# Patient Record
Sex: FEMALE | Race: WHITE | NOT HISPANIC OR LATINO | Employment: OTHER | URBAN - METROPOLITAN AREA
[De-identification: names, ages, dates, MRNs, and addresses within clinical notes are randomized per-mention and may not be internally consistent; named-entity substitution may affect disease eponyms.]

---

## 2017-01-04 ENCOUNTER — OFFICE VISIT (OUTPATIENT)
Dept: CARDIOLOGY | Facility: CLINIC | Age: 72
End: 2017-01-04
Payer: MEDICARE

## 2017-01-04 ENCOUNTER — HOSPITAL ENCOUNTER (OUTPATIENT)
Dept: RADIOLOGY | Facility: HOSPITAL | Age: 72
Discharge: HOME | End: 2017-01-04
Attending: INTERNAL MEDICINE
Payer: MEDICARE

## 2017-01-04 VITALS
WEIGHT: 126.13 LBS | HEIGHT: 60 IN | SYSTOLIC BLOOD PRESSURE: 110 MMHG | OXYGEN SATURATION: 97 % | DIASTOLIC BLOOD PRESSURE: 62 MMHG | BODY MASS INDEX: 24.76 KG/M2 | HEART RATE: 74 BPM

## 2017-01-04 DIAGNOSIS — Z82.49 FAMILY HISTORY OF ARTERIOSCLEROTIC CARDIOVASCULAR DISEASE: ICD-10-CM

## 2017-01-04 DIAGNOSIS — E78.00 PURE HYPERCHOLESTEROLEMIA: ICD-10-CM

## 2017-01-04 DIAGNOSIS — R06.02 SOB (SHORTNESS OF BREATH): ICD-10-CM

## 2017-01-04 DIAGNOSIS — I10 ESSENTIAL HYPERTENSION: ICD-10-CM

## 2017-01-04 DIAGNOSIS — Z72.0 TOBACCO USE: ICD-10-CM

## 2017-01-04 DIAGNOSIS — J43.8 OTHER EMPHYSEMA: Primary | ICD-10-CM

## 2017-01-04 PROCEDURE — 71010 XR CHEST 1 VIEW: CPT | Mod: 26,,, | Performed by: RADIOLOGY

## 2017-01-04 PROCEDURE — 3078F DIAST BP <80 MM HG: CPT | Mod: S$GLB,,, | Performed by: INTERNAL MEDICINE

## 2017-01-04 PROCEDURE — 1126F AMNT PAIN NOTED NONE PRSNT: CPT | Mod: S$GLB,,, | Performed by: INTERNAL MEDICINE

## 2017-01-04 PROCEDURE — 1159F MED LIST DOCD IN RCRD: CPT | Mod: S$GLB,,, | Performed by: INTERNAL MEDICINE

## 2017-01-04 PROCEDURE — 99215 OFFICE O/P EST HI 40 MIN: CPT | Mod: S$GLB,,, | Performed by: INTERNAL MEDICINE

## 2017-01-04 PROCEDURE — 3074F SYST BP LT 130 MM HG: CPT | Mod: S$GLB,,, | Performed by: INTERNAL MEDICINE

## 2017-01-04 PROCEDURE — 99999 PR PBB SHADOW E&M-EST. PATIENT-LVL III: CPT | Mod: PBBFAC,,, | Performed by: INTERNAL MEDICINE

## 2017-01-04 PROCEDURE — 1157F ADVNC CARE PLAN IN RCRD: CPT | Mod: S$GLB,,, | Performed by: INTERNAL MEDICINE

## 2017-01-04 PROCEDURE — 71010 XR CHEST 1 VIEW: CPT | Mod: TC

## 2017-01-04 PROCEDURE — 1160F RVW MEDS BY RX/DR IN RCRD: CPT | Mod: S$GLB,,, | Performed by: INTERNAL MEDICINE

## 2017-01-04 NOTE — PROGRESS NOTES
Subjective:   Patient ID:  Soumya Melchor is a 71 y.o. female who presents for follow-up of tests.  Echo- nml lv function. Pt did not want stress test and wants cath. Risks and benefits explained.  Pt with chronic SOB/JOHN worsening.    Hypertension   This is a chronic problem. The current episode started more than 1 year ago. The problem has been gradually improving since onset. The problem is controlled. Associated symptoms include shortness of breath. Pertinent negatives include no chest pain or palpitations. Past treatments include beta blockers, calcium channel blockers and diuretics. The current treatment provides moderate improvement. Compliance problems include medication side effects.    Hyperlipidemia   This is a chronic problem. The current episode started more than 1 year ago. The problem is controlled. Associated symptoms include shortness of breath. Pertinent negatives include no chest pain. Current antihyperlipidemic treatment includes statins and ezetimibe. The current treatment provides moderate improvement of lipids. Compliance problems include medication side effects.    Shortness of Breath   This is a chronic problem. The current episode started more than 1 year ago. The problem occurs intermittently. The problem has been gradually worsening. Pertinent negatives include no chest pain or leg swelling. The symptoms are aggravated by any activity. She has tried rest and beta agonist inhalers for the symptoms. The treatment provided mild relief.       Review of Systems   Constitution: Negative. Negative for weight gain.   HENT: Negative.    Eyes: Negative.    Cardiovascular: Negative.  Negative for chest pain, leg swelling and palpitations.   Respiratory: Positive for shortness of breath.    Endocrine: Negative.    Hematologic/Lymphatic: Negative.    Skin: Negative.    Musculoskeletal: Negative for muscle weakness.   Gastrointestinal: Negative.    Genitourinary: Negative.    Neurological: Negative.   Negative for dizziness.   Psychiatric/Behavioral: Negative.    Allergic/Immunologic: Negative.      Family History   Problem Relation Age of Onset    Heart attacks under age 50 Mother 41     Past Medical History   Diagnosis Date    Acid reflux 4/11/2014    Anxiety and depression 3/6/2014    Chronic pain associated with significant psychosocial dysfunction 2/21/2013    COPD (chronic obstructive pulmonary disease)     HTN (hypertension)     Hypothyroidism      Current Outpatient Prescriptions on File Prior to Visit   Medication Sig Dispense Refill    amlodipine (NORVASC) 10 MG tablet Take 1 tablet (10 mg total) by mouth once daily. 30 tablet 5    azithromycin (ZITHROMAX Z-LOTTIE) 250 MG tablet Take 1 tablet (250 mg total) by mouth once daily. 500 mg on day 1 (two tablets) followed by 250 mg once daily on days 2-5 6 tablet 1    calcium-vitamin D3 500 mg(1,250mg) -200 unit per tablet Take 1 tablet by mouth 2 (two) times daily with meals. 60 tablet 11    DOCOSAHEXANOIC ACID/EPA (FISH OIL ORAL) Take 1,200 mg by mouth 2 (two) times daily.      ezetimibe (ZETIA) 10 mg tablet Take 1 tablet (10 mg total) by mouth every evening. 30 tablet 11    FOLIC ACID/MULTIVIT-MIN/LUTEIN (CENTRUM SILVER ORAL) Take by mouth once daily.      ipratropium (ATROVENT) 0.06 % nasal spray 2 sprays by Nasal route 4 (four) times daily. As needed for rhinitis 15 mL 11    levothyroxine (SYNTHROID) 200 MCG tablet TAKE ONE TABLET BY MOUTH EVERY DAY 30 tablet 11    magnesium 30 mg Tab Take 30 mg by mouth.      MELATONIN ORAL Take by mouth.      metoprolol tartrate (LOPRESSOR) 25 MG tablet TAKE ONE TABLET BY MOUTH TWICE DAILY 60 tablet 11    nitrofurantoin, macrocrystal-monohydrate, (MACROBID) 100 MG capsule Take 100 mg by mouth 2 (two) times daily.  0    omeprazole (PRILOSEC) 40 MG capsule Take 1 capsule (40 mg total) by mouth once daily. 30 capsule 3    potassium 99 mg Tab Take by mouth.      SENNOSIDES (SENNA LAXATIVE ORAL) Take by  mouth 3 (three) times daily.      simvastatin (ZOCOR) 20 MG tablet Take 20 mg by mouth every evening.  11    tiotropium (SPIRIVA WITH HANDIHALER) 18 mcg inhalation capsule Inhale 1 capsule (18 mcg total) into the lungs once daily. 30 capsule 11    tramadol (ULTRAM) 50 mg tablet Take 50 mg by mouth 2 (two) times daily.  5    trazodone (DESYREL) 50 MG tablet TAKE ONE TABLET BY MOUTH EVERY EVENING 30 tablet 11    triamterene-hydrochlorothiazide 37.5-25 mg (DYAZIDE) 37.5-25 mg per capsule Take 1 capsule by mouth every morning. 30 capsule 11    VENTOLIN HFA 90 mcg/actuation inhaler Inhale 2 puffs into the lungs every 4 (four) hours as needed for Wheezing. 1 Inhaler 11    zolpidem (AMBIEN) 5 MG Tab TAKE ONE TABLET BY MOUTH AT BEDTIME AS NEEDED 30 tablet 3     No current facility-administered medications on file prior to visit.      Review of patient's allergies indicates:   Allergen Reactions    Ace inhibitors     Iodine and iodide containing products Hives     Since age of 48    Lisinopril      angioedema    Shrimp Other (See Comments)     Localized itching and swelling on her hands if she peels shrimp.  Able to eat shrimp without difficulty.  No generalized reaction.       Objective:     Physical Exam   Constitutional: She is oriented to person, place, and time. She appears well-developed and well-nourished.   HENT:   Head: Normocephalic and atraumatic.   Eyes: Conjunctivae and EOM are normal. Pupils are equal, round, and reactive to light.   Neck: Normal range of motion. Neck supple.   Cardiovascular: Normal rate, regular rhythm, normal heart sounds and intact distal pulses.    Pulmonary/Chest: Effort normal and breath sounds normal.   Abdominal: Soft. Bowel sounds are normal.   Musculoskeletal: Normal range of motion.   Neurological: She is alert and oriented to person, place, and time.   Skin: Skin is warm and dry.   Psychiatric: She has a normal mood and affect.   Nursing note and vitals  reviewed.      Assessment:     1. Other emphysema    2. SOB (shortness of breath)    3. Essential hypertension    4. Pure hypercholesterolemia    5. Family history of arteriosclerotic cardiovascular disease    6. Tobacco use        Plan:     Other emphysema    SOB (shortness of breath)    Essential hypertension    Pure hypercholesterolemia    Family history of arteriosclerotic cardiovascular disease    Tobacco use    Pt with shrimp allergy- iodine allergy.( pt states can eat shrimp though )  [facility] with prep  Risks and benefits explained  Continue amlodipine, metoprolol, diuretics- htn  Continue statin, zetia-hlp

## 2017-01-11 ENCOUNTER — TELEPHONE (OUTPATIENT)
Dept: PULMONOLOGY | Facility: HOSPITAL | Age: 72
End: 2017-01-11

## 2017-01-13 ENCOUNTER — HOSPITAL ENCOUNTER (OUTPATIENT)
Facility: HOSPITAL | Age: 72
Discharge: HOME | End: 2017-01-13
Attending: INTERNAL MEDICINE | Admitting: INTERNAL MEDICINE
Payer: MEDICARE

## 2017-01-13 ENCOUNTER — SURGERY (OUTPATIENT)
Age: 72
End: 2017-01-13

## 2017-01-13 VITALS
RESPIRATION RATE: 16 BRPM | WEIGHT: 122 LBS | DIASTOLIC BLOOD PRESSURE: 47 MMHG | SYSTOLIC BLOOD PRESSURE: 105 MMHG | HEIGHT: 60 IN | BODY MASS INDEX: 23.95 KG/M2 | TEMPERATURE: 98 F | OXYGEN SATURATION: 99 % | HEART RATE: 70 BPM

## 2017-01-13 DIAGNOSIS — K21.9 GASTRO-ESOPHAGEAL REFLUX DISEASE WITHOUT ESOPHAGITIS: ICD-10-CM

## 2017-01-13 DIAGNOSIS — R07.9 CHEST PAIN: ICD-10-CM

## 2017-01-13 DIAGNOSIS — I20.9 ANGINA PECTORIS: Primary | ICD-10-CM

## 2017-01-13 LAB — CORONARY STENOSIS: ABNORMAL

## 2017-01-13 PROCEDURE — 93458 L HRT ARTERY/VENTRICLE ANGIO: CPT | Mod: 26,,, | Performed by: INTERNAL MEDICINE

## 2017-01-13 PROCEDURE — 63600175 PHARM REV CODE 636 W HCPCS

## 2017-01-13 PROCEDURE — C1760 CLOSURE DEV, VASC: HCPCS

## 2017-01-13 PROCEDURE — 25000003 PHARM REV CODE 250

## 2017-01-13 RX ORDER — SODIUM CHLORIDE 9 MG/ML
INJECTION, SOLUTION INTRAVENOUS CONTINUOUS
Status: DISCONTINUED | OUTPATIENT
Start: 2017-01-13 | End: 2017-01-13 | Stop reason: HOSPADM

## 2017-01-13 RX ORDER — ACETAMINOPHEN 325 MG/1
650 TABLET ORAL EVERY 8 HOURS PRN
Status: DISCONTINUED | OUTPATIENT
Start: 2017-01-13 | End: 2017-01-13 | Stop reason: HOSPADM

## 2017-01-13 RX ORDER — DIPHENHYDRAMINE HCL 50 MG
50 CAPSULE ORAL ONCE
Status: COMPLETED | OUTPATIENT
Start: 2017-01-13 | End: 2017-01-13

## 2017-01-13 RX ORDER — NITROGLYCERIN 0.4 MG/1
0.4 TABLET SUBLINGUAL EVERY 5 MIN PRN
Status: DISCONTINUED | OUTPATIENT
Start: 2017-01-13 | End: 2017-01-13 | Stop reason: HOSPADM

## 2017-01-13 RX ORDER — ATROPINE SULFATE 0.1 MG/ML
0.5 INJECTION INTRAVENOUS
Status: DISCONTINUED | OUTPATIENT
Start: 2017-01-13 | End: 2017-01-13 | Stop reason: HOSPADM

## 2017-01-13 RX ORDER — DIAZEPAM 5 MG/1
5 TABLET ORAL
Status: COMPLETED | OUTPATIENT
Start: 2017-01-13 | End: 2017-01-13

## 2017-01-13 RX ORDER — HYDROCODONE BITARTRATE AND ACETAMINOPHEN 5; 325 MG/1; MG/1
1 TABLET ORAL EVERY 4 HOURS PRN
Status: DISCONTINUED | OUTPATIENT
Start: 2017-01-13 | End: 2017-01-13 | Stop reason: HOSPADM

## 2017-01-13 RX ORDER — OXYCODONE HYDROCHLORIDE 5 MG/1
10 TABLET ORAL EVERY 4 HOURS PRN
Status: DISCONTINUED | OUTPATIENT
Start: 2017-01-13 | End: 2017-01-13 | Stop reason: HOSPADM

## 2017-01-13 RX ORDER — FAMOTIDINE 10 MG/ML
20 INJECTION INTRAVENOUS
Status: COMPLETED | OUTPATIENT
Start: 2017-01-13 | End: 2017-01-13

## 2017-01-13 RX ADMIN — Medication 50 MG: at 09:01

## 2017-01-13 RX ADMIN — FAMOTIDINE 20 MG: 10 INJECTION INTRAVENOUS at 09:01

## 2017-01-13 RX ADMIN — SODIUM CHLORIDE: 9 INJECTION, SOLUTION INTRAVENOUS at 09:01

## 2017-01-13 RX ADMIN — DIAZEPAM 5 MG: 5 TABLET ORAL at 09:01

## 2017-01-13 NOTE — DISCHARGE INSTRUCTIONS
"Post-op Heart Catheterization    1. DIET: It is advisable for you to follow a diet that limits the intake of salt, sugar, saturated fats and cholesterol.     2. DRIVING: You have had sedation today, DO NOT drive or operate machinery for 24 hours. Avoid making critical decisions or signing legal documents until tomorrow.    3. ACTIVITY:                                Day of discharge:             NO vigorous activity, lifting or straining                                                   Day after discharge:         Avoid heavy lifting (up to 10 lbs)                                                                        Showers only, avoid tub baths for 5 days                                                                         Wash site gently with soap and water                 Remove dressing, apply bandaid if desired                                                                                                                                                                               2nd day after discharge:  Resume normal activities                                                                         Exercise program as instructed      4. WOUND CARE: It is not unusual to have a small amount of bruising appear in the groin area. It is also common to have a tender "knot" develop beneath the skin at the puncture site in the groin. This is scar tissue only and is not a cause for concern or alarm. This tender knot may take several weeks to fully resolve. The bruise will usually spread over several days. If the lump gets bigger, call you doctor immediately.    5. DISCOMFORT: For general discomfort at the puncture site, you may take 1 or 2 Acetaminophen (Tylenol) tablets every 4 hours as needed. (Do not take more than 4000 mg a day)    6. SIGNS AND SYMPTOMS TO REPORT:  Call your physician immediately if any of the following occur:                                            1. Problems with the affected leg: " "Pain, discomfort, loss of warmth, numbness or tingling                                                                                                                            2. Problems at the groin site: Bleeding, pain that is sudden/sharp/persistent,                   swelling at site or a change in "lump" size, increased redness or drainage at                     puncture site                                                               3. High fever (101 degrees or higher)      7. GO TO  THE EMERGENCY ROOM OR CALL 911 IF YOU HAVE: Chest pains or discomforts not relieved with 3 nitroglycerin doses (sublingual tablets or spray), numbness or severe pain or if your foot or leg becomes cold or discolored or uncontrolled bleeding from site (apply direct pressure above site).  "

## 2017-01-13 NOTE — IP AVS SNAPSHOT
[facility]  [id number] [place] [address] [address] Chaz [place]ELSEY [address]           Patient Discharge Instructions     Our goal is to set you up for success. This packet includes information on your condition, medications, and your home care. It will help you to care for yourself so you don't get sicker and need to go back to the hospital.     Please ask your nurse if you have any questions.        There are many details to remember when preparing to leave the hospital. Here is what you will need to do:    1. Take your medicine. If you are prescribed medications, review your Medication List in the following pages. You may have new medications to  at the pharmacy and others that you'll need to stop taking. Review the instructions for how and when to take your medications. Talk with your doctor or nurses if you are unsure of what to do.     2. Go to your follow-up appointments. Specific follow-up information is listed in the following pages. Your may be contacted by a transition nurse or clinical provider about future appointments. Be sure we have all of the phone numbers to reach you, if needed. Please contact your provider's office if you are unable to make an appointment.     3. Watch for warning signs. Your doctor or nurse will give you detailed warning signs to watch for and when to call for assistance. These instructions may also include educational information about your condition. If you experience any of warning signs to your health, call your doctor.               [facility]  Unless otherwise directed by your provider, please contact [facility], our nurse care line that is available for 24/7 assistance.     [phone] (toll-free)    Registered nurses in the [facility] provide clinical advisement, health education, appointment booking, and other advisory services.                    ** Verify the list of medication(s) below is accurate and up to date. Carry this with you  in case of emergency. If your medications have changed, please notify your healthcare provider.             Medication List      CONTINUE taking these medications        Additional Info                      amlodipine 10 MG tablet   Commonly known as:  NORVASC   Quantity:  30 tablet   Refills:  5   Dose:  10 mg    Instructions:  Take 1 tablet (10 mg total) by mouth once daily.     Begin Date    AM    Noon    PM    Bedtime       calcium-vitamin D3 500 mg(1,250mg) -200 unit per tablet   Quantity:  60 tablet   Refills:  11   Dose:  1 tablet    Instructions:  Take 1 tablet by mouth 2 (two) times daily with meals.     Begin Date    AM    Noon    PM    Bedtime       CENTRUM SILVER ORAL   Refills:  0    Instructions:  Take by mouth once daily.     Begin Date    AM    Noon    PM    Bedtime       ezetimibe 10 mg tablet   Commonly known as:  ZETIA   Quantity:  30 tablet   Refills:  11   Dose:  10 mg    Instructions:  Take 1 tablet (10 mg total) by mouth every evening.     Begin Date    AM    Noon    PM    Bedtime       FISH OIL ORAL   Refills:  0   Dose:  1200 mg    Instructions:  Take 1,200 mg by mouth 2 (two) times daily.     Begin Date    AM    Noon    PM    Bedtime       ipratropium 0.06 % nasal spray   Commonly known as:  ATROVENT   Quantity:  15 mL   Refills:  11   Dose:  2 spray    Instructions:  2 sprays by Nasal route 4 (four) times daily. As needed for rhinitis     Begin Date    AM    Noon    PM    Bedtime       levothyroxine 200 MCG tablet   Commonly known as:  SYNTHROID   Quantity:  30 tablet   Refills:  11    Instructions:  TAKE ONE TABLET BY MOUTH EVERY DAY     Begin Date    AM    Noon    PM    Bedtime       magnesium 30 mg Tab   Refills:  0   Dose:  30 mg    Instructions:  Take 30 mg by mouth.     Begin Date    AM    Noon    PM    Bedtime       MELATONIN ORAL   Refills:  0    Instructions:  Take by mouth.     Begin Date    AM    Noon    PM    Bedtime       metoprolol tartrate 25 MG tablet   Commonly known as:   LOPRESSOR   Quantity:  60 tablet   Refills:  11    Instructions:  TAKE ONE TABLET BY MOUTH TWICE DAILY     Begin Date    AM    Noon    PM    Bedtime       omeprazole 40 MG capsule   Commonly known as:  PRILOSEC   Quantity:  30 capsule   Refills:  3   Dose:  40 mg    Instructions:  Take 1 capsule (40 mg total) by mouth once daily.     Begin Date    AM    Noon    PM    Bedtime       potassium 99 mg Tab   Refills:  0    Instructions:  Take by mouth.     Begin Date    AM    Noon    PM    Bedtime       SENNA LAXATIVE ORAL   Refills:  0    Instructions:  Take by mouth 3 (three) times daily.     Begin Date    AM    Noon    PM    Bedtime       simvastatin 20 MG tablet   Commonly known as:  ZOCOR   Refills:  11   Dose:  20 mg    Instructions:  Take 20 mg by mouth every evening.     Begin Date    AM    Noon    PM    Bedtime       tiotropium 18 mcg inhalation capsule   Commonly known as:  SPIRIVA WITH HANDIHALER   Quantity:  30 capsule   Refills:  11   Dose:  18 mcg    Instructions:  Inhale 1 capsule (18 mcg total) into the lungs once daily.     Begin Date    AM    Noon    PM    Bedtime       tramadol 50 mg tablet   Commonly known as:  ULTRAM   Refills:  5   Dose:  50 mg    Instructions:  Take 50 mg by mouth 2 (two) times daily.     Begin Date    AM    Noon    PM    Bedtime       trazodone 50 MG tablet   Commonly known as:  DESYREL   Quantity:  30 tablet   Refills:  11    Instructions:  TAKE ONE TABLET BY MOUTH EVERY EVENING     Begin Date    AM    Noon    PM    Bedtime       triamterene-hydrochlorothiazide 37.5-25 mg 37.5-25 mg per capsule   Commonly known as:  DYAZIDE   Quantity:  30 capsule   Refills:  11    Instructions:  Take 1 capsule by mouth every morning.     Begin Date    AM    Noon    PM    Bedtime       VENTOLIN HFA 90 mcg/actuation inhaler   Quantity:  1 Inhaler   Refills:  11   Dose:  2 puff   Generic drug:  albuterol    Instructions:  Inhale 2 puffs into the lungs every 4 (four) hours as needed for Wheezing.      Begin Date    AM    Noon    PM    Bedtime       zolpidem 5 MG Tab   Commonly known as:  AMBIEN   Quantity:  30 tablet   Refills:  3    Instructions:  TAKE ONE TABLET BY MOUTH AT BEDTIME AS NEEDED     Begin Date    AM    Noon    PM    Bedtime                  Please bring to all follow up appointments:    1. A copy of your discharge instructions.  2. All medicines you are currently taking in their original bottles.  3. Identification and insurance card.    Please arrive 15 minutes ahead of scheduled appointment time.    Please call 24 hours in advance if you must reschedule your appointment and/or time.        Your Scheduled Appointments     Jan 17, 2017  3:00 PM CST   Established Patient Visit with MD [facility] ([facility])    [address] [facility] [id number] [phone]            Mar 08, 2017 11:40 AM CST   Established Patient Visit with Giuliano Henry MD   [facility]    [address] [place] [id number] [phone]            Mar 13, 2017 11:20 AM CDT   Established Patient Visit with MD [facility] ([facility])    [address] [facility] [address]   [phone]            Mar 21, 2017  9:40 AM CDT   Established Patient Visit with Navarro Carmona MD   [facility] ([facility]    [address] [place] [id number]   [phone]            Mar 21, 2017 10:00 AM CDT   Spirometry with SPIROMETRY, [facility]   [facility] ([facility]    [address] [place] [id number] [phone]              Follow-up Information     Follow up In 1 week.    Why:  For wound re-check        Follow up with Steve Bonds MD. Schedule an appointment as soon as possible for a visit in 1 week.    Specialty:  Cardiothoracic Surgery    Why:  NIKKI  AT [facility] WILL CALL WITH APPT  [phone]    Contact information:    Steven LOVE  [facility]  [address]  [facility] [address]  [phone]        Referrals     Future Orders    Ambulatory consult to Cardiovascular Surgery         Discharge Instructions     Future Orders    Activity as tolerated     Comments:    Refer to nursing instructions    Call MD for:  difficulty breathing, headache or visual disturbances     Call MD for:  extreme fatigue     Call MD for:  hives     Call MD for:  persistent dizziness or light-headedness     Call MD for:  persistent nausea and vomiting     Call MD for:  redness, tenderness, or signs of infection (pain, swelling, redness, odor or green/yellow discharge around incision site)     Call MD for:  severe uncontrolled pain     Call MD for:  temperature >100.4     Diet general     Questions:    Total calories:      Fat restriction, if any:      Protein restriction, if any:      Na restriction, if any:      Fluid restriction:      Additional restrictions:  Cardiac (Low Na/Chol)        Discharge Instructions       Post-op Heart Catheterization    1. DIET: It is advisable for you to follow a diet that limits the intake of salt, sugar, saturated fats and cholesterol.     2. DRIVING: You have had sedation today, DO NOT drive or operate machinery for 24 hours. Avoid making critical decisions or signing legal documents until tomorrow.    3. ACTIVITY:                                Day of discharge:             NO vigorous activity, lifting or straining                                                   Day after discharge:         Avoid heavy lifting (up to 10 lbs)                                                                        Showers only, avoid tub baths for 5 days                                                                         Wash site gently with soap and water                 Remove dressing, apply bandaid if desired                                                                                                                                                  "                              2nd day after discharge:  Resume normal activities                                                                         Exercise program as instructed      4. WOUND CARE: It is not unusual to have a small amount of bruising appear in the groin area. It is also common to have a tender "knot" develop beneath the skin at the puncture site in the groin. This is scar tissue only and is not a cause for concern or alarm. This tender knot may take several weeks to fully resolve. The bruise will usually spread over several days. If the lump gets bigger, call you doctor immediately.    5. DISCOMFORT: For general discomfort at the puncture site, you may take 1 or 2 Acetaminophen (Tylenol) tablets every 4 hours as needed. (Do not take more than 4000 mg a day)    6. SIGNS AND SYMPTOMS TO REPORT:  Call your physician immediately if any of the following occur:                                            1. Problems with the affected leg: Pain, discomfort, loss of warmth, numbness or tingling                                                                                                                            2. Problems at the groin site: Bleeding, pain that is sudden/sharp/persistent,                   swelling at site or a change in "lump" size, increased redness or drainage at                     puncture site                                                               3. High fever (101 degrees or higher)      7. GO TO  THE EMERGENCY ROOM OR CALL 911 IF YOU HAVE: Chest pains or discomforts not relieved with 3 nitroglycerin doses (sublingual tablets or spray), numbness or severe pain or if your foot or leg becomes cold or discolored or uncontrolled bleeding from site (apply direct pressure above site).    Discharge References/Attachments     CORONARY ARTERY DISEASE (CAD), UNDERSTANDING (ENGLISH)    BYPASS SURGERY, MINIMALLY INVASIVE (ENGLISH)        Admission Information     Date & Time " Provider Department CSN    1/13/2017  7:57 AM Giuliano Henry MD [facility] [id number]      Care Providers     Provider Role Specialty Primary office phone    Giuliano Henry MD Attending Provider Cardiology [phone]    Giuliano Henry MD Surgeon  Cardiology [phone]      Your Vitals Were     BP Pulse Temp Resp Height Weight    114/56 (BP Location: Left arm, Patient Position: Lying, BP Method: Automatic) 72 98 °F (36.7 °C) (Oral) 18 5' (1.524 m) 55.3 kg (122 lb)    SpO2 BMI             100% 23.83 kg/m2         Recent Lab Values     No lab values to display.      Allergies as of 1/13/2017        Reactions    Ace Inhibitors     Iodine And Iodide Containing Products Hives    Since age of 48    Lisinopril     angioedema    Shrimp Other (See Comments)    Localized itching and swelling on her hands if she peels shrimp.  Able to eat shrimp without difficulty.  No generalized reaction.      Advance Directives     An advance directive is a document which, in the event you are no longer able to make decisions for yourself, tells your healthcare team what kind of treatment you do or do not want to receive, or who you would like to make those decisions for you.  If you do not currently have an advance directive, [facility] encourages you to create one.  For more information call:  [phone] ([phone]), [phone] ([phone]),  or log on to [email or web address].        Smoking Cessation     If you would like to quit smoking:   You may be eligible for free services if you are a [place] resident and started smoking cigarettes before September 1, 1988.  Call the Smoking Cessation Trust (SCT) toll free at (717) 017-6161 or (926) 720-8387.   Call 1-800-QUIT-NOW if you do not meet the above criteria.            Language Assistance Services     ATTENTION: Language assistance services are available, free of charge. Please call [phone].      ATENCIÓN: apryl Vázquez  disposición servicios gratuitos de asistencia lingüística. Wilma al [phone].     [facility] Ý: N?u b?n nói Ti?ng Vi?t, có các d?ch v? h? tr? ngôn ng? mi?n phí dành cho b?n. G?i s? [phone].        [facility] Sign-Up     Activating your [facility] account is as easy as 1-2-3!     1) Visit [email or web address], select Sign Up Now, enter this activation code and your date of birth, then select Next.  [email or web address]  Expires: 1/28/2017 11:48 AM      2) Create a username and password to use when you visit [facility] in the future and select a security question in case you lose your password and select Next.    3) Enter your e-mail address and click Sign Up!    Additional Information  If you have questions, please e-mail [email or web address] or call [phone] to talk to our [facility] staff. Remember, [facility] is NOT to be used for urgent needs. For medical emergencies, dial 911.          [facility] complies with applicable Federal civil rights laws and does not discriminate on the basis of race, color, national origin, age, disability, or sex.

## 2017-01-13 NOTE — IP AVS SNAPSHOT
[facility]  [id number] [place] [address] Dr Chaz KLESEY [address]           I have received a copy of my After Visit Summary and discharge instructions from [facility].    INSTRUCTIONS RECEIVED AND UNDERSTOOD BY:                     Patient/Patient Representative: ________________________________________________________________     Date/Time: ________________________________________________________________                     Instructions Given By: ________________________________________________________________     Date/Time: ________________________________________________________________

## 2017-01-13 NOTE — PLAN OF CARE
Problem: Patient Care Overview  Goal: Discharge Needs Assessment  Outcome: Outcome(s) achieved Date Met:  01/13/17  1330 DISCHARGE INSTRUCTIONS REV'D WITH PT AND SPOUSE. BVU. IV REMOVED. PT WHEELED TO CAR.

## 2017-01-13 NOTE — BRIEF OP NOTE
Procedure Date: 01/13/2017  Procedure:C  Assistant: none  Specimen: none  : DIMAS BUTLER MD  Diagnosis:angina  Sedation:conscious  Complications:none  Blood loss: < 50 cc  Findings:multivessel cad , nml lv function  Recommend: CABG

## 2017-01-13 NOTE — H&P
Subjective:   Patient ID: Soumya Melchor is a 71 y.o. female who presents for follow-up of tests.  Echo- nml lv function. Pt did not want stress test and wants cath. Risks and benefits explained.  Pt with chronic SOB/JOHN worsening.     Hypertension   This is a chronic problem. The current episode started more than 1 year ago. The problem has been gradually improving since onset. The problem is controlled. Associated symptoms include shortness of breath. Pertinent negatives include no chest pain or palpitations. Past treatments include beta blockers, calcium channel blockers and diuretics. The current treatment provides moderate improvement. Compliance problems include medication side effects.   Hyperlipidemia   This is a chronic problem. The current episode started more than 1 year ago. The problem is controlled. Associated symptoms include shortness of breath. Pertinent negatives include no chest pain. Current antihyperlipidemic treatment includes statins and ezetimibe. The current treatment provides moderate improvement of lipids. Compliance problems include medication side effects.   Shortness of Breath   This is a chronic problem. The current episode started more than 1 year ago. The problem occurs intermittently. The problem has been gradually worsening. Pertinent negatives include no chest pain or leg swelling. The symptoms are aggravated by any activity. She has tried rest and beta agonist inhalers for the symptoms. The treatment provided mild relief.         Review of Systems   Constitution: Negative. Negative for weight gain.   HENT: Negative.   Eyes: Negative.   Cardiovascular: Negative. Negative for chest pain, leg swelling and palpitations.   Respiratory: Positive for shortness of breath.   Endocrine: Negative.   Hematologic/Lymphatic: Negative.   Skin: Negative.   Musculoskeletal: Negative for muscle weakness.   Gastrointestinal: Negative.   Genitourinary: Negative.   Neurological: Negative. Negative  for dizziness.   Psychiatric/Behavioral: Negative.   Allergic/Immunologic: Negative.            Family History   Problem Relation Age of Onset    Heart attacks under age 50 Mother 41           Past Medical History   Diagnosis Date    Acid reflux 4/11/2014    Anxiety and depression 3/6/2014    Chronic pain associated with significant psychosocial dysfunction 2/21/2013    COPD (chronic obstructive pulmonary disease)      HTN (hypertension)      Hypothyroidism               Current Outpatient Prescriptions on File Prior to Visit   Medication Sig Dispense Refill    amlodipine (NORVASC) 10 MG tablet Take 1 tablet (10 mg total) by mouth once daily. 30 tablet 5    azithromycin (ZITHROMAX Z-LOTTIE) 250 MG tablet Take 1 tablet (250 mg total) by mouth once daily. 500 mg on day 1 (two tablets) followed by 250 mg once daily on days 2-5 6 tablet 1    calcium-vitamin D3 500 mg(1,250mg) -200 unit per tablet Take 1 tablet by mouth 2 (two) times daily with meals. 60 tablet 11    DOCOSAHEXANOIC ACID/EPA (FISH OIL ORAL) Take 1,200 mg by mouth 2 (two) times daily.        ezetimibe (ZETIA) 10 mg tablet Take 1 tablet (10 mg total) by mouth every evening. 30 tablet 11    FOLIC ACID/MULTIVIT-MIN/LUTEIN (CENTRUM SILVER ORAL) Take by mouth once daily.        ipratropium (ATROVENT) 0.06 % nasal spray 2 sprays by Nasal route 4 (four) times daily. As needed for rhinitis 15 mL 11    levothyroxine (SYNTHROID) 200 MCG tablet TAKE ONE TABLET BY MOUTH EVERY DAY 30 tablet 11    magnesium 30 mg Tab Take 30 mg by mouth.        MELATONIN ORAL Take by mouth.        metoprolol tartrate (LOPRESSOR) 25 MG tablet TAKE ONE TABLET BY MOUTH TWICE DAILY 60 tablet 11    nitrofurantoin, macrocrystal-monohydrate, (MACROBID) 100 MG capsule Take 100 mg by mouth 2 (two) times daily.   0    omeprazole (PRILOSEC) 40 MG capsule Take 1 capsule (40 mg total) by mouth once daily. 30 capsule 3    potassium 99 mg Tab Take by mouth.        SENNOSIDES (SENNA  LAXATIVE ORAL) Take by mouth 3 (three) times daily.        simvastatin (ZOCOR) 20 MG tablet Take 20 mg by mouth every evening.   11    tiotropium (SPIRIVA WITH HANDIHALER) 18 mcg inhalation capsule Inhale 1 capsule (18 mcg total) into the lungs once daily. 30 capsule 11    tramadol (ULTRAM) 50 mg tablet Take 50 mg by mouth 2 (two) times daily.   5    trazodone (DESYREL) 50 MG tablet TAKE ONE TABLET BY MOUTH EVERY EVENING 30 tablet 11    triamterene-hydrochlorothiazide 37.5-25 mg (DYAZIDE) 37.5-25 mg per capsule Take 1 capsule by mouth every morning. 30 capsule 11    VENTOLIN HFA 90 mcg/actuation inhaler Inhale 2 puffs into the lungs every 4 (four) hours as needed for Wheezing. 1 Inhaler 11    zolpidem (AMBIEN) 5 MG Tab TAKE ONE TABLET BY MOUTH AT BEDTIME AS NEEDED 30 tablet 3      No current facility-administered medications on file prior to visit.             Review of patient's allergies indicates:   Allergen Reactions    Ace inhibitors      Iodine and iodide containing products Hives       Since age of 48    Lisinopril         angioedema    Shrimp Other (See Comments)       Localized itching and swelling on her hands if she peels shrimp. Able to eat shrimp without difficulty. No generalized reaction.         Objective:      Physical Exam   Constitutional: She is oriented to person, place, and time. She appears well-developed and well-nourished.   HENT:   Head: Normocephalic and atraumatic.   Eyes: Conjunctivae and EOM are normal. Pupils are equal, round, and reactive to light.   Neck: Normal range of motion. Neck supple.   Cardiovascular: Normal rate, regular rhythm, normal heart sounds and intact distal pulses.   Pulmonary/Chest: Effort normal and breath sounds normal.   Abdominal: Soft. Bowel sounds are normal.   Musculoskeletal: Normal range of motion.   Neurological: She is alert and oriented to person, place, and time.   Skin: Skin is warm and dry.   Psychiatric: She has a normal mood and affect.    Nursing note and vitals reviewed.        Assessment:      1. Other emphysema    2. SOB (shortness of breath)    3. Essential hypertension    4. Pure hypercholesterolemia    5. Family history of arteriosclerotic cardiovascular disease    6. Tobacco use          Plan:      Other emphysema     SOB (shortness of breath)     Essential hypertension     Pure hypercholesterolemia     Family history of arteriosclerotic cardiovascular disease     Tobacco use     Pt with shrimp allergy- iodine allergy.( pt states can eat shrimp though )  [facility] with prep  Risks and benefits explained  Continue amlodipine, metoprolol, diuretics- htn  Continue statin, zetia-hlp

## 2017-01-13 NOTE — PLAN OF CARE
Problem: Patient Care Overview  Goal: Individualization & Mutuality  Outcome: Ongoing (interventions implemented as appropriate)  PT HERE FOR LHC,  AND DTR AT BEDSIDE.  HX: COPD, HTN, HLP, ANXIETY, SLEEP DISORDER

## 2017-01-16 ENCOUNTER — HOSPITAL ENCOUNTER (OUTPATIENT)
Dept: PULMONOLOGY | Facility: HOSPITAL | Age: 72
Discharge: HOME | End: 2017-01-16
Attending: THORACIC SURGERY (CARDIOTHORACIC VASCULAR SURGERY)
Payer: MEDICARE

## 2017-01-16 ENCOUNTER — TELEPHONE (OUTPATIENT)
Dept: PULMONOLOGY | Facility: CLINIC | Age: 72
End: 2017-01-16

## 2017-01-16 ENCOUNTER — TELEPHONE (OUTPATIENT)
Dept: CARDIOLOGY | Facility: CLINIC | Age: 72
End: 2017-01-16

## 2017-01-16 DIAGNOSIS — I25.10 CORONARY ATHEROSCLEROSIS OF UNSPECIFIED TYPE OF VESSEL, NATIVE OR GRAFT: ICD-10-CM

## 2017-01-16 DIAGNOSIS — J96.11 CHRONIC RESPIRATORY FAILURE WITH HYPOXIA: Primary | ICD-10-CM

## 2017-01-16 DIAGNOSIS — J44.1 OBSTRUCTIVE CHRONIC BRONCHITIS WITH EXACERBATION: ICD-10-CM

## 2017-01-16 LAB
ALLENS TEST: ABNORMAL
DELSYS: ABNORMAL
FIO2: 21
HCO3 UR-SCNC: 28.5 MMOL/L (ref 24–28)
MODE: ABNORMAL
PCO2 BLDA: 41.4 MMHG (ref 35–45)
PH SMN: 7.45 [PH] (ref 7.35–7.45)
PO2 BLDA: 85 MMHG (ref 80–100)
POC BE: 4 MMOL/L
POC SATURATED O2: 97 % (ref 95–100)
SAMPLE: ABNORMAL
SITE: ABNORMAL

## 2017-01-16 PROCEDURE — 36600 WITHDRAWAL OF ARTERIAL BLOOD: CPT

## 2017-01-16 PROCEDURE — 82803 BLOOD GASES ANY COMBINATION: CPT

## 2017-01-16 NOTE — TELEPHONE ENCOUNTER
----- Message from Allison Batres sent at 1/16/2017  9:15 AM CST -----  Pt need to set up surgery or a procedure. Please call her at [phone].

## 2017-01-16 NOTE — TELEPHONE ENCOUNTER
The patient has been trying to reach [facility] in [place].She had a cath on Friday and needs a CABG appointment set up.I contacted [facility] and they were given the incorrect phone number to contact patient. i gave Ori the correct information and she stated she will be contacting patient today.

## 2017-01-16 NOTE — TELEPHONE ENCOUNTER
Call in for appointment in next 2 days for ABG and preop evaluation for Dr. Bonds. ABG on day of office visit

## 2017-01-16 NOTE — TELEPHONE ENCOUNTER
The patient was contacted by [facility] and she is now scheduled for preop testing.She will not be coming tomorrow for site check.Her

## 2017-01-17 ENCOUNTER — TELEPHONE (OUTPATIENT)
Dept: PULMONOLOGY | Facility: CLINIC | Age: 72
End: 2017-01-17

## 2017-01-17 NOTE — TELEPHONE ENCOUNTER
----- Message from Kate Diaz sent at 1/17/2017  2:48 PM CST -----  Contact: pt  Pt calling to speak to nurse...states that she has to have a f/u appt with Dr Carmona as soon as possible...states that he advised that he needs to see her this week....advised pt of availability...pt refused times offered....please adv/call pt back at [phone]///thx jw

## 2017-01-17 NOTE — LETTER
January 17, 2017    Steve Bonds MD  CVT           [facility]  [address] [place] [address]  Phone: [phone]  Fax: [phone]   Patient: Soumya Melchor   MR Number: [id number]   YOB: 1945   Date of Visit: 1/17/2017       Dear Dr. Bonds    Thank you for referring Soumya Melchor to me for evaluation. Attached you will find relevant portions of my prior assessment and plan of care.    She is scheduled for an appointment on Friday 1/20/2017. On her last office visit she was on a suboptimal regimen and was taking medications intermittenly.    I think that her lung function can be improved prior to Coronary Artery Bypass Grafting.  I am planning on starting her on an aggressive regime of bronchodilators, incentive spirometry and jet nebulizers. She should be ready for surgery in about 14 days    If you have questions, please do not hesitate to call me. I look forward to following Soumya Melchor along with you.    Sincerely,      Navarro Carmona MD            CC    Enclosure

## 2017-01-17 NOTE — TELEPHONE ENCOUNTER
----- Message from Navarro Carmona MD sent at 1/16/2017  3:42 PM CST -----  Call in for appointment in next 2 days for ABG and preop evaluation for Dr. Bonds. ABG on day of office visit

## 2017-01-17 NOTE — TELEPHONE ENCOUNTER
Spoke with patient.  She called back on her own to now set up appointment to see Dr. Carmona  Informed her that Dr. Carmona will call her today.

## 2017-01-17 NOTE — TELEPHONE ENCOUNTER
Called concerning need for office appointment to discuss  Results of ABG  Results for SOUMYA LEMON (MRN [id number]) as of 1/17/2017 15:10   Ref. Range 1/16/2017 16:38   POC PH Latest Ref Range: 7.35 - 7.45  7.445   POC PCO2 Latest Ref Range: 35 - 45 mmHg 41.4   POC PO2 Latest Ref Range: 80 - 100 mmHg 85   POC BE Latest Ref Range: -2 to 2 mmol/L 4   POC HCO3 Latest Ref Range: 24 - 28 mmol/L 28.5 (H)   POC SATURATED O2 Latest Ref Range: 95 - 100 % 97   FiO2 Unknown 21   Sample Unknown ARTERIAL   DelSys Unknown Room Air   Allens Test Unknown Pass   Site Unknown LR   Mode Unknown SPONT     Needs appointment to discuss treatment plan            Subjective:       Patient ID: Soumya Lemon is a 71 y.o. female.    Chief Complaint: She       Results (discussed ABG)    HPI   Hard time breathing  This is a 71-year-old patient with history of chronic obstructive pulmonary   disease and pulmonary nodules who is seen back in followup examination.  She has   complaints of increasing shortness of breath and dyspnea.  It has gotten worse   over the past year.  She is accompanied by her daughter from [place].  She gets   short of breath when she attempts to walk from one room to the other in her   house.  She has a morning cough with phlegm production.  She is beginning to get   short of breath while performing daily activities.  Onset has been somewhat   slow, but worse in the past few months.  She continues to smoke electronic   cigarettes despite admonitions for the contrary.  She has a longstanding history   of regular cigarette smoking was stopped some time ago.    She is somewhat stressed by the fact she is taking care of a 13-year-old child   in her home, and this is causing her a lot of stress.  Her shortness of breath   gets worse when she is stressed.  She has a cardiac evaluation scheduled.  I   have encouraged her to discuss her symptoms with the cardiologist.    On evaluation today, the patient has moderate to  severe airway obstruction and   significant hypoxemic response to exercise - walking consistent with advanced   lung disease.  This patient has only been taking an albuterol inhaler.  While in   the office today, she was instructed on the use of Spiriva as well as review of   a metered-dose inhaler.    I discussed the possibility of placing her on inhaled corticosteroids, but she   at this time is somewhat averse to using corticosteroids.  I indicated that   there are a few systemic side effects, but the patient did not want to consider   it at this time.  She has been told that she has osteoporosis, and I placed her   on a combination of vitamin D and calcium to supplement her regular multivitamin   routine.    In the office today, she also had complaints of clear postnasal drip, worse at   nighttime.  She indicates that she has tried Flonase in the past, but did not   work.  We will try her on Atrovent nasal spray and see if this would be of any   help.    With her continued use of electronic cigarettes, it may be hard to completely   resolve her sinus and pulmonary symptoms.  I have advised her to consider   tapering back or discontinuing use of these devices if possible.    We will see the patient back in three months with spirometry to see if she has   improved with the use of Spiriva.      SHARON/GINO  dd: 12/23/2016 15:05:19 (CST)  td: 12/24/2016 06:11:13 (CST)  Doc ID   #[id number]  Job ID #[id number]    CC:     [id number]        Past Medical History   Diagnosis Date    Acid reflux 4/11/2014    Anxiety and depression 3/6/2014    Chronic pain associated with significant psychosocial dysfunction 2/21/2013    COPD (chronic obstructive pulmonary disease)     HTN (hypertension)     Hypothyroidism      Past Surgical History   Procedure Laterality Date    Knee surgery Right     Dilation and curettage of uterus      Spinal cord stimulator implant       Social History     Social History    Marital status:       Spouse name: N/A    Number of children: N/A    Years of education: N/A     Occupational History    Not on file.     Social History Main Topics    Smoking status: Former Smoker     Packs/day: 1.00     Years: 50.00     Quit date: 12/10/2012    Smokeless tobacco: Not on file    Alcohol use No    Drug use: Not on file    Sexual activity: Not on file     Other Topics Concern    Not on file     Social History Narrative     Review of Systems   Constitutional: Positive for fatigue. Negative for fever.   HENT: Positive for postnasal drip and rhinorrhea. Negative for congestion.    Respiratory: Positive for cough, sputum production, shortness of breath, dyspnea on extertion, use of rescue inhaler and Paroxysmal Nocturnal Dyspnea.    Cardiovascular: Negative for chest pain, palpitations and leg swelling.   Skin: Negative for rash.   Gastrointestinal: Negative for nausea and abdominal pain.   Neurological: Negative for dizziness, syncope, weakness and light-headedness.   Hematological: Negative for adenopathy. Does not bruise/bleed easily.   Psychiatric/Behavioral: Negative for sleep disturbance. The patient is not nervous/anxious.        Objective:      Physical Exam   Constitutional: She is oriented to person, place, and time. She appears well-developed and well-nourished.   HENT:   Head: Normocephalic and atraumatic.   Mouth/Throat: Oropharyngeal exudate present.   Eyes: Conjunctivae are normal. Pupils are equal, round, and reactive to light.   Neck: Neck supple. No JVD present. No tracheal deviation present. No thyromegaly present.   Cardiovascular: Normal rate, regular rhythm and normal heart sounds.    Pulmonary/Chest: Effort normal. No respiratory distress. She has decreased breath sounds. She has wheezes in the right lower field and the left lower field. She has no rhonchi. She has no rales. She exhibits no tenderness.   Abdominal: Soft. Bowel sounds are normal.   Musculoskeletal: Normal range of motion. She  exhibits no edema.   Lymphadenopathy:     She has no cervical adenopathy.   Neurological: She is alert and oriented to person, place, and time.   Skin: Skin is warm and dry.   Nursing note and vitals reviewed.    Personal Diagnostic Review  Chest x-ray: hyperinflation with pulmonary nodules  Spirometry: moderate to severe obstruction    No flowsheet data found.      Assessment:       No diagnosis found.    Outpatient Encounter Prescriptions as of 1/17/2017   Medication Sig Dispense Refill    amlodipine (NORVASC) 10 MG tablet Take 1 tablet (10 mg total) by mouth once daily. 30 tablet 5    calcium-vitamin D3 500 mg(1,250mg) -200 unit per tablet Take 1 tablet by mouth 2 (two) times daily with meals. 60 tablet 11    DOCOSAHEXANOIC ACID/EPA (FISH OIL ORAL) Take 1,200 mg by mouth 2 (two) times daily.      ezetimibe (ZETIA) 10 mg tablet Take 1 tablet (10 mg total) by mouth every evening. 30 tablet 11    FOLIC ACID/MULTIVIT-MIN/LUTEIN (CENTRUM SILVER ORAL) Take by mouth once daily.      ipratropium (ATROVENT) 0.06 % nasal spray 2 sprays by Nasal route 4 (four) times daily. As needed for rhinitis 15 mL 11    levothyroxine (SYNTHROID) 200 MCG tablet TAKE ONE TABLET BY MOUTH EVERY DAY 30 tablet 11    magnesium 30 mg Tab Take 30 mg by mouth.      MELATONIN ORAL Take by mouth.      metoprolol tartrate (LOPRESSOR) 25 MG tablet TAKE ONE TABLET BY MOUTH TWICE DAILY 60 tablet 11    omeprazole (PRILOSEC) 40 MG capsule Take 1 capsule (40 mg total) by mouth once daily. 30 capsule 3    potassium 99 mg Tab Take by mouth.      SENNOSIDES (SENNA LAXATIVE ORAL) Take by mouth 3 (three) times daily.      simvastatin (ZOCOR) 20 MG tablet Take 20 mg by mouth every evening.  11    tiotropium (SPIRIVA WITH HANDIHALER) 18 mcg inhalation capsule Inhale 1 capsule (18 mcg total) into the lungs once daily. 30 capsule 11    tramadol (ULTRAM) 50 mg tablet Take 50 mg by mouth 2 (two) times daily.  5    trazodone (DESYREL) 50 MG tablet  TAKE ONE TABLET BY MOUTH EVERY EVENING 30 tablet 11    triamterene-hydrochlorothiazide 37.5-25 mg (DYAZIDE) 37.5-25 mg per capsule Take 1 capsule by mouth every morning. 30 capsule 11    VENTOLIN HFA 90 mcg/actuation inhaler Inhale 2 puffs into the lungs every 4 (four) hours as needed for Wheezing. 1 Inhaler 11    zolpidem (AMBIEN) 5 MG Tab TAKE ONE TABLET BY MOUTH AT BEDTIME AS NEEDED 30 tablet 3     No facility-administered encounter medications on file as of 1/17/2017.      No orders of the defined types were placed in this encounter.    Plan:       Requested Prescriptions      No prescriptions requested or ordered in this encounter     There are no diagnoses linked to this encounter.       No Follow-up on file.    MEDICAL DECISION MAKING: Moderate to high complexity.  Overall, the multiple problems listed are of moderate to high severity that may impact quality of life and activities of daily living. Side effects of medications, treatment plan as well as options and alternatives reviewed and discussed with patient. There was counseling of patient concerning these issues.    Total time spent in face to face counseling and coordination of care - 40  minutes over 50% of time was used in discussion of prognosis, risks, benefits of treatment, instructions and compliance with regimen . Discussion with other physicians or health care providers (DME, NP, pharmacy, respiratory therapy) occurred.

## 2017-01-17 NOTE — TELEPHONE ENCOUNTER
----- Message from Mala Majano sent at 1/17/2017  3:45 PM CST -----  Contact: Kenia/[facility]  Kenia returned call to Angelic. She can be contacted at [phone].    Thanks,  Mala

## 2017-01-17 NOTE — TELEPHONE ENCOUNTER
Spoke with Kely at Dr. Bonds's office to inform of letter faxed and of content of note and to inform Dr. Bonds.

## 2017-01-17 NOTE — TELEPHONE ENCOUNTER
Spoke with patient who states she wants Dr. Carmona to call her.  She states will not come in for another appointment for pulmonary clearance.  ABG's done 1/16/17.  Note to Dr. Carmona

## 2017-01-19 RX ORDER — METOPROLOL TARTRATE 25 MG/1
TABLET, FILM COATED ORAL
Qty: 60 TABLET | Refills: 11 | Status: ON HOLD | OUTPATIENT
Start: 2017-01-19 | End: 2017-02-17 | Stop reason: HOSPADM

## 2017-01-20 ENCOUNTER — OFFICE VISIT (OUTPATIENT)
Dept: PULMONOLOGY | Facility: CLINIC | Age: 72
End: 2017-01-20
Payer: MEDICARE

## 2017-01-20 VITALS
WEIGHT: 126.31 LBS | RESPIRATION RATE: 20 BRPM | OXYGEN SATURATION: 91 % | DIASTOLIC BLOOD PRESSURE: 58 MMHG | HEART RATE: 69 BPM | SYSTOLIC BLOOD PRESSURE: 122 MMHG | BODY MASS INDEX: 24.8 KG/M2 | HEIGHT: 60 IN

## 2017-01-20 DIAGNOSIS — Z72.0 TOBACCO USE: ICD-10-CM

## 2017-01-20 DIAGNOSIS — J44.9 CHRONIC OBSTRUCTIVE PULMONARY DISEASE, UNSPECIFIED COPD TYPE: ICD-10-CM

## 2017-01-20 DIAGNOSIS — R91.8 PULMONARY NODULES/LESIONS, MULTIPLE: ICD-10-CM

## 2017-01-20 DIAGNOSIS — Z01.818 PRE-OP EVALUATION: Primary | ICD-10-CM

## 2017-01-20 PROCEDURE — 3078F DIAST BP <80 MM HG: CPT | Mod: S$GLB,,, | Performed by: INTERNAL MEDICINE

## 2017-01-20 PROCEDURE — 99215 OFFICE O/P EST HI 40 MIN: CPT | Mod: 25,S$GLB,, | Performed by: INTERNAL MEDICINE

## 2017-01-20 PROCEDURE — 1159F MED LIST DOCD IN RCRD: CPT | Mod: S$GLB,,, | Performed by: INTERNAL MEDICINE

## 2017-01-20 PROCEDURE — 99406 BEHAV CHNG SMOKING 3-10 MIN: CPT | Mod: S$GLB,,, | Performed by: INTERNAL MEDICINE

## 2017-01-20 PROCEDURE — 1157F ADVNC CARE PLAN IN RCRD: CPT | Mod: S$GLB,,, | Performed by: INTERNAL MEDICINE

## 2017-01-20 PROCEDURE — 99999 PR PBB SHADOW E&M-EST. PATIENT-LVL V: CPT | Mod: PBBFAC,,, | Performed by: INTERNAL MEDICINE

## 2017-01-20 PROCEDURE — 1160F RVW MEDS BY RX/DR IN RCRD: CPT | Mod: S$GLB,,, | Performed by: INTERNAL MEDICINE

## 2017-01-20 PROCEDURE — 94640 AIRWAY INHALATION TREATMENT: CPT | Mod: S$GLB,,, | Performed by: INTERNAL MEDICINE

## 2017-01-20 PROCEDURE — 3074F SYST BP LT 130 MM HG: CPT | Mod: S$GLB,,, | Performed by: INTERNAL MEDICINE

## 2017-01-20 RX ORDER — ALBUTEROL SULFATE 0.83 MG/ML
2.5 SOLUTION RESPIRATORY (INHALATION)
Status: COMPLETED | OUTPATIENT
Start: 2017-01-20 | End: 2017-01-20

## 2017-01-20 RX ORDER — IBUPROFEN 100 MG/5ML
1000 SUSPENSION, ORAL (FINAL DOSE FORM) ORAL DAILY
COMMUNITY

## 2017-01-20 RX ORDER — BUDESONIDE 0.5 MG/2ML
0.5 INHALANT ORAL 2 TIMES DAILY
Qty: 120 ML | Refills: 12 | Status: SHIPPED | OUTPATIENT
Start: 2017-01-20 | End: 2017-04-05

## 2017-01-20 RX ORDER — SENNOSIDES 8.6 MG/1
3 TABLET ORAL
COMMUNITY
End: 2018-02-15

## 2017-01-20 RX ORDER — LANOLIN ALCOHOL/MO/W.PET/CERES
500 CREAM (GRAM) TOPICAL
Status: ON HOLD | COMMUNITY
Start: 2014-04-03 | End: 2017-02-17 | Stop reason: HOSPADM

## 2017-01-20 RX ORDER — IPRATROPIUM BROMIDE AND ALBUTEROL SULFATE 2.5; .5 MG/3ML; MG/3ML
3 SOLUTION RESPIRATORY (INHALATION) EVERY 6 HOURS
Qty: 120 VIAL | Refills: 12 | Status: SHIPPED | OUTPATIENT
Start: 2017-01-20 | End: 2017-04-05

## 2017-01-20 RX ORDER — SIMVASTATIN 40 MG/1
20 TABLET, FILM COATED ORAL NIGHTLY
Status: ON HOLD | COMMUNITY
Start: 2014-10-03 | End: 2017-02-17 | Stop reason: HOSPADM

## 2017-01-20 RX ORDER — FLUOROMETHOLONE 1 MG/ML
SUSPENSION/ DROPS OPHTHALMIC
Refills: 0 | COMMUNITY
Start: 2017-01-09 | End: 2017-08-03

## 2017-01-20 RX ADMIN — ALBUTEROL SULFATE 2.5 MG: 0.83 SOLUTION RESPIRATORY (INHALATION) at 10:01

## 2017-01-20 NOTE — PROGRESS NOTES
Subjective:       Patient ID: Soumya Melchor is a 71 y.o. female.    Chief Complaint:   She   presents for evaluation and treatment of Chronic Obstructive Pulmonary Disease and chronic exercise induced hypoxemia after being discharged from the hospital  6  days ago for angina. Since discharge symptoms have rapidly improving course since that time. Patient denies chest pain . Symptoms are aggravated by activity. Symptoms improve with rest.  Respiratory: positive for dyspnea on exertion; Cardiovascular: no chest pain or palpitations.  Patient currently is on oxygen at 2 L/min per nasal cannula. with exercise    NO cough or sputum production  Otherwise negative pulmonary symptoms  Still smokes a few vapo cigarettes a day.    Asthma with COPD    HPI  Past Medical History   Diagnosis Date    Acid reflux 4/11/2014    Anxiety and depression 3/6/2014    Chronic pain associated with significant psychosocial dysfunction 2/21/2013    COPD (chronic obstructive pulmonary disease)     HTN (hypertension)     Hypothyroidism      Past Surgical History   Procedure Laterality Date    Knee surgery Right     Dilation and curettage of uterus      Spinal cord stimulator implant       Social History     Social History    Marital status:      Spouse name: N/A    Number of children: N/A    Years of education: N/A     Occupational History    Not on file.     Social History Main Topics    Smoking status: Former Smoker     Packs/day: 1.00     Years: 50.00     Quit date: 12/10/2012    Smokeless tobacco: Not on file    Alcohol use No    Drug use: Not on file    Sexual activity: Not on file     Other Topics Concern    Not on file     Social History Narrative     Review of Systems   Constitutional: Negative for fever and fatigue.   HENT: Negative for postnasal drip, rhinorrhea, sinus pressure and congestion.    Respiratory: Positive for chest tightness and shortness of breath. Negative for cough, hemoptysis, sputum  production, wheezing, orthopnea, asthma nighttime symptoms and use of rescue inhaler.    Cardiovascular: Negative for chest pain and palpitations.   Musculoskeletal: Negative for arthralgias.   Skin: Negative for rash.   Gastrointestinal: Negative for nausea.   Neurological: Negative for dizziness and weakness.   Hematological: Negative for adenopathy.   Psychiatric/Behavioral: Negative for sleep disturbance. The patient is not nervous/anxious.        Objective:      Physical Exam   Constitutional: She is oriented to person, place, and time. She appears well-developed and well-nourished.   HENT:   Head: Normocephalic and atraumatic.   Eyes: Conjunctivae are normal. Pupils are equal, round, and reactive to light.   Neck: Neck supple. No JVD present. No tracheal deviation present. No thyromegaly present.   Cardiovascular: Normal rate, regular rhythm and normal heart sounds.    Pulmonary/Chest: Effort normal and breath sounds normal. No respiratory distress. She has no wheezes. She has no rales. She exhibits no tenderness.   Abdominal: Soft. Bowel sounds are normal.   Musculoskeletal: Normal range of motion. She exhibits no edema.   Lymphadenopathy:     She has no cervical adenopathy.   Neurological: She is alert and oriented to person, place, and time.   Skin: Skin is warm and dry.   Nursing note and vitals reviewed.    Personal Diagnostic Review  Chest x-ray: hyperinflation - stable pulmonary nodule  Radiology Result       Name:  :  Patient MRN:   Soumya Melchor 1945 [id number]   Account Number: Room & Bed Accession Number:   [id number]   [id number]   Authorizing Physician: Patient Class: Diagnosis:   [facility]- Outpatient Diagnostic Testing SOB (shortness of breath) [R06.02 (ICD-10-CM)]  Essential hypertension [I10 (ICD-10-CM)]  Pure hypercholesterolemia [E78.00 (ICD-10-CM)]  Family history of arteriosclerotic cardiovascular disease [Z82.49 (ICD-10-CM)]  Tobacco use [Z72.0 (ICD-10-CM)]    Procedure: Exam Date: Reason for Exam:   X-Ray Chest 1 View 2017 None Specified          RESULTS:  Comparison: 2015     2 views      Findings:     Stable pulmonary nodule RIGHT apex. Heart and pulmonary vasculature are within normal limits allowing for lordotic positioning. Intraspinal leads noted in the mid and lower T-spine. Trachea is midline. A few scattered bibasilar infiltrates noted.  IMPRESSION:          Mild chronic obstructive pulmonary disease changes with scattered bibasilar infiltrates without consolidation.     Pulmonary nodule again noted the RIGHT apex.     Additional findings as above.        Electronically signed by: JHONY STARR III, MD  Date:     17  Time:    16:46        Signed By: Jhony Starr III, MD on 2017 4:46 PM         REVIEW OF CT SCAN: Stable pulmonary nodules for over 2 years    Radiology Result       Name:  :  Patient MRN:   Soumya Melchor 1945 [id number]   Account Number: Room & Bed Accession Number:   [id number]   [id number]   Authorizing Physician: Patient Class: Diagnosis:   [facility]- Outpatient Diagnostic Testing Pulmonary nodules/lesions, multiple [R91.8 (ICD-10-CM)]   Procedure: Exam Date: Reason for Exam:   CT Chest Without Contrast 2016 None Specified          RESULTS:  Axial images obtained to the chest. Comparison is made to the previous exam from 2016. Vascular calcifications including coronary artery calcifications are again evident. There is suggestion of a hiatal hernia. There is no evidence of a pleural or pericardial effusion. Some right renal scarring as questioned. The adrenal glands are unremarkable. A small fat-containing Bochdalek type hernia is noted in the left posterior hemidiaphragm.     Areas of air trapping suggesting emphysema are evident. On series #2 image #12 there is a well-defined 7 by 8mm noncalcified pulmonary nodule. This is not significant to change relative to 2015. A stable  subpleural nodule is seen on series #2 image #15 measuring 4 x 2 mm. Some probable scarring and groundglass opacities in the right middle lobe. On series #2 image #49 there is a 2-3 mm noncalcified pulmonary nodule which is stable. Some linear density in subpleural groundglass opacity seen in the left lower lobe in image #46 series #2. Some lingular opacity is evident. There is a distal groundglass opacity in the left upper lobe. At the level of ashanti on series #2 image #25 there is a 3 mm noncalcified pulmonary nodule.  IMPRESSION:   Stable appearance of multiple pulmonary noduleswith areas of scarring and emphysema. No new masses or areas of infiltration are identified.     Hiatal hernia and extensive vascular calcifications are noted.  ______________________________________      Electronically signed by: CARLOS SERRANO MD  Date:     07/07/16  Time:    10:28        Signed By: Carlos Serrano MD on 7/7/2016 10:28 AM               Spirometry: severe obstruction improves with bronchodilator  .[email or web address]  No flowsheet data found.        Results for CÉSAR LEMON (MRN [id number]) as of 1/20/2017 10:27   Ref. Range 1/16/2017 16:38   POC PH Latest Ref Range: 7.35 - 7.45  7.445   POC PCO2 Latest Ref Range: 35 - 45 mmHg 41.4   POC PO2 Latest Ref Range: 80 - 100 mmHg 85   POC BE Latest Ref Range: -2 to 2 mmol/L 4   POC HCO3 Latest Ref Range: 24 - 28 mmol/L 28.5 (H)   POC SATURATED O2 Latest Ref Range: 95 - 100 % 97   FiO2 Unknown 21   Sample Unknown ARTERIAL   DelSys Unknown Room Air   Allens Test Unknown Pass   Site Unknown LR   Mode Unknown SPONT       Physician Interpretation  Severe obstruction.(FEV1>35% and <49% predicted).  Improvement in airflow following bronchodilator therapy suggests an asthmatic component.  Mild restriction based on reduced forced vital capacity (FVC - Forced Vital Capacity).    (Physician 01/04/2017 11:45AM, Dr. Navarro Carmona / Final: 01/04/2017 11:45AM, Dr. Navarro Carmona)          Assessment:        1. Pre-op evaluation    2. Chronic obstructive pulmonary disease, unspecified COPD type    3. Pulmonary nodules/lesions, multiple    4. Tobacco use        Outpatient Encounter Prescriptions as of 1/20/2017   Medication Sig Dispense Refill    amlodipine (NORVASC) 10 MG tablet Take 1 tablet (10 mg total) by mouth once daily. 30 tablet 5    ascorbic acid, vitamin C, (VITAMIN C) 1000 MG tablet Take 1,000 mg by mouth.      calcium-vitamin D3 500 mg(1,250mg) -200 unit per tablet Take 1 tablet by mouth 2 (two) times daily with meals. 60 tablet 11    DOCOSAHEXANOIC ACID/EPA (FISH OIL ORAL) Take 1,200 mg by mouth 2 (two) times daily.      ezetimibe (ZETIA) 10 mg tablet Take 1 tablet (10 mg total) by mouth every evening. 30 tablet 11    fluorometholone 0.1% (FML) 0.1 % DrpS INSTILL ONE DROP IN THE RIGHT EYE THREE TIMES DAILY  0    FOLIC ACID/MULTIVIT-MIN/LUTEIN (CENTRUM SILVER ORAL) Take by mouth once daily.      ipratropium (ATROVENT) 0.06 % nasal spray 2 sprays by Nasal route 4 (four) times daily. As needed for rhinitis 15 mL 11    levothyroxine (SYNTHROID) 200 MCG tablet TAKE ONE TABLET BY MOUTH EVERY DAY 30 tablet 11    magnesium 30 mg Tab Take 30 mg by mouth.      MELATONIN ORAL Take by mouth.      metoprolol tartrate (LOPRESSOR) 25 MG tablet TAKE ONE TABLET BY MOUTH TWICE DAILY 60 tablet 11    niacin 500 mg TbSR Take 500 mg by mouth.      omeprazole (PRILOSEC) 40 MG capsule Take 1 capsule (40 mg total) by mouth once daily. 30 capsule 3    potassium 99 mg Tab Take by mouth.      senna (SENOKOT) 8.6 mg tablet Take 3 tablets by mouth.      SENNOSIDES (SENNA LAXATIVE ORAL) Take by mouth 3 (three) times daily.      simvastatin (ZOCOR) 40 MG tablet Take 40 mg by mouth.      tiotropium (SPIRIVA WITH HANDIHALER) 18 mcg inhalation capsule Inhale 1 capsule (18 mcg total) into the lungs once daily. 30 capsule 11    tramadol (ULTRAM) 50 mg tablet Take 50 mg by mouth 2 (two) times daily.  5    trazodone  (DESYREL) 50 MG tablet TAKE ONE TABLET BY MOUTH EVERY EVENING 30 tablet 11    triamterene-hydrochlorothiazide 37.5-25 mg (DYAZIDE) 37.5-25 mg per capsule Take 1 capsule by mouth every morning. 30 capsule 11    VENTOLIN HFA 90 mcg/actuation inhaler Inhale 2 puffs into the lungs every 4 (four) hours as needed for Wheezing. 1 Inhaler 11    zolpidem (AMBIEN) 5 MG Tab TAKE ONE TABLET BY MOUTH AT BEDTIME AS NEEDED 30 tablet 3    [DISCONTINUED] ipratropium (ATROVENT HFA) 17 mcg/actuation inhaler Inhale 2 puffs into the lungs.      albuterol-ipratropium 2.5mg-0.5mg/3mL (DUO-NEB) 0.5 mg-3 mg(2.5 mg base)/3 mL nebulizer solution Take 3 mLs by nebulization every 6 (six) hours. 120 vial 12    budesonide (PULMICORT) 0.5 mg/2 mL nebulizer solution Take 2 mLs (0.5 mg total) by nebulization 2 (two) times daily. Wash out mouth after using. 120 mL 12    [DISCONTINUED] simvastatin (ZOCOR) 20 MG tablet Take 20 mg by mouth every evening.  11    [] albuterol nebulizer solution 2.5 mg        No facility-administered encounter medications on file as of 2017.      Orders Placed This Encounter   Procedures    NEBULIZER KIT (SUPPLIES) FOR HOME USE     Order Specific Question:   Height:     Answer:   5' (1.524 m)     Order Specific Question:   Weight:     Answer:   57.3 kg (126 lb 5.2 oz)     Order Specific Question:   Length of need (1-99 months):     Answer:   99     Order Specific Question:   Mask or Mouthpiece?     Answer:   Mouthpiece     Order Specific Question:   Vendor:     Answer:   [facility]     Order Specific Question:   Expected Date of Delivery:     Answer:   2017    NEBULIZER FOR HOME USE     Order Specific Question:   Height:     Answer:   5' (1.524 m)     Order Specific Question:   Weight:     Answer:   57.3 kg (126 lb 5.2 oz)     Order Specific Question:   Length of need (1-99 months):     Answer:   99     Order Specific Question:   Vendor:     Answer:   [facility]     Order Specific Question:    Expected Date of Delivery:     Answer:   1/20/2017    Incentive spirometry     Standing Status:   Future     Standing Expiration Date:   3/21/2018    Spirometry with/without bronchodilator     Standing Status:   Future     Standing Expiration Date:   1/20/2018     Plan:       Requested Prescriptions     Signed Prescriptions Disp Refills    albuterol-ipratropium 2.5mg-0.5mg/3mL (DUO-NEB) 0.5 mg-3 mg(2.5 mg base)/3 mL nebulizer solution 120 vial 12     Sig: Take 3 mLs by nebulization every 6 (six) hours.    budesonide (PULMICORT) 0.5 mg/2 mL nebulizer solution 120 mL 12     Sig: Take 2 mLs (0.5 mg total) by nebulization 2 (two) times daily. Wash out mouth after using.     Pre-op evaluation    Chronic obstructive pulmonary disease, unspecified COPD type  -     Incentive spirometry; Future; Expected date: 1/20/17  -     NEBULIZER KIT (SUPPLIES) FOR HOME USE  -     NEBULIZER FOR HOME USE  -     albuterol-ipratropium 2.5mg-0.5mg/3mL (DUO-NEB) 0.5 mg-3 mg(2.5 mg base)/3 mL nebulizer solution; Take 3 mLs by nebulization every 6 (six) hours.  Dispense: 120 vial; Refill: 12  -     budesonide (PULMICORT) 0.5 mg/2 mL nebulizer solution; Take 2 mLs (0.5 mg total) by nebulization 2 (two) times daily. Wash out mouth after using.  Dispense: 120 mL; Refill: 12  -     albuterol nebulizer solution 2.5 mg; Take 3 mLs (2.5 mg total) by nebulization one time.  -     Spirometry with/without bronchodilator; Future; Expected date: 7/23/17    Pulmonary nodules/lesions, multiple    Tobacco use    Patient advised of the adverse effects of cigarette smoking including increased risk of cancer and respiratory diseases. Method of smoking cessation with nicotine replacement products discussed The use of Chantrix  And/or nicotine replacement products were described to patient. Side effects including nausea, GI upset, insomnia, sleep disturbance and possible suicidal ideation discussed. Patient expressed understanding and is given educational  materials. Patient appeared responsive and wished to proceed.  Smoking cessation counseling: Intensive direct physician to patient counseling of greater than 5 minutes   CPT: 77244           Return in about 6 months (around 7/20/2017) for rodolfo.     Patient was given a jet nebulization treatment with albuterol. The patient was instructed on the proper use of nebulizer machine and given nebulizer set up device. Side effects of medication discussed.  Patient voiced understanding.   CPT code 47567    Patient given an incentive spirometer and instructed on use of device. Instructed to use at least four times a day for 4-5 minute intervals with jet neb treatments.    Clearance for surgery:  The patient is at an increased risk of complications from surgery due to multiple medical problems including COPD. Vaccination status reviewed and updated. Risks of possible complications of surgery discussed in detail including risk of respiratory failure requiring mechanical ventilation, post operative pneumonia, deep venous thrombosis, pulmonary embolism and death.  Pulmonary function studies, arterial blood gases and chest X ray reports are independently reviewed. Methods of improving lung function prior to surgery including incentive spirometry, regular use of bronchodilators, exercise and respiratory toilet discussed. Patient voices understanding of increased risks involved due to compromised pulmonary status and the need to comply with treatment plan prior to surgery.  The patient is cleared for anesthesia and surgery from a pulmonary standpoint.    Recommend 10 -14 days of bronchodilator therapy prior to surgery  Advised patient to stop vapo cigarettes    MEDICAL DECISION MAKING: Moderate to high complexity.  Overall, the multiple problems listed are of moderate to high severity that may impact quality of life and activities of daily living. Side effects of medications, treatment plan as well as options and alternatives  reviewed and discussed with patient. There was counseling of patient concerning these issues.    Total time spent in face to face counseling and coordination of care - 40 minutes over 50% of time was used in discussion of prognosis, risks, benefits of treatment, instructions and compliance with regimen . Discussion with other physicians or health care providers (homehealth, durable medical equipment providers).

## 2017-01-20 NOTE — PATIENT INSTRUCTIONS
Use Budesonide jet nebs twice a day - no more or no less.  Use Albuterol prior to budesonide at least twice a day. May use albuterol every 4 - 6 hours if needed for shortness of breath, cough or chest congestion        Ipratropium Bromide, Albuterol Sulfate Nebulizer solution  What is this medicine?  ALBUTEROL; IPRATROPIUM (al BYOO ter ole; i pra TROE pee um) has two bronchodilators. It helps open up the airways in your lungs to make it easier to breathe. This medicine is used to treat chronic obstructive pulmonary disease (COPD).  This medicine may be used for other purposes; ask your health care provider or pharmacist if you have questions.  What should I tell my health care provider before I take this medicine?  They need to know if you have any of the following conditions:  · heart disease  · high blood pressure  · irregular heartbeat  · an unusual or allergic reaction to albuterol, ipratropium, atropine, soya protein, soybeans or peanuts, other medicines, foods, dyes, or preservatives  · pregnant or trying to get pregnant  · breast-feeding  How should I use this medicine?  This medicine is used in a nebulizer. Nebulizers make a liquid into an aerosol that you breathe in through your mouth or your mouth and nose into your lungs. You will be taught how to use your nebulizer. Follow the directions on your prescription label. Take your medicine at regular intervals. Do not use more often than directed.  Talk to your pediatrician regarding the use of this medicine in children. Special care may be needed.  Overdosage: If you think you have taken too much of this medicine contact a poison control center or emergency room at once.  NOTE: This medicine is only for you. Do not share this medicine with others.  What if I miss a dose?  If you miss a dose, use it as soon as you can. If it is almost time for your next dose, use only that dose. Do not use double or extra doses.  What may interact with this medicine?  Do not  take this medicine with any of the following medications:  · MAOIs like Carbex, Eldepryl, Marplan, Nardil, and Parnate  This medicine may also interact with the following medications:  · diuretics  · medicines for depression, anxiety, or psychotic disturbances  · medicines for irregular heartbeat  · medicines for weight loss including some herbal products  · methadone  · pimozide  · some medicines for blood pressure or the heart  · sertindole  This list may not describe all possible interactions. Give your health care provider a list of all the medicines, herbs, non-prescription drugs, or dietary supplements you use. Also tell them if you smoke, drink alcohol, or use illegal drugs. Some items may interact with your medicine.  What should I watch for while using this medicine?  Tell your doctor or healthcare professional if your symptoms do not start to get better or if they get worse. If your breathing gets worse while you are using this medicine, call your doctor right away. Do not stop using your medicine unless your doctor tells you to.  Your mouth may get dry. Chewing sugarless gum or sucking hard candy, and drinking plenty of water may help. Contact your doctor if the problem does not go away or is severe.  You may get dizzy or have blurred vision. Do not drive, use machinery, or do anything that needs mental alertness until you know how this medicine affects you. Do not stand or sit up quickly, especially if you are an older patient. This reduces the risk of dizzy or fainting spells.  What side effects may I notice from receiving this medicine?  Side effects that you should report to your doctor or health care professional as soon as possible:  · allergic reactions like skin rash, itching or hives, swelling of the face, lips, or tongue  · breathing problems  · feeling faint or lightheaded, falls  · fever  · high blood pressure  · irregular heartbeat or chest pain  · muscle cramps or weakness  · pain, tingling,  numbness in the hands or feet  · vomiting  Side effects that usually do not require medical attention (report to your doctor or health care professional if they continue or are bothersome):  · blurred vision  · cough  · difficulty passing urine  · difficulty sleeping  · headache  · nervousness or trembling  · stuffy or runny nose  · unusual taste  · upset stomach  This list may not describe all possible side effects. Call your doctor for medical advice about side effects. You may report side effects to FDA at 5-232-HTM-6249.  Where should I keep my medicine?  Keep out of the reach of children.  Store at a room temperature 2 and 30 degees C (36 to 86 degrees F). Protect from light. Store this medicine in the protective pouch until ready to use. Throw away any unused medicine after the expiration date.  NOTE:This sheet is a summary. It may not cover all possible information. If you have questions about this medicine, talk to your doctor, pharmacist, or health care provider. Copyright© 2016 [employer]      Budesonide Nebulizer suspension  What is this medicine?  BUDESONIDE (bue KIRSTIE oh nide) is a corticosteroid. It helps decrease inflammation in your lungs. This medicine is used to treat the symptoms of asthma. Never use this medicine for an acute asthma attack.  This medicine may be used for other purposes; ask your health care provider or pharmacist if you have questions.  What should I tell my health care provider before I take this medicine?  They need to know if you have any of these conditions:  · bone problems  · glaucoma  · immune system problems  · infection, like chickenpox, tuberculosis, herpes, or fungal infection  · recent surgery or injury of the mouth or throat  · taking corticosteroids by mouth  · an unusual or allergic reaction to budesonide, steroids, other medicines, foods, dyes, or preservatives  · pregnant or trying to get pregnant  · breast-feeding  How should I use this medicine?  This medicine  is used in a nebulizer. Nebulizers make a liquid into an aerosol that you breathe in through your mouth or your mouth and nose into your lungs. You will be taught how to use your nebulizer. Rinse your mouth with water after use. Follow the directions on your prescription label. Do not mix this medicine with other medicines in your nebulizer. Do not use more often than directed.  Talk to your pediatrician regarding the use of this medicine in children. Special care may be needed.  Overdosage: If you think you have taken too much of this medicine contact a poison control center or emergency room at once.  NOTE: This medicine is only for you. Do not share this medicine with others.  What if I miss a dose?  If you miss a dose, use it as soon as you remember. If it is almost time for your next dose, use only that dose and continue with your regular schedule, spacing doses evenly. Do not use double or extra doses.  What may interact with this medicine?  Do not take this medicine with any of the following medications:  · mifepristone  This medicine may also interact with the following medications:  · cimetidine  · clarithromycin  · erythromycin  · itraconazole  · ketoconazole  · some vaccinations  This list may not describe all possible interactions. Give your health care provider a list of all the medicines, herbs, non-prescription drugs, or dietary supplements you use. Also tell them if you smoke, drink alcohol, or use illegal drugs. Some items may interact with your medicine.  What should I watch for while using this medicine?  Visit your doctor or health care professional for regular checks on your progress. Check with your health care professional if your symptoms do not improve. If your symptoms get worse or if you need your short acting inhalers more often, call your doctor right away.  The medicine may increase your risk of getting an infection. Stay away from people who are sick. Tell your doctor or health care  professional if you are around anyone with measles or chickenpox.  What side effects may I notice from receiving this medicine?  Side effects that you should report to your doctor or health care professional as soon as possible:  · allergic reactions like skin rash, itching or hives, swelling of the face, lips, or tongue  · breathing problems  · changes in vision  · unusual swelling  · white patches or sores in the mouth or throat  Side effects that usually do not require medical attention (report to your doctor or health care professional if they continue or are bothersome):  · coughing, hoarseness  · headache  · runny nose  · stomach upset  This list may not describe all possible side effects. Call your doctor for medical advice about side effects. You may report side effects to FDA at 5-376-FDA-5805.  Where should I keep my medicine?  Keep out of the reach of children.  Store at a room temperature between 20 and 25 degrees C (68 and 77 degrees F). Do not refrigerate or freeze. Keep unopened vials in the foil pouch. When the package has been opened, the shelf life of the unused medicine is 2 weeks when protected from light. Unused medicine should be returned to the aluminum foil envelope right away to protect them from light. Throw away any unused medicine after the expiration date.  NOTE:This sheet is a summary. It may not cover all possible information. If you have questions about this medicine, talk to your doctor, pharmacist, or health care provider. Copyright© 2016 [employer]        Step-by-Step  Using a Nebulizer with a Mouthpiece    © 3758-9475 [employer]. [address] [place] [address], [place] [address]. All rights reserved. This information is not intended as a substitute for professional medical care. Always follow your healthcare professional's instructions.        Step-by-Step  Using a Nebulizer    © 4645-4362 [employer]. [address] [place] [address], [place] [address]. All rights  reserved. This information is not intended as a substitute for professional medical care. Always follow your healthcare professional's instructions.        Using an Incentive Spirometer  Soon after your surgery, a nurse or therapist will teach you breathing exercises. These keep your lungs clear, strengthen your breathing muscles, and help prevent complications.  The exercises include doing a deep-breathing exercise using a device called an incentive spirometer.  To do these exercises, you will breathe in through your mouth and not your nose. The incentive spirometer only works correctly if you breathe in through your mouth.  Four steps to clear lungs     Deep breathing expands the lungs, aids circulation, and helps prevent pneumonia.   Step 1. Exhale normally.  · Relax and breathe out.  Step 2. Place your lips tightly around the mouthpiece.  · Make sure the device is upright and not tilted.  Step 3. Inhale as much air as you can through the mouthpiece (don't breath through your nose).  · Inhale slowly and deeply.  · Hold your breath long enough to keep the balls or disk raised for at least 3 seconds.  · Some spirometers have an indicator to let you know that you are breathing in too fast. If the indicator goes off, breathe in more slowly.  Step 4. Repeat the exercise regularly.  · Do this exercise every hour while you're awake, or as your healthcare provider tells you to.  · You will also be taught coughing exercises and be asked to do them regularly on your own.  © 4036-0852 [employer]. [address] [place] [address], [place] [address]. All rights reserved. This information is not intended as a substitute for professional medical care. Always follow your healthcare professional's instructions.        Discharge Instructions: Using an Incentive Spirometer  An incentive spirometer is a device that helps you do deep breathing exercises. These exercises will help you breathe better and improve the function of  your lungs. The incentive spirometer gives you a way to take an active part in your recovery.  Follow these steps to use your incentive spirometer  Inhale normally. Relax and breathe out:  · Place your lips tightly around the mouthpiece.  · Make sure the device is upright and not tilted.  · Inhale as much air as you can through the mouthpiece (don't breathe through your nose). Some spirometers have an indicator to let you know that you are breathing in too fast. If the indicator goes off, breathe in more slowly.  · Hold your breath long enough to keep the balls (or disk) raised for at least 5 seconds.  · Do this exercise every hour while youre awake or as often as your healthcare provider instructs.  · If you were also taught coughing exercises, do them regularly as instructed.  Follow-up care  Make a follow-up appointment as directed by our staff.     When to call the healthcare provider  Call your healthcare provider right away if you have any of the following:  · Shortness of breath that doesn't get better after taking your medicine  · Chest pain  · Fever above 101.0°F (38.3°C)  · Brownish, bloody, or smelly sputum  · Blue lips or fingernails   © 2858-5108 [employer]. [address] [place] [address], [place] [address]. All rights reserved. This information is not intended as a substitute for professional medical care. Always follow your healthcare professional's instructions.

## 2017-01-20 NOTE — MR AVS SNAPSHOT
[facility]  [address] [facility] [address]  Phone: [phone]  Fax: [phone]                  Soumya Melchor   2017 10:00 AM   Office Visit    Description:  Female : 1945   Provider:  Navarro Carmona MD   Department:  [facility]           Reason for Visit     Asthma with COPD           Diagnoses this Visit        Comments    Pre-op evaluation    -  Primary     Chronic obstructive pulmonary disease, unspecified COPD type                To Do List           Future Appointments        Provider Department Dept Phone    3/8/2017 11:40 AM Giuliano Henry MD [facility] [phone]    3/13/2017 11:20 AM Giuliano Henry MD [facility] [phone]    3/21/2017 9:40 AM Navarro Carmona MD [facility] [phone]    3/21/2017 10:00 AM SPIROMETRY, [facility] [phone]    2017 9:30 AM [facility] CT1 LIMIT 500 LBS [facility] -  [phone]      Goals (5 Years of Data)              17    Blood Pressure < 150/90   105/47  114/56  112/54       These Medications        Disp Refills Start End    albuterol-ipratropium 2.5mg-0.5mg/3mL (DUO-NEB) 0.5 mg-3 mg(2.5 mg base)/3 mL nebulizer solution 120 vial 12 2017    Take 3 mLs by nebulization every 6 (six) hours. - Nebulization    [place] [facility] [place] [address] [place] [address] Ph #: [phone]       budesonide (PULMICORT) 0.5 mg/2 mL nebulizer solution 120 mL 12 2017    Take 2 mLs (0.5 mg total) by nebulization 2 (two) times daily. Wash out mouth after using. - Nebulization    Pharm[place] [facility] [place] [address] [facility] Ph #: [phone]       Notes to Pharmacy: Dispense # 60 vials of 0.5mg/2ml      [facility]     [facility] -  Assistance  Registered nurses in the [facility]  [facility] provide clinical advisement, health education, appointment booking, and other advisory services.  Call for this free service at [phone].             Medications           START taking these NEW medications        Refills    albuterol-ipratropium 2.5mg-0.5mg/3mL (DUO-NEB) 0.5 mg-3 mg(2.5 mg base)/3 mL nebulizer solution 12    Sig: Take 3 mLs by nebulization every 6 (six) hours.    Class: Normal    Route: Nebulization    budesonide (PULMICORT) 0.5 mg/2 mL nebulizer solution 12    Sig: Take 2 mLs (0.5 mg total) by nebulization 2 (two) times daily. Wash out mouth after using.    Class: Normal    Route: Nebulization      These medications were administered today        Dose Freq    albuterol nebulizer solution 2.5 mg 2.5 mg Clinic/\[facility]\"" 1 time    Sig: Take 3 mLs (2.5 mg total) by nebulization one time.    Class: Normal    Route: Nebulization      STOP taking these medications     ipratropium (ATROVENT HFA) 17 mcg/actuation inhaler Inhale 2 puffs into the lungs.           Verify that the below list of medications is an accurate representation of the medications you are currently taking.  If none reported, the list may be blank. If incorrect, please contact your healthcare provider. Carry this list with you in case of emergency.           Current Medications     amlodipine (NORVASC) 10 MG tablet Take 1 tablet (10 mg total) by mouth once daily.    ascorbic acid, vitamin C, (VITAMIN C) 1000 MG tablet Take 1,000 mg by mouth.    calcium-vitamin D3 500 mg(1,250mg) -200 unit per tablet Take 1 tablet by mouth 2 (two) times daily with meals.    DOCOSAHEXANOIC ACID/EPA (FISH OIL ORAL) Take 1,200 mg by mouth 2 (two) times daily.    ezetimibe (ZETIA) 10 mg tablet Take 1 tablet (10 mg total) by mouth every evening.    fluorometholone 0.1% (FML) 0.1 % DrpS INSTILL ONE DROP IN THE RIGHT EYE THREE TIMES DAILY    FOLIC ACID/MULTIVIT-MIN/LUTEIN (CENTRUM SILVER ORAL) Take by mouth once daily.    ipratropium (ATROVENT) 0.06 %  nasal spray 2 sprays by Nasal route 4 (four) times daily. As needed for rhinitis    levothyroxine (SYNTHROID) 200 MCG tablet TAKE ONE TABLET BY MOUTH EVERY DAY    magnesium 30 mg Tab Take 30 mg by mouth.    MELATONIN ORAL Take by mouth.    metoprolol tartrate (LOPRESSOR) 25 MG tablet TAKE ONE TABLET BY MOUTH TWICE DAILY    niacin 500 mg TbSR Take 500 mg by mouth.    omeprazole (PRILOSEC) 40 MG capsule Take 1 capsule (40 mg total) by mouth once daily.    potassium 99 mg Tab Take by mouth.    senna (SENOKOT) 8.6 mg tablet Take 3 tablets by mouth.    SENNOSIDES (SENNA LAXATIVE ORAL) Take by mouth 3 (three) times daily.    simvastatin (ZOCOR) 40 MG tablet Take 40 mg by mouth.    tiotropium (SPIRIVA WITH HANDIHALER) 18 mcg inhalation capsule Inhale 1 capsule (18 mcg total) into the lungs once daily.    tramadol (ULTRAM) 50 mg tablet Take 50 mg by mouth 2 (two) times daily.    trazodone (DESYREL) 50 MG tablet TAKE ONE TABLET BY MOUTH EVERY EVENING    triamterene-hydrochlorothiazide 37.5-25 mg (DYAZIDE) 37.5-25 mg per capsule Take 1 capsule by mouth every morning.    VENTOLIN HFA 90 mcg/actuation inhaler Inhale 2 puffs into the lungs every 4 (four) hours as needed for Wheezing.    zolpidem (AMBIEN) 5 MG Tab TAKE ONE TABLET BY MOUTH AT BEDTIME AS NEEDED    albuterol-ipratropium 2.5mg-0.5mg/3mL (DUO-NEB) 0.5 mg-3 mg(2.5 mg base)/3 mL nebulizer solution Take 3 mLs by nebulization every 6 (six) hours.    budesonide (PULMICORT) 0.5 mg/2 mL nebulizer solution Take 2 mLs (0.5 mg total) by nebulization 2 (two) times daily. Wash out mouth after using.           Clinical Reference Information           Vital Signs - Last Recorded  Most recent update: 1/20/2017 10:13 AM by Angelic Curtis LPN    Pulse Resp Ht Wt SpO2 BMI    69 20 5' (1.524 m) 57.3 kg (126 lb 5.2 oz) (!) 91% 24.67 kg/m2      Allergies as of 1/20/2017     Ace Inhibitors    Iodine And Iodide Containing Products    Lisinopril    Shrimp      Immunizations Administered on  Date of Encounter - 1/20/2017     None      Orders Placed During Today's Visit      Normal Orders This Visit    NEBULIZER FOR HOME USE     NEBULIZER KIT (SUPPLIES) FOR HOME USE     Future Labs/Procedures Expected by Expires    Incentive spirometry  1/20/2017 3/21/2018      [facility] Sign-Up     Activating your [facility] account is as easy as 1-2-3!     1) Visit [email or web address], select Sign Up Now, enter this activation code and your date of birth, then select Next.  [email or web address]  Expires: 1/28/2017 11:48 AM      2) Create a username and password to use when you visit [facility] in the future and select a security question in case you lose your password and select Next.    3) Enter your e-mail address and click Sign Up!    Additional Information  If you have questions, please e-mail [email or web address] or call [phone] to talk to our [facility] staff. Remember, [facility] is NOT to be used for urgent needs. For medical emergencies, dial 911.         Instructions    Use Budesonide jet nebs twice a day - no more or no less.  Use Albuterol prior to budesonide at least twice a day. May use albuterol every 4 - 6 hours if needed for shortness of breath, cough or chest congestion        Ipratropium Bromide, Albuterol Sulfate Nebulizer solution  What is this medicine?  ALBUTEROL; IPRATROPIUM (al BYOO ter ole; i pra TROE pee um) has two bronchodilators. It helps open up the airways in your lungs to make it easier to breathe. This medicine is used to treat chronic obstructive pulmonary disease (COPD).  This medicine may be used for other purposes; ask your health care provider or pharmacist if you have questions.  What should I tell my health care provider before I take this medicine?  They need to know if you have any of the following conditions:  · heart disease  · high blood pressure  · irregular heartbeat  · an unusual or allergic reaction to albuterol, ipratropium, atropine, soya protein, soybeans or peanuts, other  medicines, foods, dyes, or preservatives  · pregnant or trying to get pregnant  · breast-feeding  How should I use this medicine?  This medicine is used in a nebulizer. Nebulizers make a liquid into an aerosol that you breathe in through your mouth or your mouth and nose into your lungs. You will be taught how to use your nebulizer. Follow the directions on your prescription label. Take your medicine at regular intervals. Do not use more often than directed.  Talk to your pediatrician regarding the use of this medicine in children. Special care may be needed.  Overdosage: If you think you have taken too much of this medicine contact a poison control center or emergency room at once.  NOTE: This medicine is only for you. Do not share this medicine with others.  What if I miss a dose?  If you miss a dose, use it as soon as you can. If it is almost time for your next dose, use only that dose. Do not use double or extra doses.  What may interact with this medicine?  Do not take this medicine with any of the following medications:  · MAOIs like Carbex, Eldepryl, Marplan, Nardil, and Parnate  This medicine may also interact with the following medications:  · diuretics  · medicines for depression, anxiety, or psychotic disturbances  · medicines for irregular heartbeat  · medicines for weight loss including some herbal products  · methadone  · pimozide  · some medicines for blood pressure or the heart  · sertindole  This list may not describe all possible interactions. Give your health care provider a list of all the medicines, herbs, non-prescription drugs, or dietary supplements you use. Also tell them if you smoke, drink alcohol, or use illegal drugs. Some items may interact with your medicine.  What should I watch for while using this medicine?  Tell your doctor or healthcare professional if your symptoms do not start to get better or if they get worse. If your breathing gets worse while you are using this medicine,  call your doctor right away. Do not stop using your medicine unless your doctor tells you to.  Your mouth may get dry. Chewing sugarless gum or sucking hard candy, and drinking plenty of water may help. Contact your doctor if the problem does not go away or is severe.  You may get dizzy or have blurred vision. Do not drive, use machinery, or do anything that needs mental alertness until you know how this medicine affects you. Do not stand or sit up quickly, especially if you are an older patient. This reduces the risk of dizzy or fainting spells.  What side effects may I notice from receiving this medicine?  Side effects that you should report to your doctor or health care professional as soon as possible:  · allergic reactions like skin rash, itching or hives, swelling of the face, lips, or tongue  · breathing problems  · feeling faint or lightheaded, falls  · fever  · high blood pressure  · irregular heartbeat or chest pain  · muscle cramps or weakness  · pain, tingling, numbness in the hands or feet  · vomiting  Side effects that usually do not require medical attention (report to your doctor or health care professional if they continue or are bothersome):  · blurred vision  · cough  · difficulty passing urine  · difficulty sleeping  · headache  · nervousness or trembling  · stuffy or runny nose  · unusual taste  · upset stomach  This list may not describe all possible side effects. Call your doctor for medical advice about side effects. You may report side effects to FDA at 9-160-FDA-1286.  Where should I keep my medicine?  Keep out of the reach of children.  Store at a room temperature 2 and 30 degees C (36 to 86 degrees F). Protect from light. Store this medicine in the protective pouch until ready to use. Throw away any unused medicine after the expiration date.  NOTE:This sheet is a summary. It may not cover all possible information. If you have questions about this medicine, talk to your doctor, pharmacist,  or health care provider. Copyright© 2016 [employer]      Budesonide Nebulizer suspension  What is this medicine?  BUDESONIDE (bue KIRSTIE oh nide) is a corticosteroid. It helps decrease inflammation in your lungs. This medicine is used to treat the symptoms of asthma. Never use this medicine for an acute asthma attack.  This medicine may be used for other purposes; ask your health care provider or pharmacist if you have questions.  What should I tell my health care provider before I take this medicine?  They need to know if you have any of these conditions:  · bone problems  · glaucoma  · immune system problems  · infection, like chickenpox, tuberculosis, herpes, or fungal infection  · recent surgery or injury of the mouth or throat  · taking corticosteroids by mouth  · an unusual or allergic reaction to budesonide, steroids, other medicines, foods, dyes, or preservatives  · pregnant or trying to get pregnant  · breast-feeding  How should I use this medicine?  This medicine is used in a nebulizer. Nebulizers make a liquid into an aerosol that you breathe in through your mouth or your mouth and nose into your lungs. You will be taught how to use your nebulizer. Rinse your mouth with water after use. Follow the directions on your prescription label. Do not mix this medicine with other medicines in your nebulizer. Do not use more often than directed.  Talk to your pediatrician regarding the use of this medicine in children. Special care may be needed.  Overdosage: If you think you have taken too much of this medicine contact a poison control center or emergency room at once.  NOTE: This medicine is only for you. Do not share this medicine with others.  What if I miss a dose?  If you miss a dose, use it as soon as you remember. If it is almost time for your next dose, use only that dose and continue with your regular schedule, spacing doses evenly. Do not use double or extra doses.  What may interact with this  medicine?  Do not take this medicine with any of the following medications:  · mifepristone  This medicine may also interact with the following medications:  · cimetidine  · clarithromycin  · erythromycin  · itraconazole  · ketoconazole  · some vaccinations  This list may not describe all possible interactions. Give your health care provider a list of all the medicines, herbs, non-prescription drugs, or dietary supplements you use. Also tell them if you smoke, drink alcohol, or use illegal drugs. Some items may interact with your medicine.  What should I watch for while using this medicine?  Visit your doctor or health care professional for regular checks on your progress. Check with your health care professional if your symptoms do not improve. If your symptoms get worse or if you need your short acting inhalers more often, call your doctor right away.  The medicine may increase your risk of getting an infection. Stay away from people who are sick. Tell your doctor or health care professional if you are around anyone with measles or chickenpox.  What side effects may I notice from receiving this medicine?  Side effects that you should report to your doctor or health care professional as soon as possible:  · allergic reactions like skin rash, itching or hives, swelling of the face, lips, or tongue  · breathing problems  · changes in vision  · unusual swelling  · white patches or sores in the mouth or throat  Side effects that usually do not require medical attention (report to your doctor or health care professional if they continue or are bothersome):  · coughing, hoarseness  · headache  · runny nose  · stomach upset  This list may not describe all possible side effects. Call your doctor for medical advice about side effects. You may report side effects to FDA at 0-479-FDA-0644.  Where should I keep my medicine?  Keep out of the reach of children.  Store at a room temperature between 20 and 25 degrees C (68 and 77  degrees F). Do not refrigerate or freeze. Keep unopened vials in the foil pouch. When the package has been opened, the shelf life of the unused medicine is 2 weeks when protected from light. Unused medicine should be returned to the aluminum foil envelope right away to protect them from light. Throw away any unused medicine after the expiration date.  NOTE:This sheet is a summary. It may not cover all possible information. If you have questions about this medicine, talk to your doctor, pharmacist, or health care provider. Copyright© 2016 Gold Standard        Step-by-Step  Using a Nebulizer with a Mouthpiece    © 2170-9849 [employer]. [address] [place] [address]. All rights reserved. This information is not intended as a substitute for professional medical care. Always follow your healthcare professional's instructions.        Step-by-Step  Using a Nebulizer    © 4301-8934 [employer]. [address] [place] [address]. All rights reserved. This information is not intended as a substitute for professional medical care. Always follow your healthcare professional's instructions.        Using an Incentive Spirometer  Soon after your surgery, a nurse or therapist will teach you breathing exercises. These keep your lungs clear, strengthen your breathing muscles, and help prevent complications.  The exercises include doing a deep-breathing exercise using a device called an incentive spirometer.  To do these exercises, you will breathe in through your mouth and not your nose. The incentive spirometer only works correctly if you breathe in through your mouth.  Four steps to clear lungs     Deep breathing expands the lungs, aids circulation, and helps prevent pneumonia.   Step 1. Exhale normally.  · Relax and breathe out.  Step 2. Place your lips tightly around the mouthpiece.  · Make sure the device is upright and not tilted.  Step 3. Inhale as much air as you can through  the mouthpiece (don't breath through your nose).  · Inhale slowly and deeply.  · Hold your breath long enough to keep the balls or disk raised for at least 3 seconds.  · Some spirometers have an indicator to let you know that you are breathing in too fast. If the indicator goes off, breathe in more slowly.  Step 4. Repeat the exercise regularly.  · Do this exercise every hour while you're awake, or as your healthcare provider tells you to.  · You will also be taught coughing exercises and be asked to do them regularly on your own.  © 5918-5385 [employer]. [address] [place] [address], [place] [address]. All rights reserved. This information is not intended as a substitute for professional medical care. Always follow your healthcare professional's instructions.        Discharge Instructions: Using an Incentive Spirometer  An incentive spirometer is a device that helps you do deep breathing exercises. These exercises will help you breathe better and improve the function of your lungs. The incentive spirometer gives you a way to take an active part in your recovery.  Follow these steps to use your incentive spirometer  Inhale normally. Relax and breathe out:  · Place your lips tightly around the mouthpiece.  · Make sure the device is upright and not tilted.  · Inhale as much air as you can through the mouthpiece (don't breathe through your nose). Some spirometers have an indicator to let you know that you are breathing in too fast. If the indicator goes off, breathe in more slowly.  · Hold your breath long enough to keep the balls (or disk) raised for at least 5 seconds.  · Do this exercise every hour while youre awake or as often as your healthcare provider instructs.  · If you were also taught coughing exercises, do them regularly as instructed.  Follow-up care  Make a follow-up appointment as directed by our staff.     When to call the healthcare provider  Call your healthcare provider right away if you  have any of the following:  · Shortness of breath that doesn't get better after taking your medicine  · Chest pain  · Fever above 101.0°F (38.3°C)  · Brownish, bloody, or smelly sputum  · Blue lips or fingernails   © 8744-5376 [employer]. [address] [place] [address]. All rights reserved. This information is not intended as a substitute for professional medical care. Always follow your healthcare professional's instructions.

## 2017-01-23 ENCOUNTER — TELEPHONE (OUTPATIENT)
Dept: CARDIOLOGY | Facility: CLINIC | Age: 72
End: 2017-01-23

## 2017-01-23 NOTE — TELEPHONE ENCOUNTER
----- Message from Tatum Trent sent at 1/23/2017 11:43 AM CST -----  Contact: patient  Patient is taking breathing treatments per Dr Carmona, but its hurts her badly, and patient wants to know if it's OK  For her to take Lidocaine patches for the pain, they have aspirin in them, patient left messages with Dr Carmona office but did not hear back. Please call  Patient back at [phone] or [phone]. Thank you

## 2017-01-25 ENCOUNTER — TELEPHONE (OUTPATIENT)
Dept: PULMONOLOGY | Facility: CLINIC | Age: 72
End: 2017-01-25

## 2017-01-25 NOTE — TELEPHONE ENCOUNTER
Pt would like to start lidocaine patches. Please advise patient on whether she may take this or not due to her COPD.

## 2017-01-25 NOTE — TELEPHONE ENCOUNTER
Pt notified per Cardiology that she may use the patches from a cardiac stand point. Advised pt to speak with pulmonary to ensure it will not cause any breathing problems for her. Pt voiced understanding.

## 2017-01-25 NOTE — TELEPHONE ENCOUNTER
----- Message from Katy Cantrell sent at 1/25/2017 11:14 AM CST -----  needs callback rg surgery next week, will elaborate..[phone]

## 2017-01-25 NOTE — TELEPHONE ENCOUNTER
----- Message from Nohemy Pulido sent at 1/25/2017  1:46 PM CST -----  States call is regarding patch that she need to wear. States she has shingles. Please adv/call [phone].//thanks. cw

## 2017-02-01 ENCOUNTER — TELEPHONE (OUTPATIENT)
Dept: PULMONOLOGY | Facility: CLINIC | Age: 72
End: 2017-02-01

## 2017-02-01 NOTE — TELEPHONE ENCOUNTER
Left message with Kely that a copy of the preop visit was faxed to [phone]. [facility]

## 2017-02-06 ENCOUNTER — PATIENT OUTREACH (OUTPATIENT)
Dept: ADMINISTRATIVE | Facility: HOSPITAL | Age: 72
End: 2017-02-06

## 2017-02-06 NOTE — PROGRESS NOTES
Completed vital signs for measurement year 2016 routed to [facility] as attestation documentation for Adult BMI Assessment.  Measure has been satisfied.

## 2017-02-07 NOTE — DISCHARGE INSTRUCTIONS
To confirm, Your doctor has instructed you that surgery is scheduled for 02/13/17  at  07:00 a.m .       Please report to [facility] [place], 1st floor, main lobby by 05:30 a.m .      INSTRUCTIONS IMPORTANT!!!   Do not eat, drink, or smoke after 12 midnight-including water. OK to brush teeth, no gum, candy or mints!    ¨ Take only these medicines with a small swallow of water-morning of surgery.  Metoprolol    REMOVE ALL ARTIFICIAL NAILS/ POLISH PRIOR TO SURGERY    Come to [facility] Saturday,. 2/11/17 by 09:00 a.m for Type and Screen    DO NOT REMOVE RED BLOOD ARMBAND PRIOR TO SURGERY  Pre operative instructions:  Please review the Pre-Operative Instruction booklet that you were given.        Bathing Instructions--See page 6 in the Pre-operative booklet.      Prevention of surgical site infections:     -Keep incisions clean and dry.   -Do not soak/submerge incisions in water until completely healed.   -Do not apply lotions, powders, creams, or deodorants to site.   -Always make sure hands are cleaned with antibacterial soap/ alcohol-based                 prior to touching the surgical site.  (This includes doctors,                 nurses, staff, and yourself.)    Signs and symptoms:   -Redness and pain around the area where you had surgery   -Drainage of cloudy fluid from your surgical wound   -Fever over 100.4       I have read or had read and explained to me, and understand the above information.  Additional comments or instructions:  Received a copy of Pre-operative instructions booklet, FAQ surgical site infection sheet, and packets of hibiclens (if indicated).

## 2017-02-09 ENCOUNTER — HOSPITAL ENCOUNTER (OUTPATIENT)
Dept: PULMONOLOGY | Facility: HOSPITAL | Age: 72
Discharge: HOME | End: 2017-02-09
Attending: THORACIC SURGERY (CARDIOTHORACIC VASCULAR SURGERY)
Payer: MEDICARE

## 2017-02-09 ENCOUNTER — HOSPITAL ENCOUNTER (OUTPATIENT)
Dept: PREADMISSION TESTING | Facility: HOSPITAL | Age: 72
Discharge: HOME | End: 2017-02-09
Attending: THORACIC SURGERY (CARDIOTHORACIC VASCULAR SURGERY)
Payer: MEDICARE

## 2017-02-09 ENCOUNTER — HOSPITAL ENCOUNTER (OUTPATIENT)
Dept: RADIOLOGY | Facility: HOSPITAL | Age: 72
Discharge: HOME | End: 2017-02-09
Attending: THORACIC SURGERY (CARDIOTHORACIC VASCULAR SURGERY)
Payer: MEDICARE

## 2017-02-09 VITALS
SYSTOLIC BLOOD PRESSURE: 120 MMHG | DIASTOLIC BLOOD PRESSURE: 57 MMHG | HEIGHT: 60 IN | OXYGEN SATURATION: 98 % | TEMPERATURE: 98 F | BODY MASS INDEX: 24.35 KG/M2 | WEIGHT: 124 LBS | RESPIRATION RATE: 20 BRPM | HEART RATE: 67 BPM

## 2017-02-09 DIAGNOSIS — I25.10 CORONARY ATHEROSCLEROSIS OF UNSPECIFIED TYPE OF VESSEL, NATIVE OR GRAFT: ICD-10-CM

## 2017-02-09 PROCEDURE — 87081 CULTURE SCREEN ONLY: CPT

## 2017-02-09 PROCEDURE — 93010 ELECTROCARDIOGRAM REPORT: CPT | Mod: ,,, | Performed by: INTERNAL MEDICINE

## 2017-02-09 PROCEDURE — 71020 XR CHEST PA AND LATERAL PRE-OP: CPT | Mod: TC

## 2017-02-09 PROCEDURE — 93005 ELECTROCARDIOGRAM TRACING: CPT

## 2017-02-09 RX ORDER — DULOXETIN HYDROCHLORIDE 30 MG/1
30 CAPSULE, DELAYED RELEASE ORAL DAILY
COMMUNITY
End: 2017-08-03

## 2017-02-09 NOTE — PRE ADMISSION SCREENING
Jennifer Grey  sent IM stating pt needs CPT code for insurance company to authorize ABG's today.  Called [place] with Dr Bonds's office to request CPT.  One[place] stated pt recently had ABG's done in January and that it is not necessary.  Jennifer Grey informed.  Alicia with [facility] called and stated pt refused to have ABG's done today.  Informed that it is not necessary/  Dr Bonds's office.

## 2017-02-09 NOTE — PROGRESS NOTES
EKG done on patient. Patient refused ABG at this time stated she had one done two weeks ago here. See results of ABG on 1/16/17

## 2017-02-09 NOTE — PLAN OF CARE
Patient visit for pre op instructions.  Pre operative instruction booklet given  Hibiclens given to patient with showering instructions

## 2017-02-09 NOTE — IP AVS SNAPSHOT
[facility]  [id number] [place] [address] [address] [place] [id number]           Patient Discharge Instructions    Our goal is to set you up for success. This packet includes information on your condition, medications, and your home care. It will help you to care for yourself so you don't get sicker.     Please ask your nurse if you have any questions.        There are many details to remember when preparing for your surgery. Here is what you will need to do, please ask your nurse if there are more specific instructions and if you have any questions:    1. 24 hours before procedure Do not smoke or drink alcoholic beverages 24 hours prior to your procedure    2. Eating before procedure Do not eat or drink anything 8 hours before your procedure - this includes gum, mints, and candy.     3. Day of procedure Please remove all jewelry for the procedure. If you wear contact lenses, dentures, hearing aids or glasses, bring a container to put them in during your surgery and give to a family member for safekeeping.  If your doctor has scheduled you for an overnight stay, bring a small overnight bag with any personal items that you need.    4. After procedure Make arrangements in advance for transportation home by a responsible adult. It is not safe to drive a vehicle during the 24 hours following surgery.     PLEASE NOTE: You may be contacted the day before your surgery to confirm your surgery date and arrival time. The Surgery schedule has many variables which may affect the time of your surgery case. Family members should be available if your surgery time changes.                ** Verify the list of medication(s) below is accurate and up to date. Carry this with you in case of emergency. If your medications have changed, please notify your healthcare provider.             Medication List      TAKE these medications        Additional Info                      albuterol-ipratropium 2.5mg-0.5mg/3mL 0.5 mg-3 mg(2.5  mg base)/3 mL nebulizer solution   Commonly known as:  DUO-NEB   Quantity:  120 vial   Refills:  12   Dose:  3 mL    Instructions:  Take 3 mLs by nebulization every 6 (six) hours.     Begin Date    AM    Noon    PM    Bedtime       amlodipine 10 MG tablet   Commonly known as:  NORVASC   Quantity:  30 tablet   Refills:  5   Dose:  10 mg    Instructions:  Take 1 tablet (10 mg total) by mouth once daily.     Begin Date    AM    Noon    PM    Bedtime       ascorbic acid (vitamin C) 1000 MG tablet   Commonly known as:  VITAMIN C   Refills:  0   Dose:  1000 mg    Instructions:  Take 1,000 mg by mouth once daily.     Begin Date    AM    Noon    PM    Bedtime       budesonide 0.5 mg/2 mL nebulizer solution   Commonly known as:  PULMICORT   Quantity:  120 mL   Refills:  12   Dose:  0.5 mg   Comments:  Dispense # 60 vials of 0.5mg/2ml    Instructions:  Take 2 mLs (0.5 mg total) by nebulization 2 (two) times daily. Wash out mouth after using.     Begin Date    AM    Noon    PM    Bedtime       calcium-vitamin D3 500 mg(1,250mg) -200 unit per tablet   Quantity:  60 tablet   Refills:  11   Dose:  1 tablet    Instructions:  Take 1 tablet by mouth 2 (two) times daily with meals.     Begin Date    AM    Noon    PM    Bedtime       CENTRUM SILVER ORAL   Refills:  0    Instructions:  Take by mouth once daily.     Begin Date    AM    Noon    PM    Bedtime       duloxetine 30 MG capsule   Commonly known as:  CYMBALTA   Refills:  0   Dose:  30 mg    Instructions:  Take 30 mg by mouth once daily.     Begin Date    AM    Noon    PM    Bedtime       ezetimibe 10 mg tablet   Commonly known as:  ZETIA   Quantity:  30 tablet   Refills:  11   Dose:  10 mg    Instructions:  Take 1 tablet (10 mg total) by mouth every evening.     Begin Date    AM    Noon    PM    Bedtime       FISH OIL ORAL   Refills:  0   Dose:  1200 mg    Instructions:  Take 1,200 mg by mouth 2 (two) times daily.     Begin Date    AM    Noon    PM    Bedtime        fluorometholone 0.1% 0.1 % Drps   Commonly known as:  FML   Refills:  0    Instructions:  INSTILL ONE DROP IN THE RIGHT EYE THREE TIMES DAILY     Begin Date    AM    Noon    PM    Bedtime       ipratropium 0.06 % nasal spray   Commonly known as:  ATROVENT   Quantity:  15 mL   Refills:  11   Dose:  2 spray    Instructions:  2 sprays by Nasal route 4 (four) times daily. As needed for rhinitis     Begin Date    AM    Noon    PM    Bedtime       levothyroxine 200 MCG tablet   Commonly known as:  SYNTHROID   Quantity:  30 tablet   Refills:  11    Instructions:  TAKE ONE TABLET BY MOUTH EVERY DAY     Begin Date    AM    Noon    PM    Bedtime       magnesium 30 mg Tab   Refills:  0   Dose:  30 mg    Instructions:  Take 30 mg by mouth.     Begin Date    AM    Noon    PM    Bedtime       MELATONIN ORAL   Refills:  0   Dose:  20 mg    Instructions:  Take 20 mg by mouth nightly as needed.     Begin Date    AM    Noon    PM    Bedtime       metoprolol tartrate 25 MG tablet   Commonly known as:  LOPRESSOR   Quantity:  60 tablet   Refills:  11    Instructions:  TAKE ONE TABLET BY MOUTH TWICE DAILY     Begin Date    AM    Noon    PM    Bedtime       niacin 500 mg Tbsr   Refills:  0   Dose:  500 mg    Instructions:  Take 500 mg by mouth.     Begin Date    AM    Noon    PM    Bedtime       omeprazole 40 MG capsule   Commonly known as:  PRILOSEC   Quantity:  30 capsule   Refills:  3   Dose:  40 mg    Instructions:  Take 1 capsule (40 mg total) by mouth once daily.     Begin Date    AM    Noon    PM    Bedtime       potassium 99 mg Tab   Refills:  0    Instructions:  Take by mouth.     Begin Date    AM    Noon    PM    Bedtime       SENNA LAXATIVE ORAL   Refills:  0    Instructions:  Take by mouth 3 (three) times daily.     Begin Date    AM    Noon    PM    Bedtime       senna 8.6 mg tablet   Commonly known as:  SENOKOT   Refills:  0   Dose:  3 tablet    Instructions:  Take 3 tablets by mouth.     Begin Date    AM    Noon    PM     Bedtime       simvastatin 40 MG tablet   Commonly known as:  ZOCOR   Refills:  0   Dose:  20 mg    Instructions:  Take 20 mg by mouth every evening.     Begin Date    AM    Noon    PM    Bedtime       tiotropium 18 mcg inhalation capsule   Commonly known as:  SPIRIVA WITH HANDIHALER   Quantity:  30 capsule   Refills:  11   Dose:  18 mcg    Instructions:  Inhale 1 capsule (18 mcg total) into the lungs once daily.     Begin Date    AM    Noon    PM    Bedtime       tramadol 50 mg tablet   Commonly known as:  ULTRAM   Refills:  5   Dose:  50 mg    Instructions:  Take 50 mg by mouth 2 (two) times daily.     Begin Date    AM    Noon    PM    Bedtime       trazodone 50 MG tablet   Commonly known as:  DESYREL   Quantity:  30 tablet   Refills:  11    Instructions:  TAKE ONE TABLET BY MOUTH EVERY EVENING     Begin Date    AM    Noon    PM    Bedtime       triamterene-hydrochlorothiazide 37.5-25 mg 37.5-25 mg per capsule   Commonly known as:  DYAZIDE   Quantity:  30 capsule   Refills:  11    Instructions:  Take 1 capsule by mouth every morning.     Begin Date    AM    Noon    PM    Bedtime       VENTOLIN HFA 90 mcg/actuation inhaler   Quantity:  1 Inhaler   Refills:  11   Dose:  2 puff   Generic drug:  albuterol    Instructions:  Inhale 2 puffs into the lungs every 4 (four) hours as needed for Wheezing.     Begin Date    AM    Noon    PM    Bedtime       zolpidem 5 MG Tab   Commonly known as:  AMBIEN   Quantity:  30 tablet   Refills:  3    Instructions:  TAKE ONE TABLET BY MOUTH AT BEDTIME AS NEEDED     Begin Date    AM    Noon    PM    Bedtime                  Please bring to all follow up appointments:    1. A copy of your discharge instructions.  2. All medicines you are currently taking in their original bottles.  3. Identification and insurance card.    Please arrive 15 minutes ahead of scheduled appointment time.    Please call 24 hours in advance if you must reschedule your appointment and/or time.        Your  Scheduled Appointments     Mar 08, 2017 11:40 AM CST   Established Patient Visit with Giuliano Henry MD   [facility]    [address] [place] [address] [id number] [phone]            Mar 13, 2017 11:20 AM CDT   Established Patient Visit with Giuliano Henry MD   [facility]    [address] [facility] [id number] [phone]            Mar 21, 2017  9:40 AM CDT   Established Patient Visit with Navarro Carmona MD   [facility]    [address] [place] [id number] [phone]            Mar 21, 2017 10:00 AM CDT   Spirometry with SPIROMETRY, [facility]   [facility]    [address] [place] [id number] [phone]            Jul 11, 2017 11:20 AM CDT   Established Patient Visit with Navarro Carmona MD   [facility]    [address] [place] [address] [id number] [phone]              Your Future Surgeries/Procedures     Feb 13, 2017   Surgery with Steve Bonds MD   [facility] ([facility])    [id number] [place] [id number]   [phone]                  Discharge Instructions       To confirm, Your doctor has instructed you that surgery is scheduled for 02/13/17  at  07:00 a.m .       Please report to [facility], 1st floor, main lobby by 05:30 a.m .      INSTRUCTIONS IMPORTANT!!!   Do not eat, drink, or smoke after 12 midnight-including water. OK to brush teeth, no gum, candy or mints!    ¨ Take only these medicines with a small swallow of water-morning of surgery.  Metoprolol    REMOVE ALL ARTIFICIAL NAILS/ POLISH PRIOR TO SURGERY    Come to [facility] Saturday,. 2/11/17 by 09:00 a.m for Type and Screen    DO NOT REMOVE RED BLOOD ARMBAND PRIOR TO SURGERY  Pre operative instructions:  Please review the Pre-Operative  Instruction booklet that you were given.        Bathing Instructions--See page 6 in the Pre-operative booklet.      Prevention of surgical site infections:     -Keep incisions clean and dry.   -Do not soak/submerge incisions in water until completely healed.   -Do not apply lotions, powders, creams, or deodorants to site.   -Always make sure hands are cleaned with antibacterial soap/ alcohol-based                 prior to touching the surgical site.  (This includes doctors,                 nurses, staff, and yourself.)    Signs and symptoms:   -Redness and pain around the area where you had surgery   -Drainage of cloudy fluid from your surgical wound   -Fever over 100.4       I have read or had read and explained to me, and understand the above information.  Additional comments or instructions:  Received a copy of Pre-operative instructions booklet, FAQ surgical site infection sheet, and packets of hibiclens (if indicated).      Discharge References/Attachments     AFTER BYPASS SURGERY, CONTROLLING YOUR RISK FACTORS  (ENGLISH)    AFTER BYPASS SURGERY, HEART MEDICINES (ENGLISH)    AFTER BYPASS SURGERY, MANAGING PAIN (ENGLISH)    AFTER BYPASS SURGERY, WHEN TO CALL THE DOCTOR  (ENGLISH)    AFTER BYPASS SURGERY, YOUR EMOTIONAL HEALTH (ENGLISH)    CARING FOR YOUR INCISION AFTER BYPASS SURGERY (ENGLISH)    CARING FOR YOURSELF AFTER BYPASS SURGERY (ENGLISH)    CHOOSING ACTIVITIES AFTER BYPASS SURGERY (ENGLISH)    GET PLENTY OF REST AFTER BYPASS SURGERY (ENGLISH)    GETTING UP AND OUT OF BED AFTER BYPASS SURGERY (ENGLISH)    PACING YOURSELF, AFTER YOUR BYPASS  (ENGLISH)    PREVENT SWELLING IN YOUR LEGS AFTER BYPASS SURGERY (ENGLISH)    REACHING, BENDING, AND LIFTING AFTER BYPASS SURGERY (ENGLISH)    YOUR ROLE IN RECOVERY, AFTER BYPASS SURGERY (ENGLISH)    CARLOS: TRANSESOPHAGEAL ECHOCARDIOGRAPHY (ENGLISH)    ANESTHESIA: GENERAL ANESTHESIA (ENGLISH)    POSTSURGICAL DEEP BREATHING, DISCHARGE INSTRUCTIONS (ENGLISH)     INCENTIVE SPIROMETER, USING AN (ENGLISH)        Admission Information     Date & Time Provider Department CSN    2/9/2017  9:00 AM Steve Bonds MD [facility] [id number]      Care Providers     Provider Role Specialty Primary office phone    Steve Bonds MD Attending Provider Cardiothoracic Surgery [phone]      Your Vitals Were     BP Pulse Temp Resp Height Weight    120/57 (BP Location: Right arm, Patient Position: Sitting, BP Method: Automatic) 67 98.2 °F (36.8 °C) (Oral) 20 5' (1.524 m) 56.2 kg (124 lb)    SpO2 BMI             98% 24.22 kg/m2         Recent Lab Values     No lab values to display.      Pending Labs     Order Current Status    Culture, MRSA In process      Allergies as of 2/9/2017        Reactions    Ace Inhibitors Other (See Comments)    unknown    Iodine And Iodide Containing Products Hives    Since age of 48    Lisinopril     angioedema    Shrimp Other (See Comments)    Localized itching and swelling on her hands if she peels shrimp.  Able to eat shrimp without difficulty.  No generalized reaction.      [facility]     [facility] Nurse Care Line - 24/7 Assistance  Unless otherwise directed by your provider, please contact [facility], our nurse care line that is available for 24/7 assistance.     Registered nurses in the [facility] provide clinical advisement, health education, appointment booking, and other advisory services.  Call for this free service at [phone].        Advance Directives     An advance directive is a document which, in the event you are no longer able to make decisions for yourself, tells your healthcare team what kind of treatment you do or do not want to receive, or who you would like to make those decisions for you.  If you do not currently have an advance directive, [facility] encourages you to create one.  For more information call:  [phone]), [phone] ([phone]),  or log on to  [email or web address].        Smoking Cessation     If you would like to quit smoking:   You may be eligible for free services if you are a [place] resident and started smoking cigarettes before September 1, 1988.  Call the [employer]oking [employer]essation [employer] (S[employer]) toll free at (296) 298-2518 or (982) 501-9384.   Call 1-800-QUIT-NOW if you do not meet the above criteria.            Language Assistance Services     ATTENTION: Language assistance services are available, free of charge. Please call [phone].      ATENCIÓN: Si habla español, tiene a baxter disposición servicios gratuitos de asistencia lingüística. Llame al [phone].     CHÚ Ý: N?u b?n nói Ti?ng Vi?t, có các d?ch v? h? tr? ngôn ng? mi?n phí dành cho b?n. G?i s? [phone].         [facility] complies with applicable Federal civil rights laws and does not discriminate on the basis of race, color, national origin, age, disability, or sex.

## 2017-02-11 LAB — MRSA SPEC QL CULT: NORMAL

## 2017-02-13 ENCOUNTER — ANESTHESIA EVENT (OUTPATIENT)
Dept: SURGERY | Facility: HOSPITAL | Age: 72
DRG: 236 | End: 2017-02-13
Payer: MEDICARE

## 2017-02-13 ENCOUNTER — HOSPITAL ENCOUNTER (INPATIENT)
Facility: HOSPITAL | Age: 72
LOS: 4 days | Discharge: HOME HEALTH | DRG: 236 | End: 2017-02-17
Attending: THORACIC SURGERY (CARDIOTHORACIC VASCULAR SURGERY) | Admitting: THORACIC SURGERY (CARDIOTHORACIC VASCULAR SURGERY)
Payer: MEDICARE

## 2017-02-13 ENCOUNTER — ANESTHESIA (OUTPATIENT)
Dept: SURGERY | Facility: HOSPITAL | Age: 72
DRG: 236 | End: 2017-02-13
Payer: MEDICARE

## 2017-02-13 DIAGNOSIS — I25.10 CAD, MULTIPLE VESSEL: ICD-10-CM

## 2017-02-13 DIAGNOSIS — I25.10 CAD (CORONARY ARTERY DISEASE): ICD-10-CM

## 2017-02-13 PROBLEM — I20.9 AP (ANGINA PECTORIS): Status: ACTIVE | Noted: 2017-02-13

## 2017-02-13 PROBLEM — Z95.1 S/P CABG (CORONARY ARTERY BYPASS GRAFT): Status: ACTIVE | Noted: 2017-02-13

## 2017-02-13 LAB
ALLENS TEST: ABNORMAL
ANION GAP SERPL CALC-SCNC: 12 MMOL/L
ANION GAP SERPL CALC-SCNC: 8 MMOL/L
APTT BLDCRRT: 29.5 SEC
APTT BLDCRRT: 31.8 SEC
BASOPHILS # BLD AUTO: 0.01 K/UL
BASOPHILS # BLD AUTO: 0.03 K/UL
BASOPHILS NFR BLD: 0.1 %
BASOPHILS NFR BLD: 0.2 %
BLD PROD TYP BPU: NORMAL
BLD PROD TYP BPU: NORMAL
BLOOD UNIT EXPIRATION DATE: NORMAL
BLOOD UNIT EXPIRATION DATE: NORMAL
BLOOD UNIT TYPE CODE: 6200
BLOOD UNIT TYPE CODE: 6200
BLOOD UNIT TYPE: NORMAL
BLOOD UNIT TYPE: NORMAL
BUN SERPL-MCNC: 12 MG/DL
BUN SERPL-MCNC: 13 MG/DL
CALCIUM SERPL-MCNC: 10.1 MG/DL
CALCIUM SERPL-MCNC: 8 MG/DL
CHLORIDE SERPL-SCNC: 105 MMOL/L
CHLORIDE SERPL-SCNC: 107 MMOL/L
CK SERPL-CCNC: 91 U/L
CO2 SERPL-SCNC: 24 MMOL/L
CO2 SERPL-SCNC: 27 MMOL/L
CODING SYSTEM: NORMAL
CODING SYSTEM: NORMAL
CREAT SERPL-MCNC: 0.9 MG/DL
CREAT SERPL-MCNC: 1.1 MG/DL
CREAT SERPL-MCNC: 1.1 MG/DL
DELSYS: ABNORMAL
DIFFERENTIAL METHOD: ABNORMAL
DIFFERENTIAL METHOD: ABNORMAL
DISPENSE STATUS: NORMAL
DISPENSE STATUS: NORMAL
EOSINOPHIL # BLD AUTO: 0.2 K/UL
EOSINOPHIL # BLD AUTO: 0.2 K/UL
EOSINOPHIL NFR BLD: 1.7 %
EOSINOPHIL NFR BLD: 2.7 %
ERYTHROCYTE [DISTWIDTH] IN BLOOD BY AUTOMATED COUNT: 14 %
ERYTHROCYTE [DISTWIDTH] IN BLOOD BY AUTOMATED COUNT: 14.3 %
ERYTHROCYTE [SEDIMENTATION RATE] IN BLOOD BY WESTERGREN METHOD: 14 MM/H
EST. GFR  (AFRICAN AMERICAN): 58 ML/MIN/1.73 M^2
EST. GFR  (AFRICAN AMERICAN): 58 ML/MIN/1.73 M^2
EST. GFR  (AFRICAN AMERICAN): >60 ML/MIN/1.73 M^2
EST. GFR  (NON AFRICAN AMERICAN): 50 ML/MIN/1.73 M^2
EST. GFR  (NON AFRICAN AMERICAN): 50 ML/MIN/1.73 M^2
EST. GFR  (NON AFRICAN AMERICAN): >60 ML/MIN/1.73 M^2
FIBRINOGEN PPP-MCNC: 177 MG/DL
FIO2: 50
GLUCOSE SERPL-MCNC: 105 MG/DL (ref 70–110)
GLUCOSE SERPL-MCNC: 111 MG/DL (ref 70–110)
GLUCOSE SERPL-MCNC: 115 MG/DL
GLUCOSE SERPL-MCNC: 120 MG/DL (ref 70–110)
GLUCOSE SERPL-MCNC: 142 MG/DL (ref 70–110)
GLUCOSE SERPL-MCNC: 65 MG/DL
HCO3 UR-SCNC: 25.4 MMOL/L (ref 24–28)
HCO3 UR-SCNC: 25.7 MMOL/L (ref 24–28)
HCO3 UR-SCNC: 29.3 MMOL/L (ref 24–28)
HCO3 UR-SCNC: 30.1 MMOL/L (ref 24–28)
HCO3 UR-SCNC: 38.4 MMOL/L (ref 24–28)
HCT VFR BLD AUTO: 26.5 %
HCT VFR BLD AUTO: 28.1 %
HCT VFR BLD AUTO: 29 %
HCT VFR BLD CALC: 26 %PCV (ref 36–54)
HCT VFR BLD CALC: 26 %PCV (ref 36–54)
HCT VFR BLD CALC: 29 %PCV (ref 36–54)
HCT VFR BLD CALC: 32 %PCV (ref 36–54)
HGB BLD-MCNC: 10.1 G/DL
HGB BLD-MCNC: 9.3 G/DL
HGB BLD-MCNC: 9.5 G/DL
INR PPP: 1
INR PPP: 1.2
INR PPP: 1.4
LYMPHOCYTES # BLD AUTO: 1.6 K/UL
LYMPHOCYTES # BLD AUTO: 2.2 K/UL
LYMPHOCYTES NFR BLD: 15.9 %
LYMPHOCYTES NFR BLD: 21.3 %
MAGNESIUM SERPL-MCNC: 1.8 MG/DL
MAGNESIUM SERPL-MCNC: 2.6 MG/DL
MCH RBC QN AUTO: 30.9 PG
MCH RBC QN AUTO: 31 PG
MCHC RBC AUTO-ENTMCNC: 34.8 %
MCHC RBC AUTO-ENTMCNC: 35.1 %
MCV RBC AUTO: 88 FL
MCV RBC AUTO: 89 FL
MODE: ABNORMAL
MONOCYTES # BLD AUTO: 0.5 K/UL
MONOCYTES # BLD AUTO: 0.6 K/UL
MONOCYTES NFR BLD: 3.7 %
MONOCYTES NFR BLD: 8.2 %
NEUTROPHILS # BLD AUTO: 10.7 K/UL
NEUTROPHILS # BLD AUTO: 5.2 K/UL
NEUTROPHILS NFR BLD: 67.7 %
NEUTROPHILS NFR BLD: 79.1 %
NUM UNITS TRANS FFP: NORMAL
NUM UNITS TRANS FFP: NORMAL
PCO2 BLDA: 46.5 MMHG (ref 35–45)
PCO2 BLDA: 48.6 MMHG (ref 35–45)
PCO2 BLDA: 52.9 MMHG (ref 35–45)
PCO2 BLDA: 55 MMHG (ref 35–45)
PCO2 BLDA: 58 MMHG (ref 35–45)
PEEP: 5
PH SMN: 7.29 [PH] (ref 7.35–7.45)
PH SMN: 7.31 [PH] (ref 7.35–7.45)
PH SMN: 7.33 [PH] (ref 7.35–7.45)
PH SMN: 7.42 [PH] (ref 7.35–7.45)
PH SMN: 7.45 [PH] (ref 7.35–7.45)
PLATELET # BLD AUTO: 142 K/UL
PLATELET # BLD AUTO: 167 K/UL
PLATELET # BLD AUTO: 284 K/UL
PMV BLD AUTO: 9 FL
PMV BLD AUTO: 9.3 FL
PMV BLD AUTO: 9.3 FL
PO2 BLDA: 210 MMHG (ref 80–100)
PO2 BLDA: 473 MMHG (ref 80–100)
PO2 BLDA: 56 MMHG (ref 40–60)
PO2 BLDA: 57 MMHG (ref 40–60)
PO2 BLDA: 76 MMHG (ref 40–60)
POC ACTIVATED CLOTTING TIME K: 121 SEC (ref 74–137)
POC ACTIVATED CLOTTING TIME K: 137 SEC (ref 74–137)
POC ACTIVATED CLOTTING TIME K: 265 SEC (ref 74–137)
POC ACTIVATED CLOTTING TIME K: 343 SEC (ref 74–137)
POC BE: -1 MMOL/L
POC BE: 0 MMOL/L
POC BE: 14 MMOL/L
POC BE: 3 MMOL/L
POC BE: 6 MMOL/L
POC IONIZED CALCIUM: 0.94 MMOL/L (ref 1.06–1.42)
POC IONIZED CALCIUM: 1.06 MMOL/L (ref 1.06–1.42)
POC IONIZED CALCIUM: 1.11 MMOL/L (ref 1.06–1.42)
POC IONIZED CALCIUM: 1.17 MMOL/L (ref 1.06–1.42)
POC SATURATED O2: 100 % (ref 95–100)
POC SATURATED O2: 100 % (ref 95–100)
POC SATURATED O2: 86 % (ref 95–100)
POC SATURATED O2: 86 % (ref 95–100)
POC SATURATED O2: 93 % (ref 95–100)
POCT GLUCOSE: 113 MG/DL (ref 70–110)
POCT GLUCOSE: 138 MG/DL (ref 70–110)
POCT GLUCOSE: 57 MG/DL (ref 70–110)
POCT GLUCOSE: 75 MG/DL (ref 70–110)
POTASSIUM BLD-SCNC: 3.1 MMOL/L (ref 3.5–5.1)
POTASSIUM BLD-SCNC: 3.5 MMOL/L (ref 3.5–5.1)
POTASSIUM BLD-SCNC: 4.1 MMOL/L (ref 3.5–5.1)
POTASSIUM BLD-SCNC: 4.9 MMOL/L (ref 3.5–5.1)
POTASSIUM SERPL-SCNC: 3.4 MMOL/L
POTASSIUM SERPL-SCNC: 3.4 MMOL/L
POTASSIUM SERPL-SCNC: 3.7 MMOL/L
PROTHROMBIN TIME: 10.5 SEC
PROTHROMBIN TIME: 12.4 SEC
PROTHROMBIN TIME: 14.7 SEC
PS: 10
RBC # BLD AUTO: 3.01 M/UL
RBC # BLD AUTO: 3.26 M/UL
SAMPLE: ABNORMAL
SAMPLE: NORMAL
SAMPLE: NORMAL
SITE: ABNORMAL
SODIUM BLD-SCNC: 138 MMOL/L (ref 136–145)
SODIUM BLD-SCNC: 140 MMOL/L (ref 136–145)
SODIUM BLD-SCNC: 140 MMOL/L (ref 136–145)
SODIUM BLD-SCNC: 141 MMOL/L (ref 136–145)
SODIUM SERPL-SCNC: 140 MMOL/L
SODIUM SERPL-SCNC: 143 MMOL/L
VT: 480
WBC # BLD AUTO: 13.61 K/UL
WBC # BLD AUTO: 7.67 K/UL

## 2017-02-13 PROCEDURE — 85610 PROTHROMBIN TIME: CPT | Mod: 91

## 2017-02-13 PROCEDURE — 85730 THROMBOPLASTIN TIME PARTIAL: CPT | Mod: 91

## 2017-02-13 PROCEDURE — 99223 1ST HOSP IP/OBS HIGH 75: CPT | Mod: ,,, | Performed by: INTERNAL MEDICINE

## 2017-02-13 PROCEDURE — 27000221 HC OXYGEN, UP TO 24 HOURS

## 2017-02-13 PROCEDURE — 25000242 PHARM REV CODE 250 ALT 637 W/ HCPCS

## 2017-02-13 PROCEDURE — 63600175 PHARM REV CODE 636 W HCPCS: Performed by: NURSE ANESTHETIST, CERTIFIED REGISTERED

## 2017-02-13 PROCEDURE — 25000242 PHARM REV CODE 250 ALT 637 W/ HCPCS: Performed by: THORACIC SURGERY (CARDIOTHORACIC VASCULAR SURGERY)

## 2017-02-13 PROCEDURE — 20000000 HC ICU ROOM

## 2017-02-13 PROCEDURE — 36430 TRANSFUSION BLD/BLD COMPNT: CPT

## 2017-02-13 PROCEDURE — 85384 FIBRINOGEN ACTIVITY: CPT

## 2017-02-13 PROCEDURE — 37000008 HC ANESTHESIA 1ST 15 MINUTES: Performed by: THORACIC SURGERY (CARDIOTHORACIC VASCULAR SURGERY)

## 2017-02-13 PROCEDURE — 36415 COLL VENOUS BLD VENIPUNCTURE: CPT

## 2017-02-13 PROCEDURE — 25000003 PHARM REV CODE 250: Performed by: THORACIC SURGERY (CARDIOTHORACIC VASCULAR SURGERY)

## 2017-02-13 PROCEDURE — C1729 CATH, DRAINAGE: HCPCS | Performed by: THORACIC SURGERY (CARDIOTHORACIC VASCULAR SURGERY)

## 2017-02-13 PROCEDURE — 99291 CRITICAL CARE FIRST HOUR: CPT | Mod: ,,, | Performed by: INTERNAL MEDICINE

## 2017-02-13 PROCEDURE — 94002 VENT MGMT INPAT INIT DAY: CPT

## 2017-02-13 PROCEDURE — 37000009 HC ANESTHESIA EA ADD 15 MINS: Performed by: THORACIC SURGERY (CARDIOTHORACIC VASCULAR SURGERY)

## 2017-02-13 PROCEDURE — 06BQ4ZZ EXCISION OF LEFT SAPHENOUS VEIN, PERCUTANEOUS ENDOSCOPIC APPROACH: ICD-10-PCS | Performed by: THORACIC SURGERY (CARDIOTHORACIC VASCULAR SURGERY)

## 2017-02-13 PROCEDURE — 27201423 OPTIME MED/SURG SUP & DEVICES STERILE SUPPLY: Performed by: THORACIC SURGERY (CARDIOTHORACIC VASCULAR SURGERY)

## 2017-02-13 PROCEDURE — 36000712 HC OR TIME LEV V 1ST 15 MIN: Performed by: THORACIC SURGERY (CARDIOTHORACIC VASCULAR SURGERY)

## 2017-02-13 PROCEDURE — P9017 PLASMA 1 DONOR FRZ W/IN 8 HR: HCPCS

## 2017-02-13 PROCEDURE — 02100Z9 BYPASS CORONARY ARTERY, ONE ARTERY FROM LEFT INTERNAL MAMMARY, OPEN APPROACH: ICD-10-PCS | Performed by: THORACIC SURGERY (CARDIOTHORACIC VASCULAR SURGERY)

## 2017-02-13 PROCEDURE — 85049 AUTOMATED PLATELET COUNT: CPT

## 2017-02-13 PROCEDURE — 94640 AIRWAY INHALATION TREATMENT: CPT

## 2017-02-13 PROCEDURE — P9045 ALBUMIN (HUMAN), 5%, 250 ML: HCPCS | Performed by: NURSE ANESTHETIST, CERTIFIED REGISTERED

## 2017-02-13 PROCEDURE — 99900038 HC OT GENERIC THERAPY SCREENING (STAT)

## 2017-02-13 PROCEDURE — 63600175 PHARM REV CODE 636 W HCPCS: Performed by: THORACIC SURGERY (CARDIOTHORACIC VASCULAR SURGERY)

## 2017-02-13 PROCEDURE — 83735 ASSAY OF MAGNESIUM: CPT

## 2017-02-13 PROCEDURE — 83036 HEMOGLOBIN GLYCOSYLATED A1C: CPT

## 2017-02-13 PROCEDURE — 82565 ASSAY OF CREATININE: CPT

## 2017-02-13 PROCEDURE — 63600175 PHARM REV CODE 636 W HCPCS

## 2017-02-13 PROCEDURE — 93010 ELECTROCARDIOGRAM REPORT: CPT | Mod: ,,, | Performed by: INTERNAL MEDICINE

## 2017-02-13 PROCEDURE — 25000003 PHARM REV CODE 250

## 2017-02-13 PROCEDURE — 82803 BLOOD GASES ANY COMBINATION: CPT

## 2017-02-13 PROCEDURE — 99900037 HC PT THERAPY SCREENING (STAT)

## 2017-02-13 PROCEDURE — 25000003 PHARM REV CODE 250: Performed by: NURSE ANESTHETIST, CERTIFIED REGISTERED

## 2017-02-13 PROCEDURE — 93005 ELECTROCARDIOGRAM TRACING: CPT

## 2017-02-13 PROCEDURE — 93503 INSERT/PLACE HEART CATHETER: CPT | Performed by: NURSE ANESTHETIST, CERTIFIED REGISTERED

## 2017-02-13 PROCEDURE — 82550 ASSAY OF CK (CPK): CPT

## 2017-02-13 PROCEDURE — 27800903 OPTIME MED/SURG SUP & DEVICES OTHER IMPLANTS: Performed by: THORACIC SURGERY (CARDIOTHORACIC VASCULAR SURGERY)

## 2017-02-13 PROCEDURE — 25000003 PHARM REV CODE 250: Performed by: PHYSICIAN ASSISTANT

## 2017-02-13 PROCEDURE — 85014 HEMATOCRIT: CPT

## 2017-02-13 PROCEDURE — 71000028 HC RECOVERY HIGH ACUITY/ PER HOUR

## 2017-02-13 PROCEDURE — 85018 HEMOGLOBIN: CPT

## 2017-02-13 PROCEDURE — 86920 COMPATIBILITY TEST SPIN: CPT

## 2017-02-13 PROCEDURE — 80048 BASIC METABOLIC PNL TOTAL CA: CPT

## 2017-02-13 PROCEDURE — 37799 UNLISTED PX VASCULAR SURGERY: CPT

## 2017-02-13 PROCEDURE — 36000713 HC OR TIME LEV V EA ADD 15 MIN: Performed by: THORACIC SURGERY (CARDIOTHORACIC VASCULAR SURGERY)

## 2017-02-13 PROCEDURE — 85025 COMPLETE CBC W/AUTO DIFF WBC: CPT

## 2017-02-13 PROCEDURE — P9045 ALBUMIN (HUMAN), 5%, 250 ML: HCPCS | Performed by: PHYSICIAN ASSISTANT

## 2017-02-13 PROCEDURE — 021109W BYPASS CORONARY ARTERY, TWO ARTERIES FROM AORTA WITH AUTOLOGOUS VENOUS TISSUE, OPEN APPROACH: ICD-10-PCS | Performed by: THORACIC SURGERY (CARDIOTHORACIC VASCULAR SURGERY)

## 2017-02-13 PROCEDURE — 63600175 PHARM REV CODE 636 W HCPCS: Performed by: PHYSICIAN ASSISTANT

## 2017-02-13 RX ORDER — ASPIRIN 81 MG/1
81 TABLET ORAL DAILY
Status: DISCONTINUED | OUTPATIENT
Start: 2017-02-14 | End: 2017-02-17 | Stop reason: HOSPADM

## 2017-02-13 RX ORDER — DOCUSATE SODIUM 100 MG/1
100 CAPSULE, LIQUID FILLED ORAL 2 TIMES DAILY
Status: DISCONTINUED | OUTPATIENT
Start: 2017-02-14 | End: 2017-02-15

## 2017-02-13 RX ORDER — DEXMEDETOMIDINE HYDROCHLORIDE 4 UG/ML
0.2 INJECTION, SOLUTION INTRAVENOUS CONTINUOUS
Status: DISCONTINUED | OUTPATIENT
Start: 2017-02-13 | End: 2017-02-14

## 2017-02-13 RX ORDER — ALBUMIN HUMAN 50 G/1000ML
SOLUTION INTRAVENOUS CONTINUOUS PRN
Status: DISCONTINUED | OUTPATIENT
Start: 2017-02-13 | End: 2017-02-13

## 2017-02-13 RX ORDER — MIDAZOLAM HYDROCHLORIDE 1 MG/ML
2 INJECTION INTRAMUSCULAR; INTRAVENOUS
Status: DISCONTINUED | OUTPATIENT
Start: 2017-02-13 | End: 2017-02-14

## 2017-02-13 RX ORDER — LIDOCAINE HCL/PF 100 MG/5ML
SYRINGE (ML) INTRAVENOUS
Status: DISCONTINUED | OUTPATIENT
Start: 2017-02-13 | End: 2017-02-13

## 2017-02-13 RX ORDER — CLOPIDOGREL BISULFATE 75 MG/1
75 TABLET ORAL DAILY
Status: DISCONTINUED | OUTPATIENT
Start: 2017-02-14 | End: 2017-02-17 | Stop reason: HOSPADM

## 2017-02-13 RX ORDER — BACITRACIN 50000 [IU]/1
INJECTION, POWDER, FOR SOLUTION INTRAMUSCULAR
Status: DISCONTINUED | OUTPATIENT
Start: 2017-02-13 | End: 2017-02-13 | Stop reason: HOSPADM

## 2017-02-13 RX ORDER — SODIUM CHLORIDE, SODIUM LACTATE, POTASSIUM CHLORIDE, CALCIUM CHLORIDE 600; 310; 30; 20 MG/100ML; MG/100ML; MG/100ML; MG/100ML
INJECTION, SOLUTION INTRAVENOUS CONTINUOUS PRN
Status: DISCONTINUED | OUTPATIENT
Start: 2017-02-13 | End: 2017-02-13

## 2017-02-13 RX ORDER — FENTANYL CITRATE 50 UG/ML
INJECTION, SOLUTION INTRAMUSCULAR; INTRAVENOUS
Status: DISCONTINUED | OUTPATIENT
Start: 2017-02-13 | End: 2017-02-13

## 2017-02-13 RX ORDER — MAGNESIUM SULFATE HEPTAHYDRATE 40 MG/ML
2 INJECTION, SOLUTION INTRAVENOUS
Status: DISCONTINUED | OUTPATIENT
Start: 2017-02-13 | End: 2017-02-14

## 2017-02-13 RX ORDER — DEXMEDETOMIDINE HYDROCHLORIDE 4 UG/ML
0.2 INJECTION, SOLUTION INTRAVENOUS CONTINUOUS
Status: DISCONTINUED | OUTPATIENT
Start: 2017-02-13 | End: 2017-02-13

## 2017-02-13 RX ORDER — POTASSIUM CHLORIDE 29.8 MG/ML
INJECTION INTRAVENOUS
Status: DISCONTINUED | OUTPATIENT
Start: 2017-02-13 | End: 2017-02-13

## 2017-02-13 RX ORDER — MIDAZOLAM HYDROCHLORIDE 1 MG/ML
INJECTION, SOLUTION INTRAMUSCULAR; INTRAVENOUS
Status: DISCONTINUED | OUTPATIENT
Start: 2017-02-13 | End: 2017-02-13

## 2017-02-13 RX ORDER — ALBUTEROL SULFATE 2.5 MG/.5ML
2.5 SOLUTION RESPIRATORY (INHALATION) EVERY 4 HOURS PRN
Status: DISCONTINUED | OUTPATIENT
Start: 2017-02-13 | End: 2017-02-17 | Stop reason: HOSPADM

## 2017-02-13 RX ORDER — DEXTROSE MONOHYDRATE AND SODIUM CHLORIDE 5; .225 G/100ML; G/100ML
50 INJECTION, SOLUTION INTRAVENOUS CONTINUOUS
Status: DISCONTINUED | OUTPATIENT
Start: 2017-02-13 | End: 2017-02-14

## 2017-02-13 RX ORDER — FAMOTIDINE 10 MG/ML
20 INJECTION INTRAVENOUS DAILY
Status: DISCONTINUED | OUTPATIENT
Start: 2017-02-13 | End: 2017-02-14

## 2017-02-13 RX ORDER — HYDROCODONE BITARTRATE AND ACETAMINOPHEN 10; 325 MG/1; MG/1
1 TABLET ORAL EVERY 4 HOURS PRN
Status: DISCONTINUED | OUTPATIENT
Start: 2017-02-13 | End: 2017-02-17 | Stop reason: HOSPADM

## 2017-02-13 RX ORDER — CEFAZOLIN SODIUM 1 G/3ML
INJECTION, POWDER, FOR SOLUTION INTRAMUSCULAR; INTRAVENOUS
Status: DISCONTINUED | OUTPATIENT
Start: 2017-02-13 | End: 2017-02-13

## 2017-02-13 RX ORDER — MORPHINE SULFATE 10 MG/ML
5 INJECTION INTRAMUSCULAR; INTRAVENOUS; SUBCUTANEOUS
Status: DISCONTINUED | OUTPATIENT
Start: 2017-02-13 | End: 2017-02-14

## 2017-02-13 RX ORDER — ACETAMINOPHEN 325 MG/1
650 TABLET ORAL EVERY 6 HOURS PRN
Status: DISCONTINUED | OUTPATIENT
Start: 2017-02-13 | End: 2017-02-17 | Stop reason: HOSPADM

## 2017-02-13 RX ORDER — HEPARIN SODIUM 1000 [USP'U]/ML
INJECTION, SOLUTION INTRAVENOUS; SUBCUTANEOUS
Status: DISCONTINUED | OUTPATIENT
Start: 2017-02-13 | End: 2017-02-13 | Stop reason: HOSPADM

## 2017-02-13 RX ORDER — MIDAZOLAM HYDROCHLORIDE 1 MG/ML
1 INJECTION INTRAMUSCULAR; INTRAVENOUS
Status: DISCONTINUED | OUTPATIENT
Start: 2017-02-13 | End: 2017-02-14

## 2017-02-13 RX ORDER — HYDROCODONE BITARTRATE AND ACETAMINOPHEN 7.5; 325 MG/1; MG/1
1 TABLET ORAL EVERY 4 HOURS PRN
Status: DISCONTINUED | OUTPATIENT
Start: 2017-02-13 | End: 2017-02-17 | Stop reason: HOSPADM

## 2017-02-13 RX ORDER — PAPAVERINE HYDROCHLORIDE 30 MG/ML
INJECTION INTRAMUSCULAR; INTRAVENOUS
Status: DISCONTINUED | OUTPATIENT
Start: 2017-02-13 | End: 2017-02-13 | Stop reason: HOSPADM

## 2017-02-13 RX ORDER — ONDANSETRON 2 MG/ML
4 INJECTION INTRAMUSCULAR; INTRAVENOUS EVERY 12 HOURS PRN
Status: DISCONTINUED | OUTPATIENT
Start: 2017-02-13 | End: 2017-02-17 | Stop reason: HOSPADM

## 2017-02-13 RX ORDER — SODIUM CHLORIDE, SODIUM LACTATE, POTASSIUM CHLORIDE, CALCIUM CHLORIDE 600; 310; 30; 20 MG/100ML; MG/100ML; MG/100ML; MG/100ML
1000 INJECTION, SOLUTION INTRAVENOUS
Status: DISCONTINUED | OUTPATIENT
Start: 2017-02-13 | End: 2017-02-14

## 2017-02-13 RX ORDER — HYDROCODONE BITARTRATE AND ACETAMINOPHEN 5; 325 MG/1; MG/1
1 TABLET ORAL EVERY 4 HOURS PRN
Status: DISCONTINUED | OUTPATIENT
Start: 2017-02-13 | End: 2017-02-17 | Stop reason: HOSPADM

## 2017-02-13 RX ORDER — MORPHINE SULFATE 10 MG/ML
2 INJECTION INTRAMUSCULAR; INTRAVENOUS; SUBCUTANEOUS
Status: DISCONTINUED | OUTPATIENT
Start: 2017-02-13 | End: 2017-02-13

## 2017-02-13 RX ORDER — ONDANSETRON 2 MG/ML
INJECTION INTRAMUSCULAR; INTRAVENOUS
Status: DISCONTINUED | OUTPATIENT
Start: 2017-02-13 | End: 2017-02-13

## 2017-02-13 RX ORDER — POTASSIUM CHLORIDE 14.9 MG/ML
20 INJECTION INTRAVENOUS
Status: DISCONTINUED | OUTPATIENT
Start: 2017-02-13 | End: 2017-02-14

## 2017-02-13 RX ORDER — LIDOCAINE HYDROCHLORIDE ANHYDROUS AND DEXTROSE MONOHYDRATE .8; 5 G/100ML; G/100ML
INJECTION, SOLUTION INTRAVENOUS CONTINUOUS PRN
Status: DISCONTINUED | OUTPATIENT
Start: 2017-02-13 | End: 2017-02-13

## 2017-02-13 RX ORDER — CEFAZOLIN SODIUM 2 G/50ML
2 SOLUTION INTRAVENOUS
Status: COMPLETED | OUTPATIENT
Start: 2017-02-13 | End: 2017-02-14

## 2017-02-13 RX ORDER — POTASSIUM CHLORIDE 20 MEQ/1
40 TABLET, EXTENDED RELEASE ORAL DAILY
Status: COMPLETED | OUTPATIENT
Start: 2017-02-14 | End: 2017-02-16

## 2017-02-13 RX ORDER — FUROSEMIDE 10 MG/ML
40 INJECTION INTRAMUSCULAR; INTRAVENOUS 2 TIMES DAILY
Status: COMPLETED | OUTPATIENT
Start: 2017-02-14 | End: 2017-02-16

## 2017-02-13 RX ORDER — ALBUTEROL SULFATE 2.5 MG/.5ML
2.5 SOLUTION RESPIRATORY (INHALATION)
Status: DISCONTINUED | OUTPATIENT
Start: 2017-02-13 | End: 2017-02-13 | Stop reason: HOSPADM

## 2017-02-13 RX ORDER — MORPHINE SULFATE 10 MG/ML
2 INJECTION INTRAMUSCULAR; INTRAVENOUS; SUBCUTANEOUS
Status: DISCONTINUED | OUTPATIENT
Start: 2017-02-13 | End: 2017-02-16

## 2017-02-13 RX ORDER — ALBUTEROL SULFATE 2.5 MG/.5ML
SOLUTION RESPIRATORY (INHALATION)
Status: COMPLETED
Start: 2017-02-13 | End: 2017-02-13

## 2017-02-13 RX ORDER — PROTAMINE SULFATE 10 MG/ML
INJECTION, SOLUTION INTRAVENOUS
Status: DISCONTINUED | OUTPATIENT
Start: 2017-02-13 | End: 2017-02-13

## 2017-02-13 RX ORDER — MORPHINE SULFATE 10 MG/ML
5 INJECTION INTRAMUSCULAR; INTRAVENOUS; SUBCUTANEOUS
Status: DISCONTINUED | OUTPATIENT
Start: 2017-02-13 | End: 2017-02-13

## 2017-02-13 RX ORDER — ALBUMIN HUMAN 50 G/1000ML
250 SOLUTION INTRAVENOUS
Status: DISCONTINUED | OUTPATIENT
Start: 2017-02-13 | End: 2017-02-14

## 2017-02-13 RX ORDER — ETOMIDATE 2 MG/ML
INJECTION INTRAVENOUS
Status: DISCONTINUED | OUTPATIENT
Start: 2017-02-13 | End: 2017-02-13

## 2017-02-13 RX ORDER — METOPROLOL TARTRATE 25 MG/1
25 TABLET, FILM COATED ORAL 2 TIMES DAILY
Status: DISCONTINUED | OUTPATIENT
Start: 2017-02-14 | End: 2017-02-17 | Stop reason: HOSPADM

## 2017-02-13 RX ORDER — ENOXAPARIN SODIUM 100 MG/ML
40 INJECTION SUBCUTANEOUS
Status: DISCONTINUED | OUTPATIENT
Start: 2017-02-14 | End: 2017-02-17 | Stop reason: HOSPADM

## 2017-02-13 RX ORDER — POLYETHYLENE GLYCOL 3350 17 G/17G
17 POWDER, FOR SOLUTION ORAL DAILY
Status: DISCONTINUED | OUTPATIENT
Start: 2017-02-14 | End: 2017-02-17 | Stop reason: HOSPADM

## 2017-02-13 RX ORDER — MUPIROCIN 20 MG/G
1 OINTMENT TOPICAL 2 TIMES DAILY
Status: COMPLETED | OUTPATIENT
Start: 2017-02-13 | End: 2017-02-15

## 2017-02-13 RX ORDER — MIDAZOLAM HYDROCHLORIDE 1 MG/ML
1 INJECTION, SOLUTION INTRAMUSCULAR; INTRAVENOUS
Status: DISPENSED | OUTPATIENT
Start: 2017-02-13 | End: 2017-02-14

## 2017-02-13 RX ORDER — VECURONIUM BROMIDE FOR INJECTION 1 MG/ML
INJECTION, POWDER, LYOPHILIZED, FOR SOLUTION INTRAVENOUS
Status: DISCONTINUED | OUTPATIENT
Start: 2017-02-13 | End: 2017-02-13

## 2017-02-13 RX ORDER — POTASSIUM CHLORIDE 29.8 MG/ML
40 INJECTION INTRAVENOUS
Status: DISCONTINUED | OUTPATIENT
Start: 2017-02-13 | End: 2017-02-14

## 2017-02-13 RX ORDER — POTASSIUM CHLORIDE 14.9 MG/ML
60 INJECTION INTRAVENOUS
Status: DISCONTINUED | OUTPATIENT
Start: 2017-02-13 | End: 2017-02-14

## 2017-02-13 RX ORDER — HEPARIN SODIUM 1000 [USP'U]/ML
INJECTION, SOLUTION INTRAVENOUS; SUBCUTANEOUS
Status: DISCONTINUED | OUTPATIENT
Start: 2017-02-13 | End: 2017-02-13

## 2017-02-13 RX ORDER — DEXMEDETOMIDINE HYDROCHLORIDE 4 UG/ML
1 INJECTION, SOLUTION INTRAVENOUS ONCE
Status: DISCONTINUED | OUTPATIENT
Start: 2017-02-13 | End: 2017-02-13

## 2017-02-13 RX ADMIN — SODIUM CHLORIDE, SODIUM LACTATE, POTASSIUM CHLORIDE, AND CALCIUM CHLORIDE: 600; 310; 30; 20 INJECTION, SOLUTION INTRAVENOUS at 10:02

## 2017-02-13 RX ADMIN — PROTAMINE SULFATE 150 MG: 10 INJECTION, SOLUTION INTRAVENOUS at 10:02

## 2017-02-13 RX ADMIN — DEXMEDETOMIDINE HYDROCHLORIDE 1.4 MCG/KG/HR: 4 INJECTION, SOLUTION INTRAVENOUS at 04:02

## 2017-02-13 RX ADMIN — HEPARIN SODIUM 8000 UNITS: 1000 INJECTION, SOLUTION INTRAVENOUS; SUBCUTANEOUS at 08:02

## 2017-02-13 RX ADMIN — VECURONIUM BROMIDE FOR INJECTION 5 MG: 1 INJECTION, POWDER, LYOPHILIZED, FOR SOLUTION INTRAVENOUS at 09:02

## 2017-02-13 RX ADMIN — MIDAZOLAM HYDROCHLORIDE 2 MG: 1 INJECTION, SOLUTION INTRAMUSCULAR; INTRAVENOUS at 12:02

## 2017-02-13 RX ADMIN — FENTANYL CITRATE 250 MCG: 50 INJECTION, SOLUTION INTRAMUSCULAR; INTRAVENOUS at 09:02

## 2017-02-13 RX ADMIN — MUPIROCIN 1 G: 20 OINTMENT TOPICAL at 08:02

## 2017-02-13 RX ADMIN — CEFAZOLIN 2 G: 1 INJECTION, POWDER, FOR SOLUTION INTRAMUSCULAR; INTRAVENOUS at 07:02

## 2017-02-13 RX ADMIN — HEPARIN SODIUM 4000 UNITS: 1000 INJECTION, SOLUTION INTRAVENOUS; SUBCUTANEOUS at 09:02

## 2017-02-13 RX ADMIN — POTASSIUM CHLORIDE 40 MEQ: 29.8 INJECTION, SOLUTION INTRAVENOUS at 09:02

## 2017-02-13 RX ADMIN — FAMOTIDINE 20 MG: 10 INJECTION, SOLUTION INTRAVENOUS at 12:02

## 2017-02-13 RX ADMIN — POTASSIUM CHLORIDE 20 MEQ: 200 INJECTION, SOLUTION INTRAVENOUS at 10:02

## 2017-02-13 RX ADMIN — MORPHINE SULFATE 5 MG: 10 INJECTION, SOLUTION INTRAMUSCULAR; INTRAVENOUS at 11:02

## 2017-02-13 RX ADMIN — ALBUTEROL SULFATE 2.5 MG: 2.5 SOLUTION RESPIRATORY (INHALATION) at 12:02

## 2017-02-13 RX ADMIN — FENTANYL CITRATE 250 MCG: 50 INJECTION, SOLUTION INTRAMUSCULAR; INTRAVENOUS at 08:02

## 2017-02-13 RX ADMIN — MIDAZOLAM HYDROCHLORIDE 3 MG: 1 INJECTION, SOLUTION INTRAMUSCULAR; INTRAVENOUS at 07:02

## 2017-02-13 RX ADMIN — FENTANYL CITRATE 250 MCG: 50 INJECTION, SOLUTION INTRAMUSCULAR; INTRAVENOUS at 07:02

## 2017-02-13 RX ADMIN — LIDOCAINE HYDROCHLORIDE 100 MG: 20 INJECTION, SOLUTION INTRAVENOUS at 07:02

## 2017-02-13 RX ADMIN — MIDAZOLAM HYDROCHLORIDE 4 MG: 1 INJECTION, SOLUTION INTRAMUSCULAR; INTRAVENOUS at 07:02

## 2017-02-13 RX ADMIN — VECURONIUM BROMIDE FOR INJECTION 10 MG: 1 INJECTION, POWDER, LYOPHILIZED, FOR SOLUTION INTRAVENOUS at 07:02

## 2017-02-13 RX ADMIN — ALBUTEROL SULFATE 2.5 MG: 2.5 SOLUTION RESPIRATORY (INHALATION) at 06:02

## 2017-02-13 RX ADMIN — SODIUM CHLORIDE 1 UNITS/HR: 9 INJECTION, SOLUTION INTRAVENOUS at 06:02

## 2017-02-13 RX ADMIN — MUPIROCIN 1 G: 20 OINTMENT TOPICAL at 12:02

## 2017-02-13 RX ADMIN — ETOMIDATE 20 MG: 2 INJECTION, SOLUTION INTRAVENOUS at 07:02

## 2017-02-13 RX ADMIN — CEFAZOLIN SODIUM 2 G: 2 SOLUTION INTRAVENOUS at 05:02

## 2017-02-13 RX ADMIN — FENTANYL CITRATE 350 MCG: 50 INJECTION, SOLUTION INTRAMUSCULAR; INTRAVENOUS at 07:02

## 2017-02-13 RX ADMIN — CALCIUM CHLORIDE 700 MG: 100 INJECTION, SOLUTION INTRAVENOUS at 10:02

## 2017-02-13 RX ADMIN — POTASSIUM CHLORIDE 40 MEQ: 400 INJECTION, SOLUTION INTRAVENOUS at 12:02

## 2017-02-13 RX ADMIN — ALBUMIN (HUMAN): 12.5 SOLUTION INTRAVENOUS at 09:02

## 2017-02-13 RX ADMIN — MIDAZOLAM HYDROCHLORIDE 1 MG: 1 INJECTION, SOLUTION INTRAMUSCULAR; INTRAVENOUS at 11:02

## 2017-02-13 RX ADMIN — MIDAZOLAM HYDROCHLORIDE 2 MG: 1 INJECTION, SOLUTION INTRAMUSCULAR; INTRAVENOUS at 02:02

## 2017-02-13 RX ADMIN — ALBUMIN (HUMAN): 12.5 SOLUTION INTRAVENOUS at 08:02

## 2017-02-13 RX ADMIN — SODIUM CHLORIDE 1 UNITS/HR: 9 INJECTION, SOLUTION INTRAVENOUS at 09:02

## 2017-02-13 RX ADMIN — LIDOCAINE HYDROCHLORIDE 2 MG/MIN: 800 INJECTION, SOLUTION INTRAVENOUS at 07:02

## 2017-02-13 RX ADMIN — MIDAZOLAM HYDROCHLORIDE 3 MG: 1 INJECTION, SOLUTION INTRAMUSCULAR; INTRAVENOUS at 10:02

## 2017-02-13 RX ADMIN — DEXTROSE AND SODIUM CHLORIDE 50 ML/HR: 5; .2 INJECTION, SOLUTION INTRAVENOUS at 11:02

## 2017-02-13 RX ADMIN — FENTANYL CITRATE 150 MCG: 50 INJECTION, SOLUTION INTRAMUSCULAR; INTRAVENOUS at 07:02

## 2017-02-13 RX ADMIN — CALCIUM CHLORIDE 300 MG: 100 INJECTION, SOLUTION INTRAVENOUS at 09:02

## 2017-02-13 RX ADMIN — DEXMEDETOMIDINE HYDROCHLORIDE 1.4 MCG/KG/HR: 4 INJECTION, SOLUTION INTRAVENOUS at 11:02

## 2017-02-13 RX ADMIN — MORPHINE SULFATE 2 MG: 10 INJECTION, SOLUTION INTRAMUSCULAR; INTRAVENOUS at 08:02

## 2017-02-13 RX ADMIN — DEXMEDETOMIDINE HYDROCHLORIDE 1.4 MCG/KG/HR: 4 INJECTION, SOLUTION INTRAVENOUS at 03:02

## 2017-02-13 RX ADMIN — MAGNESIUM SULFATE IN WATER 2 G: 40 INJECTION, SOLUTION INTRAVENOUS at 06:02

## 2017-02-13 RX ADMIN — SODIUM CHLORIDE, SODIUM LACTATE, POTASSIUM CHLORIDE, AND CALCIUM CHLORIDE: 600; 310; 30; 20 INJECTION, SOLUTION INTRAVENOUS at 07:02

## 2017-02-13 RX ADMIN — MORPHINE SULFATE 5 MG: 10 INJECTION, SOLUTION INTRAMUSCULAR; INTRAVENOUS at 02:02

## 2017-02-13 RX ADMIN — FENTANYL CITRATE 250 MCG: 50 INJECTION, SOLUTION INTRAMUSCULAR; INTRAVENOUS at 10:02

## 2017-02-13 RX ADMIN — HEPARIN SODIUM 5000 UNITS: 1000 INJECTION, SOLUTION INTRAVENOUS; SUBCUTANEOUS at 07:02

## 2017-02-13 RX ADMIN — ALBUMIN (HUMAN) 250 ML: 12.5 SOLUTION INTRAVENOUS at 08:02

## 2017-02-13 RX ADMIN — CEFAZOLIN 2 G: 1 INJECTION, POWDER, FOR SOLUTION INTRAMUSCULAR; INTRAVENOUS at 10:02

## 2017-02-13 RX ADMIN — ALBUMIN (HUMAN) 250 ML: 12.5 SOLUTION INTRAVENOUS at 11:02

## 2017-02-13 RX ADMIN — SODIUM CHLORIDE, SODIUM LACTATE, POTASSIUM CHLORIDE, AND CALCIUM CHLORIDE 1000 ML: .6; .31; .03; .02 INJECTION, SOLUTION INTRAVENOUS at 11:02

## 2017-02-13 RX ADMIN — ALBUMIN (HUMAN) 250 ML: 12.5 SOLUTION INTRAVENOUS at 06:02

## 2017-02-13 RX ADMIN — SODIUM CHLORIDE, SODIUM LACTATE, POTASSIUM CHLORIDE, AND CALCIUM CHLORIDE 1000 ML: .6; .31; .03; .02 INJECTION, SOLUTION INTRAVENOUS at 08:02

## 2017-02-13 NOTE — IP AVS SNAPSHOT
[address] [place] [address]           Patient Discharge Instructions     Our goal is to set you up for success. This packet includes information on your condition, medications, and your home care. It will help you to care for yourself so you don't get sicker and need to go back to the hospital.     Please ask your nurse if you have any questions.        There are many details to remember when preparing to leave the hospital. Here is what you will need to do:    1. Take your medicine. If you are prescribed medications, review your Medication List in the following pages. You may have new medications to  at the pharmacy and others that you'll need to stop taking. Review the instructions for how and when to take your medications. Talk with your doctor or nurses if you are unsure of what to do.     2. Go to your follow-up appointments. Specific follow-up information is listed in the following pages. Your may be contacted by a transition nurse or clinical provider about future appointments. Be sure we have all of the phone numbers to reach you, if needed. Please contact your provider's office if you are unable to make an appointment.     3. Watch for warning signs. Your doctor or nurse will give you detailed warning signs to watch for and when to call for assistance. These instructions may also include educational information about your condition. If you experience any of warning signs to your health, call your doctor.               ** Verify the list of medication(s) below is accurate and up to date. Carry this with you in case of emergency. If your medications have changed, please notify your healthcare provider.             Medication List      START taking these medications        Additional Info                      aspirin 81 MG EC tablet   Commonly known as:  ECOTRIN   Quantity:  30 tablet   Refills:  1   Dose:  81 mg    Last time this was given:  81 mg on  2/17/2017  9:55 AM   Instructions:  Take 1 tablet (81 mg total) by mouth once daily.     Begin Date    AM    Noon    PM    Bedtime       clopidogrel 75 mg tablet   Commonly known as:  PLAVIX   Quantity:  30 tablet   Refills:  1   Dose:  75 mg    Last time this was given:  75 mg on 2/17/2017  9:55 AM   Instructions:  Take 1 tablet (75 mg total) by mouth once daily.     Begin Date    AM    Noon    PM    Bedtime       hydrocodone-acetaminophen 5-325mg 5-325 mg per tablet   Commonly known as:  NORCO   Quantity:  10 tablet   Refills:  0   Dose:  1 tablet    Instructions:  Take 1 tablet by mouth every 4 (four) hours as needed.     Begin Date    AM    Noon    PM    Bedtime         CHANGE how you take these medications        Additional Info                      metoprolol tartrate 25 MG tablet   Commonly known as:  LOPRESSOR   Quantity:  60 tablet   Refills:  11   Dose:  25 mg   What changed:  See the new instructions.    Last time this was given:  25 mg on 2/17/2017  9:55 AM   Instructions:  Take 1 tablet (25 mg total) by mouth 2 (two) times daily.     Begin Date    AM    Noon    PM    Bedtime       simvastatin 40 MG tablet   Commonly known as:  ZOCOR   Quantity:  90 tablet   Refills:  3   Dose:  40 mg   What changed:  how much to take    Last time this was given:  40 mg on 2/16/2017  8:39 PM   Instructions:  Take 1 tablet (40 mg total) by mouth every evening.     Begin Date    AM    Noon    PM    Bedtime         CONTINUE taking these medications        Additional Info                      albuterol-ipratropium 2.5mg-0.5mg/3mL 0.5 mg-3 mg(2.5 mg base)/3 mL nebulizer solution   Commonly known as:  DUO-NEB   Quantity:  120 vial   Refills:  12   Dose:  3 mL    Last time this was given:  3 mLs on 2/17/2017 12:58 PM   Instructions:  Take 3 mLs by nebulization every 6 (six) hours.     Begin Date    AM    Noon    PM    Bedtime       ascorbic acid (vitamin C) 1000 MG tablet   Commonly known as:  VITAMIN C   Refills:  0   Dose:   1000 mg    Instructions:  Take 1,000 mg by mouth once daily.     Begin Date    AM    Noon    PM    Bedtime       budesonide 0.5 mg/2 mL nebulizer solution   Commonly known as:  PULMICORT   Quantity:  120 mL   Refills:  12   Dose:  0.5 mg   Comments:  Dispense # 60 vials of 0.5mg/2ml    Last time this was given:  0.5 mg on 2/17/2017  7:35 AM   Instructions:  Take 2 mLs (0.5 mg total) by nebulization 2 (two) times daily. Wash out mouth after using.     Begin Date    AM    Noon    PM    Bedtime       calcium-vitamin D3 500 mg(1,250mg) -200 unit per tablet   Quantity:  60 tablet   Refills:  11   Dose:  1 tablet    Instructions:  Take 1 tablet by mouth 2 (two) times daily with meals.     Begin Date    AM    Noon    PM    Bedtime       CENTRUM SILVER ORAL   Refills:  0    Instructions:  Take by mouth once daily.     Begin Date    AM    Noon    PM    Bedtime       duloxetine 30 MG capsule   Commonly known as:  CYMBALTA   Refills:  0   Dose:  30 mg    Instructions:  Take 30 mg by mouth once daily.     Begin Date    AM    Noon    PM    Bedtime       ezetimibe 10 mg tablet   Commonly known as:  ZETIA   Quantity:  30 tablet   Refills:  11   Dose:  10 mg    Instructions:  Take 1 tablet (10 mg total) by mouth every evening.     Begin Date    AM    Noon    PM    Bedtime       FISH OIL ORAL   Refills:  0   Dose:  1200 mg    Instructions:  Take 1,200 mg by mouth 2 (two) times daily.     Begin Date    AM    Noon    PM    Bedtime       fluorometholone 0.1% 0.1 % Drps   Commonly known as:  FML   Refills:  0    Instructions:  INSTILL ONE DROP IN THE RIGHT EYE THREE TIMES DAILY     Begin Date    AM    Noon    PM    Bedtime       ipratropium 0.06 % nasal spray   Commonly known as:  ATROVENT   Quantity:  15 mL   Refills:  11   Dose:  2 spray    Instructions:  2 sprays by Nasal route 4 (four) times daily. As needed for rhinitis     Begin Date    AM    Noon    PM    Bedtime       levothyroxine 200 MCG tablet   Commonly known as:  SYNTHROID    Quantity:  30 tablet   Refills:  11    Instructions:  TAKE ONE TABLET BY MOUTH EVERY DAY     Begin Date    AM    Noon    PM    Bedtime       MELATONIN ORAL   Refills:  0   Dose:  20 mg    Instructions:  Take 20 mg by mouth nightly as needed.     Begin Date    AM    Noon    PM    Bedtime       omeprazole 40 MG capsule   Commonly known as:  PRILOSEC   Quantity:  30 capsule   Refills:  3   Dose:  40 mg    Instructions:  Take 1 capsule (40 mg total) by mouth once daily.     Begin Date    AM    Noon    PM    Bedtime       SENNA LAXATIVE ORAL   Refills:  0    Instructions:  Take by mouth 3 (three) times daily.     Begin Date    AM    Noon    PM    Bedtime       senna 8.6 mg tablet   Commonly known as:  SENOKOT   Refills:  0   Dose:  3 tablet    Instructions:  Take 3 tablets by mouth.     Begin Date    AM    Noon    PM    Bedtime       tiotropium 18 mcg inhalation capsule   Commonly known as:  SPIRIVA WITH HANDIHALER   Quantity:  30 capsule   Refills:  11   Dose:  18 mcg    Instructions:  Inhale 1 capsule (18 mcg total) into the lungs once daily.     Begin Date    AM    Noon    PM    Bedtime       tramadol 50 mg tablet   Commonly known as:  ULTRAM   Refills:  5   Dose:  50 mg    Instructions:  Take 50 mg by mouth 2 (two) times daily.     Begin Date    AM    Noon    PM    Bedtime       trazodone 50 MG tablet   Commonly known as:  DESYREL   Quantity:  30 tablet   Refills:  11    Instructions:  TAKE ONE TABLET BY MOUTH EVERY EVENING     Begin Date    AM    Noon    PM    Bedtime       VENTOLIN HFA 90 mcg/actuation inhaler   Quantity:  1 Inhaler   Refills:  11   Dose:  2 puff   Generic drug:  albuterol    Instructions:  Inhale 2 puffs into the lungs every 4 (four) hours as needed for Wheezing.     Begin Date    AM    Noon    PM    Bedtime       zolpidem 5 MG Tab   Commonly known as:  AMBIEN   Quantity:  30 tablet   Refills:  3    Instructions:  TAKE ONE TABLET BY MOUTH AT BEDTIME AS NEEDED     Begin Date    AM    Noon    PM     Bedtime         STOP taking these medications     amlodipine 10 MG tablet   Commonly known as:  NORVASC       magnesium 30 mg Tab       niacin 500 mg Tbsr       potassium 99 mg Tab       triamterene-hydrochlorothiazide 37.5-25 mg 37.5-25 mg per capsule   Commonly known as:  DYAZIDE            Where to Get Your Medications      These medications were sent to [facility] [place] - [address] [facility]  [address] [address]     Phone:  [phone]     aspirin 81 MG EC tablet    clopidogrel 75 mg tablet    metoprolol tartrate 25 MG tablet    simvastatin 40 MG tablet         You can get these medications from any pharmacy     Bring a paper prescription for each of these medications     hydrocodone-acetaminophen 5-325mg 5-325 mg per tablet                  Please bring to all follow up appointments:    1. A copy of your discharge instructions.  2. All medicines you are currently taking in their original bottles.  3. Identification and insurance card.    Please arrive 15 minutes ahead of scheduled appointment time.    Please call 24 hours in advance if you must reschedule your appointment and/or time.        Your Scheduled Appointments     Mar 08, 2017 11:40 AM CST   Established Patient Visit with MD Vinayak Tierney - Cardiology ([facility])    [address] [place] [address] [phone]            Mar 13, 2017 11:20 AM CDT   Established Patient Visit with Giuliano Henry MD   [facility] ([facility])    [address] [facility] [address] [phone]            Mar 21, 2017  9:40 AM CDT   Established Patient Visit with MD Vinayak Brady - Pulmonary Services ([facility])    [address] [place] [address] [phone]            Mar 21, 2017 10:00 AM CDT   Spirometry with SPIROMETRY, [facility]   [facility] Function [facility] ([facility])    [address] [place] [address]    [phone]            Jul 11, 2017 11:20 AM CDT   Established Patient Visit with Navarro Carmona MD   [facility] Services ([facility])    [address] [place] [address] [phone]              Follow-up Information     Follow up with Steve Bonds MD In 4 weeks.    Specialty:  Cardiothoracic Surgery    Why:  Hosp F/U - Cardiothoracic Surgeon    Contact information:    [address] [facility] [facility]  [address]  [facility] [id number] [phone]          Follow up with NEERAJ Schroeder In 2 weeks.    Specialty:  Family Medicine    Why:  Hosp F/U - Nurse Practitioner for Cardiothoracic Surgeon    Contact information:    [address] [facility]  [address]  [facility] [address] [phone]          Follow up with [facility].    Specialties:  DME Provider, Home Health Services    Why:  Home Health, SN per CVT protocal    Contact information:    [address] [facility] [address] [phone]          Discharge Instructions     Future Orders    Activity as tolerated     Call MD for:  difficulty breathing or increased cough     Call MD for:  severe uncontrolled pain     Call MD for:  temperature >100.4     Diet general     Questions:    Total calories:      Fat restriction, if any:      Protein restriction, if any:      Na restriction, if any:      Fluid restriction:      Additional restrictions:        Discharge References/Attachments     HEART-HEALTHY FOOD, EATING: USING THE DASH PLAN (ENGLISH)    EATING HEART-HEALTHY FOODS (ENGLISH)        Primary Diagnosis     Your primary diagnosis was:  History Of Coronary Artery Bypass Graft      Admission Information     Date & Time Department CSN    2/13/2017  5:26 AM [facility] [id number]      Care Providers     Provider Role Specialty Primary office phone    Steve Bonds MD Surgeon  Cardiothoracic Surgery [phone]    Cherry Terrazas MD Team Attending  Internal Medicine [phone]    Vern Mason MD  Team Attending  General Practice [phone]      Your Vitals Were     BP Pulse Temp Resp Height Weight    121/89 84 98.1 °F (36.7 °C) (Oral) 20 5' (1.524 m) 61.2 kg (135 lb)    SpO2 BMI             96% 26.37 kg/m2         Recent Lab Values        2/13/2017                           6:21 AM           A1C 5.5           Comment for A1C at  6:21 AM on 2/13/2017:  According to ADA guidelines, hemoglobin A1C <7.0% represents  optimal control in non-pregnant diabetic patients.  Different  metrics may apply to specific populations.   Standards of Medical Care in Diabetes - 2016.  For the purpose of screening for the presence of diabetes:  <5.7%     Consistent with the absence of diabetes  5.7-6.4%  Consistent with increasing risk for diabetes   (prediabetes)  >or=6.5%  Consistent with diabetes  Currently no consensus exists for use of hemoglobin A1C  for diagnosis of diabetes for children.        Pending Labs     Order Current Status    Prepare Fresh Frozen Plasma 2 Units Preliminary result      Allergies as of 2/17/2017        Reactions    Ace Inhibitors Other (See Comments)    unknown    Iodine And Iodide Containing Products Hives    Since age of 48    Lisinopril     angioedema    Shrimp Other (See Comments)    Localized itching and swelling on her hands if she peels shrimp.  Able to eat shrimp without difficulty.  No generalized reaction.      [facility]     [facility] Nurse Care Line - 24/7 Assistance  Unless otherwise directed by your provider, please contact [facility], our nurse care line that is available for 24/7 assistance.     Registered nurses in the [facility] provide clinical advisement, health education, appointment booking, and other advisory services.  Call for this free service at [phone].        Advance Directives     An advance directive is a document which, in the event you are no longer able to make decisions for yourself, tells your healthcare team what kind of  treatment you do or do not want to receive, or who you would like to make those decisions for you.  If you do not currently have an advance directive, [facility] encourages you to create one.  For more information call:  [phone]), [phone] ([phone]),  or log on to [email or web address].        Smoking Cessation     If you would like to quit smoking:   You may be eligible for free services if you are a [place] resident and started smoking cigarettes before September 1, 1988.  Call the Smoking Cessation Trust (SCT) toll free at (117) 251-5808 or (476) 443-9089.   Call 6-011-QUIT-NOW if you do not meet the above criteria.            Language Assistance Services     ATTENTION: Language assistance services are available, free of charge. Please call [phone].      ATENCIÓN: Si habla español, tiene a baxter disposición servicios gratuitos de asistencia lingüística. Llame al [phone].     [facility] Ý: N?u b?n nói Ti?ng Vi?t, có các d?ch v? h? tr? ngôn ng? mi?n phí dành cho b?n. G?i s? [phone].        Blood Transfusion Reaction Signs and Symptoms     The blood you have received has been matched for you as carefully as possible. Most patients who receive a blood transfusion do not experience problems. However, there can be a delayed reaction that happens a few weeks after your blood transfusion. Contact your physician immediately if you experience any NEW SYMPTOMS listed below:     Fever greater than 100.4 degrees    Chills   Yellow color to your skin or eyes(Jaundice)   Back pain, chest pain, or pain at the infusion site   Weakness (more than usual)   Discomfort or uneasiness more than usual (Malaise)   Nausea or vomiting   Shortness of breath, wheezing, or coughing   Higher or lower blood pressure than normal   Skin rash, itching, skin redness, or localized swelling (example: hands or feet)   Urinating less than normal   Urine appears reddish or orange and is darker than  normal      Remember that some these signs may already exist for you--such as having chronic back pain or high blood pressure. You only need to look for and report to your doctor any new occurrences since your blood transfusion that are of concern.         [facility] complies with applicable Federal civil rights laws and does not discriminate on the basis of race, color, national origin, age, disability, or sex.

## 2017-02-13 NOTE — ANESTHESIA RELEASE NOTE
Anesthesia Release from PACU Note    Patient: Soumya Melchor    Procedure(s) Performed: Procedure(s) (LRB):  22   ), transesophageal echocardiogram. off pump (N/A)    Anesthesia type: general    Post pain: Adequate analgesia    Post assessment: no apparent anesthetic complications    Last Vitals:   98/59; 79; 14; 98%; 96.7    Post vital signs: stable    Level of consciousness: sedated    Nausea/Vomiting: no nausea/no vomiting    Complications: none    Airway Patency: patent    Respiratory: ETT    Cardiovascular: stable and blood pressure at baseline    Hydration: hypovolemic

## 2017-02-13 NOTE — ANESTHESIA PREPROCEDURE EVALUATION
02/13/2017  Soumya Melchor is a 71 y.o., female.    [facility] Anesthesia Evaluation    I have reviewed the Patient Summary Reports.     I have reviewed the Medications.     Review of Systems  Anesthesia Hx:  No problems with previous Anesthesia Denies Hx of Anesthetic complications  History of prior surgery of interest to airway management or planning: Previous anesthesia: General Denies Family Hx of Anesthesia complications.   Denies Personal Hx of Anesthesia complications.   Social:  Smoker Pt is now using vap cigs   Hematology/Oncology:         -- Anemia:   EENT/Dental:  EENT/Dental Normal Denies Eye Symptoms  Denies Throat Symptoms  Denies Active Dental Problems  Denies Jaw Problems   Cardiovascular:   Exercise tolerance: poor Hypertension CAD   Angina, with exertion PVD hyperlipidemia JOHN ECG has been reviewed. Pt with severe 3 vessel dz.... Including LAD Lcx and RCA... Good function... No sig valve disorders... Increasing SOB recently... Placed on O2 a few months ago...  Functional Capacity very limited / < 3 METS  Coronary Artery Disease:  Coronary Angiography ([facility]).  Coronary Angiogram Findings , Three Vessal Disease , left anterior descending, left circumflex, right coronary.  Denies Valvular Heart Disease.    Congestive Heart Failure (CHF)    Pulmonary:   COPD, severe Asthma Shortness of breath Pt on nasal cannula at home... Denies use of CPAP... Pt has been using bronchodilators... Will order a treatment this am...  Pulmonary Symptoms:  Chronic Obstructive Pulmonary Disease (COPD):  is secondary to smoking. Emphysema Inhaler use is maintenance inhaler daily.  Denies Obstructive Sleep Apnea (ADEOLA) Denies Lung/Chest Surgery or Procedure.    Renal/:   Denies Chronic Renal Disease.     Hepatic/GI:   GERD Denies Liver Disease. Denies Hepatitis.  Denies Biliary Disease  Denies Liver Disease     Neurological:   Denies TIA. Denies CVA. Denies Seizures.  Denies Seizure Disorder  Denies CVA - Cerebrovasular Accident  Denies TIA - Transient Ischemic Attack    Endocrine:   Denies Diabetes. Hypothyroidism  Denies Diabetes  Thyroid Disease Hypothyroidism    Psych:   anxiety depression          Physical Exam  General:  Well nourished Thin in appearance     Airway/Jaw/Neck:  Airway Findings: Mouth Opening: Small, but > 3cm Tongue: Normal  Mallampati: II  TM Distance: 4 - 6 cm         Dental:  DENTAL FINDINGS: Normal   Chest/Lungs:  Chest/Lungs Findings: Clear to auscultation, Normal Respiratory Rate  Pt on nasal cannula in holding     Heart/Vascular:  Heart Findings: Rate: Normal  Rhythm: Regular Rhythm        Mental Status:  Mental Status Findings: Normal        Anesthesia Plan  Type of Anesthesia, risks & benefits discussed:  Anesthesia Type:  general  Patient's Preference:   Intra-op Monitoring Plan: arterial line, central line, Falls-Eriberto and cardiac output  Intra-op Monitoring Plan Comments:   Post Op Pain Control Plan:   Post Op Pain Control Plan Comments:   Induction:   IV  Beta Blocker:  Patient is on a Beta-Blocker and has received one dose within the past 24 hours (No further documentation required).       Informed Consent: Patient understands risks and agrees with Anesthesia plan.  Questions answered. Anesthesia consent signed with patient.  ASA Score: 4     Day of Surgery Review of History & Physical: I have interviewed and examined the patient. I have reviewed the patient's H&P dated:        Anesthesia Plan Notes: Pt with combined cardiopulm dz... She asked me if this procedure will improve her status... i informed her that improving her heart function can certainly improve her SOB assuming that her sx are due to combined lung/heart dz... However she has severe primary lung dz and this will likely require continued maint. Rx... I have ordered a resp treatment this am to optimize... Given that her  spirometry indicated a partial reversal of the obstruction with dilators... Asthmatic component... I discussed the risk of prolonged mech vent in the face of severe COPD... I also discussed the increased poss of blood clots and emboli as well as stroke... In addition to the typical risks of heart surgery... Pt understands the risks and is willing to go forward...         Ready For Surgery From Anesthesia Perspective.

## 2017-02-13 NOTE — PT/OT/SLP PROGRESS
Physical Therapy      Soumya Melchor  MRN: [id number]    P.T. EVAL AND TX RECEIVED. PT ON VENT AFTER CABG. P.T. TO ATTEMPT EVAL 2/14/17    Disha Moore, PT 2/13/2017  , 1873

## 2017-02-13 NOTE — ANESTHESIA POSTPROCEDURE EVALUATION
Anesthesia Post Evaluation    Patient: Soumya V Pomvicente    Procedure(s) Performed: Procedure(s) (LRB):  22   ), transesophageal echocardiogram. off pump (N/A)    Final Anesthesia Type: general  Patient location during evaluation: ICU  Patient participation: No - Unable to Participate, Intubation  Level of consciousness: sedated  Post-procedure vital signs: reviewed and stable  Pain management: adequate  Airway patency: patent  PONV status at discharge: No PONV  Anesthetic complications: no      Cardiovascular status: blood pressure returned to baseline  Respiratory status: ETT and ventilator  Hydration status: hypovolemic  Follow-up needed         98/59; 79; 14; 98%; 96.7    Pain/Maya Score: Pain Assessment Performed: Yes (2/13/2017  6:24 AM)  Presence of Pain: denies (2/13/2017  6:24 AM)

## 2017-02-13 NOTE — PT/OT/SLP PROGRESS
Physical Therapy      Soumya Melchor  MRN: [id number]    ATTEMPTED COMPLETION OF P.T. EVAL THIS PM, PT CURRENTLY INTUBATED, WILL ASSESS PT NEXT VISIT    Mckayla Tenorio, PT   2/13/2017  5508

## 2017-02-13 NOTE — PROGRESS NOTES
Soumya Melchor is a 71 y.o. female patient.   1. CAD (coronary artery disease)      Past Medical History   Diagnosis Date    Acid reflux 4/11/2014    Anxiety and depression 3/6/2014    Chronic pain associated with significant psychosocial dysfunction 2/21/2013    COPD (chronic obstructive pulmonary disease)     HTN (hypertension)     Hypothyroidism      No past surgical history pertinent negatives on file.  Scheduled Meds:   [START ON 2/14/2017] aspirin  81 mg Oral Daily    ceFAZolin (ANCEF) IVPB  2 g Intravenous Q8H    [START ON 2/14/2017] clopidogrel  75 mg Oral Daily    dexmedetomidine in 0.9 % NaCl  1 mcg/kg/hr (Order-Specific) Intravenous Once    [START ON 2/14/2017] docusate sodium  100 mg Oral BID    [START ON 2/14/2017] enoxaparin  40 mg Subcutaneous Q24H    famotidine (PF)  20 mg Intravenous BID    [START ON 2/14/2017] furosemide  40 mg Intravenous BID    [START ON 2/14/2017] metoprolol tartrate  25 mg Oral BID    mupirocin  1 g Nasal BID    [START ON 2/14/2017] polyethylene glycol  17 g Oral Daily    [START ON 2/14/2017] potassium chloride  40 mEq Oral Q12H     Continuous Infusions:   dexmedetomidine (PRECEDEX) infusion      dextrose 5 % and 0.2 % NaCl      insulin (HUMAN R) infusion (adults)       PRN Meds:acetaminophen, albumin human 5%, albuterol sulfate, dextrose 50%, dextrose 50%, hydrocodone-acetaminophen 10-325mg, hydrocodone-acetaminophen 5-325mg, hydrocodone-acetaminophen 7.5-325mg, lactated ringers, magnesium sulfate IVPB, midazolam, midazolam (PF), midazolam (PF), morphine, morphine, ondansetron, potassium chloride **AND** potassium chloride **AND** potassium chloride, promethazine (PHENERGAN) IVPB    Review of patient's allergies indicates:   Allergen Reactions    Ace inhibitors Other (See Comments)     unknown      Iodine and iodide containing products Hives     Since age of 48    Lisinopril      angioedema    Shrimp Other (See Comments)     Localized itching and  swelling on her hands if she peels shrimp.  Able to eat shrimp without difficulty.  No generalized reaction.     Active Hospital Problems    Diagnosis  POA    CAD (coronary artery disease) [I25.10]  Yes      Resolved Hospital Problems    Diagnosis Date Resolved POA   No resolved problems to display.     Blood pressure 127/76, pulse 82, temperature 96.5 °F (35.8 °C), temperature source Core, resp. rate 14, height 5' (1.524 m), weight 56.8 kg (125 lb 3.5 oz), SpO2 100 %, not currently breastfeeding.    Subjective  Objective   Assessment & Plan   OP NOTE. Dx CAD OP CABG times 3. Surgeon Vamshi Complications none    Steve Bonds MD  2/13/2017

## 2017-02-13 NOTE — ANESTHESIA PROCEDURE NOTES
CARLOS    Diagnosis: CAD  Patient location during procedure: OR  Staffing  Anesthesiologist: RADHA GUEVARA  Performed by: anesthesiologist   Preanesthetic Checklist  Completed: patient identified, surgical consent, pre-op evaluation, timeout performed, risks and benefits discussed, monitors and equipment checked, anesthesia consent given, oxygen available, suction available, hand hygiene performed and patient being monitored  Setup & Induction  Patient preparation: bite block inserted  Probe Insertion: easy  Exam                                               Effusions    Summary    Other Findings   LV and RV function is normal... AV is normal with no sig gradient... Trace AI... MV looks normal... Trace MR... Trace to mild TR... Atherosclerotic plaque noted in descending aorta but not mobile... prox ascending aorta is calcified... Chambers appear grossly normal in size...

## 2017-02-13 NOTE — OP NOTE
DATE OF PROCEDURE:  02/13/2017.    PREOPERATIVE DIAGNOSIS:  Coronary artery disease.    POSTOPERATIVE DIAGNOSIS:  Coronary artery disease.    PROCEDURE:  Off-pump coronary artery bypass grafting x3 with left internal   mammary artery to LAD, aorta to first obtuse marginal, aorta to distal right   coronary artery.    SURGEON:  Steve Bonds III, M.D.    ASSISTANT:  Sandra Turner.    INDICATION FOR OPERATION:  This is a 71-year-old cigarette abusing white female   with a history of hypertension, hyperlipidemia and significant COPD.  She has a   considerable amount of dyspnea with exertion, had an abnormal outpatient workup   and underwent a heart catheterization by Dr. Giuliano Henry.  This   catheterization demonstrated good left ventricular function, significant triple   vessel disease.    The patient required preoperative pulmonary consultation and after a period of   treatment eventually she was cleared from a pulmonary standpoint for coronary   artery bypass grafting although both she and her family were aware that this   would be at significantly increased risk for respiratory complications.    OPERATIVE FINDINGS:  Chest was entered via primary median sternotomy and the   heart was found to be moderately enlarged.  The distal right coronary artery was   2 mm in size and severely diseased.  The first obtuse marginal was 2 mm in size   and moderately diseased and the LAD was 2 mm in size and minimally diseased.    The greater saphenous vein was harvested from the left leg using endoscopic   technique and was of adequate size and quality.  The internal mammary artery was   of adequate size and flow.    PROCEDURE IN DETAIL:  Chest was entered via primary median sternotomy and the   heart was inspected with the above findings noted.  The patient was systemically   heparinized.  Deep pericardial traction sutures were then placed and the   patient was placed in Trendelenburg right lateral decubitus position.    An  arteriotomy was made in the distal right coronary artery and end-to-side   anastomosis with the first vein graft segment was accomplished using 7-0 Prolene   running suture.  An arteriotomy was made in the first obtuse marginal and   end-to-side anastomosis with the second vein graft segment was accomplished   using 7-0 Prolene running suture.    Side-biting clamp was placed on the ascending aorta.  Two proximal anastomoses   were then accomplished using 6-0 Prolene running suture.  Side-biting clamp was   released.    An arteriotomy was made in the LAD and end-to-side anastomosis with the left   internal mammary artery was then accomplished using 8-0 Prolene running suture.    Heparin was reversed with protamine.  Adequate hemostasis was obtained.  A   single John drain was then placed in the left pleura and 2 John drains were   then placed in the mediastinum.    The sternum was reapproximated with heavy stainless steel wires.  The remaining   layers of the chest wall were closed with running Vicryl suture.    The patient tolerated the procedure well.  Sponge ____ correct and she has   returned to the ICU in good condition with stable vital signs.      NICOLAS  dd: 02/13/2017 11:42:25 (CST)  td: 02/13/2017 14:13:14 (CST)  Doc ID   #[id number]  Job ID #[id number]    CC: Giuliano Bonds III M.D.

## 2017-02-13 NOTE — PT/OT/SLP PROGRESS
Occupational Therapy      Soumya Melchor  MRN: [id number]    Attempted completion of OT evaluation this pm, pt currently intubated, will assess pt next visit.  Attempted: 15:45    Allyson Campbell, OT  2/13/2017

## 2017-02-13 NOTE — PLAN OF CARE
Discharge plan home with family and possible HH pending medical status discussed with spouse and daughter. Patient intubated. Ownership disclosure form completed and signed. [facility] notified of referral spoke to Kaitlynn Bradshaw. [phone]; [facility] unable to accommodate this referral; Discussed with family; ownership disclosure form completed and signed; Rena  notified of referral;  Information via [facility].      02/13/17 1430   Discharge Assessment   Assessment Type Discharge Planning Assessment   Confirmed/corrected address and phone number on facesheet? Yes   Assessment information obtained from? Caregiver;Medical Record   Expected Length of Stay (days) 7   Communicated expected length of stay with patient/caregiver yes   Type of Healthcare Directive Received (none)   If Healthcare Directive is received, is it scanned into Epic? no (comment)   Prior to hospitilization cognitive status: Alert/Oriented   Prior to hospitalization functional status: Independent   Current cognitive status: Coma/Sedated/Intubated   Current Functional Status: Assistive Equipment;Needs Assistance   Arrived From admitted as an inpatient;home or self-care   Lives With spouse   Able to Return to Prior Arrangements yes   Is patient able to care for self after discharge? Yes   How many people do you have in your home that can help with your care after discharge? 1   Who are your caregiver(s) and their phone number(s)? (Angel(spouse)-[phone]; [phone])   Patient's perception of discharge disposition admitted as an inpatient;home or selfcare   Readmission Within The Last 30 Days planned readmission   Patient currently being followed by outpatient case management? No   Patient currently receives home health services? No   Does the patient currently use HME? No   Patient currently receives private duty nursing? No   Patient currently receives any other outside agency services? No   Equipment Currently Used at Home  oxygen   Do you have any problems affording any of your prescribed medications? No   Is the patient taking medications as prescribed? yes   Do you have any financial concerns preventing you from receiving the healthcare you need? No   Does the patient have transportation to healthcare appointments? Yes   Transportation Available car;family or friend will provide   On Dialysis? No   Does the patient receive services at the [facility]madin Clinic? No   Are there any open cases? No   Discharge Plan A Home with family;Home Health   Discharge Plan B Home with family;Home Health   Patient/Family In Agreement With Plan yes   .

## 2017-02-13 NOTE — TRANSFER OF CARE
Anesthesia Transfer of Care Note    Patient: Soumya Melchor    Procedure(s) Performed: Procedure(s) (LRB):  22   ), transesophageal echocardiogram. off pump (N/A)    Patient location: ICU    Anesthesia Type: general    Transport from OR: Upon arrival to PACU/ICU, patient attached to ventilator and auscultated to confirm bilateral breath sounds and adequate TV. Continuous ECG monitoring in transport. Continuous SpO2 monitoring in transport. Continuos invasive BP monitoring in transport    Post pain: adequate analgesia    Post assessment: no apparent anesthetic complications    Post vital signs: stable    Level of consciousness: sedated    Nausea/Vomiting: no nausea/vomiting    Complications: none          Last vitals:   98/59; 79; 14; 94%; 96.7

## 2017-02-13 NOTE — PLAN OF CARE
Problem: Patient Care Overview  Goal: Plan of Care Review  Outcome: Ongoing (interventions implemented as appropriate)  Received pt from surgery earlier, no complications, VSS, sedated with precedex. Weaning vent as tolerated. CT output marginal. No signs of distress. Spouse and daughter at bedside, update given, POC discussed.

## 2017-02-13 NOTE — CONSULTS
Consult Note  Cardiology    Consult Requested By:  Dr. Steve Bonds  Reason for Consult:  CABG care    SUBJECTIVE:     History of Present Illness:  Patient is a 71 y.o. female admitted for CABG.  Pt saw Dr. Henry Dec 2016, c/o cp and dyspnea.    LHC Jan 2017 showed multivessel CAD.  Normal LVEF.  Pt underwent 3 v CABG today.  Now in ICU, intubated.  No acute post-cabg events noted.  Pt hemodynamically stable.    Patient Active Problem List   Diagnosis    Gastroesophageal reflux disease    Anxiety and depression    Chronic pain associated with significant psychosocial dysfunction    COPD (chronic obstructive pulmonary disease)    Hypothyroidism    Hypertension    Edema    Pulmonary nodule, right    Pulmonary nodules/lesions, multiple    SOB (shortness of breath)    Family history of arteriosclerotic cardiovascular disease    Hyperlipidemia    Other psychoactive substance use, unspecified with psychoactive substance-induced sleep disorder    Tobacco use    Angina pectoris    CAD (coronary artery disease)    CAD, multiple vessel    S/P CABG (coronary artery bypass graft)    AP (angina pectoris)       Review of patient's allergies indicates:   Allergen Reactions    Ace inhibitors Other (See Comments)     unknown      Iodine and iodide containing products Hives     Since age of 48    Lisinopril      angioedema    Shrimp Other (See Comments)     Localized itching and swelling on her hands if she peels shrimp.  Able to eat shrimp without difficulty.  No generalized reaction.       Past Medical History   Diagnosis Date    Acid reflux 4/11/2014    Anxiety and depression 3/6/2014    AP (angina pectoris) 2/13/2017    CAD (coronary artery disease) 2/13/2017    CAD, multiple vessel 2/13/2017    Chronic pain associated with significant psychosocial dysfunction 2/21/2013    COPD (chronic obstructive pulmonary disease)     HTN (hypertension)     Hypothyroidism     S/P CABG (coronary artery  bypass graft) 2/13/2017     Past Surgical History   Procedure Laterality Date    Knee surgery Right     Dilation and curettage of uterus      Spinal cord stimulator implant      Tonsillectomy       Family History   Problem Relation Age of Onset    Heart attacks under age 50 Mother 41     Social History   Substance Use Topics    Smoking status: Former Smoker     Packs/day: 1.00     Years: 50.00     Quit date: 12/10/2012    Smokeless tobacco: None    Alcohol use No        Review of Systems:    Unable to obtain ROS due to pt being sedated and on ventilator    OBJECTIVE:     Vital Signs (Most Recent)  Temp: 97.9 °F (36.6 °C) (02/13/17 1300)  Pulse: 87 (02/13/17 1345)  Resp: 15 (02/13/17 1345)  BP: 100/68 (02/13/17 1330)  SpO2: 100 % (02/13/17 1345)    Vital Signs Range (Last 24H):  Temp:  [96.5 °F (35.8 °C)-98.4 °F (36.9 °C)]   Pulse:  [65-87]   Resp:  [13-20]   BP: ()/(57-76)   SpO2:  [100 %]     Current Facility-Administered Medications   Medication    acetaminophen tablet 650 mg    albumin human 5% bottle 250 mL    albuterol sulfate nebulizer solution 2.5 mg    [START ON 2/14/2017] aspirin EC tablet 81 mg    cefazolin (ANCEF) 2 gram in dextrose 5% 50 mL IVPB (premix)    [START ON 2/14/2017] clopidogrel tablet 75 mg    dexmedetomidine (PRECEDEX) 400mcg/100mL 0.9% NaCL infusion    dextrose 5 % and 0.2 % NaCl infusion    dextrose 50% injection 12.5 g    dextrose 50% injection 25 g    [START ON 2/14/2017] docusate sodium capsule 100 mg    [START ON 2/14/2017] enoxaparin injection 40 mg    famotidine (PF) 20 mg/2 mL injection 20 mg    [START ON 2/14/2017] furosemide injection 40 mg    hydrocodone-acetaminophen 10-325mg per tablet 1 tablet    hydrocodone-acetaminophen 5-325mg per tablet 1 tablet    hydrocodone-acetaminophen 7.5-325mg per tablet 1 tablet    insulin regular (Humulin R) 100 Units in sodium chloride 0.9% 100 mL infusion    lactated ringers infusion    magnesium sulfate 2g in  water 50mL IVPB (premix)    [START ON 2/14/2017] metoprolol tartrate (LOPRESSOR) tablet 25 mg    midazolam injection 1 mg    midazolam injection 1 mg    midazolam injection 2 mg    morphine injection 2 mg    morphine injection 5 mg    mupirocin 2 % ointment 1 g    ondansetron injection 4 mg    [START ON 2/14/2017] polyethylene glycol packet 17 g    potassium chloride 20 mEq in 100 mL IVPB (FOR CENTRAL LINE ADMINISTRATION ONLY)    And    potassium chloride 40 mEq in 100 mL IVPB (FOR CENTRAL LINE ADMINISTRATION ONLY)    And    potassium chloride 20 mEq in 100 mL IVPB (FOR CENTRAL LINE ADMINISTRATION ONLY)    [START ON 2/14/2017] potassium chloride SA CR tablet 40 mEq    promethazine (PHENERGAN) 6.25 mg in dextrose 5 % 50 mL IVPB     No current facility-administered medications on file prior to encounter.      Current Outpatient Prescriptions on File Prior to Encounter   Medication Sig    potassium 99 mg Tab Take by mouth.    simvastatin (ZOCOR) 40 MG tablet Take 20 mg by mouth every evening.     triamterene-hydrochlorothiazide 37.5-25 mg (DYAZIDE) 37.5-25 mg per capsule Take 1 capsule by mouth every morning.    VENTOLIN HFA 90 mcg/actuation inhaler Inhale 2 puffs into the lungs every 4 (four) hours as needed for Wheezing.    albuterol-ipratropium 2.5mg-0.5mg/3mL (DUO-NEB) 0.5 mg-3 mg(2.5 mg base)/3 mL nebulizer solution Take 3 mLs by nebulization every 6 (six) hours.    amlodipine (NORVASC) 10 MG tablet Take 1 tablet (10 mg total) by mouth once daily.    ascorbic acid, vitamin C, (VITAMIN C) 1000 MG tablet Take 1,000 mg by mouth once daily.     budesonide (PULMICORT) 0.5 mg/2 mL nebulizer solution Take 2 mLs (0.5 mg total) by nebulization 2 (two) times daily. Wash out mouth after using.    calcium-vitamin D3 500 mg(1,250mg) -200 unit per tablet Take 1 tablet by mouth 2 (two) times daily with meals.    DOCOSAHEXANOIC ACID/EPA (FISH OIL ORAL) Take 1,200 mg by mouth 2 (two) times daily.     ezetimibe (ZETIA) 10 mg tablet Take 1 tablet (10 mg total) by mouth every evening.    fluorometholone 0.1% (FML) 0.1 % DrpS INSTILL ONE DROP IN THE RIGHT EYE THREE TIMES DAILY    FOLIC ACID/MULTIVIT-MIN/LUTEIN (CENTRUM SILVER ORAL) Take by mouth once daily.    ipratropium (ATROVENT) 0.06 % nasal spray 2 sprays by Nasal route 4 (four) times daily. As needed for rhinitis    levothyroxine (SYNTHROID) 200 MCG tablet TAKE ONE TABLET BY MOUTH EVERY DAY    magnesium 30 mg Tab Take 30 mg by mouth.    MELATONIN ORAL Take 20 mg by mouth nightly as needed.     metoprolol tartrate (LOPRESSOR) 25 MG tablet TAKE ONE TABLET BY MOUTH TWICE DAILY    niacin 500 mg TbSR Take 500 mg by mouth.    omeprazole (PRILOSEC) 40 MG capsule Take 1 capsule (40 mg total) by mouth once daily.    senna (SENOKOT) 8.6 mg tablet Take 3 tablets by mouth.    SENNOSIDES (SENNA LAXATIVE ORAL) Take by mouth 3 (three) times daily.    tiotropium (SPIRIVA WITH HANDIHALER) 18 mcg inhalation capsule Inhale 1 capsule (18 mcg total) into the lungs once daily.    tramadol (ULTRAM) 50 mg tablet Take 50 mg by mouth 2 (two) times daily.    trazodone (DESYREL) 50 MG tablet TAKE ONE TABLET BY MOUTH EVERY EVENING    zolpidem (AMBIEN) 5 MG Tab TAKE ONE TABLET BY MOUTH AT BEDTIME AS NEEDED       Physical Exam:  General appearance: intubated, no acute distress, appears comfortable, appears stated age.  Head: Normocephalic, without obvious abnormality, atraumatic  Neck: no adenopathy, no carotid bruit, no JVD, supple, symmetrical, trachea midline and thyroid not enlarged, symmetric, no tenderness/mass/nodules  Lungs:  clear to auscultation bilaterally  Heart: regular rate and rhythm, S1, S2 normal, no murmur, click, rub or gallop  Abdomen: soft, non-tender; bowel sounds normal; no masses,  no organomegaly  Extremities: extremities normal, atraumatic, no cyanosis or edema  Pulses: palpable  Skin: Skin color, texture, turgor normal. No rashes or  lesions  Neurologic: Grossly normal    Laboratory:  Chemistry:   Lab Results   Component Value Date     02/13/2017    K 3.4 (L) 02/13/2017    K 3.4 (L) 02/13/2017     02/13/2017    CO2 24 02/13/2017    BUN 13 02/13/2017    CREATININE 1.1 02/13/2017    CALCIUM 10.1 02/13/2017     Cardiac Markers: No results found for: CKTOTAL, CKMB, CKMBINDEX, TROPONINI  Cardiac Markers (Last 3): No results found for: CKTOTAL, CKMB, CKMBINDEX, TROPONINI  CBC:   Lab Results   Component Value Date    WBC 13.61 (H) 02/13/2017    HGB 10.1 (L) 02/13/2017    HCT 29.0 (L) 02/13/2017    HCT 32 (L) 02/13/2017    MCV 89 02/13/2017     02/13/2017     Lipids:   Lab Results   Component Value Date    CHOL 248 (H) 10/07/2015    TRIG 92 10/07/2015    HDL 99 (H) 10/07/2015     Coagulation:   Lab Results   Component Value Date    INR 1.2 02/13/2017    INR 0.9 03/17/2004    APTT 29.5 02/13/2017       Diagnostic Results:  ECG: Reviewed  X-Ray: Reviewed  US: Reviewed  CT: Reviewed  Echo: Reviewed      ASSESSMENT/PLAN:     Patient Active Problem List   Diagnosis    Gastroesophageal reflux disease    Anxiety and depression    Chronic pain associated with significant psychosocial dysfunction    COPD (chronic obstructive pulmonary disease)    Hypothyroidism    Hypertension    Edema    Pulmonary nodule, right    Pulmonary nodules/lesions, multiple    SOB (shortness of breath)    Family history of arteriosclerotic cardiovascular disease    Hyperlipidemia    Other psychoactive substance use, unspecified with psychoactive substance-induced sleep disorder    Tobacco use    Angina pectoris    CAD (coronary artery disease)    CAD, multiple vessel    S/P CABG (coronary artery bypass graft)    AP (angina pectoris)        S/P 3V CABG FOR RECENT ANGINAL SXS/MULTIVESSEL CAD.  CONTINUE POST OP CARE/MGT.  WEAN VENT AS TOLERATED.  OPTIMIZE MEDICAL TX FOR CAD  WILL FOLLOW.

## 2017-02-14 LAB
ALLENS TEST: ABNORMAL
ANION GAP SERPL CALC-SCNC: 11 MMOL/L
BASOPHILS # BLD AUTO: 0.02 K/UL
BASOPHILS NFR BLD: 0.2 %
BUN SERPL-MCNC: 10 MG/DL
CALCIUM SERPL-MCNC: 7.8 MG/DL
CHLORIDE SERPL-SCNC: 107 MMOL/L
CO2 SERPL-SCNC: 21 MMOL/L
CREAT SERPL-MCNC: 1 MG/DL
DELSYS: ABNORMAL
DIFFERENTIAL METHOD: ABNORMAL
EOSINOPHIL # BLD AUTO: 0.1 K/UL
EOSINOPHIL NFR BLD: 1.2 %
ERYTHROCYTE [DISTWIDTH] IN BLOOD BY AUTOMATED COUNT: 15.1 %
EST. GFR  (AFRICAN AMERICAN): >60 ML/MIN/1.73 M^2
EST. GFR  (NON AFRICAN AMERICAN): 56 ML/MIN/1.73 M^2
ESTIMATED AVG GLUCOSE: 111 MG/DL
FIO2: 35
GLUCOSE SERPL-MCNC: 142 MG/DL
HBA1C MFR BLD HPLC: 5.5 %
HCO3 UR-SCNC: 25 MMOL/L (ref 24–28)
HCT VFR BLD AUTO: 34.1 %
HGB BLD-MCNC: 11.8 G/DL
LYMPHOCYTES # BLD AUTO: 2.4 K/UL
LYMPHOCYTES NFR BLD: 21.6 %
MAGNESIUM SERPL-MCNC: 1.9 MG/DL
MCH RBC QN AUTO: 30.2 PG
MCHC RBC AUTO-ENTMCNC: 34.6 %
MCV RBC AUTO: 87 FL
MODE: ABNORMAL
MONOCYTES # BLD AUTO: 1 K/UL
MONOCYTES NFR BLD: 8.6 %
NEUTROPHILS # BLD AUTO: 7.7 K/UL
NEUTROPHILS NFR BLD: 68.4 %
PCO2 BLDA: 50.2 MMHG (ref 35–45)
PEEP: 5
PH SMN: 7.31 [PH] (ref 7.35–7.45)
PLATELET # BLD AUTO: 155 K/UL
PMV BLD AUTO: 9.5 FL
PO2 BLDA: 78 MMHG (ref 80–100)
POC BE: -1 MMOL/L
POC SATURATED O2: 94 % (ref 95–100)
POCT GLUCOSE: 100 MG/DL (ref 70–110)
POCT GLUCOSE: 105 MG/DL (ref 70–110)
POCT GLUCOSE: 106 MG/DL (ref 70–110)
POCT GLUCOSE: 112 MG/DL (ref 70–110)
POCT GLUCOSE: 113 MG/DL (ref 70–110)
POCT GLUCOSE: 115 MG/DL (ref 70–110)
POCT GLUCOSE: 117 MG/DL (ref 70–110)
POCT GLUCOSE: 120 MG/DL (ref 70–110)
POCT GLUCOSE: 132 MG/DL (ref 70–110)
POCT GLUCOSE: 133 MG/DL (ref 70–110)
POCT GLUCOSE: 133 MG/DL (ref 70–110)
POCT GLUCOSE: 144 MG/DL (ref 70–110)
POCT GLUCOSE: 162 MG/DL (ref 70–110)
POTASSIUM SERPL-SCNC: 4.4 MMOL/L
PS: 10
RBC # BLD AUTO: 3.91 M/UL
SAMPLE: ABNORMAL
SITE: ABNORMAL
SODIUM SERPL-SCNC: 139 MMOL/L
SPONT RATE: 13
WBC # BLD AUTO: 11.27 K/UL

## 2017-02-14 PROCEDURE — 97530 THERAPEUTIC ACTIVITIES: CPT

## 2017-02-14 PROCEDURE — 20000000 HC ICU ROOM

## 2017-02-14 PROCEDURE — 99900035 HC TECH TIME PER 15 MIN (STAT)

## 2017-02-14 PROCEDURE — G8988 SELF CARE GOAL STATUS: HCPCS | Mod: CK

## 2017-02-14 PROCEDURE — 63600175 PHARM REV CODE 636 W HCPCS: Performed by: PHYSICIAN ASSISTANT

## 2017-02-14 PROCEDURE — 27000221 HC OXYGEN, UP TO 24 HOURS

## 2017-02-14 PROCEDURE — 36415 COLL VENOUS BLD VENIPUNCTURE: CPT

## 2017-02-14 PROCEDURE — 94150 VITAL CAPACITY TEST: CPT

## 2017-02-14 PROCEDURE — 80048 BASIC METABOLIC PNL TOTAL CA: CPT

## 2017-02-14 PROCEDURE — 99900017 HC EXTUBATION W/PARAMETERS (STAT)

## 2017-02-14 PROCEDURE — 97110 THERAPEUTIC EXERCISES: CPT

## 2017-02-14 PROCEDURE — 99900026 HC AIRWAY MAINTENANCE (STAT)

## 2017-02-14 PROCEDURE — 94799 UNLISTED PULMONARY SVC/PX: CPT

## 2017-02-14 PROCEDURE — 85025 COMPLETE CBC W/AUTO DIFF WBC: CPT

## 2017-02-14 PROCEDURE — 25000003 PHARM REV CODE 250: Performed by: PHYSICIAN ASSISTANT

## 2017-02-14 PROCEDURE — 94640 AIRWAY INHALATION TREATMENT: CPT

## 2017-02-14 PROCEDURE — G8979 MOBILITY GOAL STATUS: HCPCS | Mod: CI

## 2017-02-14 PROCEDURE — 97166 OT EVAL MOD COMPLEX 45 MIN: CPT

## 2017-02-14 PROCEDURE — P9016 RBC LEUKOCYTES REDUCED: HCPCS

## 2017-02-14 PROCEDURE — G8987 SELF CARE CURRENT STATUS: HCPCS | Mod: CM

## 2017-02-14 PROCEDURE — G8978 MOBILITY CURRENT STATUS: HCPCS | Mod: CM

## 2017-02-14 PROCEDURE — 97802 MEDICAL NUTRITION INDIV IN: CPT

## 2017-02-14 PROCEDURE — 63600175 PHARM REV CODE 636 W HCPCS: Performed by: THORACIC SURGERY (CARDIOTHORACIC VASCULAR SURGERY)

## 2017-02-14 PROCEDURE — 99232 SBSQ HOSP IP/OBS MODERATE 35: CPT | Mod: ,,, | Performed by: INTERNAL MEDICINE

## 2017-02-14 PROCEDURE — 36592 COLLECT BLOOD FROM PICC: CPT

## 2017-02-14 PROCEDURE — 82803 BLOOD GASES ANY COMBINATION: CPT

## 2017-02-14 PROCEDURE — 25000003 PHARM REV CODE 250: Performed by: NURSE PRACTITIONER

## 2017-02-14 PROCEDURE — 25000003 PHARM REV CODE 250: Performed by: THORACIC SURGERY (CARDIOTHORACIC VASCULAR SURGERY)

## 2017-02-14 PROCEDURE — 97535 SELF CARE MNGMENT TRAINING: CPT

## 2017-02-14 PROCEDURE — 97162 PT EVAL MOD COMPLEX 30 MIN: CPT

## 2017-02-14 PROCEDURE — 83735 ASSAY OF MAGNESIUM: CPT

## 2017-02-14 RX ORDER — FAMOTIDINE 20 MG/1
20 TABLET, FILM COATED ORAL DAILY
Status: DISCONTINUED | OUTPATIENT
Start: 2017-02-15 | End: 2017-02-17 | Stop reason: HOSPADM

## 2017-02-14 RX ORDER — HYDROCODONE BITARTRATE AND ACETAMINOPHEN 500; 5 MG/1; MG/1
TABLET ORAL
Status: DISCONTINUED | OUTPATIENT
Start: 2017-02-14 | End: 2017-02-14

## 2017-02-14 RX ORDER — NOREPINEPHRINE BITARTRATE/D5W 4MG/250ML
2 PLASTIC BAG, INJECTION (ML) INTRAVENOUS CONTINUOUS
Status: DISCONTINUED | OUTPATIENT
Start: 2017-02-14 | End: 2017-02-14

## 2017-02-14 RX ORDER — BUDESONIDE 0.5 MG/2ML
0.5 INHALANT ORAL 2 TIMES DAILY
Status: DISCONTINUED | OUTPATIENT
Start: 2017-02-14 | End: 2017-02-17 | Stop reason: HOSPADM

## 2017-02-14 RX ORDER — IPRATROPIUM BROMIDE AND ALBUTEROL SULFATE 2.5; .5 MG/3ML; MG/3ML
3 SOLUTION RESPIRATORY (INHALATION) EVERY 6 HOURS
Status: DISCONTINUED | OUTPATIENT
Start: 2017-02-15 | End: 2017-02-17 | Stop reason: HOSPADM

## 2017-02-14 RX ORDER — SIMVASTATIN 20 MG/1
40 TABLET, FILM COATED ORAL NIGHTLY
Status: DISCONTINUED | OUTPATIENT
Start: 2017-02-14 | End: 2017-02-17 | Stop reason: HOSPADM

## 2017-02-14 RX ADMIN — CLOPIDOGREL BISULFATE 75 MG: 75 TABLET ORAL at 08:02

## 2017-02-14 RX ADMIN — BUDESONIDE 0.5 MG: 0.5 INHALANT RESPIRATORY (INHALATION) at 09:02

## 2017-02-14 RX ADMIN — MORPHINE SULFATE 2 MG: 10 INJECTION, SOLUTION INTRAMUSCULAR; INTRAVENOUS at 05:02

## 2017-02-14 RX ADMIN — FUROSEMIDE 40 MG: 10 INJECTION, SOLUTION INTRAVENOUS at 08:02

## 2017-02-14 RX ADMIN — Medication 2 MCG/MIN: at 02:02

## 2017-02-14 RX ADMIN — MUPIROCIN 1 G: 20 OINTMENT TOPICAL at 08:02

## 2017-02-14 RX ADMIN — DOCUSATE SODIUM 100 MG: 100 CAPSULE, LIQUID FILLED ORAL at 09:02

## 2017-02-14 RX ADMIN — METOPROLOL TARTRATE 25 MG: 25 TABLET ORAL at 08:02

## 2017-02-14 RX ADMIN — POTASSIUM CHLORIDE 40 MEQ: 1500 TABLET, EXTENDED RELEASE ORAL at 08:02

## 2017-02-14 RX ADMIN — POLYETHYLENE GLYCOL 3350 17 G: 17 POWDER, FOR SOLUTION ORAL at 08:02

## 2017-02-14 RX ADMIN — MORPHINE SULFATE 2 MG: 10 INJECTION, SOLUTION INTRAMUSCULAR; INTRAVENOUS at 09:02

## 2017-02-14 RX ADMIN — CEFAZOLIN SODIUM 2 G: 2 SOLUTION INTRAVENOUS at 02:02

## 2017-02-14 RX ADMIN — FUROSEMIDE 40 MG: 10 INJECTION, SOLUTION INTRAVENOUS at 09:02

## 2017-02-14 RX ADMIN — FAMOTIDINE 20 MG: 10 INJECTION, SOLUTION INTRAVENOUS at 08:02

## 2017-02-14 RX ADMIN — SIMVASTATIN 40 MG: 20 TABLET, FILM COATED ORAL at 09:02

## 2017-02-14 RX ADMIN — HYDROCODONE BITARTRATE AND ACETAMINOPHEN 1 TABLET: 10; 325 TABLET ORAL at 06:02

## 2017-02-14 RX ADMIN — DOCUSATE SODIUM 100 MG: 100 CAPSULE, LIQUID FILLED ORAL at 08:02

## 2017-02-14 RX ADMIN — SODIUM CHLORIDE 1 UNITS/HR: 9 INJECTION, SOLUTION INTRAVENOUS at 02:02

## 2017-02-14 RX ADMIN — ENOXAPARIN SODIUM 40 MG: 100 INJECTION SUBCUTANEOUS at 01:02

## 2017-02-14 RX ADMIN — MORPHINE SULFATE 2 MG: 10 INJECTION, SOLUTION INTRAMUSCULAR; INTRAVENOUS at 08:02

## 2017-02-14 RX ADMIN — ASPIRIN 81 MG: 81 TABLET, COATED ORAL at 08:02

## 2017-02-14 RX ADMIN — HYDROCODONE BITARTRATE AND ACETAMINOPHEN 1 TABLET: 10; 325 TABLET ORAL at 08:02

## 2017-02-14 RX ADMIN — MAGNESIUM SULFATE IN WATER 2 G: 40 INJECTION, SOLUTION INTRAVENOUS at 05:02

## 2017-02-14 NOTE — PT/OT/SLP EVAL
Physical Therapy  Evaluation    Soumya Melchor   MRN: [id number]   Admitting Diagnosis: CAD, multiple vessel    PT Received On: 02/14/17  PT Start Time: 0925     PT Stop Time: 0950    PT Total Time (min): 25 min       Billable Minutes:  Evaluation 15 and Therapeutic Activity 10    Diagnosis: CAD, multiple vessel  P.T. TX DX: IMPAIRED FUNCTIONAL MOBILITY    Past Medical History   Diagnosis Date    Acid reflux 4/11/2014    Anxiety and depression 3/6/2014    AP (angina pectoris) 2/13/2017    CAD (coronary artery disease) 2/13/2017    CAD, multiple vessel 2/13/2017    Chronic pain associated with significant psychosocial dysfunction 2/21/2013    COPD (chronic obstructive pulmonary disease)     HTN (hypertension)     Hypothyroidism     S/P CABG (coronary artery bypass graft) 2/13/2017      Past Surgical History   Procedure Laterality Date    Knee surgery Right     Dilation and curettage of uterus      Spinal cord stimulator implant      Tonsillectomy       Referring physician: DR. العلي  Date referred to PT: 2-13-17    General Precautions: Standard, fall, respiratory, sternal  Orthopedic Precautions: N/A   Braces: N/A       Patient History:  Lives With: spouse  Living Arrangements: house  Home Layout: Able to live on 1st floor  Transportation Available: car, family or friend will provide  Living Environment Comment: PT LIVES WITH  WHO WORKS DURING THE DAY, HOME TO ASSIST IN EVENING AND NIGHT, PT FUNCTIONALLY INDEP PTA  Equipment Currently Used at Home: none  DME owned (not currently used): rolling walker    Previous Level of Function:  Ambulation Skills: independent  Transfer Skills: independent  ADL Skills: independent  Work/Leisure Activity: independent (PT WORKS AS BEAUTICIAN 3X A WEEK)    Subjective:  Communicated with NURSE FRAUSTO prior to session. PT AGREEABLE TO P.T. EVAL  Pain Rating: 10/10   Location:  (CHEST INCISION)    Objective:   Patient found with: arterial line, blood pressure cuff,  chest tube, hernandez catheter, oxygen, peripheral IV, telemetry, pulse ox (continuous)     Cognitive Exam:  Oriented to: Person, Place, Time and Situation    Follows Commands/attention: Follows one-step commands  Communication: clear/fluent  Safety awareness/insight to disability: impaired    Physical Exam:  Postural examination/scapula alignment: Rounded shoulder    Skin integrity: Visible skin intact, STERNAL INCISION  Edema: None noted     Sensation:   Intact    Lower Extremity Range of Motion:  Right Lower Extremity: WFL  Left Lower Extremity: WFL    Lower Extremity Strength:  Right Lower Extremity: WFL  Left Lower Extremity: WFL     Functional Mobility:  Bed Mobility:  Scooting/Bridging: Maximum Assistance (SEATED SCOOTING IN CHAIR, CUES FOR DIRECTION AND SAFETY WITH STERNAL PRECAUTIONS)    Transfers:  Sit <> Stand Assistance: Moderate Assistance  Sit <> Stand Assistive Device: No Assistive Device    Gait:   Gait Distance: UNABLE TO PROGRESS TO GAIT AT THIS TIME, PT WITH MULTIPLE ICU LINES, PT ABLE TO MARCH IN PLACE WITH MOD/TYRA    Balance:   Static Sit: FAIR+  Dynamic Sit: POOR  Static Stand: POOR  Dynamic stand:     Therapeutic Activities and Exercises:  PT AND FAMILY EDUCATED IN STERNAL PRECAUTIONS, HANDOUT ISSUED FOR HOME USE, PT EDUCATED IN BLE THEREX TO PERFORM WHILE SEATED IN CHAIR    Patient left up in chair with all lines intact, call button in reach, NURSE notified and FAMILY present.    Assessment:   Soumya Melchor is a 71 y.o. female with a medical diagnosis of CAD, multiple vessel and presents with IMPAIRED FUNCTIONAL MOBILITY. PT WILL BENEFIT FROM CONT. SKILLED P.T. TO ADDRESS IMPAIRMENTS    Rehab identified problem list/impairments: Rehab identified problem list/impairments: impaired endurance, impaired functional mobilty, decreased safety awareness, impaired balance, impaired coordination    Rehab potential is good.    Activity tolerance: Good    Discharge recommendations: Discharge  Facility/Level Of Care Needs: home health PT     Barriers to discharge: Barriers to Discharge: Decreased caregiver support (FAMILY REPORTS THEY ARE WORKING ON 24 HOUR CARE FOR PT WHEN RETURN HOME)    Equipment recommendations: Equipment Needed After Discharge: bath bench     GOALS:   Physical Therapy Goals        Problem: Physical Therapy Goal    Goal Priority Disciplines Outcome Goal Variances Interventions   Physical Therapy Goal     PT/OT, PT      Description:  LTG'S TO BE MET IN 7 DAYS (2-21-17)  1. PT WILL REQUIRE SBA FOR BED MOBILITY  2. PT WILL REQUIRE SPV FOR TF'S  3. PT WILL ' WITH SPV  4. PT WILL TOLERATE BLE THEREX X 20 REPS AROM  5. PT WILL DEMO G DYNAMIC BALANCE DURING GAIT            PLAN:    Patient to be seen  (A MINIMUM OF 5 OF 7 DAYS A WEEK AS TOLERATED) to address the above listed problems via gait training, therapeutic activities, therapeutic exercises  Plan of Care expires: 02/21/17  Plan of Care reviewed with: patient, spouse, daughter     PT ENCOURAGED TO CALL FOR ASSISTANCE WITH ALL NEEDS DUE TO FALL RISK STATUS, PT AGREEABLE    Functional Assessment Tool Used: FIM-SCOOTING  Score: 2  Functional Limitation: Mobility: Walking and moving around  Mobility: Walking and Moving Around Current Status (): CM  Mobility: Walking and Moving Around Goal Status (): ELIDA Tenorio, PT  02/14/2017

## 2017-02-14 NOTE — PT/OT/SLP PROGRESS
Physical Therapy      Soumya Melchor  MRN: [id number]    S: PT AGREEABLE TO BLE THEREX   O: PT FOUND SEATED IN CHAIR UPON ARRIVAL.  NO C/O PAIN, REVIEWED STERNAL PRECAUTIONS WITH PT AND FAMILY.  PT EDUCATED IN AND PERFORMED BLE THEREX X 15 REPS AROM, PT LEFT SEATED IN CHAIR WITH ALL NEEDS MET. PT ENCOURAGED TO CALL FOR ASSISTANCE WITH ALL NEEDS DUE TO FALL RISK STATUS, PT AGREEABLE.  PT ENCOURAGED TO INCREASE TIME OOB IN CHAIR, ALL MEALS IN CHAIR OOB, PT AGREEABLE  A: GOOD PARTICIPATION  P: CONT PER POC    Mckayla Tenorio, PT   2/14/2017  [id number]

## 2017-02-14 NOTE — PLAN OF CARE
Problem: Patient Care Overview  Goal: Plan of Care Review  Outcome: Ongoing (interventions implemented as appropriate)  Recommendation/Intervention: 1. Advance diet when medically able 2. Patient not appropriate for diet education this day, medicated and sleepy, will provide education once patient is transfered out of ICU and before discharge, materials attached to EMR today.  Goals: 1. Adherence to hospital Rx diet  Nutrition Goal Status: new

## 2017-02-14 NOTE — SUBJECTIVE & OBJECTIVE
Past Medical History   Diagnosis Date    Acid reflux 4/11/2014    Anxiety and depression 3/6/2014    AP (angina pectoris) 2/13/2017    CAD (coronary artery disease) 2/13/2017    CAD, multiple vessel 2/13/2017    Chronic pain associated with significant psychosocial dysfunction 2/21/2013    COPD (chronic obstructive pulmonary disease)     HTN (hypertension)     Hypothyroidism     S/P CABG (coronary artery bypass graft) 2/13/2017       Past Surgical History   Procedure Laterality Date    Knee surgery Right     Dilation and curettage of uterus      Spinal cord stimulator implant      Tonsillectomy         Review of patient's allergies indicates:   Allergen Reactions    Ace inhibitors Other (See Comments)     unknown      Iodine and iodide containing products Hives     Since age of 48    Lisinopril      angioedema    Shrimp Other (See Comments)     Localized itching and swelling on her hands if she peels shrimp.  Able to eat shrimp without difficulty.  No generalized reaction.       No current facility-administered medications on file prior to encounter.      Current Outpatient Prescriptions on File Prior to Encounter   Medication Sig    potassium 99 mg Tab Take by mouth.    simvastatin (ZOCOR) 40 MG tablet Take 20 mg by mouth every evening.     triamterene-hydrochlorothiazide 37.5-25 mg (DYAZIDE) 37.5-25 mg per capsule Take 1 capsule by mouth every morning.    VENTOLIN HFA 90 mcg/actuation inhaler Inhale 2 puffs into the lungs every 4 (four) hours as needed for Wheezing.    albuterol-ipratropium 2.5mg-0.5mg/3mL (DUO-NEB) 0.5 mg-3 mg(2.5 mg base)/3 mL nebulizer solution Take 3 mLs by nebulization every 6 (six) hours.    amlodipine (NORVASC) 10 MG tablet Take 1 tablet (10 mg total) by mouth once daily.    ascorbic acid, vitamin C, (VITAMIN C) 1000 MG tablet Take 1,000 mg by mouth once daily.     budesonide (PULMICORT) 0.5 mg/2 mL nebulizer solution Take 2 mLs (0.5 mg total) by nebulization 2  (two) times daily. Wash out mouth after using.    calcium-vitamin D3 500 mg(1,250mg) -200 unit per tablet Take 1 tablet by mouth 2 (two) times daily with meals.    DOCOSAHEXANOIC ACID/EPA (FISH OIL ORAL) Take 1,200 mg by mouth 2 (two) times daily.    ezetimibe (ZETIA) 10 mg tablet Take 1 tablet (10 mg total) by mouth every evening.    fluorometholone 0.1% (FML) 0.1 % DrpS INSTILL ONE DROP IN THE RIGHT EYE THREE TIMES DAILY    FOLIC ACID/MULTIVIT-MIN/LUTEIN (CENTRUM SILVER ORAL) Take by mouth once daily.    ipratropium (ATROVENT) 0.06 % nasal spray 2 sprays by Nasal route 4 (four) times daily. As needed for rhinitis    levothyroxine (SYNTHROID) 200 MCG tablet TAKE ONE TABLET BY MOUTH EVERY DAY    magnesium 30 mg Tab Take 30 mg by mouth.    MELATONIN ORAL Take 20 mg by mouth nightly as needed.     metoprolol tartrate (LOPRESSOR) 25 MG tablet TAKE ONE TABLET BY MOUTH TWICE DAILY    niacin 500 mg TbSR Take 500 mg by mouth.    omeprazole (PRILOSEC) 40 MG capsule Take 1 capsule (40 mg total) by mouth once daily.    senna (SENOKOT) 8.6 mg tablet Take 3 tablets by mouth.    SENNOSIDES (SENNA LAXATIVE ORAL) Take by mouth 3 (three) times daily.    tiotropium (SPIRIVA WITH HANDIHALER) 18 mcg inhalation capsule Inhale 1 capsule (18 mcg total) into the lungs once daily.    tramadol (ULTRAM) 50 mg tablet Take 50 mg by mouth 2 (two) times daily.    trazodone (DESYREL) 50 MG tablet TAKE ONE TABLET BY MOUTH EVERY EVENING    zolpidem (AMBIEN) 5 MG Tab TAKE ONE TABLET BY MOUTH AT BEDTIME AS NEEDED     Family History     Problem Relation (Age of Onset)    Heart attacks under age 50 Mother (41)        Social History Main Topics    Smoking status: Former Smoker     Packs/day: 1.00     Years: 50.00     Quit date: 12/10/2012    Smokeless tobacco: Not on file    Alcohol use No    Drug use: No    Sexual activity: Not on file     Review of Systems   Unable to perform ROS: Intubated     Objective:     Vital Signs (Most  Recent):  Temp: 99 °F (37.2 °C) (02/13/17 1700)  Pulse: 72 (02/13/17 1745)  Resp: 14 (02/13/17 1745)  BP: (!) 91/55 (02/13/17 1730)  SpO2: 100 % (02/13/17 1745) Vital Signs (24h Range):  Temp:  [96.5 °F (35.8 °C)-99 °F (37.2 °C)] 99 °F (37.2 °C)  Pulse:  [65-87] 72  Resp:  [13-20] 14  SpO2:  [100 %] 100 %  BP: ()/(54-76) 91/55     Weight: 56.8 kg (125 lb 3.5 oz)  Body mass index is 24.46 kg/(m^2).    Physical Exam   Constitutional: She appears well-developed.   intubated   HENT:   Head: Normocephalic and atraumatic.   Eyes: Pupils are equal, round, and reactive to light. Right eye exhibits no discharge. Left eye exhibits no discharge.   Neck: Normal range of motion. Neck supple.   Cardiovascular: Normal rate.  Exam reveals no friction rub.    No murmur heard.  Pulmonary/Chest:   Decrease BS B/L   Abdominal: Soft.   Hypoactive BS   Musculoskeletal:   CT in place  Left LE wrapped.    Skin: She is not diaphoretic.       Significant Labs: All pertinent labs within the past 24 hours have been reviewed.    Significant Imaging: I have reviewed all pertinent imaging results/findings within the past 24 hours.

## 2017-02-14 NOTE — ASSESSMENT & PLAN NOTE
S/P CABG X3, extubated on NC, encouraged IS, PT, ASA, Plavix, BB, Lasix, monitor I/O, FU with Cardiology and CVT recommendation.

## 2017-02-14 NOTE — ASSESSMENT & PLAN NOTE
Nutrition Problem:   Decreased nutrient needs (saturated and trans fat, cholesterol, sodium)    Etiology/Related to:   CAD    As evidenced by:  CABGx3 this admission    Treatment Strategy:   1. Advance to Cardiac diet  2. Provide diet education prior to discharge5    Nutrition Diagnosis Status:   New

## 2017-02-14 NOTE — PLAN OF CARE
Problem: Patient Care Overview  Goal: Plan of Care Review  Outcome: Ongoing (interventions implemented as appropriate)  POC reviewed with pt at bedside and daughter by phone - all questions answered, indicated understanding. Pt slightly forgetful this AM, may require review of POC at later time. Extubated to 5L NC at 0350 following NIF of -31. Full bed bath with hibiclens completed this AM with gown and linen change - ambulated to chair with 2x assist, generalized weakness noted, VSS stable with low-normal BPs - levophed gtt maintained. Denies further pain at this time. No skin breakdown noted. Resting quietly in chair, no complaints. 24 hour chart check completed. Gwen Hernandez RN

## 2017-02-14 NOTE — CONSULTS
[facility] Hospital Medicine  Consult Note    Patient Name: Soumya Melchor  MRN: [id number]  Admission Date: 2/13/2017  Hospital Length of Stay: 0 days  Attending Physician: Steve Bonds MD   Primary Care Provider: Howard Mathews MD           Patient information was obtained from Records and ER records.     Consult to Hospitalist  Consult performed by: ANTON LEWIS  Consult ordered by: STEVE BONDS        Subjective:     Principal Problem: <principal problem not specified>    Chief Complaint: No chief complaint on file.       HPI: 71 y.o female patient with PMHx of CAD, HTN, depression, COPD, who was admitted for CABG after having LHC on Jan showed MVD, patient is intubated, no acute events.     Past Medical History   Diagnosis Date    Acid reflux 4/11/2014    Anxiety and depression 3/6/2014    AP (angina pectoris) 2/13/2017    CAD (coronary artery disease) 2/13/2017    CAD, multiple vessel 2/13/2017    Chronic pain associated with significant psychosocial dysfunction 2/21/2013    COPD (chronic obstructive pulmonary disease)     HTN (hypertension)     Hypothyroidism     S/P CABG (coronary artery bypass graft) 2/13/2017       Past Surgical History   Procedure Laterality Date    Knee surgery Right     Dilation and curettage of uterus      Spinal cord stimulator implant      Tonsillectomy         Review of patient's allergies indicates:   Allergen Reactions    Ace inhibitors Other (See Comments)     unknown      Iodine and iodide containing products Hives     Since age of 48    Lisinopril      angioedema    Shrimp Other (See Comments)     Localized itching and swelling on her hands if she peels shrimp.  Able to eat shrimp without difficulty.  No generalized reaction.       No current facility-administered medications on file prior to encounter.      Current Outpatient Prescriptions on File Prior to Encounter   Medication Sig    potassium 99 mg Tab Take by mouth.     simvastatin (ZOCOR) 40 MG tablet Take 20 mg by mouth every evening.     triamterene-hydrochlorothiazide 37.5-25 mg (DYAZIDE) 37.5-25 mg per capsule Take 1 capsule by mouth every morning.    VENTOLIN HFA 90 mcg/actuation inhaler Inhale 2 puffs into the lungs every 4 (four) hours as needed for Wheezing.    albuterol-ipratropium 2.5mg-0.5mg/3mL (DUO-NEB) 0.5 mg-3 mg(2.5 mg base)/3 mL nebulizer solution Take 3 mLs by nebulization every 6 (six) hours.    amlodipine (NORVASC) 10 MG tablet Take 1 tablet (10 mg total) by mouth once daily.    ascorbic acid, vitamin C, (VITAMIN C) 1000 MG tablet Take 1,000 mg by mouth once daily.     budesonide (PULMICORT) 0.5 mg/2 mL nebulizer solution Take 2 mLs (0.5 mg total) by nebulization 2 (two) times daily. Wash out mouth after using.    calcium-vitamin D3 500 mg(1,250mg) -200 unit per tablet Take 1 tablet by mouth 2 (two) times daily with meals.    DOCOSAHEXANOIC ACID/EPA (FISH OIL ORAL) Take 1,200 mg by mouth 2 (two) times daily.    ezetimibe (ZETIA) 10 mg tablet Take 1 tablet (10 mg total) by mouth every evening.    fluorometholone 0.1% (FML) 0.1 % DrpS INSTILL ONE DROP IN THE RIGHT EYE THREE TIMES DAILY    FOLIC ACID/MULTIVIT-MIN/LUTEIN (CENTRUM SILVER ORAL) Take by mouth once daily.    ipratropium (ATROVENT) 0.06 % nasal spray 2 sprays by Nasal route 4 (four) times daily. As needed for rhinitis    levothyroxine (SYNTHROID) 200 MCG tablet TAKE ONE TABLET BY MOUTH EVERY DAY    magnesium 30 mg Tab Take 30 mg by mouth.    MELATONIN ORAL Take 20 mg by mouth nightly as needed.     metoprolol tartrate (LOPRESSOR) 25 MG tablet TAKE ONE TABLET BY MOUTH TWICE DAILY    niacin 500 mg TbSR Take 500 mg by mouth.    omeprazole (PRILOSEC) 40 MG capsule Take 1 capsule (40 mg total) by mouth once daily.    senna (SENOKOT) 8.6 mg tablet Take 3 tablets by mouth.    SENNOSIDES (SENNA LAXATIVE ORAL) Take by mouth 3 (three) times daily.    tiotropium (SPIRIVA WITH HANDIHALER)  18 mcg inhalation capsule Inhale 1 capsule (18 mcg total) into the lungs once daily.    tramadol (ULTRAM) 50 mg tablet Take 50 mg by mouth 2 (two) times daily.    trazodone (DESYREL) 50 MG tablet TAKE ONE TABLET BY MOUTH EVERY EVENING    zolpidem (AMBIEN) 5 MG Tab TAKE ONE TABLET BY MOUTH AT BEDTIME AS NEEDED     Family History     Problem Relation (Age of Onset)    Heart attacks under age 50 Mother (41)        Social History Main Topics    Smoking status: Former Smoker     Packs/day: 1.00     Years: 50.00     Quit date: 12/10/2012    Smokeless tobacco: Not on file    Alcohol use No    Drug use: No    Sexual activity: Not on file     Review of Systems   Unable to perform ROS: Intubated     Objective:     Vital Signs (Most Recent):  Temp: 99 °F (37.2 °C) (02/13/17 1700)  Pulse: 72 (02/13/17 1745)  Resp: 14 (02/13/17 1745)  BP: (!) 91/55 (02/13/17 1730)  SpO2: 100 % (02/13/17 1745) Vital Signs (24h Range):  Temp:  [96.5 °F (35.8 °C)-99 °F (37.2 °C)] 99 °F (37.2 °C)  Pulse:  [65-87] 72  Resp:  [13-20] 14  SpO2:  [100 %] 100 %  BP: ()/(54-76) 91/55     Weight: 56.8 kg (125 lb 3.5 oz)  Body mass index is 24.46 kg/(m^2).    Physical Exam   Constitutional: She appears well-developed.   intubated   HENT:   Head: Normocephalic and atraumatic.   Eyes: Pupils are equal, round, and reactive to light. Right eye exhibits no discharge. Left eye exhibits no discharge.   Neck: Normal range of motion. Neck supple.   Cardiovascular: Normal rate.  Exam reveals no friction rub.    No murmur heard.  Pulmonary/Chest:   Decrease BS B/L   Abdominal: Soft.   Hypoactive BS   Musculoskeletal:   CT in place  Left LE wrapped.    Skin: She is not diaphoretic.       Significant Labs: All pertinent labs within the past 24 hours have been reviewed.    Significant Imaging: I have reviewed all pertinent imaging results/findings within the past 24 hours.    Assessment/Plan:     CAD (coronary artery disease)  S/P CABG X3  Intubated wean as  tolerated  On ASA, plavix, BB, lasix      S/P CABG (coronary artery bypass graft)  As above      Gastroesophageal reflux disease  On Famotidine, continue with the current medications      COPD (chronic obstructive pulmonary disease)  Intubated, wean as tolerated.   Nebs as needed    Hypertension  Continue with the current medications      VTE Risk Mitigation         Ordered     enoxaparin injection 40 mg  Every 24 hours (non-standard times)     Route:  Subcutaneous        02/13/17 1118     Medium Risk of VTE  Once      02/13/17 1118        Thank you for your consult. I will follow-up with patient. Please contact us if you have any additional questions.    Cherry Terrazas MD  Department of Hospital Medicine   [facility]

## 2017-02-14 NOTE — PROGRESS NOTES
Unit of PRBCs complete - no s/s transfusion reaction noted, pt tolerated well. Called Dr. Gonzalez to notify that BPs remain 80s/40s-90s/50s - still very labile. Order received to initiate levophed gtt at set rate (non-titrating) of 2mcg/min (7.5ml/ hr or .035mcg/kg/min). Done with immediate positive response - pt's BPs now 110s/60s with reduced lability. Gwen Hernandez RN

## 2017-02-14 NOTE — PROGRESS NOTES
Cardiology Progress Note        SUBJECTIVE:   POD 1 s/p CABG x3. Tolerated procedure well. AAO this morning. Has been extubated and has remained stable.   Normal LVF. Using IS. Labs and vitals stable. Chest tube remains in place.     OBJECTIVE:     Vital Signs (Most Recent)  Temp: 100 °F (37.8 °C) (02/14/17 1500)  Pulse: 82 (02/14/17 1500)  Resp: 19 (02/14/17 1500)  BP: (!) 113/59 (02/14/17 1500)  SpO2: 100 % (02/14/17 1500)    Vital Signs Range (Last 24H):  Temp:  [99 °F (37.2 °C)-100.3 °F (37.9 °C)]   Pulse:  []   Resp:  [9-35]   BP: ()/(27-84)   SpO2:  [83 %-100 %]     Intake/Output last 3 shifts:  I/O last 3 completed shifts:  In: 8382.8 [I.V.:6224.8; Blood:1828; NG/GT:30; IV Piggyback:300]  Out: 3621 [Urine:2371; Chest Tube:1250]    Intake/Output this shift:  I/O this shift:  In: 516.4 [P.O.:500; I.V.:16.4]  Out: 1675 [Urine:1475; Chest Tube:200]    Review of patient's allergies indicates:   Allergen Reactions    Ace inhibitors Other (See Comments)     unknown      Iodine and iodide containing products Hives     Since age of 48    Lisinopril      angioedema    Shrimp Other (See Comments)     Localized itching and swelling on her hands if she peels shrimp.  Able to eat shrimp without difficulty.  No generalized reaction.       Current Facility-Administered Medications   Medication    acetaminophen tablet 650 mg    albuterol sulfate nebulizer solution 2.5 mg    aspirin EC tablet 81 mg    clopidogrel tablet 75 mg    docusate sodium capsule 100 mg    enoxaparin injection 40 mg    [START ON 2/15/2017] famotidine tablet 20 mg    furosemide injection 40 mg    hydrocodone-acetaminophen 10-325mg per tablet 1 tablet    hydrocodone-acetaminophen 5-325mg per tablet 1 tablet    hydrocodone-acetaminophen 7.5-325mg per tablet 1 tablet    influenza vaccine 180 mcg/0.5 mL (for patients > 65) injection    metoprolol tartrate (LOPRESSOR) tablet 25 mg    morphine injection 2 mg    mupirocin 2 %  ointment 1 g    ondansetron injection 4 mg    pneumoc 13-rayne conj-dip cr(PF) 0.5 mL 0.5 mL    polyethylene glycol packet 17 g    potassium chloride SA CR tablet 40 mEq     No current facility-administered medications on file prior to encounter.      Current Outpatient Prescriptions on File Prior to Encounter   Medication Sig    potassium 99 mg Tab Take by mouth.    simvastatin (ZOCOR) 40 MG tablet Take 20 mg by mouth every evening.     triamterene-hydrochlorothiazide 37.5-25 mg (DYAZIDE) 37.5-25 mg per capsule Take 1 capsule by mouth every morning.    VENTOLIN HFA 90 mcg/actuation inhaler Inhale 2 puffs into the lungs every 4 (four) hours as needed for Wheezing.    albuterol-ipratropium 2.5mg-0.5mg/3mL (DUO-NEB) 0.5 mg-3 mg(2.5 mg base)/3 mL nebulizer solution Take 3 mLs by nebulization every 6 (six) hours.    amlodipine (NORVASC) 10 MG tablet Take 1 tablet (10 mg total) by mouth once daily.    ascorbic acid, vitamin C, (VITAMIN C) 1000 MG tablet Take 1,000 mg by mouth once daily.     budesonide (PULMICORT) 0.5 mg/2 mL nebulizer solution Take 2 mLs (0.5 mg total) by nebulization 2 (two) times daily. Wash out mouth after using.    calcium-vitamin D3 500 mg(1,250mg) -200 unit per tablet Take 1 tablet by mouth 2 (two) times daily with meals.    DOCOSAHEXANOIC ACID/EPA (FISH OIL ORAL) Take 1,200 mg by mouth 2 (two) times daily.    ezetimibe (ZETIA) 10 mg tablet Take 1 tablet (10 mg total) by mouth every evening.    fluorometholone 0.1% (FML) 0.1 % DrpS INSTILL ONE DROP IN THE RIGHT EYE THREE TIMES DAILY    FOLIC ACID/MULTIVIT-MIN/LUTEIN (CENTRUM SILVER ORAL) Take by mouth once daily.    ipratropium (ATROVENT) 0.06 % nasal spray 2 sprays by Nasal route 4 (four) times daily. As needed for rhinitis    levothyroxine (SYNTHROID) 200 MCG tablet TAKE ONE TABLET BY MOUTH EVERY DAY    magnesium 30 mg Tab Take 30 mg by mouth.    MELATONIN ORAL Take 20 mg by mouth nightly as needed.     metoprolol tartrate  (LOPRESSOR) 25 MG tablet TAKE ONE TABLET BY MOUTH TWICE DAILY    niacin 500 mg TbSR Take 500 mg by mouth.    omeprazole (PRILOSEC) 40 MG capsule Take 1 capsule (40 mg total) by mouth once daily.    senna (SENOKOT) 8.6 mg tablet Take 3 tablets by mouth.    SENNOSIDES (SENNA LAXATIVE ORAL) Take by mouth 3 (three) times daily.    tiotropium (SPIRIVA WITH HANDIHALER) 18 mcg inhalation capsule Inhale 1 capsule (18 mcg total) into the lungs once daily.    tramadol (ULTRAM) 50 mg tablet Take 50 mg by mouth 2 (two) times daily.    trazodone (DESYREL) 50 MG tablet TAKE ONE TABLET BY MOUTH EVERY EVENING    zolpidem (AMBIEN) 5 MG Tab TAKE ONE TABLET BY MOUTH AT BEDTIME AS NEEDED       Physical Exam:  General appearance: alert, appears stated age and cooperative  Head: Normocephalic, without obvious abnormality, atraumatic  Neck: no adenopathy, no carotid bruit, no JVD  Lungs: diminished to auscultation bilaterally  Chest wall: midsternal surgical incision. Well approximated. No drainage or hematoma   Heart: regular rate and rhythm, S1, S2 normal, no murmur, click, rub or gallop  Abdomen: soft, non-tender; bowel sounds normal; no masses,  no organomegaly  Extremities: graft site WNL  Pulses:Pedal pulses palpable   Skin: Skin color, texture, turgor normal. No rashes or lesions  Neurologic: Grossly normal    Laboratory:  Chemistry:   Lab Results   Component Value Date     02/14/2017    K 4.4 02/14/2017     02/14/2017    CO2 21 (L) 02/14/2017    BUN 10 02/14/2017    CREATININE 1.0 02/14/2017    CALCIUM 7.8 (L) 02/14/2017     Cardiac Markers: No results found for: CKTOTAL, CKMB, CKMBINDEX, TROPONINI  Cardiac Markers (Last 3): No results found for: CKTOTAL, CKMB, CKMBINDEX, TROPONINI  CBC:   Lab Results   Component Value Date    WBC 11.27 02/14/2017    HGB 11.8 (L) 02/14/2017    HCT 34.1 (L) 02/14/2017    HCT 32 (L) 02/13/2017    MCV 87 02/14/2017     02/14/2017     Lipids:   Lab Results   Component Value  Date    CHOL 248 (H) 10/07/2015    TRIG 92 10/07/2015    HDL 99 (H) 10/07/2015     Coagulation:   Lab Results   Component Value Date    INR 1.2 02/13/2017    INR 0.9 03/17/2004    APTT 29.5 02/13/2017       Diagnostic Results:  Pertinent labs and radiology reviewed by myself and supervising MD      ASSESSMENT/PLAN:     Patient Active Problem List   Diagnosis    Gastroesophageal reflux disease    Anxiety and depression    Chronic pain associated with significant psychosocial dysfunction    COPD (chronic obstructive pulmonary disease)    Hypothyroidism    Hypertension    Edema    Pulmonary nodule, right    Pulmonary nodules/lesions, multiple    SOB (shortness of breath)    Family history of arteriosclerotic cardiovascular disease    Hyperlipidemia    Other psychoactive substance use, unspecified with psychoactive substance-induced sleep disorder    Tobacco use    Angina pectoris    CAD (coronary artery disease)    CAD, multiple vessel    S/P CABG (coronary artery bypass graft)    AP (angina pectoris)        Plan:   Continue current post CABG care. OK to transfer to [facility].   Encourage IS use and ambulate. Restart simvastatin    Chart reviewed. Patient examined by Dr. Grey and agrees with plan that has been outlined.

## 2017-02-14 NOTE — PROGRESS NOTES
Called CVT surgeon on-call (Dr. Gonzalez) and notified of sustained low BPs in spite of 2L LR and 500ml albumin since start of shift. Also notified of CT output approximately 350ml (serosanguinous), H&H, CO/CI, drop in SVR. Pt remains intubated, arouses to stimulation, follows commands but falls asleep quickly, followed by drop in BP - unable to extubate at this time. Order received to transfuse 1 unit PRBCs. Gwen Hernandez RN

## 2017-02-14 NOTE — PROGRESS NOTES
[facility] Hospital Medicine  Progress Note    Patient Name: Soumya Melchor  MRN: [id number]  Patient Class: IP- Inpatient   Admission Date: 2/13/2017  Length of Stay: 1 days  Attending Physician: Steve Bonds MD  Primary Care Provider: Howard Mathews MD        Subjective:     Principal Problem:CAD, multiple vessel    HPI:  71 y.o female patient with PMHx of CAD, HTN, depression, COPD, who was admitted for CABG after having LHC on Jan showed MVD, patient is intubated, no acute events.     Hospital Course:  - Patient extubated, on NC, complain of pain at the chest. Encouraged patient about IS.     Interval History: Extubated on NC    Review of Systems   Constitutional: Negative for activity change and appetite change.   HENT: Negative for congestion, dental problem and ear discharge.    Eyes: Negative for discharge and itching.   Respiratory: Negative for apnea, cough, choking and shortness of breath.    Cardiovascular: Positive for chest pain. Negative for palpitations and leg swelling.   Gastrointestinal: Negative for abdominal distention, abdominal pain and constipation.   Genitourinary: Negative for difficulty urinating and dyspareunia.   Musculoskeletal: Negative for arthralgias.   Neurological: Positive for headaches. Negative for dizziness.     Objective:     Vital Signs (Most Recent):  Temp: 100.3 °F (37.9 °C) (02/14/17 0700)  Pulse: 86 (02/14/17 1100)  Resp: 16 (02/14/17 1100)  BP: (!) 113/56 (02/14/17 1100)  SpO2: 98 % (02/14/17 1100) Vital Signs (24h Range):  Temp:  [97.2 °F (36.2 °C)-100.3 °F (37.9 °C)] 100.3 °F (37.9 °C)  Pulse:  [] 86  Resp:  [9-35] 16  SpO2:  [83 %-100 %] 98 %  BP: ()/(27-84) 113/56     Weight: 60.1 kg (132 lb 7.9 oz)  Body mass index is 25.88 kg/(m^2).    Intake/Output Summary (Last 24 hours) at 02/14/17 1153  Last data filed at 02/14/17 1100   Gross per 24 hour   Intake          4671.15 ml   Output             4332 ml   Net           339.15 ml       Physical Exam   Constitutional: She is oriented to person, place, and time. She appears well-developed and well-nourished. No distress.   HENT:   Head: Normocephalic and atraumatic.   Eyes: EOM are normal. Pupils are equal, round, and reactive to light. Right eye exhibits no discharge. Left eye exhibits no discharge.   Neck: Normal range of motion. Neck supple. No thyromegaly present.   Cardiovascular: Normal rate, regular rhythm and normal heart sounds.  Exam reveals no friction rub.    No murmur heard.  Pulmonary/Chest: She has no wheezes. She has no rales.   Decrease BS B/L    Abdominal: Soft. Bowel sounds are normal. She exhibits no distension. There is no tenderness.   Hypoactive BS   Musculoskeletal:   CT in place  Left LE wrapped.    Neurological: She is alert and oriented to person, place, and time. No cranial nerve deficit.   Skin: Skin is warm and dry. She is not diaphoretic.       Significant Labs:   CBC:   Recent Labs  Lab 02/13/17  1110 02/13/17  2125 02/14/17  0436   WBC 13.61* 7.67 11.27   HGB 10.1* 9.3* 11.8*   HCT 29.0* 26.5* 34.1*    142* 155       Significant Imaging: I have reviewed all pertinent imaging results/findings within the past 24 hours.    Assessment/Plan:      * CAD, multiple vessel  S/P CABG X3, extubated on NC, encouraged IS, PT, ASA, Plavix, BB, Lasix, monitor I/O, FU with Cardiology and CVT recommendation.       S/P CABG (coronary artery bypass graft)  As above      COPD (chronic obstructive pulmonary disease)  Nebs as needed, Titrate O2 to keep sat > 94%    Hypertension  Well controlled, continue with the current medications      VTE Risk Mitigation         Ordered     enoxaparin injection 40 mg  Every 24 hours (non-standard times)     Route:  Subcutaneous        02/13/17 1118     Medium Risk of VTE  Once      02/13/17 1118        Disposition: TF to tele floor.     Cherry Terrazas MD  Department of Hospital Medicine   [facility]

## 2017-02-14 NOTE — PROGRESS NOTES
Soumya Melchor is a 71 y.o. female patient.   1. CAD (coronary artery disease)    2. CAD, multiple vessel      Past Medical History   Diagnosis Date    Acid reflux 4/11/2014    Anxiety and depression 3/6/2014    AP (angina pectoris) 2/13/2017    CAD (coronary artery disease) 2/13/2017    CAD, multiple vessel 2/13/2017    Chronic pain associated with significant psychosocial dysfunction 2/21/2013    COPD (chronic obstructive pulmonary disease)     HTN (hypertension)     Hypothyroidism     S/P CABG (coronary artery bypass graft) 2/13/2017     No past surgical history pertinent negatives on file.  Scheduled Meds:   aspirin  81 mg Oral Daily    clopidogrel  75 mg Oral Daily    docusate sodium  100 mg Oral BID    enoxaparin  40 mg Subcutaneous Q24H    famotidine (PF)  20 mg Intravenous Daily    furosemide  40 mg Intravenous BID    metoprolol tartrate  25 mg Oral BID    mupirocin  1 g Nasal BID    polyethylene glycol  17 g Oral Daily    potassium chloride SA  40 mEq Oral Daily     Continuous Infusions:   dexmedetomidine (PRECEDEX) infusion Stopped (02/13/17 2145)    dextrose 5 % and 0.2 % NaCl 50 mL/hr (02/14/17 0700)    insulin (HUMAN R) infusion (adults) 0.1 Units/hr (02/14/17 0700)    norepinephrine bitartrate-D5W 2 mcg/min (02/14/17 0700)     PRN Meds:sodium chloride, acetaminophen, albumin human 5%, albuterol sulfate, dextrose 50%, dextrose 50%, hydrocodone-acetaminophen 10-325mg, hydrocodone-acetaminophen 5-325mg, hydrocodone-acetaminophen 7.5-325mg, lactated ringers, magnesium sulfate IVPB, midazolam, midazolam (PF), midazolam (PF), morphine, morphine, ondansetron, potassium chloride **AND** potassium chloride **AND** potassium chloride, promethazine (PHENERGAN) IVPB    Review of patient's allergies indicates:   Allergen Reactions    Ace inhibitors Other (See Comments)     unknown      Iodine and iodide containing products Hives     Since age of 48    Lisinopril      angioedema     Shrimp Other (See Comments)     Localized itching and swelling on her hands if she peels shrimp.  Able to eat shrimp without difficulty.  No generalized reaction.     Active Hospital Problems    Diagnosis  POA    *CAD, multiple vessel [I25.10]  Yes    CAD (coronary artery disease) [I25.10]  Yes    S/P CABG (coronary artery bypass graft) [Z95.1]  Not Applicable    AP (angina pectoris) [I20.9]  Yes    Hypertension [I10]  Yes    COPD (chronic obstructive pulmonary disease) [J44.9]  Yes      Resolved Hospital Problems    Diagnosis Date Resolved POA   No resolved problems to display.     Blood pressure (!) 108/52, pulse 98, temperature 100.3 °F (37.9 °C), temperature source Core, resp. rate 13, height 5' (1.524 m), weight 60.1 kg (132 lb 7.9 oz), SpO2 100 %, not currently breastfeeding.    Subjective  Objective   Assessment & Plan    Awake/alert. Up in chair. Labs OK. To tele  Steve Bonds MD  2/14/2017

## 2017-02-14 NOTE — PT/OT/SLP EVAL
Occupational Therapy  Evaluation    Soumya Melchor   MRN: [id number]   Admitting Diagnosis: CAD, multiple vessel    OT Date of Treatment: 02/14/17   OT Start Time: 0845  OT Stop Time: 0915  OT Total Time (min): 30 min    Billable Minutes:  Evaluation 15 MINUTES  Therapeutic Activity 15 MINUTES    Diagnosis: CAD, multiple vessel   DEBILITY AND GENERALIZED WEAKNESS    Past Medical History   Diagnosis Date    Acid reflux 4/11/2014    Anxiety and depression 3/6/2014    AP (angina pectoris) 2/13/2017    CAD (coronary artery disease) 2/13/2017    CAD, multiple vessel 2/13/2017    Chronic pain associated with significant psychosocial dysfunction 2/21/2013    COPD (chronic obstructive pulmonary disease)     HTN (hypertension)     Hypothyroidism     S/P CABG (coronary artery bypass graft) 2/13/2017      Past Surgical History   Procedure Laterality Date    Knee surgery Right     Dilation and curettage of uterus      Spinal cord stimulator implant      Tonsillectomy         Referring physician: DR. العلي  Date referred to OT:  2-13-17    General Precautions: Standard, fall  Orthopedic Precautions: N/A  Braces:            Patient History:  Living Environment  Lives With: spouse  Living Arrangements: house  Home Layout: Able to live on 1st floor  Living Environment Comment: PT LIVES WITH SPOUSE AND (I) WITH ADL'S AND IADL'S PLOF. PT HAS 1 STEP TO ENTER BED  Equipment Currently Used at Home: oxygen    Prior level of function:   Bed Mobility/Transfers: independent  Grooming: independent  Bathing: independent  Upper Body Dressing: independent  Lower Body Dressing: independent  Toileting: independent  Home Management Skills: independent  Driving License: Yes  Occupation: Part time employment     Dominant hand: right    Subjective:  Communicated with NURSE FRAUSTO AND [facility] CHART REVIEW prior to session.  PT REPORTED 10/10 PAIN  Chief Complaint: DEBILITY AND GENERALIZED WEAKNESS  Patient/Family stated goals:     Pain  Rating: 10/10  Location - Side: Bilateral  Location - Orientation: upper  Location: chest          Objective:  Patient found with: peripheral IV, telemetry, blood pressure cuff, pulse ox (continuous)    Cognitive Exam:  Oriented to: Person, Place, Time and Situation  Follows Commands/attention: Follows one-step commands  Communication: clear/fluent  Memory:  No Deficits noted  Safety awareness/insight to disability: intact  Coping skills/emotional control: Appropriate to situation    Visual/perceptual:  Intact    Physical Exam:  Postural examination/scapula alignment: No postural abnormalities identified  Skin integrity: Open scar MID CHEST BANDAGE and Thin  Edema: None noted     Sensation:   Intact    Upper Extremity Range of Motion:  Right Upper Extremity: WFL OF STERNAL PRECAUTIONS  Left Upper Extremity: WFL OF STERNAL PRECAUTIONS    Upper Extremity Strength:  Right Upper Extremity: NOT TESTED  Left Upper Extremity: NOT TESTED   Strength: NOT TESTED    Fine motor coordination:   Intact    Gross motor coordination: WFL OF STERNAL PRECAUTIONS    Functional Mobility:  Bed Mobility:       Transfers:  Sit <> Stand Assistance: Moderate Assistance  Sit <> Stand Assistive Device: No Assistive Device    Functional Ambulation: NA    Activities of Daily Living:     Feeding adaptive equipment: NA  UE Dressing Level of Assistance: Maximum assistance  UE adaptive equipment: NA    LE Dressing Level of Assistance: Maximum assistance  LE adaptive equipment: NA          Toileting Where Assessed: Other (Comment)  Toileting Level of Assistance: Total assistance (PEDRAZA PLACEMENTT)        Bathing adaptive equipment: NA      Balance:   Static Sit: FAIR+: Able to take MINIMAL challenges from all directions  Dynamic Sit: POOR: N/A  Static Stand: POOR: Needs MODERATE assist to maintain  Dynamic stand: POOR: N/A    Therapeutic Activities and Exercises:  PT EDUCATED OF STERNAL PRECAUTIONS. PT VERBALIZED UNDERSTANDING.    AM-PAC 6 CLICK  "ADL  How much help from another person does this patient currently need?  1 = Unable, Total/Dependent Assistance  2 = A lot, Maximum/Moderate Assistance  3 = A little, Minimum/Contact Guard/Supervision  4 = None, Modified [place]/Independent         AM-PAC Raw Score CMS "G-Code Modifier Level of Impairment Assistance   6 % Total / Unable   7 - 9 CM 80 - 100% Maximal Assist   10 - 14 CL 60 - 80% Moderate Assist   15 - 19 CK 40 - 60% Moderate Assist   20 - 22 CJ 20 - 40% Minimal Assist   23 CI 1-20% SBA / CGA   24 CH 0% Independent/ Mod I       Patient left up in chair with all lines intact, call button in reach, NURSE JETT notified and SPOUSE present    Assessment:  Soumya Melchor is a 71 y.o. female with a medical diagnosis of CAD, multiple vessel and presents with DEBILITY AND GENERALIZED WEAKNESS. PT MAY BENEFIT FROM SKILLED O.T.    Rehab identified problem list/impairments: Rehab identified problem list/impairments: weakness, impaired functional mobilty, impaired balance, decreased safety awareness, impaired coordination, impaired endurance, impaired self care skills, gait instability, decreased coordination, decreased lower extremity function, pain, decreased ROM    Rehab potential is good.    Activity tolerance: Good    Discharge recommendations: Discharge Facility/Level Of Care Needs: home health OT     Barriers to discharge:      Equipment recommendations: none     GOALS:   Occupational Therapy Goals        Problem: Occupational Therapy Goal    Goal Priority Disciplines Outcome Interventions   Occupational Therapy Goal     OT, PT/OT     Description:  OT  GOALS TO BE MET BY 2-21-17  1. MIN A WITH LE DRESSING  2. MIN A WITH UE DRESSING  3. PT WILL VERBALIZED AND RETURNED DEMONSTRATION OF STERNAL PRECAUTIONS DURING THERAPEUTIC ACTIVITY  4. SBA WITH BSC T/F'S              PLAN:  Patient to be seen 3 x/week to address the above listed problems via self-care/home management, therapeutic exercises, " therapeutic activities  Plan of Care expires: 02/21/17  Plan of Care reviewed with: patient, spouse, daughter         Lidyajacquelyn Formanzier, OT  02/14/2017

## 2017-02-14 NOTE — PLAN OF CARE
Problem: Patient Care Overview  Goal: Plan of Care Review  Attempt to wean to extubation. Pt on CPAP, very anxious, RR high at times, volumes vary from 190-450 at this time.Hopefully will extubate soon if pt meets criteria.

## 2017-02-14 NOTE — PROGRESS NOTES
Pt extubated and placed on NC 5lpm. O2 sat 97%, RR 21, Pt tolerated procedure well. ABG done prior to extubation. No critical values.

## 2017-02-14 NOTE — PROGRESS NOTES
CVT cleared for transfer to Tele and transfer orders placed per IM.  Will sign off.     HECTOR Cottrell [facility]

## 2017-02-14 NOTE — SUBJECTIVE & OBJECTIVE
Interval History: Extubated on NC    Review of Systems   Constitutional: Negative for activity change and appetite change.   HENT: Negative for congestion, dental problem and ear discharge.    Eyes: Negative for discharge and itching.   Respiratory: Negative for apnea, cough, choking and shortness of breath.    Cardiovascular: Positive for chest pain. Negative for palpitations and leg swelling.   Gastrointestinal: Negative for abdominal distention, abdominal pain and constipation.   Genitourinary: Negative for difficulty urinating and dyspareunia.   Musculoskeletal: Negative for arthralgias.   Neurological: Positive for headaches. Negative for dizziness.     Objective:     Vital Signs (Most Recent):  Temp: 100.3 °F (37.9 °C) (02/14/17 0700)  Pulse: 86 (02/14/17 1100)  Resp: 16 (02/14/17 1100)  BP: (!) 113/56 (02/14/17 1100)  SpO2: 98 % (02/14/17 1100) Vital Signs (24h Range):  Temp:  [97.2 °F (36.2 °C)-100.3 °F (37.9 °C)] 100.3 °F (37.9 °C)  Pulse:  [] 86  Resp:  [9-35] 16  SpO2:  [83 %-100 %] 98 %  BP: ()/(27-84) 113/56     Weight: 60.1 kg (132 lb 7.9 oz)  Body mass index is 25.88 kg/(m^2).    Intake/Output Summary (Last 24 hours) at 02/14/17 1153  Last data filed at 02/14/17 1100   Gross per 24 hour   Intake          4671.15 ml   Output             4332 ml   Net           339.15 ml      Physical Exam   Constitutional: She is oriented to person, place, and time. She appears well-developed and well-nourished. No distress.   HENT:   Head: Normocephalic and atraumatic.   Eyes: EOM are normal. Pupils are equal, round, and reactive to light. Right eye exhibits no discharge. Left eye exhibits no discharge.   Neck: Normal range of motion. Neck supple. No thyromegaly present.   Cardiovascular: Normal rate, regular rhythm and normal heart sounds.  Exam reveals no friction rub.    No murmur heard.  Pulmonary/Chest: She has no wheezes. She has no rales.   Decrease BS B/L    Abdominal: Soft. Bowel sounds are  normal. She exhibits no distension. There is no tenderness.   Hypoactive BS   Musculoskeletal:   CT in place  Left LE wrapped.    Neurological: She is alert and oriented to person, place, and time. No cranial nerve deficit.   Skin: Skin is warm and dry. She is not diaphoretic.       Significant Labs:   CBC:   Recent Labs  Lab 02/13/17  1110 02/13/17  2125 02/14/17  0436   WBC 13.61* 7.67 11.27   HGB 10.1* 9.3* 11.8*   HCT 29.0* 26.5* 34.1*    142* 155       Significant Imaging: I have reviewed all pertinent imaging results/findings within the past 24 hours.

## 2017-02-14 NOTE — CONSULTS
[facility] -   Critical Care Medicine  Consult Note    Patient Name: Soumya Melchor  MRN: [id number]  Admission Date: 2/13/2017  Hospital Length of Stay: 0 days  Code Status: No Order  Attending Physician: Steve Bonds MD   Primary Care Provider: Howard Mathews MD   Principal Problem: CAD, multiple vessel    Inpatient consult to Pulmonology  Consult performed by: CLARICE ANGELES  Consult ordered by: STEVE BONDS  Reason for consult: Post operative ICU management        Subjective:     HPI:  71 y.o female patient with PMHx of CAD, HTN, depression, COPD, who was admitted for CABG after having C on Jan showed MVD, patient is intubated, no acute events.     Hospital/ICU Course: Transferred to the MICU post op. Seen and examined at bedside. No acute detrimental interval events.        Past Medical History   Diagnosis Date    Acid reflux 4/11/2014    Anxiety and depression 3/6/2014    AP (angina pectoris) 2/13/2017    CAD (coronary artery disease) 2/13/2017    CAD, multiple vessel 2/13/2017    Chronic pain associated with significant psychosocial dysfunction 2/21/2013    COPD (chronic obstructive pulmonary disease)     HTN (hypertension)     Hypothyroidism     S/P CABG (coronary artery bypass graft) 2/13/2017       Past Surgical History   Procedure Laterality Date    Knee surgery Right     Dilation and curettage of uterus      Spinal cord stimulator implant      Tonsillectomy         Family History   Problem Relation Age of Onset    Heart attacks under age 50 Mother 41       Medications :  Scheduled Meds:   [START ON 2/14/2017] aspirin  81 mg Oral Daily    ceFAZolin (ANCEF) IVPB  2 g Intravenous Q8H    [START ON 2/14/2017] clopidogrel  75 mg Oral Daily    [START ON 2/14/2017] docusate sodium  100 mg Oral BID    [START ON 2/14/2017] enoxaparin  40 mg Subcutaneous Q24H    famotidine (PF)  20 mg Intravenous Daily    [START ON 2/14/2017] furosemide  40 mg Intravenous BID    [START  ON 2/14/2017] metoprolol tartrate  25 mg Oral BID    mupirocin  1 g Nasal BID    [START ON 2/14/2017] polyethylene glycol  17 g Oral Daily    [START ON 2/14/2017] potassium chloride SA  40 mEq Oral Daily     Continuous Infusions:   dexmedetomidine (PRECEDEX) infusion 0.3 mcg/kg/hr (02/13/17 2115)    dextrose 5 % and 0.2 % NaCl 50 mL/hr (02/13/17 2105)    insulin (HUMAN R) infusion (adults) Stopped (02/13/17 2105)     PRN Meds:.acetaminophen, albumin human 5%, albuterol sulfate, dextrose 50%, dextrose 50%, hydrocodone-acetaminophen 10-325mg, hydrocodone-acetaminophen 5-325mg, hydrocodone-acetaminophen 7.5-325mg, lactated ringers, magnesium sulfate IVPB, midazolam, midazolam (PF), midazolam (PF), morphine, morphine, ondansetron, potassium chloride **AND** potassium chloride **AND** potassium chloride, promethazine (PHENERGAN) IVPB      Review of patient's allergies indicates:   Allergen Reactions    Ace inhibitors Other (See Comments)     unknown      Iodine and iodide containing products Hives     Since age of 48    Lisinopril      angioedema    Shrimp Other (See Comments)     Localized itching and swelling on her hands if she peels shrimp.  Able to eat shrimp without difficulty.  No generalized reaction.       Vital Signs (Most Recent):  Temp: 99.6 °F (37.6 °C) (02/13/17 1915)  Pulse: 73 (02/13/17 2120)  Resp: 10 (02/13/17 2120)  BP: (!) 89/44 (02/13/17 2100)  SpO2: 100 % (02/13/17 2120) Vital Signs (24h Range):  Temp:  [96.5 °F (35.8 °C)-99.6 °F (37.6 °C)] 99.6 °F (37.6 °C)  Pulse:  [65-87] 73  Resp:  [10-20] 10  SpO2:  [97 %-100 %] 100 %  BP: ()/(44-76) 89/44     Weight: 56.8 kg (125 lb 3.5 oz)  Body mass index is 24.46 kg/(m^2).      Intake/Output Summary (Last 24 hours) at 02/13/17 2126  Last data filed at 02/13/17 2105   Gross per 24 hour   Intake           6102.9 ml   Output             2363 ml   Net           3739.9 ml         Vents:  Vent Mode: SIMV (02/13/17 2113)  Set Rate: 10 bmp (02/13/17  2113)  Vt Set: 480 mL (02/13/17 2113)  Pressure Support: 10 cmH20 (02/13/17 2113)  PEEP/CPAP: 5 cmH20 (02/13/17 2113)  Oxygen Concentration (%): 35 (02/13/17 2120)  Peak Airway Pressure: 23 cmH2O (02/13/17 2113)  Total Ve: 4.71 mL (02/13/17 2113)  F/VT Ratio<105 (RSBI): (!) 24.95 (02/13/17 1941)    Lines/Drains/Airways     Central Venous Catheter Line                 Introducer 02/13/17 0747 right internal jugular less than 1 day         Pulmonary Artery Catheter Assessment  02/13/17 0749 Right internal jugular vein continuous hemodynamic catheter 9 Fr Double Lumen less than 1 day         Pulmonary Artery Catheter Assessment  02/13/17 0824 less than 1 day          Drain                 NG/OG Tube Morris sump 18 Fr. Center mouth -- days         Chest Tube 02/13/17 0832 1 Left Pleural 24 Fr. less than 1 day         Urethral Catheter 02/13/17 0725 Temperature probe 16 Fr. less than 1 day         Y Chest Tube 1 and 2 02/13/17 1015 Anterior Mediastinal 19 Fr. Anterior Mediastinal 24 Fr. less than 1 day          Airway                 Airway - Non-Surgical 02/13/17 0730 Endotracheal Tube less than 1 day          Peripheral Intravenous Line                 Peripheral IV - Single Lumen 02/13/17 0600 Left Forearm less than 1 day         Peripheral IV - Single Lumen 02/13/17 0738 Left Wrist less than 1 day                Significant Labs:    CBC/Anemia Profile:    Recent Labs  Lab 02/13/17  0708  02/13/17  0940 02/13/17  1017 02/13/17  1110   WBC  --   --   --   --  13.61*   HGB 9.5*  --   --   --  10.1*   HCT 28.1*  < > 26* 32* 29.0*     --   --   --  167   MCV  --   --   --   --  89   RDW  --   --   --   --  14.0   < > = values in this interval not displayed.     Chemistries:    Recent Labs  Lab 02/13/17  1110 02/13/17  1129     --    K 3.4*  3.4*  --      --    CO2 24  --    BUN 13  --    CREATININE 1.1 1.1   CALCIUM 10.1  --    MG 1.8  --            Significant Imaging:    X-Ray Chest AP Portable      Interval thoracic surgery with sternal wire sutures now noted. Neurostimulator leads again noted. ET tube, NG tube, and right-sided Cornish-Eriberto catheter now noted in position. Mediastinal/pleural drains noted. band of atelectasis noted inferiorly on the left with lungs otherwise grossly clear.    Assessment/Plan:     I have reviewed all labs and imaging studies and compared to previous results. I have also discussed labs with all the teams in the medical care of the patient and my plan is outlined below     Admission Diagnosis: Coronary atherosclerosis of unspec*    Problem   Cad (Coronary Artery Disease)   Cad, Multiple Vessel   Ap (Angina Pectoris)   Hypertension        Critical Care Medicine Daily Checklist:    A: Awake: RASS Goal/Actual Goal: RASS Goal: 0-->alert and calm (pending extubation)  Actual: [place] Agitation Sedation Scale (RASS): Light sedation   B: Spontaneous Breathing Trial Performed?  YES   C: SAT & SBT Coordinated?  YES                    D: Delirium: CAM-ICU  N/A   E: Early Mobility Performed? Yes   F: Feeding Goal:    Status:     Current Diet Order   Procedures    Diet NPO      AS: Analgesia/Sedation HYDROCODONE - ACETAMINOPHEN / MIDAZOLAM / PRECEDEX   T: Thromboembolic Prophylaxis SCD   H: HOB > 300 Yes   U: Stress Ulcer Prophylaxis (if needed) FAMOTIDINE   G: Glucose Control INSULIN GTT   B: Bowel Function     I: Indwelling Catheter (Lines & Broussard) Necessity YES   D: De-escalation of Antimicrobials/Pharmacotherapies  YES    Plan for the day/ETD Continue current    Code Status:  Family/Goals of Care: No Order  Home       PLAN:    1. Neuro:   Normal strength and tone. No focal numbness or weakness.ICU neurologic monitoring    2. Pulmonary:    Stable: Vent weaning per CVT protocol. Oxygen supplementation by nasal canula once extubated.  FIO2 titrate to > 90 - 92 %.DUONEB PRN.  Routine radiologic surveillance: CXR  ( daily?). TCDB Q shift. Sternal precautions.       3. Cardiac:   Stable:  METOPROLOL,ASA, PLAVIX, FUROSEMIDE. Monitor hemodynamics and  monitor for dysrhythmias.  MAP goal of  60 mmHg.      4. Renal:    Stable. Volume status: Euvolemic. daily weights and strict I/O's . Broussard in place.     5. Infectious Disease:   Stable: FEVER absent.  Monitor fever curve and sepsis surveillance. Continue current antibiotic(s) ANCEF per post op protocol.         6. Hematology/Oncology:   Stable:  negative for anemia:  Conservative transfusion strategy and monitor for SS of occult or overt bleeding    7. Endocrine: Stable: Monitor serum glucose.  adjust insulin as indicated by accuchecks.  Blood glucose target 100 - 180 mg/dl    8. Fluids/Electrolytes/Nutrition/GI: Monitor and replete electrolytes.  Maintain even  fluid balance. Cardiac diet once extubated.      9. Musculoskeletal: Stable. Physical Therapy and Occupational Therapy    10. Pain Management: Pain control  Adequate analgesia.    11. Discharge and Palliative Care: Home        Critical Care Time: 50 minutes  Critical secondary to Patient has a condition that poses threat to life and bodily function:  s/p CAB - multivessel.       Critical care was time spent personally by me on the following activities: development of treatment plan with patient or surrogate and bedside caregivers, discussions with consultants, evaluation of patient's response to treatment, examination of patient, ordering and performing treatments and interventions, ordering and review of laboratory studies, ordering and review of radiographic studies, pulse oximetry, re-evaluation of patient's condition. This critical care time did not overlap with that of any other provider or involve time for any procedures.    Thank you for your consult. I will follow-up with patient. Please contact us if you have any additional questions.     Tony Douglas MD  Critical Care Medicine  [facility] -

## 2017-02-14 NOTE — PLAN OF CARE
Problem: Patient Care Overview  Goal: Plan of Care Review  Outcome: Ongoing (interventions implemented as appropriate)  Patient doing well, up in cardiac chair, VSS. Octavio diaz and art line removed this AM. Patient tolerating PO well. Family at [facility], update given, POC discussed.

## 2017-02-14 NOTE — CONSULTS
[facility] -   Adult Nutrition  Consult Note    SUMMARY     Recommendations  Recommendation/Intervention: 1. Advance diet when medically able    2. Patient not appropriate for diet education this day, medicated and sleepy, will provide education once patient is transfered out of ICU and before discharge, materials attached to EMR today.  Goals: 1. Adherence to hospital Rx diet  Nutrition Goal Status: new    Nutrition Discharge Plan  Home on Cardiac diet    Reason for Assessment  Reason for Assessment: per organizational policy, nurse/nurse practitioner consult (s/p CABG)  Diagnosis:  (CAD s/p CABGx3 )  Relevent Medical History: Anxiety, Depression, CAD, COPD, HTN, GERD   General Information Comments: Patient with good appetite and intake at home prior to admission. She denies unintentiona; weight loss, she was not following Cardiac diet prior to admit. She does have good support at home for needed dietary changes.    Nutrition Prescription Ordered  Current Diet Order: Clear Liquid     Nutrition Risk Screen  Nutrition Risk Screen: no indicators present    Nutrition/Diet History  Patient Reported Diet/Restrictions/Preferences: general  Typical Food/Fluid Intake: Good appetite and intake  Food Preferences: none reported    Labs/Tests/Procedures/Meds  Pertinent Labs Reviewed: reviewed  Pertinent Labs Comments: Gluc 142  Pertinent Medications Reviewed: reviewed    Physical Findings  Oral/Mouth Cavity: WDL  Skin:  (incisions)    Anthropometrics  Height (inches): 60 in  Weight Method: Bed Scale  Weight (kg): 60.1 kg  Ideal Body Weight (IBW), Female: 100 lb  % Ideal Body Weight, Female (lb): 132.5 lb  BMI (kg/m2): 25.88  BMI Grade: 25 - 29.9 - overweight    Estimated/Assessed Needs  Weight Used For Calorie Calculations: 60.1 kg (132 lb 7.9 oz)   Height (cm): 152.4 cm  Energy Need Method: Roane-St Jeor (x1.2-1.3 = 0564-1963 calories)  Weight Used For Protein Calculations: 60.1 kg (132 lb 7.9 oz)  1.0 gm  Protein (gm): 60.23  Fluid Need Method: RDA Method (1ml/dominique or as needed)    Monitor and Evaluation  Food and Nutrient Intake: food and beverage intake  Food and Nutrient Adminstration: diet order  Knowledge/Beliefs/Attitudes: beliefs and attitudes, food and nutrition knowledge/skill  Physical Activity and Function: nutrition-related ADLs and IADLs  Biochemical Data, Medical Tests and Procedures: electrolyte and renal panel, glucose/endocrine profile  Nutrition-Focused Physical Findings: skin    Nutrition Risk  Level of Risk: moderate    Nutrition Follow-Up  RD Follow-up?: Yes      Assessment and Plan  S/P CABG (coronary artery bypass graft)  Nutrition Problem:   Decreased nutrient needs (saturated and trans fat, cholesterol, sodium)    Etiology/Related to:   CAD    As evidenced by:  CABGx3 this admission    Treatment Strategy:   1. Advance to Cardiac diet  2. Provide diet education prior to discharge5    Nutrition Diagnosis Status:   New

## 2017-02-15 LAB
ANION GAP SERPL CALC-SCNC: 14 MMOL/L
BASOPHILS # BLD AUTO: 0.03 K/UL
BASOPHILS NFR BLD: 0.2 %
BUN SERPL-MCNC: 11 MG/DL
CALCIUM SERPL-MCNC: 8.3 MG/DL
CHLORIDE SERPL-SCNC: 98 MMOL/L
CO2 SERPL-SCNC: 27 MMOL/L
CREAT SERPL-MCNC: 1.1 MG/DL
DIFFERENTIAL METHOD: ABNORMAL
EOSINOPHIL # BLD AUTO: 0.1 K/UL
EOSINOPHIL NFR BLD: 0.4 %
ERYTHROCYTE [DISTWIDTH] IN BLOOD BY AUTOMATED COUNT: 15.6 %
EST. GFR  (AFRICAN AMERICAN): 58 ML/MIN/1.73 M^2
EST. GFR  (NON AFRICAN AMERICAN): 50 ML/MIN/1.73 M^2
GLUCOSE SERPL-MCNC: 113 MG/DL
HCT VFR BLD AUTO: 39.5 %
HGB BLD-MCNC: 13.2 G/DL
LYMPHOCYTES # BLD AUTO: 1.4 K/UL
LYMPHOCYTES NFR BLD: 10.8 %
MCH RBC QN AUTO: 29.9 PG
MCHC RBC AUTO-ENTMCNC: 33.4 %
MCV RBC AUTO: 90 FL
MONOCYTES # BLD AUTO: 1.3 K/UL
MONOCYTES NFR BLD: 10 %
NEUTROPHILS # BLD AUTO: 10 K/UL
NEUTROPHILS NFR BLD: 78.6 %
PLATELET # BLD AUTO: 149 K/UL
PMV BLD AUTO: 9.6 FL
POTASSIUM SERPL-SCNC: 3.8 MMOL/L
RBC # BLD AUTO: 4.41 M/UL
SODIUM SERPL-SCNC: 139 MMOL/L
WBC # BLD AUTO: 12.65 K/UL

## 2017-02-15 PROCEDURE — 25000003 PHARM REV CODE 250: Performed by: NURSE PRACTITIONER

## 2017-02-15 PROCEDURE — 99232 SBSQ HOSP IP/OBS MODERATE 35: CPT | Mod: ,,, | Performed by: INTERNAL MEDICINE

## 2017-02-15 PROCEDURE — 97535 SELF CARE MNGMENT TRAINING: CPT

## 2017-02-15 PROCEDURE — 63600175 PHARM REV CODE 636 W HCPCS: Performed by: PHYSICIAN ASSISTANT

## 2017-02-15 PROCEDURE — 27000221 HC OXYGEN, UP TO 24 HOURS

## 2017-02-15 PROCEDURE — 25000242 PHARM REV CODE 250 ALT 637 W/ HCPCS: Performed by: NURSE PRACTITIONER

## 2017-02-15 PROCEDURE — 25000003 PHARM REV CODE 250: Performed by: PHYSICIAN ASSISTANT

## 2017-02-15 PROCEDURE — 97116 GAIT TRAINING THERAPY: CPT

## 2017-02-15 PROCEDURE — 97530 THERAPEUTIC ACTIVITIES: CPT

## 2017-02-15 PROCEDURE — 21400001 HC TELEMETRY ROOM

## 2017-02-15 PROCEDURE — 99900035 HC TECH TIME PER 15 MIN (STAT)

## 2017-02-15 PROCEDURE — 85025 COMPLETE CBC W/AUTO DIFF WBC: CPT

## 2017-02-15 PROCEDURE — 80048 BASIC METABOLIC PNL TOTAL CA: CPT

## 2017-02-15 PROCEDURE — 36415 COLL VENOUS BLD VENIPUNCTURE: CPT

## 2017-02-15 PROCEDURE — 94799 UNLISTED PULMONARY SVC/PX: CPT

## 2017-02-15 PROCEDURE — 63600175 PHARM REV CODE 636 W HCPCS: Performed by: THORACIC SURGERY (CARDIOTHORACIC VASCULAR SURGERY)

## 2017-02-15 PROCEDURE — 94640 AIRWAY INHALATION TREATMENT: CPT

## 2017-02-15 RX ORDER — AMOXICILLIN 250 MG
2 CAPSULE ORAL NIGHTLY
Status: DISCONTINUED | OUTPATIENT
Start: 2017-02-15 | End: 2017-02-17 | Stop reason: HOSPADM

## 2017-02-15 RX ORDER — RAMELTEON 8 MG/1
8 TABLET ORAL NIGHTLY PRN
Status: DISCONTINUED | OUTPATIENT
Start: 2017-02-15 | End: 2017-02-17 | Stop reason: HOSPADM

## 2017-02-15 RX ADMIN — MORPHINE SULFATE 2 MG: 10 INJECTION, SOLUTION INTRAMUSCULAR; INTRAVENOUS at 03:02

## 2017-02-15 RX ADMIN — IPRATROPIUM BROMIDE AND ALBUTEROL SULFATE 3 ML: .5; 3 SOLUTION RESPIRATORY (INHALATION) at 02:02

## 2017-02-15 RX ADMIN — FUROSEMIDE 40 MG: 10 INJECTION, SOLUTION INTRAVENOUS at 09:02

## 2017-02-15 RX ADMIN — IPRATROPIUM BROMIDE AND ALBUTEROL SULFATE 3 ML: .5; 3 SOLUTION RESPIRATORY (INHALATION) at 12:02

## 2017-02-15 RX ADMIN — CLOPIDOGREL BISULFATE 75 MG: 75 TABLET ORAL at 09:02

## 2017-02-15 RX ADMIN — BUDESONIDE 0.5 MG: 0.5 INHALANT RESPIRATORY (INHALATION) at 08:02

## 2017-02-15 RX ADMIN — STANDARDIZED SENNA CONCENTRATE AND DOCUSATE SODIUM 2 TABLET: 8.6; 5 TABLET, FILM COATED ORAL at 08:02

## 2017-02-15 RX ADMIN — ASPIRIN 81 MG: 81 TABLET, COATED ORAL at 09:02

## 2017-02-15 RX ADMIN — IPRATROPIUM BROMIDE AND ALBUTEROL SULFATE 3 ML: .5; 3 SOLUTION RESPIRATORY (INHALATION) at 07:02

## 2017-02-15 RX ADMIN — POTASSIUM CHLORIDE 40 MEQ: 1500 TABLET, EXTENDED RELEASE ORAL at 09:02

## 2017-02-15 RX ADMIN — SIMVASTATIN 40 MG: 20 TABLET, FILM COATED ORAL at 08:02

## 2017-02-15 RX ADMIN — RAMELTEON 8 MG: 8 TABLET, FILM COATED ORAL at 10:02

## 2017-02-15 RX ADMIN — FUROSEMIDE 40 MG: 10 INJECTION, SOLUTION INTRAVENOUS at 08:02

## 2017-02-15 RX ADMIN — MUPIROCIN 1 G: 20 OINTMENT TOPICAL at 09:02

## 2017-02-15 RX ADMIN — METOPROLOL TARTRATE 25 MG: 25 TABLET ORAL at 09:02

## 2017-02-15 RX ADMIN — HYDROCODONE BITARTRATE AND ACETAMINOPHEN 1 TABLET: 10; 325 TABLET ORAL at 05:02

## 2017-02-15 RX ADMIN — POLYETHYLENE GLYCOL 3350 17 G: 17 POWDER, FOR SOLUTION ORAL at 09:02

## 2017-02-15 RX ADMIN — HYDROCODONE BITARTRATE AND ACETAMINOPHEN 1 TABLET: 10; 325 TABLET ORAL at 12:02

## 2017-02-15 RX ADMIN — ONDANSETRON 4 MG: 2 INJECTION INTRAMUSCULAR; INTRAVENOUS at 11:02

## 2017-02-15 RX ADMIN — IPRATROPIUM BROMIDE AND ALBUTEROL SULFATE 3 ML: .5; 3 SOLUTION RESPIRATORY (INHALATION) at 08:02

## 2017-02-15 RX ADMIN — BUDESONIDE 0.5 MG: 0.5 INHALANT RESPIRATORY (INHALATION) at 07:02

## 2017-02-15 RX ADMIN — FAMOTIDINE 20 MG: 20 TABLET ORAL at 09:02

## 2017-02-15 RX ADMIN — DOCUSATE SODIUM 100 MG: 100 CAPSULE, LIQUID FILLED ORAL at 09:02

## 2017-02-15 RX ADMIN — ENOXAPARIN SODIUM 40 MG: 100 INJECTION SUBCUTANEOUS at 11:02

## 2017-02-15 RX ADMIN — METOPROLOL TARTRATE 25 MG: 25 TABLET ORAL at 08:02

## 2017-02-15 RX ADMIN — MUPIROCIN 1 G: 20 OINTMENT TOPICAL at 08:02

## 2017-02-15 NOTE — PROGRESS NOTES
Spoke with Sushma, Transitional Navigator. Patient declined for [facility], out of their service area. Sent referral information to [facility]RI[facility] through [facility].

## 2017-02-15 NOTE — PT/OT/SLP PROGRESS
Physical Therapy  Treatment    Soumya Melchor   MRN: [id number]   Admitting Diagnosis: CAD, multiple vessel    PT Received On: 02/15/17  PT Start Time: 0950     PT Stop Time: 1015    PT Total Time (min): 25 min       Billable Minutes:  Gait [id number] and Therapeutic Activity 10    Treatment Type: Treatment  PT/PTA: PT       General Precautions: Standard, respiratory, sternal  Orthopedic Precautions: N/A   Braces: N/A    Subjective:  Communicated with NURSE ESCALONA prior to session.  Pain Ratin/10    Objective:   Patient found with: pulse ox (continuous), telemetry, oxygen, chest tube    Functional Mobility:  Bed Mobility:   Scooting/Bridging: Stand by Assistance    Transfers:  Sit <> Stand Assistance: Contact Guard Assistance  Sit <> Stand Assistive Device: No Assistive Device    Gait:   Gait Distance: PT ' WITH CGA, QUICK PACED GAIT, MILDY UNSTEADY BUT NO GROSS LOB, CT AND O2 IN TOW  Assistance 1: Contact Guard Assistance  Gait Assistive Device: No device  Gait Pattern: swing-through gait  Gait Deviation(s): decreased toe-to-floor clearance    Balance:   Static Sit: GOOD  Dynamic Sit: GOOD  Static Stand: FAIR  Dynamic stand:  FAIR    Therapeutic Activities and Exercises:  PT ABLE TO RECITE 1/5 STERNAL PRECAUTIONS SO ALL REVIEWED WITH PT AND FAMILY.  PT STOOD FOR APPROX. 10 MINUTES TO SPEAK TO LENARD FISHER+ STATIC STAND    Patient left up in chair with all lines intact, call button in reach, NURSE notified and FAMILY present.    Assessment:  Soumya Melchor is a 71 y.o. female with a medical diagnosis of CAD, multiple vessel PT WILL BENEFIT FROM CONT. SKILLED P.T. TO ADDRESS IMPAIRMENTS    Rehab identified problem list/impairments: Rehab identified problem list/impairments: impaired endurance, impaired functional mobilty, decreased safety awareness, impaired coordination, impaired balance    Rehab potential is good.    Activity tolerance: Good    Discharge recommendations: Discharge Facility/Level Of Care  Needs: home health PT     Barriers to discharge: Barriers to Discharge: Decreased caregiver support    Equipment recommendations: Equipment Needed After Discharge: bath bench     GOALS:   Physical Therapy Goals        Problem: Physical Therapy Goal    Goal Priority Disciplines Outcome Goal Variances Interventions   Physical Therapy Goal     PT/OT, PT      Description:  LTG'S TO BE MET IN 7 DAYS (2-21-17)  1. PT WILL REQUIRE SBA FOR BED MOBILITY  2. PT WILL REQUIRE SPV FOR TF'S  3. PT WILL ' WITH SPV  4. PT WILL TOLERATE BLE THEREX X 20 REPS AROM  5. PT WILL DEMO G DYNAMIC BALANCE DURING GAIT            PLAN:    Patient to be seen  (A MINIMUM OF 5 OF 7 DAYS A WEEK AS TOLERATED)  to address the above listed problems via  (CONT PER POC)  Plan of Care expires: 02/21/17  Plan of Care reviewed with: patient, spouse    PT ENCOURAGED TO CALL FOR ASSISTANCE WITH ALL NEEDS DUE TO FALL RISK STATUS, PT AGREEABLE.  PT ENCOURAGED TO INCREASE TIME OOB IN CHAIR, ALL MEALS IN CHAIR OOB, PT AGREEABLE    Mckayla Tenorio, PT  02/15/2017

## 2017-02-15 NOTE — PLAN OF CARE
Problem: Patient Care Overview  Goal: Plan of Care Review  Pt currently on O2, IS therapy done with encouragement w patient.

## 2017-02-15 NOTE — PROGRESS NOTES
Soumya Melchor is a 71 y.o. female patient.   1. CAD (coronary artery disease)    2. CAD, multiple vessel      Past Medical History   Diagnosis Date    Acid reflux 4/11/2014    Anxiety and depression 3/6/2014    AP (angina pectoris) 2/13/2017    CAD (coronary artery disease) 2/13/2017    CAD, multiple vessel 2/13/2017    Chronic pain associated with significant psychosocial dysfunction 2/21/2013    COPD (chronic obstructive pulmonary disease)     HTN (hypertension)     Hypothyroidism     S/P CABG (coronary artery bypass graft) 2/13/2017     No past surgical history pertinent negatives on file.  Scheduled Meds:   albuterol-ipratropium 2.5mg-0.5mg/3mL  3 mL Nebulization Q6H    aspirin  81 mg Oral Daily    budesonide  0.5 mg Nebulization BID    clopidogrel  75 mg Oral Daily    docusate sodium  100 mg Oral BID    enoxaparin  40 mg Subcutaneous Q24H    famotidine  20 mg Oral Daily    furosemide  40 mg Intravenous BID    metoprolol tartrate  25 mg Oral BID    mupirocin  1 g Nasal BID    polyethylene glycol  17 g Oral Daily    potassium chloride SA  40 mEq Oral Daily    simvastatin  40 mg Oral QHS     Continuous Infusions:   PRN Meds:acetaminophen, albuterol sulfate, hydrocodone-acetaminophen 10-325mg, hydrocodone-acetaminophen 5-325mg, hydrocodone-acetaminophen 7.5-325mg, flu vacc [id number] 65yr up(PF), morphine, ondansetron, pneumoc 13-rayne conj-dip cr(PF)    Review of patient's allergies indicates:   Allergen Reactions    Ace inhibitors Other (See Comments)     unknown      Iodine and iodide containing products Hives     Since age of 48    Lisinopril      angioedema    Shrimp Other (See Comments)     Localized itching and swelling on her hands if she peels shrimp.  Able to eat shrimp without difficulty.  No generalized reaction.     Active Hospital Problems    Diagnosis  POA    *CAD, multiple vessel [I25.10]  Yes    CAD (coronary artery disease) [I25.10]  Yes    S/P CABG (coronary artery  bypass graft) [Z95.1]  Not Applicable    Hypertension [I10]  Yes    COPD (chronic obstructive pulmonary disease) [J44.9]  Yes      Resolved Hospital Problems    Diagnosis Date Resolved POA   No resolved problems to display.     Blood pressure 112/60, pulse 86, temperature 98.4 °F (36.9 °C), temperature source Oral, resp. rate 18, height 5' (1.524 m), weight 60.9 kg (134 lb 4.2 oz), SpO2 96 %, not currently breastfeeding.    Subjective  Objective   Assessment & Plan   NSR. Labs OK. DC tubes. Start Rehab. Home Fri/Sat.    Steve Bonds MD  2/15/2017

## 2017-02-15 NOTE — PT/OT/SLP PROGRESS
Physical Therapy  Treatment    Soumya Melchor   MRN: [id number]   Admitting Diagnosis: CAD, multiple vessel    PT Received On: 02/15/17  PT Start Time: 1420     PT Stop Time: 1445    PT Total Time (min): 25 min       Billable Minutes:  Gait [id number] and Therapeutic Activity 10    Treatment Type: Treatment  PT/PTA: PT             General Precautions: Standard, sternal, fall  Orthopedic Precautions: N/A   Braces: N/A         Subjective:  Communicated with epic prior to session.      Pain Ratin/10                   Objective:   Patient found with: oxygen    Functional Mobility:  Bed Mobility:   Scooting/Bridging: Stand by Assistance  Supine to Sit: Moderate Assistance  Sit to Supine: Moderate Assistance    Transfers:  Sit <> Stand Assistance: Minimum Assistance  Sit <> Stand Assistive Device: No Assistive Device    Gait:   Gait with no AD.  150 ft with CGA.  Wide [place] wide path, mild staggering.    Stairs:      Balance:   Static Sit: fair  Dynamic Sit: fair  Static Stand: fair-  Dynamic stand: fair-     Therapeutic Activities and Exercises:  Supine to/from sit requires mod A secondary to sternal precautions.       AM-PAC 6 CLICK MOBILITY  How much help from another person does this patient currently need?   1 = Unable, Total/Dependent Assistance  2 = A lot, Maximum/Moderate Assistance  3 = A little, Minimum/Contact Guard/Supervision  4 = None, Modified [place]/Independent         AM-PAC Raw Score CMS G-Code Modifier Level of Impairment Assistance   6 % Total / Unable   7 - 9 CM 80 - 100% Maximal Assist   10 - 14 CL 60 - 80% Moderate Assist   15 - 19 CK 40 - 60% Moderate Assist   20 - 22 CJ 20 - 40% Minimal Assist   23 CI 1-20% SBA / CGA   24 CH 0% Independent/ Mod I     Patient left supine with all lines intact.    Assessment:  Soumya Melchor is a 71 y.o. female with a medical diagnosis of CAD, multiple vessel and presents with decreased endurance, decreased balance and decreased func mobility  including gaitn.    Rehab identified problem list/impairments: Rehab identified problem list/impairments: weakness, impaired balance, gait instability, impaired functional mobilty    Rehab potential is fair.    Activity tolerance: Fair    Discharge recommendations: Discharge Facility/Level Of Care Needs: home health PT     Barriers to discharge: Barriers to Discharge: None    Equipment recommendations: Equipment Needed After Discharge: none     GOALS:   Physical Therapy Goals        Problem: Physical Therapy Goal    Goal Priority Disciplines Outcome Goal Variances Interventions   Physical Therapy Goal     PT/OT, PT      Description:  LTG'S TO BE MET IN 7 DAYS (2-21-17)  1. PT WILL REQUIRE SBA FOR BED MOBILITY  2. PT WILL REQUIRE SPV FOR TF'S  3. PT WILL ' WITH SPV  4. PT WILL TOLERATE BLE THEREX X 20 REPS AROM  5. PT WILL DEMO G DYNAMIC BALANCE DURING GAIT              PLAN:    Patient to be seen 5 x/week  to address the above listed problems via gait training, therapeutic activities, therapeutic exercises  Plan of Care expires: 02/21/17  Plan of Care reviewed with: patient, spouse         Fatimah Michell, PT  02/15/2017

## 2017-02-15 NOTE — PROGRESS NOTES
Cardiology Progress Note        SUBJECTIVE:   Transferred to Tele this morning. Patient is POD 2 s/p CABG x3. She is progressing well. Her labs and vitals look good. No arrhythmias. Pain well controlled. Ambulating around  Unit with PT. Using IS. No chest pain or SOB. Chest tube remains in place. BP is a little on the low side today, but asymptomatic. Normal EF on recent echo.       OBJECTIVE:     Vital Signs (Most Recent)  Temp: 98.4 °F (36.9 °C) (02/15/17 1224)  Pulse: 86 (02/15/17 1224)  Resp: 18 (02/15/17 1224)  BP: 112/60 (02/15/17 1224)  SpO2: 96 % (02/15/17 1224)    Vital Signs Range (Last 24H):  Temp:  [98.4 °F (36.9 °C)-100 °F (37.8 °C)]   Pulse:  []   Resp:  [12-29]   BP: ()/(45-70)   SpO2:  [93 %-100 %]     Intake/Output last 3 shifts:  I/O last 3 completed shifts:  In: 4882.9 [P.O.:1100; I.V.:3252.9; Blood:350; NG/GT:30; IV Piggyback:150]  Out: 5043 [Urine:3873; Chest Tube:1170]    Intake/Output this shift:       Review of patient's allergies indicates:   Allergen Reactions    Ace inhibitors Other (See Comments)     unknown      Iodine and iodide containing products Hives     Since age of 48    Lisinopril      angioedema    Shrimp Other (See Comments)     Localized itching and swelling on her hands if she peels shrimp.  Able to eat shrimp without difficulty.  No generalized reaction.       Current Facility-Administered Medications   Medication    acetaminophen tablet 650 mg    albuterol sulfate nebulizer solution 2.5 mg    albuterol-ipratropium 2.5mg-0.5mg/3mL nebulizer solution 3 mL    aspirin EC tablet 81 mg    budesonide nebulizer solution 0.5 mg    clopidogrel tablet 75 mg    docusate sodium capsule 100 mg    enoxaparin injection 40 mg    famotidine tablet 20 mg    furosemide injection 40 mg    hydrocodone-acetaminophen 10-325mg per tablet 1 tablet    hydrocodone-acetaminophen 5-325mg per tablet 1 tablet    hydrocodone-acetaminophen 7.5-325mg per tablet 1 tablet     influenza vaccine 180 mcg/0.5 mL (for patients > 65) injection    metoprolol tartrate (LOPRESSOR) tablet 25 mg    morphine injection 2 mg    mupirocin 2 % ointment 1 g    ondansetron injection 4 mg    pneumoc 13-rayne conj-dip cr(PF) 0.5 mL 0.5 mL    polyethylene glycol packet 17 g    potassium chloride SA CR tablet 40 mEq    simvastatin tablet 40 mg     No current facility-administered medications on file prior to encounter.      Current Outpatient Prescriptions on File Prior to Encounter   Medication Sig    potassium 99 mg Tab Take by mouth.    simvastatin (ZOCOR) 40 MG tablet Take 20 mg by mouth every evening.     triamterene-hydrochlorothiazide 37.5-25 mg (DYAZIDE) 37.5-25 mg per capsule Take 1 capsule by mouth every morning.    VENTOLIN HFA 90 mcg/actuation inhaler Inhale 2 puffs into the lungs every 4 (four) hours as needed for Wheezing.    albuterol-ipratropium 2.5mg-0.5mg/3mL (DUO-NEB) 0.5 mg-3 mg(2.5 mg base)/3 mL nebulizer solution Take 3 mLs by nebulization every 6 (six) hours.    amlodipine (NORVASC) 10 MG tablet Take 1 tablet (10 mg total) by mouth once daily.    ascorbic acid, vitamin C, (VITAMIN C) 1000 MG tablet Take 1,000 mg by mouth once daily.     budesonide (PULMICORT) 0.5 mg/2 mL nebulizer solution Take 2 mLs (0.5 mg total) by nebulization 2 (two) times daily. Wash out mouth after using.    calcium-vitamin D3 500 mg(1,250mg) -200 unit per tablet Take 1 tablet by mouth 2 (two) times daily with meals.    DOCOSAHEXANOIC ACID/EPA (FISH OIL ORAL) Take 1,200 mg by mouth 2 (two) times daily.    ezetimibe (ZETIA) 10 mg tablet Take 1 tablet (10 mg total) by mouth every evening.    fluorometholone 0.1% (FML) 0.1 % DrpS INSTILL ONE DROP IN THE RIGHT EYE THREE TIMES DAILY    FOLIC ACID/MULTIVIT-MIN/LUTEIN (CENTRUM SILVER ORAL) Take by mouth once daily.    ipratropium (ATROVENT) 0.06 % nasal spray 2 sprays by Nasal route 4 (four) times daily. As needed for rhinitis    levothyroxine  (SYNTHROID) 200 MCG tablet TAKE ONE TABLET BY MOUTH EVERY DAY    magnesium 30 mg Tab Take 30 mg by mouth.    MELATONIN ORAL Take 20 mg by mouth nightly as needed.     metoprolol tartrate (LOPRESSOR) 25 MG tablet TAKE ONE TABLET BY MOUTH TWICE DAILY    niacin 500 mg TbSR Take 500 mg by mouth.    omeprazole (PRILOSEC) 40 MG capsule Take 1 capsule (40 mg total) by mouth once daily.    senna (SENOKOT) 8.6 mg tablet Take 3 tablets by mouth.    SENNOSIDES (SENNA LAXATIVE ORAL) Take by mouth 3 (three) times daily.    tiotropium (SPIRIVA WITH HANDIHALER) 18 mcg inhalation capsule Inhale 1 capsule (18 mcg total) into the lungs once daily.    tramadol (ULTRAM) 50 mg tablet Take 50 mg by mouth 2 (two) times daily.    trazodone (DESYREL) 50 MG tablet TAKE ONE TABLET BY MOUTH EVERY EVENING    zolpidem (AMBIEN) 5 MG Tab TAKE ONE TABLET BY MOUTH AT BEDTIME AS NEEDED       Physical Exam:  General appearance: alert, appears stated age and cooperative  Head: Normocephalic, without obvious abnormality, atraumatic  Neck: no adenopathy, no carotid bruit, no JVD, supple  Lungs:  clear to auscultation bilaterally  Chest wall: mid sternal surgical incision. Well approximated. No drainage or hematoma. CT remains in place   Heart: regular rate and rhythm, S1, S2 normal, no murmur, click, rub or gallop  Abdomen: soft, non-tender; bowel sounds normal; no masses,  no organomegaly  Extremities: extremities normal, atraumatic, no cyanosis . + edema  Pulses: 2+ and symmetric  Skin: Skin color, texture, turgor normal. No rashes or lesions  Neurologic: Grossly normal    Laboratory:  Chemistry:   Lab Results   Component Value Date     02/15/2017    K 3.8 02/15/2017    CL 98 02/15/2017    CO2 27 02/15/2017    BUN 11 02/15/2017    CREATININE 1.1 02/15/2017    CALCIUM 8.3 (L) 02/15/2017     Cardiac Markers: No results found for: CKTOTAL, CKMB, CKMBINDEX, TROPONINI  Cardiac Markers (Last 3): No results found for: CKTOTAL, CKMB,  CKMBINDEX, TROPONINI  CBC:   Lab Results   Component Value Date    WBC 12.65 02/15/2017    HGB 13.2 02/15/2017    HCT 39.5 02/15/2017    HCT 32 (L) 02/13/2017    MCV 90 02/15/2017     (L) 02/15/2017     Lipids:   Lab Results   Component Value Date    CHOL 248 (H) 10/07/2015    TRIG 92 10/07/2015    HDL 99 (H) 10/07/2015     Coagulation:   Lab Results   Component Value Date    INR 1.2 02/13/2017    INR 0.9 03/17/2004    APTT 29.5 02/13/2017       Diagnostic Results:  ECG: Reviewed  X-Ray: Reviewed  US: Reviewed  CT: Reviewed  Echo: Reviewed      ASSESSMENT/PLAN:     Patient Active Problem List   Diagnosis    Gastroesophageal reflux disease    Anxiety and depression    Chronic pain associated with significant psychosocial dysfunction    COPD (chronic obstructive pulmonary disease)    Hypothyroidism    Hypertension    Edema    Pulmonary nodule, right    Pulmonary nodules/lesions, multiple    SOB (shortness of breath)    Family history of arteriosclerotic cardiovascular disease    Hyperlipidemia    Other psychoactive substance use, unspecified with psychoactive substance-induced sleep disorder    Tobacco use    Angina pectoris    CAD (coronary artery disease)    CAD, multiple vessel    S/P CABG (coronary artery bypass graft)    AP (angina pectoris)        Plan:   Continue current post CABG medical management. Patient looks great and is progressing well. Continue to ambulate and use IS.     Chart reviewed. Patient examined by Dr. Grey  and agrees with plan that has been outlined.

## 2017-02-15 NOTE — PROGRESS NOTES
[facility] Hospital Medicine  Progress Note    Patient Name: Soumya Melchor  MRN: [id number]  Patient Class: IP- Inpatient   Admission Date: 2/13/2017  Length of Stay: 2 days  Attending Physician: Steve Bonds MD  Primary Care Provider: Howard Mathews MD        Subjective:     Principal Problem:CAD, multiple vessel    HPI:  71 y.o female patient with PMHx of CAD, HTN, depression, COPD, who was admitted for CABG after having LHC on Jan showed MVD, patient is intubated, no acute events.     Hospital Course:  - Patient extubated, on NC, complain of pain at the chest. Encouraged patient about IS.     2/15 - Patient is up and walking with PT, complain of some confusion this am which is resolved, has no other complaints, in good spirit.       Interval History: Transferred to the floor.     Review of Systems   Constitutional: Negative for activity change and appetite change.   HENT: Negative for congestion, dental problem and ear discharge.    Eyes: Negative for discharge and itching.   Respiratory: Negative for apnea, cough, choking and shortness of breath.    Cardiovascular: Positive for chest pain (controlled). Negative for palpitations and leg swelling.   Gastrointestinal: Negative for abdominal distention, abdominal pain and constipation.   Genitourinary: Negative for difficulty urinating and dyspareunia.   Musculoskeletal: Negative for arthralgias.   Neurological: Negative for dizziness and syncope.   Psychiatric/Behavioral: Positive for confusion (early this am but better than yesterday. ). Negative for agitation.     Objective:     Vital Signs (Most Recent):  Temp: 98.7 °F (37.1 °C) (02/15/17 0730)  Pulse: 81 (02/15/17 0900)  Resp: 17 (02/15/17 0757)  BP: (!) 100/53 (02/15/17 0732)  SpO2: 97 % (02/15/17 0757) Vital Signs (24h Range):  Temp:  [98.7 °F (37.1 °C)-100 °F (37.8 °C)] 98.7 °F (37.1 °C)  Pulse:  [] 81  Resp:  [11-29] 17  SpO2:  [93 %-100 %] 97 %  BP: ()/(45-70) 100/53      Weight: 60.9 kg (134 lb 4.2 oz)  Body mass index is 26.22 kg/(m^2).    Intake/Output Summary (Last 24 hours) at 02/15/17 1120  Last data filed at 02/15/17 0600   Gross per 24 hour   Intake              850 ml   Output             2371 ml   Net            -1521 ml      Physical Exam   Constitutional: She is oriented to person, place, and time. She appears well-developed and well-nourished. No distress.   HENT:   Head: Normocephalic and atraumatic.   Eyes: EOM are normal. Pupils are equal, round, and reactive to light. Right eye exhibits no discharge. Left eye exhibits no discharge.   Neck: Normal range of motion. Neck supple. No thyromegaly present.   Cardiovascular: Normal rate, regular rhythm and normal heart sounds.  Exam reveals no friction rub.    No murmur heard.  Pulmonary/Chest: She has no wheezes. She has no rales.   Decrease BS B/L    Abdominal: Soft. Bowel sounds are normal. She exhibits no distension. There is no tenderness.   Hypoactive BS   Musculoskeletal: Normal range of motion. She exhibits no deformity.   CT in place  Left LE wrapped.    Neurological: She is alert and oriented to person, place, and time. No cranial nerve deficit.   Skin: Skin is warm and dry. She is not diaphoretic.       Significant Labs:   BMP:   Recent Labs  Lab 02/14/17  0436 02/15/17  0317   * 113*    139   K 4.4 3.8    98   CO2 21* 27   BUN 10 11   CREATININE 1.0 1.1   CALCIUM 7.8* 8.3*   MG 1.9  --      CBC:   Recent Labs  Lab 02/13/17  2125 02/14/17  0436 02/15/17  0317   WBC 7.67 11.27 12.65   HGB 9.3* 11.8* 13.2   HCT 26.5* 34.1* 39.5   * 155 149*       Significant Imaging: I have reviewed all pertinent imaging results/findings within the past 24 hours.    Assessment/Plan:      * CAD, multiple vessel  S/P CABG X3, extubated on NC, encouraged IS, PT, ASA, Plavix, BB, Lasix, monitor I/O, FU with Cardiology and CVT recommendation.       CAD (coronary artery disease)  S/P CABG X3  Extubated doing  well on NC  On ASA, plavix, BB, lasix  Cardiology and CVT on board      COPD (chronic obstructive pulmonary disease)  On NC, PRN breathing tx.     VTE Risk Mitigation         Ordered     enoxaparin injection 40 mg  Every 24 hours (non-standard times)     Route:  Subcutaneous        02/13/17 1118     Medium Risk of VTE  Once      02/13/17 1118          Cherry Terrazas MD  Department of Hospital Medicine   [facility]

## 2017-02-15 NOTE — PLAN OF CARE
Problem: Physical Therapy Goal  Goal: Physical Therapy Goal  LTGS TO BE MET IN 7 DAYS (2-21-17)  1. PT WILL REQUIRE SBA FOR BED MOBILITY  2. PT WILL REQUIRE SPV FOR TFS  3. PT WILL  WITH SPV  4. PT WILL TOLERATE BLE THEREX X 20 REPS AROM  5. PT WILL DEMO G DYNAMIC BALANCE DURING GAIT   Pt progressing with bed mobility t/fs and gait

## 2017-02-15 NOTE — PROGRESS NOTES
Called daughter Meg at [phone] who stated she will tell pt's  of transfer to telemetry unit, room 220.

## 2017-02-15 NOTE — PROGRESS NOTES
Report given to DELMY Pizarro (telemetry), pt placed on telemetry unit monitor and pt assisted to wheelchair for transfer per Ondina HOBSON (CNA). Gwen Hernandez RN

## 2017-02-15 NOTE — PT/OT/SLP PROGRESS
Occupational Therapy      Soumya Melchor  MRN: [id number]    S: PT COOPERATIVE WITH THERAPY SESSION. PT REPORTED FEELING A LIL CONFUSED THIS DATE  O: PT SEEN IN ROOM WITH C/O NO PAIN. PT REQ CGA WITH SIT<>STAND T/F'S AND STATIC STANCE X 10 MINUTES.  PT AMBULATED 250 FEET WITH CGA. PT WAS UNABLE TO VERBALIZED STERNAL PRECAUTIONS.  PT REQ MIN A WITH UE DRESSING , HOWEVER, UNABLE TO  ADHERE TO STERNAL PRECAUTIONS AND SBA WITH RENETTA/DOFF SOCKS.  A: PT VERBALIZED UNDERSTANDING OF HEP AND INCREASE (I) WITH FUNCTIONAL MOBILITY.  P: CONTINUE WITH POC    Lidya Roldan, OT   [id number]  1ADL  1TA    2/15/2017

## 2017-02-15 NOTE — PROGRESS NOTES
Chest tubes removed by CVT.  Pt tolerated well. Vamshi stated he wants pt up walking and to receive a shower today.  This RN communicated with ANABEL Oh.

## 2017-02-15 NOTE — PLAN OF CARE
Problem: Patient Care Overview  Goal: Plan of Care Review  Outcome: Ongoing (interventions implemented as appropriate)  PT WITH GOOD TOLERANCE TO P.T. TX.

## 2017-02-15 NOTE — PLAN OF CARE
Problem: Patient Care Overview  Goal: Plan of Care Review  Outcome: Ongoing (interventions implemented as appropriate)  POC reviewed with pt and  at bedside, all questions answered, indicated understanding. Pain intermittently controlled with prn norco/morphine. No vasoactive or sedative drips in use. Assisted with turning/repositioning q2 hours and prn. Voided since hernandez cath removal. Bed bath with hibiclens this AM, up to chair, full linen and gown change completed, dressings removed, sites cleaned. CT remain x3 - see flowsheet for output. Informed of pending transfer to telemetry unit this AM. 24 hour chart check completed. Gwen Hernandez RN

## 2017-02-15 NOTE — SUBJECTIVE & OBJECTIVE
Interval History: Transferred to the floor.     Review of Systems   Constitutional: Negative for activity change and appetite change.   HENT: Negative for congestion, dental problem and ear discharge.    Eyes: Negative for discharge and itching.   Respiratory: Negative for apnea, cough, choking and shortness of breath.    Cardiovascular: Positive for chest pain (controlled). Negative for palpitations and leg swelling.   Gastrointestinal: Negative for abdominal distention, abdominal pain and constipation.   Genitourinary: Negative for difficulty urinating and dyspareunia.   Musculoskeletal: Negative for arthralgias.   Neurological: Negative for dizziness and syncope.   Psychiatric/Behavioral: Positive for confusion (early this am but better than yesterday. ). Negative for agitation.     Objective:     Vital Signs (Most Recent):  Temp: 98.7 °F (37.1 °C) (02/15/17 0730)  Pulse: 81 (02/15/17 0900)  Resp: 17 (02/15/17 0757)  BP: (!) 100/53 (02/15/17 0732)  SpO2: 97 % (02/15/17 0757) Vital Signs (24h Range):  Temp:  [98.7 °F (37.1 °C)-100 °F (37.8 °C)] 98.7 °F (37.1 °C)  Pulse:  [] 81  Resp:  [11-29] 17  SpO2:  [93 %-100 %] 97 %  BP: ()/(45-70) 100/53     Weight: 60.9 kg (134 lb 4.2 oz)  Body mass index is 26.22 kg/(m^2).    Intake/Output Summary (Last 24 hours) at 02/15/17 1120  Last data filed at 02/15/17 0600   Gross per 24 hour   Intake              850 ml   Output             2371 ml   Net            -1521 ml      Physical Exam   Constitutional: She is oriented to person, place, and time. She appears well-developed and well-nourished. No distress.   HENT:   Head: Normocephalic and atraumatic.   Eyes: EOM are normal. Pupils are equal, round, and reactive to light. Right eye exhibits no discharge. Left eye exhibits no discharge.   Neck: Normal range of motion. Neck supple. No thyromegaly present.   Cardiovascular: Normal rate, regular rhythm and normal heart sounds.  Exam reveals no friction rub.    No  murmur heard.  Pulmonary/Chest: She has no wheezes. She has no rales.   Decrease BS B/L    Abdominal: Soft. Bowel sounds are normal. She exhibits no distension. There is no tenderness.   Hypoactive BS   Musculoskeletal: Normal range of motion. She exhibits no deformity.   CT in place  Left LE wrapped.    Neurological: She is alert and oriented to person, place, and time. No cranial nerve deficit.   Skin: Skin is warm and dry. She is not diaphoretic.       Significant Labs:   BMP:   Recent Labs  Lab 02/14/17  0436 02/15/17  0317   * 113*    139   K 4.4 3.8    98   CO2 21* 27   BUN 10 11   CREATININE 1.0 1.1   CALCIUM 7.8* 8.3*   MG 1.9  --      CBC:   Recent Labs  Lab 02/13/17  2125 02/14/17  0436 02/15/17  0317   WBC 7.67 11.27 12.65   HGB 9.3* 11.8* 13.2   HCT 26.5* 34.1* 39.5   * 155 149*       Significant Imaging: I have reviewed all pertinent imaging results/findings within the past 24 hours.

## 2017-02-16 PROBLEM — I25.10 CAD, MULTIPLE VESSEL: Status: RESOLVED | Noted: 2017-02-13 | Resolved: 2017-02-16

## 2017-02-16 LAB
ANION GAP SERPL CALC-SCNC: 12 MMOL/L
BASOPHILS # BLD AUTO: 0.02 K/UL
BASOPHILS NFR BLD: 0.2 %
BUN SERPL-MCNC: 21 MG/DL
CALCIUM SERPL-MCNC: 8.4 MG/DL
CHLORIDE SERPL-SCNC: 94 MMOL/L
CO2 SERPL-SCNC: 31 MMOL/L
CREAT SERPL-MCNC: 1.2 MG/DL
DIFFERENTIAL METHOD: ABNORMAL
EOSINOPHIL # BLD AUTO: 0.2 K/UL
EOSINOPHIL NFR BLD: 1.6 %
ERYTHROCYTE [DISTWIDTH] IN BLOOD BY AUTOMATED COUNT: 15 %
EST. GFR  (AFRICAN AMERICAN): 52 ML/MIN/1.73 M^2
EST. GFR  (NON AFRICAN AMERICAN): 45 ML/MIN/1.73 M^2
GLUCOSE SERPL-MCNC: 108 MG/DL
HCT VFR BLD AUTO: 34 %
HGB BLD-MCNC: 11.6 G/DL
LYMPHOCYTES # BLD AUTO: 1.3 K/UL
LYMPHOCYTES NFR BLD: 12.7 %
MCH RBC QN AUTO: 30.7 PG
MCHC RBC AUTO-ENTMCNC: 34.1 %
MCV RBC AUTO: 90 FL
MONOCYTES # BLD AUTO: 1.1 K/UL
MONOCYTES NFR BLD: 10.9 %
NEUTROPHILS # BLD AUTO: 7.7 K/UL
NEUTROPHILS NFR BLD: 74.6 %
PLATELET # BLD AUTO: 144 K/UL
PMV BLD AUTO: 9.7 FL
POTASSIUM SERPL-SCNC: 3.6 MMOL/L
RBC # BLD AUTO: 3.78 M/UL
SODIUM SERPL-SCNC: 137 MMOL/L
WBC # BLD AUTO: 10.27 K/UL

## 2017-02-16 PROCEDURE — 63600175 PHARM REV CODE 636 W HCPCS: Performed by: PHYSICIAN ASSISTANT

## 2017-02-16 PROCEDURE — 36415 COLL VENOUS BLD VENIPUNCTURE: CPT

## 2017-02-16 PROCEDURE — 97535 SELF CARE MNGMENT TRAINING: CPT

## 2017-02-16 PROCEDURE — 25000003 PHARM REV CODE 250: Performed by: PHYSICIAN ASSISTANT

## 2017-02-16 PROCEDURE — 25000003 PHARM REV CODE 250: Performed by: NURSE PRACTITIONER

## 2017-02-16 PROCEDURE — 25000242 PHARM REV CODE 250 ALT 637 W/ HCPCS: Performed by: NURSE PRACTITIONER

## 2017-02-16 PROCEDURE — 99232 SBSQ HOSP IP/OBS MODERATE 35: CPT | Mod: ,,, | Performed by: INTERNAL MEDICINE

## 2017-02-16 PROCEDURE — 94799 UNLISTED PULMONARY SVC/PX: CPT

## 2017-02-16 PROCEDURE — 21400001 HC TELEMETRY ROOM

## 2017-02-16 PROCEDURE — 94640 AIRWAY INHALATION TREATMENT: CPT

## 2017-02-16 PROCEDURE — 99900035 HC TECH TIME PER 15 MIN (STAT)

## 2017-02-16 PROCEDURE — 27000221 HC OXYGEN, UP TO 24 HOURS

## 2017-02-16 PROCEDURE — 97110 THERAPEUTIC EXERCISES: CPT

## 2017-02-16 PROCEDURE — 63600175 PHARM REV CODE 636 W HCPCS: Performed by: THORACIC SURGERY (CARDIOTHORACIC VASCULAR SURGERY)

## 2017-02-16 PROCEDURE — 97530 THERAPEUTIC ACTIVITIES: CPT

## 2017-02-16 PROCEDURE — 94761 N-INVAS EAR/PLS OXIMETRY MLT: CPT

## 2017-02-16 PROCEDURE — 97116 GAIT TRAINING THERAPY: CPT

## 2017-02-16 PROCEDURE — 80048 BASIC METABOLIC PNL TOTAL CA: CPT

## 2017-02-16 PROCEDURE — 85025 COMPLETE CBC W/AUTO DIFF WBC: CPT

## 2017-02-16 RX ORDER — MORPHINE SULFATE 2 MG/ML
2 INJECTION, SOLUTION INTRAMUSCULAR; INTRAVENOUS
Status: DISCONTINUED | OUTPATIENT
Start: 2017-02-16 | End: 2017-02-17 | Stop reason: HOSPADM

## 2017-02-16 RX ORDER — SYRING-NEEDL,DISP,INSUL,0.3 ML 29 G X1/2"
296 SYRINGE, EMPTY DISPOSABLE MISCELLANEOUS ONCE
Status: COMPLETED | OUTPATIENT
Start: 2017-02-16 | End: 2017-02-16

## 2017-02-16 RX ADMIN — MORPHINE SULFATE 2 MG: 2 INJECTION, SOLUTION INTRAMUSCULAR; INTRAVENOUS at 04:02

## 2017-02-16 RX ADMIN — IPRATROPIUM BROMIDE AND ALBUTEROL SULFATE 3 ML: .5; 3 SOLUTION RESPIRATORY (INHALATION) at 07:02

## 2017-02-16 RX ADMIN — HYDROCODONE BITARTRATE AND ACETAMINOPHEN 1 TABLET: 10; 325 TABLET ORAL at 02:02

## 2017-02-16 RX ADMIN — FAMOTIDINE 20 MG: 20 TABLET ORAL at 08:02

## 2017-02-16 RX ADMIN — METOPROLOL TARTRATE 25 MG: 25 TABLET ORAL at 08:02

## 2017-02-16 RX ADMIN — CLOPIDOGREL BISULFATE 75 MG: 75 TABLET ORAL at 08:02

## 2017-02-16 RX ADMIN — FUROSEMIDE 40 MG: 10 INJECTION, SOLUTION INTRAVENOUS at 08:02

## 2017-02-16 RX ADMIN — BUDESONIDE 0.5 MG: 0.5 INHALANT RESPIRATORY (INHALATION) at 07:02

## 2017-02-16 RX ADMIN — IPRATROPIUM BROMIDE AND ALBUTEROL SULFATE 3 ML: .5; 3 SOLUTION RESPIRATORY (INHALATION) at 01:02

## 2017-02-16 RX ADMIN — HYDROCODONE BITARTRATE AND ACETAMINOPHEN 1 TABLET: 7.5; 325 TABLET ORAL at 06:02

## 2017-02-16 RX ADMIN — POLYETHYLENE GLYCOL 3350 17 G: 17 POWDER, FOR SOLUTION ORAL at 08:02

## 2017-02-16 RX ADMIN — HYDROCODONE BITARTRATE AND ACETAMINOPHEN 1 TABLET: 10; 325 TABLET ORAL at 10:02

## 2017-02-16 RX ADMIN — IPRATROPIUM BROMIDE AND ALBUTEROL SULFATE 3 ML: .5; 3 SOLUTION RESPIRATORY (INHALATION) at 12:02

## 2017-02-16 RX ADMIN — MAGESIUM CITRATE 296 ML: 1.75 LIQUID ORAL at 08:02

## 2017-02-16 RX ADMIN — ENOXAPARIN SODIUM 40 MG: 100 INJECTION SUBCUTANEOUS at 11:02

## 2017-02-16 RX ADMIN — ASPIRIN 81 MG: 81 TABLET, COATED ORAL at 08:02

## 2017-02-16 RX ADMIN — POTASSIUM CHLORIDE 40 MEQ: 1500 TABLET, EXTENDED RELEASE ORAL at 08:02

## 2017-02-16 RX ADMIN — HYDROCODONE BITARTRATE AND ACETAMINOPHEN 1 TABLET: 7.5; 325 TABLET ORAL at 01:02

## 2017-02-16 RX ADMIN — SIMVASTATIN 40 MG: 20 TABLET, FILM COATED ORAL at 08:02

## 2017-02-16 NOTE — PROGRESS NOTES
[facility] Hospital Medicine  Progress Note    Patient Name: Soumya Melchor  MRN: [id number]  Patient Class: IP- Inpatient   Admission Date: 2/13/2017  Length of Stay: 3 days  Attending Physician: Steve Bonds MD  Primary Care Provider: Howard Mathews MD        Subjective:     Principal Problem:S/P CABG (coronary artery bypass graft)    HPI:  71 y.o female patient with PMHx of CAD, HTN, depression, COPD, who was admitted for CABG after having LHC on Jan showed MVD, patient is intubated, no acute events.     Hospital Course:  71 y.o female patient with PMHx of CAD, HTN, Depression, COPD, who was admitted for CABG after LHC in Jan showed MVD- Patient extubated, on NC, complain of pain at the chest. Encouraged patient about IS.     2/15 - Patient is up and walking with PT, complain of some confusion this am which is resolved, has no other complaints, in good spirit.     Resting and taking a quiet nap, has been walking around well in the hallways. No complaints, pain under control, doing IS well.       Interval History: Resting and taking a quiet nap, has been walking around well in the hallways. No complaints, pain under control, doing IS well.      Review of Systems   Constitutional: Negative for activity change and appetite change.   HENT: Negative for congestion, dental problem and ear discharge.    Eyes: Negative for discharge and itching.   Respiratory: Negative for apnea, cough, choking and shortness of breath.    Cardiovascular: Positive for chest pain (controlled). Negative for palpitations and leg swelling.   Gastrointestinal: Negative for abdominal distention, abdominal pain and constipation.   Genitourinary: Negative for difficulty urinating and dyspareunia.   Musculoskeletal: Negative for arthralgias.   Neurological: Negative for dizziness and syncope.   Psychiatric/Behavioral: Negative for agitation and confusion (early this am but better than yesterday. ).     Objective:     Vital  Signs (Most Recent):  Temp: 98.4 °F (36.9 °C) (02/16/17 1115)  Pulse: 75 (02/16/17 1314)  Resp: 18 (02/16/17 1314)  BP: (!) 94/59 (02/16/17 1115)  SpO2: 95 % (02/16/17 1314) Vital Signs (24h Range):  Temp:  [98.2 °F (36.8 °C)-98.6 °F (37 °C)] 98.4 °F (36.9 °C)  Pulse:  [75-89] 75  Resp:  [17-20] 18  SpO2:  [92 %-96 %] 95 %  BP: ()/(58-65) 94/59     Weight: 61.6 kg (135 lb 12.9 oz)  Body mass index is 26.52 kg/(m^2).    Intake/Output Summary (Last 24 hours) at 02/16/17 1731  Last data filed at 02/16/17 1653   Gross per 24 hour   Intake             2440 ml   Output             1550 ml   Net              890 ml      Physical Exam   Psychiatric: She has a normal mood and affect. Her behavior is normal. Judgment and thought content normal.   Nursing note and vitals reviewed.      Significant Labs:   BMP:   Recent Labs  Lab 02/16/17  0539         K 3.6   CL 94*   CO2 31*   BUN 21   CREATININE 1.2   CALCIUM 8.4*     CBC:   Recent Labs  Lab 02/15/17  0317 02/16/17  0539   WBC 12.65 10.27   HGB 13.2 11.6*   HCT 39.5 34.0*   * 144*     All pertinent labs within the past 24 hours have been reviewed.    Significant Imaging: I have reviewed all pertinent imaging results/findings within the past 24 hours.    Assessment/Plan:      * S/P CABG (coronary artery bypass graft)  As above      CAD (coronary artery disease)  S/P CABG X3  Transferred to floor, all lines, CT out.  Walking around well  On ASA, plavix, BB, lasix  Cardiology and CVT on board  Ok to D/C soon    COPD (chronic obstructive pulmonary disease)  On NC, PRN breathing tx.     Hypertension  Well controlled, continue with the current medications      VTE Risk Mitigation         Ordered     enoxaparin injection 40 mg  Every 24 hours (non-standard times)     Route:  Subcutaneous        02/13/17 1118     Medium Risk of VTE  Once      02/13/17 1118          Vern Mason MD  Department of Hospital Medicine   [facility]

## 2017-02-16 NOTE — PT/OT/SLP PROGRESS
Physical Therapy  Treatment    Soumya Melchor   MRN: [id number]   Admitting Diagnosis: CAD, multiple vessel    PT Received On: 17  PT Start Time: 0850     PT Stop Time: 0915    PT Total Time (min): 25 min       Billable Minutes:  Gait [id number] and Therapeutic Activity 10    Treatment Type: Treatment  PT/PTA: PT       General Precautions: Standard, sternal  Orthopedic Precautions: N/A   Braces: N/A    Subjective:  Communicated with NURSE MICHEL prior to session.  Pain Ratin/10    Objective:   Patient found with: telemetry    Functional Mobility:  Bed Mobility:   Scooting/Bridging: Stand by Assistance    Transfers:  Sit <> Stand Assistance: Stand By Assistance  Sit <> Stand Assistive Device: No Assistive Device    Gait:   Gait Distance: PT ' WITH SBA, NO LOB OR SOB ON ROOM AIR  Assistance 1: Stand by Assistance  Gait Assistive Device: No device  Gait Pattern: swing-through gait  Gait Deviation(s): decreased edith    Balance:   Static Sit: GOOD  Dynamic Sit: GOOD  Static Stand: GOOD  Dynamic stand:  FAIR    Therapeutic Activities and Exercises:  PT ABLE TO RECITE 3/5 STERNAL PRECAUTIONS SO REVIEWED ALL WITH PT AND FAMILY    Patient left up in chair with all lines intact, call button in reach and NURSE notified.    Assessment:  Soumya Melchor is a 71 y.o. female with a medical diagnosis of CAD, multiple vessel   PT WILL BENEFIT FROM CONT. SKILLED P.T. TO ADDRESS IMPAIRMENTS    Rehab identified problem list/impairments: Rehab identified problem list/impairments: impaired endurance, decreased safety awareness    Rehab potential is good.    Activity tolerance: Good    Discharge recommendations: Discharge Facility/Level Of Care Needs: home health PT     Barriers to discharge: Barriers to Discharge: None    Equipment recommendations: Equipment Needed After Discharge: none     GOALS:   Physical Therapy Goals        Problem: Physical Therapy Goal    Goal Priority Disciplines Outcome Goal Variances  Interventions   Physical Therapy Goal     PT/OT, PT      Description:  LTG'S TO BE MET IN 7 DAYS (2-21-17)  1. PT WILL REQUIRE SBA FOR BED MOBILITY  2. PT WILL REQUIRE SPV FOR TF'S  3. PT WILL ' WITH SPV  4. PT WILL TOLERATE BLE THEREX X 20 REPS AROM  5. PT WILL DEMO G DYNAMIC BALANCE DURING GAIT            PLAN:    Patient to be seen 5 x/week  to address the above listed problems via  (CONT PER POC)  Plan of Care expires: 02/21/17  Plan of Care reviewed with: patient    PT ENCOURAGED TO CALL FOR ASSISTANCE WITH ALL NEEDS DUE TO FALL RISK STATUS, PT AGREEABLE.  PT ENCOURAGED TO INCREASE TIME OOB IN CHAIR, ALL MEALS IN CHAIR OOB, PT AGREEABLE    Mckayla Tenorio, PT  02/16/2017

## 2017-02-16 NOTE — PLAN OF CARE
Problem: Patient Care Overview  Goal: Plan of Care Review  Outcome: Ongoing (interventions implemented as appropriate)  Patient remains free from falls. Fall precautions remain in place. Pain medication given for incisional pain with full relief. Sternal precautions in place. NSR on cardiac monitor. VS are stable. No other c/o at this time. Call bell within reach. Reminded to call for assistance.

## 2017-02-16 NOTE — PROGRESS NOTES
Cardiology Progress Note        SUBJECTIVE:   POD 3 s/p CABG x3. She looks great and is progressing well. Labs and vitals remain stable. Pain well controlled. Ambulating around unit without symptoms. No chest pain or angina. Chest tube has been dc'd.       OBJECTIVE:     Vital Signs (Most Recent)  Temp: 98.4 °F (36.9 °C) (02/16/17 1115)  Pulse: 75 (02/16/17 1314)  Resp: 18 (02/16/17 1314)  BP: (!) 94/59 (02/16/17 1115)  SpO2: 95 % (02/16/17 1314)    Vital Signs Range (Last 24H):  Temp:  [98.1 °F (36.7 °C)-98.6 °F (37 °C)]   Pulse:  [75-89]   Resp:  [17-20]   BP: ()/(58-65)   SpO2:  [92 %-96 %]     Intake/Output last 3 shifts:  I/O last 3 completed shifts:  In: 2840 [P.O.:2840]  Out: 3197 [Urine:2987; Chest Tube:210]    Intake/Output this shift:       Review of patient's allergies indicates:   Allergen Reactions    Ace inhibitors Other (See Comments)     unknown      Iodine and iodide containing products Hives     Since age of 48    Lisinopril      angioedema    Shrimp Other (See Comments)     Localized itching and swelling on her hands if she peels shrimp.  Able to eat shrimp without difficulty.  No generalized reaction.       Current Facility-Administered Medications   Medication    acetaminophen tablet 650 mg    albuterol sulfate nebulizer solution 2.5 mg    albuterol-ipratropium 2.5mg-0.5mg/3mL nebulizer solution 3 mL    aspirin EC tablet 81 mg    budesonide nebulizer solution 0.5 mg    clopidogrel tablet 75 mg    enoxaparin injection 40 mg    famotidine tablet 20 mg    furosemide injection 40 mg    hydrocodone-acetaminophen 10-325mg per tablet 1 tablet    hydrocodone-acetaminophen 5-325mg per tablet 1 tablet    hydrocodone-acetaminophen 7.5-325mg per tablet 1 tablet    influenza vaccine 180 mcg/0.5 mL (for patients > 65) injection    metoprolol tartrate (LOPRESSOR) tablet 25 mg    morphine injection 2 mg    ondansetron injection 4 mg    pneumoc 13-rayne conj-dip cr(PF) 0.5 mL 0.5 mL     polyethylene glycol packet 17 g    ramelteon tablet 8 mg    senna-docusate 8.6-50 mg per tablet 2 tablet    simvastatin tablet 40 mg     No current facility-administered medications on file prior to encounter.      Current Outpatient Prescriptions on File Prior to Encounter   Medication Sig    potassium 99 mg Tab Take by mouth.    simvastatin (ZOCOR) 40 MG tablet Take 20 mg by mouth every evening.     triamterene-hydrochlorothiazide 37.5-25 mg (DYAZIDE) 37.5-25 mg per capsule Take 1 capsule by mouth every morning.    VENTOLIN HFA 90 mcg/actuation inhaler Inhale 2 puffs into the lungs every 4 (four) hours as needed for Wheezing.    albuterol-ipratropium 2.5mg-0.5mg/3mL (DUO-NEB) 0.5 mg-3 mg(2.5 mg base)/3 mL nebulizer solution Take 3 mLs by nebulization every 6 (six) hours.    amlodipine (NORVASC) 10 MG tablet Take 1 tablet (10 mg total) by mouth once daily.    ascorbic acid, vitamin C, (VITAMIN C) 1000 MG tablet Take 1,000 mg by mouth once daily.     budesonide (PULMICORT) 0.5 mg/2 mL nebulizer solution Take 2 mLs (0.5 mg total) by nebulization 2 (two) times daily. Wash out mouth after using.    calcium-vitamin D3 500 mg(1,250mg) -200 unit per tablet Take 1 tablet by mouth 2 (two) times daily with meals.    DOCOSAHEXANOIC ACID/EPA (FISH OIL ORAL) Take 1,200 mg by mouth 2 (two) times daily.    ezetimibe (ZETIA) 10 mg tablet Take 1 tablet (10 mg total) by mouth every evening.    fluorometholone 0.1% (FML) 0.1 % DrpS INSTILL ONE DROP IN THE RIGHT EYE THREE TIMES DAILY    FOLIC ACID/MULTIVIT-MIN/LUTEIN (CENTRUM SILVER ORAL) Take by mouth once daily.    ipratropium (ATROVENT) 0.06 % nasal spray 2 sprays by Nasal route 4 (four) times daily. As needed for rhinitis    levothyroxine (SYNTHROID) 200 MCG tablet TAKE ONE TABLET BY MOUTH EVERY DAY    magnesium 30 mg Tab Take 30 mg by mouth.    MELATONIN ORAL Take 20 mg by mouth nightly as needed.     metoprolol tartrate (LOPRESSOR) 25 MG tablet TAKE ONE  TABLET BY MOUTH TWICE DAILY    niacin 500 mg TbSR Take 500 mg by mouth.    omeprazole (PRILOSEC) 40 MG capsule Take 1 capsule (40 mg total) by mouth once daily.    senna (SENOKOT) 8.6 mg tablet Take 3 tablets by mouth.    SENNOSIDES (SENNA LAXATIVE ORAL) Take by mouth 3 (three) times daily.    tiotropium (SPIRIVA WITH HANDIHALER) 18 mcg inhalation capsule Inhale 1 capsule (18 mcg total) into the lungs once daily.    tramadol (ULTRAM) 50 mg tablet Take 50 mg by mouth 2 (two) times daily.    trazodone (DESYREL) 50 MG tablet TAKE ONE TABLET BY MOUTH EVERY EVENING    zolpidem (AMBIEN) 5 MG Tab TAKE ONE TABLET BY MOUTH AT BEDTIME AS NEEDED       Physical Exam:  General appearance: alert, appears stated age and cooperative  Head: Normocephalic, without obvious abnormality, atraumatic  Neck: no adenopathy, no carotid bruit, no JVD, supple  Back:  no skin lesions, erythema, or scars  Lungs:  clear to auscultation bilaterally  Chest wall:  Mid sternal surgical incision   Heart: regular rate and rhythm, S1, S2 normal, no murmur, click, rub or gallop  Abdomen: soft, non-tender; bowel sounds normal; no masses,  no organomegaly  Extremities: extremities normal, atraumatic, no cyanosis . trace edema to LLE  Pulses: 2+ and symmetric  Skin: Skin color, texture, turgor normal. No rashes or lesions  Neurologic: Grossly normal    Laboratory:  Chemistry:   Lab Results   Component Value Date     02/16/2017    K 3.6 02/16/2017    CL 94 (L) 02/16/2017    CO2 31 (H) 02/16/2017    BUN 21 02/16/2017    CREATININE 1.2 02/16/2017    CALCIUM 8.4 (L) 02/16/2017     Cardiac Markers: No results found for: CKTOTAL, CKMB, CKMBINDEX, TROPONINI  Cardiac Markers (Last 3): No results found for: CKTOTAL, CKMB, CKMBINDEX, TROPONINI  CBC:   Lab Results   Component Value Date    WBC 10.27 02/16/2017    HGB 11.6 (L) 02/16/2017    HCT 34.0 (L) 02/16/2017    HCT 32 (L) 02/13/2017    MCV 90 02/16/2017     (L) 02/16/2017     Lipids:   Lab  Results   Component Value Date    CHOL 248 (H) 10/07/2015    TRIG 92 10/07/2015    HDL 99 (H) 10/07/2015     Coagulation:   Lab Results   Component Value Date    INR 1.2 02/13/2017    INR 0.9 03/17/2004    APTT 29.5 02/13/2017       Diagnostic Results:  ECG: Reviewed  X-Ray: Reviewed  Echo: Reviewed      ASSESSMENT/PLAN:     Patient Active Problem List   Diagnosis    Gastroesophageal reflux disease    Anxiety and depression    Chronic pain associated with significant psychosocial dysfunction    COPD (chronic obstructive pulmonary disease)    Hypothyroidism    Hypertension    Edema    Pulmonary nodule, right    Pulmonary nodules/lesions, multiple    SOB (shortness of breath)    Family history of arteriosclerotic cardiovascular disease    Hyperlipidemia    Other psychoactive substance use, unspecified with psychoactive substance-induced sleep disorder    Tobacco use    Angina pectoris    CAD (coronary artery disease)    CAD, multiple vessel    S/P CABG (coronary artery bypass graft)    AP (angina pectoris)        Plan:   Continue post CABG medical management. If she continues to progress well today, she may be cleared for DC tomorrow if ok with   CVT. Continue to ambulate and use IS.     Chart reviewed. Patient examined by Dr. Grey and agrees with plan that has been outlined.

## 2017-02-16 NOTE — PLAN OF CARE
Problem: Patient Care Overview  Goal: Plan of Care Review  Outcome: Ongoing (interventions implemented as appropriate)  Pt stable. Pt is NSR on the heart monitor.  Pt c/o mild pain but did not want pain med this shift.  Pt had chest tubes removed.  Pt walked three times this shift.  Pt received shower following CT removal.  Pt provided cardiac chair.  Plan of care reviewed.  Pt verbalizes understanding.  Pt was free from falls or injuries during duration of shift.  Pt turned and repositioned self.  Pt reminded of sternal precautions.  PIV intact with no redness, swelling or drainage.  Bed low, wheels locked, bed alarm on, call light in reach. Pt instructed to call for assistance.  Will continue to monitor.

## 2017-02-16 NOTE — PLAN OF CARE
Problem: Patient Care Overview  Goal: Plan of Care Review  Outcome: Outcome(s) achieved Date Met:  02/16/17  D/C PT FROM [facility] TO [facility]

## 2017-02-16 NOTE — PT/OT/SLP PROGRESS
Occupational Therapy  Treatment    Soumya Melchor   MRN: [id number]   Admitting Diagnosis: CAD, multiple vessel    OT Date of Treatment: 17   OT Start Time:   OT Stop Time:   OT Total Time (min): 20 min    Billable Minutes:  Self Care/Home Management  x 20 min    General Precautions: Standard, sternal  Orthopedic Precautions: N/A  Braces: N/A         Subjective:  Communicated with pt, spouse, and nurse- Barbara prior to session.      Pain Ratin/10                   Objective:  Patient found with: telemetry     Functional Mobility:  Bed Mobility:  Rolling/Turning to Left: Modified independent  Rolling/Turning Right: Modified independent  Scooting/Bridging: Modified Independent  Supine to Sit: Modified Independent  Sit to Supine: Modified Independent    Transfers:   Sit <> Stand Assistance: Supervision  Sit <> Stand Assistive Device: No Assistive Device  Toilet Transfer Technique: Stand Pivot  Toilet Transfer Assistance: Supervision  Toilet Transfer Assistive Device: No Assistive Device    Functional Ambulation:     Activities of Daily Living:     Feeding adaptive equipment:   UE Dressing Level of Assistance: Modified independent  UE adaptive equipment:   LE Dressing Level of Assistance: Modified independent  LE adaptive equipment:         Toileting Where Assessed: Toilet  Toileting Level of Assistance: Modified independent (per pt/spouse report)        Bathing adaptive equipment:     Balance:   Static Sit: NORMAL: No deviations seen in posture held statically  Dynamic Sit: GOOD: Maintains balance through MODERATE excursions of active trunk movement  Static Stand: FAIR+: Takes MINIMAL challenges from all directions  Dynamic stand: FAIR+: Needs CLOSE SUPERVISION during gait and is able to right self with minor LOB    Therapeutic Activities and Exercises:  Pt completed AROM ex to BUE with adhere to sternal precautions to maintain joint mobility, educated pt on sternal precautions as related to home  management tasks and toileting tasks. Pt and spouse verbalized understanding and pt is able to recited all sternal precautions. Pt tolerated tx well, cooperative and motivated.     AM-PAC 6 CLICK ADL   How much help from another person does this patient currently need?   1 = Unable, Total/Dependent Assistance  2 = A lot, Maximum/Moderate Assistance  3 = A little, Minimum/Contact Guard/Supervision  4 = None, Modified [place]/Independent          AM-PAC Raw Score CMS G-Code Modifier Level of Impairment Assistance   6 % Total / Unable   7 - 9 CM 80 - 100% Maximal Assist   10 - 14 CL 60 - 80% Moderate Assist   15 - 19 CK 40 - 60% Moderate Assist   20 - 22 CJ 20 - 40% Minimal Assist   23 CI 1-20% SBA / CGA   24 CH 0% Independent/ Mod I     Patient left supine with all lines intact, call button in reach and spouse present    ASSESSMENT:  Soumya Melchor is a 71 y.o. female with a medical diagnosis of CAD, multiple vessel              Discharge recommendations: Discharge Facility/Level Of Care Needs: home (family to assist as needed)     Barriers to discharge: Barriers to Discharge: None    Equipment recommendations: none     GOALS:   Occupational Therapy Goals     Not on file      Multidisciplinary Problems (Resolved)        Problem: Occupational Therapy Goal    Goal Priority Disciplines Outcome Interventions   Occupational Therapy Goal   (Resolved)     OT, PT/OT Outcome(s) achieved    Description:  OT  GOALS TO BE MET BY 2-21-17  1. MIN A WITH LE DRESSING  2. MIN A WITH UE DRESSING  3. PT WILL VERBALIZED AND RETURNED DEMONSTRATION OF STERNAL PRECAUTIONS DURING THERAPEUTIC ACTIVITY  4. SBA WITH BSC T/F'S              Plan:  Pt is d/c from skilled OT services due to pt is performing basic self care skills and fx transfers with mod indep and SPV for transfers for safety awareness.    Plan of Care reviewed with: patient, spouse         Allyson Campbell, OT  02/16/2017

## 2017-02-16 NOTE — PROGRESS NOTES
Soumya Melchor is a 71 y.o. female patient.   1. CAD (coronary artery disease)    2. CAD, multiple vessel      Past Medical History   Diagnosis Date    Acid reflux 4/11/2014    Anxiety and depression 3/6/2014    AP (angina pectoris) 2/13/2017    CAD (coronary artery disease) 2/13/2017    CAD, multiple vessel 2/13/2017    Chronic pain associated with significant psychosocial dysfunction 2/21/2013    COPD (chronic obstructive pulmonary disease)     HTN (hypertension)     Hypothyroidism     S/P CABG (coronary artery bypass graft) 2/13/2017     No past surgical history pertinent negatives on file.  Scheduled Meds:   albuterol-ipratropium 2.5mg-0.5mg/3mL  3 mL Nebulization Q6H    aspirin  81 mg Oral Daily    budesonide  0.5 mg Nebulization BID    clopidogrel  75 mg Oral Daily    enoxaparin  40 mg Subcutaneous Q24H    famotidine  20 mg Oral Daily    furosemide  40 mg Intravenous BID    metoprolol tartrate  25 mg Oral BID    polyethylene glycol  17 g Oral Daily    potassium chloride SA  40 mEq Oral Daily    senna-docusate 8.6-50 mg  2 tablet Oral Nightly    simvastatin  40 mg Oral QHS     Continuous Infusions:   PRN Meds:acetaminophen, albuterol sulfate, hydrocodone-acetaminophen 10-325mg, hydrocodone-acetaminophen 5-325mg, hydrocodone-acetaminophen 7.5-325mg, flu vacc [id number] 65yr up(PF), morphine, ondansetron, pneumoc 13-rayne conj-dip cr(PF), ramelteon    Review of patient's allergies indicates:   Allergen Reactions    Ace inhibitors Other (See Comments)     unknown      Iodine and iodide containing products Hives     Since age of 48    Lisinopril      angioedema    Shrimp Other (See Comments)     Localized itching and swelling on her hands if she peels shrimp.  Able to eat shrimp without difficulty.  No generalized reaction.     Active Hospital Problems    Diagnosis  POA    *CAD, multiple vessel [I25.10]  Yes    CAD (coronary artery disease) [I25.10]  Yes    S/P CABG (coronary artery  bypass graft) [Z95.1]  Not Applicable    Hypertension [I10]  Yes    COPD (chronic obstructive pulmonary disease) [J44.9]  Yes      Resolved Hospital Problems    Diagnosis Date Resolved POA   No resolved problems to display.     Blood pressure 108/64, pulse 88, temperature 98.4 °F (36.9 °C), temperature source Oral, resp. rate 18, height 5' (1.524 m), weight 61.6 kg (135 lb 12.9 oz), SpO2 (!) 94 %, not currently breastfeeding.    Subjective  Objective   Assessment & Plan   NSR. Wounds OK. Doing well so far. Home in 1 to 2 days.    Steve Bonds MD  2/16/2017

## 2017-02-16 NOTE — SUBJECTIVE & OBJECTIVE
Interval History: Resting and taking a quiet nap, has been walking around well in the hallways. No complaints, pain under control, doing IS well.      Review of Systems   Constitutional: Negative for activity change and appetite change.   HENT: Negative for congestion, dental problem and ear discharge.    Eyes: Negative for discharge and itching.   Respiratory: Negative for apnea, cough, choking and shortness of breath.    Cardiovascular: Positive for chest pain (controlled). Negative for palpitations and leg swelling.   Gastrointestinal: Negative for abdominal distention, abdominal pain and constipation.   Genitourinary: Negative for difficulty urinating and dyspareunia.   Musculoskeletal: Negative for arthralgias.   Neurological: Negative for dizziness and syncope.   Psychiatric/Behavioral: Negative for agitation and confusion (early this am but better than yesterday. ).     Objective:     Vital Signs (Most Recent):  Temp: 98.4 °F (36.9 °C) (02/16/17 1115)  Pulse: 75 (02/16/17 1314)  Resp: 18 (02/16/17 1314)  BP: (!) 94/59 (02/16/17 1115)  SpO2: 95 % (02/16/17 1314) Vital Signs (24h Range):  Temp:  [98.2 °F (36.8 °C)-98.6 °F (37 °C)] 98.4 °F (36.9 °C)  Pulse:  [75-89] 75  Resp:  [17-20] 18  SpO2:  [92 %-96 %] 95 %  BP: ()/(58-65) 94/59     Weight: 61.6 kg (135 lb 12.9 oz)  Body mass index is 26.52 kg/(m^2).    Intake/Output Summary (Last 24 hours) at 02/16/17 1731  Last data filed at 02/16/17 1653   Gross per 24 hour   Intake             2440 ml   Output             1550 ml   Net              890 ml      Physical Exam   Psychiatric: She has a normal mood and affect. Her behavior is normal. Judgment and thought content normal.   Nursing note and vitals reviewed.      Significant Labs:   BMP:   Recent Labs  Lab 02/16/17  0539         K 3.6   CL 94*   CO2 31*   BUN 21   CREATININE 1.2   CALCIUM 8.4*     CBC:   Recent Labs  Lab 02/15/17  0317 02/16/17  0539   WBC 12.65 10.27   HGB 13.2 11.6*   HCT  39.5 34.0*   * 144*     All pertinent labs within the past 24 hours have been reviewed.    Significant Imaging: I have reviewed all pertinent imaging results/findings within the past 24 hours.

## 2017-02-16 NOTE — PT/OT/SLP PROGRESS
Physical Therapy      Soumya Melchor  MRN: [id number]    S: PT AGREEABLE TO BLE THEREX  O: PT FOUND SEATED IN CHAIR UPON ARRIVAL, NO C/O PAIN.  REVIEWED ALL STERNAL PRECAUTIONS WITH PT.  PT PERFORMED BLE THEREX X 20 REPS AROM. PT LEFT SEATED IN CHAIR WITH ALL NEEDS MET. PT ENCOURAGED TO CALL FOR ASSISTANCE WITH ALL NEEDS DUE TO FALL RISK STATUS, PT AGREEABLE.  PT ENCOURAGED TO INCREASE TIME OOB IN CHAIR, ALL MEALS IN CHAIR OOB, PT AGREEABLE  A: GOOD PARTICIPATION  P: CONT PER POC    Mckayla Tenorio, PT   2/16/2017  [id number]

## 2017-02-16 NOTE — PT/OT/SLP PROGRESS
Physical Therapy  Treatment    Soumya Melchor   MRN: [id number]   Admitting Diagnosis: CAD, multiple vessel    PT Received On: 17  PT Start Time: 1100     PT Stop Time: 1115    PT Total Time (min): 15 min       Billable Minutes:  Gait [id number]    Treatment Type: Treatment  PT/PTA: PT       General Precautions: Standard, sternal  Orthopedic Precautions: N/A   Braces: N/A     Subjective:  Communicated with NURSE MICHEL prior to session.  Pain Ratin/10    Objective:   Patient found with: telemetry    Functional Mobility:  Bed Mobility:   Scooting/Bridging: Supervision    Transfers:  Sit <> Stand Assistance: Supervision  Sit <> Stand Assistive Device: No Assistive Device    Gait:   Gait Distance: PT ' WITH SPV, NO LOB OR SOB ON ROOM AIR  Assistance 1: Stand by Assistance  Gait Assistive Device: No device  Gait Pattern: swing-through gait  Gait Deviation(s): decreased edith    Balance:   Static Sit: GOOD  Dynamic Sit: GOOD  Static Stand: GOOD  Dynamic stand:  GOOD    Therapeutic Activities and Exercises:  PT ABLE TO RECITE STERNAL PRECAUTIONS, ENCOURAGED TO CONT. GAIT IN HALLWAY WITH FAMILY MULTIPLE TIMES A DAY, PT AGREEABLE    Patient left up in chair with all lines intact, call button in reach and NURSE notified.    Assessment:  Soumya Melchor is a 71 y.o. female with a medical diagnosis of CAD, multiple vessel   PT IS NO LONGER A CANDIDATE FOR INPATIENT SKILLED P.T. DUE TO HIGH LEVEL OF FUNCTION, PT WILL BENEFIT FROM [facility] FOR CONT. GAIT/TE    Rehab identified problem list/impairments:     Rehab potential is .    Activity tolerance:     Discharge recommendations: Discharge Facility/Level Of Care Needs: home health PT     Barriers to discharge: Barriers to Discharge: None    Equipment recommendations: Equipment Needed After Discharge: none     GOALS:   Physical Therapy Goals        Problem: Physical Therapy Goal    Goal Priority Disciplines Outcome Goal Variances Interventions    Physical Therapy Goal     PT/OT, PT      Description:  LTG'S TO BE MET IN 7 DAYS (2-21-17)  1. PT WILL REQUIRE SBA FOR BED MOBILITY  2. PT WILL REQUIRE SPV FOR TF'S  3. PT WILL ' WITH SPV  4. PT WILL TOLERATE BLE THEREX X 20 REPS AROM  5. PT WILL DEMO G DYNAMIC BALANCE DURING GAIT            PLAN:      (D/C PT FROM [facility] SERVICES TO [facility])  Plan of Care reviewed with: patient    PT ENCOURAGED TO CALL FOR ASSISTANCE WITH ALL NEEDS, PT AGREEABLE.  PT ENCOURAGED TO INCREASE TIME OOB IN CHAIR, ALL MEALS IN CHAIR OOB, PT AGREEABLE    Mckayla Tenorio, PT  02/16/2017

## 2017-02-16 NOTE — PLAN OF CARE
Problem: Patient Care Overview  Goal: Plan of Care Review  Outcome: Ongoing (interventions implemented as appropriate)  o2 sat on nc 2l/m=94%; tolerates txs well.

## 2017-02-16 NOTE — PLAN OF CARE
Problem: Patient Care Overview  Goal: Plan of Care Review  Outcome: Ongoing (interventions implemented as appropriate)  Patient has been doing very well today. She is ambulating today with no difficulty noted, and has had no complaints of pain. Patient has ambulated several times and has had a good oral intake. She is aware that staff is keeping accurate counts of intake and output. Patient's incisions remain clean, dry and appear free from infection. Patient is demonstrating good sternal precautions and IS use. No distress noted at this time.

## 2017-02-16 NOTE — ASSESSMENT & PLAN NOTE
S/P CABG X3  Transferred to floor, all lines, CT out.  Walking around well  On ASA, plavix, BB, lasix  Cardiology and CVT on board  Ok to D/C soon

## 2017-02-17 VITALS
HEART RATE: 84 BPM | RESPIRATION RATE: 20 BRPM | WEIGHT: 135 LBS | OXYGEN SATURATION: 96 % | BODY MASS INDEX: 26.5 KG/M2 | DIASTOLIC BLOOD PRESSURE: 89 MMHG | SYSTOLIC BLOOD PRESSURE: 121 MMHG | HEIGHT: 60 IN | TEMPERATURE: 98 F

## 2017-02-17 LAB
BLD PROD TYP BPU: NORMAL
BLD PROD TYP BPU: NORMAL
BLOOD UNIT EXPIRATION DATE: NORMAL
BLOOD UNIT EXPIRATION DATE: NORMAL
BLOOD UNIT TYPE CODE: 6200
BLOOD UNIT TYPE CODE: 6200
BLOOD UNIT TYPE: NORMAL
BLOOD UNIT TYPE: NORMAL
CODING SYSTEM: NORMAL
CODING SYSTEM: NORMAL
DISPENSE STATUS: NORMAL
DISPENSE STATUS: NORMAL
NUM UNITS TRANS PACKED RBC: NORMAL
NUM UNITS TRANS PACKED RBC: NORMAL

## 2017-02-17 PROCEDURE — 27000221 HC OXYGEN, UP TO 24 HOURS

## 2017-02-17 PROCEDURE — 99900035 HC TECH TIME PER 15 MIN (STAT)

## 2017-02-17 PROCEDURE — 25000003 PHARM REV CODE 250: Performed by: PHYSICIAN ASSISTANT

## 2017-02-17 PROCEDURE — 25000003 PHARM REV CODE 250: Performed by: NURSE PRACTITIONER

## 2017-02-17 PROCEDURE — 94640 AIRWAY INHALATION TREATMENT: CPT

## 2017-02-17 PROCEDURE — 99232 SBSQ HOSP IP/OBS MODERATE 35: CPT | Mod: ,,, | Performed by: INTERNAL MEDICINE

## 2017-02-17 PROCEDURE — 25000242 PHARM REV CODE 250 ALT 637 W/ HCPCS: Performed by: NURSE PRACTITIONER

## 2017-02-17 RX ORDER — ASPIRIN 81 MG/1
81 TABLET ORAL DAILY
Qty: 30 TABLET | Refills: 1 | Status: SHIPPED | OUTPATIENT
Start: 2017-02-17 | End: 2018-02-17

## 2017-02-17 RX ORDER — SIMVASTATIN 40 MG/1
40 TABLET, FILM COATED ORAL NIGHTLY
Qty: 90 TABLET | Refills: 3 | Status: SHIPPED | OUTPATIENT
Start: 2017-02-17 | End: 2018-03-08

## 2017-02-17 RX ORDER — METOPROLOL TARTRATE 25 MG/1
25 TABLET, FILM COATED ORAL 2 TIMES DAILY
Qty: 60 TABLET | Refills: 11 | Status: SHIPPED | OUTPATIENT
Start: 2017-02-17 | End: 2017-06-13 | Stop reason: SDUPTHER

## 2017-02-17 RX ORDER — HYDROCODONE BITARTRATE AND ACETAMINOPHEN 5; 325 MG/1; MG/1
1 TABLET ORAL EVERY 4 HOURS PRN
Qty: 10 TABLET | Refills: 0 | Status: SHIPPED | OUTPATIENT
Start: 2017-02-17 | End: 2017-04-05

## 2017-02-17 RX ORDER — CLOPIDOGREL BISULFATE 75 MG/1
75 TABLET ORAL DAILY
Qty: 30 TABLET | Refills: 1 | Status: SHIPPED | OUTPATIENT
Start: 2017-02-17 | End: 2018-02-15

## 2017-02-17 RX ADMIN — IPRATROPIUM BROMIDE AND ALBUTEROL SULFATE 3 ML: .5; 3 SOLUTION RESPIRATORY (INHALATION) at 12:02

## 2017-02-17 RX ADMIN — BUDESONIDE 0.5 MG: 0.5 INHALANT RESPIRATORY (INHALATION) at 07:02

## 2017-02-17 RX ADMIN — IPRATROPIUM BROMIDE AND ALBUTEROL SULFATE 3 ML: .5; 3 SOLUTION RESPIRATORY (INHALATION) at 07:02

## 2017-02-17 RX ADMIN — ASPIRIN 81 MG: 81 TABLET, COATED ORAL at 09:02

## 2017-02-17 RX ADMIN — CLOPIDOGREL BISULFATE 75 MG: 75 TABLET ORAL at 09:02

## 2017-02-17 RX ADMIN — METOPROLOL TARTRATE 25 MG: 25 TABLET ORAL at 09:02

## 2017-02-17 RX ADMIN — HYDROCODONE BITARTRATE AND ACETAMINOPHEN 1 TABLET: 10; 325 TABLET ORAL at 03:02

## 2017-02-17 RX ADMIN — FAMOTIDINE 20 MG: 20 TABLET ORAL at 09:02

## 2017-02-17 NOTE — PLAN OF CARE
Problem: Infection, Risk/Actual (Adult)  Goal: Identify Related Risk Factors and Signs and Symptoms  Related risk factors and signs and symptoms are identified upon initiation of Human Response Clinical Practice Guideline (CPG)   Outcome: Ongoing (interventions implemented as appropriate)  o2 sat on nc 2l/m=95%; tolerates txs well.

## 2017-02-17 NOTE — PLAN OF CARE
02/17/17 1206   Final Note   Assessment Type Final Discharge Note   Discharge Disposition Home-Health   Referral to / orders for Home Health Complete? Yes  (Sent referral information through [facility]. )   [facility] Referral Info   Post Acute Recommendation Home-care   Facility Name [facility]

## 2017-02-17 NOTE — PROGRESS NOTES
Cardiology Progress Note        SUBJECTIVE:   POD 4 s/p CABG. Patient has progressed really well. Labs and vitals stable. Ambulating with no issues. CVT has cleared patient for discharge.       OBJECTIVE:     Vital Signs (Most Recent)  Temp: 98.1 °F (36.7 °C) (02/17/17 1207)  Pulse: 78 (02/17/17 1258)  Resp: 20 (02/17/17 1258)  BP: 121/89 (02/17/17 1207)  SpO2: 96 % (02/17/17 1207)    Vital Signs Range (Last 24H):  Temp:  [98 °F (36.7 °C)-98.6 °F (37 °C)]   Pulse:  [72-95]   Resp:  [18-20]   BP: (102-121)/(61-89)   SpO2:  [88 %-100 %]     Intake/Output last 3 shifts:  I/O last 3 completed shifts:  In: 2440 [P.O.:2440]  Out: 2350 [Urine:2350]    Intake/Output this shift:       Review of patient's allergies indicates:   Allergen Reactions    Ace inhibitors Other (See Comments)     unknown      Iodine and iodide containing products Hives     Since age of 48    Lisinopril      angioedema    Shrimp Other (See Comments)     Localized itching and swelling on her hands if she peels shrimp.  Able to eat shrimp without difficulty.  No generalized reaction.       Current Facility-Administered Medications   Medication    acetaminophen tablet 650 mg    albuterol sulfate nebulizer solution 2.5 mg    albuterol-ipratropium 2.5mg-0.5mg/3mL nebulizer solution 3 mL    aspirin EC tablet 81 mg    budesonide nebulizer solution 0.5 mg    clopidogrel tablet 75 mg    enoxaparin injection 40 mg    famotidine tablet 20 mg    hydrocodone-acetaminophen 10-325mg per tablet 1 tablet    hydrocodone-acetaminophen 5-325mg per tablet 1 tablet    hydrocodone-acetaminophen 7.5-325mg per tablet 1 tablet    influenza vaccine 180 mcg/0.5 mL (for patients > 65) injection    metoprolol tartrate (LOPRESSOR) tablet 25 mg    morphine injection 2 mg    ondansetron injection 4 mg    pneumoc 13-rayne conj-dip cr(PF) 0.5 mL 0.5 mL    polyethylene glycol packet 17 g    ramelteon tablet 8 mg    senna-docusate 8.6-50 mg per tablet 2 tablet     simvastatin tablet 40 mg     No current facility-administered medications on file prior to encounter.      Current Outpatient Prescriptions on File Prior to Encounter   Medication Sig    VENTOLIN HFA 90 mcg/actuation inhaler Inhale 2 puffs into the lungs every 4 (four) hours as needed for Wheezing.    [DISCONTINUED] potassium 99 mg Tab Take by mouth.    [DISCONTINUED] simvastatin (ZOCOR) 40 MG tablet Take 20 mg by mouth every evening.     [DISCONTINUED] triamterene-hydrochlorothiazide 37.5-25 mg (DYAZIDE) 37.5-25 mg per capsule Take 1 capsule by mouth every morning.    albuterol-ipratropium 2.5mg-0.5mg/3mL (DUO-NEB) 0.5 mg-3 mg(2.5 mg base)/3 mL nebulizer solution Take 3 mLs by nebulization every 6 (six) hours.    ascorbic acid, vitamin C, (VITAMIN C) 1000 MG tablet Take 1,000 mg by mouth once daily.     budesonide (PULMICORT) 0.5 mg/2 mL nebulizer solution Take 2 mLs (0.5 mg total) by nebulization 2 (two) times daily. Wash out mouth after using.    calcium-vitamin D3 500 mg(1,250mg) -200 unit per tablet Take 1 tablet by mouth 2 (two) times daily with meals.    DOCOSAHEXANOIC ACID/EPA (FISH OIL ORAL) Take 1,200 mg by mouth 2 (two) times daily.    ezetimibe (ZETIA) 10 mg tablet Take 1 tablet (10 mg total) by mouth every evening.    fluorometholone 0.1% (FML) 0.1 % DrpS INSTILL ONE DROP IN THE RIGHT EYE THREE TIMES DAILY    FOLIC ACID/MULTIVIT-MIN/LUTEIN (CENTRUM SILVER ORAL) Take by mouth once daily.    ipratropium (ATROVENT) 0.06 % nasal spray 2 sprays by Nasal route 4 (four) times daily. As needed for rhinitis    levothyroxine (SYNTHROID) 200 MCG tablet TAKE ONE TABLET BY MOUTH EVERY DAY    MELATONIN ORAL Take 20 mg by mouth nightly as needed.     omeprazole (PRILOSEC) 40 MG capsule Take 1 capsule (40 mg total) by mouth once daily.    senna (SENOKOT) 8.6 mg tablet Take 3 tablets by mouth.    SENNOSIDES (SENNA LAXATIVE ORAL) Take by mouth 3 (three) times daily.    tiotropium (SPIRIVA WITH  HANDIHALER) 18 mcg inhalation capsule Inhale 1 capsule (18 mcg total) into the lungs once daily.    tramadol (ULTRAM) 50 mg tablet Take 50 mg by mouth 2 (two) times daily.    trazodone (DESYREL) 50 MG tablet TAKE ONE TABLET BY MOUTH EVERY EVENING    zolpidem (AMBIEN) 5 MG Tab TAKE ONE TABLET BY MOUTH AT BEDTIME AS NEEDED    [DISCONTINUED] amlodipine (NORVASC) 10 MG tablet Take 1 tablet (10 mg total) by mouth once daily.    [DISCONTINUED] magnesium 30 mg Tab Take 30 mg by mouth.    [DISCONTINUED] metoprolol tartrate (LOPRESSOR) 25 MG tablet TAKE ONE TABLET BY MOUTH TWICE DAILY    [DISCONTINUED] niacin 500 mg TbSR Take 500 mg by mouth.       Physical Exam:  General appearance: alert, appears stated age and cooperative  Head: Normocephalic, without obvious abnormality, atraumatic  Neck: no adenopathy, no carotid bruit, no JVD, supple  Lungs:  clear to auscultation bilaterally  Chest wall: Surgical incision C/D/I  Heart: regular rate and rhythm, S1, S2 normal, no murmur, click, rub or gallop  Abdomen: soft, non-tender; bowel sounds normal; no masses,  no organomegaly  Extremities: extremities normal, atraumatic, no cyanosis or edema  Pulses: 2+ and symmetric  Skin: Skin color, texture, turgor normal. No rashes or lesions  Neurologic: Grossly normal    Laboratory:  Chemistry:   Lab Results   Component Value Date     02/16/2017    K 3.6 02/16/2017    CL 94 (L) 02/16/2017    CO2 31 (H) 02/16/2017    BUN 21 02/16/2017    CREATININE 1.2 02/16/2017    CALCIUM 8.4 (L) 02/16/2017     Cardiac Markers: No results found for: CKTOTAL, CKMB, CKMBINDEX, TROPONINI  Cardiac Markers (Last 3): No results found for: CKTOTAL, CKMB, CKMBINDEX, TROPONINI  CBC:   Lab Results   Component Value Date    WBC 10.27 02/16/2017    HGB 11.6 (L) 02/16/2017    HCT 34.0 (L) 02/16/2017    HCT 32 (L) 02/13/2017    MCV 90 02/16/2017     (L) 02/16/2017     Lipids:   Lab Results   Component Value Date    CHOL 248 (H) 10/07/2015    TRIG 92  10/07/2015    HDL 99 (H) 10/07/2015     Coagulation:   Lab Results   Component Value Date    INR 1.2 02/13/2017    INR 0.9 03/17/2004    APTT 29.5 02/13/2017       Diagnostic Results:  Pertinent labs and radiology reviewed by myself and supervising MD      ASSESSMENT/PLAN:     Patient Active Problem List   Diagnosis    Gastroesophageal reflux disease    Anxiety and depression    Chronic pain associated with significant psychosocial dysfunction    COPD (chronic obstructive pulmonary disease)    Hypothyroidism    Hypertension    Edema    Pulmonary nodule, right    Pulmonary nodules/lesions, multiple    SOB (shortness of breath)    Family history of arteriosclerotic cardiovascular disease    Hyperlipidemia    Other psychoactive substance use, unspecified with psychoactive substance-induced sleep disorder    Tobacco use    Angina pectoris    CAD (coronary artery disease)    S/P CABG (coronary artery bypass graft)    AP (angina pectoris)        Plan:   Patient has been examined and is clear for discharge from Cardiology standpoint. DC home with current admission meds. She will need to follow up with Cardiology in [place] in 1 month.  F/U with Dr. Bonds in 4 weeks & NEERAJ Benton with CVT in 2 weeks.     Chart reviewed. Patient examined by Dr. Grey and agrees with plan that has been outlined.

## 2017-02-17 NOTE — PROGRESS NOTES
Ambulating in room    Vitals:    02/17/17 0420   BP: 108/70   Pulse: 77   Resp: 18   Temp: 98.6 °F (37 °C)     Currently NSR on tele    Gen-no acute distress  CVS-s1s2 normal  Resp-diminished bilateral bases  Abd- +BM  Skin- M/S and LLE incision HUMA, C/D/I  Ext-BLE trace edema  Neuro- A&O x 3    A/P: 1. S/p CABGx3 POD #4  2. COPD    -patient looks great  -d/c home today, F/U with Dr. Bonds in 4 weeks & NEERAJ Benton with CVT in 2 weeks  -script for plavix and pain med left in folder.

## 2017-02-17 NOTE — PLAN OF CARE
Problem: Patient Care Overview  Goal: Plan of Care Review  Outcome: Ongoing (interventions implemented as appropriate)  Patient remains free from falls. Fall precautions remain in place. Pain medication given for incisional pain. Ambulates in hallway with standby assistance. Magnesium Citrate given to help with bowel movement. VS are stable. No other c/o at this time. Call bell within reach. Reminded to call for assistance.

## 2017-02-21 RX ORDER — OMEPRAZOLE 40 MG/1
CAPSULE, DELAYED RELEASE ORAL
Qty: 30 CAPSULE | Refills: 11 | Status: SHIPPED | OUTPATIENT
Start: 2017-02-21 | End: 2018-04-17 | Stop reason: SDUPTHER

## 2017-02-21 NOTE — DISCHARGE SUMMARY
[facility] -   Cardiology  Discharge Summary      Patient Name: Soumya Melchor  MRN: [id number]  Admission Date: 2/13/2017  Hospital Length of Stay: 4 days  Discharge Date and Time: 2/17/2017  2:45 PM  Attending Physician: Tahmina att. providers found  Discharging Provider: NEERAJ Schroeder  Primary Care Physician: Howard Mathews MD    HPI:  This is a 71-year-old cigarette abusing white female with a history of hypertension, hyperlipidemia and significant COPD. She has a considerable amount of dyspnea with exertion, had an abnormal outpatient workup and underwent a heart catheterization by Dr. Giuliano Henry. This catheterization demonstrated good left ventricular function, significant triple vessel disease.The patient required preoperative pulmonary consultation and after a period of   treatment eventually she was cleared from a pulmonary standpoint for coronary artery bypass grafting although both she and her family were aware that this would be at significantly increased risk for respiratory complications.       Procedure(s) (LRB):  AOROTOCORONARY BYPASS-CABG (N/A)     Indwelling Lines/Drains at time of discharge:  Lines/Drains/Airways          No matching active lines, drains, or airways          Hospital Course: Once the patient agreed and consented to undergo surgery, on 2/13/2017 Dr. Bonds performed an Off-pump coronary artery bypass grafting x3 with left internal mammary artery to LAD, aorta to first obtuse marginal, aorta to distal right coronary artery. She tolerated the procedure well and afterwards was transferred to the ICU in stable condition.    Postoperative Course:  POD #1: Patient was extubated per CVT protocol.  Vital signs and lab values stable.  H&H was 11.8 & 34.1.  She was deemed medically stable and orders placed to transfer to telemetry.  Chest tubes to stay, hernandez and appropriate lines were removed.  POD #2: Patient doing well. Chest tubes with minimal output and thus  removed. Patient ambulating in halls without issues. NSR on tele. POD #3: Patient with no new events.  POD #4: Patient doing well and was cleared for discharge by cardiology and CVT surgeons.    Consults:   Consults         Status Ordering Provider     Consult to Cardiology  Once     Provider:  (Not yet assigned)    Completed ELMIRA RODRIGUEZ     Consult to Dietary  Once     Provider:  (Not yet assigned)    Completed ELMIRA RODRIGUEZ     Consult to Hospitalist  Once     Provider:  (Not yet assigned)    Completed ELMIRA RODRIGUEZ     Consult to Pulmonology  Once     Provider:  (Not yet assigned)    Completed ELMIRA RODRIGUEZ          Significant Diagnostic Studies: Labs: BMP: No results for input(s): GLU, NA, K, CL, CO2, BUN, CREATININE, CALCIUM, MG in the last 48 hours. and CBC No results for input(s): WBC, HGB, HCT, PLT in the last 48 hours.    Pending Diagnostic Studies:     None          Final Active Diagnoses:    Diagnosis Date Noted POA    PRINCIPAL PROBLEM:  S/P CABG (coronary artery bypass graft) [Z95.1] 02/13/2017 Not Applicable    CAD (coronary artery disease) [I25.10] 02/13/2017 Yes    Hypertension [I10]  Yes    COPD (chronic obstructive pulmonary disease) [J44.9]  Yes      Problems Resolved During this Admission:    Diagnosis Date Noted Date Resolved POA    CAD, multiple vessel [I25.10] 02/13/2017 02/16/2017 Yes       Discharged Condition: stable    Disposition: Home-Health Care [facility]    Follow Up:  Follow-up Information     Follow up with Steve Bonds MD In 4 weeks.    Specialty:  Cardiothoracic Surgery    Why:  Hosp F/U - Cardiothoracic Surgeon    Contact information:    [address] [facility]  [address]  [facility] [address] [phone]          Follow up with NEERAJ Schroeder In 2 weeks.    Specialty:  Family Medicine    Why:  Hosp F/U - Nurse Practitioner for Cardiothoracic Surgeon    Contact information:    [address] [facility]  [address]  [facility] [address] [phone]          Follow up with  [facility].    Specialties:  DME Provider, Home Health Services    Why:  Home Health, SN per CVT protocal    Contact information:    [address]  [facility] [address]  [phone]          Patient Instructions:     Diet general     Activity as tolerated     Call MD for:  temperature >100.4     Call MD for:  difficulty breathing or increased cough     Call MD for:  severe uncontrolled pain       Medications:  Reconciled Home Medications:   Discharge Medication List as of 2/17/2017  3:17 PM      START taking these medications    Details   aspirin (ECOTRIN) 81 MG EC tablet Take 1 tablet (81 mg total) by mouth once daily., Starting 2/17/2017, Until Sat 2/17/18, Normal      clopidogrel (PLAVIX) 75 mg tablet Take 1 tablet (75 mg total) by mouth once daily., Starting 2/17/2017, Until Sat 2/17/18, Normal      hydrocodone-acetaminophen 5-325mg (NORCO) 5-325 mg per tablet Take 1 tablet by mouth every 4 (four) hours as needed., Starting 2/17/2017, Until Discontinued, Print         CONTINUE these medications which have CHANGED    Details   metoprolol tartrate (LOPRESSOR) 25 MG tablet Take 1 tablet (25 mg total) by mouth 2 (two) times daily., Starting 2/17/2017, Until Sat 2/17/18, Normal      simvastatin (ZOCOR) 40 MG tablet Take 1 tablet (40 mg total) by mouth every evening., Starting 2/17/2017, Until Sat 2/17/18, Normal         CONTINUE these medications which have NOT CHANGED    Details   albuterol-ipratropium 2.5mg-0.5mg/3mL (DUO-NEB) 0.5 mg-3 mg(2.5 mg base)/3 mL nebulizer solution Take 3 mLs by nebulization every 6 (six) hours., Starting 1/20/2017, Until Sat 1/20/18, Normal      ascorbic acid, vitamin C, (VITAMIN C) 1000 MG tablet Take 1,000 mg by mouth once daily. , Until Discontinued, Historical Med      budesonide (PULMICORT) 0.5 mg/2 mL nebulizer solution Take 2 mLs (0.5 mg total) by nebulization 2 (two) times daily. Wash out mouth after using., Starting 1/20/2017, Until Sat 1/20/18, Normal       calcium-vitamin D3 500 mg(1,250mg) -200 unit per tablet Take 1 tablet by mouth 2 (two) times daily with meals., Starting 12/23/2016, Until Sat 12/23/17, OTC      DOCOSAHEXANOIC ACID/EPA (FISH OIL ORAL) Take 1,200 mg by mouth 2 (two) times daily., Until Discontinued, Historical Med      duloxetine (CYMBALTA) 30 MG capsule Take 30 mg by mouth once daily., Until Discontinued, Historical Med      ezetimibe (ZETIA) 10 mg tablet Take 1 tablet (10 mg total) by mouth every evening., Starting 3/30/2016, Until Thu 3/30/17, Normal      fluorometholone 0.1% (FML) 0.1 % DrpS INSTILL ONE DROP IN THE RIGHT EYE THREE TIMES DAILY, Historical Med      FOLIC ACID/MULTIVIT-MIN/LUTEIN (CENTRUM SILVER ORAL) Take by mouth once daily., Until Discontinued, Historical Med      ipratropium (ATROVENT) 0.06 % nasal spray 2 sprays by Nasal route 4 (four) times daily. As needed for rhinitis, Starting 12/23/2016, Until Discontinued, Normal      levothyroxine (SYNTHROID) 200 MCG tablet TAKE ONE TABLET BY MOUTH EVERY DAY, Normal      MELATONIN ORAL Take 20 mg by mouth nightly as needed. , Until Discontinued, Historical Med      !! senna (SENOKOT) 8.6 mg tablet Take 3 tablets by mouth., Until Discontinued, Historical Med      !! SENNOSIDES (SENNA LAXATIVE ORAL) Take by mouth 3 (three) times daily., Until Discontinued, Historical Med      tiotropium (SPIRIVA WITH HANDIHALER) 18 mcg inhalation capsule Inhale 1 capsule (18 mcg total) into the lungs once daily., Starting 12/23/2016, Until Sat 12/23/17, Normal      tramadol (ULTRAM) 50 mg tablet Take 50 mg by mouth 2 (two) times daily., Starting 7/25/2016, Until Discontinued, Historical Med      trazodone (DESYREL) 50 MG tablet TAKE ONE TABLET BY MOUTH EVERY EVENING, Normal      VENTOLIN HFA 90 mcg/actuation inhaler Inhale 2 puffs into the lungs every 4 (four) hours as needed for Wheezing., Starting 12/23/2016, Until Discontinued, Normal      zolpidem (AMBIEN) 5 MG Tab TAKE ONE TABLET BY MOUTH AT  BEDTIME AS NEEDED, Normal      omeprazole (PRILOSEC) 40 MG capsule Take 1 capsule (40 mg total) by mouth once daily., Starting 8/29/2016, Until Discontinued, Normal       !! - Potential duplicate medications found. Please discuss with provider.      STOP taking these medications       amlodipine (NORVASC) 10 MG tablet Comments:   Reason for Stopping:         magnesium 30 mg Tab Comments:   Reason for Stopping:         niacin 500 mg TbSR Comments:   Reason for Stopping:         potassium 99 mg Tab Comments:   Reason for Stopping:         triamterene-hydrochlorothiazide 37.5-25 mg (DYAZIDE) 37.5-25 mg per capsule Comments:   Reason for Stopping:               Time spent on the discharge of patient: 15 minutes    NEERAJ Schroeder  Cardiology  [facility]

## 2017-02-21 NOTE — PROGRESS NOTES
[facility]. Patient would not allow the home health in the home, patient stated that she did not need assistance. [place]  to notify the surgeon.

## 2017-03-13 ENCOUNTER — HOSPITAL ENCOUNTER (OUTPATIENT)
Dept: RADIOLOGY | Facility: HOSPITAL | Age: 72
Discharge: HOME | End: 2017-03-13
Attending: INTERNAL MEDICINE
Payer: MEDICARE

## 2017-03-13 ENCOUNTER — OFFICE VISIT (OUTPATIENT)
Dept: CARDIOLOGY | Facility: CLINIC | Age: 72
End: 2017-03-13
Payer: MEDICARE

## 2017-03-13 VITALS
WEIGHT: 123.13 LBS | HEART RATE: 89 BPM | BODY MASS INDEX: 24.17 KG/M2 | HEIGHT: 60 IN | DIASTOLIC BLOOD PRESSURE: 69 MMHG | SYSTOLIC BLOOD PRESSURE: 124 MMHG

## 2017-03-13 DIAGNOSIS — Z95.1 S/P CABG (CORONARY ARTERY BYPASS GRAFT): ICD-10-CM

## 2017-03-13 DIAGNOSIS — R06.02 SOB (SHORTNESS OF BREATH): ICD-10-CM

## 2017-03-13 DIAGNOSIS — I25.10 CORONARY ARTERY DISEASE INVOLVING NATIVE CORONARY ARTERY OF NATIVE HEART WITHOUT ANGINA PECTORIS: ICD-10-CM

## 2017-03-13 DIAGNOSIS — E78.00 PURE HYPERCHOLESTEROLEMIA: ICD-10-CM

## 2017-03-13 DIAGNOSIS — R06.02 SOB (SHORTNESS OF BREATH): Primary | ICD-10-CM

## 2017-03-13 DIAGNOSIS — I10 ESSENTIAL HYPERTENSION: ICD-10-CM

## 2017-03-13 PROCEDURE — 71020 XR CHEST PA AND LATERAL: CPT | Mod: TC,PO

## 2017-03-13 PROCEDURE — 99214 OFFICE O/P EST MOD 30 MIN: CPT | Mod: S$GLB,,, | Performed by: INTERNAL MEDICINE

## 2017-03-13 PROCEDURE — 1159F MED LIST DOCD IN RCRD: CPT | Mod: S$GLB,,, | Performed by: INTERNAL MEDICINE

## 2017-03-13 PROCEDURE — 71020 XR CHEST PA AND LATERAL: CPT | Mod: 26,,, | Performed by: RADIOLOGY

## 2017-03-13 PROCEDURE — 3074F SYST BP LT 130 MM HG: CPT | Mod: S$GLB,,, | Performed by: INTERNAL MEDICINE

## 2017-03-13 PROCEDURE — 1157F ADVNC CARE PLAN IN RCRD: CPT | Mod: S$GLB,,, | Performed by: INTERNAL MEDICINE

## 2017-03-13 PROCEDURE — 3078F DIAST BP <80 MM HG: CPT | Mod: S$GLB,,, | Performed by: INTERNAL MEDICINE

## 2017-03-13 PROCEDURE — 99999 PR PBB SHADOW E&M-EST. PATIENT-LVL II: CPT | Mod: PBBFAC,,, | Performed by: INTERNAL MEDICINE

## 2017-03-13 PROCEDURE — 1160F RVW MEDS BY RX/DR IN RCRD: CPT | Mod: S$GLB,,, | Performed by: INTERNAL MEDICINE

## 2017-03-13 RX ORDER — FUROSEMIDE 20 MG/1
20 TABLET ORAL DAILY
Qty: 30 TABLET | Refills: 11 | Status: SHIPPED | OUTPATIENT
Start: 2017-03-13 | End: 2018-03-20 | Stop reason: SDUPTHER

## 2017-03-13 NOTE — MR AVS SNAPSHOT
[facility]  [address] [facility]  [place] [address]  Phone: [phone]  Fax: [phone]                  Soumya Melchor   3/13/2017 11:20 AM   Office Visit    Description:  Female : 1945   Provider:  Giuliano Henry MD   Department:  [facility]           Reason for Visit     Follow-up           Diagnoses this Visit        Comments    SOB (shortness of breath)    -  Primary     Coronary artery disease involving native coronary artery of native heart without angina pectoris         S/P CABG (coronary artery bypass graft)         Pure hypercholesterolemia         Essential hypertension                To Do List           Future Appointments        Provider Department Dept Phone    3/13/2017 12:00 PM [facility] XR1 [facility] [phone]    3/17/2017 10:00 AM JORGE GUERRA SCHEDULE [facility] [phone]    3/21/2017 9:20 AM SPIROMETRY, [facility] Pulm Function [facility] [phone]    3/21/2017 9:40 AM Navarro Carmona MD [facility]monary [facility] [phone]    3/21/2017 10:00 AM SPIROMETRY, [facility] - Pulm Function [facility] [phone]      Goals (5 Years of Data)              Today    17    Blood Pressure < 150/90   124/69  124/69  124/69      Follow-Up and Disposition     Return in about 1 month (around 2017).    Follow-up and Disposition History       These Medications        Disp Refills Start End    furosemide (LASIX) 20 MG tablet 30 tablet 11 3/13/2017 3/13/2018    Take 1 tablet (20 mg total) by mouth once daily. - Oral    Pharmacy: [facility] [place] - [address] Ph #: [phone]         [facility] On [facility]     [facility] -  Assistance  Registered nurses in the [facility] provide clinical advisement, health education, appointment booking, and other advisory services.  Call for this free service at [phone].             Medications           START  taking these NEW medications        Refills    furosemide (LASIX) 20 MG tablet 11    Sig: Take 1 tablet (20 mg total) by mouth once daily.    Class: Normal    Route: Oral           Verify that the below list of medications is an accurate representation of the medications you are currently taking.  If none reported, the list may be blank. If incorrect, please contact your healthcare provider. Carry this list with you in case of emergency.           Current Medications     albuterol-ipratropium 2.5mg-0.5mg/3mL (DUO-NEB) 0.5 mg-3 mg(2.5 mg base)/3 mL nebulizer solution Take 3 mLs by nebulization every 6 (six) hours.    ascorbic acid, vitamin C, (VITAMIN C) 1000 MG tablet Take 1,000 mg by mouth once daily.     aspirin (ECOTRIN) 81 MG EC tablet Take 1 tablet (81 mg total) by mouth once daily.    budesonide (PULMICORT) 0.5 mg/2 mL nebulizer solution Take 2 mLs (0.5 mg total) by nebulization 2 (two) times daily. Wash out mouth after using.    calcium-vitamin D3 500 mg(1,250mg) -200 unit per tablet Take 1 tablet by mouth 2 (two) times daily with meals.    clopidogrel (PLAVIX) 75 mg tablet Take 1 tablet (75 mg total) by mouth once daily.    DOCOSAHEXANOIC ACID/EPA (FISH OIL ORAL) Take 1,200 mg by mouth 2 (two) times daily.    duloxetine (CYMBALTA) 30 MG capsule Take 30 mg by mouth once daily.    ezetimibe (ZETIA) 10 mg tablet Take 1 tablet (10 mg total) by mouth every evening.    fluorometholone 0.1% (FML) 0.1 % DrpS INSTILL ONE DROP IN THE RIGHT EYE THREE TIMES DAILY    FOLIC ACID/MULTIVIT-MIN/LUTEIN (CENTRUM SILVER ORAL) Take by mouth once daily.    hydrocodone-acetaminophen 5-325mg (NORCO) 5-325 mg per tablet Take 1 tablet by mouth every 4 (four) hours as needed.    ipratropium (ATROVENT) 0.06 % nasal spray 2 sprays by Nasal route 4 (four) times daily. As needed for rhinitis    levothyroxine (SYNTHROID) 200 MCG tablet TAKE ONE TABLET BY MOUTH EVERY DAY    MELATONIN ORAL Take 20 mg by mouth nightly as needed.      metoprolol tartrate (LOPRESSOR) 25 MG tablet Take 1 tablet (25 mg total) by mouth 2 (two) times daily.    omeprazole (PRILOSEC) 40 MG capsule TAKE ONE CAPSULE BY MOUTH DAILY    senna (SENOKOT) 8.6 mg tablet Take 3 tablets by mouth.    SENNOSIDES (SENNA LAXATIVE ORAL) Take by mouth 3 (three) times daily.    simvastatin (ZOCOR) 40 MG tablet Take 1 tablet (40 mg total) by mouth every evening.    tiotropium (SPIRIVA WITH HANDIHALER) 18 mcg inhalation capsule Inhale 1 capsule (18 mcg total) into the lungs once daily.    tramadol (ULTRAM) 50 mg tablet Take 50 mg by mouth 2 (two) times daily.    trazodone (DESYREL) 50 MG tablet TAKE ONE TABLET BY MOUTH EVERY EVENING    VENTOLIN HFA 90 mcg/actuation inhaler Inhale 2 puffs into the lungs every 4 (four) hours as needed for Wheezing.    zolpidem (AMBIEN) 5 MG Tab TAKE ONE TABLET BY MOUTH AT BEDTIME AS NEEDED    furosemide (LASIX) 20 MG tablet Take 1 tablet (20 mg total) by mouth once daily.           Clinical Reference Information           Your Vitals Were     BP Pulse Height Weight BMI    124/69 (BP Location: Left arm, Patient Position: Sitting, BP Method: Automatic) 89 5' (1.524 m) 55.8 kg (123 lb 2 oz) 24.05 kg/m2      Blood Pressure          Most Recent Value    BP  124/69      Allergies as of 3/13/2017     Ace Inhibitors    Iodine And Iodide Containing Products    Lisinopril    Shrimp      Immunizations Administered on Date of Encounter - 3/13/2017     None      Orders Placed During Today's Visit      Normal Orders This Visit    2D echo with color flow doppler     Future Labs/Procedures Expected by Expires    X-Ray Chest PA And Lateral  3/13/2017 3/13/2018      Language Assistance Services     ATTENTION: Language assistance services are available, free of charge. Please call [phone].      ATENCIÓN: Si ac yanely, tiene a baxter disposición servicios gratuitos de asistencia lingüística. Llame al [phone].     CHÚ Ý: N?u b?n nói Ti?ng Vi?t, có các d?ch v? h?  tr? gardenia zee? mi?n phí dành cho b?n. G?i s? [phone].         [facility] complies with applicable Federal civil rights laws and does not discriminate on the basis of race, color, national origin, age, disability, or sex.

## 2017-03-13 NOTE — PROGRESS NOTES
Subjective:   Patient ID:  Soumya Melchor is a 71 y.o. female who presents for follow-up of Follow-up  Pt is s/p #V CABG. Pt states increasing SOB/JOHN. Pt with pleuritic CP.    Coronary Artery Disease   Presents for follow-up visit. Symptoms include chest pain and shortness of breath. Pertinent negatives include no chest pressure, chest tightness, dizziness, leg swelling, muscle weakness, palpitations or weight gain. The symptoms have been stable. Compliance with diet is variable. Compliance with exercise is variable. Compliance with medications is good.   Shortness of Breath   This is a chronic problem. The current episode started more than 1 year ago. The problem has been gradually worsening. Associated symptoms include chest pain. Pertinent negatives include no leg swelling. The symptoms are aggravated by any activity. She has tried rest for the symptoms. The treatment provided mild relief. Her past medical history is significant for CAD.       Review of Systems   Constitution: Negative. Negative for weight gain.   HENT: Negative.    Eyes: Negative.    Cardiovascular: Positive for chest pain. Negative for leg swelling and palpitations.   Respiratory: Positive for shortness of breath. Negative for chest tightness.    Endocrine: Negative.    Hematologic/Lymphatic: Negative.    Skin: Negative.    Musculoskeletal: Negative for muscle weakness.   Gastrointestinal: Negative.    Genitourinary: Negative.    Neurological: Negative.  Negative for dizziness.   Psychiatric/Behavioral: Negative.    Allergic/Immunologic: Negative.      Family History   Problem Relation Age of Onset    Heart attacks under age 50 Mother 41     Past Medical History:   Diagnosis Date    Acid reflux 4/11/2014    Anxiety and depression 3/6/2014    AP (angina pectoris) 2/13/2017    CAD (coronary artery disease) 2/13/2017    CAD, multiple vessel 2/13/2017    Chronic pain associated with significant psychosocial dysfunction 2/21/2013    COPD  (chronic obstructive pulmonary disease)     HTN (hypertension)     Hypothyroidism     S/P CABG (coronary artery bypass graft) 2/13/2017     Current Outpatient Prescriptions on File Prior to Visit   Medication Sig Dispense Refill    albuterol-ipratropium 2.5mg-0.5mg/3mL (DUO-NEB) 0.5 mg-3 mg(2.5 mg base)/3 mL nebulizer solution Take 3 mLs by nebulization every 6 (six) hours. 120 vial 12    ascorbic acid, vitamin C, (VITAMIN C) 1000 MG tablet Take 1,000 mg by mouth once daily.       aspirin (ECOTRIN) 81 MG EC tablet Take 1 tablet (81 mg total) by mouth once daily. 30 tablet 1    budesonide (PULMICORT) 0.5 mg/2 mL nebulizer solution Take 2 mLs (0.5 mg total) by nebulization 2 (two) times daily. Wash out mouth after using. 120 mL 12    calcium-vitamin D3 500 mg(1,250mg) -200 unit per tablet Take 1 tablet by mouth 2 (two) times daily with meals. 60 tablet 11    clopidogrel (PLAVIX) 75 mg tablet Take 1 tablet (75 mg total) by mouth once daily. 30 tablet 1    DOCOSAHEXANOIC ACID/EPA (FISH OIL ORAL) Take 1,200 mg by mouth 2 (two) times daily.      duloxetine (CYMBALTA) 30 MG capsule Take 30 mg by mouth once daily.      ezetimibe (ZETIA) 10 mg tablet Take 1 tablet (10 mg total) by mouth every evening. 30 tablet 11    fluorometholone 0.1% (FML) 0.1 % DrpS INSTILL ONE DROP IN THE RIGHT EYE THREE TIMES DAILY  0    FOLIC ACID/MULTIVIT-MIN/LUTEIN (CENTRUM SILVER ORAL) Take by mouth once daily.      hydrocodone-acetaminophen 5-325mg (NORCO) 5-325 mg per tablet Take 1 tablet by mouth every 4 (four) hours as needed. 10 tablet 0    ipratropium (ATROVENT) 0.06 % nasal spray 2 sprays by Nasal route 4 (four) times daily. As needed for rhinitis 15 mL 11    levothyroxine (SYNTHROID) 200 MCG tablet TAKE ONE TABLET BY MOUTH EVERY DAY 30 tablet 11    MELATONIN ORAL Take 20 mg by mouth nightly as needed.       metoprolol tartrate (LOPRESSOR) 25 MG tablet Take 1 tablet (25 mg total) by mouth 2 (two) times daily. 60 tablet 11     omeprazole (PRILOSEC) 40 MG capsule TAKE ONE CAPSULE BY MOUTH DAILY 30 capsule 11    senna (SENOKOT) 8.6 mg tablet Take 3 tablets by mouth.      SENNOSIDES (SENNA LAXATIVE ORAL) Take by mouth 3 (three) times daily.      simvastatin (ZOCOR) 40 MG tablet Take 1 tablet (40 mg total) by mouth every evening. 90 tablet 3    tiotropium (SPIRIVA WITH HANDIHALER) 18 mcg inhalation capsule Inhale 1 capsule (18 mcg total) into the lungs once daily. 30 capsule 11    tramadol (ULTRAM) 50 mg tablet Take 50 mg by mouth 2 (two) times daily.  5    trazodone (DESYREL) 50 MG tablet TAKE ONE TABLET BY MOUTH EVERY EVENING 30 tablet 11    VENTOLIN HFA 90 mcg/actuation inhaler Inhale 2 puffs into the lungs every 4 (four) hours as needed for Wheezing. 1 Inhaler 11    zolpidem (AMBIEN) 5 MG Tab TAKE ONE TABLET BY MOUTH AT BEDTIME AS NEEDED 30 tablet 3     No current facility-administered medications on file prior to visit.      Review of patient's allergies indicates:   Allergen Reactions    Ace inhibitors Other (See Comments)     unknown      Iodine and iodide containing products Hives     Since age of 48    Lisinopril      angioedema    Shrimp Other (See Comments)     Localized itching and swelling on her hands if she peels shrimp.  Able to eat shrimp without difficulty.  No generalized reaction.       Objective:     Physical Exam   Constitutional: She is oriented to person, place, and time. She appears well-developed and well-nourished.   HENT:   Head: Normocephalic and atraumatic.   Eyes: Conjunctivae and EOM are normal. Pupils are equal, round, and reactive to light.   Neck: Normal range of motion. Neck supple.   Cardiovascular: Normal rate, regular rhythm, normal heart sounds and intact distal pulses.    Pulmonary/Chest: Effort normal and breath sounds normal.   Abdominal: Soft. Bowel sounds are normal.   Musculoskeletal: Normal range of motion.   Neurological: She is alert and oriented to person, place, and time.    Skin: Skin is warm and dry.   Psychiatric: She has a normal mood and affect.   Nursing note and vitals reviewed.      Assessment:     1. SOB (shortness of breath)    2. Coronary artery disease involving native coronary artery of native heart without angina pectoris    3. S/P CABG (coronary artery bypass graft)    4. Pure hypercholesterolemia    5. Essential hypertension        Plan:     SOB (shortness of breath)    Coronary artery disease involving native coronary artery of native heart without angina pectoris    S/P CABG (coronary artery bypass graft)    Pure hypercholesterolemia    Essential hypertension    echo  CXR  lasix

## 2017-03-14 ENCOUNTER — TELEPHONE (OUTPATIENT)
Dept: CARDIOLOGY | Facility: CLINIC | Age: 72
End: 2017-03-14

## 2017-03-14 NOTE — TELEPHONE ENCOUNTER
----- Message from Kaitlynn Silverman sent at 3/14/2017  9:28 AM CDT -----  Contact: pt  Pt is requesting results from 3/13/17. Pls call pt back at [phone]

## 2017-03-17 ENCOUNTER — APPOINTMENT (OUTPATIENT)
Dept: CARDIOLOGY | Facility: CLINIC | Age: 72
End: 2017-03-17
Payer: MEDICARE

## 2017-03-17 LAB
DIASTOLIC DYSFUNCTION: NO
ESTIMATED PA SYSTOLIC PRESSURE: 20.98
RETIRED EF AND QEF - SEE NOTES: 60 (ref 55–65)

## 2017-03-17 PROCEDURE — 93306 TTE W/DOPPLER COMPLETE: CPT | Mod: S$GLB,,, | Performed by: INTERNAL MEDICINE

## 2017-03-21 ENCOUNTER — OFFICE VISIT (OUTPATIENT)
Dept: PULMONOLOGY | Facility: CLINIC | Age: 72
End: 2017-03-21
Payer: MEDICARE

## 2017-03-21 ENCOUNTER — PROCEDURE VISIT (OUTPATIENT)
Dept: PULMONOLOGY | Facility: CLINIC | Age: 72
End: 2017-03-21
Payer: MEDICARE

## 2017-03-21 VITALS
HEIGHT: 59 IN | HEART RATE: 73 BPM | OXYGEN SATURATION: 99 % | SYSTOLIC BLOOD PRESSURE: 108 MMHG | DIASTOLIC BLOOD PRESSURE: 64 MMHG

## 2017-03-21 DIAGNOSIS — J90 PLEURAL EFFUSION: ICD-10-CM

## 2017-03-21 DIAGNOSIS — R91.8 PULMONARY NODULES/LESIONS, MULTIPLE: ICD-10-CM

## 2017-03-21 DIAGNOSIS — J44.9 CHRONIC OBSTRUCTIVE PULMONARY DISEASE, UNSPECIFIED COPD TYPE: ICD-10-CM

## 2017-03-21 DIAGNOSIS — J44.1 COPD EXACERBATION: ICD-10-CM

## 2017-03-21 DIAGNOSIS — R91.1 PULMONARY NODULE, RIGHT: ICD-10-CM

## 2017-03-21 DIAGNOSIS — J30.2 SEASONAL ALLERGIC RHINITIS, UNSPECIFIED ALLERGIC RHINITIS TRIGGER: ICD-10-CM

## 2017-03-21 DIAGNOSIS — J43.8 OTHER EMPHYSEMA: Primary | ICD-10-CM

## 2017-03-21 LAB
PRE FEV1 FVC: 43.23 % (ref 65.5–85.09)
PRE FEV1: 0.47 L (ref 1.22–2.21)
PRE FVC: 1.08 L (ref 1.71–2.89)
PRE PEF: 1.85 L/S (ref 3.22–6.13)

## 2017-03-21 PROCEDURE — 3074F SYST BP LT 130 MM HG: CPT | Mod: S$GLB,,, | Performed by: INTERNAL MEDICINE

## 2017-03-21 PROCEDURE — 94060 EVALUATION OF WHEEZING: CPT | Mod: S$GLB,,, | Performed by: INTERNAL MEDICINE

## 2017-03-21 PROCEDURE — 99215 OFFICE O/P EST HI 40 MIN: CPT | Mod: 25,S$GLB,, | Performed by: INTERNAL MEDICINE

## 2017-03-21 PROCEDURE — 3078F DIAST BP <80 MM HG: CPT | Mod: S$GLB,,, | Performed by: INTERNAL MEDICINE

## 2017-03-21 PROCEDURE — 1157F ADVNC CARE PLAN IN RCRD: CPT | Mod: S$GLB,,, | Performed by: INTERNAL MEDICINE

## 2017-03-21 PROCEDURE — 99999 PR PBB SHADOW E&M-EST. PATIENT-LVL III: CPT | Mod: PBBFAC,,, | Performed by: INTERNAL MEDICINE

## 2017-03-21 PROCEDURE — 1160F RVW MEDS BY RX/DR IN RCRD: CPT | Mod: S$GLB,,, | Performed by: INTERNAL MEDICINE

## 2017-03-21 PROCEDURE — 1159F MED LIST DOCD IN RCRD: CPT | Mod: S$GLB,,, | Performed by: INTERNAL MEDICINE

## 2017-03-21 PROCEDURE — 1125F AMNT PAIN NOTED PAIN PRSNT: CPT | Mod: S$GLB,,, | Performed by: INTERNAL MEDICINE

## 2017-03-21 RX ORDER — CETIRIZINE HYDROCHLORIDE 10 MG/1
10 TABLET ORAL DAILY
Qty: 30 TABLET | Refills: 11 | Status: SHIPPED | OUTPATIENT
Start: 2017-03-21 | End: 2017-08-03

## 2017-03-21 RX ORDER — METHYLPREDNISOLONE 4 MG/1
TABLET ORAL
Qty: 1 PACKAGE | Refills: 1 | Status: SHIPPED | OUTPATIENT
Start: 2017-03-21 | End: 2017-04-05 | Stop reason: ALTCHOICE

## 2017-03-21 RX ORDER — AZITHROMYCIN 250 MG/1
250 TABLET, FILM COATED ORAL DAILY
Qty: 6 TABLET | Refills: 1 | Status: SHIPPED | OUTPATIENT
Start: 2017-03-21 | End: 2017-04-05 | Stop reason: ALTCHOICE

## 2017-03-21 NOTE — PATIENT INSTRUCTIONS
Methylprednisolone tablets  What is this medicine?  METHYLPREDNISOLONE (meth ill pred NISS oh lone) is a corticosteroid. It is commonly used to treat inflammation of the skin, joints, lungs, and other organs. Common conditions treated include asthma, allergies, and arthritis. It is also used for other conditions, such as blood disorders and diseases of the adrenal glands.  How should I use this medicine?  Take this medicine by mouth with a drink of water. Follow the directions on the prescription label. Take it with food or milk to avoid stomach upset. If you are taking this medicine once a day, take it in the morning. Do not take more medicine than you are told to take. Do not suddenly stop taking your medicine because you may develop a severe reaction. Your doctor will tell you how much medicine to take. If your doctor wants you to stop the medicine, the dose may be slowly lowered over time to avoid any side effects.  Talk to your pediatrician regarding the use of this medicine in children. Special care may be needed.  What side effects may I notice from receiving this medicine?  Side effects that you should report to your doctor or health care professional as soon as possible:  · allergic reactions like skin rash, itching or hives, swelling of the face, lips, or tongue  · eye pain, decreased or blurred vision, or bulging eyes  · fever, sore throat, sneezing, cough, or other signs of infection, wounds that will not heal  · increased thirst  · mental depression, mood swings, mistaken feelings of self importance or of being mistreated  · pain in hips, back, ribs, arms, shoulders, or legs  · swelling of the ankles, feet, hands  · trouble passing urine or change in the amount of urine  Side effects that usually do not require medical attention (report to your doctor or health care professional if they continue or are bothersome):  · confusion, excitement, restlessness  · headache  · nausea, vomiting  · skin  problems, acne, thin and shiny skin  · weight gain  What may interact with this medicine?  Do not take this medicine with any of the following medications:  · mifepristone  This medicine may also interact with the following medications:  · tacrolimus  · vaccines  · warfarin  What if I miss a dose?  If you miss a dose, take it as soon as you can. If it is almost time for your next dose, talk to your doctor or health care professional. You may need to miss a dose or take an extra dose. Do not take double or extra doses without advice.  Where should I keep my medicine?  Keep out of the reach of children.  Store at room temperature between 20 and 25 degrees C (68 and 77 degrees F). Throw away any unused medicine after the expiration date.  What should I tell my health care provider before I take this medicine?  They need to know if you have any of these conditions:  · Cushing's syndrome  · diabetes  · glaucoma  · heart problems or disease  · high blood pressure  · infection such as herpes, measles, tuberculosis, or chickenpox  · kidney disease  · liver disease  · mental problems  · myasthenia gravis  · osteoporosis  · seizures  · stomach ulcer or intestine disease including colitis and diverticulitis  · thyroid problem  · an unusual or allergic reaction to lactose, methylprednisolone, other medicines, foods, dyes, or preservatives  · pregnant or trying to get pregnant  · breast-feeding  What should I watch for while using this medicine?  Visit your doctor or health care professional for regular checks on your progress. If you are taking this medicine for a long time, carry an identification card with your name and address, the type and dose of your medicine, and your doctor's name and address.  The medicine may increase your risk of getting an infection. Stay away from people who are sick. Tell your doctor or health care professional if you are around anyone with measles or chickenpox.  If you are going to have surgery,  tell your doctor or health care professional that you have taken this medicine within the last twelve months.  Ask your doctor or health care professional about your diet. You may need to lower the amount of salt you eat.  The medicine can increase your blood sugar. If you are a diabetic check with your doctor if you need help adjusting the dose of your diabetic medicine.  Date Last Reviewed:   NOTE:This sheet is a summary. It may not cover all possible information. If you have questions about this medicine, talk to your doctor, pharmacist, or health care provider. Copyright© 2016 [employer]        Azithromycin tablets  What is this medicine?  AZITHROMYCIN (az ith conrado DEMARCO sin) is a macrolide antibiotic. It is used to treat or prevent certain kinds of bacterial infections. It will not work for colds, flu, or other viral infections.  How should I use this medicine?  Take this medicine by mouth with a full glass of water. Follow the directions on the prescription label. The tablets can be taken with food or on an empty stomach. If the medicine upsets your stomach, take it with food. Take your medicine at regular intervals. Do not take your medicine more often than directed. Take all of your medicine as directed even if you think your are better. Do not skip doses or stop your medicine early. Talk to your pediatrician regarding the use of this medicine in children. Special care may be needed.  What side effects may I notice from receiving this medicine?  Side effects that you should report to your doctor or health care professional as soon as possible:  · allergic reactions like skin rash, itching or hives, swelling of the face, lips, or tongue  · confusion, nightmares or hallucinations  · dark urine  · difficulty breathing  · hearing loss  · irregular heartbeat or chest pain  · pain or difficulty passing urine  · redness, blistering, peeling or loosening of the skin, including inside the mouth  · white patches or  sores in the mouth  · yellowing of the eyes or skin  Side effects that usually do not require medical attention (report to your doctor or health care professional if they continue or are bothersome):  · diarrhea  · dizziness, drowsiness  · headache  · stomach upset or vomiting  · tooth discoloration  · vaginal irritation  What may interact with this medicine?  Do not take this medicine with any of the following medications:  · lincomycin  This medicine may also interact with the following medications:  · amiodarone  · antacids  · birth control pills  · cyclosporine  · digoxin  · magnesium  · nelfinavir  · phenytoin  · warfarin  What if I miss a dose?  If you miss a dose, take it as soon as you can. If it is almost time for your next dose, take only that dose. Do not take double or extra doses.  Where should I keep my medicine?  Keep out of the reach of children.  Store at room temperature between 15 and 30 degrees C (59 and 86 degrees F). Throw away any unused medicine after the expiration date.  What should I tell my health care provider before I take this medicine?  They need to know if you have any of these conditions:  · kidney disease  · liver disease  · irregular heartbeat or heart disease  · an unusual or allergic reaction to azithromycin, erythromycin, other macrolide antibiotics, foods, dyes, or preservatives  · pregnant or trying to get pregnant  · breast-feeding  What should I watch for while using this medicine?  Tell your doctor or health care professional if your symptoms do not improve.  Do not treat diarrhea with over the counter products. Contact your doctor if you have diarrhea that lasts more than 2 days or if it is severe and watery.  This medicine can make you more sensitive to the sun. Keep out of the sun. If you cannot avoid being in the sun, wear protective clothing and use sunscreen. Do not use sun lamps or tanning beds/booths.  Date Last Reviewed:   NOTE:This sheet is a summary. It may not  cover all possible information. If you have questions about this medicine, talk to your doctor, pharmacist, or health care provider. Copyright© 2016 [employer]        Pulmonary Rehabilitation  Pulmonary rehabilitation (rehab) programs help people with chronic lung problems breathe better and improve their overall health and strength. The programs are led by healthcare professionals who are trained to treat people with lung disease. With their help, youll learn about your condition and gain skills to help you manage it.  Features    Pulmonary rehab programs include:  · Exercise. To help you increase endurance, strength, and flexibility.  ¨ You may walk, ride a stationary bike, or do exercises in a chair.  ¨ You will be taught stretches to do before and after exercise.  ¨ You may also use weights to build strength.  ¨ Your pulse, heart rate, and oxygen levels may be checked during exercise.  · Education. About your lung problem.  · Self-management. Information about medicines, oxygen, and other equipment.  · Counseling. Emotional support and problem-solving.  · Stop-smoking. For smokers, ways to quit and support.     Benefits  Pulmonary rehab will help you:  · Do more of the things you enjoy.  · Improve your strength.  · Be more independent.  · Complete daily activities, such as household tasks, with less shortness of breath.  · Manage your symptoms and medicines. This can mean fewer emergency room visits and less time in the hospital.  · Deal with shortness of breath.  · Quit smoking. This is the most important change you can make for your health.  · Get answers to your healthcare questions.  · Set and meet realistic goals.   Date Last Reviewed: 11/1/2016  © 4569-6132 [employer]. [address] [place] [address], [place] [address]. All rights reserved. This information is not intended as a substitute for professional medical care. Always follow your healthcare professional's instructions.        Using Oxygen  at Home  Your healthcare provider has prescribed oxygen to help make breathing easier for you. You will be shown how to use your oxygen unit. Below are some guidelines on using oxygen at home safely. Do all steps each time you use your oxygen unit.   Note: Instructions will vary based on the type of oxygen device you use.    Step 1. Check your supply  · Pressurize your oxygen tank (compressed oxygen tanks only). Other devices may simply be switched on. Make sure you follow the instructions provided by your healthcare provider or medical equipment company.  · Check the oxygen supply level on the tank to be sure you have enough. Your medical supply company will tell you when to call them to let them know that you need more oxygen. Or they will deliver your oxygen on a regular schedule.  · If you have a humidifier bottle, check the water level. When it is at or below 1/2 full, refill it with sterile or distilled water.  Step 2. Attach the tubing  · Attach the cannula tubing (long oxygen tubing) to your oxygen source as you have been shown.  · Be sure the tubing is not bent or blocked.  Step 3. Set your prescribed flow rate  · Set the oxygen to flow at the rate your healthcare provider has prescribed. This is _____________.  · Never change this rate unless told to by your healthcare provider.  Step 4. Insert the cannula  · Insert the nasal cannula into your nose and breathe through your nose normally.  · If youre not sure whether oxygen is flowing, place the nasal cannula in a glass of water. Bubbles mean that oxygen is flowing.  Follow safety guidelines when using oxygen in your home  · Avoid open flames. This includes cigarettes, matches, candles, fireplaces, gas burners, pipes, or anything else that could start a fire.  · Don't smoke or be around others who are smoking.  · Keep oxygen tanks at least 5 feet from gas stoves, space heaters, electric or gas heaters, or any heat sources.  · Don't use lotions or creams  that contain petroleum jelly. This substance can be flammable when mixed with pure oxygen.  · Turn oxygen off when you aren't using it.  · Store the oxygen cylinder upright in a secure, approved storage device.   · Make sure you know what to do in an emergency. Your emergency numbers should include 911 (or your area's emergency number), your healthcare provider, and your medical supply company.  · Always follow the instructions for safe use as recommended by your medical supply company. Not using oxygen safely at home can put you and your neighbors at higher risk for fires and burns.   Maintain your equipment  Ask your medical supply company how often you should change your nasal cannula tubing, your cannula, and your humidifier bottle, if you have one.  Date Last Reviewed: 5/1/2016 © 2000-2016 [employer]. [address] [place] [address]. All rights reserved. This information is not intended as a substitute for professional medical care. Always follow your healthcare professional's instructions.        Pulmonary Rehabilitation: Getting Started  Pulmonary rehabilitation (rehab) programs can help people with chronic lung disease like chronic obstructive pulmonary disease (COPD) which includes emphysema and chronic bronchitis. And, other conditions like cystic fibrosis, pulmonary fibrosis, and sarcoidosis. The programs are provided by healthcare professionals who will:  · Teach you the skills you need to live and breathe better  · Encourage you to put these skills to good use through lifestyle changes  · Help you set realistic goals so you can make these changes gradually and effectively    Pulmonary rehabilitation professionals  The programs are usually led by healthcare providers including nurses and respiratory therapists. The team may also include exercise specialists, physical and occupational therapists, dietitians, pharmacists, and counselors. Although most programs take place in a  group setting, these team members will help you one-on-one when you need it. If you are not in a pulmonary rehab program, ask your healthcare provider or nurse about programs in your area.  Pulmonary rehab programs  The programs may include:  · Exercise training  · Breathing techniques  · How to do things more easily  · Nutritional support  · Information about your condition  · Self-management skills  · Help to stop smoking  · Emotional support  Making changes that work for you  To reach your goals, youll probably need to make changes to your lifestyle. To help make changes go more smoothly:  · Expect new emotions. Its common to resist or feel angry or scared about having to make changes. Youre not alone. Share your feelings with the pulmonary rehab team and people close to you.  · Prepare yourself for slow, steady progress. Change doesnt happen overnight. To feel your best, you need to commit yourself to practicing your new skills. Over time, youll be stronger, have more control of shortness of breath, and be able to do more. But only if you keep at it.  · Get support. Get support from family and friends as you try new things. Tell the people in your life how they can help you reach your goals. Share your ideas and tips for success with other members of your pulmonary rehab group. And dont be embarrassed to ask for help.  My goals  Are there things you cant do now that youd like to be able to do when your pulmonary rehab program is finished? Check off the statements below that may apply to you.  I want to:  ? Breathe better.  ? Understand my lung disease and what I can do to feel better.  ? Have more energy to enjoy my family and friends.  ? Rely less on others.  ? Be able to walk further.  ? Be stronger.  ? Enjoy my hobbies and leisure activities.  ? Eat healthier foods.  ? Quit smoking.  ? Feel less anxious and depressed about my condition.  ? Travel and enjoy myself.  ? Have fewer visits to the hospital  or emergency room.  Other goals:  ___________________________________________________________  ___________________________________________________________  ___________________________________________________________  ___________________________________________________________   Date Last Reviewed: 11/1/2016  © 8275-8968 [employer]. [address] [place] [address], [place] [address]. All rights reserved. This information is not intended as a substitute for professional medical care. Always follow your healthcare professional's instructions.

## 2017-03-21 NOTE — MR AVS SNAPSHOT
[facility]  [address] [facility]  [place] [address]  Phone: [phone]  Fax: [phone]                  Soumya Melchor   3/21/2017 9:40 AM   Office Visit    Description:  Female : 1945   Provider:  Navarro Carmona MD   Department:  [facility]           Reason for Visit     COPD           Diagnoses this Visit        Comments    Other emphysema    -  Primary     Pulmonary nodule, right         Pulmonary nodules/lesions, multiple         Pleural effusion         Seasonal allergic rhinitis, unspecified allergic rhinitis trigger         COPD exacerbation                To Do List           Future Appointments        Provider Department Dept Phone    3/22/2017 1:20 PM Geraldine Umanzor NP [facility] [phone]    2017 3:00 PM [facility] [phone]    2017 9:30 AM [facility] CT1 LIMIT 500 LBS [facility] -  [phone]    2017 11:20 AM Navarro Carmona MD [facility] [phone]      Goals (5 Years of Data)              Today    3/13/17    2/17/17    Blood Pressure < 150/90   108/64  108/64  108/64      Follow-Up and Disposition     Return in about 2 weeks (around 2017) for CXR on retrun.       These Medications        Disp Refills Start End    cetirizine (ZYRTEC) 10 MG tablet 30 tablet 11 3/21/2017 3/21/2018    Take 1 tablet (10 mg total) by mouth once daily. For sinus congestion - Oral    [place] [facility] [place] [address] [place] [address] Ph #: [phone]       methylPREDNISolone (MEDROL DOSEPACK) 4 mg tablet 1 Package 1 3/21/2017 2017    use as directed    [place] [facility] [place] [address] [place] [address] Ph #: [phone]       azithromycin (ZITHROMAX Z-LOTTIE) 250 MG tablet 6 tablet 1 3/21/2017     Take 1 tablet (250 mg total) by mouth once daily. 500 mg on day 1 (two tablets) followed by 250 mg once daily on days  2-5 - Oral    Pharmacy: [facility]  [place] [address] [facility] #: [phone]         [facility]     [facility] - 24/7 Assistance  Registered nurses in the [facility] provide clinical advisement, health education, appointment booking, and other advisory services.  Call for this free service at [phone].             Medications           START taking these NEW medications        Refills    cetirizine (ZYRTEC) 10 MG tablet 11    Sig: Take 1 tablet (10 mg total) by mouth once daily. For sinus congestion    Class: Normal    Route: Oral    methylPREDNISolone (MEDROL DOSEPACK) 4 mg tablet 1    Sig: use as directed    Class: Normal    azithromycin (ZITHROMAX Z-LOTTIE) 250 MG tablet 1    Sig: Take 1 tablet (250 mg total) by mouth once daily. 500 mg on day 1 (two tablets) followed by 250 mg once daily on days 2-5    Class: Normal    Route: Oral      STOP taking these medications     trazodone (DESYREL) 50 MG tablet TAKE ONE TABLET BY MOUTH EVERY EVENING           Verify that the below list of medications is an accurate representation of the medications you are currently taking.  If none reported, the list may be blank. If incorrect, please contact your healthcare provider. Carry this list with you in case of emergency.           Current Medications     albuterol-ipratropium 2.5mg-0.5mg/3mL (DUO-NEB) 0.5 mg-3 mg(2.5 mg base)/3 mL nebulizer solution Take 3 mLs by nebulization every 6 (six) hours.    ascorbic acid, vitamin C, (VITAMIN C) 1000 MG tablet Take 1,000 mg by mouth once daily.     aspirin (ECOTRIN) 81 MG EC tablet Take 1 tablet (81 mg total) by mouth once daily.    azithromycin (ZITHROMAX Z-LOTTIE) 250 MG tablet Take 1 tablet (250 mg total) by mouth once daily. 500 mg on day 1 (two tablets) followed by 250 mg once daily on days 2-5    budesonide (PULMICORT) 0.5 mg/2 mL nebulizer solution Take 2 mLs (0.5 mg total) by nebulization 2 (two) times daily.  Wash out mouth after using.    calcium-vitamin D3 500 mg(1,250mg) -200 unit per tablet Take 1 tablet by mouth 2 (two) times daily with meals.    cetirizine (ZYRTEC) 10 MG tablet Take 1 tablet (10 mg total) by mouth once daily. For sinus congestion    clopidogrel (PLAVIX) 75 mg tablet Take 1 tablet (75 mg total) by mouth once daily.    DOCOSAHEXANOIC ACID/EPA (FISH OIL ORAL) Take 1,200 mg by mouth 2 (two) times daily.    duloxetine (CYMBALTA) 30 MG capsule Take 30 mg by mouth once daily.    ezetimibe (ZETIA) 10 mg tablet Take 1 tablet (10 mg total) by mouth every evening.    fluorometholone 0.1% (FML) 0.1 % DrpS INSTILL ONE DROP IN THE RIGHT EYE THREE TIMES DAILY    FOLIC ACID/MULTIVIT-MIN/LUTEIN (CENTRUM SILVER ORAL) Take by mouth once daily.    furosemide (LASIX) 20 MG tablet Take 1 tablet (20 mg total) by mouth once daily.    hydrocodone-acetaminophen 5-325mg (NORCO) 5-325 mg per tablet Take 1 tablet by mouth every 4 (four) hours as needed.    ipratropium (ATROVENT) 0.06 % nasal spray 2 sprays by Nasal route 4 (four) times daily. As needed for rhinitis    levothyroxine (SYNTHROID) 200 MCG tablet TAKE ONE TABLET BY MOUTH EVERY DAY    MELATONIN ORAL Take 20 mg by mouth nightly as needed.     methylPREDNISolone (MEDROL DOSEPACK) 4 mg tablet use as directed    metoprolol tartrate (LOPRESSOR) 25 MG tablet Take 1 tablet (25 mg total) by mouth 2 (two) times daily.    omeprazole (PRILOSEC) 40 MG capsule TAKE ONE CAPSULE BY MOUTH DAILY    senna (SENOKOT) 8.6 mg tablet Take 3 tablets by mouth.    simvastatin (ZOCOR) 40 MG tablet Take 1 tablet (40 mg total) by mouth every evening.    tiotropium (SPIRIVA WITH HANDIHALER) 18 mcg inhalation capsule Inhale 1 capsule (18 mcg total) into the lungs once daily.    tramadol (ULTRAM) 50 mg tablet Take 50 mg by mouth 2 (two) times daily.    VENTOLIN HFA 90 mcg/actuation inhaler Inhale 2 puffs into the lungs every 4 (four) hours as needed for Wheezing.    zolpidem (AMBIEN) 5 MG Tab TAKE  "ONE TABLET BY MOUTH AT BEDTIME AS NEEDED           Clinical Reference Information           Your Vitals Were     BP Pulse Height SpO2          108/64 73 4' 11" (1.499 m) 99%        Blood Pressure          Most Recent Value    BP  108/64      Allergies as of 3/21/2017     Ace Inhibitors    Iodine And Iodide Containing Products    Lisinopril    Shrimp      Immunizations Administered on Date of Encounter - 3/21/2017     None      Orders Placed During Today's Visit      Normal Orders This Visit    Ambulatory Referral to Pulmonary Rehab     Future Labs/Procedures Expected by Expires    X-Ray Chest Lateral Decubitus Bilateral  3/21/2017 3/21/2018      Instructions      Methylprednisolone tablets  What is this medicine?  METHYLPREDNISOLONE (meth ill pred NISS oh lone) is a corticosteroid. It is commonly used to treat inflammation of the skin, joints, lungs, and other organs. Common conditions treated include asthma, allergies, and arthritis. It is also used for other conditions, such as blood disorders and diseases of the adrenal glands.  How should I use this medicine?  Take this medicine by mouth with a drink of water. Follow the directions on the prescription label. Take it with food or milk to avoid stomach upset. If you are taking this medicine once a day, take it in the morning. Do not take more medicine than you are told to take. Do not suddenly stop taking your medicine because you may develop a severe reaction. Your doctor will tell you how much medicine to take. If your doctor wants you to stop the medicine, the dose may be slowly lowered over time to avoid any side effects.  Talk to your pediatrician regarding the use of this medicine in children. Special care may be needed.  What side effects may I notice from receiving this medicine?  Side effects that you should report to your doctor or health care professional as soon as possible:  · allergic reactions like skin rash, itching or hives, swelling of the face, " lips, or tongue  · eye pain, decreased or blurred vision, or bulging eyes  · fever, sore throat, sneezing, cough, or other signs of infection, wounds that will not heal  · increased thirst  · mental depression, mood swings, mistaken feelings of self importance or of being mistreated  · pain in hips, back, ribs, arms, shoulders, or legs  · swelling of the ankles, feet, hands  · trouble passing urine or change in the amount of urine  Side effects that usually do not require medical attention (report to your doctor or health care professional if they continue or are bothersome):  · confusion, excitement, restlessness  · headache  · nausea, vomiting  · skin problems, acne, thin and shiny skin  · weight gain  What may interact with this medicine?  Do not take this medicine with any of the following medications:  · mifepristone  This medicine may also interact with the following medications:  · tacrolimus  · vaccines  · warfarin  What if I miss a dose?  If you miss a dose, take it as soon as you can. If it is almost time for your next dose, talk to your doctor or health care professional. You may need to miss a dose or take an extra dose. Do not take double or extra doses without advice.  Where should I keep my medicine?  Keep out of the reach of children.  Store at room temperature between 20 and 25 degrees C (68 and 77 degrees F). Throw away any unused medicine after the expiration date.  What should I tell my health care provider before I take this medicine?  They need to know if you have any of these conditions:  · Cushing's syndrome  · diabetes  · glaucoma  · heart problems or disease  · high blood pressure  · infection such as herpes, measles, tuberculosis, or chickenpox  · kidney disease  · liver disease  · mental problems  · myasthenia gravis  · osteoporosis  · seizures  · stomach ulcer or intestine disease including colitis and diverticulitis  · thyroid problem  · an unusual or allergic reaction to lactose,  methylprednisolone, other medicines, foods, dyes, or preservatives  · pregnant or trying to get pregnant  · breast-feeding  What should I watch for while using this medicine?  Visit your doctor or health care professional for regular checks on your progress. If you are taking this medicine for a long time, carry an identification card with your name and address, the type and dose of your medicine, and your doctor's name and address.  The medicine may increase your risk of getting an infection. Stay away from people who are sick. Tell your doctor or health care professional if you are around anyone with measles or chickenpox.  If you are going to have surgery, tell your doctor or health care professional that you have taken this medicine within the last twelve months.  Ask your doctor or health care professional about your diet. You may need to lower the amount of salt you eat.  The medicine can increase your blood sugar. If you are a diabetic check with your doctor if you need help adjusting the dose of your diabetic medicine.  Date Last Reviewed:   NOTE:This sheet is a summary. It may not cover all possible information. If you have questions about this medicine, talk to your doctor, pharmacist, or health care provider. Copyright© 2016 [employer]        Azithromycin tablets  What is this medicine?  AZITHROMYCIN (az ith conrado MYE sin) is a macrolide antibiotic. It is used to treat or prevent certain kinds of bacterial infections. It will not work for colds, flu, or other viral infections.  How should I use this medicine?  Take this medicine by mouth with a full glass of water. Follow the directions on the prescription label. The tablets can be taken with food or on an empty stomach. If the medicine upsets your stomach, take it with food. Take your medicine at regular intervals. Do not take your medicine more often than directed. Take all of your medicine as directed even if you think your are better. Do not skip  doses or stop your medicine early. Talk to your pediatrician regarding the use of this medicine in children. Special care may be needed.  What side effects may I notice from receiving this medicine?  Side effects that you should report to your doctor or health care professional as soon as possible:  · allergic reactions like skin rash, itching or hives, swelling of the face, lips, or tongue  · confusion, nightmares or hallucinations  · dark urine  · difficulty breathing  · hearing loss  · irregular heartbeat or chest pain  · pain or difficulty passing urine  · redness, blistering, peeling or loosening of the skin, including inside the mouth  · white patches or sores in the mouth  · yellowing of the eyes or skin  Side effects that usually do not require medical attention (report to your doctor or health care professional if they continue or are bothersome):  · diarrhea  · dizziness, drowsiness  · headache  · stomach upset or vomiting  · tooth discoloration  · vaginal irritation  What may interact with this medicine?  Do not take this medicine with any of the following medications:  · lincomycin  This medicine may also interact with the following medications:  · amiodarone  · antacids  · birth control pills  · cyclosporine  · digoxin  · magnesium  · nelfinavir  · phenytoin  · warfarin  What if I miss a dose?  If you miss a dose, take it as soon as you can. If it is almost time for your next dose, take only that dose. Do not take double or extra doses.  Where should I keep my medicine?  Keep out of the reach of children.  Store at room temperature between 15 and 30 degrees C (59 and 86 degrees F). Throw away any unused medicine after the expiration date.  What should I tell my health care provider before I take this medicine?  They need to know if you have any of these conditions:  · kidney disease  · liver disease  · irregular heartbeat or heart disease  · an unusual or allergic reaction to azithromycin, erythromycin,  other macrolide antibiotics, foods, dyes, or preservatives  · pregnant or trying to get pregnant  · breast-feeding  What should I watch for while using this medicine?  Tell your doctor or health care professional if your symptoms do not improve.  Do not treat diarrhea with over the counter products. Contact your doctor if you have diarrhea that lasts more than 2 days or if it is severe and watery.  This medicine can make you more sensitive to the sun. Keep out of the sun. If you cannot avoid being in the sun, wear protective clothing and use sunscreen. Do not use sun lamps or tanning beds/booths.  Date Last Reviewed:   NOTE:This sheet is a summary. It may not cover all possible information. If you have questions about this medicine, talk to your doctor, pharmacist, or health care provider. Copyright© 2016 [employer]        Pulmonary Rehabilitation  Pulmonary rehabilitation (rehab) programs help people with chronic lung problems breathe better and improve their overall health and strength. The programs are led by healthcare professionals who are trained to treat people with lung disease. With their help, youll learn about your condition and gain skills to help you manage it.  Features    Pulmonary rehab programs include:  · Exercise. To help you increase endurance, strength, and flexibility.  ¨ You may walk, ride a stationary bike, or do exercises in a chair.  ¨ You will be taught stretches to do before and after exercise.  ¨ You may also use weights to build strength.  ¨ Your pulse, heart rate, and oxygen levels may be checked during exercise.  · Education. About your lung problem.  · Self-management. Information about medicines, oxygen, and other equipment.  · Counseling. Emotional support and problem-solving.  · Stop-smoking. For smokers, ways to quit and support.     Benefits  Pulmonary rehab will help you:  · Do more of the things you enjoy.  · Improve your strength.  · Be more independent.  · Complete daily  activities, such as household tasks, with less shortness of breath.  · Manage your symptoms and medicines. This can mean fewer emergency room visits and less time in the hospital.  · Deal with shortness of breath.  · Quit smoking. This is the most important change you can make for your health.  · Get answers to your healthcare questions.  · Set and meet realistic goals.   Date Last Reviewed: 11/1/2016  © 6740-7506 [employer]. [address] [place] [address], [place] [address]. All rights reserved. This information is not intended as a substitute for professional medical care. Always follow your healthcare professional's instructions.        Using Oxygen at Home  Your healthcare provider has prescribed oxygen to help make breathing easier for you. You will be shown how to use your oxygen unit. Below are some guidelines on using oxygen at home safely. Do all steps each time you use your oxygen unit.   Note: Instructions will vary based on the type of oxygen device you use.    Step 1. Check your supply  · Pressurize your oxygen tank (compressed oxygen tanks only). Other devices may simply be switched on. Make sure you follow the instructions provided by your healthcare provider or medical equipment company.  · Check the oxygen supply level on the tank to be sure you have enough. Your medical supply company will tell you when to call them to let them know that you need more oxygen. Or they will deliver your oxygen on a regular schedule.  · If you have a humidifier bottle, check the water level. When it is at or below 1/2 full, refill it with sterile or distilled water.  Step 2. Attach the tubing  · Attach the cannula tubing (long oxygen tubing) to your oxygen source as you have been shown.  · Be sure the tubing is not bent or blocked.  Step 3. Set your prescribed flow rate  · Set the oxygen to flow at the rate your healthcare provider has prescribed. This is _____________.  · Never change this rate unless told to  by your healthcare provider.  Step 4. Insert the cannula  · Insert the nasal cannula into your nose and breathe through your nose normally.  · If youre not sure whether oxygen is flowing, place the nasal cannula in a glass of water. Bubbles mean that oxygen is flowing.  Follow safety guidelines when using oxygen in your home  · Avoid open flames. This includes cigarettes, matches, candles, fireplaces, gas burners, pipes, or anything else that could start a fire.  · Don't smoke or be around others who are smoking.  · Keep oxygen tanks at least 5 feet from gas stoves, space heaters, electric or gas heaters, or any heat sources.  · Don't use lotions or creams that contain petroleum jelly. This substance can be flammable when mixed with pure oxygen.  · Turn oxygen off when you aren't using it.  · Store the oxygen cylinder upright in a secure, approved storage device.   · Make sure you know what to do in an emergency. Your emergency numbers should include 911 (or your area's emergency number), your healthcare provider, and your medical supply company.  · Always follow the instructions for safe use as recommended by your medical supply company. Not using oxygen safely at home can put you and your neighbors at higher risk for fires and burns.   Maintain your equipment  Ask your medical supply company how often you should change your nasal cannula tubing, your cannula, and your humidifier bottle, if you have one.  Date Last Reviewed: 5/1/2016  © 0929-5605 [employer]. [address] [place] [address]. All rights reserved. This information is not intended as a substitute for professional medical care. Always follow your healthcare professional's instructions.        Pulmonary Rehabilitation: Getting Started  Pulmonary rehabilitation (rehab) programs can help people with chronic lung disease like chronic obstructive pulmonary disease (COPD) which includes emphysema and chronic bronchitis. And, other  conditions like cystic fibrosis, pulmonary fibrosis, and sarcoidosis. The programs are provided by healthcare professionals who will:  · Teach you the skills you need to live and breathe better  · Encourage you to put these skills to good use through lifestyle changes  · Help you set realistic goals so you can make these changes gradually and effectively    Pulmonary rehabilitation professionals  The programs are usually led by healthcare providers including nurses and respiratory therapists. The team may also include exercise specialists, physical and occupational therapists, dietitians, pharmacists, and counselors. Although most programs take place in a group setting, these team members will help you one-on-one when you need it. If you are not in a pulmonary rehab program, ask your healthcare provider or nurse about programs in your area.  Pulmonary rehab programs  The programs may include:  · Exercise training  · Breathing techniques  · How to do things more easily  · Nutritional support  · Information about your condition  · Self-management skills  · Help to stop smoking  · Emotional support  Making changes that work for you  To reach your goals, youll probably need to make changes to your lifestyle. To help make changes go more smoothly:  · Expect new emotions. Its common to resist or feel angry or scared about having to make changes. Youre not alone. Share your feelings with the pulmonary rehab team and people close to you.  · Prepare yourself for slow, steady progress. Change doesnt happen overnight. To feel your best, you need to commit yourself to practicing your new skills. Over time, youll be stronger, have more control of shortness of breath, and be able to do more. But only if you keep at it.  · Get support. Get support from family and friends as you try new things. Tell the people in your life how they can help you reach your goals. Share your ideas and tips for success with other members of your  pulmonary rehab group. And dont be embarrassed to ask for help.  My goals  Are there things you cant do now that youd like to be able to do when your pulmonary rehab program is finished? Check off the statements below that may apply to you.  I want to:  ? Breathe better.  ? Understand my lung disease and what I can do to feel better.  ? Have more energy to enjoy my family and friends.  ? Rely less on others.  ? Be able to walk further.  ? Be stronger.  ? Enjoy my hobbies and leisure activities.  ? Eat healthier foods.  ? Quit smoking.  ? Feel less anxious and depressed about my condition.  ? Travel and enjoy myself.  ? Have fewer visits to the hospital or emergency room.  Other goals:  ___________________________________________________________  ___________________________________________________________  ___________________________________________________________  ___________________________________________________________   Date Last Reviewed: 11/1/2016  © 5273-6861 [employer]. [address] [place] [address], [place] [address]. All rights reserved. This information is not intended as a substitute for professional medical care. Always follow your healthcare professional's instructions.             Language Assistance Services     ATTENTION: Language assistance services are available, free of charge. Please call [phone].      ATENCIÓN: Si ac ny, tiene a baxter disposición servicios gratuitos de asistencia lingüística. Wilma mayers [phone].     JAYY Ý: N?u b?n nói Ti?ng Vi?t, có các d?ch v? h? tr? ngôn ng? mi?n phí dành cho b?n. G?i s? [phone].         [facility] complies with applicable Federal civil rights laws and does not discriminate on the basis of race, color, national origin, age, disability, or sex.

## 2017-03-21 NOTE — PROGRESS NOTES
Subjective:       Patient ID: Soumya Melchor is a 71 y.o. female.    Chief Complaint: COPD    HPI COPD  She presents for evaluation and treatment of COPD. The patient is currently having symptoms / an exacerbation. Current symptoms include chronic dyspnea and cough productive of yellow sputum in moderate amounts. Symptoms have been present since several weeks ago and have been gradually worsening. She denies chills and fever. Associated symptoms include poor exercise tolerance and shortness of breath.  This episode appears to have been triggered by pollens. Treatments tried for the current exacerbation: albuterol nebulizer. The patient has been having similar episodes for approximately 10 years. She uses 1 pillows at night. Patient currently is on oxygen at 2-3 L/min per nasal cannula.. The patient is having no constitutional symptoms, denying fever, chills, anorexia, or weight loss. The patient has been hospitalized for this condition before. She quit smoking approximately 5 years ago. The patient is experiencing exercise intolerance (difficulty walking 1 blocks on flat ground).    Chest Pain: 5/10    She   presents for evaluation and treatment of Chronic Obstructive Pulmonary Disease and Coronary Artery Disease  after being discharged from the hospital  1  month ago following Coronary Artery Bypass Grafting .. Since discharge symptoms have unchanged course since that time. Patient denies fever or chills. Symptoms are aggravated by activity. Symptoms improve with rest.  Respiratory: positive for cough, dyspnea on exertion and pleurisy/chest pain; Cardiovascular: no chest pain or palpitations.  Patient currently is on oxygen at 2-3 L/min per nasal cannula..      [facility] -   Cardiology  Discharge Summary   Patient Name: Soumya Melchor  MRN: [id number]  Admission Date: 2/13/2017  Hospital Length of Stay: 4 days  Discharge Date and Time: 2/17/2017 2:45 PM  Attending Physician: No att. providers  found  Discharging Provider: NEERAJ Schroeder  Primary Care Physician: Howard Mathews MD     HPI:  This is a 71-year-old cigarette abusing white female with a history of hypertension, hyperlipidemia and significant COPD. She has a considerable amount of dyspnea with exertion, had an abnormal outpatient workup and underwent a heart catheterization by Dr. Giuliano Henry. This catheterization demonstrated good left ventricular function, significant triple vessel disease.The patient required preoperative pulmonary consultation and after a period of   treatment eventually she was cleared from a pulmonary standpoint for coronary artery bypass grafting although both she and her family were aware that this would be at significantly increased risk for respiratory complications.        Procedure(s) (LRB):  AOROTOCORONARY BYPASS-CABG (N/A)      Indwelling Lines/Drains at time of discharge:  Lines/Drains/Airways            No matching active lines, drains, or airways            Hospital Course: Once the patient agreed and consented to undergo surgery, on 2/13/2017 Dr. Bonds performed an Off-pump coronary artery bypass grafting x3 with left internal mammary artery to LAD, aorta to first obtuse marginal, aorta to distal right coronary artery. She tolerated the procedure well and afterwards was transferred to the ICU in stable condition.     Postoperative Course: POD #1: Patient was extubated per CVT protocol. Vital signs and lab values stable. H&H was 11.8 & 34.1. She was deemed medically stable and orders placed to transfer to telemetry. Chest tubes to stay, hernandez and appropriate lines were removed. POD #2: Patient doing well. Chest tubes with minimal output and thus removed. Patient ambulating in halls without issues. NSR on tele. POD #3: Patient with no new events. POD #4: Patient doing well and was cleared for discharge by cardiology and CVT surgeons.         Past Medical History:   Diagnosis Date    Acid reflux  4/11/2014    Anxiety and depression 3/6/2014    AP (angina pectoris) 2/13/2017    CAD (coronary artery disease) 2/13/2017    CAD, multiple vessel 2/13/2017    Chronic pain associated with significant psychosocial dysfunction 2/21/2013    COPD (chronic obstructive pulmonary disease)     HTN (hypertension)     Hypothyroidism     S/P CABG (coronary artery bypass graft) 2/13/2017     Past Surgical History:   Procedure Laterality Date    DILATION AND CURETTAGE OF UTERUS      KNEE SURGERY Right     SPINAL CORD STIMULATOR IMPLANT      TONSILLECTOMY       Social History     Social History    Marital status:      Spouse name: N/A    Number of children: N/A    Years of education: N/A     Occupational History    Not on file.     Social History Main Topics    Smoking status: Former Smoker     Packs/day: 1.00     Years: 50.00     Quit date: 12/10/2012    Smokeless tobacco: Not on file    Alcohol use No    Drug use: No    Sexual activity: Not on file     Other Topics Concern    Not on file     Social History Narrative     Review of Systems   Constitutional: Positive for fatigue. Negative for fever.   HENT: Positive for postnasal drip and rhinorrhea. Negative for congestion.    Respiratory: Positive for cough, sputum production, shortness of breath, dyspnea on extertion, use of rescue inhaler and Paroxysmal Nocturnal Dyspnea.    Cardiovascular: Negative for chest pain, palpitations and leg swelling.   Skin: Negative for rash.   Gastrointestinal: Negative for nausea and abdominal pain.   Neurological: Negative for dizziness, syncope, weakness and light-headedness.   Hematological: Negative for adenopathy. Does not bruise/bleed easily.   Psychiatric/Behavioral: Negative for sleep disturbance. The patient is not nervous/anxious.        Objective:      Physical Exam   Constitutional: She is oriented to person, place, and time. She appears well-developed and well-nourished.   HENT:   Head: Normocephalic  and atraumatic.   Mouth/Throat: Oropharyngeal exudate present.   Eyes: Conjunctivae are normal. Pupils are equal, round, and reactive to light.   Neck: Neck supple. No JVD present. No tracheal deviation present. No thyromegaly present.   Cardiovascular: Normal rate, regular rhythm and normal heart sounds.    Pulmonary/Chest: Effort normal. No respiratory distress. She has decreased breath sounds. She has no wheezes. She has rales in the right lower field and the left lower field. She exhibits no tenderness.   Abdominal: Soft. Bowel sounds are normal.   Musculoskeletal: Normal range of motion. She exhibits no edema.   Lymphadenopathy:     She has no cervical adenopathy.   Neurological: She is alert and oriented to person, place, and time.   Skin: Skin is warm and dry.   Nursing note and vitals reviewed.    Personal Diagnostic Review  Chest X-Ray: I personally reviewed the films and findings are:, air trapping/emphysema  Pulmonary function tests:  Severe obstruction  No flowsheet data found.      Assessment:       1. Other emphysema    2. Pulmonary nodule, right    3. Pulmonary nodules/lesions, multiple    4. Pleural effusion    5. Seasonal allergic rhinitis, unspecified allergic rhinitis trigger    6. COPD exacerbation        Outpatient Encounter Prescriptions as of 3/21/2017   Medication Sig Dispense Refill    albuterol-ipratropium 2.5mg-0.5mg/3mL (DUO-NEB) 0.5 mg-3 mg(2.5 mg base)/3 mL nebulizer solution Take 3 mLs by nebulization every 6 (six) hours. 120 vial 12    ascorbic acid, vitamin C, (VITAMIN C) 1000 MG tablet Take 1,000 mg by mouth once daily.       aspirin (ECOTRIN) 81 MG EC tablet Take 1 tablet (81 mg total) by mouth once daily. 30 tablet 1    budesonide (PULMICORT) 0.5 mg/2 mL nebulizer solution Take 2 mLs (0.5 mg total) by nebulization 2 (two) times daily. Wash out mouth after using. 120 mL 12    calcium-vitamin D3 500 mg(1,250mg) -200 unit per tablet Take 1 tablet by mouth 2 (two) times daily  with meals. 60 tablet 11    clopidogrel (PLAVIX) 75 mg tablet Take 1 tablet (75 mg total) by mouth once daily. 30 tablet 1    DOCOSAHEXANOIC ACID/EPA (FISH OIL ORAL) Take 1,200 mg by mouth 2 (two) times daily.      duloxetine (CYMBALTA) 30 MG capsule Take 30 mg by mouth once daily.      ezetimibe (ZETIA) 10 mg tablet Take 1 tablet (10 mg total) by mouth every evening. 30 tablet 11    fluorometholone 0.1% (FML) 0.1 % DrpS INSTILL ONE DROP IN THE RIGHT EYE THREE TIMES DAILY  0    FOLIC ACID/MULTIVIT-MIN/LUTEIN (CENTRUM SILVER ORAL) Take by mouth once daily.      furosemide (LASIX) 20 MG tablet Take 1 tablet (20 mg total) by mouth once daily. 30 tablet 11    hydrocodone-acetaminophen 5-325mg (NORCO) 5-325 mg per tablet Take 1 tablet by mouth every 4 (four) hours as needed. 10 tablet 0    ipratropium (ATROVENT) 0.06 % nasal spray 2 sprays by Nasal route 4 (four) times daily. As needed for rhinitis 15 mL 11    levothyroxine (SYNTHROID) 200 MCG tablet TAKE ONE TABLET BY MOUTH EVERY DAY 30 tablet 11    MELATONIN ORAL Take 20 mg by mouth nightly as needed.       metoprolol tartrate (LOPRESSOR) 25 MG tablet Take 1 tablet (25 mg total) by mouth 2 (two) times daily. 60 tablet 11    omeprazole (PRILOSEC) 40 MG capsule TAKE ONE CAPSULE BY MOUTH DAILY 30 capsule 11    senna (SENOKOT) 8.6 mg tablet Take 3 tablets by mouth.      simvastatin (ZOCOR) 40 MG tablet Take 1 tablet (40 mg total) by mouth every evening. 90 tablet 3    tiotropium (SPIRIVA WITH HANDIHALER) 18 mcg inhalation capsule Inhale 1 capsule (18 mcg total) into the lungs once daily. 30 capsule 11    tramadol (ULTRAM) 50 mg tablet Take 50 mg by mouth 2 (two) times daily.  5    VENTOLIN HFA 90 mcg/actuation inhaler Inhale 2 puffs into the lungs every 4 (four) hours as needed for Wheezing. 1 Inhaler 11    zolpidem (AMBIEN) 5 MG Tab TAKE ONE TABLET BY MOUTH AT BEDTIME AS NEEDED 30 tablet 3    [DISCONTINUED] trazodone (DESYREL) 50 MG tablet TAKE ONE  TABLET BY MOUTH EVERY EVENING 30 tablet 11    azithromycin (ZITHROMAX Z-LOTTIE) 250 MG tablet Take 1 tablet (250 mg total) by mouth once daily. 500 mg on day 1 (two tablets) followed by 250 mg once daily on days 2-5 6 tablet 1    cetirizine (ZYRTEC) 10 MG tablet Take 1 tablet (10 mg total) by mouth once daily. For sinus congestion 30 tablet 11    methylPREDNISolone (MEDROL DOSEPACK) 4 mg tablet use as directed 1 Package 1    [DISCONTINUED] amlodipine (NORVASC) 10 MG tablet Take 1 tablet (10 mg total) by mouth once daily. 30 tablet 5    [DISCONTINUED] magnesium 30 mg Tab Take 30 mg by mouth.      [DISCONTINUED] metoprolol tartrate (LOPRESSOR) 25 MG tablet TAKE ONE TABLET BY MOUTH TWICE DAILY 60 tablet 11    [DISCONTINUED] nitrofurantoin, macrocrystal-monohydrate, (MACROBID) 100 MG capsule Take 100 mg by mouth 2 (two) times daily.  0    [DISCONTINUED] omeprazole (PRILOSEC) 40 MG capsule Take 1 capsule (40 mg total) by mouth once daily. 30 capsule 3    [DISCONTINUED] potassium 99 mg Tab Take by mouth.      [DISCONTINUED] SENNOSIDES (SENNA LAXATIVE ORAL) Take by mouth 3 (three) times daily.      [DISCONTINUED] simvastatin (ZOCOR) 20 MG tablet Take 20 mg by mouth every evening.  11    [DISCONTINUED] triamterene-hydrochlorothiazide 37.5-25 mg (DYAZIDE) 37.5-25 mg per capsule Take 1 capsule by mouth every morning. 30 capsule 11    [] diazePAM tablet 5 mg       [] diphenhydrAMINE capsule 50 mg       [] famotidine (PF) 20 mg/2 mL injection 20 mg       [] methylPREDNISolone sod suc(PF) 125 mg/2 mL injection 125 mg       [DISCONTINUED] 0.9%  NaCl infusion       [DISCONTINUED] acetaminophen tablet 650 mg       [DISCONTINUED] atropine injection 0.5 mg       [DISCONTINUED] hydrocodone-acetaminophen 5-325mg per tablet 1 tablet       [DISCONTINUED] nitroGLYCERIN SL tablet 0.4 mg       [DISCONTINUED] oxycodone immediate release tablet 10 mg        No facility-administered encounter  medications on file as of 3/21/2017.      Orders Placed This Encounter   Procedures    X-Ray Chest Lateral Decubitus Bilateral     Standing Status:   Future     Standing Expiration Date:   3/21/2018    Ambulatory Referral to Pulmonary Rehab     Referral Priority:   Routine     Referral Type:   Consultation     Referral Reason:   Specialty Services Required     Requested Specialty:   Respiratory Therapy     Number of Visits Requested:   1     Plan:       Requested Prescriptions     Signed Prescriptions Disp Refills    cetirizine (ZYRTEC) 10 MG tablet 30 tablet 11     Sig: Take 1 tablet (10 mg total) by mouth once daily. For sinus congestion    methylPREDNISolone (MEDROL DOSEPACK) 4 mg tablet 1 Package 1     Sig: use as directed    azithromycin (ZITHROMAX Z-LOTTIE) 250 MG tablet 6 tablet 1     Sig: Take 1 tablet (250 mg total) by mouth once daily. 500 mg on day 1 (two tablets) followed by 250 mg once daily on days 2-5     Other emphysema  -     Ambulatory Referral to Pulmonary Rehab    Pulmonary nodule, right    Pulmonary nodules/lesions, multiple    Pleural effusion  -     X-Ray Chest Lateral Decubitus Bilateral; Future; Expected date: 3/21/17    Seasonal allergic rhinitis, unspecified allergic rhinitis trigger  -     cetirizine (ZYRTEC) 10 MG tablet; Take 1 tablet (10 mg total) by mouth once daily. For sinus congestion  Dispense: 30 tablet; Refill: 11    COPD exacerbation  -     methylPREDNISolone (MEDROL DOSEPACK) 4 mg tablet; use as directed  Dispense: 1 Package; Refill: 1  -     azithromycin (ZITHROMAX Z-LOTTIE) 250 MG tablet; Take 1 tablet (250 mg total) by mouth once daily. 500 mg on day 1 (two tablets) followed by 250 mg once daily on days 2-5  Dispense: 6 tablet; Refill: 1    Return in about 2 weeks (around 4/4/2017) for CXR on retrun.    MEDICAL DECISION MAKING: Moderate to high complexity.  Overall, the multiple problems listed are of moderate to high severity that may impact quality of life and activities  of daily living. Side effects of medications, treatment plan as well as options and alternatives reviewed and discussed with patient. There was counseling of patient concerning these issues.    Total time spent in face to face counseling and coordination of care - 40 minutes over 50% of time was used in discussion of prognosis, risks, benefits of treatment, instructions and compliance with regimen . Discussion with other physicians or health care providers (homehealth, durable medical equipment providers).

## 2017-04-05 ENCOUNTER — HOSPITAL ENCOUNTER (OUTPATIENT)
Dept: RADIOLOGY | Facility: HOSPITAL | Age: 72
Discharge: HOME | End: 2017-04-05
Attending: INTERNAL MEDICINE
Payer: MEDICARE

## 2017-04-05 ENCOUNTER — OFFICE VISIT (OUTPATIENT)
Dept: PULMONOLOGY | Facility: CLINIC | Age: 72
End: 2017-04-05
Payer: MEDICARE

## 2017-04-05 VITALS
HEIGHT: 59 IN | WEIGHT: 116.63 LBS | HEART RATE: 61 BPM | BODY MASS INDEX: 23.51 KG/M2 | DIASTOLIC BLOOD PRESSURE: 66 MMHG | SYSTOLIC BLOOD PRESSURE: 114 MMHG

## 2017-04-05 DIAGNOSIS — J90 PLEURAL EFFUSION: ICD-10-CM

## 2017-04-05 DIAGNOSIS — J44.9 CHRONIC OBSTRUCTIVE PULMONARY DISEASE, UNSPECIFIED COPD TYPE: Primary | ICD-10-CM

## 2017-04-05 PROCEDURE — 1160F RVW MEDS BY RX/DR IN RCRD: CPT | Mod: S$GLB,,, | Performed by: INTERNAL MEDICINE

## 2017-04-05 PROCEDURE — 99999 PR PBB SHADOW E&M-EST. PATIENT-LVL IV: CPT | Mod: PBBFAC,,, | Performed by: INTERNAL MEDICINE

## 2017-04-05 PROCEDURE — 1157F ADVNC CARE PLAN IN RCRD: CPT | Mod: S$GLB,,, | Performed by: INTERNAL MEDICINE

## 2017-04-05 PROCEDURE — 1159F MED LIST DOCD IN RCRD: CPT | Mod: S$GLB,,, | Performed by: INTERNAL MEDICINE

## 2017-04-05 PROCEDURE — 71035 XR CHEST LATERAL DECUBITUS BILAT: CPT | Mod: 26,,, | Performed by: RADIOLOGY

## 2017-04-05 PROCEDURE — 99215 OFFICE O/P EST HI 40 MIN: CPT | Mod: S$GLB,,, | Performed by: INTERNAL MEDICINE

## 2017-04-05 PROCEDURE — 3074F SYST BP LT 130 MM HG: CPT | Mod: S$GLB,,, | Performed by: INTERNAL MEDICINE

## 2017-04-05 PROCEDURE — 1126F AMNT PAIN NOTED NONE PRSNT: CPT | Mod: S$GLB,,, | Performed by: INTERNAL MEDICINE

## 2017-04-05 PROCEDURE — 3078F DIAST BP <80 MM HG: CPT | Mod: S$GLB,,, | Performed by: INTERNAL MEDICINE

## 2017-04-05 RX ORDER — AMLODIPINE BESYLATE 10 MG/1
10 TABLET ORAL DAILY
Refills: 5 | COMMUNITY
Start: 2017-03-24 | End: 2017-04-24 | Stop reason: SINTOL

## 2017-04-05 RX ORDER — TRAZODONE HYDROCHLORIDE 50 MG/1
50 TABLET ORAL NIGHTLY
Refills: 11 | COMMUNITY
Start: 2017-03-23 | End: 2017-09-05

## 2017-04-05 NOTE — MR AVS SNAPSHOT
[facility]  [address]  [place] [address]  Phone: [phone]  Fax: [phone]                  Soumya Melchor   2017 1:00 PM   Office Visit    Description:  Female : 1945   Provider:  Navarro Carmona MD   Department:  [facility]           Reason for Visit     Pleural Effusion           Diagnoses this Visit        Comments    Chronic obstructive pulmonary disease, unspecified COPD type    -  Primary            To Do List           Future Appointments        Provider Department Dept Phone    2017 3:00 PM Giuliano Henry MD [facility] [phone]    1/15/2018 9:30 AM [facility] CT1 LIMIT 500 LBS [facility] -  [phone]    1/15/2018 11:00 AM Navarro Carmona MD [facility] [phone]    3/26/2018 10:40 AM [facility], [facility] [phone]    3/26/2018 11:00 AM Nirmala Marcelino NP [facility] [phone]      Goals (5 Years of Data)              Today    3/21/17    3/13/17    Blood Pressure < 150/90   114/66  114/66  114/66      Follow-Up and Disposition     Return in about 1 year (around 2018) for cxr and rodolfo.      [facility]     [facility] Care [facility] -  Assistance  Unless otherwise directed by your provider, please contact [facility], our nurse care line that is available for  assistance.     Registered nurses in the [facility] provide: appointment scheduling, clinical advisement, health education, and other advisory services.  Call: [phone] (toll free)               Medications           STOP taking these medications     azithromycin (ZITHROMAX Z-LOTTIE) 250 MG tablet Take 1 tablet (250 mg total) by mouth once daily. 500 mg on day 1 (two tablets) followed by 250 mg once daily on days 2-5    methylPREDNISolone (MEDROL DOSEPACK) 4 mg tablet use as directed    hydrocodone-acetaminophen 5-325mg  (NORCO) 5-325 mg per tablet Take 1 tablet by mouth every 4 (four) hours as needed.    budesonide (PULMICORT) 0.5 mg/2 mL nebulizer solution Take 2 mLs (0.5 mg total) by nebulization 2 (two) times daily. Wash out mouth after using.    albuterol-ipratropium 2.5mg-0.5mg/3mL (DUO-NEB) 0.5 mg-3 mg(2.5 mg base)/3 mL nebulizer solution Take 3 mLs by nebulization every 6 (six) hours.           Verify that the below list of medications is an accurate representation of the medications you are currently taking.  If none reported, the list may be blank. If incorrect, please contact your healthcare provider. Carry this list with you in case of emergency.           Current Medications     amlodipine (NORVASC) 10 MG tablet Take 10 mg by mouth once daily.    ascorbic acid, vitamin C, (VITAMIN C) 1000 MG tablet Take 1,000 mg by mouth once daily.     aspirin (ECOTRIN) 81 MG EC tablet Take 1 tablet (81 mg total) by mouth once daily.    calcium-vitamin D3 500 mg(1,250mg) -200 unit per tablet Take 1 tablet by mouth 2 (two) times daily with meals.    cetirizine (ZYRTEC) 10 MG tablet Take 1 tablet (10 mg total) by mouth once daily. For sinus congestion    clopidogrel (PLAVIX) 75 mg tablet Take 1 tablet (75 mg total) by mouth once daily.    DOCOSAHEXANOIC ACID/EPA (FISH OIL ORAL) Take 1,200 mg by mouth 2 (two) times daily.    duloxetine (CYMBALTA) 30 MG capsule Take 30 mg by mouth once daily.    ezetimibe (ZETIA) 10 mg tablet Take 1 tablet (10 mg total) by mouth every evening.    fluorometholone 0.1% (FML) 0.1 % DrpS INSTILL ONE DROP IN THE RIGHT EYE THREE TIMES DAILY    FOLIC ACID/MULTIVIT-MIN/LUTEIN (CENTRUM SILVER ORAL) Take by mouth once daily.    furosemide (LASIX) 20 MG tablet Take 1 tablet (20 mg total) by mouth once daily.    ipratropium (ATROVENT) 0.06 % nasal spray 2 sprays by Nasal route 4 (four) times daily. As needed for rhinitis    levothyroxine (SYNTHROID) 200 MCG tablet TAKE ONE TABLET BY MOUTH EVERY DAY    MELATONIN ORAL  "Take 20 mg by mouth nightly as needed.     metoprolol tartrate (LOPRESSOR) 25 MG tablet Take 1 tablet (25 mg total) by mouth 2 (two) times daily.    omeprazole (PRILOSEC) 40 MG capsule TAKE ONE CAPSULE BY MOUTH DAILY    senna (SENOKOT) 8.6 mg tablet Take 3 tablets by mouth.    simvastatin (ZOCOR) 40 MG tablet Take 1 tablet (40 mg total) by mouth every evening.    tiotropium (SPIRIVA WITH HANDIHALER) 18 mcg inhalation capsule Inhale 1 capsule (18 mcg total) into the lungs once daily.    tramadol (ULTRAM) 50 mg tablet Take 50 mg by mouth 2 (two) times daily.    trazodone (DESYREL) 50 MG tablet Take 50 mg by mouth every evening.    VENTOLIN HFA 90 mcg/actuation inhaler Inhale 2 puffs into the lungs every 4 (four) hours as needed for Wheezing.    zolpidem (AMBIEN) 5 MG Tab TAKE ONE TABLET BY MOUTH AT BEDTIME AS NEEDED           Clinical Reference Information           Your Vitals Were     BP Pulse Height Weight BMI    114/66 61 4' 11" (1.499 m) 52.9 kg (116 lb 10 oz) 23.56 kg/m2      Blood Pressure          Most Recent Value    BP  114/66      Allergies as of 4/5/2017     Ace Inhibitors    Iodine And Iodide Containing Products    Lisinopril    Shrimp      Immunizations Administered on Date of Encounter - 4/5/2017     None      Orders Placed During Today's Visit     Future Labs/Procedures Expected by Expires    Spirometry with/without bronchodilator  10/6/2017 (Approximate) 4/5/2018    X-Ray Chest PA And Lateral  As directed 4/5/2019      Language Assistance Services     ATTENTION: Language assistance services are available, free of charge. Please call [phone].      ATENCIÓN: Si habla kokiañol, tiene a baxter disposición servicios gratuitos de asistencia lingüística. Llame al [phone].     CHÚ Ý: N?u b?n nói Ti?ng Vi?t, có các d?ch v? h? tr? ngôn ng? mi?n phí dành cho b?n. G?i s? [phone].         [facility] complies with applicable Federal civil rights laws and does not discriminate on the " basis of race, color, national origin, age, disability, or sex.

## 2017-04-05 NOTE — PROGRESS NOTES
Subjective:       Patient ID: Soumya Melchor is a 71 y.o. female.    Chief Complaint: She       Pleural Effusion    HPI     Improved   Dyspnea improved  Still on daily lasix  pleural effusion largely resolved on Chest X Ray    She   presents for evaluation and treatment of Chronic Obstructive Pulmonary Disease and pleural effusion  after being discharged from the hospital  7  weeks ago for Coronary Artery Bypass Grafting . Since discharge symptoms have gradually improving course since that time. Patient denies chest pain , wheezing . Symptoms are aggravated by activity. Symptoms improve with rest.  Respiratory: positive for dyspnea on exertion; Cardiovascular: no chest pain or palpitations.  Patient currently is not on home oxygen therapy..      REVIEW OF DISCHARGE SUMMARY AND HOSPITAL COURSE:  [facility]  Cardiology  Discharge Summary      Patient Name: Soumya Melchor  MRN: [id number]  Admission Date: 2/13/2017  Hospital Length of Stay: 4 days  Discharge Date and Time: 2/17/2017 2:45 PM  Attending Physician: Tahmina att. providers found  Discharging Provider: NEERAJ Schroeder  Primary Care Physician: Howard Mathews MD     HPI:  This is a 71-year-old cigarette abusing white female with a history of hypertension, hyperlipidemia and significant COPD. She has a considerable amount of dyspnea with exertion, had an abnormal outpatient workup and underwent a heart catheterization by Dr. Giuliano Henry. This catheterization demonstrated good left ventricular function, significant triple vessel disease.The patient required preoperative pulmonary consultation and after a period of   treatment eventually she was cleared from a pulmonary standpoint for coronary artery bypass grafting although both she and her family were aware that this would be at significantly increased risk for respiratory complications.        Procedure(s) (LRB):  AOROTOCORONARY BYPASS-CABG (N/A)      Indwelling Lines/Drains at time of  discharge:  Lines/Drains/Airways            No matching active lines, drains, or airways          Hospital Course: Once the patient agreed and consented to undergo surgery, on 2/13/2017 Dr. Bonds performed an Off-pump coronary artery bypass grafting x3 with left internal mammary artery to LAD, aorta to first obtuse marginal, aorta to distal right coronary artery. She tolerated the procedure well and afterwards was transferred to the ICU in stable condition.     Postoperative Course: POD #1: Patient was extubated per CVT protocol. Vital signs and lab values stable. H&H was 11.8 & 34.1. She was deemed medically stable and orders placed to transfer to telemetry. Chest tubes to stay, hernandez and appropriate lines were removed. POD #2: Patient doing well. Chest tubes with minimal output and thus removed. Patient ambulating in halls without issues. NSR on tele. POD #3: Patient with no new events. POD #4: Patient doing well and was cleared for discharge by cardiology and CVT surgeons.    Past Medical History:   Diagnosis Date    Acid reflux 4/11/2014    Anxiety and depression 3/6/2014    AP (angina pectoris) 2/13/2017    CAD (coronary artery disease) 2/13/2017    CAD, multiple vessel 2/13/2017    Chronic pain associated with significant psychosocial dysfunction 2/21/2013    COPD (chronic obstructive pulmonary disease)     HTN (hypertension)     Hypothyroidism     S/P CABG (coronary artery bypass graft) 2/13/2017     Past Surgical History:   Procedure Laterality Date    DILATION AND CURETTAGE OF UTERUS      KNEE SURGERY Right     SPINAL CORD STIMULATOR IMPLANT      TONSILLECTOMY       Social History     Social History    Marital status:      Spouse name: N/A    Number of children: N/A    Years of education: N/A     Occupational History    Not on file.     Social History Main Topics    Smoking status: Former Smoker     Packs/day: 1.00     Years: 50.00     Quit date: 12/10/2012    Smokeless  tobacco: Not on file    Alcohol use No    Drug use: No    Sexual activity: Not on file     Other Topics Concern    Not on file     Social History Narrative     Review of Systems   Constitutional: Negative for fever and fatigue.   HENT: Negative for postnasal drip and rhinorrhea.    Eyes: Negative for redness and itching.   Respiratory: Negative for cough, shortness of breath, wheezing, dyspnea on extertion and Paroxysmal Nocturnal Dyspnea.    Cardiovascular: Positive for chest pain.   Genitourinary: Negative for difficulty urinating and hematuria.   Endocrine: Negative for polyphagia, cold intolerance and heat intolerance.    Musculoskeletal: Negative for arthralgias.   Skin: Negative for rash.   Gastrointestinal: Negative for nausea, vomiting, abdominal pain and abdominal distention.   Neurological: Negative for dizziness and headaches.   Hematological: Negative for adenopathy. Does not bruise/bleed easily and no excessive bruising.   Psychiatric/Behavioral: The patient is not nervous/anxious.        Objective:      Physical Exam   Constitutional: She is oriented to person, place, and time. She appears well-developed and well-nourished.   HENT:   Head: Normocephalic and atraumatic.   Eyes: Conjunctivae are normal. Pupils are equal, round, and reactive to light.   Neck: Neck supple. No JVD present. No tracheal deviation present. No thyromegaly present.   Cardiovascular: Normal rate, regular rhythm and normal heart sounds.    Pulmonary/Chest: Effort normal. No respiratory distress. She has decreased breath sounds. She has no wheezes. She has no rhonchi. She has no rales. She exhibits no tenderness.   Abdominal: Soft. Bowel sounds are normal.   Musculoskeletal: Normal range of motion. She exhibits no edema.   Lymphadenopathy:     She has no cervical adenopathy.   Neurological: She is alert and oriented to person, place, and time.   Skin: Skin is warm and dry.   Nursing note and vitals reviewed.    Personal  Diagnostic Review  Chest x-ray: pleural effusion is resolved. hyperinflation  Post op changes    No flowsheet data found.      Assessment:       1. Chronic obstructive pulmonary disease, unspecified COPD type        Outpatient Encounter Prescriptions as of 4/5/2017   Medication Sig Dispense Refill    amlodipine (NORVASC) 10 MG tablet Take 10 mg by mouth once daily.  5    ascorbic acid, vitamin C, (VITAMIN C) 1000 MG tablet Take 1,000 mg by mouth once daily.       aspirin (ECOTRIN) 81 MG EC tablet Take 1 tablet (81 mg total) by mouth once daily. 30 tablet 1    calcium-vitamin D3 500 mg(1,250mg) -200 unit per tablet Take 1 tablet by mouth 2 (two) times daily with meals. 60 tablet 11    cetirizine (ZYRTEC) 10 MG tablet Take 1 tablet (10 mg total) by mouth once daily. For sinus congestion 30 tablet 11    clopidogrel (PLAVIX) 75 mg tablet Take 1 tablet (75 mg total) by mouth once daily. 30 tablet 1    DOCOSAHEXANOIC ACID/EPA (FISH OIL ORAL) Take 1,200 mg by mouth 2 (two) times daily.      duloxetine (CYMBALTA) 30 MG capsule Take 30 mg by mouth once daily.      ezetimibe (ZETIA) 10 mg tablet Take 1 tablet (10 mg total) by mouth every evening. 30 tablet 11    fluorometholone 0.1% (FML) 0.1 % DrpS INSTILL ONE DROP IN THE RIGHT EYE THREE TIMES DAILY  0    FOLIC ACID/MULTIVIT-MIN/LUTEIN (CENTRUM SILVER ORAL) Take by mouth once daily.      furosemide (LASIX) 20 MG tablet Take 1 tablet (20 mg total) by mouth once daily. 30 tablet 11    ipratropium (ATROVENT) 0.06 % nasal spray 2 sprays by Nasal route 4 (four) times daily. As needed for rhinitis 15 mL 11    levothyroxine (SYNTHROID) 200 MCG tablet TAKE ONE TABLET BY MOUTH EVERY DAY 30 tablet 11    MELATONIN ORAL Take 20 mg by mouth nightly as needed.       metoprolol tartrate (LOPRESSOR) 25 MG tablet Take 1 tablet (25 mg total) by mouth 2 (two) times daily. 60 tablet 11    omeprazole (PRILOSEC) 40 MG capsule TAKE ONE CAPSULE BY MOUTH DAILY 30 capsule 11     senna (SENOKOT) 8.6 mg tablet Take 3 tablets by mouth.      simvastatin (ZOCOR) 40 MG tablet Take 1 tablet (40 mg total) by mouth every evening. 90 tablet 3    tiotropium (SPIRIVA WITH HANDIHALER) 18 mcg inhalation capsule Inhale 1 capsule (18 mcg total) into the lungs once daily. 30 capsule 11    tramadol (ULTRAM) 50 mg tablet Take 50 mg by mouth 2 (two) times daily.  5    trazodone (DESYREL) 50 MG tablet Take 50 mg by mouth every evening.  11    VENTOLIN HFA 90 mcg/actuation inhaler Inhale 2 puffs into the lungs every 4 (four) hours as needed for Wheezing. 1 Inhaler 11    zolpidem (AMBIEN) 5 MG Tab TAKE ONE TABLET BY MOUTH AT BEDTIME AS NEEDED 30 tablet 3    [DISCONTINUED] albuterol-ipratropium 2.5mg-0.5mg/3mL (DUO-NEB) 0.5 mg-3 mg(2.5 mg base)/3 mL nebulizer solution Take 3 mLs by nebulization every 6 (six) hours. 120 vial 12    [DISCONTINUED] azithromycin (ZITHROMAX Z-LOTTIE) 250 MG tablet Take 1 tablet (250 mg total) by mouth once daily. 500 mg on day 1 (two tablets) followed by 250 mg once daily on days 2-5 6 tablet 1    [DISCONTINUED] budesonide (PULMICORT) 0.5 mg/2 mL nebulizer solution Take 2 mLs (0.5 mg total) by nebulization 2 (two) times daily. Wash out mouth after using. 120 mL 12    [DISCONTINUED] hydrocodone-acetaminophen 5-325mg (NORCO) 5-325 mg per tablet Take 1 tablet by mouth every 4 (four) hours as needed. 10 tablet 0    [DISCONTINUED] methylPREDNISolone (MEDROL DOSEPACK) 4 mg tablet use as directed 1 Package 1     No facility-administered encounter medications on file as of 4/5/2017.      Orders Placed This Encounter   Procedures    X-Ray Chest PA And Lateral     Standing Status:   Future     Standing Expiration Date:   4/5/2019     Order Specific Question:   Reason for Exam:     Answer:   SOB    Spirometry with/without bronchodilator     Standing Status:   Future     Standing Expiration Date:   4/5/2018     Plan:       Requested Prescriptions      No prescriptions requested or ordered  in this encounter     Chronic obstructive pulmonary disease, unspecified COPD type  -     Spirometry with/without bronchodilator; Future; Expected date: 10/6/17  -     X-Ray Chest PA And Lateral; Future           Return in about 1 year (around 4/5/2018) for cxr and rodolfo.    MEDICAL DECISION MAKING: Moderate to high complexity.  Overall, the multiple problems listed are of moderate to high severity that may impact quality of life and activities of daily living. Side effects of medications, treatment plan as well as options and alternatives reviewed and discussed with patient. There was counseling of patient concerning these issues.    Total time spent in face to face counseling and coordination of care - 40  minutes over 50% of time was used in discussion of prognosis, risks, benefits of treatment, instructions and compliance with regimen . Discussion with other physicians or health care providers (DME, NP, pharmacy, respiratory therapy) occurred.

## 2017-04-18 RX ORDER — EZETIMIBE 10 MG
TABLET ORAL
Qty: 30 TABLET | Refills: 11 | Status: SHIPPED | OUTPATIENT
Start: 2017-04-18 | End: 2020-06-17

## 2017-04-20 ENCOUNTER — TELEPHONE (OUTPATIENT)
Dept: PULMONOLOGY | Facility: HOSPITAL | Age: 72
End: 2017-04-20

## 2017-04-20 ENCOUNTER — TELEPHONE (OUTPATIENT)
Dept: CARDIOLOGY | Facility: CLINIC | Age: 72
End: 2017-04-20

## 2017-04-20 NOTE — TELEPHONE ENCOUNTER
----- Message from Taisha Ríos sent at 4/20/2017 12:21 PM CDT -----  Contact: Pt  Pt request call from nurse to see if she can get a Nitro Med when she comes for her apt on 04-24-17, please contact pt at [phone]

## 2017-04-24 ENCOUNTER — OFFICE VISIT (OUTPATIENT)
Dept: CARDIOLOGY | Facility: CLINIC | Age: 72
End: 2017-04-24
Payer: MEDICARE

## 2017-04-24 VITALS
HEART RATE: 76 BPM | DIASTOLIC BLOOD PRESSURE: 48 MMHG | BODY MASS INDEX: 22.59 KG/M2 | SYSTOLIC BLOOD PRESSURE: 104 MMHG | HEIGHT: 60 IN | WEIGHT: 115.06 LBS

## 2017-04-24 DIAGNOSIS — J90 PLEURAL EFFUSION: ICD-10-CM

## 2017-04-24 DIAGNOSIS — R06.02 SOB (SHORTNESS OF BREATH): Primary | ICD-10-CM

## 2017-04-24 DIAGNOSIS — E78.00 PURE HYPERCHOLESTEROLEMIA: ICD-10-CM

## 2017-04-24 DIAGNOSIS — I25.10 CORONARY ARTERY DISEASE INVOLVING NATIVE CORONARY ARTERY OF NATIVE HEART WITHOUT ANGINA PECTORIS: ICD-10-CM

## 2017-04-24 DIAGNOSIS — I10 ESSENTIAL HYPERTENSION: ICD-10-CM

## 2017-04-24 DIAGNOSIS — Z72.0 TOBACCO USE: ICD-10-CM

## 2017-04-24 DIAGNOSIS — Z95.1 S/P CABG (CORONARY ARTERY BYPASS GRAFT): ICD-10-CM

## 2017-04-24 PROCEDURE — 99999 PR PBB SHADOW E&M-EST. PATIENT-LVL II: CPT | Mod: PBBFAC,,, | Performed by: INTERNAL MEDICINE

## 2017-04-24 PROCEDURE — 1159F MED LIST DOCD IN RCRD: CPT | Mod: S$GLB,,, | Performed by: INTERNAL MEDICINE

## 2017-04-24 PROCEDURE — 3074F SYST BP LT 130 MM HG: CPT | Mod: S$GLB,,, | Performed by: INTERNAL MEDICINE

## 2017-04-24 PROCEDURE — 1160F RVW MEDS BY RX/DR IN RCRD: CPT | Mod: S$GLB,,, | Performed by: INTERNAL MEDICINE

## 2017-04-24 PROCEDURE — 3078F DIAST BP <80 MM HG: CPT | Mod: S$GLB,,, | Performed by: INTERNAL MEDICINE

## 2017-04-24 PROCEDURE — 99214 OFFICE O/P EST MOD 30 MIN: CPT | Mod: S$GLB,,, | Performed by: INTERNAL MEDICINE

## 2017-04-24 RX ORDER — LOSARTAN POTASSIUM 100 MG/1
100 TABLET ORAL DAILY
Qty: 90 TABLET | Refills: 3 | Status: SHIPPED | OUTPATIENT
Start: 2017-04-24 | End: 2018-02-15

## 2017-04-24 NOTE — PROGRESS NOTES
Subjective:   Patient ID:  Soumya Melchor is a 71 y.o. female who presents for follow-up of echo- nml.  Patient denies CP, angina or anginal equivalent.Pt is off O2.    Hypertension   This is a chronic problem. The current episode started more than 1 year ago. The problem has been gradually improving since onset. The problem is controlled. Pertinent negatives include no chest pain, palpitations or shortness of breath. Past treatments include beta blockers and angiotensin blockers. The current treatment provides moderate improvement. Compliance problems include medication side effects.    Hyperlipidemia   This is a chronic problem. The current episode started more than 1 year ago. The problem is controlled. Recent lipid tests were reviewed and are variable. Pertinent negatives include no chest pain or shortness of breath. Current antihyperlipidemic treatment includes statins and ezetimibe. The current treatment provides moderate improvement of lipids. Compliance problems include adherence to exercise.    Coronary Artery Disease   Presents for follow-up visit. Pertinent negatives include no chest pain, chest pressure, chest tightness, dizziness, leg swelling, muscle weakness, palpitations, shortness of breath or weight gain. Risk factors include hyperlipidemia. The symptoms have been stable. Compliance with diet is variable. Compliance with exercise is variable. Compliance with medications is good.       Review of Systems   Constitution: Negative. Negative for weight gain.   HENT: Negative.    Eyes: Negative.    Cardiovascular: Negative.  Negative for chest pain, leg swelling and palpitations.   Respiratory: Negative.  Negative for chest tightness and shortness of breath.    Endocrine: Negative.    Hematologic/Lymphatic: Negative.    Skin: Negative.    Musculoskeletal: Negative for muscle weakness.   Gastrointestinal: Negative.    Genitourinary: Negative.    Neurological: Negative.  Negative for dizziness.    Psychiatric/Behavioral: Negative.    Allergic/Immunologic: Negative.      Family History   Problem Relation Age of Onset    Heart attacks under age 50 Mother 41     Past Medical History:   Diagnosis Date    Acid reflux 4/11/2014    Anxiety and depression 3/6/2014    AP (angina pectoris) 2/13/2017    CAD (coronary artery disease) 2/13/2017    CAD, multiple vessel 2/13/2017    Chronic pain associated with significant psychosocial dysfunction 2/21/2013    COPD (chronic obstructive pulmonary disease)     HTN (hypertension)     Hypothyroidism     S/P CABG (coronary artery bypass graft) 2/13/2017     Current Outpatient Prescriptions on File Prior to Visit   Medication Sig Dispense Refill    amlodipine (NORVASC) 10 MG tablet Take 10 mg by mouth once daily.  5    ascorbic acid, vitamin C, (VITAMIN C) 1000 MG tablet Take 1,000 mg by mouth once daily.       aspirin (ECOTRIN) 81 MG EC tablet Take 1 tablet (81 mg total) by mouth once daily. 30 tablet 1    calcium-vitamin D3 500 mg(1,250mg) -200 unit per tablet Take 1 tablet by mouth 2 (two) times daily with meals. 60 tablet 11    cetirizine (ZYRTEC) 10 MG tablet Take 1 tablet (10 mg total) by mouth once daily. For sinus congestion 30 tablet 11    clopidogrel (PLAVIX) 75 mg tablet Take 1 tablet (75 mg total) by mouth once daily. 30 tablet 1    DOCOSAHEXANOIC ACID/EPA (FISH OIL ORAL) Take 1,200 mg by mouth 2 (two) times daily.      duloxetine (CYMBALTA) 30 MG capsule Take 30 mg by mouth once daily.      fluorometholone 0.1% (FML) 0.1 % DrpS INSTILL ONE DROP IN THE RIGHT EYE THREE TIMES DAILY  0    FOLIC ACID/MULTIVIT-MIN/LUTEIN (CENTRUM SILVER ORAL) Take by mouth once daily.      furosemide (LASIX) 20 MG tablet Take 1 tablet (20 mg total) by mouth once daily. 30 tablet 11    ipratropium (ATROVENT) 0.06 % nasal spray 2 sprays by Nasal route 4 (four) times daily. As needed for rhinitis 15 mL 11    levothyroxine (SYNTHROID) 200 MCG tablet TAKE ONE TABLET BY  MOUTH EVERY DAY 30 tablet 11    MELATONIN ORAL Take 20 mg by mouth nightly as needed.       metoprolol tartrate (LOPRESSOR) 25 MG tablet Take 1 tablet (25 mg total) by mouth 2 (two) times daily. 60 tablet 11    omeprazole (PRILOSEC) 40 MG capsule TAKE ONE CAPSULE BY MOUTH DAILY 30 capsule 11    senna (SENOKOT) 8.6 mg tablet Take 3 tablets by mouth.      simvastatin (ZOCOR) 40 MG tablet Take 1 tablet (40 mg total) by mouth every evening. 90 tablet 3    tiotropium (SPIRIVA WITH HANDIHALER) 18 mcg inhalation capsule Inhale 1 capsule (18 mcg total) into the lungs once daily. 30 capsule 11    tramadol (ULTRAM) 50 mg tablet Take 50 mg by mouth 2 (two) times daily.  5    trazodone (DESYREL) 50 MG tablet Take 50 mg by mouth every evening.  11    VENTOLIN HFA 90 mcg/actuation inhaler Inhale 2 puffs into the lungs every 4 (four) hours as needed for Wheezing. 1 Inhaler 11    ZETIA 10 mg tablet TAKE 1 TABLET BY MOUTH EVERY EVENING 30 tablet 11    zolpidem (AMBIEN) 5 MG Tab TAKE ONE TABLET BY MOUTH AT BEDTIME AS NEEDED 30 tablet 3     No current facility-administered medications on file prior to visit.      Review of patient's allergies indicates:   Allergen Reactions    Ace inhibitors Other (See Comments)     unknown      Iodine and iodide containing products Hives     Since age of 48    Lisinopril      angioedema    Shrimp Other (See Comments)     Localized itching and swelling on her hands if she peels shrimp.  Able to eat shrimp without difficulty.  No generalized reaction.       Objective:     Physical Exam    Assessment:     1. SOB (shortness of breath)    2. Pleural effusion    3. Essential hypertension    4. Coronary artery disease involving native coronary artery of native heart without angina pectoris    5. Pure hypercholesterolemia    6. S/P CABG (coronary artery bypass graft)    7. Tobacco use        Plan:     SOB (shortness of breath)    Pleural effusion    Essential hypertension    Coronary artery  disease involving native coronary artery of native heart without angina pectoris    Pure hypercholesterolemia    S/P CABG (coronary artery bypass graft)    Tobacco use    continue asa, plavix- cad  coninue statin, zetia -hlp   Continue metoprolol,  norvasc- htn

## 2017-04-24 NOTE — MR AVS SNAPSHOT
[facility]  [address] [facility]  [place] [address]  Phone: [phone]  Fax: [phone]                  Soumya Melchor   2017 3:00 PM   Office Visit    Description:  Female : 1945   Provider:  Giuliano Henry MD   Department:  [facility]           Reason for Visit     Follow-up           Diagnoses this Visit        Comments    SOB (shortness of breath)    -  Primary     Pleural effusion         Essential hypertension         Coronary artery disease involving native coronary artery of native heart without angina pectoris         Pure hypercholesterolemia         S/P CABG (coronary artery bypass graft)         Tobacco use                To Do List           Future Appointments        Provider Department Dept Phone    1/15/2018 9:30 AM [facility] CT1 LIMIT 500 LBS [facility] -  [phone]    1/15/2018 11:00 AM Navarro Carmona MD [facility] [phone]    3/26/2018 10:40 AM [facility]ARY [facility], [place] [facility] [phone]    3/26/2018 11:00 AM Nirmala Marcelino NP [facility] [phone]      Goals (5 Years of Data)              Today    4/5/17    3/21/17    Blood Pressure < 150/90   104/48  104/48  104/48      Follow-Up and Disposition     Return in about 6 months (around 10/24/2017).       These Medications        Disp Refills Start End    losartan (COZAAR) 100 MG tablet 90 tablet 3 2017    Take 1 tablet (100 mg total) by mouth once daily. - Oral    Pharmacy: [facility] - [place] - [address] [place] [address] Ph #: [phone]         [facility]     [facility] Nurse Care Line -  Assistance  Unless otherwise directed by your provider, please contact [facility], our nurse care line that is available for  assistance.     Registered nurses in the [facility] provide: appointment scheduling, clinical advisement, health education, and other advisory  services.  Call: [phone] (toll free)               Medications           START taking these NEW medications        Refills    losartan (COZAAR) 100 MG tablet 3    Sig: Take 1 tablet (100 mg total) by mouth once daily.    Class: Normal    Route: Oral      STOP taking these medications     amlodipine (NORVASC) 10 MG tablet Take 10 mg by mouth once daily.           Verify that the below list of medications is an accurate representation of the medications you are currently taking.  If none reported, the list may be blank. If incorrect, please contact your healthcare provider. Carry this list with you in case of emergency.           Current Medications     ascorbic acid, vitamin C, (VITAMIN C) 1000 MG tablet Take 1,000 mg by mouth once daily.     aspirin (ECOTRIN) 81 MG EC tablet Take 1 tablet (81 mg total) by mouth once daily.    calcium-vitamin D3 500 mg(1,250mg) -200 unit per tablet Take 1 tablet by mouth 2 (two) times daily with meals.    cetirizine (ZYRTEC) 10 MG tablet Take 1 tablet (10 mg total) by mouth once daily. For sinus congestion    clopidogrel (PLAVIX) 75 mg tablet Take 1 tablet (75 mg total) by mouth once daily.    DOCOSAHEXANOIC ACID/EPA (FISH OIL ORAL) Take 1,200 mg by mouth 2 (two) times daily.    duloxetine (CYMBALTA) 30 MG capsule Take 30 mg by mouth once daily.    fluorometholone 0.1% (FML) 0.1 % DrpS INSTILL ONE DROP IN THE RIGHT EYE THREE TIMES DAILY    FOLIC ACID/MULTIVIT-MIN/LUTEIN (CENTRUM SILVER ORAL) Take by mouth once daily.    furosemide (LASIX) 20 MG tablet Take 1 tablet (20 mg total) by mouth once daily.    ipratropium (ATROVENT) 0.06 % nasal spray 2 sprays by Nasal route 4 (four) times daily. As needed for rhinitis    levothyroxine (SYNTHROID) 200 MCG tablet TAKE ONE TABLET BY MOUTH EVERY DAY    MELATONIN ORAL Take 20 mg by mouth nightly as needed.     metoprolol tartrate (LOPRESSOR) 25 MG tablet Take 1 tablet (25 mg total) by mouth 2 (two) times daily.    omeprazole (PRILOSEC) 40  MG capsule TAKE ONE CAPSULE BY MOUTH DAILY    senna (SENOKOT) 8.6 mg tablet Take 3 tablets by mouth.    simvastatin (ZOCOR) 40 MG tablet Take 1 tablet (40 mg total) by mouth every evening.    tiotropium (SPIRIVA WITH HANDIHALER) 18 mcg inhalation capsule Inhale 1 capsule (18 mcg total) into the lungs once daily.    tramadol (ULTRAM) 50 mg tablet Take 50 mg by mouth 2 (two) times daily.    trazodone (DESYREL) 50 MG tablet Take 50 mg by mouth every evening.    VENTOLIN HFA 90 mcg/actuation inhaler Inhale 2 puffs into the lungs every 4 (four) hours as needed for Wheezing.    ZETIA 10 mg tablet TAKE 1 TABLET BY MOUTH EVERY EVENING    zolpidem (AMBIEN) 5 MG Tab TAKE ONE TABLET BY MOUTH AT BEDTIME AS NEEDED    losartan (COZAAR) 100 MG tablet Take 1 tablet (100 mg total) by mouth once daily.           Clinical Reference Information           Your Vitals Were     BP Pulse Height Weight BMI    104/48 (BP Location: Left arm, Patient Position: Sitting, BP Method: Automatic) 76 5' (1.524 m) 52.2 kg (115 lb 1.3 oz) 22.48 kg/m2      Blood Pressure          Most Recent Value    BP  (!)  104/48      Allergies as of 4/24/2017     Ace Inhibitors    Iodine And Iodide Containing Products    Lisinopril    Shrimp      Immunizations Administered on Date of Encounter - 4/24/2017     None      Language Assistance Services     ATTENTION: Language assistance services are available, free of charge. Please call [phone].      ATENCIÓN: Si habla español, tiene a baxter disposición servicios gratuitos de asistencia lingüística. Llame al [phone].     [facility] Ý: N?u b?n nói Ti?ng Vi?t, có các d?ch v? h? tr? ngôn ng? mi?n phí dành cho b?n. G?i s? [phone].         [facility] complies with applicable Federal civil rights laws and does not discriminate on the basis of race, color, national origin, age, disability, or sex.

## 2017-04-26 ENCOUNTER — TELEPHONE (OUTPATIENT)
Dept: PULMONOLOGY | Facility: CLINIC | Age: 72
End: 2017-04-26

## 2017-04-26 DIAGNOSIS — J44.89 ASTHMA WITH COPD: Primary | ICD-10-CM

## 2017-04-26 NOTE — LETTER
April 26, 2017    Soumya Melchor  [address]  [place] [address]       [facility]  [address] [place] [address]  Phone: [phone]  Fax: [phone] Dear Ms. Melchor:    Enclosed is your prescription to discontinue oxygen. Please give this to your medical equipment company ( [employer] ).    If you have any questions or concerns, please don't hesitate to call.    Sincerely,        Navarro Carmona MD

## 2017-04-26 NOTE — TELEPHONE ENCOUNTER
Spoke with pt, she would like to d/c oxygen. Explained to pt that she would need to come into clinic for stress test to show that she does not desat on exertion and then we could d/c oxygen. Other option is to return equipment to [employer] without order and they may make her sign AMA. Pt became very upset stating she doesn't know why she would have to come in for such a test. Explained to pt that test would show that she no longer benefits from oxygen use and in turn it could be d/c'd. Pt states she would just contact DME and tell them to just come pick it up.

## 2017-04-26 NOTE — TELEPHONE ENCOUNTER
----- Message from Kacie Hendricks sent at 4/26/2017  9:38 AM CDT -----  Contact: pt  Needs to return oxygen equipment but the company needs approval from the Dr first.  Please call pt to discuss and assist at [phone]

## 2017-06-12 RX ORDER — FLUTICASONE PROPIONATE 50 MCG
1 SPRAY, SUSPENSION (ML) NASAL DAILY
Qty: 1 BOTTLE | Refills: 11 | Status: SHIPPED | OUTPATIENT
Start: 2017-06-12 | End: 2017-08-03

## 2017-06-12 NOTE — TELEPHONE ENCOUNTER
----- Message from Kate Bates sent at 6/12/2017 12:46 PM CDT -----  Contact: Pt  Pt is requesting to have an rx for Fluticasone  Propionate nasal spray. Pt uses.    [facility] - [place] - [address] [id number] [facility] [address]  Phone: [phone] Fax: [phone]    Pt can be reached at [phone].

## 2017-06-13 ENCOUNTER — OFFICE VISIT (OUTPATIENT)
Dept: CARDIOLOGY | Facility: CLINIC | Age: 72
End: 2017-06-13
Payer: MEDICARE

## 2017-06-13 VITALS
SYSTOLIC BLOOD PRESSURE: 136 MMHG | HEIGHT: 60 IN | HEART RATE: 103 BPM | DIASTOLIC BLOOD PRESSURE: 71 MMHG | WEIGHT: 112.63 LBS | BODY MASS INDEX: 22.11 KG/M2

## 2017-06-13 DIAGNOSIS — R07.89 CHEST PAIN, ATYPICAL: ICD-10-CM

## 2017-06-13 DIAGNOSIS — Z95.1 S/P CABG (CORONARY ARTERY BYPASS GRAFT): Primary | ICD-10-CM

## 2017-06-13 DIAGNOSIS — Z72.0 TOBACCO USE: ICD-10-CM

## 2017-06-13 DIAGNOSIS — I25.10 CORONARY ARTERY DISEASE INVOLVING NATIVE CORONARY ARTERY OF NATIVE HEART WITHOUT ANGINA PECTORIS: ICD-10-CM

## 2017-06-13 DIAGNOSIS — E78.00 PURE HYPERCHOLESTEROLEMIA: ICD-10-CM

## 2017-06-13 DIAGNOSIS — I10 ESSENTIAL HYPERTENSION: ICD-10-CM

## 2017-06-13 DIAGNOSIS — R00.2 PALPITATIONS: ICD-10-CM

## 2017-06-13 PROCEDURE — 99999 PR PBB SHADOW E&M-EST. PATIENT-LVL II: CPT | Mod: PBBFAC,,, | Performed by: INTERNAL MEDICINE

## 2017-06-13 PROCEDURE — 99215 OFFICE O/P EST HI 40 MIN: CPT | Mod: S$GLB,,, | Performed by: INTERNAL MEDICINE

## 2017-06-13 PROCEDURE — 1159F MED LIST DOCD IN RCRD: CPT | Mod: S$GLB,,, | Performed by: INTERNAL MEDICINE

## 2017-06-13 RX ORDER — METOPROLOL TARTRATE 25 MG/1
25 TABLET, FILM COATED ORAL DAILY
Qty: 30 TABLET | Refills: 11 | Status: SHIPPED | OUTPATIENT
Start: 2017-06-13 | End: 2018-02-15 | Stop reason: SDUPTHER

## 2017-06-13 NOTE — PROGRESS NOTES
Subjective:   Patient ID:  Soumya Melchor is a 71 y.o. female who presents for follow-up of CAD, HTN, HLP.  Pt with chest burning at rest or exertion. Pt with 1 episode of SOB and palpitations.    Hypertension   This is a chronic problem. The current episode started more than 1 year ago. The problem has been gradually improving since onset. Pertinent negatives include no chest pain, palpitations or shortness of breath. Past treatments include beta blockers and ACE inhibitors. Compliance problems include medication side effects.    Hyperlipidemia   This is a chronic problem. The current episode started more than 1 year ago. Recent lipid tests were reviewed and are variable. Pertinent negatives include no chest pain or shortness of breath. The current treatment provides moderate improvement of lipids.       Review of Systems   Constitution: Negative. Negative for weight gain.   HENT: Negative.    Eyes: Negative.    Cardiovascular: Negative.  Negative for chest pain, leg swelling and palpitations.   Respiratory: Negative.  Negative for shortness of breath.    Endocrine: Negative.    Hematologic/Lymphatic: Negative.    Skin: Negative.    Musculoskeletal: Negative for muscle weakness.   Gastrointestinal: Negative.    Genitourinary: Negative.    Neurological: Negative.  Negative for dizziness.   Psychiatric/Behavioral: Negative.    Allergic/Immunologic: Negative.      Family History   Problem Relation Age of Onset    Heart attacks under age 50 Mother 41     Past Medical History:   Diagnosis Date    Acid reflux 4/11/2014    Anxiety and depression 3/6/2014    AP (angina pectoris) 2/13/2017    CAD (coronary artery disease) 2/13/2017    CAD, multiple vessel 2/13/2017    Chronic pain associated with significant psychosocial dysfunction 2/21/2013    COPD (chronic obstructive pulmonary disease)     HTN (hypertension)     Hypothyroidism     S/P CABG (coronary artery bypass graft) 2/13/2017     Current Outpatient  Prescriptions on File Prior to Visit   Medication Sig Dispense Refill    ascorbic acid, vitamin C, (VITAMIN C) 1000 MG tablet Take 1,000 mg by mouth once daily.       aspirin (ECOTRIN) 81 MG EC tablet Take 1 tablet (81 mg total) by mouth once daily. 30 tablet 1    calcium-vitamin D3 500 mg(1,250mg) -200 unit per tablet Take 1 tablet by mouth 2 (two) times daily with meals. 60 tablet 11    cetirizine (ZYRTEC) 10 MG tablet Take 1 tablet (10 mg total) by mouth once daily. For sinus congestion 30 tablet 11    clopidogrel (PLAVIX) 75 mg tablet Take 1 tablet (75 mg total) by mouth once daily. 30 tablet 1    DOCOSAHEXANOIC ACID/EPA (FISH OIL ORAL) Take 1,200 mg by mouth 2 (two) times daily.      duloxetine (CYMBALTA) 30 MG capsule Take 30 mg by mouth once daily.      fluorometholone 0.1% (FML) 0.1 % DrpS INSTILL ONE DROP IN THE RIGHT EYE THREE TIMES DAILY  0    fluticasone (FLONASE) 50 mcg/actuation nasal spray 1 spray by Each Nare route once daily. 1 Bottle 11    FOLIC ACID/MULTIVIT-MIN/LUTEIN (CENTRUM SILVER ORAL) Take by mouth once daily.      furosemide (LASIX) 20 MG tablet Take 1 tablet (20 mg total) by mouth once daily. 30 tablet 11    ipratropium (ATROVENT) 0.06 % nasal spray 2 sprays by Nasal route 4 (four) times daily. As needed for rhinitis 15 mL 11    levothyroxine (SYNTHROID) 200 MCG tablet TAKE ONE TABLET BY MOUTH EVERY DAY 30 tablet 11    losartan (COZAAR) 100 MG tablet Take 1 tablet (100 mg total) by mouth once daily. 90 tablet 3    MELATONIN ORAL Take 20 mg by mouth nightly as needed.       metoprolol tartrate (LOPRESSOR) 25 MG tablet Take 1 tablet (25 mg total) by mouth 2 (two) times daily. 60 tablet 11    omeprazole (PRILOSEC) 40 MG capsule TAKE ONE CAPSULE BY MOUTH DAILY 30 capsule 11    senna (SENOKOT) 8.6 mg tablet Take 3 tablets by mouth.      simvastatin (ZOCOR) 40 MG tablet Take 1 tablet (40 mg total) by mouth every evening. 90 tablet 3    tiotropium (SPIRIVA WITH HANDIHALER) 18  mcg inhalation capsule Inhale 1 capsule (18 mcg total) into the lungs once daily. 30 capsule 11    tramadol (ULTRAM) 50 mg tablet Take 50 mg by mouth 2 (two) times daily.  5    trazodone (DESYREL) 50 MG tablet Take 50 mg by mouth every evening.  11    VENTOLIN HFA 90 mcg/actuation inhaler Inhale 2 puffs into the lungs every 4 (four) hours as needed for Wheezing. 1 Inhaler 11    ZETIA 10 mg tablet TAKE 1 TABLET BY MOUTH EVERY EVENING 30 tablet 11    zolpidem (AMBIEN) 5 MG Tab TAKE ONE TABLET BY MOUTH AT BEDTIME AS NEEDED 30 tablet 3     No current facility-administered medications on file prior to visit.      Review of patient's allergies indicates:   Allergen Reactions    Ace inhibitors Other (See Comments)     unknown      Iodine and iodide containing products Hives     Since age of 48    Lisinopril      angioedema    Shrimp Other (See Comments)     Localized itching and swelling on her hands if she peels shrimp.  Able to eat shrimp without difficulty.  No generalized reaction.       Objective:     Physical Exam   Constitutional: She is oriented to person, place, and time. She appears well-developed and well-nourished.   HENT:   Head: Normocephalic and atraumatic.   Eyes: Conjunctivae and EOM are normal. Pupils are equal, round, and reactive to light.   Neck: Normal range of motion. Neck supple.   Cardiovascular: Normal rate, regular rhythm, normal heart sounds and intact distal pulses.    Pulmonary/Chest: Effort normal and breath sounds normal.   Abdominal: Soft. Bowel sounds are normal.   Musculoskeletal: Normal range of motion.   Neurological: She is alert and oriented to person, place, and time.   Skin: Skin is warm and dry.   Psychiatric: She has a normal mood and affect.   Nursing note and vitals reviewed.      Assessment:     1. S/P CABG (coronary artery bypass graft)    2. Tobacco use    3. Pure hypercholesterolemia    4. Coronary artery disease involving native coronary artery of native heart  without angina pectoris    5. Essential hypertension        Plan:       Stress test, echo and holter  Continue asa- cad  Continue zetia, statin-hlp  Continue losartan, metoprolol-htn

## 2017-06-23 ENCOUNTER — CLINICAL SUPPORT (OUTPATIENT)
Dept: CARDIOLOGY | Facility: CLINIC | Age: 72
End: 2017-06-23
Payer: MEDICARE

## 2017-06-23 DIAGNOSIS — R00.2 PALPITATIONS: ICD-10-CM

## 2017-06-23 DIAGNOSIS — R00.2 RAPID PALPITATIONS: ICD-10-CM

## 2017-06-23 DIAGNOSIS — R00.2 RAPID PALPITATIONS: Primary | ICD-10-CM

## 2017-06-23 PROCEDURE — 0298T HOLTER MONITOR - 3-14 DAY ADULT: CPT | Mod: ,,, | Performed by: INTERNAL MEDICINE

## 2017-06-23 PROCEDURE — 0296T HOLTER MONITOR - 3-14 DAY ADULT: CPT | Mod: ,,, | Performed by: INTERNAL MEDICINE

## 2017-07-05 ENCOUNTER — TELEPHONE (OUTPATIENT)
Dept: FAMILY MEDICINE | Facility: CLINIC | Age: 72
End: 2017-07-05

## 2017-07-05 NOTE — TELEPHONE ENCOUNTER
----- Message from Rod Pulido sent at 7/5/2017  2:20 PM CDT -----  Contact: Pt   Pt is requesting to be seen tomorrow./ States she would like to discuss something that happened to her day before yesterday. Pt would not give any other information./ She does not want to see anyone else./ she accepted an appt for 8/3 but wants to be seen tomorrow./ Pt can be reached at [phone]

## 2017-07-28 ENCOUNTER — TELEPHONE (OUTPATIENT)
Dept: CARDIOLOGY | Facility: CLINIC | Age: 72
End: 2017-07-28

## 2017-07-28 DIAGNOSIS — Z79.01 CHRONIC ANTICOAGULATION: Primary | ICD-10-CM

## 2017-07-28 NOTE — TELEPHONE ENCOUNTER
Patient would like a result message to accompany her zio monitor results.  I do not see an attached message so I will forward to provider.

## 2017-07-28 NOTE — TELEPHONE ENCOUNTER
I gave patient result message of holter within normal limits, verbalized understanding.  She has not had PTINR checked since February and says she is bruising badly.  Will ask provider if we can order labs [facility].

## 2017-08-03 ENCOUNTER — TELEPHONE (OUTPATIENT)
Dept: CARDIOLOGY | Facility: CLINIC | Age: 72
End: 2017-08-03

## 2017-08-03 ENCOUNTER — OFFICE VISIT (OUTPATIENT)
Dept: FAMILY MEDICINE | Facility: CLINIC | Age: 72
End: 2017-08-03
Payer: MEDICARE

## 2017-08-03 VITALS
DIASTOLIC BLOOD PRESSURE: 60 MMHG | HEART RATE: 62 BPM | BODY MASS INDEX: 23.37 KG/M2 | WEIGHT: 119.06 LBS | TEMPERATURE: 99 F | SYSTOLIC BLOOD PRESSURE: 115 MMHG | HEIGHT: 60 IN

## 2017-08-03 DIAGNOSIS — R41.3 MEMORY DEFICIT: Primary | ICD-10-CM

## 2017-08-03 DIAGNOSIS — G89.4 CHRONIC PAIN ASSOCIATED WITH SIGNIFICANT PSYCHOSOCIAL DYSFUNCTION: ICD-10-CM

## 2017-08-03 DIAGNOSIS — E78.00 PURE HYPERCHOLESTEROLEMIA: ICD-10-CM

## 2017-08-03 DIAGNOSIS — I25.10 CORONARY ARTERY DISEASE INVOLVING NATIVE CORONARY ARTERY OF NATIVE HEART WITHOUT ANGINA PECTORIS: Primary | ICD-10-CM

## 2017-08-03 DIAGNOSIS — I25.10 CORONARY ARTERY DISEASE INVOLVING NATIVE HEART, ANGINA PRESENCE UNSPECIFIED, UNSPECIFIED VESSEL OR LESION TYPE: ICD-10-CM

## 2017-08-03 DIAGNOSIS — E03.4 HYPOTHYROIDISM DUE TO ACQUIRED ATROPHY OF THYROID: ICD-10-CM

## 2017-08-03 DIAGNOSIS — I10 ESSENTIAL HYPERTENSION: ICD-10-CM

## 2017-08-03 DIAGNOSIS — Z95.1 S/P CABG (CORONARY ARTERY BYPASS GRAFT): ICD-10-CM

## 2017-08-03 DIAGNOSIS — Z95.1 HX OF CABG: ICD-10-CM

## 2017-08-03 PROBLEM — J90 PLEURAL EFFUSION: Status: RESOLVED | Noted: 2017-03-21 | Resolved: 2017-08-03

## 2017-08-03 PROBLEM — I20.9 ANGINA PECTORIS: Status: RESOLVED | Noted: 2017-01-13 | Resolved: 2017-08-03

## 2017-08-03 PROBLEM — I20.9 AP (ANGINA PECTORIS): Status: RESOLVED | Noted: 2017-02-13 | Resolved: 2017-08-03

## 2017-08-03 PROCEDURE — 1126F AMNT PAIN NOTED NONE PRSNT: CPT | Mod: S$GLB,,, | Performed by: FAMILY MEDICINE

## 2017-08-03 PROCEDURE — 99214 OFFICE O/P EST MOD 30 MIN: CPT | Mod: S$GLB,,, | Performed by: FAMILY MEDICINE

## 2017-08-03 PROCEDURE — 1159F MED LIST DOCD IN RCRD: CPT | Mod: S$GLB,,, | Performed by: FAMILY MEDICINE

## 2017-08-03 PROCEDURE — 3008F BODY MASS INDEX DOCD: CPT | Mod: S$GLB,,, | Performed by: FAMILY MEDICINE

## 2017-08-03 PROCEDURE — 99999 PR PBB SHADOW E&M-EST. PATIENT-LVL III: CPT | Mod: PBBFAC,,, | Performed by: FAMILY MEDICINE

## 2017-08-03 RX ORDER — DULOXETIN HYDROCHLORIDE 60 MG/1
60 CAPSULE, DELAYED RELEASE ORAL DAILY
Refills: 11 | COMMUNITY
Start: 2017-07-06 | End: 2018-02-15

## 2017-08-03 NOTE — TELEPHONE ENCOUNTER
I received call from Dr Shoemaker office stating orders we had sent to [facility] were not signed to redo to have PT/INR.Noted patient not on Coumadin,only Plavix and ASA so no PT/INR indicated.However patient saw Dr Mathews and he is checking her CBC and other lab to cover possible causes of bruising.

## 2017-08-03 NOTE — PROGRESS NOTES
The patient presents today co episode mod severe memory issues started ~7/3 was lost in town. Co that  and daughter are complaining that she repeats a lot.   Interrim hx CAD sp 3v CABG since last visit.      Past Medical History:  Past Medical History:   Diagnosis Date    Acid reflux 4/11/2014    Anxiety and depression 3/6/2014    AP (angina pectoris) 2/13/2017    CAD (coronary artery disease) 2/13/2017    CAD, multiple vessel 2/13/2017    Chronic pain associated with significant psychosocial dysfunction 2/21/2013    COPD (chronic obstructive pulmonary disease)     HTN (hypertension)     Hypothyroidism     S/P CABG (coronary artery bypass graft) 2/13/2017     Past Surgical History:   Procedure Laterality Date    DILATION AND CURETTAGE OF UTERUS      KNEE SURGERY Right     SPINAL CORD STIMULATOR IMPLANT      TONSILLECTOMY       Review of patient's allergies indicates:   Allergen Reactions    Ace inhibitors Other (See Comments)     unknown      Iodine and iodide containing products Hives     Since age of 48    Lisinopril      angioedema    Shrimp Other (See Comments)     Localized itching and swelling on her hands if she peels shrimp.  Able to eat shrimp without difficulty.  No generalized reaction.     Current Outpatient Prescriptions on File Prior to Visit   Medication Sig Dispense Refill    ascorbic acid, vitamin C, (VITAMIN C) 1000 MG tablet Take 1,000 mg by mouth once daily.       aspirin (ECOTRIN) 81 MG EC tablet Take 1 tablet (81 mg total) by mouth once daily. 30 tablet 1    calcium-vitamin D3 500 mg(1,250mg) -200 unit per tablet Take 1 tablet by mouth 2 (two) times daily with meals. 60 tablet 11    clopidogrel (PLAVIX) 75 mg tablet Take 1 tablet (75 mg total) by mouth once daily. 30 tablet 1    DOCOSAHEXANOIC ACID/EPA (FISH OIL ORAL) Take 1,200 mg by mouth 2 (two) times daily.      FOLIC ACID/MULTIVIT-MIN/LUTEIN (CENTRUM SILVER ORAL) Take by mouth once daily.      furosemide  (LASIX) 20 MG tablet Take 1 tablet (20 mg total) by mouth once daily. 30 tablet 11    levothyroxine (SYNTHROID) 200 MCG tablet TAKE ONE TABLET BY MOUTH EVERY DAY 30 tablet 11    losartan (COZAAR) 100 MG tablet Take 1 tablet (100 mg total) by mouth once daily. 90 tablet 3    MELATONIN ORAL Take 20 mg by mouth nightly as needed.       metoprolol tartrate (LOPRESSOR) 25 MG tablet Take 1 tablet (25 mg total) by mouth once daily. 30 tablet 11    omeprazole (PRILOSEC) 40 MG capsule TAKE ONE CAPSULE BY MOUTH DAILY 30 capsule 11    senna (SENOKOT) 8.6 mg tablet Take 3 tablets by mouth.      simvastatin (ZOCOR) 40 MG tablet Take 1 tablet (40 mg total) by mouth every evening. 90 tablet 3    tiotropium (SPIRIVA WITH HANDIHALER) 18 mcg inhalation capsule Inhale 1 capsule (18 mcg total) into the lungs once daily. 30 capsule 11    tramadol (ULTRAM) 50 mg tablet Take 50 mg by mouth 2 (two) times daily.  5    trazodone (DESYREL) 50 MG tablet Take 50 mg by mouth every evening.  11    ZETIA 10 mg tablet TAKE 1 TABLET BY MOUTH EVERY EVENING 30 tablet 11    [DISCONTINUED] duloxetine (CYMBALTA) 30 MG capsule Take 30 mg by mouth once daily.      [DISCONTINUED] cetirizine (ZYRTEC) 10 MG tablet Take 1 tablet (10 mg total) by mouth once daily. For sinus congestion 30 tablet 11    [DISCONTINUED] fluorometholone 0.1% (FML) 0.1 % DrpS INSTILL ONE DROP IN THE RIGHT EYE THREE TIMES DAILY  0    [DISCONTINUED] fluticasone (FLONASE) 50 mcg/actuation nasal spray 1 spray by Each Nare route once daily. 1 Bottle 11    [DISCONTINUED] ipratropium (ATROVENT) 0.06 % nasal spray 2 sprays by Nasal route 4 (four) times daily. As needed for rhinitis 15 mL 11    [DISCONTINUED] VENTOLIN HFA 90 mcg/actuation inhaler Inhale 2 puffs into the lungs every 4 (four) hours as needed for Wheezing. 1 Inhaler 11    [DISCONTINUED] zolpidem (AMBIEN) 5 MG Tab TAKE ONE TABLET BY MOUTH AT BEDTIME AS NEEDED 30 tablet 3     No current facility-administered  medications on file prior to visit.      Social History     Social History    Marital status:      Spouse name: N/A    Number of children: N/A    Years of education: N/A     Occupational History    Not on file.     Social History Main Topics    Smoking status: Former Smoker     Packs/day: 1.00     Years: 50.00     Quit date: 12/10/2012    Smokeless tobacco: Not on file    Alcohol use No    Drug use: No    Sexual activity: Not on file     Other Topics Concern    Not on file     Social History Narrative    No narrative on file     Family History   Problem Relation Age of Onset    Heart attacks under age 50 Mother 41         ROS:GENERAL: No fever, chills, fatigability or weight loss.  SKIN: No rashes, itching or changes in color or texture of skin.  HEAD: No headaches or recent head trauma.EYES: Visual acuity fine. No photophobia, ocular pain or diplopia.EARS: Denies ear pain, discharge or vertigo.NOSE: No loss of smell, no epistaxis or postnasal drip.MOUTH & THROAT: No hoarseness or change in voice. No excessive gum bleeding.NODES: Denies swollen glands.  CHEST: Denies JOHN, cyanosis, wheezing, cough and sputum production.  CARDIOVASCULAR: Denies chest pain, PND, orthopnea or reduced exercise tolerance.  ABDOMEN: Appetite fine. No weight loss. Denies diarrhea, abdominal pain, hematemesis or blood in stool.  URINARY: No flank pain, dysuria or hematuria.  PERIPHERAL VASCULAR: No claudication or cyanosis.  MUSCULOSKELETAL: See above.  NEUROLOGIC: No history of seizures, paralysis, alteration of gait or coordination.  PE:   HEAD: Normocephalic, atraumatic.EYES: PERRL. EOMI.   EARS: TM's intact. Light reflex normal. No retraction or perforation.   NOSE: Mucosa pink. Airway clear.MOUTH & THROAT: No tonsillar enlargement. No pharyngeal erythema or exudate. No stridor.  NODES: No cervical, axillary or inguinal lymph node enlargement.  CHEST: Lungs clear to auscultation.  CARDIOVASCULAR: Normal S1, S2. No  rubs, murmurs or gallops.  ABDOMEN: Bowel sounds normal. Not distended. Soft. No tenderness or masses.  MUSCULOSKELETAL: Neck tender to palp and rom   NEUROLOGIC: Cranial Nerves: II-XII grossly intact.  Motor: 5/5 strength major flexors/extensors.  DTR's: Knees, Ankles 2+ and equal bilaterally; downgoing toes.  Sensory: Intact to light touch distally.  Gait & Posture: Normal gait and fine motion. No cerebellar signs.  MSE Alert oriented   MMSE: 30/30   Impression:Memory deficit   CAD    Plan:Lab eval  Rec diet and ex recs  CT brain (spinal metal Lumbar to neck)   Consider Neuro con (currently sees Dr Ndiaye for PMR)

## 2017-08-15 LAB
ALBUMIN SERPL-MCNC: 4.5 G/DL (ref 3.6–5.1)
ALBUMIN/GLOB SERPL: 1.7 (CALC) (ref 1–2.5)
ALP SERPL-CCNC: 70 U/L (ref 33–130)
ALT SERPL-CCNC: 14 U/L (ref 6–29)
AST SERPL-CCNC: 22 U/L (ref 10–35)
BASOPHILS # BLD AUTO: 64 CELLS/UL (ref 0–200)
BASOPHILS NFR BLD AUTO: 0.9 %
BILIRUB SERPL-MCNC: 0.4 MG/DL (ref 0.2–1.2)
BUN SERPL-MCNC: 18 MG/DL (ref 7–25)
BUN/CREAT SERPL: 14 (CALC) (ref 6–22)
CALCIUM SERPL-MCNC: 9.5 MG/DL (ref 8.6–10.4)
CHLORIDE SERPL-SCNC: 98 MMOL/L (ref 98–110)
CO2 SERPL-SCNC: 33 MMOL/L (ref 20–31)
CREAT SERPL-MCNC: 1.25 MG/DL (ref 0.6–0.93)
EOSINOPHIL # BLD AUTO: 341 CELLS/UL (ref 15–500)
EOSINOPHIL NFR BLD AUTO: 4.8 %
ERYTHROCYTE [DISTWIDTH] IN BLOOD BY AUTOMATED COUNT: 12.9 % (ref 11–15)
ERYTHROCYTE [SEDIMENTATION RATE] IN BLOOD BY WESTERGREN METHOD: 2 MM/H
GFR SERPL CREATININE-BSD FRML MDRD: 43 ML/MIN/1.73M2
GLOBULIN SER CALC-MCNC: 2.6 G/DL (CALC) (ref 1.9–3.7)
GLUCOSE SERPL-MCNC: 86 MG/DL (ref 65–99)
HCT VFR BLD AUTO: 35.8 % (ref 35–45)
HGB BLD-MCNC: 11.9 G/DL (ref 11.7–15.5)
LYMPHOCYTES # BLD AUTO: 2073 CELLS/UL (ref 850–3900)
LYMPHOCYTES NFR BLD AUTO: 29.2 %
MCH RBC QN AUTO: 31 PG (ref 27–33)
MCHC RBC AUTO-ENTMCNC: 33.2 G/DL (ref 32–36)
MCV RBC AUTO: 93.2 FL (ref 80–100)
MONOCYTES # BLD AUTO: 454 CELLS/UL (ref 200–950)
MONOCYTES NFR BLD AUTO: 6.4 %
NEUTROPHILS # BLD AUTO: 4168 CELLS/UL (ref 1500–7800)
NEUTROPHILS NFR BLD AUTO: 58.7 %
PLATELET # BLD AUTO: 334 THOUSAND/UL (ref 140–400)
PMV BLD REES-ECKER: 9.5 FL (ref 7.5–12.5)
POTASSIUM SERPL-SCNC: 4.3 MMOL/L (ref 3.5–5.3)
PROT SERPL-MCNC: 7.1 G/DL (ref 6.1–8.1)
RBC # BLD AUTO: 3.84 MILLION/UL (ref 3.8–5.1)
SODIUM SERPL-SCNC: 139 MMOL/L (ref 135–146)
TSH SERPL-ACNC: 10.39 MIU/L (ref 0.4–4.5)
WBC # BLD AUTO: 7.1 THOUSAND/UL (ref 3.8–10.8)

## 2017-08-16 ENCOUNTER — TELEPHONE (OUTPATIENT)
Dept: FAMILY MEDICINE | Facility: CLINIC | Age: 72
End: 2017-08-16

## 2017-08-16 DIAGNOSIS — N28.9 FUNCTION KIDNEY DECREASED: Primary | ICD-10-CM

## 2017-08-21 ENCOUNTER — TELEPHONE (OUTPATIENT)
Dept: FAMILY MEDICINE | Facility: CLINIC | Age: 72
End: 2017-08-21

## 2017-08-21 DIAGNOSIS — N28.9 FUNCTION KIDNEY DECREASED: Primary | ICD-10-CM

## 2017-08-21 RX ORDER — LEVOTHYROXINE SODIUM 125 UG/1
250 TABLET ORAL DAILY
Qty: 60 TABLET | Refills: 11 | Status: SHIPPED | OUTPATIENT
Start: 2017-08-21 | End: 2017-11-15 | Stop reason: DRUGHIGH

## 2017-08-21 NOTE — TELEPHONE ENCOUNTER
----- Message from Allison Cast sent at 8/21/2017 10:18 AM CDT -----  Contact: pt  Pt has a lab appt on 8/28. Pt needs this sent electronically to [facility]. Pt can be reached at [phone]

## 2017-08-22 ENCOUNTER — TELEPHONE (OUTPATIENT)
Dept: FAMILY MEDICINE | Facility: CLINIC | Age: 72
End: 2017-08-22

## 2017-08-22 DIAGNOSIS — N28.9 FUNCTION KIDNEY DECREASED: Primary | ICD-10-CM

## 2017-08-22 DIAGNOSIS — R79.89 ABNORMAL TSH: ICD-10-CM

## 2017-08-23 ENCOUNTER — TELEPHONE (OUTPATIENT)
Dept: FAMILY MEDICINE | Facility: CLINIC | Age: 72
End: 2017-08-23

## 2017-08-23 NOTE — TELEPHONE ENCOUNTER
----- Message from Amanda Barron sent at 8/23/2017 12:36 PM CDT -----  Contact: pt   States she's calling rg being a heart pt and wanted to let the Dr richardson that she is now taking Donepezil 5mg once daily and can be reached at [phone]//thanks/dbw

## 2017-08-26 LAB
BUN SERPL-MCNC: 20 MG/DL (ref 7–25)
BUN/CREAT SERPL: 17 (CALC) (ref 6–22)
CALCIUM SERPL-MCNC: 9.2 MG/DL (ref 8.6–10.4)
CHLORIDE SERPL-SCNC: 98 MMOL/L (ref 98–110)
CO2 SERPL-SCNC: 29 MMOL/L (ref 20–31)
CREAT SERPL-MCNC: 1.17 MG/DL (ref 0.6–0.93)
GFR SERPL CREATININE-BSD FRML MDRD: 46 ML/MIN/1.73M2
GLUCOSE SERPL-MCNC: 89 MG/DL (ref 65–99)
POTASSIUM SERPL-SCNC: 3.6 MMOL/L (ref 3.5–5.3)
SODIUM SERPL-SCNC: 137 MMOL/L (ref 135–146)
TSH SERPL-ACNC: 4.94 MIU/L (ref 0.4–4.5)

## 2017-08-28 ENCOUNTER — TELEPHONE (OUTPATIENT)
Dept: FAMILY MEDICINE | Facility: CLINIC | Age: 72
End: 2017-08-28

## 2017-08-28 DIAGNOSIS — R79.89 ABNORMAL TSH: ICD-10-CM

## 2017-08-28 DIAGNOSIS — N28.9 FUNCTION KIDNEY DECREASED: Primary | ICD-10-CM

## 2017-08-28 NOTE — TELEPHONE ENCOUNTER
----- Message from Daylin Sullivan sent at 8/28/2017  3:25 PM CDT -----  Contact: pt  Call pt at [phone]//pt want to talk with about a different rx from what she has//thks ht

## 2017-09-05 ENCOUNTER — TELEPHONE (OUTPATIENT)
Dept: FAMILY MEDICINE | Facility: CLINIC | Age: 72
End: 2017-09-05

## 2017-09-05 RX ORDER — TRAZODONE HYDROCHLORIDE 100 MG/1
100 TABLET ORAL NIGHTLY
Qty: 90 TABLET | Refills: 4 | Status: SHIPPED | OUTPATIENT
Start: 2017-09-05 | End: 2018-02-15

## 2017-09-05 NOTE — TELEPHONE ENCOUNTER
----- Message from Amanda Barron sent at 9/5/2017  1:54 PM CDT -----  Contact: pt   States she is calling rg pt has been taking 2 trazadone at night along with melatonin and she is sleeping better and but won't have enough to last and wants to knw if it's ok to have doubled the trazadon and taking 3 10mg melatonin and if it's ok she needs a refill and can be reached at [phone]//thanks/dbw     1. What is the name of the medication you are requesting? trazadon  2. What is the dose? 50mg  3. How do you take the medication? Orally, topically, etc? Orally   4. How often do you take this medication? 2 pills once daily   5. Do you need a 30 day or 90 day supply? 30 days   6. How many refills are you requesting? States she needs the nurse to be the  of that   7. What is your preferred pharmacy and location of the pharmacy?   8. Who can we contact with further questions? Pt    [facility] [place] - [place] - [address] [address] [facility]  [place] [address]  Phone: [phone] Fax: [phone]

## 2017-09-05 NOTE — TELEPHONE ENCOUNTER
Can patient take 100 mg trazodone and 30 mg melatonin nightly safely? If so, will send rx request for trazodone.

## 2017-09-25 ENCOUNTER — OFFICE VISIT (OUTPATIENT)
Dept: FAMILY MEDICINE | Facility: CLINIC | Age: 72
End: 2017-09-25
Payer: MEDICARE

## 2017-09-25 VITALS
HEART RATE: 77 BPM | DIASTOLIC BLOOD PRESSURE: 71 MMHG | SYSTOLIC BLOOD PRESSURE: 132 MMHG | WEIGHT: 121 LBS | HEIGHT: 60 IN | BODY MASS INDEX: 23.75 KG/M2 | TEMPERATURE: 98 F

## 2017-09-25 DIAGNOSIS — Z48.02 VISIT FOR SUTURE REMOVAL: Primary | ICD-10-CM

## 2017-09-25 PROCEDURE — 3078F DIAST BP <80 MM HG: CPT | Mod: S$GLB,,, | Performed by: NURSE PRACTITIONER

## 2017-09-25 PROCEDURE — 3075F SYST BP GE 130 - 139MM HG: CPT | Mod: S$GLB,,, | Performed by: NURSE PRACTITIONER

## 2017-09-25 PROCEDURE — 1159F MED LIST DOCD IN RCRD: CPT | Mod: S$GLB,,, | Performed by: NURSE PRACTITIONER

## 2017-09-25 PROCEDURE — 99212 OFFICE O/P EST SF 10 MIN: CPT | Mod: S$GLB,,, | Performed by: NURSE PRACTITIONER

## 2017-09-25 PROCEDURE — 3008F BODY MASS INDEX DOCD: CPT | Mod: S$GLB,,, | Performed by: NURSE PRACTITIONER

## 2017-09-25 PROCEDURE — 99999 PR PBB SHADOW E&M-EST. PATIENT-LVL IV: CPT | Mod: PBBFAC,,, | Performed by: NURSE PRACTITIONER

## 2017-09-25 RX ORDER — DONEPEZIL HYDROCHLORIDE 5 MG/1
5 TABLET, FILM COATED ORAL NIGHTLY
COMMUNITY
End: 2017-10-26 | Stop reason: DRUGHIGH

## 2017-09-28 NOTE — PROGRESS NOTES
Subjective:       Patient ID: Soumya Melchor is a 72 y.o. female.    Chief Complaint: Suture / Staple Removal    Suture / Staple Removal   The sutures were placed 3 to 6 days ago (Stitches x 3 noted to left forehead; pt had fall at home and struck affected area; denies LOC; went to [facility] ER; CT head unremarkable). She tried regular soap and water washings since the wound repair. The treatment provided mild relief. The maximum temperature noted was less than 100.4 F. The temperature was taken using an oral thermometer. There has been no drainage from the wound. There is no redness present. There is no swelling present. There is no pain present. She has no difficulty moving the affected extremity or digit.     Past Medical History:   Diagnosis Date    Acid reflux 4/11/2014    Anxiety and depression 3/6/2014    AP (angina pectoris) 2/13/2017    CAD (coronary artery disease) 2/13/2017    CAD, multiple vessel 2/13/2017    Chronic pain associated with significant psychosocial dysfunction 2/21/2013    COPD (chronic obstructive pulmonary disease)     HTN (hypertension)     Hypothyroidism     S/P CABG (coronary artery bypass graft) 2/13/2017     Social History     Social History    Marital status:      Spouse name: N/A    Number of children: N/A    Years of education: N/A     Occupational History         Social History Main Topics    Smoking status: Former Smoker     Packs/day: 1.00     Years: 50.00     Quit date: 12/10/2012    Smokeless tobacco: Not on file    Alcohol use No    Drug use: No    Sexual activity: Not on file     Past Surgical History:   Procedure Laterality Date    DILATION AND CURETTAGE OF UTERUS      KNEE SURGERY Right     SPINAL CORD STIMULATOR IMPLANT      TONSILLECTOMY         Review of Systems   Constitutional: Negative.    HENT: Negative.    Eyes: Negative.    Respiratory: Negative.    Cardiovascular: Negative.    Gastrointestinal: Negative.    Endocrine: Negative.     Genitourinary: Negative.    Musculoskeletal: Negative.    Skin: Negative.         Healing laceration with stitches   Allergic/Immunologic: Negative.    Neurological: Negative.    Psychiatric/Behavioral: Negative.        Objective:      Physical Exam   Constitutional: She is oriented to person, place, and time. She appears well-developed and well-nourished.   HENT:   Head: Normocephalic.       Right Ear: External ear normal.   Left Ear: External ear normal.   Nose: Nose normal.   Mouth/Throat: Oropharynx is clear and moist.   Eyes: Conjunctivae are normal. Pupils are equal, round, and reactive to light.   Neck: Normal range of motion. Neck supple.   Cardiovascular: Normal rate, regular rhythm and normal heart sounds.    Pulmonary/Chest: Effort normal and breath sounds normal.   Abdominal: Soft. Bowel sounds are normal.   Musculoskeletal: Normal range of motion.   Neurological: She is alert and oriented to person, place, and time.   Skin: Skin is warm and dry. Capillary refill takes 2 to 3 seconds.   Psychiatric: She has a normal mood and affect. Her behavior is normal. Judgment and thought content normal.   Nursing note and vitals reviewed.      Assessment:       1. Visit for suture removal        Plan:           Soumya was seen today for suture / staple removal.    Diagnoses and all orders for this visit:    Visit for suture removal  Continue cleaning with mild soap and water  RTC on Friday for removal  Report to ER immediately if symptoms worsen  Cool compresses to bruised area

## 2017-09-29 ENCOUNTER — OFFICE VISIT (OUTPATIENT)
Dept: FAMILY MEDICINE | Facility: CLINIC | Age: 72
End: 2017-09-29
Payer: MEDICARE

## 2017-09-29 VITALS
HEIGHT: 60 IN | HEART RATE: 86 BPM | SYSTOLIC BLOOD PRESSURE: 133 MMHG | WEIGHT: 121 LBS | DIASTOLIC BLOOD PRESSURE: 72 MMHG | BODY MASS INDEX: 23.75 KG/M2

## 2017-09-29 DIAGNOSIS — Z48.02 VISIT FOR SUTURE REMOVAL: Primary | ICD-10-CM

## 2017-09-29 PROCEDURE — 3078F DIAST BP <80 MM HG: CPT | Mod: S$GLB,,, | Performed by: NURSE PRACTITIONER

## 2017-09-29 PROCEDURE — 99212 OFFICE O/P EST SF 10 MIN: CPT | Mod: S$GLB,,, | Performed by: NURSE PRACTITIONER

## 2017-09-29 PROCEDURE — 3008F BODY MASS INDEX DOCD: CPT | Mod: S$GLB,,, | Performed by: NURSE PRACTITIONER

## 2017-09-29 PROCEDURE — 1159F MED LIST DOCD IN RCRD: CPT | Mod: S$GLB,,, | Performed by: NURSE PRACTITIONER

## 2017-09-29 PROCEDURE — 3075F SYST BP GE 130 - 139MM HG: CPT | Mod: S$GLB,,, | Performed by: NURSE PRACTITIONER

## 2017-09-29 PROCEDURE — 99999 PR PBB SHADOW E&M-EST. PATIENT-LVL III: CPT | Mod: PBBFAC,,, | Performed by: NURSE PRACTITIONER

## 2017-09-29 NOTE — PROGRESS NOTES
Subjective:       Patient ID: Soumya Melchor is a 72 y.o. female.    Chief Complaint: No chief complaint on file.    Suture / Staple Removal   The sutures were placed 7 to 10 days ago (Sutures x 3 in place to left forehead). She tried regular peroxide and water cleansings since the wound repair. The treatment provided significant relief. The maximum temperature noted was less than 100.4 F. The temperature was taken using an oral thermometer. There has been no drainage from the wound. There is no redness present. There is no swelling present. There is no pain present. She has no difficulty moving the affected extremity or digit.     Past Medical History:   Diagnosis Date    Acid reflux 4/11/2014    Anxiety and depression 3/6/2014    AP (angina pectoris) 2/13/2017    CAD (coronary artery disease) 2/13/2017    CAD, multiple vessel 2/13/2017    Chronic pain associated with significant psychosocial dysfunction 2/21/2013    COPD (chronic obstructive pulmonary disease)     HTN (hypertension)     Hypothyroidism     S/P CABG (coronary artery bypass graft) 2/13/2017     Social History     Social History    Marital status:      Spouse name: N/A    Number of children: N/A    Years of education: N/A     Occupational History         Social History Main Topics    Smoking status: Former Smoker     Packs/day: 1.00     Years: 50.00     Quit date: 12/10/2012    Smokeless tobacco: Not on file    Alcohol use No    Drug use: No    Sexual activity: Not on file     Past Surgical History:   Procedure Laterality Date    DILATION AND CURETTAGE OF UTERUS      KNEE SURGERY Right     SPINAL CORD STIMULATOR IMPLANT      TONSILLECTOMY         Review of Systems   Constitutional: Negative.    HENT: Negative.    Eyes: Negative.    Respiratory: Negative.    Cardiovascular: Negative.    Gastrointestinal: Negative.    Endocrine: Negative.    Genitourinary: Negative.    Musculoskeletal: Negative.    Skin: Negative.     Allergic/Immunologic: Negative.    Neurological: Negative.    Psychiatric/Behavioral: Negative.        Objective:      Physical Exam   Constitutional: She is oriented to person, place, and time. She appears well-developed and well-nourished.   HENT:   Head: Normocephalic.       Right Ear: External ear normal.   Left Ear: External ear normal.   Nose: Nose normal.   Mouth/Throat: Oropharynx is clear and moist.   Eyes: Conjunctivae are normal. Pupils are equal, round, and reactive to light.   Neck: Normal range of motion.   Cardiovascular: Normal rate, regular rhythm and normal heart sounds.    Pulmonary/Chest: Effort normal and breath sounds normal.   Abdominal: Soft. Bowel sounds are normal.   Musculoskeletal: Normal range of motion.   Neurological: She is alert and oriented to person, place, and time.   Skin: Skin is warm and dry. Capillary refill takes 2 to 3 seconds.   Psychiatric: She has a normal mood and affect. Her behavior is normal. Judgment and thought content normal.   Nursing note and vitals reviewed.      Assessment:       1. Visit for suture removal        Plan:         Diagnoses and all orders for this visit:    Visit for suture removal  Sutures x 3 counted prior to removal and post removal; sterile suture removal kit used; tolerated well  Clean area with mild soap and water  Triple antibiotic ointment OTC x 3 d  RTC as needed

## 2017-10-03 ENCOUNTER — OFFICE VISIT (OUTPATIENT)
Dept: CARDIOLOGY | Facility: CLINIC | Age: 72
End: 2017-10-03
Payer: MEDICARE

## 2017-10-03 VITALS
SYSTOLIC BLOOD PRESSURE: 121 MMHG | BODY MASS INDEX: 23.5 KG/M2 | HEART RATE: 70 BPM | HEIGHT: 60 IN | DIASTOLIC BLOOD PRESSURE: 62 MMHG | WEIGHT: 119.69 LBS

## 2017-10-03 DIAGNOSIS — E78.00 PURE HYPERCHOLESTEROLEMIA: ICD-10-CM

## 2017-10-03 DIAGNOSIS — J43.8 OTHER EMPHYSEMA: ICD-10-CM

## 2017-10-03 DIAGNOSIS — Z95.1 S/P CABG (CORONARY ARTERY BYPASS GRAFT): Primary | ICD-10-CM

## 2017-10-03 DIAGNOSIS — I25.10 CORONARY ARTERY DISEASE INVOLVING NATIVE CORONARY ARTERY OF NATIVE HEART WITHOUT ANGINA PECTORIS: ICD-10-CM

## 2017-10-03 DIAGNOSIS — Z72.0 TOBACCO USE: ICD-10-CM

## 2017-10-03 DIAGNOSIS — I10 ESSENTIAL HYPERTENSION: ICD-10-CM

## 2017-10-03 PROCEDURE — 99214 OFFICE O/P EST MOD 30 MIN: CPT | Mod: S$GLB,,, | Performed by: INTERNAL MEDICINE

## 2017-10-03 PROCEDURE — 99999 PR PBB SHADOW E&M-EST. PATIENT-LVL IV: CPT | Mod: PBBFAC,,, | Performed by: INTERNAL MEDICINE

## 2017-10-03 NOTE — PROGRESS NOTES
Subjective:    Patient ID:  Soumya Melchor is a 72 y.o. female who presents for evaluation of Follow-up (follow up ); recent fall; and clearance for surgery      HPI 71 yo WF S/P CABG with UZAIR to LAD and SVG to OM and RCA 2-, HTN , HLD and smoking hx.Denies chest pain, SOB, or edema. Has some soreness in sternum. Denies palpitations, weak spells, and syncope Tripped recently and hit her head but healing OK.      DATE OF PROCEDURE:  02/13/2017.     PREOPERATIVE DIAGNOSIS:  Coronary artery disease.     POSTOPERATIVE DIAGNOSIS:  Coronary artery disease.     PROCEDURE:  Off-pump coronary artery bypass grafting x3 with left internal   mammary artery to LAD, aorta to first obtuse marginal, aorta to distal right   coronary artery.     SURGEON:  Steve Bonds III, M.D.    CONCLUSIONS     1 - Concentric remodeling.     2 - Normal left ventricular systolic function (EF 60-65%).     3 - Normal left ventricular diastolic function.     4 - Normal right ventricular systolic function .     5 - The estimated PA systolic pressure is greater than 21 mmHg.     Review of Systems   Constitution: Negative for decreased appetite, fever, weakness, malaise/fatigue, weight gain and weight loss.   HENT: Negative for hearing loss and nosebleeds.    Eyes: Negative for visual disturbance.   Cardiovascular: Positive for chest pain. Negative for claudication, cyanosis, dyspnea on exertion, irregular heartbeat, leg swelling, near-syncope, orthopnea, palpitations, paroxysmal nocturnal dyspnea and syncope.        Incisional pain   Respiratory: Negative for cough, hemoptysis, shortness of breath, sleep disturbances due to breathing, snoring and wheezing.    Endocrine: Negative for cold intolerance, heat intolerance, polydipsia and polyuria.   Hematologic/Lymphatic: Negative for adenopathy and bleeding problem. Does not bruise/bleed easily.   Skin: Positive for suspicious lesions. Negative for color change, itching, poor wound healing and  rash.   Musculoskeletal: Negative for arthritis, back pain, falls, joint pain, joint swelling, muscle cramps, muscle weakness and myalgias.   Gastrointestinal: Negative for bloating, abdominal pain, change in bowel habit, constipation, flatus, heartburn, hematemesis, hematochezia, hemorrhoids, jaundice, melena, nausea and vomiting.   Genitourinary: Negative for bladder incontinence, decreased libido, frequency, hematuria, hesitancy and urgency.   Neurological: Negative for brief paralysis, difficulty with concentration, excessive daytime sleepiness, dizziness, focal weakness, headaches, light-headedness, loss of balance, numbness and vertigo.   Psychiatric/Behavioral: Negative for altered mental status, depression and memory loss. The patient does not have insomnia and is not nervous/anxious.    Allergic/Immunologic: Negative for environmental allergies, hives and persistent infections.        Objective:    Physical Exam   Constitutional: She is oriented to person, place, and time. She appears well-developed and well-nourished.   /62 (BP Location: Left arm, Patient Position: Sitting, BP Method: Large (Automatic))   Pulse 70   Ht 5' (1.524 m)   Wt 54.3 kg (119 lb 11.2 oz)   BMI 23.38 kg/m²      HENT:   Head: Normocephalic and atraumatic.   Right Ear: External ear normal.   Left Ear: External ear normal.   Nose: Nose normal.   Mouth/Throat: Oropharynx is clear and moist.   Eyes: Conjunctivae, EOM and lids are normal. Pupils are equal, round, and reactive to light. Right eye exhibits no discharge. Left eye exhibits no discharge. Right conjunctiva has no hemorrhage. No scleral icterus.   Neck: Normal range of motion. Neck supple. No JVD present. No tracheal deviation present. No thyromegaly present.   Cardiovascular: Normal rate, regular rhythm, normal heart sounds and intact distal pulses.  Exam reveals no gallop and no friction rub.    No murmur heard.  Pulmonary/Chest: Effort normal and breath sounds  normal. No respiratory distress. She has no wheezes. She has no rales. She exhibits no tenderness. Breasts are symmetrical.   Sternal scar   Abdominal: Soft. Bowel sounds are normal. She exhibits no distension and no mass. There is no hepatosplenomegaly or hepatomegaly. There is no tenderness. There is no rebound and no guarding.   Musculoskeletal: Normal range of motion. She exhibits no edema or tenderness.   Lymphadenopathy:     She has no cervical adenopathy.   Neurological: She is alert and oriented to person, place, and time. She displays normal reflexes. No cranial nerve deficit. Coordination normal.   Skin: Skin is warm and dry. No rash noted. No erythema. No pallor.   Psychiatric: She has a normal mood and affect. Her behavior is normal. Judgment and thought content normal.   Nursing note and vitals reviewed.        Assessment:       1. S/P CABG (coronary artery bypass graft)    2. Coronary artery disease involving native coronary artery of native heart without angina pectoris    3. Pure hypercholesterolemia    4. Essential hypertension    5. Tobacco use    6. Other emphysema         Plan:    Doing well. Plan on having spine stimulator changed in January   Can Dc plavix now    Hold ASA 5 days prior to procedure    There are no absolute contraindications to surgery from cardiac standpoint and would use routine precautions. No further cardiac testing required prior to surgery.  Has lab thru primary  No orders of the defined types were placed in this encounter.    Return in about 6 months (around 4/3/2018).

## 2017-10-11 ENCOUNTER — TELEPHONE (OUTPATIENT)
Dept: FAMILY MEDICINE | Facility: CLINIC | Age: 72
End: 2017-10-11

## 2017-10-11 NOTE — TELEPHONE ENCOUNTER
----- Message from Bernadine Mayer sent at 10/11/2017  2:47 PM CDT -----  Contact: karen at [facility] in [place]  Karen Barron at [facility] in [place] calling stating she needs a copy of the pt medication list...[phone]

## 2017-10-20 ENCOUNTER — TELEPHONE (OUTPATIENT)
Dept: FAMILY MEDICINE | Facility: CLINIC | Age: 72
End: 2017-10-20

## 2017-10-20 NOTE — TELEPHONE ENCOUNTER
----- Message from Krystyna Montanez sent at 10/20/2017  9:01 AM CDT -----  Contact: pt  Please call pt @ [phone] regarding a referral for urologist, prefer in [place].

## 2017-10-26 ENCOUNTER — TELEPHONE (OUTPATIENT)
Dept: FAMILY MEDICINE | Facility: CLINIC | Age: 72
End: 2017-10-26

## 2017-10-26 ENCOUNTER — OFFICE VISIT (OUTPATIENT)
Dept: FAMILY MEDICINE | Facility: CLINIC | Age: 72
End: 2017-10-26
Payer: MEDICARE

## 2017-10-26 VITALS
HEIGHT: 59 IN | DIASTOLIC BLOOD PRESSURE: 67 MMHG | SYSTOLIC BLOOD PRESSURE: 128 MMHG | TEMPERATURE: 98 F | BODY MASS INDEX: 24.76 KG/M2 | WEIGHT: 122.81 LBS | HEART RATE: 64 BPM

## 2017-10-26 DIAGNOSIS — R41.89 COGNITIVE IMPAIRMENT: Primary | ICD-10-CM

## 2017-10-26 DIAGNOSIS — R25.2 LEG CRAMPS: Primary | ICD-10-CM

## 2017-10-26 PROCEDURE — 99213 OFFICE O/P EST LOW 20 MIN: CPT | Mod: S$GLB,,, | Performed by: FAMILY MEDICINE

## 2017-10-26 PROCEDURE — 99999 PR PBB SHADOW E&M-EST. PATIENT-LVL IV: CPT | Mod: PBBFAC,,, | Performed by: FAMILY MEDICINE

## 2017-10-26 RX ORDER — DONEPEZIL HYDROCHLORIDE 10 MG/1
10 TABLET, FILM COATED ORAL NIGHTLY
Refills: 5 | COMMUNITY
Start: 2017-10-04 | End: 2018-03-12

## 2017-10-26 NOTE — PROGRESS NOTES
Transitional Care Note  Subjective:       Patient ID: Soumya Melchor is a 72 y.o. female.  Chief Complaint: Hospital Follow Up    Family and/or Caretaker present at visit?  No.  Diagnostic tests reviewed/disposition: No diagnosic tests pending after this hospitalization.  Disease/illness education: Cog impairment  Home health/community services discussion/referrals: Patient does not have home health established from hospital visit.  They do not need home health.  If needed, we will set up home health for the patient.   Establishment or re-establishment of referral orders for community resources: No other necessary community resources.   Discussion with other health care providers: No discussion with other health care providers necessary.   HPI  Review of Systems    Objective:      Physical Exam    Assessment:       No diagnosis found.    Plan:       The patient presents today to fu admit cog impairment Following w Dr Mccain now on Aricept which is helping      Past Medical History:  Past Medical History:   Diagnosis Date    Acid reflux 4/11/2014    Anxiety and depression 3/6/2014    AP (angina pectoris) 2/13/2017    CAD (coronary artery disease) 2/13/2017    CAD, multiple vessel 2/13/2017    Chronic pain associated with significant psychosocial dysfunction 2/21/2013    COPD (chronic obstructive pulmonary disease)     HTN (hypertension)     Hypothyroidism     S/P CABG (coronary artery bypass graft) 2/13/2017     Past Surgical History:   Procedure Laterality Date    DILATION AND CURETTAGE OF UTERUS      KNEE SURGERY Right     SPINAL CORD STIMULATOR IMPLANT      TONSILLECTOMY       Review of patient's allergies indicates:   Allergen Reactions    Ace inhibitors Other (See Comments)     unknown      Iodine and iodide containing products Hives     Since age of 48    Lisinopril      angioedema    Shrimp Other (See Comments)     Localized itching and swelling on her hands if she peels shrimp.  Able to  eat shrimp without difficulty.  No generalized reaction.     Current Outpatient Prescriptions on File Prior to Visit   Medication Sig Dispense Refill    ascorbic acid, vitamin C, (VITAMIN C) 1000 MG tablet Take 1,000 mg by mouth once daily.       aspirin (ECOTRIN) 81 MG EC tablet Take 1 tablet (81 mg total) by mouth once daily. 30 tablet 1    calcium-vitamin D3 500 mg(1,250mg) -200 unit per tablet Take 1 tablet by mouth 2 (two) times daily with meals. 60 tablet 11    clopidogrel (PLAVIX) 75 mg tablet Take 1 tablet (75 mg total) by mouth once daily. 30 tablet 1    duloxetine (CYMBALTA) 60 MG capsule Take 60 mg by mouth once daily.  11    FOLIC ACID/MULTIVIT-MIN/LUTEIN (CENTRUM SILVER ORAL) Take by mouth once daily.      furosemide (LASIX) 20 MG tablet Take 1 tablet (20 mg total) by mouth once daily. 30 tablet 11    levothyroxine (SYNTHROID) 125 MCG tablet Take 2 tablets (250 mcg total) by mouth once daily. 60 tablet 11    losartan (COZAAR) 100 MG tablet Take 1 tablet (100 mg total) by mouth once daily. 90 tablet 3    MELATONIN ORAL Take 20 mg by mouth nightly as needed.       metoprolol tartrate (LOPRESSOR) 25 MG tablet Take 1 tablet (25 mg total) by mouth once daily. 30 tablet 11    omeprazole (PRILOSEC) 40 MG capsule TAKE ONE CAPSULE BY MOUTH DAILY 30 capsule 11    senna (SENOKOT) 8.6 mg tablet Take 3 tablets by mouth.      simvastatin (ZOCOR) 40 MG tablet Take 1 tablet (40 mg total) by mouth every evening. 90 tablet 3    tiotropium (SPIRIVA WITH HANDIHALER) 18 mcg inhalation capsule Inhale 1 capsule (18 mcg total) into the lungs once daily. 30 capsule 11    tramadol (ULTRAM) 50 mg tablet Take 50 mg by mouth 2 (two) times daily.  5    trazodone (DESYREL) 100 MG tablet Take 1 tablet (100 mg total) by mouth every evening. 90 tablet 4    ZETIA 10 mg tablet TAKE 1 TABLET BY MOUTH EVERY EVENING 30 tablet 11    [DISCONTINUED] DOCOSAHEXANOIC ACID/EPA (FISH OIL ORAL) Take 1,200 mg by mouth 2 (two) times  daily.      [DISCONTINUED] donepezil (ARICEPT) 5 MG tablet Take 5 mg by mouth every evening.       No current facility-administered medications on file prior to visit.      Social History     Social History    Marital status:      Spouse name: N/A    Number of children: N/A    Years of education: N/A     Occupational History    Not on file.     Social History Main Topics    Smoking status: Former Smoker     Packs/day: 1.00     Years: 50.00     Quit date: 12/10/2012    Smokeless tobacco: Never Used    Alcohol use No    Drug use: No    Sexual activity: Not on file     Other Topics Concern    Not on file     Social History Narrative    No narrative on file     Family History   Problem Relation Age of Onset    Heart attacks under age 50 Mother 41         ROS:GENERAL: No fever, chills, fatigability or weight loss.  SKIN: No rashes, itching or changes in color or texture of skin.  HEAD: No headaches or recent head trauma.EYES: Visual acuity fine. No photophobia, ocular pain or diplopia.EARS: Denies ear pain, discharge or vertigo.NOSE: No loss of smell, no epistaxis or postnasal drip.MOUTH & THROAT: No hoarseness or change in voice. No excessive gum bleeding.NODES: Denies swollen glands.  CHEST: Denies JOHN, cyanosis, wheezing, cough and sputum production.  CARDIOVASCULAR: Denies chest pain, PND, orthopnea or reduced exercise tolerance.  ABDOMEN: Appetite fine. No weight loss. Denies diarrhea, abdominal pain, hematemesis or blood in stool.  URINARY: No flank pain, dysuria or hematuria.  PERIPHERAL VASCULAR: No claudication or cyanosis.  MUSCULOSKELETAL: See above.  NEUROLOGIC: No history of seizures, paralysis, alteration of gait or coordination.  PE:   HEAD: Normocephalic, atraumatic.EYES: PERRL. EOMI.   EARS: TM's intact. Light reflex normal. No retraction or perforation.   NOSE: Mucosa pink. Airway clear.MOUTH & THROAT: No tonsillar enlargement. No pharyngeal erythema or exudate. No stridor.  NODES:  No cervical, axillary or inguinal lymph node enlargement.  CHEST: Lungs clear to auscultation.  CARDIOVASCULAR: Normal S1, S2. No rubs, murmurs or gallops.  ABDOMEN: Bowel sounds normal. Not distended. Soft. No tenderness or masses.  MUSCULOSKELETAL: No palpable abnormality  NEUROLOGIC: Cranial Nerves: II-XII grossly intact.  Motor: 5/5 strength major flexors/extensors.  DTR's: Knees, Ankles 2+ and equal bilaterally; downgoing toes.  Sensory: Intact to light touch distally.  Gait & Posture: Normal gait and fine motion. No cerebellar signs.

## 2017-10-31 LAB
BUN SERPL-MCNC: 23 MG/DL (ref 7–25)
BUN/CREAT SERPL: 19 (CALC) (ref 6–22)
CALCIUM SERPL-MCNC: 9 MG/DL (ref 8.6–10.4)
CHLORIDE SERPL-SCNC: 102 MMOL/L (ref 98–110)
CO2 SERPL-SCNC: 32 MMOL/L (ref 20–31)
CREAT SERPL-MCNC: 1.18 MG/DL (ref 0.6–0.93)
GFR SERPL CREATININE-BSD FRML MDRD: 45 ML/MIN/1.73M2
GLUCOSE SERPL-MCNC: 94 MG/DL (ref 65–99)
MAGNESIUM SERPL-MCNC: 2.3 MG/DL (ref 1.5–2.5)
POTASSIUM SERPL-SCNC: 4.4 MMOL/L (ref 3.5–5.3)
SODIUM SERPL-SCNC: 141 MMOL/L (ref 135–146)

## 2017-11-01 ENCOUNTER — TELEPHONE (OUTPATIENT)
Dept: FAMILY MEDICINE | Facility: CLINIC | Age: 72
End: 2017-11-01

## 2017-11-01 DIAGNOSIS — R79.89 ABNORMAL TSH: Primary | ICD-10-CM

## 2017-11-15 ENCOUNTER — OFFICE VISIT (OUTPATIENT)
Dept: UROLOGY | Facility: CLINIC | Age: 72
End: 2017-11-15
Payer: MEDICARE

## 2017-11-15 VITALS
DIASTOLIC BLOOD PRESSURE: 66 MMHG | WEIGHT: 123 LBS | HEART RATE: 67 BPM | SYSTOLIC BLOOD PRESSURE: 143 MMHG | BODY MASS INDEX: 24.15 KG/M2 | HEIGHT: 60 IN

## 2017-11-15 DIAGNOSIS — R35.0 INCREASED URINARY FREQUENCY: ICD-10-CM

## 2017-11-15 DIAGNOSIS — R30.0 DYSURIA: Primary | ICD-10-CM

## 2017-11-15 DIAGNOSIS — N39.41 URGE INCONTINENCE: ICD-10-CM

## 2017-11-15 DIAGNOSIS — R39.15 URINARY URGENCY: ICD-10-CM

## 2017-11-15 LAB
BILIRUB SERPL-MCNC: NORMAL MG/DL
BLOOD URINE, POC: NORMAL
COLOR, POC UA: YELLOW
GLUCOSE UR QL STRIP: NORMAL
KETONES UR QL STRIP: NORMAL
LEUKOCYTE ESTERASE URINE, POC: NORMAL
NITRITE, POC UA: NORMAL
PH, POC UA: 6
PROTEIN, POC: NORMAL
SPECIFIC GRAVITY, POC UA: 1
UROBILINOGEN, POC UA: NORMAL

## 2017-11-15 PROCEDURE — 99999 PR PBB SHADOW E&M-EST. PATIENT-LVL III: CPT | Mod: PBBFAC,,, | Performed by: UROLOGY

## 2017-11-15 PROCEDURE — 81002 URINALYSIS NONAUTO W/O SCOPE: CPT | Mod: S$GLB,,, | Performed by: UROLOGY

## 2017-11-15 PROCEDURE — 99204 OFFICE O/P NEW MOD 45 MIN: CPT | Mod: 25,S$GLB,, | Performed by: UROLOGY

## 2017-11-15 RX ORDER — LEVOTHYROXINE SODIUM 200 UG/1
200 TABLET ORAL DAILY
Refills: 11 | COMMUNITY
Start: 2017-10-10 | End: 2017-12-13 | Stop reason: SDUPTHER

## 2017-11-15 RX ORDER — OXYBUTYNIN CHLORIDE 5 MG/1
5 TABLET, EXTENDED RELEASE ORAL DAILY
Qty: 30 TABLET | Refills: 11 | Status: SHIPPED | OUTPATIENT
Start: 2017-11-15 | End: 2018-12-04 | Stop reason: SDUPTHER

## 2017-11-15 NOTE — PROGRESS NOTES
Subjective:       Patient ID: Soumya Melchor is a 72 y.o. female.    Chief Complaint: Dysuria    HPI     72 year old with painful urination which began 9 months ago.  She says the pain is intermittent and happens about 1-2 times per week.  She had a catheter placed 9 months ago when she was hospitalized for heart surgery.  Previous urine culture was negative and UA is clear today.  She denies gross hematuria.  The pain is in her bladder or lower abdomen.  She also notes mild stress incontinence with sneeze and cough.  She wears pads.  She has difficulty holding urine.  Urgency.  Wearing 3 pads per day.   She has a history of smoking.  Quit 7 years ago.  Urine dipstick shows negative for all components.      Past Medical History:   Diagnosis Date    Acid reflux 4/11/2014    Anxiety and depression 3/6/2014    AP (angina pectoris) 2/13/2017    CAD (coronary artery disease) 2/13/2017    CAD, multiple vessel 2/13/2017    Chronic pain associated with significant psychosocial dysfunction 2/21/2013    COPD (chronic obstructive pulmonary disease)     HTN (hypertension)     Hypothyroidism     S/P CABG (coronary artery bypass graft) 2/13/2017     Past Surgical History:   Procedure Laterality Date    DILATION AND CURETTAGE OF UTERUS      KNEE SURGERY Right     SPINAL CORD STIMULATOR IMPLANT      TONSILLECTOMY         Current Outpatient Prescriptions:     ascorbic acid, vitamin C, (VITAMIN C) 1000 MG tablet, Take 1,000 mg by mouth once daily. , Disp: , Rfl:     aspirin (ECOTRIN) 81 MG EC tablet, Take 1 tablet (81 mg total) by mouth once daily., Disp: 30 tablet, Rfl: 1    calcium-vitamin D3 500 mg(1,250mg) -200 unit per tablet, Take 1 tablet by mouth 2 (two) times daily with meals., Disp: 60 tablet, Rfl: 11    clopidogrel (PLAVIX) 75 mg tablet, Take 1 tablet (75 mg total) by mouth once daily., Disp: 30 tablet, Rfl: 1    docosahexanoic acid-epa 120-180 mg Cap, Take by mouth., Disp: , Rfl:     donepezil  (ARICEPT) 10 MG tablet, Take 10 mg by mouth nightly., Disp: , Rfl: 5    duloxetine (CYMBALTA) 60 MG capsule, Take 60 mg by mouth once daily., Disp: , Rfl: 11    FOLIC ACID/MULTIVIT-MIN/LUTEIN (CENTRUM SILVER ORAL), Take by mouth once daily., Disp: , Rfl:     furosemide (LASIX) 20 MG tablet, Take 1 tablet (20 mg total) by mouth once daily., Disp: 30 tablet, Rfl: 11    levothyroxine (SYNTHROID) 200 MCG tablet, Take 200 mcg by mouth once daily., Disp: , Rfl: 11    losartan (COZAAR) 100 MG tablet, Take 1 tablet (100 mg total) by mouth once daily., Disp: 90 tablet, Rfl: 3    MELATONIN ORAL, Take 20 mg by mouth nightly as needed. , Disp: , Rfl:     metoprolol tartrate (LOPRESSOR) 25 MG tablet, Take 1 tablet (25 mg total) by mouth once daily., Disp: 30 tablet, Rfl: 11    omeprazole (PRILOSEC) 40 MG capsule, TAKE ONE CAPSULE BY MOUTH DAILY, Disp: 30 capsule, Rfl: 11    senna (SENOKOT) 8.6 mg tablet, Take 3 tablets by mouth., Disp: , Rfl:     simvastatin (ZOCOR) 40 MG tablet, Take 1 tablet (40 mg total) by mouth every evening., Disp: 90 tablet, Rfl: 3    tiotropium (SPIRIVA WITH HANDIHALER) 18 mcg inhalation capsule, Inhale 1 capsule (18 mcg total) into the lungs once daily., Disp: 30 capsule, Rfl: 11    tramadol (ULTRAM) 50 mg tablet, Take 50 mg by mouth 2 (two) times daily., Disp: , Rfl: 5    trazodone (DESYREL) 100 MG tablet, Take 1 tablet (100 mg total) by mouth every evening., Disp: 90 tablet, Rfl: 4    ZETIA 10 mg tablet, TAKE 1 TABLET BY MOUTH EVERY EVENING, Disp: 30 tablet, Rfl: 11    oxybutynin (DITROPAN-XL) 5 MG TR24, Take 1 tablet (5 mg total) by mouth once daily., Disp: 30 tablet, Rfl: 11      Review of Systems   Constitutional: Negative for fever.   Eyes: Negative for visual disturbance.   Respiratory: Negative for shortness of breath.    Cardiovascular: Negative for chest pain.   Gastrointestinal: Negative for constipation and nausea.   Genitourinary: Positive for dysuria, frequency and urgency.  Negative for flank pain and hematuria.   Musculoskeletal: Negative for gait problem.   Skin: Negative for rash.   Neurological: Negative for seizures.   Psychiatric/Behavioral: Negative for confusion.       Objective:      Physical Exam   Constitutional: She is oriented to person, place, and time. She appears well-developed and well-nourished.   HENT:   Head: Normocephalic.   Eyes: Conjunctivae and EOM are normal.   Neck: Normal range of motion.   Cardiovascular: Normal rate.    Pulmonary/Chest: Effort normal.   Abdominal: Soft. She exhibits no distension and no mass. There is no tenderness.   Genitourinary:   Genitourinary Comments: Bladder non-tender and nondistended  No CVA tenderness   Musculoskeletal: She exhibits no edema.   Neurological: She is alert and oriented to person, place, and time.   Skin: Skin is warm and dry. No rash noted. No erythema.   Psychiatric: She has a normal mood and affect. Her behavior is normal.   Vitals reviewed.      Assessment:       1. Dysuria    2. Increased urinary frequency    3. Urinary urgency    4. Urge incontinence        Plan:       Dysuria  -     US Retroperitoneal Complete (Kidney and; Future; Expected date: 11/15/2017  -     POCT URINE DIPSTICK WITHOUT MICROSCOPE    Increased urinary frequency  -     POCT URINE DIPSTICK WITHOUT MICROSCOPE    Urinary urgency  -     POCT URINE DIPSTICK WITHOUT MICROSCOPE    Urge incontinence  -     POCT URINE DIPSTICK WITHOUT MICROSCOPE    Other orders  -     oxybutynin (DITROPAN-XL) 5 MG TR24; Take 1 tablet (5 mg total) by mouth once daily.  Dispense: 30 tablet; Refill: 11      Trial oxybutynin.  RTC for cystoscopy.

## 2017-11-30 ENCOUNTER — TELEPHONE (OUTPATIENT)
Dept: CARDIOLOGY | Facility: CLINIC | Age: 72
End: 2017-11-30

## 2017-11-30 NOTE — TELEPHONE ENCOUNTER
Faxed last office note.        ----- Message from Kristofer Ross sent at 11/30/2017 11:23 AM CST -----  Contact: Jennifer/[facility]/Mari Rodriguez called in regarding the attached patient and stated she is having surgery in the morning Friday 12/1 at 5:30am and needs clinical notes from 10/3/17 (patient last saw Dr. Loya).  Fax number is [phone] and call back number is [phone]

## 2017-12-13 RX ORDER — LEVOTHYROXINE SODIUM 200 UG/1
200 TABLET ORAL DAILY
Qty: 30 TABLET | Refills: 11 | Status: SHIPPED | OUTPATIENT
Start: 2017-12-13 | End: 2018-12-03 | Stop reason: SDUPTHER

## 2017-12-19 ENCOUNTER — TELEPHONE (OUTPATIENT)
Dept: FAMILY MEDICINE | Facility: CLINIC | Age: 72
End: 2017-12-19

## 2017-12-19 DIAGNOSIS — R41.89 COGNITIVE IMPAIRMENT: Primary | ICD-10-CM

## 2017-12-19 NOTE — TELEPHONE ENCOUNTER
----- Message from Henny Sullivan sent at 12/19/2017  3:13 PM CST -----  Contact: pt  She's calling to get a referral to a neurologist, please advise [phone] (home)

## 2017-12-20 ENCOUNTER — HOSPITAL ENCOUNTER (OUTPATIENT)
Dept: RADIOLOGY | Facility: HOSPITAL | Age: 72
Discharge: HOME | End: 2017-12-20
Attending: UROLOGY
Payer: MEDICARE

## 2017-12-20 DIAGNOSIS — R30.0 DYSURIA: ICD-10-CM

## 2017-12-20 PROCEDURE — 76770 US EXAM ABDO BACK WALL COMP: CPT | Mod: TC,PO

## 2017-12-20 PROCEDURE — 76770 US EXAM ABDO BACK WALL COMP: CPT | Mod: 26,,, | Performed by: RADIOLOGY

## 2017-12-21 DIAGNOSIS — R32 URINARY INCONTINENCE, UNSPECIFIED TYPE: Primary | ICD-10-CM

## 2017-12-28 ENCOUNTER — PROCEDURE VISIT (OUTPATIENT)
Dept: UROLOGY | Facility: CLINIC | Age: 72
End: 2017-12-28
Payer: MEDICARE

## 2017-12-28 VITALS
HEIGHT: 60 IN | WEIGHT: 127 LBS | RESPIRATION RATE: 18 BRPM | SYSTOLIC BLOOD PRESSURE: 116 MMHG | BODY MASS INDEX: 24.94 KG/M2 | HEART RATE: 61 BPM | DIASTOLIC BLOOD PRESSURE: 67 MMHG

## 2017-12-28 DIAGNOSIS — R32 URINARY INCONTINENCE, UNSPECIFIED TYPE: ICD-10-CM

## 2017-12-28 PROCEDURE — 52000 CYSTOURETHROSCOPY: CPT | Mod: S$GLB,,, | Performed by: UROLOGY

## 2017-12-28 RX ORDER — MUPIROCIN 20 MG/G
OINTMENT TOPICAL 2 TIMES DAILY
Refills: 0 | COMMUNITY
Start: 2017-11-14 | End: 2018-02-15

## 2017-12-28 NOTE — PROCEDURES
"Cystoscopy  Date/Time: 12/28/2017 12:41 PM  Performed by: TIFF CRUZ  Authorized by: TIFF CRUZ     Consent Done?:  Yes (Written)  Time out: Immediately prior to procedure a "time out" was called to verify the correct patient, procedure, equipment, support staff and site/side marked as required.    Indications: overactive bladder and dysuria    Position:  Other  Patient sedated?: No    Preparation: Patient was prepped and draped in usual sterile fashion      Scope type:  Flexible cystoscope    Patient tolerance:  Patient tolerated the procedure well with no immediate complications    Blood Loss:  None    The patients clinic history and physical were reviewed and there are no changes.    The patient was placed in the frog-leg position.  The genitals were prepped and draped in a sterile fashion.   The urethra was dilated with sounds to 22 Romansh.  Using a flexible scope, a careful cystoscopic exam was then performed.  The entire bladder mucosa was systematically visualized.  The mucosa appeared normal.  There were no lesions, masses foreign bodies or stones.   Each ureteral orifices were visualized and both had clear efflux of urine.    The cystoscope was then removed and I examined the entire length of the urethra.  The urethra appeared normal.  She tolerated the procedure well.  There were no complications.    Impression:  Normal cystoscopy.    Plan:  Continue oxybutynin.  RTC 6 months      "

## 2018-01-26 ENCOUNTER — TELEPHONE (OUTPATIENT)
Dept: FAMILY MEDICINE | Facility: CLINIC | Age: 73
End: 2018-01-26

## 2018-01-26 DIAGNOSIS — R41.3 MEMORY CHANGES: Primary | ICD-10-CM

## 2018-01-26 NOTE — TELEPHONE ENCOUNTER
----- Message from Eleni Katz sent at 1/26/2018  3:34 PM CST -----  Contact: Latia  Patient is requesting referral to be entered for Dr Carlos Valdivia, Neurologist, [facility]. Please call when referral is entered [phone]. Thanks!

## 2018-01-26 NOTE — TELEPHONE ENCOUNTER
Dr Mathews, patient currently a patient of Dr. Mccain, wants to change to Dr Valdivia, states original consult was for memory changes

## 2018-01-29 ENCOUNTER — TELEPHONE (OUTPATIENT)
Dept: NEUROLOGY | Facility: CLINIC | Age: 73
End: 2018-01-29

## 2018-01-29 NOTE — TELEPHONE ENCOUNTER
----- Message from Zo Espitia sent at 1/29/2018 11:12 AM CST -----  Contact: self  Patient needs first available appointment due to referred from Dr Mathews's office. Please call patient at [phone]. Thanks!

## 2018-01-29 NOTE — TELEPHONE ENCOUNTER
Spoke with patient. Scheduled appointment. Gave her fax number for our office; stated she would contact her current neurologist to try to get all the records faxed to us.

## 2018-02-01 ENCOUNTER — TELEPHONE (OUTPATIENT)
Dept: NEUROLOGY | Facility: CLINIC | Age: 73
End: 2018-02-01

## 2018-02-01 NOTE — TELEPHONE ENCOUNTER
----- Message from Steph Torres sent at 2/1/2018  9:52 AM CST -----  Contact: Patient  Patient is calling to see if your office received a fax on her from Dr. Galan's office.  Call Back#  Thanks

## 2018-02-15 ENCOUNTER — OFFICE VISIT (OUTPATIENT)
Dept: CARDIOLOGY | Facility: CLINIC | Age: 73
End: 2018-02-15
Payer: MEDICARE

## 2018-02-15 ENCOUNTER — OFFICE VISIT (OUTPATIENT)
Dept: FAMILY MEDICINE | Facility: CLINIC | Age: 73
End: 2018-02-15
Payer: MEDICARE

## 2018-02-15 VITALS
BODY MASS INDEX: 24.41 KG/M2 | HEIGHT: 60 IN | WEIGHT: 124.31 LBS | DIASTOLIC BLOOD PRESSURE: 59 MMHG | HEART RATE: 62 BPM | TEMPERATURE: 98 F | SYSTOLIC BLOOD PRESSURE: 103 MMHG

## 2018-02-15 VITALS
HEART RATE: 60 BPM | BODY MASS INDEX: 24.76 KG/M2 | WEIGHT: 126.13 LBS | SYSTOLIC BLOOD PRESSURE: 146 MMHG | HEIGHT: 60 IN | DIASTOLIC BLOOD PRESSURE: 56 MMHG

## 2018-02-15 DIAGNOSIS — I10 ESSENTIAL HYPERTENSION: ICD-10-CM

## 2018-02-15 DIAGNOSIS — T78.3XXD ANGIOEDEMA, SUBSEQUENT ENCOUNTER: Primary | ICD-10-CM

## 2018-02-15 DIAGNOSIS — Z72.0 TOBACCO USE: ICD-10-CM

## 2018-02-15 DIAGNOSIS — E78.00 PURE HYPERCHOLESTEROLEMIA: Primary | ICD-10-CM

## 2018-02-15 DIAGNOSIS — Z95.1 S/P CABG (CORONARY ARTERY BYPASS GRAFT): ICD-10-CM

## 2018-02-15 DIAGNOSIS — E03.4 HYPOTHYROIDISM DUE TO ACQUIRED ATROPHY OF THYROID: ICD-10-CM

## 2018-02-15 DIAGNOSIS — I25.10 CORONARY ARTERY DISEASE INVOLVING NATIVE CORONARY ARTERY OF NATIVE HEART WITHOUT ANGINA PECTORIS: ICD-10-CM

## 2018-02-15 DIAGNOSIS — T78.40XD ALLERGIC REACTION, SUBSEQUENT ENCOUNTER: ICD-10-CM

## 2018-02-15 PROCEDURE — 3008F BODY MASS INDEX DOCD: CPT | Mod: S$GLB,,, | Performed by: NURSE PRACTITIONER

## 2018-02-15 PROCEDURE — 1159F MED LIST DOCD IN RCRD: CPT | Mod: S$GLB,,, | Performed by: INTERNAL MEDICINE

## 2018-02-15 PROCEDURE — 99999 PR PBB SHADOW E&M-EST. PATIENT-LVL III: CPT | Mod: PBBFAC,,, | Performed by: NURSE PRACTITIONER

## 2018-02-15 PROCEDURE — 1126F AMNT PAIN NOTED NONE PRSNT: CPT | Mod: S$GLB,,, | Performed by: INTERNAL MEDICINE

## 2018-02-15 PROCEDURE — 99214 OFFICE O/P EST MOD 30 MIN: CPT | Mod: S$GLB,,, | Performed by: INTERNAL MEDICINE

## 2018-02-15 PROCEDURE — 3008F BODY MASS INDEX DOCD: CPT | Mod: S$GLB,,, | Performed by: INTERNAL MEDICINE

## 2018-02-15 PROCEDURE — 1159F MED LIST DOCD IN RCRD: CPT | Mod: S$GLB,,, | Performed by: NURSE PRACTITIONER

## 2018-02-15 PROCEDURE — 99214 OFFICE O/P EST MOD 30 MIN: CPT | Mod: S$GLB,,, | Performed by: NURSE PRACTITIONER

## 2018-02-15 PROCEDURE — 99999 PR PBB SHADOW E&M-EST. PATIENT-LVL II: CPT | Mod: PBBFAC,,, | Performed by: INTERNAL MEDICINE

## 2018-02-15 RX ORDER — NIACIN 500 MG
500 CAPSULE, EXTENDED RELEASE ORAL NIGHTLY
Status: ON HOLD | COMMUNITY
End: 2019-06-19 | Stop reason: HOSPADM

## 2018-02-15 RX ORDER — CETIRIZINE HYDROCHLORIDE 10 MG/1
TABLET ORAL
Refills: 11 | COMMUNITY
Start: 2018-01-15 | End: 2018-05-18

## 2018-02-15 RX ORDER — ZOLPIDEM TARTRATE 10 MG/1
10 TABLET ORAL NIGHTLY
Refills: 1 | COMMUNITY
Start: 2018-01-25 | End: 2018-02-15

## 2018-02-15 RX ORDER — METOPROLOL TARTRATE 25 MG/1
25 TABLET, FILM COATED ORAL 2 TIMES DAILY
Qty: 60 TABLET | Refills: 2 | Status: SHIPPED | OUTPATIENT
Start: 2018-02-15 | End: 2018-05-29 | Stop reason: SDUPTHER

## 2018-02-15 RX ORDER — AMOXICILLIN 500 MG
1 CAPSULE ORAL 2 TIMES DAILY
COMMUNITY

## 2018-02-15 RX ORDER — ALBUTEROL SULFATE 90 UG/1
2 AEROSOL, METERED RESPIRATORY (INHALATION)
COMMUNITY
Start: 2017-12-30 | End: 2018-09-14

## 2018-02-15 NOTE — PROGRESS NOTES
Subjective:    Patient ID:  Soumya Melchor is a 72 y.o. female who presents for evaluation of Hospital Follow Up (hospital follow up)      HPI 73 yo WF S/P CABG with UZAIR to LAD and SVG to OM and RCA 2-, HTN , HLD and smoking hx.She recently in the hospital because of angioedema secondary to losartan even though she had been taking it for a while. Feels OK today    DATE OF PROCEDURE:  02/13/2017.     PREOPERATIVE DIAGNOSIS:  Coronary artery disease.     POSTOPERATIVE DIAGNOSIS:  Coronary artery disease.     PROCEDURE:  Off-pump coronary artery bypass grafting x3 with left internal   mammary artery to LAD, aorta to first obtuse marginal, aorta to distal right   coronary artery.     SURGEON:  Steve Bonds III, M.D.     CONCLUSIONS     1 - Concentric remodeling.     2 - Normal left ventricular systolic function (EF 60-65%).     3 - Normal left ventricular diastolic function.     4 - Normal right ventricular systolic function .     5 - The estimated PA systolic pressure is greater than 21 mmHg.        Review of Systems   Constitution: Negative for decreased appetite, fever, weakness, malaise/fatigue, weight gain and weight loss.   HENT: Negative for hearing loss and nosebleeds.    Eyes: Negative for visual disturbance.   Cardiovascular: Negative for chest pain, claudication, cyanosis, dyspnea on exertion, irregular heartbeat, leg swelling, near-syncope, orthopnea, palpitations, paroxysmal nocturnal dyspnea and syncope.   Respiratory: Negative for cough, hemoptysis, shortness of breath, sleep disturbances due to breathing, snoring and wheezing.    Endocrine: Negative for cold intolerance, heat intolerance, polydipsia and polyuria.   Hematologic/Lymphatic: Negative for adenopathy and bleeding problem. Does not bruise/bleed easily.   Skin: Negative for color change, itching, poor wound healing, rash and suspicious lesions.   Musculoskeletal: Positive for arthritis and back pain. Negative for falls, joint pain,  joint swelling, muscle cramps, muscle weakness and myalgias.   Gastrointestinal: Negative for bloating, abdominal pain, change in bowel habit, constipation, flatus, heartburn, hematemesis, hematochezia, hemorrhoids, jaundice, melena, nausea and vomiting.   Genitourinary: Negative for bladder incontinence, decreased libido, frequency, hematuria, hesitancy and urgency.   Neurological: Negative for brief paralysis, difficulty with concentration, excessive daytime sleepiness, dizziness, focal weakness, headaches, light-headedness, loss of balance, numbness and vertigo.   Psychiatric/Behavioral: Negative for altered mental status, depression and memory loss. The patient does not have insomnia and is not nervous/anxious.    Allergic/Immunologic: Negative for environmental allergies, hives and persistent infections.        Objective:    Physical Exam   Constitutional: She is oriented to person, place, and time. She appears well-developed and well-nourished.   BP (!) 146/56 (BP Location: Left arm, Patient Position: Sitting, BP Method: Medium (Automatic))   Pulse 60   Ht 5' (1.524 m)   Wt 57.2 kg (126 lb 1.6 oz)   BMI 24.63 kg/m²      HENT:   Head: Normocephalic and atraumatic.   Right Ear: External ear normal.   Left Ear: External ear normal.   Nose: Nose normal.   Mouth/Throat: Oropharynx is clear and moist.   Eyes: Conjunctivae, EOM and lids are normal. Pupils are equal, round, and reactive to light. Right eye exhibits no discharge. Left eye exhibits no discharge. Right conjunctiva has no hemorrhage. No scleral icterus.   Neck: Normal range of motion. Neck supple. No JVD present. No tracheal deviation present. No thyromegaly present.   Cardiovascular: Normal rate, regular rhythm, normal heart sounds and intact distal pulses.  Exam reveals no gallop and no friction rub.    No murmur heard.  Pulmonary/Chest: Effort normal and breath sounds normal. No respiratory distress. She has no wheezes. She has no rales. She  exhibits no tenderness. Breasts are symmetrical.   Sternal scar   Abdominal: Soft. Bowel sounds are normal. She exhibits no distension and no mass. There is no hepatosplenomegaly or hepatomegaly. There is no tenderness. There is no rebound and no guarding.   Musculoskeletal: Normal range of motion. She exhibits no edema or tenderness.   Lymphadenopathy:     She has no cervical adenopathy.   Neurological: She is alert and oriented to person, place, and time. She displays normal reflexes. No cranial nerve deficit. Coordination normal.   Skin: Skin is warm and dry. No rash noted. No erythema. No pallor.   Psychiatric: She has a normal mood and affect. Her behavior is normal. Judgment and thought content normal.   Nursing note and vitals reviewed.        Assessment:       1. Pure hypercholesterolemia    2. Coronary artery disease involving native coronary artery of native heart without angina pectoris    3. Essential hypertension    4. S/P CABG (coronary artery bypass graft)    5. Tobacco use         Plan:    Will leave on metoprolol 25 mg BID and lasix 20 mg qd for now BP at primary office [id number]    Patient advised to modify risk factors such as weight, exercise, diet,  tobacco and alcohol exposure    Low salt diet    No orders of the defined types were placed in this encounter.    Follow-up in about 6 months (around 8/15/2018).

## 2018-02-19 LAB
CLAM IGE QN: <0.1 KU/L
CODFISH IGE QN: <0.1 KU/L
CORN IGE QN: <0.1 KU/L
DEPRECATED CLAM IGE RAST QL: 0
DEPRECATED CODFISH IGE RAST QL: 0
DEPRECATED CORN IGE RAST QL: 0
DEPRECATED EGG WHITE IGE RAST QL: 1
DEPRECATED MILK IGE RAST QL: 2
DEPRECATED PEANUT IGE RAST QL: 0
DEPRECATED SCALLOP IGE RAST QL: ABNORMAL
DEPRECATED SESAME SEED IGE RAST QL: 0
DEPRECATED SHRIMP IGE RAST QL: 2
DEPRECATED SOYBEAN IGE RAST QL: 0
DEPRECATED WALNUT IGE RAST QL: 0
DEPRECATED WHEAT IGE RAST QL: 1
EGG WHITE IGE QN: 0.47 KU/L
MILK IGE QN: 1.53 KU/L
PEANUT IGE QN: <0.1 KU/L
REF LAB TEST REF RANGE: ABNORMAL
SCALLOP IGE QN: 0.25 KU/L
SESAME SEED IGE QN: <0.1 KU/L
SHRIMP IGE QN: 3.39 KU/L
SOYBEAN IGE QN: <0.1 KU/L
TSH SERPL-ACNC: 2.24 MIU/L (ref 0.4–4.5)
WALNUT IGE QN: <0.1 KU/L
WHEAT IGE QN: 0.48 KU/L

## 2018-02-20 NOTE — PROGRESS NOTES
Subjective:       Patient ID: Soumya Melchor is a 72 y.o. female.    Chief Complaint: Allergic Reaction    Allergic Reaction   This is a new problem. The current episode started 3 to 5 days ago. The problem has been gradually improving since onset. The problem is moderate. The patient was exposed to a prescription drug. Time of exposure: been taking losartan for over a year. Pertinent negatives include no abdominal pain, chest pain, coughing, diarrhea, difficulty breathing, rash, trouble swallowing or vomiting. Past treatments include diphenhydramine (seen in ER at [facility], given steroids). The treatment provided mild relief. Her past medical history is significant for medication allergies ( previous similar reaction to lisinopril). Swelling is present on the face, eyes and lips.   Hypertension   This is a chronic problem. The current episode started more than 1 year ago. The problem is unchanged. The problem is controlled. Pertinent negatives include no chest pain, headaches, palpitations or shortness of breath. Past treatments include beta blockers and diuretics. There are no compliance problems.  Identifiable causes of hypertension include a thyroid problem.   Thyroid Problem   Presents for follow-up visit. Patient reports no anxiety, constipation, depressed mood, diarrhea, fatigue, hair loss, palpitations, weight gain or weight loss.       Review of Systems   Constitutional: Negative for fatigue, fever, unexpected weight change, weight gain and weight loss.   HENT: Positive for facial swelling. Negative for ear pain, sore throat and trouble swallowing.    Eyes: Negative for pain and visual disturbance.   Respiratory: Negative for cough and shortness of breath.    Cardiovascular: Negative for chest pain and palpitations.   Gastrointestinal: Negative for abdominal pain, constipation, diarrhea and vomiting.   Musculoskeletal: Negative for arthralgias and myalgias.   Skin: Negative for color change and  rash.   Neurological: Negative for dizziness and headaches.   Psychiatric/Behavioral: Negative for dysphoric mood and sleep disturbance. The patient is not nervous/anxious.        Vitals:    02/15/18 0850   BP: (!) 103/59   Pulse: 62   Temp: 98 °F (36.7 °C)       Objective:     Current Outpatient Prescriptions   Medication Sig Dispense Refill    albuterol 90 mcg/actuation inhaler Inhale 2 puffs into the lungs.      ascorbic acid, vitamin C, (VITAMIN C) 1000 MG tablet Take 1,000 mg by mouth once daily.       aspirin (ECOTRIN) 81 MG EC tablet Take 1 tablet (81 mg total) by mouth once daily. 30 tablet 1    calcium-vitamin D3 500 mg(1,250mg) -200 unit per tablet Take 1 tablet by mouth 2 (two) times daily with meals. 60 tablet 11    cetirizine (ZYRTEC) 10 MG tablet Take 1 tablet (10 mg total) by mouth once daily. For sinus congestion  11    donepezil (ARICEPT) 10 MG tablet Take 10 mg by mouth nightly.  5    fish oil-omega-3 fatty acids 300-1,000 mg capsule Take by mouth once daily.      FOLIC ACID/MULTIVIT-MIN/LUTEIN (CENTRUM SILVER ORAL) Take by mouth once daily.      furosemide (LASIX) 20 MG tablet Take 1 tablet (20 mg total) by mouth once daily. 30 tablet 11    levothyroxine (SYNTHROID) 200 MCG tablet Take 1 tablet (200 mcg total) by mouth once daily. 30 tablet 11    metoprolol tartrate (LOPRESSOR) 25 MG tablet Take 1 tablet (25 mg total) by mouth 2 (two) times daily. 60 tablet 2    niacin 500 MG CpSR Take 250 mg by mouth every evening.      omeprazole (PRILOSEC) 40 MG capsule TAKE ONE CAPSULE BY MOUTH DAILY 30 capsule 11    oxybutynin (DITROPAN-XL) 5 MG TR24 Take 1 tablet (5 mg total) by mouth once daily. 30 tablet 11    simvastatin (ZOCOR) 40 MG tablet Take 1 tablet (40 mg total) by mouth every evening. 90 tablet 3    tiotropium (SPIRIVA WITH HANDIHALER) 18 mcg inhalation capsule Inhale 1 capsule (18 mcg total) into the lungs once daily. 30 capsule 11    ZETIA 10 mg tablet TAKE 1 TABLET BY MOUTH  EVERY EVENING 30 tablet 11     No current facility-administered medications for this visit.        Physical Exam   Constitutional: She is oriented to person, place, and time. She appears well-developed and well-nourished. No distress.   HENT:   Head: Normocephalic and atraumatic.   Facial swelling   Eyes: EOM are normal. Pupils are equal, round, and reactive to light.   Neck: Normal range of motion. Neck supple.   Cardiovascular: Normal rate and regular rhythm.    Pulmonary/Chest: Effort normal and breath sounds normal. She has no decreased breath sounds. She has no wheezes. She has no rhonchi.   Musculoskeletal: Normal range of motion.   Neurological: She is alert and oriented to person, place, and time.   Skin: Skin is warm and dry. No rash noted.   Psychiatric: She has a normal mood and affect. Judgment normal.   Nursing note and vitals reviewed.      Assessment:       1. Angioedema, subsequent encounter    2. Allergic reaction, subsequent encounter    3. Hypothyroidism due to acquired atrophy of thyroid        Plan:   Angioedema, subsequent encounter  -     Food Allergy Panel; Future; Expected date: 02/15/2018  -     TSH; Future; Expected date: 02/15/2018    Allergic reaction, subsequent encounter  -     Food Allergy Panel; Future; Expected date: 02/15/2018  -     TSH; Future; Expected date: 02/15/2018    Hypothyroidism due to acquired atrophy of thyroid  -     TSH; Future; Expected date: 02/15/2018    Other orders  -     metoprolol tartrate (LOPRESSOR) 25 MG tablet; Take 1 tablet (25 mg total) by mouth 2 (two) times daily.  Dispense: 60 tablet; Refill: 2    discontinue losartan    Follow-up in about 3 weeks (around 3/8/2018).

## 2018-02-23 ENCOUNTER — TELEPHONE (OUTPATIENT)
Dept: FAMILY MEDICINE | Facility: CLINIC | Age: 73
End: 2018-02-23

## 2018-02-26 ENCOUNTER — TELEPHONE (OUTPATIENT)
Dept: FAMILY MEDICINE | Facility: CLINIC | Age: 73
End: 2018-02-26

## 2018-02-26 ENCOUNTER — PATIENT OUTREACH (OUTPATIENT)
Dept: ADMINISTRATIVE | Facility: HOSPITAL | Age: 73
End: 2018-02-26

## 2018-02-26 ENCOUNTER — OFFICE VISIT (OUTPATIENT)
Dept: NEUROLOGY | Facility: CLINIC | Age: 73
End: 2018-02-26
Payer: MEDICARE

## 2018-02-26 VITALS
RESPIRATION RATE: 16 BRPM | SYSTOLIC BLOOD PRESSURE: 187 MMHG | BODY MASS INDEX: 24.61 KG/M2 | HEART RATE: 58 BPM | HEIGHT: 60 IN | DIASTOLIC BLOOD PRESSURE: 79 MMHG | WEIGHT: 125.38 LBS

## 2018-02-26 DIAGNOSIS — R41.82 ALTERED MENTAL STATUS, UNSPECIFIED ALTERED MENTAL STATUS TYPE: Primary | ICD-10-CM

## 2018-02-26 DIAGNOSIS — R56.9 SEIZURES, TRANSIENT: ICD-10-CM

## 2018-02-26 DIAGNOSIS — R19.7 DIARRHEA, UNSPECIFIED TYPE: Primary | ICD-10-CM

## 2018-02-26 PROCEDURE — 99205 OFFICE O/P NEW HI 60 MIN: CPT | Mod: S$GLB,,, | Performed by: NURSE PRACTITIONER

## 2018-02-26 PROCEDURE — 3008F BODY MASS INDEX DOCD: CPT | Mod: S$GLB,,, | Performed by: NURSE PRACTITIONER

## 2018-02-26 PROCEDURE — 1159F MED LIST DOCD IN RCRD: CPT | Mod: S$GLB,,, | Performed by: NURSE PRACTITIONER

## 2018-02-26 PROCEDURE — 99999 PR PBB SHADOW E&M-EST. PATIENT-LVL IV: CPT | Mod: PBBFAC,,, | Performed by: NURSE PRACTITIONER

## 2018-02-26 PROCEDURE — 1126F AMNT PAIN NOTED NONE PRSNT: CPT | Mod: S$GLB,,, | Performed by: NURSE PRACTITIONER

## 2018-02-26 RX ORDER — FAMOTIDINE 20 MG/1
20 TABLET, FILM COATED ORAL DAILY
Refills: 0 | COMMUNITY
Start: 2018-02-15 | End: 2018-03-12

## 2018-02-26 NOTE — PATIENT INSTRUCTIONS
MRI scheduled     Labs to be competed before MRI    We will contact MRI to determine if patient can have MRI with new NS which was placed in January 2018.  Patient was told she can have MRI scan with this stimulator.

## 2018-02-26 NOTE — LETTER
February 26, 2018      Howard Mathews MD  [address] [facility] [address]           [facility]  [address] [facility] [address] [place] [address]  Phone: [phone]  Fax: [phone]          Patient: Soumya Melchor   MR Number: [id number]   YOB: 1945   Date of Visit: 2/26/2018       Dear Dr. Howard Mathews:    Thank you for referring Soumya Melchor to me for evaluation. Attached you will find relevant portions of my assessment and plan of care.    If you have questions, please do not hesitate to call me. I look forward to following Soumya Melchor along with you.    Sincerely,    Elisa Zarate, NP    Enclosure  CC:  No Recipients    If you would like to receive this communication electronically, please contact [email or web address] or [phone] to request more information on [employer] Link access.    For providers and/or their staff who would like to refer a patient to [facility], please contact us through our one-stop-shop provider referral line, [facility], at [phone].    If you feel you have received this communication in error or would no longer like to receive these types of communications, please e-mail [email or web address]

## 2018-02-26 NOTE — TELEPHONE ENCOUNTER
----- Message from Amanda Barron sent at 2/26/2018 12:17 PM CST -----  Contact: pt   States she's calling rg needing a referral to see someone in [facility] and would like to see one in [place] and can be reached at [phone]//[email or web address]

## 2018-02-26 NOTE — PROGRESS NOTES
Subjective:       Patient ID: Soumya Melchor is a 72 y.o. female.    Chief Complaint:  Memory Loss      History of Present Illness  Mrs. Melchor is a 72 year old female here for evaluation of cognitive impairment and episodes of confusion.  Patient had 3 episodes of acute confusion from August to October 2017 (made chili in coffee)  She was evaluated by a Neurologist in [place] by Dr Mccain for these complaints.  She had EEG, and ambulatory EEG for 3 plays.  Both test were normal EEG showing no incidents of seizure.  Patient has not had any episodes since early October.  She was hospitalized for a suicide attempt at [facility].  She is managed on medications by Psychiatry, She does not feel depressed at this time. She denies any suicidal ideation. She was started on Donepezil 5 mg daily with no adverse side effects.  She feels the medication as sharpened her cognitive function.  She had NP testing in September with no evidence of cognitive impairment. Dr Mccain did not feel patient had a seizure disorder so she was not started on any medication for seizure control.  She has had no further episodes since early October. Drives without incident. 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  Patient has NS placed in January 2018 for post herpetic pain We will contact MRI to determine if patient can have MRI with new NS which was placed in January 2018.  Patient was told she can have MRI scan with this stimulator.        Review of Systems  Review of Systems   Constitutional: Negative for chills.   HENT: Negative for hearing loss.    Eyes: Negative for visual disturbance.   Respiratory: Negative for cough.    Cardiovascular: Negative for chest pain.   Gastrointestinal: Negative for diarrhea and nausea.   Genitourinary: Negative for urgency.   Musculoskeletal: Negative for gait problem.        Has a neurostimulator for post herpatic pain   Neurological: Negative for  dizziness and tremors.   Psychiatric/Behavioral: Negative.        Objective:      Neurologic Exam     Mental Status   Oriented to country, city and area.   Oriented to year, month, date, day and season.   Registration of memory: recalls 5 of 5 words. Recall of objects at 5 minutes: recalls 4 of 5 words (1 with cueing)   Attention: normal. Concentration: normal.   Level of consciousness: alert  Knowledge: good and consistent with education. Unable to perform simple calculations (2 of 5 serial 7s).   Able to name object. Able to read. Able to repeat. Normal comprehension.    MOCA was administered- Overall Score was 28/30  Normal .26  Adequate fund of knowledge.  Able to discuss current events and past medical history in detail.     Cranial Nerves   Cranial nerves II through XII intact.     Motor Exam   Muscle bulk: normal  Overall muscle tone: normal    Sensory Exam   Light touch normal.   Pinprick normal.     Gait, Coordination, and Reflexes     Gait  Gait: normal    Coordination   Romberg: negative  Finger to nose coordination: normal  Tandem walking coordination: normal    Tremor   Resting tremor: absent  Intention tremor: absent    Reflexes   Reflexes 2+ except as noted.       Physical Exam   Neurological: She has a normal Finger-Nose-Finger Test, a normal Romberg Test and a normal Tandem Gait Test. Gait normal.       08/09/17 CT head  No acute intracranial processes.  Atherosclerosis of the vertebral arteries and the cavernous carotid arteries.     02/2018  TSH 2.24    EEG  Normal  Ambulatory Normal  No episodes noted  Assessment:      Confusion- episodic   Depression    Plan:       -Reviewed results of cognitive screen and answered questions.  -Discussed MCI vs normal aging.  Mild cognitive impairment is defined as the transitional state between the cognitive changes of normal aging and the fully developed features of dementia. There are several types of MCI. Some subtypes may have no or little disease  progression and some other subtypes may show a progressive decline in memory and cognition that results in the diagnosis of degenerative type of dementia. The clinical outcome of individuals with MCI remains unclear and unpredictable.   -Diagnostic work up- MRI, Vitamin B12 level, folate  -Reviewed records from Dr Mccain and [facility] with patient.  No evidence of seizures noted.  Has had no further episodes of acute confusion since October 2017  -Discussed safety issues related to MCI- medication management, cooking, managing finances  No evidence of cognitive impairment at this time per NP testing  -Discussed medications for cognitive enhancement- AChEIs and Namenda CR  She would like to continue on Donepezil 5 mg daily  She does not want to increase dosage  -Discussed NP testing completed.  No evidence of cognitive impairment  (see scanned report)  -Will review MRI with Dr Grey.  Consider consult to Dr Grey if any further episodes of acute confusion  -Continue care for depression with Psychiatrist

## 2018-02-26 NOTE — LETTER
February 26, 2018    Soumya Melchor  [address] [place] [address]           [facility]  [address]  [place] [address]  Phone: [phone] Dear Mrs. Melchor:    [facility] is committed to your overall health.  To help you get the most out of each of your visits, we will review your information to make sure you are up to date on all of your recommended tests and/or procedures.      Howard Mathews MD has found that you may be due for   Health Maintenance Due   Topic    TETANUS VACCINE     Mammogram     DEXA SCAN     Colonoscopy     Pneumococcal (65+) (2 of 2 - PCV13)    Lipid Panel     Influenza Vaccine       If you have had any of the above done at another facility, please bring the records or information with you so that your record at [facility] will be complete.    If you are currently taking medication, please bring it with you to your appointment for review.    We will be happy to assist you with scheduling any necessary appointments or you may contact the [facility] appointment desk at [phone] to schedule at your convenience.     Thank you for choosing [facility] for your healthcare needs,    If you have any questions or concerns, please don't hesitate to call.    Sincerely,    NAIN Velasco  Care Coordination Department  [facility]  [phone]

## 2018-02-27 ENCOUNTER — TELEPHONE (OUTPATIENT)
Dept: NEUROLOGY | Facility: CLINIC | Age: 73
End: 2018-02-27

## 2018-02-27 NOTE — TELEPHONE ENCOUNTER
Spoke with Saray at [facility] MRI; she informed me that the patient could get an MRI done there as long as she brought the remote to the stimulator with her. Spoke with patient and informed her of this; gave her the number to schedule the MRI.

## 2018-03-05 ENCOUNTER — HOSPITAL ENCOUNTER (OUTPATIENT)
Dept: RADIOLOGY | Facility: HOSPITAL | Age: 73
Discharge: HOME | End: 2018-03-05
Attending: NURSE PRACTITIONER
Payer: MEDICARE

## 2018-03-05 ENCOUNTER — INITIAL CONSULT (OUTPATIENT)
Dept: GASTROENTEROLOGY | Facility: CLINIC | Age: 73
End: 2018-03-05
Payer: MEDICARE

## 2018-03-05 VITALS
SYSTOLIC BLOOD PRESSURE: 152 MMHG | DIASTOLIC BLOOD PRESSURE: 72 MMHG | BODY MASS INDEX: 25.14 KG/M2 | HEART RATE: 63 BPM | WEIGHT: 128.06 LBS | HEIGHT: 60 IN

## 2018-03-05 DIAGNOSIS — R10.84 GENERALIZED ABDOMINAL DISCOMFORT: ICD-10-CM

## 2018-03-05 DIAGNOSIS — R19.7 DIARRHEA, UNSPECIFIED TYPE: ICD-10-CM

## 2018-03-05 DIAGNOSIS — R15.2 INCONTINENCE OF FECES WITH FECAL URGENCY: ICD-10-CM

## 2018-03-05 DIAGNOSIS — Z87.19 HISTORY OF CHRONIC CONSTIPATION: ICD-10-CM

## 2018-03-05 DIAGNOSIS — R19.4 CHANGE IN BOWEL HABITS: ICD-10-CM

## 2018-03-05 DIAGNOSIS — R15.9 INCONTINENCE OF FECES WITH FECAL URGENCY: Primary | ICD-10-CM

## 2018-03-05 DIAGNOSIS — R15.2 INCONTINENCE OF FECES WITH FECAL URGENCY: Primary | ICD-10-CM

## 2018-03-05 DIAGNOSIS — R15.9 INCONTINENCE OF FECES WITH FECAL URGENCY: ICD-10-CM

## 2018-03-05 PROCEDURE — 74019 RADEX ABDOMEN 2 VIEWS: CPT | Mod: 26,,, | Performed by: RADIOLOGY

## 2018-03-05 PROCEDURE — 74019 RADEX ABDOMEN 2 VIEWS: CPT | Mod: TC,FY,PO

## 2018-03-05 PROCEDURE — 3078F DIAST BP <80 MM HG: CPT | Mod: S$GLB,,, | Performed by: NURSE PRACTITIONER

## 2018-03-05 PROCEDURE — 99214 OFFICE O/P EST MOD 30 MIN: CPT | Mod: S$GLB,,, | Performed by: NURSE PRACTITIONER

## 2018-03-05 PROCEDURE — 99999 PR PBB SHADOW E&M-EST. PATIENT-LVL V: CPT | Mod: PBBFAC,,, | Performed by: NURSE PRACTITIONER

## 2018-03-05 PROCEDURE — 3077F SYST BP >= 140 MM HG: CPT | Mod: S$GLB,,, | Performed by: NURSE PRACTITIONER

## 2018-03-05 RX ORDER — ASPIRIN 81 MG/1
81 TABLET ORAL DAILY
COMMUNITY

## 2018-03-05 NOTE — PATIENT INSTRUCTIONS

## 2018-03-05 NOTE — LETTER
March 5, 2018      Howard Mathews MD  [address] [facility] [address]           [facility]  [address] [facility] [address] [place] [address]  Phone: [phone]          Patient: Soumya Melchor   MR Number: [id number]   YOB: 1945   Date of Visit: 3/5/2018       Dear Dr. Howard Mathews:    Thank you for referring Soumya Melchor to me for evaluation. Attached you will find relevant portions of my assessment and plan of care.    If you have questions, please do not hesitate to call me. I look forward to following Soumya Melchor along with you.    Sincerely,    Francisca Gasca, [facility]    Enclosure  CC:  No Recipients    If you would like to receive this communication electronically, please contact [email or web address] or [phone] to request more information on [employer] Link access.    For providers and/or their staff who would like to refer a patient to [facility], please contact us through our one-stop-shop provider referral line, [facility], at [phone].    If you feel you have received this communication in error or would no longer like to receive these types of communications, please e-mail [email or web address]

## 2018-03-05 NOTE — PROGRESS NOTES
Subjective:       Patient ID: Soumya Melchor is a 72 y.o. female Body mass index is 25.02 kg/m².    Chief Complaint: Encopresis    This patient is new to me.  Referring Provider:  Dr. Mathews for diarrhea.    Diarrhea    This is a new problem. Episode onset: started around 7/2017. The problem occurs 2 to 4 times per day. The problem has been unchanged. Diarrhea characteristics: pasty. The patient states that diarrhea does not awaken her from sleep. Associated symptoms include abdominal pain (constant aching to generalized abdomen) and bloating. Pertinent negatives include no chills, coughing, fever, vomiting or weight loss. Associated symptoms comments: Fecal incontinence with urgency. The symptoms are aggravated by dairy products (allergy testing recently (milk, wheat)). Risk factors: denies recent antibiotic/hospitalization, ill contacts, suspect food intake, or foreign travel. Treatments tried: lactose tablet PRN; PAST: senna PRN constipation or bloating.     Review of Systems   Constitutional: Negative for appetite change, chills, fatigue, fever, unexpected weight change and weight loss.   HENT: Negative for sore throat and trouble swallowing.    Respiratory: Negative for cough, choking and shortness of breath.    Cardiovascular: Negative for chest pain.   Gastrointestinal: Positive for abdominal pain (constant aching to generalized abdomen), bloating and diarrhea. Negative for anal bleeding, blood in stool, constipation (chronic prior to diarrhea; has resolved), nausea, rectal pain and vomiting.        Patient reports with her last colonoscopy she had post-op complications of aspiration pneumonia   Genitourinary: Negative for difficulty urinating, dysuria and flank pain.   Neurological: Negative for weakness.       Past Medical History:   Diagnosis Date    Acid reflux 4/11/2014    Anxiety and depression 3/6/2014    AP (angina pectoris) 2/13/2017    CAD (coronary artery disease) 2/13/2017    CAD, multiple  vessel 2/13/2017    Chronic pain associated with significant psychosocial dysfunction 2/21/2013    COPD (chronic obstructive pulmonary disease)     HTN (hypertension)     Hypothyroidism     S/P CABG (coronary artery bypass graft) 2/13/2017     Past Surgical History:   Procedure Laterality Date    COLONOSCOPY  ~2013    Dr. Perez; colon polyps removed    DILATION AND CURETTAGE OF UTERUS      KNEE SURGERY Right     SPINAL CORD STIMULATOR IMPLANT      TONSILLECTOMY       Family History   Problem Relation Age of Onset    Heart attacks under age 50 Mother 41    Colon cancer Neg Hx     Colon polyps Neg Hx     Crohn's disease Neg Hx     Ulcerative colitis Neg Hx     Celiac disease Neg Hx      Wt Readings from Last 10 Encounters:   03/05/18 58.1 kg (128 lb 1.4 oz)   02/26/18 56.9 kg (125 lb 6.4 oz)   02/15/18 57.2 kg (126 lb 1.6 oz)   02/15/18 56.4 kg (124 lb 5.4 oz)   12/28/17 57.6 kg (126 lb 15.8 oz)   11/15/17 55.8 kg (123 lb 0.3 oz)   10/26/17 55.7 kg (122 lb 12.8 oz)   10/03/17 54.3 kg (119 lb 11.2 oz)   09/29/17 54.9 kg (121 lb)   09/25/17 54.9 kg (121 lb)     Lab Results   Component Value Date    WBC 7.1 08/14/2017    HGB 11.9 08/14/2017    HCT 35.8 08/14/2017    MCV 93.2 08/14/2017     08/14/2017     CMP  Sodium   Date Value Ref Range Status   10/30/2017 141 135 - 146 mmol/L Final     Potassium   Date Value Ref Range Status   10/30/2017 4.4 3.5 - 5.3 mmol/L Final     Chloride   Date Value Ref Range Status   10/30/2017 102 98 - 110 mmol/L Final     CO2   Date Value Ref Range Status   10/30/2017 32 (H) 20 - 31 mmol/L Final     Glucose   Date Value Ref Range Status   10/30/2017 94 65 - 99 mg/dL Final     Comment:                   Fasting reference interval          BUN, Bld   Date Value Ref Range Status   10/30/2017 23 7 - 25 mg/dL Final     Creatinine   Date Value Ref Range Status   10/30/2017 1.18 (H) 0.60 - 0.93 mg/dL Final     Comment:     For patients >49 years of age, the reference  limit  for Creatinine is approximately 13% higher for people  identified as -American.          Calcium   Date Value Ref Range Status   10/30/2017 9.0 8.6 - 10.4 mg/dL Final     Total Protein   Date Value Ref Range Status   08/14/2017 7.1 6.1 - 8.1 g/dL Final     Albumin   Date Value Ref Range Status   08/14/2017 4.5 3.6 - 5.1 g/dL Final     Total Bilirubin   Date Value Ref Range Status   08/14/2017 0.4 0.2 - 1.2 mg/dL Final     Alkaline Phosphatase   Date Value Ref Range Status   08/14/2017 70 33 - 130 U/L Final     AST   Date Value Ref Range Status   08/14/2017 22 10 - 35 U/L Final     ALT   Date Value Ref Range Status   08/14/2017 14 6 - 29 U/L Final     Anion Gap   Date Value Ref Range Status   02/16/2017 12 8 - 16 mmol/L Final     eGFR if    Date Value Ref Range Status   10/30/2017 53 (L) > OR = 60 mL/min/1.73m2 Final     eGFR if non    Date Value Ref Range Status   10/30/2017 45 (L) > OR = 60 mL/min/1.73m2 Final     Lab Results   Component Value Date    TSH 2.24 02/15/2018     Reviewed prior medical records including radiology report of 5/12/15 abdominal ultrasound (in care everywhere) & endoscopy history (see surgical history).    Objective:      Physical Exam   Constitutional: She is oriented to person, place, and time. She appears well-developed and well-nourished. No distress.   HENT:   Mouth/Throat: Oropharynx is clear and moist and mucous membranes are normal. No oral lesions. No oropharyngeal exudate.   Eyes: Conjunctivae are normal. Pupils are equal, round, and reactive to light. No scleral icterus.   Cardiovascular: Normal rate.    Pulmonary/Chest: Effort normal and breath sounds normal. No respiratory distress. She has no wheezes.   Abdominal: Soft. Bowel sounds are normal. She exhibits distension (mild). She exhibits no abdominal bruit and no mass. There is no hepatosplenomegaly. There is no tenderness. There is no rigidity, no rebound, no guarding, no  tenderness at McBurney's point and negative Goddard's sign. No hernia.   Neurological: She is alert and oriented to person, place, and time.   Skin: Skin is warm and dry. No rash noted. She is not diaphoretic. No erythema. No pallor.   Non-jaundiced   Psychiatric: She has a normal mood and affect. Her behavior is normal. Judgment and thought content normal.   Nursing note and vitals reviewed.      Assessment:       1. Incontinence of feces with fecal urgency    2. Diarrhea, unspecified type    3. History of chronic constipation    4. Generalized abdominal discomfort    5. Change in bowel habits        Plan:     Patient agreed to sign medical records release consent for us to obtain records from Dr. Perez (to include colonoscopy reports)    Incontinence of feces with fecal urgency  -     X-Ray Abdomen Flat And Erect; Future; Expected date: 03/05/2018  - recommended schedule Colonoscopy, discussed procedure with the patient, patient verbalized understanding but patient declined colonoscopy  - recommend high fiber diet to help bulk up the stool, especially soluble fiber since this can help bulk up the stool consistency and may help to slow down how fast the stool goes through the colon and can prevent diarrhea  - possible referral to general surgery, if symptoms persist despite use of above recommendations    Diarrhea, unspecified type  -     Stool Exam-Ova,Cysts,Parasites; Future; Expected date: 03/05/2018  -     Adenovirus Antigen EIA, Stool; Future; Expected date: 03/05/2018  -     Giardia / Cryptosporidum, EIA; Future; Expected date: 03/05/2018  -     Occult blood x 1, stool; Future; Expected date: 03/05/2018  -     Rotavirus antigen, stool; Future; Expected date: 03/05/2018  -     WBC, Stool; Future; Expected date: 03/05/2018  -     Stool culture; Future; Expected date: 03/05/2018  -     Clostridium difficile EIA; Future; Expected date: 03/05/2018  -     IgA; Future; Expected date: 03/05/2018  -     Tissue  transglutaminase, IgA; Future; Expected date: 03/05/2018  -     X-Ray Abdomen Flat And Erect; Future; Expected date: 03/05/2018  - recommended schedule Colonoscopy, discussed procedure with the patient, patient verbalized understanding but patient declined colonoscopy  - recommended OTC probiotic, such as Align or Culturelle, as directed  - avoid lactose    History of chronic constipation  -     X-Ray Abdomen Flat And Erect; Future; Expected date: 03/05/2018  - recommended schedule Colonoscopy, discussed procedure with the patient, patient verbalized understanding but patient declined colonoscopy  Recommended daily exercise as tolerated, adequate water intake (six 8-oz glasses of water daily), and high fiber diet. OTC fiber supplements are recommended if diet does not reach daily fiber goal (25 grams daily), such as Metamucil, Citrucel, or FiberCon (take as directed, separate from other oral medications by >2 hours).  -Recommend taking an OTC stool softener such as Colace as directed to avoid hard stools and straining with bowel movements PRN  -Recommend trying OTC MiraLax once daily (17g PO) as directed  - If no improvement with above recommendations, try intermittently dosed Dulcolax OTC as directed (every 3-4  days) PRN to facilitate bowel movements  -If still no improvement with these measures, call/follow-up    Generalized abdominal discomfort  -     Stool Exam-Ova,Cysts,Parasites; Future; Expected date: 03/05/2018  -     Adenovirus Antigen EIA, Stool; Future; Expected date: 03/05/2018  -     Giardia / Cryptosporidum, EIA; Future; Expected date: 03/05/2018  -     Occult blood x 1, stool; Future; Expected date: 03/05/2018  -     Rotavirus antigen, stool; Future; Expected date: 03/05/2018  -     WBC, Stool; Future; Expected date: 03/05/2018  -     Stool culture; Future; Expected date: 03/05/2018  -     Clostridium difficile EIA; Future; Expected date: 03/05/2018  -     IgA; Future; Expected date: 03/05/2018  -      Tissue transglutaminase, IgA; Future; Expected date: 03/05/2018  -     X-Ray Abdomen Flat And Erect; Future; Expected date: 03/05/2018  - recommended schedule Colonoscopy, discussed procedure with the patient, patient verbalized understanding but patient declined colonoscopy  - Possible CT scan pending results of testing and if symptoms persist    Change in bowel habits  - recommended schedule Colonoscopy, discussed procedure with the patient, patient verbalized understanding but patient declined colonoscopy    Follow-up in about 1 month (around 4/5/2018), or if symptoms worsen or fail to improve.      If no improvement in symptoms or symptoms worsen, call/follow-up at clinic or go to ER.

## 2018-03-06 ENCOUNTER — TELEPHONE (OUTPATIENT)
Dept: GASTROENTEROLOGY | Facility: CLINIC | Age: 73
End: 2018-03-06

## 2018-03-06 ENCOUNTER — TELEPHONE (OUTPATIENT)
Dept: FAMILY MEDICINE | Facility: CLINIC | Age: 73
End: 2018-03-06

## 2018-03-06 DIAGNOSIS — R19.5 YEAST IN STOOL: Primary | ICD-10-CM

## 2018-03-06 DIAGNOSIS — K92.1 BLOOD IN STOOL: Primary | ICD-10-CM

## 2018-03-06 RX ORDER — FLUCONAZOLE 150 MG/1
150 TABLET ORAL DAILY
Qty: 1 TABLET | Refills: 0 | Status: SHIPPED | OUTPATIENT
Start: 2018-03-06 | End: 2018-03-08

## 2018-03-06 NOTE — TELEPHONE ENCOUNTER
Please call to inform & review the results with the patient- radiology report of the Abdominal x-ray showed unremarkable bowel gas pattern & no signs of intestinal obstruction. Multiple calcifications noted to kidneys and pelvis (possible kidney/ureter stones): Recommend follow-up with Primary Care Provider/Urology for continued evaluation and management of these findings.  Bones noted to be osteopenic and with degenerative changes to lumbar spine: Recommend follow-up with Primary Care Provider for continued evaluation and management of these findings.  Some stool studies are still pending (we will notify you once received and reviewed), but the ones that have come back show positive for rotavirus & occult blood. Also noted for a few yeast- sent in a course of diflucan. Rotavirus is an acute viral gastroenteritis which is common in young children, but can occur in adults. It is usually self-limited & is treated with supportive measures (fluid repletion- increasing fluid intake especially with electrolyte-enhanced fluids to avoid dehydration and unrestricted nutrition as tolerated). No specific antiviral/antibiotic agents are available. Recommend trying an OTC probiotics, such as Culturelle, taken as directed; avoid/minimize use of anti-motility agents (imodium/pepto- the longer the bug stays in the longer you will have symptoms). Recommend frequent handwashing and good hand hygiene.Continue with previous recommendations.  Due to occult blood positive, I strongly recommend scheduling colonoscopy to further evaluate finding and symptoms.  If no improvement in symptoms or symptoms worsen, call/follow-up at clinic or go to ER.  Please release results to patient's mychart once you have discussed results and recommendations with patient.  Thanks,  Francisca BAKER

## 2018-03-06 NOTE — TELEPHONE ENCOUNTER
Pt notified test results/recommendations. Recommended colonoscopy for heme+ stool, pt states will think about it.

## 2018-03-06 NOTE — TELEPHONE ENCOUNTER
----- Message from Padma Holley sent at 3/6/2018  3:40 PM CST -----  Contact: Patient   Patient stated that she was advise that she needs to have a colonoscopy done she stated that she was told that she has blood in her stool but she wants Dr. Macdonald to do it. Please call her at [phone].    Thanks  td

## 2018-03-07 ENCOUNTER — TELEPHONE (OUTPATIENT)
Dept: GASTROENTEROLOGY | Facility: CLINIC | Age: 73
End: 2018-03-07

## 2018-03-07 NOTE — TELEPHONE ENCOUNTER
----- Message from Gabby Jacobs sent at 3/6/2018 11:18 AM CST -----  Patient is returning office call concerning her test results. The test revealed that she had blood in the stool. Please call back at [phone] with advise. She would like to know what to do next.

## 2018-03-07 NOTE — TELEPHONE ENCOUNTER
Pt  is waiting on 2nd opinion from a doctor in [place] before scheduling colonoscopy.  had an extremely bad experience with a previous colonoscopy but understands the importance of having one due to heme+ stool. Pt  will back after she speaks with MD from [place]

## 2018-03-08 ENCOUNTER — TELEPHONE (OUTPATIENT)
Dept: GASTROENTEROLOGY | Facility: CLINIC | Age: 73
End: 2018-03-08

## 2018-03-08 ENCOUNTER — HOSPITAL ENCOUNTER (OUTPATIENT)
Dept: RADIOLOGY | Facility: HOSPITAL | Age: 73
Discharge: HOME | End: 2018-03-08
Attending: UROLOGY
Payer: MEDICARE

## 2018-03-08 ENCOUNTER — OFFICE VISIT (OUTPATIENT)
Dept: UROLOGY | Facility: CLINIC | Age: 73
End: 2018-03-08
Payer: MEDICARE

## 2018-03-08 VITALS
BODY MASS INDEX: 25.19 KG/M2 | DIASTOLIC BLOOD PRESSURE: 76 MMHG | WEIGHT: 128.31 LBS | HEIGHT: 60 IN | HEART RATE: 64 BPM | SYSTOLIC BLOOD PRESSURE: 120 MMHG

## 2018-03-08 DIAGNOSIS — N20.0 KIDNEY STONE: Primary | ICD-10-CM

## 2018-03-08 DIAGNOSIS — R10.9 FLANK PAIN: ICD-10-CM

## 2018-03-08 LAB
BILIRUB SERPL-MCNC: NORMAL MG/DL
BLOOD URINE, POC: NORMAL
COLOR, POC UA: YELLOW
GLUCOSE UR QL STRIP: NORMAL
KETONES UR QL STRIP: NORMAL
LEUKOCYTE ESTERASE URINE, POC: NORMAL
NITRITE, POC UA: NORMAL
PH, POC UA: 6
PROTEIN, POC: NORMAL
SPECIFIC GRAVITY, POC UA: 1.01
UROBILINOGEN, POC UA: NORMAL

## 2018-03-08 PROCEDURE — 74176 CT ABD & PELVIS W/O CONTRAST: CPT | Mod: TC,PO

## 2018-03-08 PROCEDURE — 3074F SYST BP LT 130 MM HG: CPT | Mod: S$GLB,,, | Performed by: UROLOGY

## 2018-03-08 PROCEDURE — 3078F DIAST BP <80 MM HG: CPT | Mod: S$GLB,,, | Performed by: UROLOGY

## 2018-03-08 PROCEDURE — 99214 OFFICE O/P EST MOD 30 MIN: CPT | Mod: 25,S$GLB,, | Performed by: UROLOGY

## 2018-03-08 PROCEDURE — 99999 PR PBB SHADOW E&M-EST. PATIENT-LVL III: CPT | Mod: PBBFAC,,, | Performed by: UROLOGY

## 2018-03-08 PROCEDURE — 81002 URINALYSIS NONAUTO W/O SCOPE: CPT | Mod: S$GLB,,, | Performed by: UROLOGY

## 2018-03-08 PROCEDURE — 74176 CT ABD & PELVIS W/O CONTRAST: CPT | Mod: 26,,, | Performed by: RADIOLOGY

## 2018-03-08 NOTE — TELEPHONE ENCOUNTER
----- Message from Annalee Rosen sent at 3/7/2018  4:22 PM CST -----  Contact: patient   Patient called to speak to Nurse Tubbs. Call to pod. Warm transferred. Thanks!

## 2018-03-08 NOTE — PROGRESS NOTES
Subjective:       Patient ID: Soumya Melchor is a 72 y.o. female.    Chief Complaint: Nephrolithiasis    HPI     72 year old seen last year for dysuria.  She underwent cystoscopy and renal ultrasound which were unremarkable.  No stones or obstruction seen on US.  She has urinary frequency and urgneyc and was given oxybutynin which she feels has been very effective.  She had a KUB for fecal incontinence which noted stones bilaterally.  She has no history of stones.  She denies flank pain and nausea.  UA is clear.    Urine dipstick shows negative for all components.    Review of Systems   Constitutional: Negative for fever.   Genitourinary: Negative for dysuria and hematuria.       Objective:      Physical Exam   Constitutional: She is oriented to person, place, and time. She appears well-developed and well-nourished.   HENT:   Head: Normocephalic.   Eyes: Conjunctivae and EOM are normal.   Neck: Normal range of motion.   Cardiovascular: Normal rate.    Pulmonary/Chest: Effort normal.   Abdominal: Soft. She exhibits no distension and no mass. There is no tenderness.   Genitourinary:   Genitourinary Comments: Bladder non-tender and nondistended  No CVA tenderness   Musculoskeletal: She exhibits no edema.   Neurological: She is alert and oriented to person, place, and time.   Skin: Skin is warm and dry. No rash noted. No erythema.   Psychiatric: She has a normal mood and affect. Her behavior is normal.   Vitals reviewed.      Assessment:       1. Kidney stone    2. Flank pain        Plan:       Kidney stone  -     POCT urine dipstick without microscope    Flank pain  -     CT Renal Stone Study ABD Pelvis WO; Future; Expected date: 03/08/2018      Will get CT scan.

## 2018-03-08 NOTE — TELEPHONE ENCOUNTER
Received request fro refill on Simvastatin from [facility].The patient no longer taking in noted from encounter note from today.[facility] notified.

## 2018-03-08 NOTE — TELEPHONE ENCOUNTER
Pt scheduled colon, went over instr in detail, pt able to repeat back to me clear liquid diet, mag citrate prep

## 2018-03-11 RX ORDER — LOSARTAN POTASSIUM 100 MG/1
TABLET ORAL
Qty: 90 TABLET | Refills: 4 | Status: SHIPPED | OUTPATIENT
Start: 2018-03-11 | End: 2018-03-12

## 2018-03-12 ENCOUNTER — TELEPHONE (OUTPATIENT)
Dept: FAMILY MEDICINE | Facility: CLINIC | Age: 73
End: 2018-03-12

## 2018-03-12 ENCOUNTER — TELEPHONE (OUTPATIENT)
Dept: GASTROENTEROLOGY | Facility: CLINIC | Age: 73
End: 2018-03-12

## 2018-03-12 ENCOUNTER — OFFICE VISIT (OUTPATIENT)
Dept: FAMILY MEDICINE | Facility: CLINIC | Age: 73
End: 2018-03-12
Payer: MEDICARE

## 2018-03-12 VITALS
HEART RATE: 56 BPM | TEMPERATURE: 98 F | SYSTOLIC BLOOD PRESSURE: 134 MMHG | HEIGHT: 60 IN | DIASTOLIC BLOOD PRESSURE: 64 MMHG | BODY MASS INDEX: 25.23 KG/M2 | WEIGHT: 128.5 LBS

## 2018-03-12 DIAGNOSIS — E78.5 HYPERLIPIDEMIA, UNSPECIFIED HYPERLIPIDEMIA TYPE: Primary | ICD-10-CM

## 2018-03-12 DIAGNOSIS — K31.89 MASS OF STOMACH: Primary | ICD-10-CM

## 2018-03-12 DIAGNOSIS — M81.0 OSTEOPOROSIS, UNSPECIFIED OSTEOPOROSIS TYPE, UNSPECIFIED PATHOLOGICAL FRACTURE PRESENCE: ICD-10-CM

## 2018-03-12 PROCEDURE — 99215 OFFICE O/P EST HI 40 MIN: CPT | Mod: S$GLB,,, | Performed by: FAMILY MEDICINE

## 2018-03-12 PROCEDURE — 3077F SYST BP >= 140 MM HG: CPT | Mod: CPTII,S$GLB,, | Performed by: FAMILY MEDICINE

## 2018-03-12 PROCEDURE — 3078F DIAST BP <80 MM HG: CPT | Mod: CPTII,S$GLB,, | Performed by: FAMILY MEDICINE

## 2018-03-12 PROCEDURE — 99999 PR PBB SHADOW E&M-EST. PATIENT-LVL IV: CPT | Mod: PBBFAC,,, | Performed by: FAMILY MEDICINE

## 2018-03-12 RX ORDER — EPINEPHRINE 0.3 MG/.3ML
0.3 INJECTION SUBCUTANEOUS
COMMUNITY
Start: 2018-03-11 | End: 2020-06-17

## 2018-03-12 RX ORDER — PREDNISONE 20 MG/1
40 TABLET ORAL
COMMUNITY
Start: 2018-03-11 | End: 2018-03-21

## 2018-03-12 RX ORDER — DONEPEZIL HYDROCHLORIDE 10 MG/1
10 TABLET, FILM COATED ORAL NIGHTLY
COMMUNITY
End: 2018-04-17

## 2018-03-12 NOTE — TELEPHONE ENCOUNTER
----- Message from Ashley Odonnlel sent at 3/12/2018  1:41 PM CDT -----  Contact: Dr Shoemaker Office   Dr Shoemaker Office needs a call back from Nurse about patient   Her Ext is [phone]

## 2018-03-12 NOTE — PROGRESS NOTES
The patient presents today co labial edema which has had recurrent episodes of angioedema . Also to rev recent abnl scans .    Past Medical History:  Past Medical History:   Diagnosis Date    Acid reflux 4/11/2014    Anxiety and depression 3/6/2014    AP (angina pectoris) 2/13/2017    CAD (coronary artery disease) 2/13/2017    CAD, multiple vessel 2/13/2017    Chronic pain associated with significant psychosocial dysfunction 2/21/2013    COPD (chronic obstructive pulmonary disease)     HTN (hypertension)     Hypothyroidism     S/P CABG (coronary artery bypass graft) 2/13/2017     Past Surgical History:   Procedure Laterality Date    COLONOSCOPY  ~2013    Dr. Perez; colon polyps removed    DILATION AND CURETTAGE OF UTERUS      KNEE SURGERY Right     SPINAL CORD STIMULATOR IMPLANT      TONSILLECTOMY       Review of patient's allergies indicates:   Allergen Reactions    Losartan Swelling     angioedema    Ace inhibitors Other (See Comments)     unknown      Iodine and iodide containing products Hives     Since age of 48    Lisinopril      angioedema    Shrimp Other (See Comments)     Localized itching and swelling on her hands if she peels shrimp.  Able to eat shrimp without difficulty.  No generalized reaction.     Current Outpatient Prescriptions on File Prior to Visit   Medication Sig Dispense Refill    albuterol 90 mcg/actuation inhaler Inhale 2 puffs into the lungs.      ascorbic acid, vitamin C, (VITAMIN C) 1000 MG tablet Take 1,000 mg by mouth once daily.       aspirin (ECOTRIN) 81 MG EC tablet Take 81 mg by mouth once daily.      calcium-vitamin D3 500 mg(1,250mg) -200 unit per tablet Take 1 tablet by mouth 2 (two) times daily with meals. 60 tablet 11    cetirizine (ZYRTEC) 10 MG tablet Take 1 tablet (10 mg total) by mouth once daily. For sinus congestion  11    fish oil-omega-3 fatty acids 300-1,000 mg capsule Take by mouth once daily.      FOLIC ACID/MULTIVIT-MIN/LUTEIN (CENTRUM  SILVER ORAL) Take by mouth once daily.      furosemide (LASIX) 20 MG tablet Take 1 tablet (20 mg total) by mouth once daily. 30 tablet 11    levothyroxine (SYNTHROID) 200 MCG tablet Take 1 tablet (200 mcg total) by mouth once daily. 30 tablet 11    metoprolol tartrate (LOPRESSOR) 25 MG tablet Take 1 tablet (25 mg total) by mouth 2 (two) times daily. 60 tablet 2    niacin 500 MG CpSR Take 250 mg by mouth every evening.      omeprazole (PRILOSEC) 40 MG capsule TAKE ONE CAPSULE BY MOUTH DAILY 30 capsule 11    oxybutynin (DITROPAN-XL) 5 MG TR24 Take 1 tablet (5 mg total) by mouth once daily. 30 tablet 11    ZETIA 10 mg tablet TAKE 1 TABLET BY MOUTH EVERY EVENING 30 tablet 11    [DISCONTINUED] donepezil (ARICEPT) 10 MG tablet Take 10 mg by mouth nightly.  5    [DISCONTINUED] losartan (COZAAR) 100 MG tablet TAKE 1 TABLET BY MOUTH ONCE DAILY 90 tablet 4    [DISCONTINUED] famotidine (PEPCID) 20 MG tablet Take 20 mg by mouth once daily.  0     No current facility-administered medications on file prior to visit.      Social History     Social History    Marital status:      Spouse name: N/A    Number of children: N/A    Years of education: N/A     Occupational History    Not on file.     Social History Main Topics    Smoking status: Former Smoker     Packs/day: 1.00     Years: 50.00     Quit date: 12/10/2012    Smokeless tobacco: Never Used    Alcohol use No    Drug use: No    Sexual activity: Not on file     Other Topics Concern    Not on file     Social History Narrative    No narrative on file     Family History   Problem Relation Age of Onset    Heart attacks under age 50 Mother 41    Colon cancer Neg Hx     Colon polyps Neg Hx     Crohn's disease Neg Hx     Ulcerative colitis Neg Hx     Celiac disease Neg Hx          ROS:GENERAL: No fever, chills, fatigability or weight loss.  SKIN: No rashes, itching or changes in color or texture of skin.  HEAD: No headaches or recent head trauma.EYES:  Visual acuity fine. No photophobia, ocular pain or diplopia.EARS: Denies ear pain, discharge or vertigo.NOSE: No loss of smell, no epistaxis or postnasal drip.MOUTH & THROAT: No hoarseness or change in voice. No excessive gum bleeding.NODES: Denies swollen glands.  CHEST: Denies JOHN, cyanosis, wheezing, cough and sputum production.  CARDIOVASCULAR: Denies chest pain, PND, orthopnea or reduced exercise tolerance.  ABDOMEN: Appetite fine. No weight loss. Denies diarrhea, abdominal pain, hematemesis or blood in stool.  URINARY: No flank pain, dysuria or hematuria.  PERIPHERAL VASCULAR: No claudication or cyanosis.  MUSCULOSKELETAL: See above.  NEUROLOGIC: No history of seizures, paralysis, alteration of gait or coordination.  PE:   HEAD: Normocephalic, atraumatic.EYES: PERRL. EOMI.   EARS: TM's intact. Light reflex normal. No retraction or perforation.   NOSE: Mucosa pink. Airway clear.MOUTH & THROAT: No tonsillar enlargement. No pharyngeal erythema or exudate. No stridor.  NODES: No cervical, axillary or inguinal lymph node enlargement.  CHEST: Lungs clear to auscultation.  CARDIOVASCULAR: Normal S1, S2. No rubs, murmurs or gallops.  ABDOMEN: Bowel sounds normal. Not distended. Soft. No tenderness or masses.  MUSCULOSKELETAL: Tender paralumbar   NEUROLOGIC: Cranial Nerves: II-XII grossly intact.  Motor: 5/5 strength major flexors/extensors.  DTR's: Knees, Ankles 2+ and equal bilaterally; downgoing toes.  Sensory: Intact to light touch distally.  Gait & Posture: Normal gait and fine motion. No cerebellar signs.     Impression:Angioedema  Lumbar DDD-sp spinal stimulator   Abnl CT scan   Plan:Lab eval  Rec diet and ex recs  Add zyrtec/zantac  GI con -re poss stomach tumor

## 2018-03-12 NOTE — TELEPHONE ENCOUNTER
----- Message from Dustin Whitehead MA sent at 3/12/2018  1:57 PM CDT -----  Contact: Dr. Mathews's office (dustin Ext [phone])  Pt is scheduled to have a C-scope done on Thursday, Dr. Mathews would like to know if she can have a EGD done also. The orders are in the computer

## 2018-03-13 ENCOUNTER — TELEPHONE (OUTPATIENT)
Dept: GASTROENTEROLOGY | Facility: CLINIC | Age: 73
End: 2018-03-13

## 2018-03-13 NOTE — TELEPHONE ENCOUNTER
Pt notified labs, instr neg for Celiac so is allowed to eat gluten products. Pt verbalizes understanding

## 2018-03-13 NOTE — TELEPHONE ENCOUNTER
Please call to inform & review the results with the patient- remaining stool studies were negative/normal and negative for celiac disease on blood work.  Continue with previous recommendations. If no improvement in symptoms or symptoms worsen, call/follow-up at clinic or go to ER.  Please release results to patient's mychart once you have discussed results and recommendations with patient.  Thanks,  Francisca BAKER

## 2018-03-15 ENCOUNTER — TELEPHONE (OUTPATIENT)
Dept: GASTROENTEROLOGY | Facility: CLINIC | Age: 73
End: 2018-03-15

## 2018-03-15 PROBLEM — R19.5 HEME POSITIVE STOOL: Status: ACTIVE | Noted: 2018-03-15

## 2018-03-15 NOTE — TELEPHONE ENCOUNTER
S/w pharmacy pt is complaining of rectal irritation from having colon prep. Pt asked the pharmacist to call me to discuss. I recommended pt us witch hazel, warm sitz baths and could try lidocaine ointment if needed. Pharmacist said she would call pt to let her know.

## 2018-03-15 NOTE — TELEPHONE ENCOUNTER
----- Message from Bernadine Mayer sent at 3/15/2018  2:02 PM CDT -----  Contact: [facility]  [facility] calling regarding pt requesting a new medication,unsure what it is so needs to speak to the nurse..  [facility] - [place] - [address] [facility] [address]  Phone: [phone] Fax: [phone]

## 2018-03-21 RX ORDER — FUROSEMIDE 20 MG/1
20 TABLET ORAL DAILY
Qty: 30 TABLET | Refills: 3 | Status: SHIPPED | OUTPATIENT
Start: 2018-03-21 | End: 2018-07-09 | Stop reason: SDUPTHER

## 2018-03-26 ENCOUNTER — HOSPITAL ENCOUNTER (OUTPATIENT)
Dept: RADIOLOGY | Facility: HOSPITAL | Age: 73
Discharge: HOME | End: 2018-03-26
Attending: INTERNAL MEDICINE
Payer: MEDICARE

## 2018-03-26 ENCOUNTER — PROCEDURE VISIT (OUTPATIENT)
Dept: PULMONOLOGY | Facility: CLINIC | Age: 73
End: 2018-03-26
Payer: MEDICARE

## 2018-03-26 ENCOUNTER — OFFICE VISIT (OUTPATIENT)
Dept: PULMONOLOGY | Facility: CLINIC | Age: 73
End: 2018-03-26
Payer: MEDICARE

## 2018-03-26 VITALS
HEIGHT: 60 IN | BODY MASS INDEX: 25.13 KG/M2 | OXYGEN SATURATION: 99 % | WEIGHT: 128 LBS | RESPIRATION RATE: 18 BRPM | DIASTOLIC BLOOD PRESSURE: 68 MMHG | SYSTOLIC BLOOD PRESSURE: 118 MMHG | HEART RATE: 68 BPM

## 2018-03-26 DIAGNOSIS — J90 PLEURAL EFFUSION: ICD-10-CM

## 2018-03-26 DIAGNOSIS — F17.201 TOBACCO USE DISORDER, MODERATE, IN SUSTAINED REMISSION: ICD-10-CM

## 2018-03-26 DIAGNOSIS — J44.9 CHRONIC OBSTRUCTIVE PULMONARY DISEASE, UNSPECIFIED COPD TYPE: ICD-10-CM

## 2018-03-26 DIAGNOSIS — J43.8 OTHER EMPHYSEMA: ICD-10-CM

## 2018-03-26 DIAGNOSIS — R91.1 LUNG NODULE: ICD-10-CM

## 2018-03-26 DIAGNOSIS — J44.9 COPD, MODERATE: Primary | ICD-10-CM

## 2018-03-26 DIAGNOSIS — R91.8 PULMONARY NODULES/LESIONS, MULTIPLE: ICD-10-CM

## 2018-03-26 PROCEDURE — 99999 PR PBB SHADOW E&M-EST. PATIENT-LVL V: CPT | Mod: PBBFAC,,, | Performed by: NURSE PRACTITIONER

## 2018-03-26 PROCEDURE — 94060 EVALUATION OF WHEEZING: CPT | Mod: S$GLB,,, | Performed by: INTERNAL MEDICINE

## 2018-03-26 PROCEDURE — 71046 X-RAY EXAM CHEST 2 VIEWS: CPT | Mod: 26,,, | Performed by: RADIOLOGY

## 2018-03-26 PROCEDURE — 3074F SYST BP LT 130 MM HG: CPT | Mod: CPTII,S$GLB,, | Performed by: NURSE PRACTITIONER

## 2018-03-26 PROCEDURE — 99214 OFFICE O/P EST MOD 30 MIN: CPT | Mod: 25,S$GLB,, | Performed by: NURSE PRACTITIONER

## 2018-03-26 PROCEDURE — 3078F DIAST BP <80 MM HG: CPT | Mod: CPTII,S$GLB,, | Performed by: NURSE PRACTITIONER

## 2018-03-26 PROCEDURE — 71046 X-RAY EXAM CHEST 2 VIEWS: CPT | Mod: TC

## 2018-03-26 RX ORDER — CYANOCOBALAMIN (VITAMIN B-12) 250 MCG
250 TABLET ORAL DAILY
COMMUNITY
End: 2024-01-10

## 2018-03-26 RX ORDER — TIOTROPIUM BROMIDE 18 UG/1
18 CAPSULE ORAL; RESPIRATORY (INHALATION) DAILY
COMMUNITY
End: 2018-03-26

## 2018-03-26 NOTE — PROGRESS NOTES
Subjective:      Patient ID: Soumya Melchor is a 72 y.o. female.    I have reviewed the patient's medical history in detail and updated the computerized patient record.    Problem list has been reviewed.    Chief Complaint: COPD    HPI  The patient does not have an exacerbation.   She states that she  is at her respiratory baseline.  She has chronic symptom of shortness of breath with prolonged exertion.   She denies  cough,  sputum, hemoptysis, pain with breathing, wheezing and  asthma.   Her current respiratory regimen: Albuterol MDI(or generic equivalent),  Spiriva (Toptrpium) Handihaler and Albuterol  Nebulizer.   She does not use medications compliantly. Find spiriva is too expensive with $200 out of pocket cost. And does not wheeze or cough so rare use of Albuterol or nebs, about once every 2 months.   She avoids activities that cause shortness of breath.  She is agreeable to trial of Anoro inhaler.   CAT score today is 17.  Never diagnosis of asthma    Previous Report Reviewed: lab reports and office notes     Past Medical History: The following portions of the patient's history were reviewed and updated as appropriate:   She  has a past surgical history that includes Knee surgery (Right); Dilation and curettage of uterus; Spinal cord stimulator implant (02/12/2018); Tonsillectomy; Colonoscopy (~2013); Colonoscopy (N/A, 3/15/2018); and Upper gastrointestinal endoscopy.  Her family history includes Heart attacks under age 50 (age of onset: 41) in her mother.  She  reports that she quit smoking about 5 years ago. She has a 50.00 pack-year smoking history. She has never used smokeless tobacco. She reports that she does not drink alcohol or use drugs.  She has a current medication list which includes the following prescription(s): albuterol, ascorbic acid (vitamin c), aspirin, cetirizine, cyanocobalamin, donepezil, epinephrine, fish oil-omega-3 fatty acids, folic acid/multivit-min/lutein, furosemide,  levothyroxine, metoprolol tartrate, niacin, omeprazole, oxybutynin, calcium-vitamin d3, ranitidine, umeclidinium-vilanterol, and zetia.  She is allergic to losartan; ace inhibitors; iodine and iodide containing products; lisinopril; and shrimp..    The following portions of the patient's history were reviewed and updated as appropriate: allergies, current medications, past family history, past medical history, past social history, past surgical history and problem list.    Review of Systems   Constitutional: Negative for fever, chills, weight loss, weight gain, activity change, appetite change, fatigue and night sweats.   HENT: Negative for postnasal drip, rhinorrhea, sinus pressure, voice change and congestion.    Eyes: Negative for redness and itching.   Respiratory: Negative for snoring, cough, sputum production, chest tightness, shortness of breath, wheezing, orthopnea, asthma nighttime symptoms, dyspnea on extertion, use of rescue inhaler and somnolence.    Cardiovascular: Negative.  Negative for chest pain, palpitations and leg swelling.   Genitourinary: Negative for difficulty urinating and hematuria.   Endocrine: Negative for cold intolerance and heat intolerance.    Musculoskeletal: Negative for arthralgias, gait problem, joint swelling and myalgias.   Skin: Negative.    Gastrointestinal: Negative for nausea, vomiting, abdominal pain and acid reflux.   Neurological: Negative for dizziness, weakness, light-headedness and headaches.   Hematological: Negative for adenopathy. No excessive bruising.   All other systems reviewed and are negative.     Objective:     Physical Exam   Constitutional: She is oriented to person, place, and time. She appears well-developed and well-nourished. She is active and cooperative.  Non-toxic appearance. She does not appear ill. No distress.   HENT:   Head: Normocephalic.   Right Ear: External ear normal.   Left Ear: External ear normal.   Nose: Nose normal.   Mouth/Throat:  Oropharynx is clear and moist. No oropharyngeal exudate.   Eyes: Conjunctivae are normal.   Neck: Normal range of motion. Neck supple.   Cardiovascular: Normal rate, regular rhythm, normal heart sounds and intact distal pulses.    Pulmonary/Chest: Effort normal and breath sounds normal. No stridor.   Abdominal: Soft.   Musculoskeletal: Normal range of motion.   Lymphadenopathy:     She has no cervical adenopathy.   Neurological: She is alert and oriented to person, place, and time.   Skin: Skin is warm and dry.   Psychiatric: She has a normal mood and affect. Her behavior is normal. Judgment and thought content normal.   Vitals reviewed.    Personal Diagnostic Review  Pulmonary function tests: 3/26/2018 FEV1: 0.88  (50 % predicted), FVC:  1.79 (76 % predicted), FEV1/FVC:  49 (66 % predicted)  Post bronchodilator: FEV1: 1.16  (66 % predicted), FVC:  2.27 (96 % predicted), FEV1/FVC:  51 (68 % predicted).   Moderate obstructive airflow defect with positive bronchodilator response  Stable when compared to prior dec 2016    Chest xray 3/26/2018   Interval clearance of previously noted bilateral effusions.  Nodular opacity again identified the right upper lung field.  Discussed in clinic with my viewing telephoned final radiology interpretation, 3/26/2018 5:59pm  Assessment:     1. COPD, moderate    2. Tobacco use disorder, moderate, in sustained remission    3. Lung nodule    4. Pulmonary nodules/lesions, multiple    5. Other emphysema    6. Pleural effusion Inactive     Orders Placed This Encounter   Procedures    X-Ray Chest PA And Lateral     Standing Status:   Future     Standing Expiration Date:   3/26/2019     Order Specific Question:   May the Radiologist modify the order per protocol to meet the clinical needs of the patient?     Answer:   Yes    CT Chest Without Contrast     Patient has severe allergy to iodine, so CT scan no contrast ordered.     Standing Status:   Future     Standing Expiration Date:    3/26/2019     Order Specific Question:   May the Radiologist modify the order per protocol to meet the clinical needs of the patient?     Answer:   Yes    Spirometry with/without bronchodilator     Standing Status:   Future     Standing Expiration Date:   3/26/2019     Plan:     Problem List Items Addressed This Visit     COPD, moderate - Primary     Not on controller, symptoms of shortness of breath, denies wheezing or cough.   Begin Anoro controller            Relevant Medications    umeclidinium-vilanterol (ANORO ELLIPTA) 62.5-25 mcg/actuation DsDv    Other Relevant Orders    X-Ray Chest PA And Lateral    Spirometry with/without bronchodilator    Pulmonary nodules/lesions, multiple     CT renal 3/8/2018   Multiple subcentimeter nodules within the lung bases bilaterally for which a follow-up examination in 3-6 months is recommended.            Tobacco use disorder, moderate, in sustained remission     Quit in 2012 remains in sustained remission         Relevant Orders    CT Chest Without Contrast      Other Visit Diagnoses     Lung nodule        Relevant Orders    CT Chest Without Contrast    Pleural effusion   (Inactive)      Interval clearance of previously noted bilateral effusions seen on cxr 3/26/2018.            Follow-up in about 6 months (around 9/26/2018) for COPD fu w/review rodolfo/cxr. pul nodule w/review of CT chest.

## 2018-03-26 NOTE — ASSESSMENT & PLAN NOTE
CT renal 3/8/2018   Multiple subcentimeter nodules within the lung bases bilaterally for which a follow-up examination in 3-6 months is recommended.

## 2018-03-26 NOTE — ASSESSMENT & PLAN NOTE
Not on controller, symptoms of shortness of breath, denies wheezing or cough.   Begin Anoro controller

## 2018-03-27 LAB
POST FEF 25 75: 0.41 L/S (ref 0.94–2.02)
POST FET 100: 13.99 S
POST FEV1 FVC: 51 %
POST FEV1: 1.16 L (ref 1.5–2.02)
POST FIF 50: 2.21 L/S
POST FVC: 2.28 L (ref 2.06–2.66)
POST PEF: 3.62 L/S (ref 3.96–5.46)
PRE FEF 25 75: 0.3 L/S (ref 0.94–2.02)
PRE FET 100: 12.82 S
PRE FEV1 FVC: 49 %
PRE FEV1: 0.88 L (ref 1.5–2.02)
PRE FIF 50: 2.9 L/S
PRE FVC: 1.79 L (ref 2.06–2.66)
PRE PEF: 3.05 L/S (ref 3.96–5.46)
PREDICTED FEV1 FVC: 75.08 % (ref 70.19–79.98)
PREDICTED FEV1: 1.76 L (ref 1.5–2.02)
PREDICTED FVC: 2.36 L (ref 2.06–2.66)
PROVOCATION PROTOCOL: ABNORMAL

## 2018-04-02 ENCOUNTER — TELEPHONE (OUTPATIENT)
Dept: FAMILY MEDICINE | Facility: CLINIC | Age: 73
End: 2018-04-02

## 2018-04-02 ENCOUNTER — TELEPHONE (OUTPATIENT)
Dept: GASTROENTEROLOGY | Facility: CLINIC | Age: 73
End: 2018-04-02

## 2018-04-02 NOTE — TELEPHONE ENCOUNTER
----- Message from Mala Majano sent at 4/2/2018 12:54 PM CDT -----  Contact: Patient  Patient called to speak with the nurse; she stated she had a colonoscopy about 2 weeks ago and she did have polyps. She stated that she is upset because no one from Dr. Calloway's office has contacted her about the results. She needs someone to tell her something. She has sent messages to his office but with no response. She can be contacted at [phone].    Thanks,  Mala

## 2018-04-02 NOTE — TELEPHONE ENCOUNTER
EGD small hiatal hernia, otherwise normal. Stomach bx normal. C-scope 3 polyps removed - benign. Rec f/u C-scope 3 years. F/U NP PRN.

## 2018-04-02 NOTE — TELEPHONE ENCOUNTER
"----- Message from Eleni Waggoner sent at 4/2/2018  9:59 AM CDT -----  Contact: self  Patient [phone] is calling for colonoscopy results from over two weeks/Patient states "I am very very very upset that I have not received a call concerning my colonoscopy results"/she said she had this done about two weeks ago/she is wanting to hear from anyone today 04 02 18/please call  "

## 2018-04-02 NOTE — TELEPHONE ENCOUNTER
S/w pt reviewed results and recommendations. Pt is very upset that she has not gotten answers for why she tested positive for blood in her stool and the abnormal ct scan showing a tumor. Pt refuses to see the NP she stated she saw her once and said she did not care for the treatment received from the NP. She did not feel the NP answered her questions. Pt scheduled for a f/u with Dr. Calloway.

## 2018-04-04 ENCOUNTER — HOSPITAL ENCOUNTER (OUTPATIENT)
Dept: RADIOLOGY | Facility: HOSPITAL | Age: 73
Discharge: HOME | End: 2018-04-04
Attending: FAMILY MEDICINE
Payer: MEDICARE

## 2018-04-04 ENCOUNTER — TELEPHONE (OUTPATIENT)
Dept: FAMILY MEDICINE | Facility: CLINIC | Age: 73
End: 2018-04-04

## 2018-04-04 DIAGNOSIS — M85.80 OSTEOPENIA, UNSPECIFIED LOCATION: Primary | ICD-10-CM

## 2018-04-04 DIAGNOSIS — M81.0 OSTEOPOROSIS, UNSPECIFIED OSTEOPOROSIS TYPE, UNSPECIFIED PATHOLOGICAL FRACTURE PRESENCE: ICD-10-CM

## 2018-04-04 PROCEDURE — 77080 DXA BONE DENSITY AXIAL: CPT | Mod: TC,PO

## 2018-04-04 PROCEDURE — 77080 DXA BONE DENSITY AXIAL: CPT | Mod: 26,,, | Performed by: RADIOLOGY

## 2018-04-04 NOTE — TELEPHONE ENCOUNTER
Patient informed of results, states she needs her lab done at [facility], order pended.

## 2018-04-04 NOTE — TELEPHONE ENCOUNTER
----- Message from Howard Mathews MD sent at 4/4/2018  4:12 PM CDT -----  On bone density 1 area cw osteopenia, 1 area cw osteoporosis  Rec vit D level then to help strengthen bone density we recommend vit D 1-2000 units daily, calcium  mg daily-note that diet sources are better than pill supplements like some fortified orange juices,almonds and certain vegetables like okra, cauliflower and broccoli. We also recommend weight bearing exercise to strengthen bones. Repeat Dexa scan 2 years

## 2018-04-06 ENCOUNTER — TELEPHONE (OUTPATIENT)
Dept: FAMILY MEDICINE | Facility: CLINIC | Age: 73
End: 2018-04-06

## 2018-04-06 DIAGNOSIS — Z88.9 DRUG ALLERGY: ICD-10-CM

## 2018-04-06 DIAGNOSIS — Z91.018 FOOD ALLERGY: Primary | ICD-10-CM

## 2018-04-06 NOTE — TELEPHONE ENCOUNTER
Spoke with patient, patient keeps having allergic reactions from food and medication, that she is having to go to the ER for (facial swelling, swelling of throat)Patient saw [place] for this and a Food allergy panel was done. Patient is requesting referral to allergist

## 2018-04-06 NOTE — TELEPHONE ENCOUNTER
----- Message from Susu Grove sent at 4/6/2018  2:30 PM CDT -----  Contact: self [phone]  States that she is calling for a referral to an allergy specialist in [place] or [place]. Please call back at [phone]//thank you acc

## 2018-04-07 ENCOUNTER — PATIENT MESSAGE (OUTPATIENT)
Dept: FAMILY MEDICINE | Facility: CLINIC | Age: 73
End: 2018-04-07

## 2018-04-08 ENCOUNTER — PATIENT MESSAGE (OUTPATIENT)
Dept: FAMILY MEDICINE | Facility: CLINIC | Age: 73
End: 2018-04-08

## 2018-04-16 ENCOUNTER — PATIENT MESSAGE (OUTPATIENT)
Dept: NEUROLOGY | Facility: CLINIC | Age: 73
End: 2018-04-16

## 2018-04-16 RX ORDER — DONEPEZIL HYDROCHLORIDE 10 MG/1
10 TABLET, FILM COATED ORAL NIGHTLY
Status: CANCELLED | OUTPATIENT
Start: 2018-04-16

## 2018-04-16 NOTE — TELEPHONE ENCOUNTER
----- Message from Adriana Arita sent at 4/16/2018  2:33 PM CDT -----  Contact: patient  Type:  RX Refill Request    Who Called:  patient  Refill or New Rx: refill  RX Name and Strength:  Aricept  Preferred Pharmacy with phone number [facility]   Local or Mail Order:  Local  Best Call Back Number:  [phone]  Additional Information:  Requesting a call back when script has been sent

## 2018-04-17 ENCOUNTER — PATIENT MESSAGE (OUTPATIENT)
Dept: FAMILY MEDICINE | Facility: CLINIC | Age: 73
End: 2018-04-17

## 2018-04-17 RX ORDER — DONEPEZIL HYDROCHLORIDE 10 MG/1
10 TABLET, FILM COATED ORAL DAILY
Qty: 30 TABLET | Refills: 11 | Status: SHIPPED | OUTPATIENT
Start: 2018-04-17 | End: 2018-05-14 | Stop reason: SDUPTHER

## 2018-04-17 RX ORDER — OMEPRAZOLE 40 MG/1
40 CAPSULE, DELAYED RELEASE ORAL DAILY
Qty: 30 CAPSULE | Refills: 11 | Status: SHIPPED | OUTPATIENT
Start: 2018-04-17 | End: 2019-04-04 | Stop reason: SDUPTHER

## 2018-04-24 LAB
1,25(OH)2D SERPL-MCNC: 68 PG/ML (ref 18–72)
1,25(OH)2D2 SERPL-MCNC: <8 PG/ML
1,25(OH)2D3 SERPL-MCNC: 68 PG/ML

## 2018-04-24 RX ORDER — DONEPEZIL HYDROCHLORIDE 10 MG/1
10 TABLET, FILM COATED ORAL NIGHTLY
Qty: 30 TABLET | Refills: 11 | Status: SHIPPED | OUTPATIENT
Start: 2018-04-24 | End: 2018-05-03 | Stop reason: SDUPTHER

## 2018-04-26 ENCOUNTER — TELEPHONE (OUTPATIENT)
Dept: GASTROENTEROLOGY | Facility: CLINIC | Age: 73
End: 2018-04-26

## 2018-04-26 ENCOUNTER — PATIENT MESSAGE (OUTPATIENT)
Dept: FAMILY MEDICINE | Facility: CLINIC | Age: 73
End: 2018-04-26

## 2018-04-26 NOTE — TELEPHONE ENCOUNTER
[place] Wanted to see NP today. Instr NP out of the office this week,  Colonoscopy done in March was w/o significant findings. Pt states  Stopped the Beneful but feels like she shouldn't have. Instr to go back on med and call if things don't get better. Verbs understanding

## 2018-04-26 NOTE — TELEPHONE ENCOUNTER
----- Message from Ashley Odonnell sent at 4/26/2018 12:51 PM CDT -----  Contact: Self  Patient states she is having the same issues with her bowls and needs to speak to the nurse     Please call back [phone]

## 2018-04-30 ENCOUNTER — PATIENT OUTREACH (OUTPATIENT)
Dept: ADMINISTRATIVE | Facility: HOSPITAL | Age: 73
End: 2018-04-30

## 2018-04-30 ENCOUNTER — PATIENT MESSAGE (OUTPATIENT)
Dept: FAMILY MEDICINE | Facility: CLINIC | Age: 73
End: 2018-04-30

## 2018-04-30 NOTE — PROGRESS NOTES
Mammogram decline by pt per Dr. Levi at [place] [facility] dated 3/22/2018.  HM updated and report sent to scanning.

## 2018-05-03 ENCOUNTER — OFFICE VISIT (OUTPATIENT)
Dept: SURGERY | Facility: CLINIC | Age: 73
End: 2018-05-03
Payer: MEDICARE

## 2018-05-03 ENCOUNTER — PATIENT MESSAGE (OUTPATIENT)
Dept: FAMILY MEDICINE | Facility: CLINIC | Age: 73
End: 2018-05-03

## 2018-05-03 VITALS
HEART RATE: 50 BPM | TEMPERATURE: 98 F | WEIGHT: 132.81 LBS | SYSTOLIC BLOOD PRESSURE: 150 MMHG | DIASTOLIC BLOOD PRESSURE: 63 MMHG | HEIGHT: 60 IN | BODY MASS INDEX: 26.07 KG/M2

## 2018-05-03 DIAGNOSIS — R10.84 GENERALIZED ABDOMINAL PAIN: ICD-10-CM

## 2018-05-03 PROCEDURE — 99999 PR PBB SHADOW E&M-EST. PATIENT-LVL III: CPT | Mod: PBBFAC,,, | Performed by: SURGERY

## 2018-05-03 PROCEDURE — 3077F SYST BP >= 140 MM HG: CPT | Mod: CPTII,S$GLB,, | Performed by: SURGERY

## 2018-05-03 PROCEDURE — 3078F DIAST BP <80 MM HG: CPT | Mod: CPTII,S$GLB,, | Performed by: SURGERY

## 2018-05-03 PROCEDURE — 99203 OFFICE O/P NEW LOW 30 MIN: CPT | Mod: S$GLB,,, | Performed by: SURGERY

## 2018-05-04 ENCOUNTER — TELEPHONE (OUTPATIENT)
Dept: PULMONOLOGY | Facility: CLINIC | Age: 73
End: 2018-05-04

## 2018-05-04 ENCOUNTER — TELEPHONE (OUTPATIENT)
Dept: GASTROENTEROLOGY | Facility: CLINIC | Age: 73
End: 2018-05-04

## 2018-05-04 ENCOUNTER — TELEPHONE (OUTPATIENT)
Dept: UROLOGY | Facility: CLINIC | Age: 73
End: 2018-05-04

## 2018-05-04 RX ORDER — PREDNISONE 50 MG/1
50 TABLET ORAL ONCE AS NEEDED
Qty: 3 TABLET | Refills: 0 | Status: SHIPPED | OUTPATIENT
Start: 2018-05-04 | End: 2018-05-04

## 2018-05-04 NOTE — TELEPHONE ENCOUNTER
Pt stated that she had seen a surgeon who told her that she did not have a hernia and wanted to know what imaging was taken previously showing that she did. Pt informed that CT Chest report from 9/30/15 and 1/06/2016 stated the affirmative. Pt wanted to reschedule follow up appointment to see Dr Carmona exclusively. Pt accepted [facility] appointment on 9/26/2018 at 1600 with Dr Carmona. Pt provided times for all prior testing. Pt verbalized understanding.

## 2018-05-04 NOTE — TELEPHONE ENCOUNTER
----- Message from Fidelina Carrasco sent at 5/4/2018 10:51 AM CDT -----  Contact: self  Pt is asking for a call back to ask some questions regarding the hernia that was found. [phone].

## 2018-05-04 NOTE — TELEPHONE ENCOUNTER
Pt has been notified of recommendations. Pt verbalized understanding. Pt mentioned she has an appt with Dr. Moreno for a second opinion. She will wait to see what he says.

## 2018-05-04 NOTE — TELEPHONE ENCOUNTER
Pt saw Dr. Brower yesterday for the hiatal hernia. He told her he does not do hiatal hernia surgery. Pt is insisting she wants something done about the hiatal hernia you found. She would like for you to refer her to a surgeon that will be willing to do surgery on it. I explained to her that it is very rare for someone to need hiatal hernia surgery. We discussed what a hiatal hernia is and the treatment. I don't think she is understanding. She is insisting on having this address. Please advise.

## 2018-05-04 NOTE — TELEPHONE ENCOUNTER
Neither Dr Brower nor myself feel pt needs surgery for hiatal hernia. Pt can f/u with PCP if she wants to pursue further.

## 2018-05-04 NOTE — PROGRESS NOTES
Subjective:       Patient ID: Soumya Melchor is a 72 y.o. female.    Chief Complaint: Consult (Hernia)    HPI  71 yo F referred to my office for evaluation of presumed hernia.  Pt was a bit upset that I was running late at this visit.  She presented stating that she has a hernia that is causing her pain.  She reports pain in the upper abdomen and into her chest.  Notes that the pain is mostly on her L side just under ribs.  Has some slight heartburn.  No n/v.  No changes in bowel habits. Pt reports being told by 3 different doctors that she has hernias.  Pt has had ct scan and EGD.  It was noted on EGD that she did have a small hiatal hernia.  Ct scan demonstrated no abdominal hernia.  There was a small soft tissue mass between stomach and spleen for which a triple phase ct scan was recommended.  This has not been done.   Review of Systems   Constitutional: Negative for activity change, appetite change, fever and unexpected weight change.   Respiratory: Positive for chest tightness. Negative for shortness of breath and wheezing.    Cardiovascular: Negative for chest pain.   Gastrointestinal: Positive for abdominal pain. Negative for abdominal distention, anal bleeding, blood in stool, constipation, diarrhea, nausea, rectal pain and vomiting.   Genitourinary: Negative for difficulty urinating, dysuria and frequency.   Skin: Negative for wound.   Neurological: Negative for dizziness.   Hematological: Negative for adenopathy.   Psychiatric/Behavioral: Negative for agitation.       Objective:      Physical Exam   Constitutional: She is oriented to person, place, and time. She appears well-developed and well-nourished.   HENT:   Head: Normocephalic and atraumatic.   Eyes: Pupils are equal, round, and reactive to light.   Neck: Normal range of motion. Neck supple. No tracheal deviation present. No thyromegaly present.   Cardiovascular: Normal rate, regular rhythm and normal heart sounds.    No murmur  heard.  Pulmonary/Chest: Effort normal and breath sounds normal. She exhibits no tenderness.   Abdominal: Soft. Bowel sounds are normal. She exhibits no distension, no abdominal bruit, no pulsatile midline mass and no mass. There is no hepatosplenomegaly. There is no tenderness. There is no rigidity, no rebound, no guarding, no tenderness at McBurney's point and negative Ogddard's sign. No hernia. Hernia confirmed negative in the ventral area.   Genitourinary: Rectum normal.   Musculoskeletal: Normal range of motion.   Neurological: She is alert and oriented to person, place, and time.   Skin: Skin is warm. No rash noted. No erythema.   Psychiatric: She has a normal mood and affect.   Vitals reviewed.        EGD: 3/18  Small hiatal hernia    CT scan images reviewed and report reviewed.  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    Assessment:       1. Generalized abdominal pain        Plan:       Apologized to pt for being late today.  Stressed with pt that I do not feel that the hiatal hernia is likely to be symptomatic.  Stressed to her that I feel that the mass seen on noncontrast ct scan needs to be further evaluated with contrast ct scan as recommended by radiology.  Will order and call with results.

## 2018-05-04 NOTE — TELEPHONE ENCOUNTER
----- Message from Olivia Grey LPN sent at 5/4/2018 11:00 AM CDT -----  Contact: pt      ----- Message -----  From: Tamara Eddy  Sent: 5/4/2018  10:55 AM  To: Chari ANGELO Staff    She's calling in regards to speak with nurse concerning hernia pls call pt back at [phone] (home)

## 2018-05-14 ENCOUNTER — TELEPHONE (OUTPATIENT)
Dept: FAMILY MEDICINE | Facility: CLINIC | Age: 73
End: 2018-05-14

## 2018-05-14 DIAGNOSIS — Z91.018 FOOD ALLERGY: ICD-10-CM

## 2018-05-14 DIAGNOSIS — J32.9 CHRONIC SINUSITIS, UNSPECIFIED LOCATION: Primary | ICD-10-CM

## 2018-05-14 DIAGNOSIS — Z88.9 DRUG ALLERGY: ICD-10-CM

## 2018-05-14 RX ORDER — DONEPEZIL HYDROCHLORIDE 10 MG/1
10 TABLET, FILM COATED ORAL DAILY
Qty: 30 TABLET | Refills: 11 | Status: SHIPPED | OUTPATIENT
Start: 2018-05-14 | End: 2019-06-05 | Stop reason: SDUPTHER

## 2018-05-14 NOTE — TELEPHONE ENCOUNTER
----- Message from Nohemy Pulido sent at 5/14/2018 10:40 AM CDT -----  Patient requesting a Rx refill for Aricept.    Pt use..  [facility] - [place] - [address]  [address] [place] [address]  Phone: [phone] Fax: [phone]    Please adv/call [phone].//thanks.cw

## 2018-05-14 NOTE — TELEPHONE ENCOUNTER
----- Message from Nohemy Pulido sent at 5/14/2018 10:43 AM CDT -----  Patient requesting a referral for a sinus infection. Patient state she have an allergist treating the infection, but state it is not helping. Please adv/call [phone].//thanks.cw

## 2018-05-18 ENCOUNTER — OFFICE VISIT (OUTPATIENT)
Dept: OTOLARYNGOLOGY | Facility: CLINIC | Age: 73
End: 2018-05-18
Payer: MEDICARE

## 2018-05-18 VITALS
BODY MASS INDEX: 26.31 KG/M2 | DIASTOLIC BLOOD PRESSURE: 65 MMHG | WEIGHT: 134.69 LBS | SYSTOLIC BLOOD PRESSURE: 154 MMHG | HEART RATE: 58 BPM

## 2018-05-18 DIAGNOSIS — H61.21 IMPACTED CERUMEN OF RIGHT EAR: ICD-10-CM

## 2018-05-18 DIAGNOSIS — J31.0 CHRONIC RHINITIS: Primary | ICD-10-CM

## 2018-05-18 PROCEDURE — 3077F SYST BP >= 140 MM HG: CPT | Mod: CPTII,S$GLB,, | Performed by: PHYSICIAN ASSISTANT

## 2018-05-18 PROCEDURE — 99203 OFFICE O/P NEW LOW 30 MIN: CPT | Mod: 25,S$GLB,, | Performed by: PHYSICIAN ASSISTANT

## 2018-05-18 PROCEDURE — 3078F DIAST BP <80 MM HG: CPT | Mod: CPTII,S$GLB,, | Performed by: PHYSICIAN ASSISTANT

## 2018-05-18 PROCEDURE — 69210 REMOVE IMPACTED EAR WAX UNI: CPT | Mod: S$GLB,,, | Performed by: PHYSICIAN ASSISTANT

## 2018-05-18 PROCEDURE — 99999 PR PBB SHADOW E&M-EST. PATIENT-LVL IV: CPT | Mod: PBBFAC,,, | Performed by: PHYSICIAN ASSISTANT

## 2018-05-18 RX ORDER — MONTELUKAST SODIUM 10 MG/1
10 TABLET ORAL NIGHTLY
COMMUNITY
End: 2018-10-17 | Stop reason: SDUPTHER

## 2018-05-18 RX ORDER — DIPHENHYDRAMINE HCL 25 MG
25 CAPSULE ORAL EVERY 6 HOURS PRN
COMMUNITY
End: 2018-09-14

## 2018-05-18 RX ORDER — IPRATROPIUM BROMIDE 21 UG/1
2 SPRAY, METERED NASAL 3 TIMES DAILY
Qty: 30 ML | Refills: 3 | Status: SHIPPED | OUTPATIENT
Start: 2018-05-18 | End: 2018-07-25

## 2018-05-18 RX ORDER — LEVOCETIRIZINE DIHYDROCHLORIDE 5 MG/1
5 TABLET, FILM COATED ORAL NIGHTLY
COMMUNITY
End: 2018-07-17

## 2018-05-18 NOTE — PROGRESS NOTES
"Subjective:       Patient ID: Soumya Melchor is a 72 y.o. female.    Chief Complaint: Sinus Problem    Patient is a very pleasant 72 year old female here to see me today for the first time for evaluation of chronic clear nasal drainage, worse on right side.  She's had this "forever."  Denies alleviating factors (doesn't occur while lying down) or exacerbating factors.  Denies nasal congestion or obstruction.  Denies anosmia. Denies frequent sneezing or itchy, watery eyes.  Denies facial pain or pressure.  Denies headaches.  She has COPD and often has cough and mucus sensation in her throat.  She also suffers with reflux and takes Zantac and Prilosec daily.  She's tried Flonase in the past for 4-6 weeks with no relief; also tried Sudafed PE and Wal-Dryl without relief.  Says her allergist (Dr. Georgiana Soto) recently treated her for sinusitis (2 wk of oral antibiotics but she can't recall what it was) with no relief in her nasal drainage.  She recently started on Singulair and uses Xyzal daily as well.  Denies issues with recurrent sinusitis.   She quit smoking 10 years ago.  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  Says she later learned it was due to acrylic exposure.  She had previous allergy testing 3-4 years ago and was told she was allergic to flour.  Has not done immunotherapy; no previous sinus or nasal surgery.  No recent fever or URI symptoms.  Denies ear pain or pressure.  She has appointment next week with Dr. Miller for evaluation of her allergies.      Review of Systems   Constitutional: Positive for unexpected weight change (20# gain with recent steroids). Negative for activity change, appetite change and fever.   HENT: Positive for postnasal drip, rhinorrhea (clear nasal drainage) and sinus pressure (occasional). Negative for congestion, ear discharge, ear pain, hearing loss, nosebleeds, sinus pain, sneezing, sore throat, tinnitus, trouble swallowing " and voice change.    Eyes: Negative for discharge and itching.   Respiratory: Positive for cough and shortness of breath.    Cardiovascular: Negative for chest pain.   Gastrointestinal: Negative for diarrhea, nausea and vomiting.        GE reflux   Musculoskeletal: Negative for gait problem.   Skin:        Dry, cracking hands (found out due to acrylic)   Allergic/Immunologic: Positive for environmental allergies and food allergies.   Neurological: Negative for dizziness, light-headedness and headaches.   Hematological: Negative for adenopathy.   Psychiatric/Behavioral: Negative for confusion.       Objective:      Physical Exam   Constitutional: She is oriented to person, place, and time. She appears well-developed and well-nourished. She is cooperative. No distress.   HENT:   Head: Normocephalic and atraumatic.   Right Ear: External ear and ear canal normal.   Left Ear: Tympanic membrane, external ear and ear canal normal.   Nose: Mucosal edema (mild; head of right middle turbinate pale) and septal deviation (to left) present. No rhinorrhea or nasal deformity. No epistaxis. Right sinus exhibits no maxillary sinus tenderness and no frontal sinus tenderness. Left sinus exhibits no maxillary sinus tenderness and no frontal sinus tenderness.   Mouth/Throat: Uvula is midline, oropharynx is clear and moist and mucous membranes are normal. Mucous membranes are not pale and not dry. She has dentures (upper partial). No trismus in the jaw. Normal dentition. No uvula swelling. No oropharyngeal exudate or posterior oropharyngeal erythema.   Right cerumen impaction (removal described below)   Eyes: Conjunctivae, EOM and lids are normal. Pupils are equal, round, and reactive to light. Right eye exhibits no chemosis. Left eye exhibits no chemosis. Right conjunctiva is not injected. Left conjunctiva is not injected. No scleral icterus. Right eye exhibits normal extraocular motion and no nystagmus. Left eye exhibits normal  extraocular motion and no nystagmus.   eyeglasses   Neck: Trachea normal and phonation normal. No tracheal tenderness present. No tracheal deviation present. No thyroid mass and no thyromegaly present.   Cardiovascular: Intact distal pulses.    Pulmonary/Chest: Effort normal. No stridor. No respiratory distress.   Abdominal: She exhibits no distension.   Lymphadenopathy:        Head (right side): No submental, no submandibular, no preauricular and no posterior auricular adenopathy present.        Head (left side): No submental, no submandibular, no preauricular and no posterior auricular adenopathy present.     She has no cervical adenopathy.   Neurological: She is alert and oriented to person, place, and time. No cranial nerve deficit.   Skin: Skin is warm and dry. No rash noted. No erythema.   Psychiatric: She has a normal mood and affect. Her behavior is normal.             Procedure Note    CHIEF COMPLAINT:  Cerumen Impaction    Description:  The patient was seated in an exam chair.  An ear speculum was placed in the right EAC and was examined under the microscope.  Alligator forceps were used to remove a large cerumen impaction.  The tympanic membrane was visualized and was normal in appearance.  The patient tolerated the procedure well.      Assessment:       1. Chronic rhinitis    2. Impacted cerumen of right ear        Plan:         Recommend trial of Atrovent nasal spray; can be used up to TID as needed.  Discussed that her right middle turbinate appears pale today and it's unlikely a polyp given that she has no complaints of nasal obstruction or chronic congestion.  Would recommend RTC for nasal endoscopy if symptoms worsen or no relief with Atrovent.  Discussed that this should be scheduled with MD and we could try and arrange appointment in [place] with Dr. Krishnamurthy if needed.  She's looking forward to her appointment next week with Dr. Miller.  I agree with consistent use of Singulair and Xyzal.  She  will email me with her progress or with any worsening symptoms.     Cerumen impaction:  Removed today without difficulty.  I would recommend the use of a wax softening drop, either over the counter Debrox or mineral oil, on a weekly basis.  I also instructed the patient to avoid Qtips.

## 2018-05-18 NOTE — LETTER
May 18, 2018      Howard Mathews MD  [address] [facility] [address]           [facility]  [address] [place] [address]  Phone: [phone]  Fax: [phone]          Patient: Soumya Melchor   MR Number: [id number]   YOB: 1945   Date of Visit: 5/18/2018       Dear Dr. Howard Mathews:    Thank you for referring Soumya Melchor to me for evaluation. Attached you will find relevant portions of my assessment and plan of care.    If you have questions, please do not hesitate to call me. I look forward to following Soumya Melchor along with you.    Sincerely,    Lucretia Pulido PA-C    Enclosure  CC:  No Recipients    If you would like to receive this communication electronically, please contact [email or web address] or [phone] to request more information on [facility] Link access.    For providers and/or their staff who would like to refer a patient to [facility], please contact us through our one-stop-shop provider referral line, [facility], at [phone].    If you feel you have received this communication in error or would no longer like to receive these types of communications, please e-mail [email or web address]

## 2018-05-24 ENCOUNTER — LAB VISIT (OUTPATIENT)
Dept: LAB | Facility: HOSPITAL | Age: 73
End: 2018-05-24
Attending: ALLERGY & IMMUNOLOGY
Payer: MEDICARE

## 2018-05-24 ENCOUNTER — OFFICE VISIT (OUTPATIENT)
Dept: ALLERGY | Facility: CLINIC | Age: 73
End: 2018-05-24
Payer: MEDICARE

## 2018-05-24 VITALS
WEIGHT: 136 LBS | DIASTOLIC BLOOD PRESSURE: 80 MMHG | BODY MASS INDEX: 26.7 KG/M2 | SYSTOLIC BLOOD PRESSURE: 140 MMHG | HEART RATE: 74 BPM | RESPIRATION RATE: 15 BRPM | TEMPERATURE: 97 F | HEIGHT: 60 IN

## 2018-05-24 DIAGNOSIS — R60.9 SWELLING: ICD-10-CM

## 2018-05-24 DIAGNOSIS — K21.9 GASTROESOPHAGEAL REFLUX DISEASE, ESOPHAGITIS PRESENCE NOT SPECIFIED: ICD-10-CM

## 2018-05-24 DIAGNOSIS — T78.3XXA ANGIOEDEMA, INITIAL ENCOUNTER: ICD-10-CM

## 2018-05-24 DIAGNOSIS — R60.9 SWELLING: Primary | ICD-10-CM

## 2018-05-24 DIAGNOSIS — Z78.9 MEDICATION INTOLERANCE: ICD-10-CM

## 2018-05-24 LAB
BASOPHILS # BLD AUTO: 0.07 K/UL
BASOPHILS NFR BLD: 0.8 %
C3 SERPL-MCNC: 136 MG/DL
C4 SERPL-MCNC: 40 MG/DL
DIFFERENTIAL METHOD: ABNORMAL
EOSINOPHIL # BLD AUTO: 0.3 K/UL
EOSINOPHIL NFR BLD: 3.3 %
ERYTHROCYTE [DISTWIDTH] IN BLOOD BY AUTOMATED COUNT: 12.7 %
HCT VFR BLD AUTO: 41.1 %
HGB BLD-MCNC: 13.2 G/DL
IMM GRANULOCYTES # BLD AUTO: 0.01 K/UL
IMM GRANULOCYTES NFR BLD AUTO: 0.1 %
LYMPHOCYTES # BLD AUTO: 2.5 K/UL
LYMPHOCYTES NFR BLD: 29.8 %
MCH RBC QN AUTO: 31.8 PG
MCHC RBC AUTO-ENTMCNC: 32.1 G/DL
MCV RBC AUTO: 99 FL
MONOCYTES # BLD AUTO: 0.7 K/UL
MONOCYTES NFR BLD: 8 %
NEUTROPHILS # BLD AUTO: 4.9 K/UL
NEUTROPHILS NFR BLD: 58 %
NRBC BLD-RTO: 0 /100 WBC
PLATELET # BLD AUTO: 331 K/UL
PMV BLD AUTO: 10.5 FL
RBC # BLD AUTO: 4.15 M/UL
WBC # BLD AUTO: 8.52 K/UL

## 2018-05-24 PROCEDURE — 84165 PROTEIN E-PHORESIS SERUM: CPT

## 2018-05-24 PROCEDURE — 3077F SYST BP >= 140 MM HG: CPT | Mod: CPTII,S$GLB,, | Performed by: ALLERGY & IMMUNOLOGY

## 2018-05-24 PROCEDURE — 86003 ALLG SPEC IGE CRUDE XTRC EA: CPT

## 2018-05-24 PROCEDURE — 99205 OFFICE O/P NEW HI 60 MIN: CPT | Mod: S$GLB,,, | Performed by: ALLERGY & IMMUNOLOGY

## 2018-05-24 PROCEDURE — 84165 PROTEIN E-PHORESIS SERUM: CPT | Mod: 26,,, | Performed by: PATHOLOGY

## 2018-05-24 PROCEDURE — 3079F DIAST BP 80-89 MM HG: CPT | Mod: CPTII,S$GLB,, | Performed by: ALLERGY & IMMUNOLOGY

## 2018-05-24 PROCEDURE — 86160 COMPLEMENT ANTIGEN: CPT

## 2018-05-24 PROCEDURE — 86162 COMPLEMENT TOTAL (CH50): CPT

## 2018-05-24 PROCEDURE — 86160 COMPLEMENT ANTIGEN: CPT | Mod: 59

## 2018-05-24 PROCEDURE — 86161 COMPLEMENT/FUNCTION ACTIVITY: CPT

## 2018-05-24 PROCEDURE — 83520 IMMUNOASSAY QUANT NOS NONAB: CPT

## 2018-05-24 PROCEDURE — 85025 COMPLETE CBC W/AUTO DIFF WBC: CPT

## 2018-05-24 PROCEDURE — 36415 COLL VENOUS BLD VENIPUNCTURE: CPT

## 2018-05-24 PROCEDURE — 99999 PR PBB SHADOW E&M-EST. PATIENT-LVL III: CPT | Mod: PBBFAC,,, | Performed by: ALLERGY & IMMUNOLOGY

## 2018-05-24 RX ORDER — SIMVASTATIN 40 MG/1
40 TABLET, FILM COATED ORAL NIGHTLY
COMMUNITY
End: 2018-05-30 | Stop reason: SDUPTHER

## 2018-05-24 RX ORDER — CETIRIZINE HYDROCHLORIDE 10 MG/1
10 TABLET ORAL DAILY
COMMUNITY
End: 2018-05-24 | Stop reason: ALTCHOICE

## 2018-05-24 NOTE — LETTER
May 24, 2018      Howard Mathews MD  [address] [facility] [address]           [facility]  [id number] [place] [address]  Phone: [phone]  Fax: [phone]          Patient: Soumya Melchor   MR Number: [id number]   YOB: 1945   Date of Visit: 5/24/2018       Dear Dr. Howard Mathews:    Thank you for referring Soumya Melchor to me for evaluation. Attached you will find relevant portions of my assessment and plan of care.    If you have questions, please do not hesitate to call me. I look forward to following Soumya Melchor along with you.    Sincerely,    Santos Miller MD    Enclosure  CC:  No Recipients    If you would like to receive this communication electronically, please contact [email or web address] or [phone] to request more information on [facility] Link access.    For providers and/or their staff who would like to refer a patient to [facility], please contact us through our one-stop-shop provider referral line, [facility], at [phone].    If you feel you have received this communication in error or would no longer like to receive these types of communications, please e-mail [email or web address]

## 2018-05-25 LAB
ALBUMIN SERPL ELPH-MCNC: 4.36 G/DL
ALPHA1 GLOB SERPL ELPH-MCNC: 0.33 G/DL
ALPHA2 GLOB SERPL ELPH-MCNC: 0.78 G/DL
B-GLOBULIN SERPL ELPH-MCNC: 0.85 G/DL
GAMMA GLOB SERPL ELPH-MCNC: 0.88 G/DL
PATHOLOGIST INTERPRETATION SPE: NORMAL
PROT SERPL-MCNC: 7.2 G/DL

## 2018-05-25 NOTE — PROGRESS NOTES
"OFFICE NOTE    CHIEF COMPLAINT:  Allergies, swelling.    HISTORY:  She had a 10:00 a.m. appointment on Thursday, May 24, 2018.    Information in her medical record regarding her past medical history, family   history, and social history was reviewed and updated today.  Significant   additions, if any, are as noted below.    She stated approximately one year ago, she developed swelling consistent with   angioedema when she was taking lisinopril and ACE inhibitor.  That medication   was discontinued.  Since then, she was placed on treatment with losartan and   experienced swelling.  It was discontinued.    In March of this year, she stated she was awake at 4:00 a.m. and had the onset   of swelling of the lips.  She did not experience development of urticaria.  She   was taken to the Emergency Room for treatment and improved.  In April, she   experienced another episode of swelling.  The swelling began about 07:20 in the   evening.  She stated on this day, she had coffee at 9:00 a.m., a banana at 1:00   p.m.  An "energy bar" at 3:00 p.m.  A Mandarin orange at 5:00 p.m. and at 7:20   p.m. developed swelling of the lip.  She went to the Emergency Room for   treatment.  With the episodes of swelling, she has experienced swelling of the   tongue.  She stated that she has not perceived swelling of the throat.  She has   developed some swelling of the soft tissues of the face.  With all of these   events, she has never experienced urticaria.    In years past, she had not considered herself to be allergic to any foods.  She   had not considered herself to be allergic to any animals.    She is not aware of any family members with lupus.  She is not aware of any   family members with a history of swelling disorders.    She has seen an allergist in [place], [place], Georgiana Soto M.D.  She stated that blood was   drawn and that a number of diagnostic tests were performed.  She does not know   with absolute certainty " the results of all of the tests.    She stated that the doctor treated her for a sinus infection.    REVIEW OF SYSTEMS:  At the time of her appointment today, she is feeling well in   general and had no additional new symptoms to mention.    PAST MEDICAL HISTORY:  She has no history of lupus.    PHYSICAL EXAM:  GENERAL:  She is in no distress.  She is alert, oriented, well developed, in   good mood, and attentive.  GAIT:  Steady.  SKIN:  No rash noted.  EYES:  Sclerae white.  Conjunctivae pink.  NOSE:  Patent.  No polyps seen.  EARS:  Not inflamed.  TYMPANIC MEMBRANES:  Not inflamed.  HEAD:  No swelling noted.  MOUTH:  No swelling or inflammation of the lips, tongue or in the throat noted.  NECK:  No thyromegaly or masses noted.  Auscultation over the anterior trachea   revealed normal breath sounds and good air exchange.  LYMPH NODES:  No significant cervical or epitrochlear lymphadenopathy was noted.  LUNGS:  Clear to auscultation.  HEART:  No murmurs heard.  Regular rhythm.  EXTREMITIES:  No swelling or inflammation of the hands or legs noted.    IMPRESSION:  1.  Swelling consistent with angioedema.  2.  Medication intolerance and adverse drug reactions.  3.  Hypertension, receiving treatment.  4.  GE reflux, receiving treatment.  5.  Other health concerns as noted in her medical record.    ASSESSMENT AND PLAN:  Her appointment was 90 minutes in duration spent entirely   in face-to-face contact.  More than 50% of the visit was spent in counseling and   coordination of care.    She stated that she had epinephrine for self-administration.  She stated that   she had the medicine with her.  I inquired as to whether or not she had ever   been instructed in the proper technique for using her epinephrine.  She stated   she had not.  I reviewed with her the proper technique for administering her   device for self-administration of epinephrine.  We reviewed guidelines for its   use.    In the future, should she ever  experience swelling of the tongue or in the   throat, I recommended that she be transported immediately to the nearest   emergency facility for additional evaluation and treatment.  I reviewed with her   that swelling of the tongue or in the throat could cause airway obstruction and   that a fatality could occur as a result.    While she was here today, she signed forms authorizing her doctor to release   copies of her medical records including the results of diagnostic tests, which   were ordered and performed.    She stated the diagnostic tests that were performed would have been performed   approximately April 11.  I have recommended that laboratory tests be performed   at this time.  Arrangements were made to have blood drawn after her appointment   with me today.    I told her I did not know with certainty why she has experienced all of her   symptomatic events.  I told her last year, most likely the swelling and   angioedema that occurred did develop in association with the use of her ACE   inhibitor, lisinopril.  I have recommended that she avoid ACE inhibitors.  She   should also avoid losartan and similar medicines in that class of   antihypertensive agents.    When the results of the diagnostic tests ordered today are known, I will contact   her.  Additional recommendations may be made at that time.  She was given the   office phone number.  Should she have additional questions or concerns, she was   instructed to call.      BRITTANIE  dd: 05/24/2018 12:49:01 (CDT)  td: 05/25/2018 05:35:05 (CDT)  Doc ID   #[id number]  Job ID #[id number]    CC:

## 2018-05-26 LAB — TRYPTASE LEVEL: 15.6 NG/ML

## 2018-05-28 ENCOUNTER — TELEPHONE (OUTPATIENT)
Dept: ALLERGY | Facility: CLINIC | Age: 73
End: 2018-05-28

## 2018-05-28 NOTE — TELEPHONE ENCOUNTER
I spoke with her on the telephone on Memorial Day, Monday, May 28, 2018 at 8:31 a.m..  I reviewed with her results of laboratory tests available to review at this time.  I told her that her tryptase level will be repeated in approximately 4-6 weeks.  When the results of other outstanding tests are available to review with her I will do so.    This note was dictated using voice recognition software and may contain errors.

## 2018-05-29 LAB
CH50 SERPL-ACNC: 93 U/ML
DEPRECATED LTX IGE RAST QL: NORMAL
LTX IGE QN: <0.35 KU/L

## 2018-05-29 RX ORDER — METOPROLOL TARTRATE 25 MG/1
TABLET, FILM COATED ORAL
Qty: 60 TABLET | Refills: 5 | Status: SHIPPED | OUTPATIENT
Start: 2018-05-29 | End: 2018-07-17

## 2018-05-30 ENCOUNTER — PATIENT MESSAGE (OUTPATIENT)
Dept: FAMILY MEDICINE | Facility: CLINIC | Age: 73
End: 2018-05-30

## 2018-05-30 RX ORDER — SIMVASTATIN 40 MG/1
40 TABLET, FILM COATED ORAL NIGHTLY
Qty: 30 TABLET | Refills: 12 | Status: SHIPPED | OUTPATIENT
Start: 2018-05-30 | End: 2019-06-01 | Stop reason: SDUPTHER

## 2018-05-31 ENCOUNTER — TELEPHONE (OUTPATIENT)
Dept: OTOLARYNGOLOGY | Facility: CLINIC | Age: 73
End: 2018-05-31

## 2018-05-31 ENCOUNTER — TELEPHONE (OUTPATIENT)
Dept: ALLERGY | Facility: CLINIC | Age: 73
End: 2018-05-31

## 2018-05-31 LAB — C1INH FUNCTIONAL/C1INH TOTAL MFR SERPL: >86 %

## 2018-05-31 RX ORDER — AZELASTINE 1 MG/ML
1 SPRAY, METERED NASAL 2 TIMES DAILY
Qty: 30 ML | Refills: 6 | Status: SHIPPED | OUTPATIENT
Start: 2018-05-31 | End: 2018-07-25

## 2018-05-31 NOTE — TELEPHONE ENCOUNTER
Recommend trial of Astelin nasal spray instead of Atrovent.  I'll send it to her pharmacy now.  Recommend she continue Xyzal and Singulair as well.  Also recommend scheduling her to see Dr. Krishnamurthy in [place] (6/20) as we discussed for possible nasal endoscopy.  If she's better with Astelin, she can always cancel that appt.        ----- Message from Mala Majano sent at 5/31/2018 11:21 AM CDT -----  Contact: Patient  Patient called and stated she was given some medication and told to call in to tell how she is doing. She stated that the medication only takes the issue away about 3-4 hours and then it come right back. She isn't satisfied with it at all. She can be contacted at [phone].    Thanks,  Mala

## 2018-06-01 ENCOUNTER — TELEPHONE (OUTPATIENT)
Dept: FAMILY MEDICINE | Facility: CLINIC | Age: 73
End: 2018-06-01

## 2018-06-01 RX ORDER — AMLODIPINE BESYLATE 5 MG/1
5 TABLET ORAL DAILY
Qty: 30 TABLET | Refills: 11 | Status: SHIPPED | OUTPATIENT
Start: 2018-06-01 | End: 2019-06-01 | Stop reason: SDUPTHER

## 2018-06-01 NOTE — TELEPHONE ENCOUNTER
Pt is thinking she may be having allergic reactions to the BP meds she has been taking, asking if you can change her she is currently on metoprolol, she has also been on lisinopril, when she had an allergic reaction.

## 2018-06-01 NOTE — TELEPHONE ENCOUNTER
----- Message from Nisa Holley sent at 6/1/2018 10:22 AM CDT -----  Contact: pt   Pt states that she went to the ER @ [facility] due to a allergic. Pt states that her previous blood pressure med was discontinue and would like to discontinue the one that she is on now and try something else. Pt states that she has not had a blood pressure med this morning at all. She said that this is the 4th time this year she has been to the ER for a allergic reaction. Pt started seeing Dr. Miller in [place] and states that she hasn't done nothing different from the previous Allergy doctor. Call pt @ [phone]

## 2018-06-03 ENCOUNTER — TELEPHONE (OUTPATIENT)
Dept: ALLERGY | Facility: CLINIC | Age: 73
End: 2018-06-03

## 2018-06-03 NOTE — TELEPHONE ENCOUNTER
I spoke with her on the telephone on Bebo, Alida 3, 2018.  We completed our conversation at 4:22 p.m..    She went to the emergency room on May 31 2018.  She stated about 3:00 p.m. on May 31 she became aware of swelling of the left corner of the mouth.  She received intravenous medications in the emergency room.  She stayed there about 4 hr.  She improved at that time.  Since May 31 she has not had any more swelling.    I reviewed with her the results of the laboratory tests which I had ordered after her appointment with me on May 24.  Her tryptase level was slightly elevated.  The other tests were negative, normal, or satisfactory.    I recommended in about 5 or 6 weeks blood be drawn again for repeat measurement of the tryptase.    I recommended that she begin taking H1 and H2 antihistamines concurrently.  I suggested she take Claritin 10 mg every morning and Zyrtec 10 mg every evening.  She has been taking Pepcid on a daily basis since leaving the emergency room.  I recommended that she continue to do so.    I told her I did not know why the tryptase level was elevated.  I told her this may suggest that her mast cells may be inappropriately releasing mediators.  The reason for this occurring is not known.    I requested she call on Tuesday he June 5 and report upon her status.

## 2018-06-04 ENCOUNTER — OFFICE VISIT (OUTPATIENT)
Dept: SURGERY | Facility: CLINIC | Age: 73
End: 2018-06-04
Payer: MEDICARE

## 2018-06-04 VITALS
DIASTOLIC BLOOD PRESSURE: 72 MMHG | HEART RATE: 46 BPM | BODY MASS INDEX: 26.22 KG/M2 | SYSTOLIC BLOOD PRESSURE: 171 MMHG | WEIGHT: 134.25 LBS

## 2018-06-04 DIAGNOSIS — R10.9 ABDOMINAL PAIN, UNSPECIFIED ABDOMINAL LOCATION: Primary | ICD-10-CM

## 2018-06-04 PROCEDURE — 99214 OFFICE O/P EST MOD 30 MIN: CPT | Mod: S$GLB,,, | Performed by: SURGERY

## 2018-06-04 PROCEDURE — 3078F DIAST BP <80 MM HG: CPT | Mod: CPTII,S$GLB,, | Performed by: SURGERY

## 2018-06-04 PROCEDURE — 3077F SYST BP >= 140 MM HG: CPT | Mod: CPTII,S$GLB,, | Performed by: SURGERY

## 2018-06-04 PROCEDURE — 99999 PR PBB SHADOW E&M-EST. PATIENT-LVL III: CPT | Mod: PBBFAC,,, | Performed by: SURGERY

## 2018-06-04 RX ORDER — FAMOTIDINE 20 MG/1
20 TABLET, FILM COATED ORAL
COMMUNITY
Start: 2018-05-31 | End: 2018-06-05

## 2018-06-04 RX ORDER — DIPHENHYDRAMINE HCL 25 MG
25 CAPSULE ORAL
COMMUNITY
Start: 2018-05-31 | End: 2018-06-05

## 2018-06-04 RX ORDER — PREDNISONE 20 MG/1
20 TABLET ORAL
COMMUNITY
Start: 2018-06-01 | End: 2018-06-05

## 2018-06-04 NOTE — LETTER
Alida 10, 2018      Howard Mathews MD  [address] [facility] [address]           [facility]  [address] [facility] [address] [place] [address]  Phone: [phone]          Patient: Soumya Melchor   MR Number: [id number]   YOB: 1945   Date of Visit: 6/4/2018       Dear Dr. Howard Mathews:    Thank you for referring Soumya Melchor to me for evaluation. Attached you will find relevant portions of my assessment and plan of care.    If you have questions, please do not hesitate to call me. I look forward to following Soumya Melchor along with you.    Sincerely,    José Luis Moreno MD    Enclosure  CC:  No Recipients    If you would like to receive this communication electronically, please contact [email or web address] or [phone] to request more information on [facility] Link access.    For providers and/or their staff who would like to refer a patient to [facility], please contact us through our one-stop-shop provider referral line, [facility], at [phone].    If you feel you have received this communication in error or would no longer like to receive these types of communications, please e-mail [email or web address]

## 2018-06-06 ENCOUNTER — TELEPHONE (OUTPATIENT)
Dept: SURGERY | Facility: CLINIC | Age: 73
End: 2018-06-06

## 2018-06-06 NOTE — TELEPHONE ENCOUNTER
----- Message from Lisa Morris sent at 6/6/2018 10:21 AM CDT -----  Contact: self [phone]  Please call her regarding the MRI.  Thank you!

## 2018-06-06 NOTE — TELEPHONE ENCOUNTER
Returned pt call advised pt that [facility] does not do mri procedures on pt that have spinal chord stimulator devices pt verbalized understanding ,will notify dr prasad

## 2018-06-07 ENCOUNTER — TELEPHONE (OUTPATIENT)
Dept: FAMILY MEDICINE | Facility: CLINIC | Age: 73
End: 2018-06-07

## 2018-06-07 NOTE — TELEPHONE ENCOUNTER
----- Message from Ivan Wade sent at 6/7/2018  2:16 PM CDT -----  Contact: self [phone]  Would like to consult with nurse regarding MRI.  Please call back at [phone].  Md Paulie

## 2018-06-07 NOTE — TELEPHONE ENCOUNTER
I spoke with Dr. Moreno's nurse and she is going to contact [facility] to see if they can do the MRI there due to the spinal stimulator

## 2018-06-10 NOTE — PROGRESS NOTES
Subjective:       Patient ID: Soumya Melchor is a 72 y.o. female.    Chief Complaint: Consult (possible hiatal hernia)      HPI this is a 72-year-old female who presents with a complaint of some left upper quadrant pain. She says she was told she had a hernia in the past and she is convinced that this is the cause of her pain although she has been evaluated by other physicians have not identified an abdominal wall hernia. She did have a CT scan which revealed a small hiatal hernia and this was confirmed on EGD.  I suspect that this is what she is focused on because there is no evidence of an abdominal wall hernia on CT scan or clinical exam.  She denies any nausea or vomiting.  She denies any fever or chills.  She says this pain has been going on for months.  I think she was just confused about being told she had a hiatal hernia.  She denies any reflux symptoms currently.  She did have reflux but this was controlled with Prilosec and she is happy with that result.  We had a long discussion about the difference between a hiatal hernia and abdominal wall hernia and she seems reasonably satisfied with that.  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 MRI or triple phase CT scan was recommended.  The patient says she cannot have any contrast.  She has refused CT scan in the past because of this.  We discussed proceeding with further imaging as radiology recommended.    Past Medical History:   Diagnosis Date    Acid reflux 4/11/2014    Anxiety and depression 3/6/2014    AP (angina pectoris) 2/13/2017    CAD (coronary artery disease) 2/13/2017    CAD, multiple vessel 2/13/2017    Chronic pain associated with significant psychosocial dysfunction 2/21/2013    COPD (chronic obstructive pulmonary disease)     HTN (hypertension)     Hypothyroidism     S/P CABG (coronary artery bypass graft) 2/13/2017     Past Surgical History:    Procedure Laterality Date    COLONOSCOPY  ~2013    Dr. Perez; colon polyps removed    COLONOSCOPY N/A 3/15/2018    Procedure: COLONOSCOPY;  Surgeon: Margarito Calloway MD;  Location: [facility];  Service: Endoscopy;  Laterality: N/A;    DILATION AND CURETTAGE OF UTERUS      KNEE SURGERY Right     SPINAL CORD STIMULATOR IMPLANT  02/12/2018    TONSILLECTOMY      UPPER GASTROINTESTINAL ENDOSCOPY           Current Outpatient Prescriptions:     albuterol 90 mcg/actuation inhaler, Inhale 2 puffs into the lungs., Disp: , Rfl:     amLODIPine (NORVASC) 5 MG tablet, Take 1 tablet (5 mg total) by mouth once daily., Disp: 30 tablet, Rfl: 11    ascorbic acid, vitamin C, (VITAMIN C) 1000 MG tablet, Take 1,000 mg by mouth once daily. , Disp: , Rfl:     aspirin (ECOTRIN) 81 MG EC tablet, Take 81 mg by mouth once daily., Disp: , Rfl:     azelastine (ASTELIN) 137 mcg (0.1 %) nasal spray, 1 spray (137 mcg total) by Nasal route 2 (two) times daily., Disp: 30 mL, Rfl: 6    cyanocobalamin (VITAMIN B-12) 250 MCG tablet, Take 250 mcg by mouth once daily., Disp: , Rfl:     diphenhydrAMINE (BENADRYL) 25 mg capsule, Take 25 mg by mouth every 6 (six) hours as needed for Itching., Disp: , Rfl:     donepezil (ARICEPT) 10 MG tablet, Take 1 tablet (10 mg total) by mouth once daily., Disp: 30 tablet, Rfl: 11    EPINEPHrine (EPIPEN) 0.3 mg/0.3 mL AtIn, Inject 0.3 mg into the muscle., Disp: , Rfl:     fish oil-omega-3 fatty acids 300-1,000 mg capsule, Take by mouth once daily., Disp: , Rfl:     FOLIC ACID/MULTIVIT-MIN/LUTEIN (CENTRUM SILVER ORAL), Take by mouth once daily., Disp: , Rfl:     furosemide (LASIX) 20 MG tablet, Take 1 tablet (20 mg total) by mouth once daily., Disp: 30 tablet, Rfl: 3    ipratropium (ATROVENT) 0.03 % nasal spray, 2 sprays by Nasal route 3 (three) times daily., Disp: 30 mL, Rfl: 3    L.acid/L.casei/B.bif/B.miryam/FOS (PROBIOTIC BLEND ORAL), Take by mouth., Disp: , Rfl:     lactase (LACTAID ORAL), Take  by mouth., Disp: , Rfl:     Lactobacillus acidophilus (PROBIOTIC) 10 billion cell Cap, Take 2 capsules by mouth once daily., Disp: , Rfl:     levocetirizine (XYZAL) 5 MG tablet, Take 5 mg by mouth every evening., Disp: , Rfl:     levothyroxine (SYNTHROID) 200 MCG tablet, Take 1 tablet (200 mcg total) by mouth once daily., Disp: 30 tablet, Rfl: 11    montelukast (SINGULAIR) 10 mg tablet, Take 10 mg by mouth every evening., Disp: , Rfl:     multivit-minerals/ferrous fum (MULTI VITAMIN ORAL), Take by mouth., Disp: , Rfl:     niacin 500 MG CpSR, Take 250 mg by mouth every evening., Disp: , Rfl:     omeprazole (PRILOSEC) 40 MG capsule, Take 1 capsule (40 mg total) by mouth once daily., Disp: 30 capsule, Rfl: 11    oxybutynin (DITROPAN-XL) 5 MG TR24, Take 1 tablet (5 mg total) by mouth once daily., Disp: 30 tablet, Rfl: 11    POTASSIUM GLUCONATE ORAL, Take 99 mg by mouth., Disp: , Rfl:     ranitidine (ZANTAC) 300 MG tablet, Take 1 tablet (300 mg total) by mouth every evening., Disp: 30 tablet, Rfl: 11    simvastatin (ZOCOR) 40 MG tablet, Take 1 tablet (40 mg total) by mouth every evening., Disp: 30 tablet, Rfl: 12    umeclidinium-vilanterol (ANORO ELLIPTA) 62.5-25 mcg/actuation DsDv, Inhale 1 puff into the lungs once daily. Controller, Disp: 60 each, Rfl: 11    vitamin A-vit C-vit E-zinc-Cu Tab, Take by mouth., Disp: , Rfl:     vitamins  A,C,E-zinc-copper (PRESERVISION AREDS) 14,320-226-200 unit-mg-unit Cap, Take 2 capsules by mouth once daily., Disp: , Rfl:     ZETIA 10 mg tablet, TAKE 1 TABLET BY MOUTH EVERY EVENING, Disp: 30 tablet, Rfl: 11    calcium-vitamin D3 500 mg(1,250mg) -200 unit per tablet, Take 1 tablet by mouth 2 (two) times daily with meals., Disp: 60 tablet, Rfl: 11    metoprolol tartrate (LOPRESSOR) 25 MG tablet, TAKE 1 TABLET BY MOUTH TWICE DAILY FOR BLOOD PRESSURE, Disp: 60 tablet, Rfl: 5    Review of patient's allergies indicates:   Allergen Reactions    Losartan Swelling      angioedema    Ace inhibitors Other (See Comments)     unknown      Iodine and iodide containing products Hives     Since age of 48    Lisinopril      angioedema    Shrimp Other (See Comments) and Swelling     Localized itching and swelling on her hands if she peels shrimp.  Able to eat shrimp without difficulty.  No generalized reaction.       Family History   Problem Relation Age of Onset    Heart attacks under age 50 Mother 41    Colon cancer Neg Hx     Colon polyps Neg Hx     Crohn's disease Neg Hx     Ulcerative colitis Neg Hx     Celiac disease Neg Hx      Social History     Social History    Marital status:      Spouse name: N/A    Number of children: N/A    Years of education: N/A     Occupational History    Not on file.     Social History Main Topics    Smoking status: Former Smoker     Packs/day: 1.00     Years: 50.00     Quit date: 12/10/2012    Smokeless tobacco: Never Used    Alcohol use No    Drug use: No    Sexual activity: Not on file     Other Topics Concern    Not on file     Social History Narrative    No narrative on file       Review of Systems   Constitutional: Negative for activity change, chills, fever and unexpected weight change.   HENT: Negative for congestion, sore throat, trouble swallowing and voice change.    Eyes: Negative for redness and visual disturbance.   Respiratory: Negative for cough, shortness of breath and wheezing.    Cardiovascular: Negative for chest pain and palpitations.   Gastrointestinal: Positive for abdominal pain. Negative for blood in stool, nausea and vomiting.   Endocrine: Negative.    Genitourinary: Negative for dysuria, frequency and hematuria.   Musculoskeletal: Negative for arthralgias, back pain and neck pain.   Skin: Negative for rash and wound.   Allergic/Immunologic: Negative.    Neurological: Negative for dizziness, weakness and headaches.   Hematological: Negative for adenopathy.   Psychiatric/Behavioral: Negative for  agitation and dysphoric mood. The patient is not nervous/anxious.      Objective:     Physical Exam   Constitutional: She is oriented to person, place, and time. She appears well-developed and well-nourished. No distress.   HENT:   Head: Normocephalic and atraumatic.   Mouth/Throat: Oropharynx is clear and moist. No oropharyngeal exudate.   Eyes: Conjunctivae and EOM are normal. Pupils are equal, round, and reactive to light. No scleral icterus.   Neck: Normal range of motion. No thyromegaly present.   Cardiovascular: Normal rate and regular rhythm.    No murmur heard.  Pulmonary/Chest: Effort normal and breath sounds normal. She has no wheezes. She has no rales.   Abdominal: Soft. Bowel sounds are normal. She exhibits no distension and no mass. There is tenderness (There is very mild if any abdominal tenderness in the left upper quadrant.). There is no rebound and no guarding. No hernia ( there is no palpable abdominal wall hernia.).   Musculoskeletal: Normal range of motion. She exhibits no edema.   Lymphadenopathy:     She has no cervical adenopathy.   Neurological: She is alert and oriented to person, place, and time. No cranial nerve deficit.   Skin: Skin is warm and dry. No rash noted. No erythema.   Psychiatric: She has a normal mood and affect. Her behavior is normal.     Assessment:     Encounter Diagnosis   Name Primary?    Abdominal pain, unspecified abdominal location Yes       Plan:      1.  I do not think her abdominal pain is related to the mass which was identified on CT.  We will however arrange to have her have further studies as recommended by Radiology.  2.  She seems comfortable now that she does not have an abdominal wall hernia and this does not need to be further addressed.  3.  Small hiatal hernia but her reflux is well controlled with medications.  4.  We will review radiology results when available.  5.  Follow-up as needed.

## 2018-06-11 ENCOUNTER — TELEPHONE (OUTPATIENT)
Dept: SURGERY | Facility: CLINIC | Age: 73
End: 2018-06-11

## 2018-06-11 ENCOUNTER — TELEPHONE (OUTPATIENT)
Dept: ALLERGY | Facility: CLINIC | Age: 73
End: 2018-06-11

## 2018-06-11 DIAGNOSIS — R60.9 SWELLING: ICD-10-CM

## 2018-06-11 DIAGNOSIS — K21.9 GASTROESOPHAGEAL REFLUX DISEASE, ESOPHAGITIS PRESENCE NOT SPECIFIED: Primary | ICD-10-CM

## 2018-06-11 DIAGNOSIS — L50.9 URTICARIA, UNSPECIFIED: ICD-10-CM

## 2018-06-11 RX ORDER — FAMOTIDINE 20 MG/1
TABLET, FILM COATED ORAL
Qty: 30 TABLET | Refills: 2 | Status: SHIPPED | OUTPATIENT
Start: 2018-06-11 | End: 2018-09-01 | Stop reason: SDUPTHER

## 2018-06-11 NOTE — TELEPHONE ENCOUNTER
----- Message from Kayleigh Roldan sent at 6/11/2018 11:57 AM CDT -----  Contact: Soumya Dooley at [phone] regarding refill on medication

## 2018-06-11 NOTE — TELEPHONE ENCOUNTER
----- Message from Kristofer Ross sent at 6/11/2018 12:15 PM CDT -----  Contact: same  Patient called in and wanted to see what her next step in treatment is going to be?  Patient call back number is [phone]

## 2018-06-11 NOTE — TELEPHONE ENCOUNTER
This note was dictated using voice recognition software and may contain errors.    I spoke with her on the telephone on Monday June 11, 2000 18 at 6:35 p.m..  When I spoke with her last I had requested that she call on June 5. I received no communication on June 5.  She stated that she did well last week.  She had no more episodes of swelling.  She stated she is in need of a prescription for Pepcid 20 mg. She will continue to take Claritin in the morning and Zyrtec in the evening and Pepcid daily.  At her request a prescription for Pepcid was issue to and was transmitted electronically to her pharmacy.  Should she have other questions or concerns she may call.  I emphasized the importance of continuing to take her H1 and H2 antihistamines daily.

## 2018-06-11 NOTE — TELEPHONE ENCOUNTER
----- Message from Annalee Rosen sent at 6/11/2018  2:34 PM CDT -----  Contact: patient   Patient returning a missed call. Please advise. Call to pod. No answer.   Call back    Thanks!

## 2018-06-11 NOTE — TELEPHONE ENCOUNTER
----- Message from Kiera Wood sent at 6/11/2018  2:40 PM CDT -----  Contact: pt  Pt stated she was returning a call and can be reached at [phone] Thanks

## 2018-06-12 ENCOUNTER — TELEPHONE (OUTPATIENT)
Dept: FAMILY MEDICINE | Facility: CLINIC | Age: 73
End: 2018-06-12

## 2018-06-12 NOTE — TELEPHONE ENCOUNTER
----- Message from Rylie Rocha sent at 6/12/2018 11:33 AM CDT -----  Pt at [phone]//states she thinks she needs a referral from Dr Mathews//need to talk with Danica about it/please call//thanks/[facility]

## 2018-06-12 NOTE — TELEPHONE ENCOUNTER
I spoke with Dr. Moreno's nurse and she states that the pt can have the MRI done at main campus. She will call down there to advise what is going on with the pt. Mrs. Dooley has been given the number to call and schedule at her convenience

## 2018-06-13 ENCOUNTER — PATIENT MESSAGE (OUTPATIENT)
Dept: OTOLARYNGOLOGY | Facility: CLINIC | Age: 73
End: 2018-06-13

## 2018-06-13 ENCOUNTER — PATIENT MESSAGE (OUTPATIENT)
Dept: SURGERY | Facility: CLINIC | Age: 73
End: 2018-06-13

## 2018-06-14 ENCOUNTER — PATIENT MESSAGE (OUTPATIENT)
Dept: SURGERY | Facility: CLINIC | Age: 73
End: 2018-06-14

## 2018-06-14 RX ORDER — PREDNISONE 20 MG/1
20 TABLET ORAL EVERY 6 HOURS
Qty: 3 TABLET | Refills: 0 | Status: SHIPPED | OUTPATIENT
Start: 2018-06-14 | End: 2018-06-15

## 2018-06-14 RX ORDER — CETIRIZINE HYDROCHLORIDE 10 MG/1
10 TABLET ORAL DAILY
Qty: 30 TABLET | Refills: 11 | Status: SHIPPED | OUTPATIENT
Start: 2018-06-14 | End: 2019-04-10

## 2018-06-21 ENCOUNTER — HOSPITAL ENCOUNTER (OUTPATIENT)
Dept: RADIOLOGY | Facility: HOSPITAL | Age: 73
Discharge: HOME | End: 2018-06-21
Attending: SURGERY
Payer: MEDICARE

## 2018-06-21 DIAGNOSIS — R10.84 GENERALIZED ABDOMINAL PAIN: ICD-10-CM

## 2018-06-21 PROCEDURE — 74177 CT ABD & PELVIS W/CONTRAST: CPT | Mod: TC

## 2018-06-21 PROCEDURE — 25500020 PHARM REV CODE 255: Performed by: SURGERY

## 2018-06-21 PROCEDURE — 74177 CT ABD & PELVIS W/CONTRAST: CPT | Mod: 26,,, | Performed by: RADIOLOGY

## 2018-06-21 RX ADMIN — IOHEXOL 15 ML: 350 INJECTION, SOLUTION INTRAVENOUS at 02:06

## 2018-06-21 RX ADMIN — IOHEXOL 75 ML: 350 INJECTION, SOLUTION INTRAVENOUS at 04:06

## 2018-06-21 RX ADMIN — IOHEXOL 15 ML: 350 INJECTION, SOLUTION INTRAVENOUS at 03:06

## 2018-06-23 ENCOUNTER — PATIENT MESSAGE (OUTPATIENT)
Dept: SURGERY | Facility: CLINIC | Age: 73
End: 2018-06-23

## 2018-06-23 ENCOUNTER — PATIENT MESSAGE (OUTPATIENT)
Dept: FAMILY MEDICINE | Facility: CLINIC | Age: 73
End: 2018-06-23

## 2018-06-24 ENCOUNTER — PATIENT MESSAGE (OUTPATIENT)
Dept: FAMILY MEDICINE | Facility: CLINIC | Age: 73
End: 2018-06-24

## 2018-06-25 ENCOUNTER — PATIENT MESSAGE (OUTPATIENT)
Dept: FAMILY MEDICINE | Facility: CLINIC | Age: 73
End: 2018-06-25

## 2018-06-25 ENCOUNTER — PATIENT MESSAGE (OUTPATIENT)
Dept: ALLERGY | Facility: CLINIC | Age: 73
End: 2018-06-25

## 2018-06-25 RX ORDER — GABAPENTIN 100 MG/1
100 CAPSULE ORAL NIGHTLY
Qty: 90 CAPSULE | Refills: 3 | Status: SHIPPED | OUTPATIENT
Start: 2018-06-25 | End: 2018-07-26 | Stop reason: SDUPTHER

## 2018-06-25 NOTE — TELEPHONE ENCOUNTER
Since we just started gabapentin I'd like to give it a week or 2 because improving her sleep may sig impact her anxiety/depression. I thought she was going to get an MRI?

## 2018-06-26 ENCOUNTER — TELEPHONE (OUTPATIENT)
Dept: ALLERGY | Facility: CLINIC | Age: 73
End: 2018-06-26

## 2018-06-26 ENCOUNTER — TELEPHONE (OUTPATIENT)
Dept: SURGERY | Facility: CLINIC | Age: 73
End: 2018-06-26

## 2018-06-26 ENCOUNTER — PATIENT MESSAGE (OUTPATIENT)
Dept: GASTROENTEROLOGY | Facility: CLINIC | Age: 73
End: 2018-06-26

## 2018-06-26 NOTE — TELEPHONE ENCOUNTER
She had communicated electronically.  She requested a prescription for Zyrtec.  In reviewing information in her medical record it is noted that a valid prescription for Zyrtec was issue did earlier this month by . I called her pharmacy  On June 26, 2018, and confirmed this fact.  I requested of my nurse that she make her aware of the above.    This note was dictated using voice recognition software and  May contain errors.

## 2018-06-26 NOTE — TELEPHONE ENCOUNTER
Called and reviewed results of ct scan with pt.   Soft tissue lesion present along greater curve.  Pt prefers to proceed with endoscopic biopsy if possible.   Will refer to GI

## 2018-06-26 NOTE — TELEPHONE ENCOUNTER
It looks like Dr Moreno already ordered an MRI, which is appropriate next step. Based on MRI results, recommendation re: possible EUS (EGD done in 3/2018 was unremarkable).

## 2018-06-27 ENCOUNTER — PATIENT MESSAGE (OUTPATIENT)
Dept: SURGERY | Facility: CLINIC | Age: 73
End: 2018-06-27

## 2018-06-28 ENCOUNTER — PATIENT MESSAGE (OUTPATIENT)
Dept: GASTROENTEROLOGY | Facility: CLINIC | Age: 73
End: 2018-06-28

## 2018-06-28 NOTE — TELEPHONE ENCOUNTER
Explain to pt that Dr Brower has her scheduled for EUS with Dr Greenberg (I cannot biopsy this area with regular EGD scope).

## 2018-07-01 ENCOUNTER — PATIENT MESSAGE (OUTPATIENT)
Dept: FAMILY MEDICINE | Facility: CLINIC | Age: 73
End: 2018-07-01

## 2018-07-02 NOTE — TELEPHONE ENCOUNTER
Taking 2 is fine-so is taking 3-they make a capsule w 300mg in it-let me know if she goes up to 3 and I can rerx that one

## 2018-07-03 PROBLEM — R93.89 ABNORMAL CT SCAN: Status: ACTIVE | Noted: 2018-07-03

## 2018-07-05 ENCOUNTER — OFFICE VISIT (OUTPATIENT)
Dept: SURGERY | Facility: CLINIC | Age: 73
End: 2018-07-05
Payer: MEDICARE

## 2018-07-05 VITALS
WEIGHT: 138.88 LBS | HEIGHT: 60 IN | HEART RATE: 99 BPM | TEMPERATURE: 99 F | BODY MASS INDEX: 27.26 KG/M2 | DIASTOLIC BLOOD PRESSURE: 72 MMHG | SYSTOLIC BLOOD PRESSURE: 154 MMHG

## 2018-07-05 DIAGNOSIS — C49.A0 GASTROINTESTINAL STROMAL TUMOR (GIST): Primary | ICD-10-CM

## 2018-07-05 PROCEDURE — 99212 OFFICE O/P EST SF 10 MIN: CPT | Mod: S$GLB,,, | Performed by: SURGERY

## 2018-07-05 PROCEDURE — 99999 PR PBB SHADOW E&M-EST. PATIENT-LVL III: CPT | Mod: PBBFAC,,, | Performed by: SURGERY

## 2018-07-05 PROCEDURE — 3078F DIAST BP <80 MM HG: CPT | Mod: CPTII,S$GLB,, | Performed by: SURGERY

## 2018-07-05 PROCEDURE — 3077F SYST BP >= 140 MM HG: CPT | Mod: CPTII,S$GLB,, | Performed by: SURGERY

## 2018-07-05 NOTE — PROGRESS NOTES
73 yo F whom I am familiar with. Pt with exophytic lesion along the greater curvature of the stomach just above the spleen.  This was confirmed by EUS performed by DR Greenberg on MOnday.  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   D/w pt that pathology is pending but that most likely surgical resection will be needed.  She prefers to do at [facility].  Will refer to Dr Grey for management.

## 2018-07-11 ENCOUNTER — TELEPHONE (OUTPATIENT)
Dept: CARDIOLOGY | Facility: CLINIC | Age: 73
End: 2018-07-11

## 2018-07-11 RX ORDER — FUROSEMIDE 20 MG/1
20 TABLET ORAL DAILY
Qty: 30 TABLET | Refills: 6 | Status: SHIPPED | OUTPATIENT
Start: 2018-07-11 | End: 2019-02-12 | Stop reason: SDUPTHER

## 2018-07-11 NOTE — TELEPHONE ENCOUNTER
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

## 2018-07-14 ENCOUNTER — PATIENT MESSAGE (OUTPATIENT)
Dept: FAMILY MEDICINE | Facility: CLINIC | Age: 73
End: 2018-07-14

## 2018-07-16 ENCOUNTER — PATIENT MESSAGE (OUTPATIENT)
Dept: CARDIOLOGY | Facility: CLINIC | Age: 73
End: 2018-07-16

## 2018-07-16 RX ORDER — HYDROXYZINE PAMOATE 50 MG/1
50 CAPSULE ORAL EVERY 8 HOURS PRN
Qty: 30 CAPSULE | Refills: 3 | Status: SHIPPED | OUTPATIENT
Start: 2018-07-16 | End: 2019-01-03

## 2018-07-16 NOTE — TELEPHONE ENCOUNTER
Rx sent for hydroxyzine 50 mg but cont colton and incr to 400mg -ov 1week if not better  Hold benedryl at bedtime

## 2018-07-17 ENCOUNTER — TELEPHONE (OUTPATIENT)
Dept: CARDIOLOGY | Facility: CLINIC | Age: 73
End: 2018-07-17

## 2018-07-17 ENCOUNTER — OFFICE VISIT (OUTPATIENT)
Dept: CARDIOLOGY | Facility: CLINIC | Age: 73
End: 2018-07-17
Payer: MEDICARE

## 2018-07-17 VITALS
HEIGHT: 60 IN | WEIGHT: 141.19 LBS | DIASTOLIC BLOOD PRESSURE: 62 MMHG | SYSTOLIC BLOOD PRESSURE: 133 MMHG | BODY MASS INDEX: 27.72 KG/M2

## 2018-07-17 DIAGNOSIS — E78.00 PURE HYPERCHOLESTEROLEMIA: ICD-10-CM

## 2018-07-17 DIAGNOSIS — Z01.810 PREOP CARDIOVASCULAR EXAM: ICD-10-CM

## 2018-07-17 DIAGNOSIS — I25.10 CORONARY ARTERY DISEASE INVOLVING NATIVE CORONARY ARTERY OF NATIVE HEART WITHOUT ANGINA PECTORIS: ICD-10-CM

## 2018-07-17 DIAGNOSIS — F32.A ANXIETY AND DEPRESSION: Primary | ICD-10-CM

## 2018-07-17 DIAGNOSIS — Z95.1 S/P CABG (CORONARY ARTERY BYPASS GRAFT): ICD-10-CM

## 2018-07-17 DIAGNOSIS — I10 ESSENTIAL HYPERTENSION: ICD-10-CM

## 2018-07-17 DIAGNOSIS — F41.9 ANXIETY AND DEPRESSION: Primary | ICD-10-CM

## 2018-07-17 PROCEDURE — 3075F SYST BP GE 130 - 139MM HG: CPT | Mod: CPTII,S$GLB,, | Performed by: INTERNAL MEDICINE

## 2018-07-17 PROCEDURE — 99999 PR PBB SHADOW E&M-EST. PATIENT-LVL III: CPT | Mod: PBBFAC,,, | Performed by: INTERNAL MEDICINE

## 2018-07-17 PROCEDURE — 99214 OFFICE O/P EST MOD 30 MIN: CPT | Mod: S$GLB,,, | Performed by: INTERNAL MEDICINE

## 2018-07-17 PROCEDURE — 3078F DIAST BP <80 MM HG: CPT | Mod: CPTII,S$GLB,, | Performed by: INTERNAL MEDICINE

## 2018-07-17 NOTE — PROGRESS NOTES
Subjective:    Patient ID:  Soumya Melchor is a 72 y.o. female who presents for evaluation of Follow-up      HPI 71 yo WF S/P CABG with UZAIR to LAD and SVG to OM and RCA 2-, HTN , HLD and smoking hx.Patient here for preop clearance. Denies chest pain, SOB, or edema Denies palpitations, weak spells, and syncope    DATE OF PROCEDURE:  02/13/2017.     PREOPERATIVE DIAGNOSIS:  Coronary artery disease.     POSTOPERATIVE DIAGNOSIS:  Coronary artery disease.     PROCEDURE:  Off-pump coronary artery bypass grafting x3 with left internal   mammary artery to LAD, aorta to first obtuse marginal, aorta to distal right   coronary artery.     SURGEON:  Steve Bonds III, M.D.     CONCLUSIONS     1 - Concentric remodeling.     2 - Normal left ventricular systolic function (EF 60-65%).     3 - Normal left ventricular diastolic function.     4 - Normal right ventricular systolic function .     5 - The estimated PA systolic pressure is greater than 21 mmHg.     CONCLUSIONS     1 - Concentric remodeling.     2 - Normal left ventricular systolic function (EF 60-65%).     3 - Normal left ventricular diastolic function.     4 - Normal right ventricular systolic function .     5 - The estimated PA systolic pressure is greater than 21 mmHg.             This document has been electronically    SIGNED BY: Giuliano Henry MD On: 03/17/2017 14:49    Review of Systems   Constitution: Negative for decreased appetite, fever, weakness, malaise/fatigue, weight gain and weight loss.   HENT: Negative for hearing loss and nosebleeds.    Eyes: Negative for visual disturbance.   Cardiovascular: Negative for chest pain, claudication, cyanosis, dyspnea on exertion, irregular heartbeat, leg swelling, near-syncope, orthopnea, palpitations, paroxysmal nocturnal dyspnea and syncope.   Respiratory: Negative for cough, hemoptysis, shortness of breath, sleep disturbances due to breathing, snoring and wheezing.    Endocrine: Negative for cold  intolerance, heat intolerance, polydipsia and polyuria.   Hematologic/Lymphatic: Negative for adenopathy and bleeding problem. Does not bruise/bleed easily.   Skin: Negative for color change, itching, poor wound healing, rash and suspicious lesions.   Musculoskeletal: Positive for arthritis and back pain. Negative for falls, joint pain, joint swelling, muscle cramps, muscle weakness and myalgias.   Gastrointestinal: Negative for bloating, abdominal pain, change in bowel habit, constipation, flatus, heartburn, hematemesis, hematochezia, hemorrhoids, jaundice, melena, nausea and vomiting.   Genitourinary: Negative for bladder incontinence, decreased libido, frequency, hematuria, hesitancy and urgency.   Neurological: Negative for brief paralysis, difficulty with concentration, excessive daytime sleepiness, dizziness, focal weakness, headaches, light-headedness, loss of balance, numbness and vertigo.   Psychiatric/Behavioral: Negative for altered mental status, depression and memory loss. The patient has insomnia. The patient is not nervous/anxious.    Allergic/Immunologic: Negative for environmental allergies, hives and persistent infections.        Objective:    Physical Exam   Constitutional: She is oriented to person, place, and time. She appears well-developed and well-nourished.   /62   Ht 5' (1.524 m)   Wt 64 kg (141 lb 3.3 oz)   BMI 27.58 kg/m²      HENT:   Head: Normocephalic and atraumatic.   Right Ear: External ear normal.   Left Ear: External ear normal.   Nose: Nose normal.   Mouth/Throat: Oropharynx is clear and moist.   Eyes: Conjunctivae, EOM and lids are normal. Pupils are equal, round, and reactive to light. Right eye exhibits no discharge. Left eye exhibits no discharge. Right conjunctiva has no hemorrhage. No scleral icterus.   Neck: Normal range of motion. Neck supple. No JVD present. No tracheal deviation present. No thyromegaly present.   Cardiovascular: Normal rate, regular rhythm,  normal heart sounds and intact distal pulses.  Exam reveals no gallop and no friction rub.    No murmur heard.  Pulmonary/Chest: Effort normal and breath sounds normal. No respiratory distress. She has no wheezes. She has no rales. She exhibits no tenderness. Breasts are symmetrical.   Midline scar   Abdominal: Soft. Bowel sounds are normal. She exhibits no distension and no mass. There is no hepatosplenomegaly or hepatomegaly. There is no tenderness. There is no rebound and no guarding.   Musculoskeletal: Normal range of motion. She exhibits no edema or tenderness.   Lymphadenopathy:     She has no cervical adenopathy.   Neurological: She is alert and oriented to person, place, and time. She displays normal reflexes. No cranial nerve deficit. Coordination normal.   Skin: Skin is warm and dry. No rash noted. No erythema. No pallor.   Psychiatric: She has a normal mood and affect. Her behavior is normal. Judgment and thought content normal.   Nursing note and vitals reviewed.        Assessment:       1. Anxiety and depression    2. Coronary artery disease involving native coronary artery of native heart without angina pectoris    3. S/P CABG (coronary artery bypass graft)    4. Essential hypertension    5. Pure hypercholesterolemia    6. Preop cardiovascular exam         Plan:     Cardiac status stable.    There are no absolute contraindications to surgery from cardiac standpoint and would use routine precautions. No further cardiac testing required prior to surgery.May hold ASA for surgery  Patient advised to modify risk factors such as weight, exercise, diet,  tobacco and alcohol exposure    No orders of the defined types were placed in this encounter.    Follow-up in about 6 months (around 1/17/2019).

## 2018-07-17 NOTE — TELEPHONE ENCOUNTER
----- Message from Malorie Tuttle MA sent at 7/17/2018  9:24 AM CDT -----  Contact: self [phone]  Patient was just seen by Dr. Loya and need to have her release to have her surgery sent over to Dr. Dre Grey.  States that the paperwork should have already been faxed over for Dr. Loya to fill out for her.  Please call.  Thanks

## 2018-07-23 RX ORDER — LOSARTAN POTASSIUM 100 MG/1
TABLET ORAL
Qty: 90 TABLET | Refills: 6 | Status: SHIPPED | OUTPATIENT
Start: 2018-07-23 | End: 2018-07-25

## 2018-07-26 RX ORDER — GABAPENTIN 100 MG/1
300 CAPSULE ORAL NIGHTLY
Qty: 90 CAPSULE | Refills: 12 | Status: SHIPPED | OUTPATIENT
Start: 2018-07-26 | End: 2018-08-06 | Stop reason: SDUPTHER

## 2018-07-27 ENCOUNTER — TELEPHONE (OUTPATIENT)
Dept: CARDIOLOGY | Facility: CLINIC | Age: 73
End: 2018-07-27

## 2018-07-27 NOTE — TELEPHONE ENCOUNTER
This has been updated in the chart ----- Message from Laureano Pacheco sent at 7/27/2018  3:14 PM CDT -----  Contact: Pt   Pt called in regards to Losartan prescription she is allergic to it need to updated in her chart..[phone] (home)

## 2018-07-29 ENCOUNTER — PATIENT MESSAGE (OUTPATIENT)
Dept: FAMILY MEDICINE | Facility: CLINIC | Age: 73
End: 2018-07-29

## 2018-07-31 PROBLEM — D21.4 BENIGN GASTROINTESTINAL STROMAL TUMOR (GIST): Status: ACTIVE | Noted: 2018-07-31

## 2018-08-02 ENCOUNTER — PATIENT MESSAGE (OUTPATIENT)
Dept: FAMILY MEDICINE | Facility: CLINIC | Age: 73
End: 2018-08-02

## 2018-08-03 ENCOUNTER — PATIENT MESSAGE (OUTPATIENT)
Dept: FAMILY MEDICINE | Facility: CLINIC | Age: 73
End: 2018-08-03

## 2018-08-06 RX ORDER — GABAPENTIN 100 MG/1
300 CAPSULE ORAL NIGHTLY
Qty: 90 CAPSULE | Refills: 12 | Status: SHIPPED | OUTPATIENT
Start: 2018-08-06 | End: 2019-08-30 | Stop reason: SDUPTHER

## 2018-08-08 ENCOUNTER — TELEPHONE (OUTPATIENT)
Dept: PAIN MEDICINE | Facility: CLINIC | Age: 73
End: 2018-08-08

## 2018-08-08 NOTE — TELEPHONE ENCOUNTER
Spoke with patient. She stated she has a SCS implant and she also has never damage from shingles. The area under her left breast is tender to the touch. Patient is asking if there is anything you can do to treat this pain before she makes an appointment. Please advise thanks.

## 2018-08-08 NOTE — TELEPHONE ENCOUNTER
----- Message from Rylie Grey sent at 8/8/2018 12:09 PM CDT -----  Call [phone]  Asking to discuss prior to seeing Dr as a new patient / has a pain stimulator / her  Angel Melchor sees the Dr also

## 2018-08-10 NOTE — TELEPHONE ENCOUNTER
Spoke with patient and she stated she would call in the future to schedule appointment with Dr. Rouse.

## 2018-08-10 NOTE — TELEPHONE ENCOUNTER
I have not evaluated this patient and cannot offer treatment prior to doing so.  Please direct her to follow up with her PCP in the mean time.  Once I perform an eval, I can treat.    Radha Martinez Jr, MD  Interventional Pain Medicine / Anesthesiology

## 2018-08-15 ENCOUNTER — OFFICE VISIT (OUTPATIENT)
Dept: GASTROENTEROLOGY | Facility: CLINIC | Age: 73
End: 2018-08-15
Payer: MEDICARE

## 2018-08-15 DIAGNOSIS — R10.84 GENERALIZED ABDOMINAL PAIN: ICD-10-CM

## 2018-08-15 DIAGNOSIS — R15.9 INCONTINENCE OF FECES, UNSPECIFIED FECAL INCONTINENCE TYPE: ICD-10-CM

## 2018-08-15 DIAGNOSIS — K58.9 IRRITABLE BOWEL SYNDROME, UNSPECIFIED TYPE: Primary | ICD-10-CM

## 2018-08-15 PROCEDURE — 99213 OFFICE O/P EST LOW 20 MIN: CPT | Mod: S$GLB,,, | Performed by: INTERNAL MEDICINE

## 2018-08-15 PROCEDURE — 99999 PR PBB SHADOW E&M-EST. PATIENT-LVL II: CPT | Mod: PBBFAC,,, | Performed by: INTERNAL MEDICINE

## 2018-08-15 NOTE — PROGRESS NOTES
Pt presents for evaluation fecal incontinence past year. C-scope earlier this year noted. Pt s/p recent surgery with resection small GIST from stomach. Pt c/o generalized abd pain and distension since surgery. Intermittent constipation/diarrhea. No bleeding. No fever or jaundice. No vomiting. Weight over-all stable.    REVIEW OF SYSTEMS:   Constitutional: Negative for fever, appetite change and unexpected weight change.  HENT: Negative for sore throat and trouble swallowing.  Eyes: Negative for visual disturbance.  Respiratory: Negative for chest tightness, shortness of breath and wheezing.  Cardiovascular: Negative for chest pain.  Gastrointestinal:  as per HPI  Genitourinary: Negative for dysuria, frequency and hematuria.    PHYSICAL EXAMINATION:                                                        GENERAL:  Comfortable, in no acute distress.      SKIN: Non-jaundiced.                             HEENT EXAM:  Nonicteric.  No adenopathy.  Oropharynx is clear.               NECK:  Supple.                                                               LUNGS:  Clear.                                                               CARDIAC:  Regular rate and rhythm.  S1, S2.  No murmur.                      ABDOMEN:  Soft, positive bowel sounds, nontender.  No hepatosplenomegaly or masses.  No rebound or guarding.                                             EXTREMITIES:  No edema.       IMP: 1. Fecal incontinence/IBS          2. Abd pain (generalized)          3. Gastric GIST s/p resection    PLAN: 1. Abd CT scan             2. Once abd complaints addressed, will try low-dose Questran for the incontinence.

## 2018-08-22 ENCOUNTER — HOSPITAL ENCOUNTER (OUTPATIENT)
Dept: RADIOLOGY | Facility: HOSPITAL | Age: 73
Discharge: HOME | End: 2018-08-22
Attending: INTERNAL MEDICINE
Payer: MEDICARE

## 2018-08-22 DIAGNOSIS — R10.84 GENERALIZED ABDOMINAL PAIN: ICD-10-CM

## 2018-08-22 PROCEDURE — 74176 CT ABD & PELVIS W/O CONTRAST: CPT | Mod: TC,PO

## 2018-08-22 PROCEDURE — 25500020 PHARM REV CODE 255: Mod: PO | Performed by: INTERNAL MEDICINE

## 2018-08-22 PROCEDURE — A9698 NON-RAD CONTRAST MATERIALNOC: HCPCS | Mod: PO | Performed by: INTERNAL MEDICINE

## 2018-08-22 PROCEDURE — 74176 CT ABD & PELVIS W/O CONTRAST: CPT | Mod: 26,,, | Performed by: RADIOLOGY

## 2018-08-22 RX ADMIN — Medication 900 ML: at 11:08

## 2018-08-23 ENCOUNTER — TELEPHONE (OUTPATIENT)
Dept: PAIN MEDICINE | Facility: CLINIC | Age: 73
End: 2018-08-23

## 2018-08-23 NOTE — TELEPHONE ENCOUNTER
Patient wants a new patient appointment with Dr Lopez or Nasim. She needs records from [facility], she will come by the office and sign a records release.

## 2018-08-23 NOTE — TELEPHONE ENCOUNTER
----- Message from Gladys Telles sent at 8/23/2018 10:56 AM CDT -----  Contact: Latia  She is calling to make an appointment, but she needs to get her  Records from Dr. Medardo Kam. Their number [phone] is their phone number. She has a spinal cord stimulator implant.  Call her [phone] (home)   Thanks!

## 2018-08-25 ENCOUNTER — PATIENT MESSAGE (OUTPATIENT)
Dept: GASTROENTEROLOGY | Facility: CLINIC | Age: 73
End: 2018-08-25

## 2018-08-27 NOTE — TELEPHONE ENCOUNTER
CT scan notable for post-surgical changes of stomach, lung nodules (chronic, unchanged), and bladder wall thickening. Recommend F/U PCP re: bladder. I do not prescribe flexeril, will need to get refill from whoever prescribed it. If still having fecal incontinence, can start Questran 4 gm daily.

## 2018-08-28 DIAGNOSIS — R15.9 INCONTINENCE OF FECES, UNSPECIFIED FECAL INCONTINENCE TYPE: Primary | ICD-10-CM

## 2018-08-28 RX ORDER — CHOLESTYRAMINE 4 G/9G
4 POWDER, FOR SUSPENSION ORAL DAILY
COMMUNITY
End: 2018-08-28 | Stop reason: SDUPTHER

## 2018-08-28 NOTE — TELEPHONE ENCOUNTER
Reviewed results from ct scan with pt along with recommendations. Pt verbalized understanding of all. Pt is still having fecal incontinence she is wanting the rx for questran to go to [facility] in [place].

## 2018-08-28 NOTE — TELEPHONE ENCOUNTER
----- Message from Kat Modi sent at 8/28/2018 10:26 AM CDT -----   Type:  Test Results    Who Called: pt Name of Test (Lab/Mammo/Etc): ct  Scan Date of Test:wed Ordering Provide guarisco  Where the test was performed:   Last  Week Best Call Back Number:  [phone]  Additional Information:   Pt is  Calling to  Speak to the  Nurse needs to  Talk about  Her  Test  Results  // please call   Pt  Has  Questions  // third  Request since  Last  Week // please call

## 2018-08-29 ENCOUNTER — PATIENT MESSAGE (OUTPATIENT)
Dept: UROLOGY | Facility: CLINIC | Age: 73
End: 2018-08-29

## 2018-08-29 RX ORDER — CHOLESTYRAMINE 4 G/9G
4 POWDER, FOR SUSPENSION ORAL DAILY
Qty: 30 PACKET | Refills: 1 | Status: ON HOLD | OUTPATIENT
Start: 2018-08-29 | End: 2019-06-19 | Stop reason: HOSPADM

## 2018-09-01 ENCOUNTER — PATIENT MESSAGE (OUTPATIENT)
Dept: ALLERGY | Facility: CLINIC | Age: 73
End: 2018-09-01

## 2018-09-01 DIAGNOSIS — R60.9 SWELLING: ICD-10-CM

## 2018-09-01 DIAGNOSIS — K21.9 GASTROESOPHAGEAL REFLUX DISEASE, ESOPHAGITIS PRESENCE NOT SPECIFIED: ICD-10-CM

## 2018-09-01 DIAGNOSIS — L50.9 URTICARIA, UNSPECIFIED: ICD-10-CM

## 2018-09-02 RX ORDER — FAMOTIDINE 20 MG/1
TABLET, FILM COATED ORAL
Qty: 30 TABLET | Refills: 2 | Status: SHIPPED | OUTPATIENT
Start: 2018-09-02 | End: 2018-12-03 | Stop reason: SDUPTHER

## 2018-09-13 ENCOUNTER — TELEPHONE (OUTPATIENT)
Dept: FAMILY MEDICINE | Facility: CLINIC | Age: 73
End: 2018-09-13

## 2018-09-13 NOTE — TELEPHONE ENCOUNTER
----- Message from Karen Barth sent at 9/13/2018 12:09 PM CDT -----  Contact: self   Patient requesting to be worked into schedule on 9/17/18. Patient has throat pain,lost her voice, and possibly has shingles. She states she has not seen hi in  Months and would like to talk with him. Please call back at [phone].      Thanks,  Karen Barth

## 2018-09-14 ENCOUNTER — OFFICE VISIT (OUTPATIENT)
Dept: FAMILY MEDICINE | Facility: CLINIC | Age: 73
End: 2018-09-14
Payer: MEDICARE

## 2018-09-14 VITALS
SYSTOLIC BLOOD PRESSURE: 104 MMHG | TEMPERATURE: 99 F | HEART RATE: 95 BPM | WEIGHT: 133 LBS | BODY MASS INDEX: 26.11 KG/M2 | HEIGHT: 60 IN | DIASTOLIC BLOOD PRESSURE: 64 MMHG

## 2018-09-14 DIAGNOSIS — B34.9 VIRAL SYNDROME: Primary | ICD-10-CM

## 2018-09-14 DIAGNOSIS — M79.2 NEURALGIA: ICD-10-CM

## 2018-09-14 PROCEDURE — 99213 OFFICE O/P EST LOW 20 MIN: CPT | Mod: PBBFAC,PO | Performed by: NURSE PRACTITIONER

## 2018-09-14 PROCEDURE — 3074F SYST BP LT 130 MM HG: CPT | Mod: CPTII,,, | Performed by: NURSE PRACTITIONER

## 2018-09-14 PROCEDURE — 3078F DIAST BP <80 MM HG: CPT | Mod: CPTII,,, | Performed by: NURSE PRACTITIONER

## 2018-09-14 PROCEDURE — 99214 OFFICE O/P EST MOD 30 MIN: CPT | Mod: S$PBB,,, | Performed by: NURSE PRACTITIONER

## 2018-09-14 PROCEDURE — 1101F PT FALLS ASSESS-DOCD LE1/YR: CPT | Mod: CPTII,,, | Performed by: NURSE PRACTITIONER

## 2018-09-14 PROCEDURE — 99999 PR PBB SHADOW E&M-EST. PATIENT-LVL III: CPT | Mod: PBBFAC,,, | Performed by: NURSE PRACTITIONER

## 2018-09-14 RX ORDER — METHYLPREDNISOLONE ACETATE 40 MG/ML
40 INJECTION, SUSPENSION INTRA-ARTICULAR; INTRALESIONAL; INTRAMUSCULAR; SOFT TISSUE ONCE
Status: DISCONTINUED | OUTPATIENT
Start: 2018-09-14 | End: 2018-09-14

## 2018-09-14 RX ORDER — VALACYCLOVIR HYDROCHLORIDE 500 MG/1
500 TABLET, FILM COATED ORAL 2 TIMES DAILY
Qty: 14 TABLET | Refills: 0 | Status: ON HOLD | OUTPATIENT
Start: 2018-09-14 | End: 2019-06-19 | Stop reason: HOSPADM

## 2018-09-14 NOTE — PROGRESS NOTES
Subjective:       Patient ID: Soumya Melchor is a 73 y.o. female.    Chief Complaint: Hoarse    URI    This is a new problem. The current episode started in the past 7 days. The problem has been unchanged. The maximum temperature recorded prior to her arrival was 100.4 - 100.9 F. The fever has been present for 1 to 2 days. Associated symptoms include congestion, rhinorrhea and a sore throat. Pertinent negatives include no coughing, diarrhea, ear pain or rash. She has tried acetaminophen for the symptoms. The treatment provided no relief.   Rash   This is a new problem. Episode onset: 2 days. The problem has been gradually worsening since onset. The affected locations include the scalp. The rash is characterized by peeling and itchiness. She was exposed to nothing. Associated symptoms include congestion, rhinorrhea and a sore throat. Pertinent negatives include no cough, diarrhea or eye pain. Past treatments include nothing.            Review of Systems   Constitutional: Negative for unexpected weight change.   HENT: Positive for congestion, rhinorrhea and sore throat. Negative for ear pain.    Eyes: Negative for pain and visual disturbance.   Respiratory: Negative for cough.    Gastrointestinal: Negative for diarrhea.   Musculoskeletal: Negative for arthralgias and myalgias.   Skin: Negative for color change and rash.   Psychiatric/Behavioral: Negative for dysphoric mood and sleep disturbance. The patient is not nervous/anxious.        Vitals:    09/14/18 1348   BP: 104/64   Pulse: 95   Temp: 98.7 °F (37.1 °C)       Objective:     Current Outpatient Medications   Medication Sig Dispense Refill    ascorbic acid, vitamin C, (VITAMIN C) 1000 MG tablet Take 1,000 mg by mouth once daily.       aspirin (ECOTRIN) 81 MG EC tablet Take 81 mg by mouth once daily.      calcium carbonate-vitamin D2 500 mg(1,250mg) -200 unit Tab Take 1 tablet by mouth.      calcium-vitamin D3 500 mg(1,250mg) -200 unit per tablet Take 1  tablet by mouth 2 (two) times daily with meals. 60 tablet 11    cetirizine (ZYRTEC) 10 MG tablet Take 1 tablet (10 mg total) by mouth once daily. For sinus congestion (Patient taking differently: Take 10 mg by mouth every evening. For sinus congestion) 30 tablet 11    cyanocobalamin (VITAMIN B-12) 250 MCG tablet Take 250 mcg by mouth once daily.      docosahexanoic acid-epa 120-180 mg Cap Take by mouth.      donepezil (ARICEPT) 10 MG tablet Take 1 tablet (10 mg total) by mouth once daily. 30 tablet 11    EPINEPHrine (EPIPEN) 0.3 mg/0.3 mL AtIn Inject 0.3 mg into the muscle.      famotidine (PEPCID) 20 MG tablet TAKE 1 TABLET BY MOUTH DAILY AS DIRECTED 30 tablet 2    fish oil-omega-3 fatty acids 300-1,000 mg capsule Take 1 capsule by mouth 2 (two) times daily.       FOLIC ACID/MULTIVIT-MIN/LUTEIN (CENTRUM SILVER ORAL) Take by mouth once daily.      furosemide (LASIX) 20 MG tablet Take 1 tablet (20 mg total) by mouth once daily. 30 tablet 6    gabapentin (NEURONTIN) 100 MG capsule Take 3 capsules (300 mg total) by mouth every evening. 90 capsule 12    HYDROcodone-acetaminophen (NORCO) 7.5-325 mg per tablet Take 1 tablet by mouth every 4 (four) hours as needed (pain). 30 tablet 0    hydrOXYzine pamoate (VISTARIL) 50 MG Cap Take 1 capsule (50 mg total) by mouth every 8 (eight) hours as needed (sleep). 30 capsule 3    L.acid/L.casei/B.bif/B.miryam/FOS (PROBIOTIC BLEND ORAL) Take by mouth once daily.       lactase (LACTAID ORAL) Take by mouth as needed.       levothyroxine (SYNTHROID) 200 MCG tablet Take 1 tablet (200 mcg total) by mouth once daily. 30 tablet 11    montelukast (SINGULAIR) 10 mg tablet Take 10 mg by mouth every evening.      niacin 500 MG CpSR Take 500 mg by mouth every evening.       omeprazole (PRILOSEC) 40 MG capsule Take 1 capsule (40 mg total) by mouth once daily. 30 capsule 11    POTASSIUM GLUCONATE ORAL Take 99 mg by mouth.      simvastatin (ZOCOR) 40 MG tablet Take 1 tablet (40 mg  total) by mouth every evening. 30 tablet 12    umeclidinium-vilanterol (ANORO ELLIPTA) 62.5-25 mcg/actuation DsDv Inhale 1 puff into the lungs once daily. Controller 60 each 11    vitamins  A,C,E-zinc-copper (PRESERVISION AREDS) 14,320-226-200 unit-mg-unit Cap Take 2 capsules by mouth once daily.      ZETIA 10 mg tablet TAKE 1 TABLET BY MOUTH EVERY EVENING 30 tablet 11    amLODIPine (NORVASC) 5 MG tablet Take 1 tablet (5 mg total) by mouth once daily. 30 tablet 11    cholestyramine (QUESTRAN) 4 gram packet Take 1 packet (4 g total) by mouth once daily. 30 packet 1    oxybutynin (DITROPAN-XL) 5 MG TR24 Take 1 tablet (5 mg total) by mouth once daily. 30 tablet 11    valACYclovir (VALTREX) 500 MG tablet Take 1 tablet (500 mg total) by mouth 2 (two) times daily. 14 tablet 0     No current facility-administered medications for this visit.        Physical Exam   Constitutional: She is oriented to person, place, and time. She appears well-developed and well-nourished. No distress.   HENT:   Head: Normocephalic and atraumatic.   Right Ear: Tympanic membrane normal.   Left Ear: Tympanic membrane normal.   Nose: Rhinorrhea present.   Mouth/Throat: Posterior oropharyngeal edema present.   Eyes: EOM are normal. Pupils are equal, round, and reactive to light.   Neck: Normal range of motion. Neck supple.   Cardiovascular: Normal rate and regular rhythm.   Pulmonary/Chest: Effort normal and breath sounds normal.   Musculoskeletal: Normal range of motion.   Neurological: She is alert and oriented to person, place, and time.   Skin: Skin is warm and dry. No rash noted.        Psychiatric: She has a normal mood and affect. Judgment normal.   Nursing note and vitals reviewed.      Assessment:       1. Viral syndrome    2. Neuralgia        Plan:   Viral syndrome  Stay well hydrated, over the counter zyrtec once daily    Neuralgia    Other orders  -     valACYclovir (VALTREX) 500 MG tablet; Take 1 tablet (500 mg total) by mouth 2  (two) times daily.  Dispense: 14 tablet; Refill: 0        No Follow-up on file.    There are no Patient Instructions on file for this visit.

## 2018-09-19 ENCOUNTER — PATIENT MESSAGE (OUTPATIENT)
Dept: FAMILY MEDICINE | Facility: CLINIC | Age: 73
End: 2018-09-19

## 2018-09-20 NOTE — TELEPHONE ENCOUNTER
Called Ms Melchor and requested that she call the the [facility] scheduling and they could attempt to reschedule her appt. Pt stated that was tried already and she stated that [place] was supposed to contact Dr Roues. Routing conversation.

## 2018-09-26 ENCOUNTER — HOSPITAL ENCOUNTER (OUTPATIENT)
Dept: RADIOLOGY | Facility: HOSPITAL | Age: 73
Discharge: HOME | End: 2018-09-26
Attending: NURSE PRACTITIONER
Payer: MEDICARE

## 2018-09-26 ENCOUNTER — PATIENT MESSAGE (OUTPATIENT)
Dept: FAMILY MEDICINE | Facility: CLINIC | Age: 73
End: 2018-09-26

## 2018-09-26 DIAGNOSIS — F17.201 TOBACCO USE DISORDER, MODERATE, IN SUSTAINED REMISSION: ICD-10-CM

## 2018-09-26 DIAGNOSIS — R91.1 LUNG NODULE: ICD-10-CM

## 2018-09-26 PROCEDURE — 71250 CT THORAX DX C-: CPT | Mod: TC

## 2018-09-27 ENCOUNTER — TELEPHONE (OUTPATIENT)
Dept: PULMONOLOGY | Facility: CLINIC | Age: 73
End: 2018-09-27

## 2018-09-27 DIAGNOSIS — R91.8 MULTIPLE PULMONARY NODULES: Primary | ICD-10-CM

## 2018-09-27 DIAGNOSIS — J44.9 COPD, MODERATE: ICD-10-CM

## 2018-09-27 RX ORDER — HYDROCODONE BITARTRATE AND ACETAMINOPHEN 7.5; 325 MG/1; MG/1
1 TABLET ORAL EVERY 6 HOURS PRN
Qty: 30 TABLET | Refills: 0 | Status: SHIPPED | OUTPATIENT
Start: 2018-09-27 | End: 2018-11-06 | Stop reason: SDUPTHER

## 2018-09-27 NOTE — TELEPHONE ENCOUNTER
Telephoned patient to review CT chest done related to pulmonary nodules this was a 6 month follow up.   She missed her 6 month follow up appointment yesterday with Dr. Carmona for review of 9/26/2018 CT chest. She was also scheduled for rodolfo with rodolfo appointment missed also.  Patient reports no one told her she had other appointments besides CT so after CT scan of chest she left clinic.     Advised of recommendation for 6 month repeat CT chest to recheck the changes along the fissure inferolaterally on the right.   Patient reports her breathing is stable, she is not sure she really needs Anoro controller, but takes daily anyway. No use of albuterol rescue.     She is agreeable to follow up with Dr. Carmona in 6 months with review of rodolfo and repeat CT chest.  She will follow up sooner if needed.     Patient states she would like all future pulmonary follow up with Dr. Carmona if possible.       CT chest without 9/26/2018  IMPRESSION  1.  The pulmonary nodules at the bases are grossly static when compared to the prior studies.    2.  Additional findings are noted in the mid to upper lung fields as described above.  There are no prior CT chest scans at this time for comparison.  Follow-up recommended as per Fleischner protocol.    I now have access to the patient's prior CT scan performed in 2016.  The larger partially calcified nodule in the right upper lung field posteriorly is grossly static.  The nodular thickening anterolaterally along the fissure on the right is a new finding.  The smaller 5 mm zone of nodularity at the right base and small nodularity laterally at the left base is grossly static.  Would recommend a follow-up scan in the next 3-6 months to recheck the changes along the fissure inferolaterally on the right..

## 2018-09-27 NOTE — TELEPHONE ENCOUNTER
Pt accepted [facility] appointment on 4/10/2018 at 1320 with Dr Carmona.   Pt provided times for all testing appointments.  Pt verbalized understanding.

## 2018-10-05 ENCOUNTER — PATIENT MESSAGE (OUTPATIENT)
Dept: FAMILY MEDICINE | Facility: CLINIC | Age: 73
End: 2018-10-05

## 2018-10-07 RX ORDER — TRAZODONE HYDROCHLORIDE 50 MG/1
50 TABLET ORAL NIGHTLY
Qty: 30 TABLET | Refills: 11 | Status: ON HOLD | OUTPATIENT
Start: 2018-10-07 | End: 2019-06-19 | Stop reason: HOSPADM

## 2018-10-09 ENCOUNTER — PATIENT MESSAGE (OUTPATIENT)
Dept: FAMILY MEDICINE | Facility: CLINIC | Age: 73
End: 2018-10-09

## 2018-10-09 RX ORDER — ESZOPICLONE 2 MG/1
2 TABLET, FILM COATED ORAL NIGHTLY
Qty: 30 TABLET | Refills: 5 | Status: SHIPPED | OUTPATIENT
Start: 2018-10-09 | End: 2018-10-31

## 2018-10-09 NOTE — TELEPHONE ENCOUNTER
Rx sent lunesta-dont take with ambien/zolpidem but ok to use melatonin with it if that ever helped-if this doesn't work rec Neuro con

## 2018-10-17 ENCOUNTER — PATIENT MESSAGE (OUTPATIENT)
Dept: FAMILY MEDICINE | Facility: CLINIC | Age: 73
End: 2018-10-17

## 2018-10-17 RX ORDER — MONTELUKAST SODIUM 10 MG/1
10 TABLET ORAL NIGHTLY
Qty: 30 TABLET | Refills: 11 | Status: ON HOLD | OUTPATIENT
Start: 2018-10-17 | End: 2019-06-19 | Stop reason: HOSPADM

## 2018-10-31 ENCOUNTER — PATIENT MESSAGE (OUTPATIENT)
Dept: FAMILY MEDICINE | Facility: CLINIC | Age: 73
End: 2018-10-31

## 2018-10-31 NOTE — TELEPHONE ENCOUNTER
Max dose is 3mg but the 2mg dose is the preferred maximum dt long term side effects. Consider sleep eval

## 2018-11-06 ENCOUNTER — HOSPITAL ENCOUNTER (OUTPATIENT)
Dept: RADIOLOGY | Facility: HOSPITAL | Age: 73
Discharge: HOME | End: 2018-11-06
Attending: ANESTHESIOLOGY
Payer: MEDICARE

## 2018-11-06 ENCOUNTER — OFFICE VISIT (OUTPATIENT)
Dept: PAIN MEDICINE | Facility: CLINIC | Age: 73
End: 2018-11-06
Payer: MEDICARE

## 2018-11-06 VITALS
HEART RATE: 84 BPM | BODY MASS INDEX: 27.32 KG/M2 | SYSTOLIC BLOOD PRESSURE: 149 MMHG | HEIGHT: 60 IN | WEIGHT: 139.13 LBS | DIASTOLIC BLOOD PRESSURE: 71 MMHG

## 2018-11-06 DIAGNOSIS — R10.9 ABDOMINAL PAIN, LEFT LATERAL: ICD-10-CM

## 2018-11-06 DIAGNOSIS — G89.4 CHRONIC PAIN SYNDROME: ICD-10-CM

## 2018-11-06 DIAGNOSIS — G89.4 CHRONIC PAIN SYNDROME: Primary | ICD-10-CM

## 2018-11-06 DIAGNOSIS — B02.29 POSTHERPETIC NEURALGIA: ICD-10-CM

## 2018-11-06 PROCEDURE — 72080 X-RAY EXAM THORACOLMB 2/> VW: CPT | Mod: 26,HCWC,, | Performed by: RADIOLOGY

## 2018-11-06 PROCEDURE — 3077F SYST BP >= 140 MM HG: CPT | Mod: CPTII,HCWC,S$GLB, | Performed by: ANESTHESIOLOGY

## 2018-11-06 PROCEDURE — 1101F PT FALLS ASSESS-DOCD LE1/YR: CPT | Mod: CPTII,HCWC,S$GLB, | Performed by: ANESTHESIOLOGY

## 2018-11-06 PROCEDURE — 72080 X-RAY EXAM THORACOLMB 2/> VW: CPT | Mod: TC,HCWC,PO

## 2018-11-06 PROCEDURE — 99204 OFFICE O/P NEW MOD 45 MIN: CPT | Mod: HCWC,S$GLB,, | Performed by: ANESTHESIOLOGY

## 2018-11-06 PROCEDURE — 3078F DIAST BP <80 MM HG: CPT | Mod: CPTII,HCWC,S$GLB, | Performed by: ANESTHESIOLOGY

## 2018-11-06 PROCEDURE — 99999 PR PBB SHADOW E&M-EST. PATIENT-LVL V: CPT | Mod: PBBFAC,HCWC,, | Performed by: ANESTHESIOLOGY

## 2018-11-06 RX ORDER — LIDOCAINE 50 MG/G
1 PATCH TOPICAL DAILY
Qty: 30 PATCH | Refills: 3 | Status: SHIPPED | OUTPATIENT
Start: 2018-11-06 | End: 2019-01-03

## 2018-11-06 RX ORDER — HYDROCODONE BITARTRATE AND ACETAMINOPHEN 7.5; 325 MG/1; MG/1
1 TABLET ORAL EVERY 12 HOURS PRN
Qty: 30 TABLET | Refills: 0 | Status: SHIPPED | OUTPATIENT
Start: 2018-11-06 | End: 2020-06-17

## 2018-11-06 NOTE — PROGRESS NOTES
This note was completed with dictation software and grammatical errors may exist.    CC:  Left abdominal wall pain, left lateral thigh pain    HPI:  The patient is a 73-year-old woman with a history of COPD, CAD, chronic abdominal wall pain who presents in referral from Dr. Mathews continued pain. The patient reports that she has had a history of chronic left abdominal wall pain, was seen by multiple physicians about 10 years ago and was eventually diagnosed with post herpetic neuralgia, sounds like she never had any of the visible lesions but continued to have pain. She was seen by Dr. Huan Nielson and had undergone trial and implantation of a spinal cord stimulator for this pain and states that she was almost 100% pain free with this device.  She states that she had read that the battery only lasts for 10 years and so she requested a battery exchange at 9 years to make sure that she had continued coverage.  She states while she initially had a [employer] stimulator, when the battery was changed, it was changed to a [employer] device.  She was also told that one of the leads had frayed where it entered the IPG and she was told that this lead was replaced.  She cannot recall if the incision was made only over the IPG site or if they actually went into the initial midline incision site as well. Nonetheless, this was accomplished in January, 2018 and after this revision, she was doing well without left abdominal wall pain. 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 Dre Grey in August, 2018.  After that time, she states that her left abdominal wall pain returned and also her left lateral thigh pain returned.  She states that she has met with the [employer]  representatives and has not been able to get the stimulator to provide relief.  She has difficulty with providing good history, I am not sure if she actually has any stimulation over her pain sites but nonetheless, she states that she is not having any relief.  She also reports having pain in her lateral left thigh similar to her abdominal wall pain.  She is currently using lidocaine patches with some relief.  She also has been taking hydrocodone 7.5/325 every several days.  I do not see a record of her receiving this medication but states that she had this filled at [facility] in [place].        ROS:  She reports shortness of breath, easy bruising, bleeding problems, memory loss, difficulty sleeping, anxiety and depression.  Balance of review of systems is negative.    Past Medical History:   Diagnosis Date    Acid reflux 4/11/2014    Anxiety and depression 3/6/2014    AP (angina pectoris) 2/13/2017    CAD (coronary artery disease) 2/13/2017    CAD, multiple vessel 2/13/2017    Chronic pain associated with significant psychosocial dysfunction 2/21/2013    COPD (chronic obstructive pulmonary disease)     well controlled, Dr [facility]    History of shingles     HTN (hypertension)     Hypothyroidism     Multiple allergies     S/P CABG (coronary artery bypass graft) 2/13/2017       Past Surgical History:   Procedure Laterality Date    AOROTOCORONARY BYPASS-CABG N/A 2/13/2017    [facility]    CARDIAC SURGERY  02/2017    CABG x3    COLONOSCOPY  ~2013    Dr. Perez; colon polyps removed    COLONOSCOPY N/A 3/15/2018    Procedure: COLONOSCOPY;  Surgeon: Margarito Calloway MD;  Location: [facility];  Service: Endoscopy;  Laterality: N/A;    COLONOSCOPY N/A 3/15/2018    Performed by [facility]ay [facility] at [facility]    DILATION AND CURETTAGE OF UTERUS      EGD (ESOPHAGOGASTRODUODENOSCOPY) N/A 7/3/2018    Performed by Jordy Greenberg [facility] at [facility]     ENDOSCOPIC ULTRASOUND OF UPPER GASTROINTESTINAL TRACT Left 7/3/2018    Procedure: ULTRASOUND, ENDOSCOPIC, UPPER GI TRACT;  Surgeon: Jordy Greenberg MD;  Location: [facility] ENDO;  Service: Endoscopy;  Laterality: Left;  Linear scope    ESOPHAGOGASTRODUODENOSCOPY N/A 7/3/2018    Procedure: EGD (ESOPHAGOGASTRODUODENOSCOPY);  Surgeon: Jordy Greenberg MD;  Location: [facility] ENDO;  Service: Endoscopy;  Laterality: N/A;    ESOPHAGOGASTRODUODENOSCOPY (EGD) N/A 3/15/2018    Performed by Margarito Calloway MD at [facility] ENDO    HEART CATH-LEFT Left 2017    [facility]ATH LAB    KNEE SURGERY Right     ROBOT-ASSISTED SURGICAL REMOVAL OF STOMACH USING DA YVROSE XI N/A 2018    Procedure: XI ROBOTIC GASTRECTOMY-PARTIAL;  Surgeon: Dre Grey MD;  Location: [facility] OR;  Service: General;  Laterality: N/A;    SPINAL CORD STIMULATOR IMPLANT  2018    for pain secondary to shingles, sees Dr Kam for pain management    TONSILLECTOMY      TUMOR REMOVAL      ULTRASOUND, ENDOSCOPIC, UPPER GI TRACT Left 7/3/2018    Performed by Jordy Greenberg MD at [facility] ENDO    UPPER GASTROINTESTINAL ENDOSCOPY      XI ROBOTIC GASTRECTOMY-PARTIAL N/A 2018    Performed by Dre Grey MD at [facility] OR       Social History     Socioeconomic History    Marital status:      Spouse name: None    Number of children: None    Years of education: None    Highest education level: None   Social Needs    Financial resource strain: None    Food insecurity - worry: None    Food insecurity - inability: None    Transportation needs - medical: None    Transportation needs - non-medical: None   Occupational History    None   Tobacco Use    Smoking status: Former Smoker     Packs/day: 1.00     Years: 50.00     Pack years: 50.00     Last attempt to quit: 12/10/2012     Years since quittin.9    Smokeless tobacco: Never Used   Substance and Sexual Activity    Alcohol use: No    Drug  use: No    Sexual activity: None   Other Topics Concern    None   Social History Narrative    None         Medications/Allergies: See med card    Vitals:    18 1041   BP: (!) 149/71   Pulse: 84   Weight: 63.1 kg (139 lb 1.8 oz)   Height: 5' (1.524 m)   PainSc:   5   PainLoc: Abdomen         Physical exam:    Gen: A and O x3, pleasant, well-groomed  Skin: No rashes or obvious lesions  HEENT: PERRLA, no obvious deformities on ears or in canals. Trachea midline.  CVS: Regular rate and rhythm, normal palpable pulses.  Resp:No increased work of breathing, symmetrical chest rise.  Abdomen: Soft, NT/ND.  She has tenderness palpation the left lower abdominal wall, allodynia with light touch across the left abdomen from about T7 down to T12 and extending into the left flank region as well.  Musculoskeletal:No antalgic gait.     Neuro:  Lower extremities: 5/5 strength bilaterally  Reflexes: Patellar 2+, Achilles 2+ bilaterally.  Sensory:  Intact and symmetrical to light touch and pinprick in L2-S1 dermatomes bilaterally.    Lumbar spine:  Lumbar spine: ROM is full with flexion extension and oblique extension with no increased pain.    Ramon's test causes no increased pain on either side.    Supine straight leg raise is negative bilaterally.    Internal and external rotation of the hip causes no increased pain on either side.  Myofascial exam: No tenderness to palpation across lumbar paraspinous muscles.    Imagin18 CT Abdomen and pelvis:  1. Thickening of the gastric wall in the location adjacent to where a mass was seen on 2018 that may relate to partial gastrostomy.  The mass may relate to a hematoma or postsurgical change but residual disease is difficult to exclude.  Please clinically correlate follow-up for stability and/or correlation with endoscopy may be of use  2. Multiple stable pulmonary nodules at the lung bases favoring a benign etiology  3. Hepatic hypodensity at the dome of the liver  stable since 07/06/16 suggesting a benign etiology  4. Apparent bladder wall thickening as can be seen with urinary tract infection, postobstructive change, or neurogenic bladder.      Assessment:   The patient is a 73-year-old woman with a history of COPD, CAD, chronic abdominal wall pain who presents in referral from Dr. Mathews continued pain.     1. Chronic pain syndrome  X-Ray Thoracolumbar Spine AP Lateral   2. Postherpetic neuralgia  X-Ray Thoracolumbar Spine AP Lateral   3. Abdominal pain, left lateral         Plan:  1.  She states that after the removal of her gastric tumor, her stimulator no longer seemed to control her pain. I suspect that something could have moved during this time or she is having pain that is not related to her previous issue of post herpetic neuralgia.  I am going to get records from her previous pain management physician so that I do know exactly where these leads were placed also, what kind of revision was performed in January, 2018 if a lead was actually replaced or not.  I am going to get an x-ray of her thoracolumbar spine to evaluate where the leads currently lie.  Once we get this information, I can make a determination if something has moved or not, I will call her.  Otherwise we can schedule follow-up in 1 month just to make sure that she does not become lost to follow-up.  2.  She requested hydrocodone, I have given her 30 tablets of this but explained that this should not be something that she takes on a regular basis.  Otherwise I am going to have her sign an opioid agreement and complete a urine drug screen.  I have also given her prescription for Lidoderm patches.    Thank you for referring this interesting patient, and I look forward to continuing to collaborate in her care.

## 2018-11-06 NOTE — LETTER
November 6, 2018      Howard Mathews MD  [address] [facility] [address]           [place] [facility]  [address] [facility] [address]  Phone: [phone]  Fax: [phone]          Patient: Soumya Melchor   MR Number: [id number]   YOB: 1945   Date of Visit: 11/6/2018       Dear Dr. Howard Mathews:    Thank you for referring Soumya Melchor to me for evaluation. Attached you will find relevant portions of my assessment and plan of care.    If you have questions, please do not hesitate to call me. I look forward to following Soumya Melchor along with you.    Sincerely,    Ebenezer Rouse MD    Enclosure  CC:  No Recipients    If you would like to receive this communication electronically, please contact [email or web address] or [phone] to request more information on [employer] Link access.    For providers and/or their staff who would like to refer a patient to [facility], please contact us through our one-stop-shop provider referral line, [facility], at [phone].    If you feel you have received this communication in error or would no longer like to receive these types of communications, please e-mail [email or web address]

## 2018-11-07 ENCOUNTER — PATIENT MESSAGE (OUTPATIENT)
Dept: FAMILY MEDICINE | Facility: CLINIC | Age: 73
End: 2018-11-07

## 2018-11-08 ENCOUNTER — PATIENT MESSAGE (OUTPATIENT)
Dept: PAIN MEDICINE | Facility: CLINIC | Age: 73
End: 2018-11-08

## 2018-11-09 NOTE — TELEPHONE ENCOUNTER
Yes, I have seen the x-rays and this shows us where the actual stimulator leads are located.  However, I need to see her records to determine where the leads were originally placed, that way we can determine if they have moved or not.

## 2018-11-11 ENCOUNTER — PATIENT MESSAGE (OUTPATIENT)
Dept: FAMILY MEDICINE | Facility: CLINIC | Age: 73
End: 2018-11-11

## 2018-11-30 ENCOUNTER — TELEPHONE (OUTPATIENT)
Dept: FAMILY MEDICINE | Facility: CLINIC | Age: 73
End: 2018-11-30

## 2018-11-30 DIAGNOSIS — G89.4 CHRONIC PAIN ASSOCIATED WITH SIGNIFICANT PSYCHOSOCIAL DYSFUNCTION: Primary | ICD-10-CM

## 2018-11-30 NOTE — TELEPHONE ENCOUNTER
----- Message from Shasta Alcaraz sent at 11/30/2018  1:26 PM CST -----  Contact: patient  Calling to get a referral to see Huan Domain to be faxed to [phone]. Please call patient @ [phone]. Thanks, michele

## 2018-12-03 ENCOUNTER — TELEPHONE (OUTPATIENT)
Dept: ALLERGY | Facility: CLINIC | Age: 73
End: 2018-12-03

## 2018-12-03 DIAGNOSIS — K21.9 GASTROESOPHAGEAL REFLUX DISEASE, ESOPHAGITIS PRESENCE NOT SPECIFIED: Primary | ICD-10-CM

## 2018-12-03 DIAGNOSIS — R60.9 SWELLING: ICD-10-CM

## 2018-12-03 DIAGNOSIS — L50.9 URTICARIA, UNSPECIFIED: ICD-10-CM

## 2018-12-03 RX ORDER — LEVOTHYROXINE SODIUM 200 UG/1
200 TABLET ORAL DAILY
Qty: 30 TABLET | Refills: 11 | Status: SHIPPED | OUTPATIENT
Start: 2018-12-03 | End: 2019-06-05 | Stop reason: SDUPTHER

## 2018-12-03 RX ORDER — FAMOTIDINE 20 MG/1
TABLET, FILM COATED ORAL
Qty: 30 TABLET | Refills: 3 | Status: SHIPPED | OUTPATIENT
Start: 2018-12-03 | End: 2019-04-04 | Stop reason: SDUPTHER

## 2018-12-03 NOTE — TELEPHONE ENCOUNTER
----- Message from Segun Diaz sent at 12/3/2018 12:16 PM CST -----  Pt trying to get refills on dizipam xl 5 mg and was unable to on mychart         ..[phone] (home)          ..  [facility] - [place] - [address] [facility] [address]  Phone: [phone] Fax: [phone]

## 2018-12-04 RX ORDER — OXYBUTYNIN CHLORIDE 5 MG/1
5 TABLET, EXTENDED RELEASE ORAL DAILY
Qty: 30 TABLET | Refills: 11 | Status: SHIPPED | OUTPATIENT
Start: 2018-12-04 | End: 2018-12-10 | Stop reason: SDUPTHER

## 2018-12-05 ENCOUNTER — TELEPHONE (OUTPATIENT)
Dept: FAMILY MEDICINE | Facility: CLINIC | Age: 73
End: 2018-12-05

## 2018-12-05 NOTE — TELEPHONE ENCOUNTER
----- Message from Kiera Wood sent at 12/5/2018  4:47 PM CST -----  Contact: pt  Pt stated Dr Nielson needs a copy of the referral faxed to [phone], pt can be reached at [phone] Thanks

## 2018-12-07 ENCOUNTER — PATIENT MESSAGE (OUTPATIENT)
Dept: UROLOGY | Facility: CLINIC | Age: 73
End: 2018-12-07

## 2018-12-10 RX ORDER — OXYBUTYNIN CHLORIDE 5 MG/1
5 TABLET, EXTENDED RELEASE ORAL DAILY
Qty: 30 TABLET | Refills: 12 | Status: SHIPPED | OUTPATIENT
Start: 2018-12-10 | End: 2019-12-11 | Stop reason: SDUPTHER

## 2018-12-13 RX ORDER — LEVOTHYROXINE SODIUM 200 UG/1
TABLET ORAL
Qty: 30 TABLET | Refills: 11 | Status: SHIPPED | OUTPATIENT
Start: 2018-12-13 | End: 2019-01-15

## 2018-12-18 ENCOUNTER — OFFICE VISIT (OUTPATIENT)
Dept: CARDIOLOGY | Facility: CLINIC | Age: 73
End: 2018-12-18
Payer: MEDICARE

## 2018-12-18 VITALS
BODY MASS INDEX: 27.29 KG/M2 | DIASTOLIC BLOOD PRESSURE: 62 MMHG | SYSTOLIC BLOOD PRESSURE: 127 MMHG | WEIGHT: 139 LBS | HEIGHT: 60 IN | HEART RATE: 76 BPM

## 2018-12-18 DIAGNOSIS — R20.2 BILATERAL NUMBNESS AND TINGLING OF ARMS AND LEGS: ICD-10-CM

## 2018-12-18 DIAGNOSIS — Z95.1 S/P CABG (CORONARY ARTERY BYPASS GRAFT): Primary | ICD-10-CM

## 2018-12-18 DIAGNOSIS — I10 ESSENTIAL HYPERTENSION: ICD-10-CM

## 2018-12-18 DIAGNOSIS — E78.00 PURE HYPERCHOLESTEROLEMIA: ICD-10-CM

## 2018-12-18 DIAGNOSIS — R20.0 BILATERAL NUMBNESS AND TINGLING OF ARMS AND LEGS: ICD-10-CM

## 2018-12-18 PROCEDURE — 3074F SYST BP LT 130 MM HG: CPT | Mod: CPTII,S$GLB,, | Performed by: INTERNAL MEDICINE

## 2018-12-18 PROCEDURE — 99214 OFFICE O/P EST MOD 30 MIN: CPT | Mod: S$GLB,,, | Performed by: INTERNAL MEDICINE

## 2018-12-18 PROCEDURE — 1101F PT FALLS ASSESS-DOCD LE1/YR: CPT | Mod: CPTII,S$GLB,, | Performed by: INTERNAL MEDICINE

## 2018-12-18 PROCEDURE — 3078F DIAST BP <80 MM HG: CPT | Mod: CPTII,S$GLB,, | Performed by: INTERNAL MEDICINE

## 2018-12-18 PROCEDURE — 99999 PR PBB SHADOW E&M-EST. PATIENT-LVL IV: CPT | Mod: PBBFAC,,, | Performed by: INTERNAL MEDICINE

## 2018-12-18 RX ORDER — OXYCODONE AND ACETAMINOPHEN 5; 325 MG/1; MG/1
1 TABLET ORAL 3 TIMES DAILY
Refills: 0 | COMMUNITY
Start: 2018-12-10 | End: 2019-01-15

## 2018-12-18 NOTE — PROGRESS NOTES
Subjective:    Patient ID:  Soumya Melchor is a 73 y.o. female who presents for evaluation of Follow-up (follow up )      HPI 71 yo WF S/P CABG with UZAIR to LAD and SVG to OM and RCA 2-, HTN , HLD and smoking hx. Patient not having any cardiac issues but has numerous somatic complaints of numbness in arms and legs at night. Her daughter is an ultrasound tech and already did carotids that were normal.    DATE OF PROCEDURE:  02/13/2017.     PREOPERATIVE DIAGNOSIS:  Coronary artery disease.     POSTOPERATIVE DIAGNOSIS:  Coronary artery disease.     PROCEDURE:  Off-pump coronary artery bypass grafting x3 with left internal   mammary artery to LAD, aorta to first obtuse marginal, aorta to distal right   coronary artery.     SURGEON:  Steve Bonds III, M.D.     CONCLUSIONS     1 - Concentric remodeling.     2 - Normal left ventricular systolic function (EF 60-65%).     3 - Normal left ventricular diastolic function.     4 - Normal right ventricular systolic function .     5 - The estimated PA systolic pressure is greater than 21 mmHg.      CONCLUSIONS     1 - Concentric remodeling.     2 - Normal left ventricular systolic function (EF 60-65%).     3 - Normal left ventricular diastolic function.     4 - Normal right ventricular systolic function .     5 - The estimated PA systolic pressure is greater than 21 mmHg.        Review of Systems   Constitution: Negative for decreased appetite, fever, weakness, malaise/fatigue, weight gain and weight loss.   HENT: Negative for hearing loss and nosebleeds.    Eyes: Negative for visual disturbance.   Cardiovascular: Negative for chest pain, claudication, cyanosis, dyspnea on exertion, irregular heartbeat, leg swelling, near-syncope, orthopnea, palpitations, paroxysmal nocturnal dyspnea and syncope.   Respiratory: Negative for cough, hemoptysis, shortness of breath, sleep disturbances due to breathing, snoring and wheezing.    Endocrine: Negative for cold intolerance,  heat intolerance, polydipsia and polyuria.   Hematologic/Lymphatic: Negative for adenopathy and bleeding problem. Does not bruise/bleed easily.   Skin: Negative for color change, itching, poor wound healing, rash and suspicious lesions.   Musculoskeletal: Positive for back pain and myalgias. Negative for arthritis, falls, joint pain, joint swelling, muscle cramps and muscle weakness.   Gastrointestinal: Negative for bloating, abdominal pain, change in bowel habit, constipation, flatus, heartburn, hematemesis, hematochezia, hemorrhoids, jaundice, melena, nausea and vomiting.   Genitourinary: Negative for bladder incontinence, decreased libido, frequency, hematuria, hesitancy and urgency.   Neurological: Positive for numbness and paresthesias. Negative for brief paralysis, difficulty with concentration, excessive daytime sleepiness, dizziness, focal weakness, headaches, light-headedness, loss of balance and vertigo.   Psychiatric/Behavioral: Negative for altered mental status, depression and memory loss. The patient does not have insomnia and is not nervous/anxious.    Allergic/Immunologic: Negative for environmental allergies, hives and persistent infections.        Objective:    Physical Exam   Constitutional: She is oriented to person, place, and time. She appears well-developed and well-nourished.   /62   Pulse 76   Ht 5' (1.524 m)   Wt 63 kg (139 lb)   BMI 27.15 kg/m²      HENT:   Head: Normocephalic and atraumatic.   Right Ear: External ear normal.   Left Ear: External ear normal.   Nose: Nose normal.   Mouth/Throat: Oropharynx is clear and moist.   Eyes: Conjunctivae, EOM and lids are normal. Pupils are equal, round, and reactive to light. Right eye exhibits no discharge. Left eye exhibits no discharge. Right conjunctiva has no hemorrhage. No scleral icterus.   Neck: Normal range of motion. Neck supple. No JVD present. No tracheal deviation present. No thyromegaly present.   Cardiovascular: Normal  rate, regular rhythm, normal heart sounds and intact distal pulses. Exam reveals no gallop and no friction rub.   No murmur heard.  Pulmonary/Chest: Effort normal and breath sounds normal. No respiratory distress. She has no wheezes. She has no rales. She exhibits no tenderness. Breasts are symmetrical.   Sternal scar   Abdominal: Soft. Bowel sounds are normal. She exhibits no distension and no mass. There is no hepatosplenomegaly or hepatomegaly. There is no tenderness. There is no rebound and no guarding.   Musculoskeletal: Normal range of motion. She exhibits no edema or tenderness.   Lymphadenopathy:     She has no cervical adenopathy.   Neurological: She is alert and oriented to person, place, and time. She displays normal reflexes. No cranial nerve deficit. Coordination normal.   Skin: Skin is warm and dry. No rash noted. No erythema. No pallor.   Psychiatric: She has a normal mood and affect. Her behavior is normal. Judgment and thought content normal.   Nursing note and vitals reviewed.        Assessment:       1. S/P CABG (coronary artery bypass graft)    2. Essential hypertension    3. Pure hypercholesterolemia    4. Bilateral numbness and tingling of arms and legs         Plan:     Reassured patient that her symptoms were not consistent with a vascular issue    The current medical regimen is effective;  continue present plan and medications.    Patient advised to modify risk factors such as weight, exercise, diet,  tobacco and alcohol exposure  Orders Placed This Encounter   Procedures    Lipid panel    Comprehensive metabolic panel     Follow-up in about 6 months (around 6/18/2019).

## 2018-12-31 ENCOUNTER — TELEPHONE (OUTPATIENT)
Dept: UROLOGY | Facility: CLINIC | Age: 73
End: 2018-12-31

## 2018-12-31 NOTE — TELEPHONE ENCOUNTER
----- Message from Steph Torres sent at 12/31/2018 10:08 AM CST -----  Contact: Patient's daughter, Frida Rouse  Patient's daughter is calling to let the office know that the patient's renal function is at 44 and the patient is having a CT scan w/ and w/out contrast right now.  Please call to discuss.  Call Back#[phone]   Thanks

## 2019-01-03 ENCOUNTER — OFFICE VISIT (OUTPATIENT)
Dept: NEPHROLOGY | Facility: CLINIC | Age: 74
End: 2019-01-03
Payer: MEDICARE

## 2019-01-03 VITALS
HEART RATE: 62 BPM | SYSTOLIC BLOOD PRESSURE: 136 MMHG | HEIGHT: 60 IN | WEIGHT: 141 LBS | DIASTOLIC BLOOD PRESSURE: 70 MMHG | OXYGEN SATURATION: 96 % | BODY MASS INDEX: 27.68 KG/M2

## 2019-01-03 DIAGNOSIS — F17.201 TOBACCO USE DISORDER, MODERATE, IN SUSTAINED REMISSION: ICD-10-CM

## 2019-01-03 DIAGNOSIS — R60.0 LOCALIZED EDEMA: ICD-10-CM

## 2019-01-03 DIAGNOSIS — E03.4 HYPOTHYROIDISM DUE TO ACQUIRED ATROPHY OF THYROID: ICD-10-CM

## 2019-01-03 DIAGNOSIS — N18.30 CKD (CHRONIC KIDNEY DISEASE) STAGE 3, GFR 30-59 ML/MIN: Primary | ICD-10-CM

## 2019-01-03 DIAGNOSIS — J44.9 COPD, MODERATE: ICD-10-CM

## 2019-01-03 DIAGNOSIS — Z95.1 S/P CABG (CORONARY ARTERY BYPASS GRAFT): ICD-10-CM

## 2019-01-03 DIAGNOSIS — I25.10 CORONARY ARTERY DISEASE INVOLVING NATIVE CORONARY ARTERY OF NATIVE HEART WITHOUT ANGINA PECTORIS: ICD-10-CM

## 2019-01-03 DIAGNOSIS — E78.2 MIXED HYPERLIPIDEMIA: ICD-10-CM

## 2019-01-03 DIAGNOSIS — R73.9 HYPERGLYCEMIA: ICD-10-CM

## 2019-01-03 DIAGNOSIS — I10 ESSENTIAL HYPERTENSION: ICD-10-CM

## 2019-01-03 DIAGNOSIS — Z87.891 FORMER SMOKER: ICD-10-CM

## 2019-01-03 PROCEDURE — 99999 PR PBB SHADOW E&M-EST. PATIENT-LVL V: CPT | Mod: PBBFAC,,, | Performed by: INTERNAL MEDICINE

## 2019-01-03 PROCEDURE — 3078F PR MOST RECENT DIASTOLIC BLOOD PRESSURE < 80 MM HG: ICD-10-PCS | Mod: CPTII,S$GLB,, | Performed by: INTERNAL MEDICINE

## 2019-01-03 PROCEDURE — 3075F SYST BP GE 130 - 139MM HG: CPT | Mod: CPTII,S$GLB,, | Performed by: INTERNAL MEDICINE

## 2019-01-03 PROCEDURE — 1101F PT FALLS ASSESS-DOCD LE1/YR: CPT | Mod: CPTII,S$GLB,, | Performed by: INTERNAL MEDICINE

## 2019-01-03 PROCEDURE — 1101F PR PT FALLS ASSESS DOC 0-1 FALLS W/OUT INJ PAST YR: ICD-10-PCS | Mod: CPTII,S$GLB,, | Performed by: INTERNAL MEDICINE

## 2019-01-03 PROCEDURE — 99204 PR OFFICE/OUTPT VISIT, NEW, LEVL IV, 45-59 MIN: ICD-10-PCS | Mod: S$GLB,,, | Performed by: INTERNAL MEDICINE

## 2019-01-03 PROCEDURE — 3078F DIAST BP <80 MM HG: CPT | Mod: CPTII,S$GLB,, | Performed by: INTERNAL MEDICINE

## 2019-01-03 PROCEDURE — 99999 PR PBB SHADOW E&M-EST. PATIENT-LVL V: ICD-10-PCS | Mod: PBBFAC,,, | Performed by: INTERNAL MEDICINE

## 2019-01-03 PROCEDURE — 3075F PR MOST RECENT SYSTOLIC BLOOD PRESS GE 130-139MM HG: ICD-10-PCS | Mod: CPTII,S$GLB,, | Performed by: INTERNAL MEDICINE

## 2019-01-03 PROCEDURE — 99204 OFFICE O/P NEW MOD 45 MIN: CPT | Mod: S$GLB,,, | Performed by: INTERNAL MEDICINE

## 2019-01-03 RX ORDER — ZOSTER VACCINE RECOMBINANT, ADJUVANTED 50 MCG/0.5
KIT INTRAMUSCULAR
Refills: 0 | COMMUNITY
Start: 2018-12-25 | End: 2022-02-28

## 2019-01-03 RX ORDER — METOPROLOL TARTRATE 25 MG/1
25 TABLET, FILM COATED ORAL
COMMUNITY
End: 2019-07-02

## 2019-01-03 RX ORDER — TIOTROPIUM BROMIDE 18 UG/1
18 CAPSULE ORAL; RESPIRATORY (INHALATION)
COMMUNITY
End: 2019-01-15

## 2019-01-03 RX ORDER — ESZOPICLONE 2 MG/1
TABLET, FILM COATED ORAL
Refills: 5 | COMMUNITY
Start: 2018-12-13 | End: 2019-04-10

## 2019-01-03 RX ORDER — PYRIDOXINE HCL (VITAMIN B6) 100 MG
100 TABLET ORAL
Status: ON HOLD | COMMUNITY
End: 2019-06-19 | Stop reason: HOSPADM

## 2019-01-03 NOTE — LETTER
January 13, 2019      TIFF Morejon MD  [address] [facility] [address] [place] [address]           [facility]  [address] [facility] [address]  Phone: [phone]  Fax: [phone]          Patient: Soumya Melchor   MR Number: [id number]   YOB: 1945   Date of Visit: 1/3/2019       Dear Dr. TIFF Morejon:    Thank you for referring Soumya Melchor to me for evaluation. Attached you will find relevant portions of my assessment and plan of care.    If you have questions, please do not hesitate to call me. I look forward to following Soumya Melchor along with you.    Sincerely,    Ajit Vásquez MD    Enclosure  CC:  No Recipients    If you would like to receive this communication electronically, please contact [email or web address] or [phone] to request more information on [employer] Link access.    For providers and/or their staff who would like to refer a patient to [facility], please contact us through our one-stop-shop provider referral line, [facility], at [phone].    If you feel you have received this communication in error or would no longer like to receive these types of communications, please e-mail [email or web address]

## 2019-01-15 ENCOUNTER — OFFICE VISIT (OUTPATIENT)
Dept: FAMILY MEDICINE | Facility: CLINIC | Age: 74
End: 2019-01-15
Payer: MEDICARE

## 2019-01-15 ENCOUNTER — HOSPITAL ENCOUNTER (OUTPATIENT)
Dept: RADIOLOGY | Facility: HOSPITAL | Age: 74
Discharge: HOME | End: 2019-01-15
Attending: NURSE PRACTITIONER
Payer: MEDICARE

## 2019-01-15 ENCOUNTER — TELEPHONE (OUTPATIENT)
Dept: FAMILY MEDICINE | Facility: CLINIC | Age: 74
End: 2019-01-15

## 2019-01-15 VITALS
SYSTOLIC BLOOD PRESSURE: 128 MMHG | BODY MASS INDEX: 27.48 KG/M2 | HEART RATE: 96 BPM | WEIGHT: 140 LBS | TEMPERATURE: 99 F | HEIGHT: 60 IN | DIASTOLIC BLOOD PRESSURE: 67 MMHG

## 2019-01-15 DIAGNOSIS — J20.9 ACUTE BRONCHITIS WITH BRONCHOSPASM: Primary | ICD-10-CM

## 2019-01-15 DIAGNOSIS — G89.4 CHRONIC PAIN ASSOCIATED WITH SIGNIFICANT PSYCHOSOCIAL DYSFUNCTION: ICD-10-CM

## 2019-01-15 DIAGNOSIS — J40 BRONCHITIS: ICD-10-CM

## 2019-01-15 PROCEDURE — 99214 OFFICE O/P EST MOD 30 MIN: CPT | Mod: S$GLB,,, | Performed by: NURSE PRACTITIONER

## 2019-01-15 PROCEDURE — 71046 XR CHEST PA AND LATERAL: ICD-10-PCS | Mod: 26,,, | Performed by: RADIOLOGY

## 2019-01-15 PROCEDURE — 1101F PT FALLS ASSESS-DOCD LE1/YR: CPT | Mod: CPTII,S$GLB,, | Performed by: NURSE PRACTITIONER

## 2019-01-15 PROCEDURE — 99999 PR PBB SHADOW E&M-EST. PATIENT-LVL III: CPT | Mod: PBBFAC,,, | Performed by: NURSE PRACTITIONER

## 2019-01-15 PROCEDURE — 71046 X-RAY EXAM CHEST 2 VIEWS: CPT | Mod: 26,,, | Performed by: RADIOLOGY

## 2019-01-15 PROCEDURE — 1101F PR PT FALLS ASSESS DOC 0-1 FALLS W/OUT INJ PAST YR: ICD-10-PCS | Mod: CPTII,S$GLB,, | Performed by: NURSE PRACTITIONER

## 2019-01-15 PROCEDURE — 71046 X-RAY EXAM CHEST 2 VIEWS: CPT | Mod: TC,PO

## 2019-01-15 PROCEDURE — 3074F SYST BP LT 130 MM HG: CPT | Mod: CPTII,S$GLB,, | Performed by: NURSE PRACTITIONER

## 2019-01-15 PROCEDURE — 99999 PR PBB SHADOW E&M-EST. PATIENT-LVL III: ICD-10-PCS | Mod: PBBFAC,,, | Performed by: NURSE PRACTITIONER

## 2019-01-15 PROCEDURE — 99214 PR OFFICE/OUTPT VISIT, EST, LEVL IV, 30-39 MIN: ICD-10-PCS | Mod: S$GLB,,, | Performed by: NURSE PRACTITIONER

## 2019-01-15 PROCEDURE — 3074F PR MOST RECENT SYSTOLIC BLOOD PRESSURE < 130 MM HG: ICD-10-PCS | Mod: CPTII,S$GLB,, | Performed by: NURSE PRACTITIONER

## 2019-01-15 PROCEDURE — 3078F PR MOST RECENT DIASTOLIC BLOOD PRESSURE < 80 MM HG: ICD-10-PCS | Mod: CPTII,S$GLB,, | Performed by: NURSE PRACTITIONER

## 2019-01-15 PROCEDURE — 3078F DIAST BP <80 MM HG: CPT | Mod: CPTII,S$GLB,, | Performed by: NURSE PRACTITIONER

## 2019-01-15 RX ORDER — ALBUTEROL SULFATE 90 UG/1
2 AEROSOL, METERED RESPIRATORY (INHALATION) EVERY 6 HOURS PRN
Qty: 18 G | Refills: 0 | Status: SHIPPED | OUTPATIENT
Start: 2019-01-15 | End: 2019-03-04 | Stop reason: SDUPTHER

## 2019-01-15 RX ORDER — GUAIFENESIN 600 MG/1
1200 TABLET, EXTENDED RELEASE ORAL 2 TIMES DAILY
Status: ON HOLD | COMMUNITY
Start: 2019-01-15 | End: 2019-06-19 | Stop reason: HOSPADM

## 2019-01-15 RX ORDER — DOXYCYCLINE HYCLATE 100 MG
100 TABLET ORAL 2 TIMES DAILY
Qty: 20 TABLET | Refills: 0 | Status: SHIPPED | OUTPATIENT
Start: 2019-01-15 | End: 2019-01-25

## 2019-01-15 RX ORDER — METHYLPREDNISOLONE 4 MG/1
TABLET ORAL
Qty: 1 PACKAGE | Refills: 0 | Status: SHIPPED | OUTPATIENT
Start: 2019-01-15 | End: 2019-01-21

## 2019-01-15 RX ORDER — OXYCODONE AND ACETAMINOPHEN 5; 325 MG/1; MG/1
1 TABLET ORAL EVERY 4 HOURS PRN
Qty: 3 TABLET | Refills: 0 | Status: ON HOLD | OUTPATIENT
Start: 2019-01-15 | End: 2019-06-19 | Stop reason: HOSPADM

## 2019-01-15 NOTE — TELEPHONE ENCOUNTER
----- Message from Lieztt Wilsonite sent at 1/15/2019  1:26 PM CST -----  Contact: Kary with [facility]   Kary called and stated she needs clarity on a prescription. She can be reached at   Phone: [phone] Fax: [phone].    Thanks,  TF

## 2019-01-15 NOTE — PROGRESS NOTES
Subjective:      Patient ID: Soumya Melchor is a 73 y.o. female.    Chief Complaint: Cough    Cough   This is a new problem. The current episode started in the past 7 days. The problem has been gradually worsening. The cough is productive of sputum. Associated symptoms include nasal congestion, postnasal drip, rhinorrhea, shortness of breath and wheezing. Pertinent negatives include no chest pain, chills, ear congestion, ear pain, fever, headaches, heartburn, hemoptysis, myalgias, rash or sore throat. The symptoms are aggravated by lying down. She has tried leukotriene antagonists (zyrtec) for the symptoms. The treatment provided no relief.   Patient also complains of chronic left abdominal wall pain in and reports she has run out of her oxycodone.  She sees pain management tomorrow morning.    Review of Systems   Constitutional: Negative for chills, fatigue and fever.   HENT: Positive for congestion, postnasal drip, rhinorrhea, sinus pressure and sinus pain. Negative for ear pain and sore throat.    Respiratory: Positive for cough, chest tightness (described as chest congestion), shortness of breath and wheezing. Negative for hemoptysis.    Cardiovascular: Negative for chest pain, palpitations and leg swelling.   Gastrointestinal: Negative for heartburn.        Chronic left abdominal wall pain   Musculoskeletal: Negative for myalgias.   Skin: Negative for rash and wound.   Neurological: Negative for weakness, numbness and headaches.   Psychiatric/Behavioral: The patient is not nervous/anxious.        Objective:     /67   Pulse 96   Temp 99.1 °F (37.3 °C) (Oral)   Ht 5' (1.524 m)   Wt 63.5 kg (140 lb)   BMI 27.34 kg/m²     Physical Exam   Constitutional: She is oriented to person, place, and time. She appears well-developed and well-nourished.   HENT:   Head: Normocephalic.   Right Ear: Tympanic membrane normal.   Left Ear: Tympanic membrane normal.   Nose: Rhinorrhea present. No mucosal edema. Right  sinus exhibits no maxillary sinus tenderness and no frontal sinus tenderness. Left sinus exhibits no maxillary sinus tenderness and no frontal sinus tenderness.   Mouth/Throat: Uvula is midline, oropharynx is clear and moist and mucous membranes are normal. No oropharyngeal exudate, posterior oropharyngeal edema or posterior oropharyngeal erythema. Tonsils are 0 on the right. Tonsils are 0 on the left.   Eyes: Pupils are equal, round, and reactive to light.   Neck: Normal range of motion. Neck supple.   Cardiovascular: Normal rate, regular rhythm and normal heart sounds.   Pulmonary/Chest: Effort normal. No respiratory distress. She has no decreased breath sounds. She has wheezes. She has rhonchi. She has no rales.   Neurological: She is alert and oriented to person, place, and time.   Skin: Skin is warm and dry. No rash noted.   Psychiatric: She has a normal mood and affect. Her behavior is normal. Judgment and thought content normal.   Vitals reviewed.    Assessment:     1. Acute bronchitis with bronchospasm    2. Chronic pain associated with significant psychosocial dysfunction        Plan:     Problem List Items Addressed This Visit        Neuro    Chronic pain associated with significant psychosocial dysfunction      Other Visit Diagnoses     Acute bronchitis with bronchospasm    -  Primary    Relevant Medications    doxycycline (VIBRA-TABS) 100 MG tablet    guaiFENesin (MUCINEX) 600 mg 12 hr tablet    methylPREDNISolone (MEDROL DOSEPACK) 4 mg tablet    albuterol (PROVENTIL/VENTOLIN HFA) 90 mcg/actuation inhaler    Other Relevant Orders    X-Ray Chest PA And Lateral      Symptomatic care discussed   Report to ER if symptoms worsen  The patient was checked in the [facility] Board of Pharmacy's  Prescription Monitoring Program. There appears to be no incongruities with the patient's verbalized history.     Follow-up if symptoms worsen or fail to improve.        Parts of this note was dictated using voice  recognition software. Please excuse any grammatical or typographical errors.

## 2019-01-17 ENCOUNTER — TELEPHONE (OUTPATIENT)
Dept: CARDIOLOGY | Facility: CLINIC | Age: 74
End: 2019-01-17

## 2019-01-19 PROBLEM — N18.30 CKD (CHRONIC KIDNEY DISEASE) STAGE 3, GFR 30-59 ML/MIN: Status: ACTIVE | Noted: 2019-01-19

## 2019-01-19 PROBLEM — Z87.891 FORMER SMOKER: Status: ACTIVE | Noted: 2019-01-19

## 2019-01-19 NOTE — PROGRESS NOTES
Subjective:       Patient ID: Soumya Melchor is a 73 y.o. White female who presents for new evaluation of Consult and Chronic Kidney Disease    HPI     She is referred by her Urologist for CKD with baseline creatinine of 1.1-1.2mg/dL    She denies foamy urine, no hematuria and no flank pain, and denies a history of frequent UTIs. She follows a low sodium diet and feels she stays hydrated (120 oz day). No LE edema and no SOB, she uses Lasix QD. No NSAID use (uses Tylenol) and no herbal medications. She has a history of blood transfusion (s/p CABG). No known family history of kidney disease.     Review of Systems   Constitutional: Negative for activity change, appetite change, fatigue and fever.   HENT: Negative for facial swelling.    Eyes: Negative for visual disturbance.   Respiratory: Negative for shortness of breath.    Cardiovascular: Negative for chest pain and leg swelling.   Gastrointestinal: Negative for abdominal distention, constipation and diarrhea.   Endocrine: Negative for polydipsia and polyuria.   Genitourinary: Negative for difficulty urinating, dysuria, flank pain and hematuria.   Musculoskeletal: Negative for arthralgias.   Skin: Negative for rash.   Allergic/Immunologic: Negative for immunocompromised state.   Neurological: Negative for weakness and headaches.       Objective:      Physical Exam   Constitutional: She is oriented to person, place, and time. She appears well-nourished. No distress.   HENT:   Mouth/Throat: Oropharynx is clear and moist.   Neck: No JVD present.   Cardiovascular: S1 normal and S2 normal. Exam reveals no friction rub.   Pulmonary/Chest: Breath sounds normal. She has no wheezes. She has no rales.   Abdominal: Soft.   Musculoskeletal: She exhibits no edema.   Neurological: She is alert and oriented to person, place, and time.   Skin: Skin is warm and dry.   Psychiatric: She has a normal mood and affect.   Nursing note and vitals reviewed.      Assessment:       1. CKD  (chronic kidney disease) stage 3, GFR 30-59 ml/min    2. Hyperglycemia    3. Essential hypertension    4. S/P CABG (coronary artery bypass graft)    5. COPD, moderate    6. Coronary artery disease involving native coronary artery of native heart without angina pectoris    7. Mixed hyperlipidemia    8. Tobacco use disorder, moderate, in sustained remission    9. Localized edema    10. Hypothyroidism due to acquired atrophy of thyroid    11. Former smoker        Plan:           CKD appearing to be at Stage 3 since at least 2004. Fortunately she did not have NYDIA post CABG in 2017. She is unable to use either ace/ARB for renal protection due to history of angioedema. She will undergo further work up with urine studies, and a few select serologies. Her CT from 2018 regarding her kidneys was reviewed with her.    Patient was advised to avoid NSAIDs, continue a low sodium diet, and ensure hydration      Labs then RTC pending results

## 2019-01-31 ENCOUNTER — TELEPHONE (OUTPATIENT)
Dept: NEPHROLOGY | Facility: CLINIC | Age: 74
End: 2019-01-31

## 2019-01-31 NOTE — TELEPHONE ENCOUNTER
Patient has chronic pain, which is worse today and she is unable to come to appointment.  She is under the care of Dr. Nielson and had to take oxycodone today.      She brought a cd to Dr. Vásquez.  She is unable to make the appointment today due to the pain.  She would like a call re: her ultrasound if it's appropriate.  If not, let me know and we can schedule her another day.

## 2019-01-31 NOTE — TELEPHONE ENCOUNTER
----- Message from Barbara Pierre sent at 1/31/2019 10:30 AM CST -----  Contact: self  Pt has canceled appt for today, but would like to speak with nurse in regards to pain. Pt states pain level is at a 15 and would like to know what she can do for pain. Please call pt back at [phone].          Thanks,   Barbara Pierre

## 2019-02-12 RX ORDER — FUROSEMIDE 20 MG/1
20 TABLET ORAL DAILY
Qty: 30 TABLET | Refills: 6 | Status: ON HOLD | OUTPATIENT
Start: 2019-02-12 | End: 2019-06-19 | Stop reason: HOSPADM

## 2019-03-02 ENCOUNTER — PATIENT MESSAGE (OUTPATIENT)
Dept: PULMONOLOGY | Facility: CLINIC | Age: 74
End: 2019-03-02

## 2019-03-02 ENCOUNTER — PATIENT MESSAGE (OUTPATIENT)
Dept: FAMILY MEDICINE | Facility: CLINIC | Age: 74
End: 2019-03-02

## 2019-03-04 DIAGNOSIS — J20.9 ACUTE BRONCHITIS WITH BRONCHOSPASM: ICD-10-CM

## 2019-03-04 RX ORDER — ALBUTEROL SULFATE 90 UG/1
2 AEROSOL, METERED RESPIRATORY (INHALATION) EVERY 6 HOURS PRN
Qty: 18 G | Refills: 11 | Status: SHIPPED | OUTPATIENT
Start: 2019-03-04 | End: 2019-04-10 | Stop reason: SDUPTHER

## 2019-03-11 ENCOUNTER — PATIENT MESSAGE (OUTPATIENT)
Dept: FAMILY MEDICINE | Facility: CLINIC | Age: 74
End: 2019-03-11

## 2019-03-11 RX ORDER — PREGABALIN 25 MG/1
25 CAPSULE ORAL 2 TIMES DAILY
Qty: 60 CAPSULE | Refills: 6 | Status: SHIPPED | OUTPATIENT
Start: 2019-03-11 | End: 2019-04-08

## 2019-03-11 NOTE — TELEPHONE ENCOUNTER
She could try lyrica-pregabalin-Ill send rx and we can adjust to higher dose if we need to. Stop gabapentin

## 2019-03-24 ENCOUNTER — PATIENT MESSAGE (OUTPATIENT)
Dept: FAMILY MEDICINE | Facility: CLINIC | Age: 74
End: 2019-03-24

## 2019-03-25 RX ORDER — ESZOPICLONE 3 MG/1
3 TABLET, FILM COATED ORAL NIGHTLY
Qty: 30 TABLET | Refills: 3 | Status: SHIPPED | OUTPATIENT
Start: 2019-03-25 | End: 2019-04-24

## 2019-04-01 ENCOUNTER — OFFICE VISIT (OUTPATIENT)
Dept: PAIN MEDICINE | Facility: CLINIC | Age: 74
End: 2019-04-01
Payer: MEDICARE

## 2019-04-01 VITALS
TEMPERATURE: 97 F | HEART RATE: 88 BPM | WEIGHT: 131.31 LBS | BODY MASS INDEX: 25.64 KG/M2 | DIASTOLIC BLOOD PRESSURE: 60 MMHG | SYSTOLIC BLOOD PRESSURE: 122 MMHG | RESPIRATION RATE: 20 BRPM | OXYGEN SATURATION: 98 %

## 2019-04-01 DIAGNOSIS — R10.9 ABDOMINAL PAIN, LEFT LATERAL: ICD-10-CM

## 2019-04-01 DIAGNOSIS — B02.29 POSTHERPETIC NEURALGIA: ICD-10-CM

## 2019-04-01 DIAGNOSIS — G89.4 CHRONIC PAIN SYNDROME: Primary | ICD-10-CM

## 2019-04-01 PROCEDURE — 3074F PR MOST RECENT SYSTOLIC BLOOD PRESSURE < 130 MM HG: ICD-10-PCS | Mod: CPTII,S$GLB,, | Performed by: ANESTHESIOLOGY

## 2019-04-01 PROCEDURE — 3074F SYST BP LT 130 MM HG: CPT | Mod: CPTII,S$GLB,, | Performed by: ANESTHESIOLOGY

## 2019-04-01 PROCEDURE — 99999 PR PBB SHADOW E&M-EST. PATIENT-LVL V: CPT | Mod: PBBFAC,,, | Performed by: ANESTHESIOLOGY

## 2019-04-01 PROCEDURE — 3078F PR MOST RECENT DIASTOLIC BLOOD PRESSURE < 80 MM HG: ICD-10-PCS | Mod: CPTII,S$GLB,, | Performed by: ANESTHESIOLOGY

## 2019-04-01 PROCEDURE — 1101F PR PT FALLS ASSESS DOC 0-1 FALLS W/OUT INJ PAST YR: ICD-10-PCS | Mod: CPTII,S$GLB,, | Performed by: ANESTHESIOLOGY

## 2019-04-01 PROCEDURE — 99214 PR OFFICE/OUTPT VISIT, EST, LEVL IV, 30-39 MIN: ICD-10-PCS | Mod: S$GLB,,, | Performed by: ANESTHESIOLOGY

## 2019-04-01 PROCEDURE — 99499 RISK ADDL DX/OHS AUDIT: ICD-10-PCS | Mod: S$GLB,,, | Performed by: ANESTHESIOLOGY

## 2019-04-01 PROCEDURE — 99214 OFFICE O/P EST MOD 30 MIN: CPT | Mod: S$GLB,,, | Performed by: ANESTHESIOLOGY

## 2019-04-01 PROCEDURE — 99499 UNLISTED E&M SERVICE: CPT | Mod: S$GLB,,, | Performed by: ANESTHESIOLOGY

## 2019-04-01 PROCEDURE — 99999 PR PBB SHADOW E&M-EST. PATIENT-LVL V: ICD-10-PCS | Mod: PBBFAC,,, | Performed by: ANESTHESIOLOGY

## 2019-04-01 PROCEDURE — 1101F PT FALLS ASSESS-DOCD LE1/YR: CPT | Mod: CPTII,S$GLB,, | Performed by: ANESTHESIOLOGY

## 2019-04-01 PROCEDURE — 3078F DIAST BP <80 MM HG: CPT | Mod: CPTII,S$GLB,, | Performed by: ANESTHESIOLOGY

## 2019-04-01 NOTE — PROGRESS NOTES
This note was completed with dictation software and grammatical errors may exist.    CC:  Left abdominal wall pain, left lateral thigh pain    HPI:  The patient is a 73-year-old woman with a history of COPD, CAD, chronic abdominal wall pain who presents in referral from Dr. Mathews continued pain. She returns in follow-up today to review records, continues to have left abdominal wall pain but states that it also crosses over to the midline and right side as well. I reviewed records from Dr. Nielson that states her leads were initially placed at T5, but unclear if this was the top of T5 over the bottom or middle.  Furthermore, notes from x-rays done after this state that one lead was in the midthoracic region and the other was in the lower thoracic region but there is no mention of specific levels.  We have obtained an x-ray of her spine that shows the left lead at T10 and T11 and the right lead over T6 through T8.  She states that she does get coverage of her left leg where she has pain but denies any improvement in her abdomen.  Since the last visit, she denies any major changes.      Past history:  The patient reports that she has had a history of chronic left abdominal wall pain, was seen by multiple physicians about 10 years ago and was eventually diagnosed with post herpetic neuralgia, sounds like she never had any of the visible lesions but continued to have pain. She was seen by Dr. Huan Nielson and had undergone trial and implantation of a spinal cord stimulator for this pain and states that she was almost 100% pain free with this device.  She states that she had read that the battery only lasts for 10 years and so she requested a battery exchange at 9 years to make sure that she had continued coverage.  She states while she initially had a [employer] stimulator, when the battery was changed, it was changed to a [employer] device.  She was also told that one of the leads had frayed where it entered the  IPG and she was told that this lead was replaced.  She cannot recall if the incision was made only over the IPG site or if they actually went into the initial midline incision site as well. Nonetheless, this was accomplished in January, 2018 and after this revision, she was doing well without left abdominal wall pain. 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 Dre Grey in August, 2018.  After that time, she states that her left abdominal wall pain returned and also her left lateral thigh pain returned.  She states that she has met with the [employer] representatives and has not been able to get the stimulator to provide relief.  She has difficulty with providing good history, I am not sure if she actually has any stimulation over her pain sites but nonetheless, she states that she is not having any relief.  She also reports having pain in her lateral left thigh similar to her abdominal wall pain.  She is currently using lidocaine patches with some relief.  She also has been taking hydrocodone 7.5/325 every several days.  I do not see a record of her receiving this medication but states that she had this filled at [facility] in [place].        ROS:  She reports shortness of breath, easy bruising, bleeding problems, memory loss, difficulty sleeping, anxiety and depression.  Balance of review of systems is negative.    Past Medical History:   Diagnosis Date    Acid reflux 4/11/2014    Anxiety and depression 3/6/2014    AP (angina pectoris) 2/13/2017    CAD (coronary artery disease) 2/13/2017    CAD, multiple vessel 2/13/2017    Chronic pain associated with significant psychosocial dysfunction 2/21/2013    CKD (chronic kidney disease) stage 3, GFR 30-59  ml/min     Dr. Vásquez    COPD (chronic obstructive pulmonary disease)     well controlled, Dr [facility]    History of shingles     HTN (hypertension)     Hypothyroidism     Multiple allergies     S/P CABG (coronary artery bypass graft) 2017       Past Surgical History:   Procedure Laterality Date    AOROTOCORONARY BYPASS-CABG N/A 2017    [facility]    CARDIAC SURGERY  2017    CABG x3    COLONOSCOPY  ~    Dr. Perez; colon polyps removed    COLONOSCOPY N/A 3/15/2018    [facility] ENDO    DILATION AND CURETTAGE OF UTERUS      EGD (ESOPHAGOGASTRODUODENOSCOPY) N/A 7/3/2018    [facility]berg [facility] ENDO    ESOPHAGOGASTRODUODENOSCOPY (EGD) N/A 3/15/2018    [facility] ENDO    HEART CATH-LEFT Left 2017    Performed [facility] Giuliano Henry [facility] at [facility] CATH LAB    KNEE SURGERY Right     SPINAL CORD STIMULATOR IMPLANT  2018    for pain secondary to shingles, sees Dr Kam for pain management    TONSILLECTOMY      TUMOR REMOVAL      ULTRASOUND, ENDOSCOPIC, UPPER GI TRACT Left 7/3/2018    [facility] END[facility]    UPPER GASTROINTESTINAL ENDOSCOPY      XI ROBOTIC GASTRECTOMY-PARTIAL N/A 2018    [facility] Dre [facility] OR       Social History     Socioeconomic History    Marital status:      Spouse name: None    Number of children: None    Years of education: None    Highest education level: None   Occupational History    None   Social Needs    Financial resource strain: None    Food insecurity:     Worry: None     Inability: None    Transportation needs:     Medical: None     Non-medical: None   Tobacco Use    Smoking status: Former Smoker     Packs/day: 1.00     Years: 50.00     Pack years: 50.00     Last attempt to quit: 12/10/2012     Years since quittin.3    Smokeless tobacco:  Never Used   Substance and Sexual Activity    Alcohol use: No    Drug use: No    Sexual activity: None   Lifestyle    Physical activity:     Days per week: None     Minutes per session: None    Stress: None   Relationships    Social connections:     Talks on phone: None     Gets together: None     Attends [employer] service: None     Active member of club or organization: None     Attends meetings of clubs or organizations: None     Relationship status: None    Intimate partner violence:     Fear of current or ex partner: None     Emotionally abused: None     Physically abused: None     Forced sexual activity: None   Other Topics Concern    None   Social History Narrative    None         Medications/Allergies: See med card    Vitals:    04/01/19 1058   BP: 122/60   Pulse: 88   Resp: 20   Temp: 96.7 °F (35.9 °C)   TempSrc: Oral   SpO2: 98%   Weight: 59.5 kg (131 lb 4.5 oz)   PainSc:   4   PainLoc: Rib Cage         Physical exam:    Gen: A and O x3, pleasant, well-groomed  Skin: No rashes or obvious lesions  HEENT: PERRLA, no obvious deformities on ears or in canals. Trachea midline.  CVS: Regular rate and rhythm, normal palpable pulses.  Resp:No increased work of breathing, symmetrical chest rise.  Abdomen: Soft, NT/ND.  She has tenderness palpation the left lower abdominal wall, allodynia with light touch across the left abdomen from about T7 down to T12 and extending into the left flank region as well.  Musculoskeletal:No antalgic gait.     Neuro:  Lower extremities: 5/5 strength bilaterally  Reflexes: Patellar 2+, Achilles 2+ bilaterally.  Sensory:  Intact and symmetrical to light touch and pinprick in L2-S1 dermatomes bilaterally.    Lumbar spine:  Lumbar spine: ROM is full with flexion extension and oblique extension with no increased pain.    Ramon's test causes no increased pain on either side.    Supine straight leg raise is negative bilaterally.    Internal and external rotation of the hip causes  no increased pain on either side.  Myofascial exam: No tenderness to palpation across lumbar paraspinous muscles.    Imagin18 CT Abdomen and pelvis:  1. Thickening of the gastric wall in the location adjacent to where a mass was seen on 2018 that may relate to partial gastrostomy.  The mass may relate to a hematoma or postsurgical change but residual disease is difficult to exclude.  Please clinically correlate follow-up for stability and/or correlation with endoscopy may be of use  2. Multiple stable pulmonary nodules at the lung bases favoring a benign etiology  3. Hepatic hypodensity at the dome of the liver stable since 16 suggesting a benign etiology  4. Apparent bladder wall thickening as can be seen with urinary tract infection, postobstructive change, or neurogenic bladder.      Assessment:   The patient is a 73-year-old woman with a history of COPD, CAD, chronic abdominal wall pain who presents in referral from Dr. Mathews continued pain.     1. Chronic pain syndrome     2. Postherpetic neuralgia     3. Abdominal pain, left lateral         Plan:  1.  I am not sure if her lead actually moved and that is why she no longer have his coverage of her abdominal pain or if this was actually the intended placement for the leads.  I will contact both the [employer] and [employer] representatives to get more information on initial placement and placement after explantation.  Once I have spoken to them, we may decide to revise this or not.    Greater than 50% of this 45min visit was spent educating and counseling this patient.

## 2019-04-04 DIAGNOSIS — R60.9 SWELLING: ICD-10-CM

## 2019-04-04 DIAGNOSIS — K21.9 GASTROESOPHAGEAL REFLUX DISEASE, ESOPHAGITIS PRESENCE NOT SPECIFIED: ICD-10-CM

## 2019-04-04 DIAGNOSIS — L50.9 URTICARIA, UNSPECIFIED: ICD-10-CM

## 2019-04-04 RX ORDER — OMEPRAZOLE 40 MG/1
CAPSULE, DELAYED RELEASE ORAL
Qty: 30 CAPSULE | Refills: 11 | Status: SHIPPED | OUTPATIENT
Start: 2019-04-04 | End: 2020-04-17 | Stop reason: SDUPTHER

## 2019-04-04 RX ORDER — FAMOTIDINE 20 MG/1
TABLET, FILM COATED ORAL
Qty: 30 TABLET | Refills: 3 | Status: ON HOLD | OUTPATIENT
Start: 2019-04-04 | End: 2019-06-19 | Stop reason: HOSPADM

## 2019-04-06 ENCOUNTER — PATIENT MESSAGE (OUTPATIENT)
Dept: PAIN MEDICINE | Facility: CLINIC | Age: 74
End: 2019-04-06

## 2019-04-08 ENCOUNTER — PATIENT MESSAGE (OUTPATIENT)
Dept: FAMILY MEDICINE | Facility: CLINIC | Age: 74
End: 2019-04-08

## 2019-04-08 RX ORDER — PREGABALIN 75 MG/1
75 CAPSULE ORAL 2 TIMES DAILY
Qty: 60 CAPSULE | Refills: 6 | Status: ON HOLD | OUTPATIENT
Start: 2019-04-08 | End: 2019-06-19 | Stop reason: HOSPADM

## 2019-04-08 RX ORDER — ZALEPLON 10 MG/1
10 CAPSULE ORAL NIGHTLY
Qty: 30 CAPSULE | Refills: 0 | Status: SHIPPED | OUTPATIENT
Start: 2019-04-08 | End: 2019-05-08

## 2019-04-08 NOTE — TELEPHONE ENCOUNTER
Please let her know that I have spoken to the representatives for both [employer] and [employer], looks like the leads were actually placed according to where her pain was at the time initially.  When I had last seen her, she was complaining more of abdominal pain but also left leg pain.  Is this right leg pain new?  Also, since the leads are possibly in a position to potentially help, I would like her to meet with [employer] one last time to see if we can get any coverage.  I am going to have occurred us contact her.

## 2019-04-08 NOTE — TELEPHONE ENCOUNTER
I think currently at 25 bid lyrica -will incr to 75 mg bid and change lunesta to zalepon-if not helping needs OV([place])

## 2019-04-10 ENCOUNTER — HOSPITAL ENCOUNTER (OUTPATIENT)
Dept: RADIOLOGY | Facility: HOSPITAL | Age: 74
Discharge: HOME | End: 2019-04-10
Attending: INTERNAL MEDICINE
Payer: MEDICARE

## 2019-04-10 ENCOUNTER — OFFICE VISIT (OUTPATIENT)
Dept: PULMONOLOGY | Facility: CLINIC | Age: 74
End: 2019-04-10
Payer: MEDICARE

## 2019-04-10 ENCOUNTER — CLINICAL SUPPORT (OUTPATIENT)
Dept: PULMONOLOGY | Facility: CLINIC | Age: 74
End: 2019-04-10
Payer: MEDICARE

## 2019-04-10 VITALS
SYSTOLIC BLOOD PRESSURE: 112 MMHG | OXYGEN SATURATION: 97 % | HEIGHT: 60 IN | HEART RATE: 76 BPM | DIASTOLIC BLOOD PRESSURE: 60 MMHG | RESPIRATION RATE: 18 BRPM | BODY MASS INDEX: 25.11 KG/M2 | WEIGHT: 127.88 LBS

## 2019-04-10 DIAGNOSIS — J44.9 CHRONIC OBSTRUCTIVE PULMONARY DISEASE, UNSPECIFIED COPD TYPE: Primary | ICD-10-CM

## 2019-04-10 DIAGNOSIS — R91.8 MULTIPLE PULMONARY NODULES: ICD-10-CM

## 2019-04-10 DIAGNOSIS — J20.9 ACUTE BRONCHITIS WITH BRONCHOSPASM: ICD-10-CM

## 2019-04-10 DIAGNOSIS — J44.9 COPD, MODERATE: ICD-10-CM

## 2019-04-10 DIAGNOSIS — Z96.89 SPINAL CORD STIMULATOR STATUS: ICD-10-CM

## 2019-04-10 DIAGNOSIS — R91.8 PULMONARY NODULES: ICD-10-CM

## 2019-04-10 DIAGNOSIS — J30.89 NON-SEASONAL ALLERGIC RHINITIS, UNSPECIFIED TRIGGER: ICD-10-CM

## 2019-04-10 DIAGNOSIS — B02.23 NEUROPATHY DUE TO HERPES ZOSTER: ICD-10-CM

## 2019-04-10 PROCEDURE — 3078F DIAST BP <80 MM HG: CPT | Mod: CPTII,S$GLB,, | Performed by: INTERNAL MEDICINE

## 2019-04-10 PROCEDURE — 71250 CT THORAX DX C-: CPT | Mod: TC

## 2019-04-10 PROCEDURE — 99215 OFFICE O/P EST HI 40 MIN: CPT | Mod: 25,S$GLB,, | Performed by: INTERNAL MEDICINE

## 2019-04-10 PROCEDURE — 1101F PR PT FALLS ASSESS DOC 0-1 FALLS W/OUT INJ PAST YR: ICD-10-PCS | Mod: CPTII,S$GLB,, | Performed by: INTERNAL MEDICINE

## 2019-04-10 PROCEDURE — 99999 PR PBB SHADOW E&M-EST. PATIENT-LVL V: CPT | Mod: PBBFAC,,, | Performed by: INTERNAL MEDICINE

## 2019-04-10 PROCEDURE — 3078F PR MOST RECENT DIASTOLIC BLOOD PRESSURE < 80 MM HG: ICD-10-PCS | Mod: CPTII,S$GLB,, | Performed by: INTERNAL MEDICINE

## 2019-04-10 PROCEDURE — 3074F SYST BP LT 130 MM HG: CPT | Mod: CPTII,S$GLB,, | Performed by: INTERNAL MEDICINE

## 2019-04-10 PROCEDURE — 99999 PR PBB SHADOW E&M-EST. PATIENT-LVL V: ICD-10-PCS | Mod: PBBFAC,,, | Performed by: INTERNAL MEDICINE

## 2019-04-10 PROCEDURE — 94010 BREATHING CAPACITY TEST: ICD-10-PCS | Mod: S$GLB,,, | Performed by: INTERNAL MEDICINE

## 2019-04-10 PROCEDURE — 3074F PR MOST RECENT SYSTOLIC BLOOD PRESSURE < 130 MM HG: ICD-10-PCS | Mod: CPTII,S$GLB,, | Performed by: INTERNAL MEDICINE

## 2019-04-10 PROCEDURE — 1101F PT FALLS ASSESS-DOCD LE1/YR: CPT | Mod: CPTII,S$GLB,, | Performed by: INTERNAL MEDICINE

## 2019-04-10 PROCEDURE — 94010 BREATHING CAPACITY TEST: CPT | Mod: S$GLB,,, | Performed by: INTERNAL MEDICINE

## 2019-04-10 PROCEDURE — 99215 PR OFFICE/OUTPT VISIT, EST, LEVL V, 40-54 MIN: ICD-10-PCS | Mod: 25,S$GLB,, | Performed by: INTERNAL MEDICINE

## 2019-04-10 RX ORDER — FLUTICASONE PROPIONATE 50 MCG
2 SPRAY, SUSPENSION (ML) NASAL DAILY
Qty: 1 BOTTLE | Refills: 11 | Status: SHIPPED | OUTPATIENT
Start: 2019-04-10 | End: 2020-04-09

## 2019-04-10 RX ORDER — VIT C/E/ZN/COPPR/LUTEIN/ZEAXAN 250MG-90MG
CAPSULE ORAL
COMMUNITY
End: 2020-06-17 | Stop reason: SDUPTHER

## 2019-04-10 RX ORDER — ACETAMINOPHEN 500 MG
TABLET ORAL
COMMUNITY
End: 2019-04-10

## 2019-04-10 RX ORDER — TALC
POWDER (GRAM) TOPICAL
Status: ON HOLD | COMMUNITY
End: 2019-06-19 | Stop reason: HOSPADM

## 2019-04-10 RX ORDER — ALBUTEROL SULFATE 90 UG/1
2 AEROSOL, METERED RESPIRATORY (INHALATION) EVERY 6 HOURS PRN
Qty: 18 G | Refills: 11 | Status: SHIPPED | OUTPATIENT
Start: 2019-04-10 | End: 2020-06-17

## 2019-04-10 RX ORDER — DOXEPIN HYDROCHLORIDE 10 MG/1
CAPSULE ORAL
Refills: 5 | COMMUNITY
Start: 2019-04-04 | End: 2019-04-10

## 2019-04-10 RX ORDER — TRIAMTERENE AND HYDROCHLOROTHIAZIDE 37.5; 25 MG/1; MG/1
CAPSULE ORAL
Status: ON HOLD | COMMUNITY
End: 2019-06-19 | Stop reason: HOSPADM

## 2019-04-10 RX ORDER — HYDROGEN PEROXIDE 3 %
SOLUTION, NON-ORAL MISCELLANEOUS
COMMUNITY
End: 2020-06-04

## 2019-04-10 RX ORDER — MINERAL OIL
180 ENEMA (ML) RECTAL DAILY
Qty: 30 TABLET | Refills: 11 | Status: SHIPPED | OUTPATIENT
Start: 2019-04-10 | End: 2019-06-05

## 2019-04-10 NOTE — PROGRESS NOTES
Subjective:       Patient ID: Soumya Melchor is a 73 y.o. female.    Chief Complaint: COPD and Pulmonary Nodules    HPI COPD  She presents for evaluation and treatment of COPD. The patient is currently having symptoms / an exacerbation. Current symptoms include chronic dyspnea, dyspnea after 1/4 blocks, non-productive cough and cough productive of white sputum in small amounts. Symptoms have been present since several years ago and have been gradually worsening. She denies chills and fever. Associated symptoms include fatigue, poor exercise tolerance and shortness of breath.  This episode appears to have been triggered by pollens. Treatments tried for the current exacerbation: albuterol nebulizer. The patient has been having similar episodes for approximately 10 years. She uses 1 pillows at night. Patient currently is not on home oxygen therapy.. The patient is having no constitutional symptoms, denying fever, chills, anorexia, or weight loss. The patient has not been hospitalized for this condition before. She has a history of 50 pack years. The patient is experiencing exercise intolerance (difficulty walking 10 feet on flat ground).    My office received a request  for a scooter evaluation .  Patient has severe impairment of ambulation due to back pain from post herpetic neuralgia.  She has a nerve stimulator. She requires assistance in ambulation        Past Medical History:   Diagnosis Date    Acid reflux 4/11/2014    Anxiety and depression 3/6/2014    AP (angina pectoris) 2/13/2017    CAD (coronary artery disease) 2/13/2017    CAD, multiple vessel 2/13/2017    Chronic pain associated with significant psychosocial dysfunction 2/21/2013    CKD (chronic kidney disease) stage 3, GFR 30-59 ml/min     Dr. Vásquez    COPD (chronic obstructive pulmonary disease)     well controlled, Dr Gomez [facility]    History of shingles     HTN (hypertension)     Hypothyroidism     Multiple allergies     S/P  CABG (coronary artery bypass graft) 2017     Past Surgical History:   Procedure Laterality Date    AOROTOCORONARY BYPASS-CABG N/A 2017    Performed by [facility]s [facility] at [facility] OR    CARDIAC SURGERY  2017    CABG x3    COLONOSCOPY  ~    Dr. Perez; colon polyps removed    COLONOSCOPY N/A 3/15/2018    Performed by [facility]ito Calloway MD at [facility] ENDO    DILATION AND CURETTAGE OF UTERUS      EGD (ESOPHAGOGASTRODUODENOSCOPY) N/A 7/3/2018    Performed by Jordy Greenberg MD at [facility] ENDO    ESOPHAGOGASTRODUODENOSCOPY (EGD) N/A 3/15/2018    [facility]    HEART CATH-LEFT Left 2017    Performed by Giuliano Henry MD at [facility] CATH LAB    KNEE SURGERY Right     SPINAL CORD STIMULATOR IMPLANT  2018    for pain secondary to shingles, sees Dr Kam for pain management    TONSILLECTOMY      TUMOR REMOVAL      ULTRASOUND, ENDOSCOPIC, UPPER GI TRACT Left 7/3/2018    Per[facility] by [facility]y [facility]berg [facility] at [facility] ENDO    UPPER GASTROINTESTINAL ENDOSCOPY      XI ROBOTIC GASTRECTOMY-PARTIAL N/A 2018    Performed by Dre Grey MD at [facility] OR     Social History     Socioeconomic History    Marital status:      Spouse name: Not on file    Number of children: Not on file    Years of education: Not on file    Highest education level: Not on file   Occupational History    Not on file   Social Needs    Financial resource strain: Not on file    Food insecurity:     Worry: Not on file     Inability: Not on file    Transportation needs:     Medical: Not on file     Non-medical: Not on file   Tobacco Use    Smoking status: Former Smoker     Packs/day: 1.00     Years: 50.00     Pack years: 50.00     Last attempt to quit: 12/10/2012     Years since quittin.3    Smokeless tobacco: Never Used   Substance and Sexual Activity    Alcohol use: No    Drug use: No    Sexual activity: Not on file   Lifestyle    Physical activity:      Days per week: Not on file     Minutes per session: Not on file    Stress: Not on file   Relationships    Social connections:     Talks on phone: Not on file     Gets together: Not on file     Attends Jewish service: Not on file     Active member of club or organization: Not on file     Attends meetings of clubs or organizations: Not on file     Relationship status: Not on file   Other Topics Concern    Not on file   Social History Narrative    Not on file     Review of Systems   Constitutional: Positive for fatigue. Negative for fever.   HENT: Positive for postnasal drip, rhinorrhea and congestion.    Eyes: Negative for redness and itching.   Respiratory: Positive for cough, sputum production, shortness of breath, dyspnea on extertion, use of rescue inhaler and Paroxysmal Nocturnal Dyspnea.    Cardiovascular: Negative for chest pain, palpitations and leg swelling.   Genitourinary: Negative for difficulty urinating and hematuria.   Endocrine: Negative for cold intolerance and heat intolerance.    Musculoskeletal: Positive for arthralgias, back pain and gait problem.   Skin: Negative for rash.   Gastrointestinal: Negative for nausea and abdominal pain.   Neurological: Negative for dizziness, syncope, weakness and light-headedness.   Hematological: Negative for adenopathy. Does not bruise/bleed easily.   Psychiatric/Behavioral: Negative for sleep disturbance. The patient is not nervous/anxious.        Objective:      Physical Exam   Constitutional: She is oriented to person, place, and time. She appears well-developed and well-nourished.   HENT:   Head: Normocephalic and atraumatic.   Mouth/Throat: Oropharyngeal exudate present.   Eyes: Pupils are equal, round, and reactive to light. Conjunctivae are normal.   Neck: Neck supple. No JVD present. No tracheal deviation present. No thyromegaly present.   Cardiovascular: Normal rate, regular rhythm and normal heart sounds.   Pulmonary/Chest: Effort normal. No  respiratory distress. She has decreased breath sounds. She has wheezes in the right lower field and the left lower field. She has no rhonchi. She has no rales. She exhibits no tenderness.   Abdominal: Soft. Bowel sounds are normal.   Musculoskeletal: She exhibits no edema.   Decreased range of motion of back.     Lymphadenopathy:     She has no cervical adenopathy.   Neurological: She is alert and oriented to person, place, and time. She displays abnormal reflex. Coordination abnormal.   Skin: Skin is warm and dry.   Nursing note and vitals reviewed.    Personal Diagnostic Review  Chest X-Ray: I personally reviewed the films and findings are: hyperinflation stable  CT Chest Without Contrast  Narrative: EXAMINATION:  CT CHEST WITHOUT CONTRAST    CLINICAL HISTORY:  Other nonspecific abnormal finding of lung field6 month repeat CT chest to recheck the changes along the fissure inferolaterally on the right.;    TECHNIQUE:  Non-contrast axial CT images of the chest were obtained.  Multiplanar reconstruction images were obtained.    COMPARISON:  09/26/2018    FINDINGS:  Right lung:    Emphysema changes.  1.0 x 1.0 cm rounded noncalcified pulmonary nodule in the posterior aspect of the right upper lobe, image 121 series 3.  No detrimental change in this finding.    0.5 cm subpleural nodule in the periphery of the right upper lobe, image 140 series 3.  No detrimental change in this finding.    0.4 cm in the right mid lung zone, likely inferior aspect of the right upper lobe, image 213 series 3.  No detrimental change in this finding.    0.6 cm subpleural nodule or focal area of pleural thickening in the periphery of the right lower lobe near the fissure, image 305 series 3.  This finding was present before.    0.3 cm noncalcified pulmonary nodule in the right middle lobe, image 284 series 3.  No detrimental change in this finding.    Left lung:    Emphysema changes.  0.4 cm pulmonary nodule in the periphery of the left  upper lobe, image 168 series 3.  No detrimental change in this finding.    0.4 cm subpleural nodule in the lingular segment, image 293 series 3.  This finding was present previously.    2 mm subpleural nodule in the periphery of the left lower lobe, image 377 series 3.  This finding appears to have been present previously.    Subpleural area of thickening in the posterolateral left lower lobe, image 354 series 3.  This finding is similar to before.    No effusions.  Aortic and coronary artery calcifications.  Small mediastinal lymph nodes.  No pericardial fluid.  No acute upper abdominal findings.  Renal vascular calcifications.  Stimulation catheter within the thoracic spinal canal.  Sternotomy wires.  Impression: Bilateral pulmonary parenchymal nodular densities Jacque's subpleural nodule/pleural thickening, mostly similar to before.    All CT scans at this facility use dose modulation, iterative reconstruction, and/or weight based dosing when appropriate to reduce radiation dose to as low as reasonably achievable.    Electronically signed by: Nora Landrum MD  Date:    04/10/2019  Time:    12:26      Pulmonary function tests:  Mild/mod obstruciton  Pulmonary Studies Review 4/10/2019 4/1/2019 1/15/2019 1/3/2019 12/18/2018 11/6/2018 9/14/2018   SpO2 97 98 - 96 - - -   Ordering Provider - - - - - - -   Interpreting Provider - - - - - - -   Performing nurse/tech/RT - - - - - - -   Diagnosis - - - - - - -   Height 60.000 - 60.000 60.000 60.000 60.000 60.000   Weight 2046.4 2100.54 2240 2256 2224 2225.76 2128   BMI (Calculated) 25 - 27.4 27.6 27.2 27.2 26   Predicted Distance 284.75 440.75 269.77 268.53 271.02 271.02 278.51   Patient Race - - - - - - -   6MWT Status - - - - - - -   Patient Reported - - - - - - -   Was O2 used? - - - - - - -   Delivery Method - - - - - - -   Did patient stop? - - - - - - -   Type of assistive device(s) used? - - - - - - -   Is extra documentation required for this patient? - - - - - - -    Oxygen Saturation - - - - - - -   Supplemental Oxygen - - - - - - -   Heart Rate - - - - - - -   Blood Pressure - - - - - - -   John Paul Dyspnea Rating  - - - - - - -   Oxygen Saturation - - - - - - -   Supplemental Oxygen - - - - - - -   Heart Rate - - - - - - -   Blood Pressure - - - - - - -   John Paul Dyspnea Rating  - - - - - - -   Recovery Time (seconds) - - - - - - -   Oxygen Saturation - - - - - - -   Supplemental Oxygen - - - - - - -   Heart Rate - - - - - - -   Blood Pressure - - - - - - -   John Paul Dyspnea Rating  - - - - - - -   Is procedure ready for interpretation? - - - - - - -   Did the patient stop or pause? - - - - - - -   Total Time Walked (Calculated) - - - - - - -   Total Laps Walked - - - - - - -   Final Partial Lap Distance (feet) - - - - - - -   Total Distance Feet (Calculated) - - - - - - -   Total Distance Meters (Calculated) - - - - - - -   Predicted Distance Meters (Calculated) 422.21 - 409.64 408.6 410.68 410.57 416.91   Percentage of Predicted (Calculated) - - - - - - -   Peak VO2 (Calculated) - - - - - - -   Mets - - - - - - -   Has The Patient Had a Previous Six Minute Walk Test? - - - - - - -   Oxygen Qualification? - - - - - - -     No flowsheet data found.      Assessment:       1. 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 Spinal cord stimulator status    4. Pulmonary nodules    5. Acute bronchitis with bronchospasm    6. Non-seasonal allergic rhinitis, unspecified trigger        Outpatient Encounter Medications as of 4/10/2019   Medication Sig Dispense Refill    albuterol (PROVENTIL/VENTOLIN HFA) 90 mcg/actuation inhaler Inhale 2 puffs into the lungs every 6 (six) hours as needed for Wheezing. Rescue 18 g 11    amLODIPine (NORVASC) 5 MG tablet Take 1 tablet (5 mg total) by mouth once daily. 30 tablet 11    ascorbic acid, vitamin C, (VITAMIN C) 1000 MG tablet Take 1,000 mg by mouth once daily.       aspirin (ECOTRIN) 81 MG EC  tablet Take 81 mg by mouth once daily.      calcium carbonate-vitamin D2 500 mg(1,250mg) -200 unit Tab Take 1 tablet by mouth.      cholecalciferol, vitamin D3, (VITAMIN D3) 1,000 unit capsule 1 capsule      cholestyramine (QUESTRAN) 4 gram packet Take 1 packet (4 g total) by mouth once daily. 30 packet 1    cyanocobalamin (VITAMIN B-12) 250 MCG tablet Take 250 mcg by mouth once daily.      docosahexanoic acid-epa 120-180 mg Cap Take by mouth.      donepezil (ARICEPT) 10 MG tablet Take 1 tablet (10 mg total) by mouth once daily. 30 tablet 11    EPINEPHrine (EPIPEN) 0.3 mg/0.3 mL AtIn Inject 0.3 mg into the muscle.      esomeprazole (NEXIUM) 20 MG capsule 1 capsule      eszopiclone (LUNESTA) 3 mg Tab Take 1 tablet (3 mg total) by mouth every evening. 30 tablet 3    famotidine (PEPCID) 20 MG tablet TAKE 1 TABLET BY MOUTH EVERY DAY 30 tablet 3    fish oil-omega-3 fatty acids 300-1,000 mg capsule Take 1 capsule by mouth 2 (two) times daily.       FOLIC ACID/MULTIVIT-MIN/LUTEIN (CENTRUM SILVER ORAL) Take by mouth once daily.      furosemide (LASIX) 20 MG tablet Take 1 tablet (20 mg total) by mouth once daily. 30 tablet 6    gabapentin (NEURONTIN) 100 MG capsule Take 3 capsules (300 mg total) by mouth every evening. 90 capsule 12    guaiFENesin (MUCINEX) 600 mg 12 hr tablet Take 2 tablets (1,200 mg total) by mouth 2 (two) times daily.      HYDROcodone-acetaminophen (NORCO) 7.5-325 mg per tablet Take 1 tablet by mouth every 12 (twelve) hours as needed (pain). 30 tablet 0    L.acid/L.casei/B.bif/B.miryam/FOS (PROBIOTIC BLEND ORAL) Take by mouth once daily.       lactase (LACTAID ORAL) Take by mouth as needed.       levothyroxine (SYNTHROID) 200 MCG tablet Take 1 tablet (200 mcg total) by mouth once daily. 30 tablet 11    magnesium oxide (MAG-OX) 250 mg magnesium Tab 1 tablet with a meal      metoprolol tartrate (LOPRESSOR) 25 MG tablet Take 25 mg by mouth.      montelukast (SINGULAIR) 10 mg tablet Take 1  tablet (10 mg total) by mouth every evening. 30 tablet 11    niacin 500 MG CpSR Take 500 mg by mouth every evening.       omeprazole (PRILOSEC) 40 MG capsule TAKE ONE CAPSULE BY MOUTH EVERY DAY 30 capsule 11    oxybutynin (DITROPAN-XL) 5 MG TR24 Take 1 tablet (5 mg total) by mouth once daily. 30 tablet 12    oxyCODONE-acetaminophen (PERCOCET) 5-325 mg per tablet Take 1 tablet by mouth every 4 (four) hours as needed for Pain. 3 tablet 0    POTASSIUM GLUCONATE ORAL Take 99 mg by mouth.      pregabalin (LYRICA) 75 MG capsule Take 1 capsule (75 mg total) by mouth 2 (two) times daily. 60 capsule 6    pyridoxine, vitamin B6, (B-6) 100 MG Tab Take 100 mg by mouth.      SHINGRIX, PF, 50 mcg/0.5 mL injection ADM 0.5ML IM UTD  0    simvastatin (ZOCOR) 40 MG tablet Take 1 tablet (40 mg total) by mouth every evening. 30 tablet 12    traZODone (DESYREL) 50 MG tablet Take 1 tablet (50 mg total) by mouth every evening. 30 tablet 11    triamterene-hydrochlorothiazide 37.5-25 mg (DYAZIDE) 37.5-25 mg per capsule 1 capsule in the morning      valACYclovir (VALTREX) 500 MG tablet Take 1 tablet (500 mg total) by mouth 2 (two) times daily. 14 tablet 0    vitamins  A,C,E-zinc-copper (PRESERVISION AREDS) 14,320-226-200 unit-mg-unit Cap Take 2 capsules by mouth once daily.      zaleplon (SONATA) 10 MG capsule Take 1 capsule (10 mg total) by mouth every evening. 30 capsule 0    ZETIA 10 mg tablet TAKE 1 TABLET BY MOUTH EVERY EVENING 30 tablet 11    [DISCONTINUED] albuterol (PROVENTIL/VENTOLIN HFA) 90 mcg/actuation inhaler Inhale 2 puffs into the lungs every 6 (six) hours as needed for Wheezing. Rescue 18 g 11    [DISCONTINUED] cetirizine (ZYRTEC) 10 MG tablet Take 1 tablet (10 mg total) by mouth once daily. For sinus congestion (Patient taking differently: Take 10 mg by mouth every evening. For sinus congestion) 30 tablet 11    [DISCONTINUED] diphenhydramine-allantoin (ALLEGRA INTENSIVE RELIEF) 2-0.5 % Crea        [DISCONTINUED] doxepin (SINEQUAN) 10 MG capsule TAKE 1-3 CAPSULES BY MOUTH EVERY NIGHT AT BEDTIME AS NEEDED  5    [DISCONTINUED] eszopiclone (LUNESTA) 2 MG Tab Take 1 tablet (2 mg total) by mouth every evening.  5    [DISCONTINUED] melatonin 5 mg Tab 1 tablet at bedtime as needed with food      calcium-vitamin D3 500 mg(1,250mg) -200 unit per tablet Take 1 tablet by mouth 2 (two) times daily with meals. 60 tablet 11    fexofenadine (ALLEGRA) 180 MG tablet Take 1 tablet (180 mg total) by mouth once daily. 30 tablet 11    fluticasone (FLONASE) 50 mcg/actuation nasal spray 2 sprays (100 mcg total) by Each Nare route once daily. 1 Bottle 11    umeclidinium-vilanterol (ANORO ELLIPTA) 62.5-25 mcg/actuation DsDv Inhale 1 puff into the lungs once daily. Controller 60 each 11     No facility-administered encounter medications on file as of 4/10/2019.      Orders Placed This Encounter   Procedures    WALKER FOR HOME USE     Altered lower extremity sensation due to spinal stimulator     Order Specific Question:   Type of Walker:     Answer:   Rollator     Order Specific Question:   With wheels?     Answer:   Yes     Order Specific Question:   Height:     Answer:   5' (1.524 m)     Order Specific Question:   Weight:     Answer:   58 kg (127 lb 14.4 oz)     Order Specific Question:   Length of need (1-99 months):     Answer:   99     Order Specific Question:   Platform attachment:     Answer:   Right     Order Specific Question:   Please check all that apply:     Answer:   Patient's condition impairs ambulation.     Order Specific Question:   Please check all that apply:     Answer:   Altered sensory perception.    X-Ray Chest PA And Lateral     Standing Status:   Future     Standing Expiration Date:   10/10/2020     Order Specific Question:   Reason for Exam:     Answer:   SOB    Spirometry without Bronchodilator     Standing Status:   Future     Standing Expiration Date:   10/10/2020     Plan:       Requested  Prescriptions     Signed Prescriptions Disp Refills    umeclidinium-vilanterol (ANORO ELLIPTA) 62.5-25 mcg/actuation DsDv 60 each 11     Sig: Inhale 1 puff into the lungs once daily. Controller    albuterol (PROVENTIL/VENTOLIN HFA) 90 mcg/actuation inhaler 18 g 11     Sig: Inhale 2 puffs into the lungs every 6 (six) hours as needed for Wheezing. Rescue    fexofenadine (ALLEGRA) 180 MG tablet 30 tablet 11     Sig: Take 1 tablet (180 mg total) by mouth once daily.    fluticasone (FLONASE) 50 mcg/actuation nasal spray 1 Bottle 11     Si sprays (100 mcg total) by Each Nare route once daily.     Chronic obstructive pulmonary disease, unspecified COPD type  -     WALKER FOR HOME USE  -     umeclidinium-vilanterol (ANORO ELLIPTA) 62.5-25 mcg/actuation DsDv; Inhale 1 puff into the lungs once daily. 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 Rescue  Dispense: 18 g; Refill: 11    Non-seasonal allergic rhinitis, unspecified trigger  -     fexofenadine (ALLEGRA) 180 MG tablet; Take 1 tablet (180 mg total) by mouth once daily.  Dispense: 30 tablet; Refill: 11  -     fluticasone (FLONASE) 50 mcg/actuation nasal spray; 2 sprays (100 mcg total) by Each Nare route once daily.  Dispense: 1 Bottle; Refill: 11      Follow up in about 1 year (around 4/10/2020) for Review rodolfo and CXR.   MEDICAL DECISION MAKING: Moderate to high  complexity.  Overall, the multiple problems listed are of moderate to high severity that may impact quality of life and activities of daily living. Side effects of medications, treatment plan as well as options and alternatives reviewed and discussed with patient. There was counseling of patient concerning these issues.    Total time spent in face to face counseling and coordination of care - 40 minutes over 50% of time was used in discussion of prognosis, risks, benefits of treatment, instructions and compliance with regimen . Discussion with other physicians or health care providers (homehealth, durable medical equipment providers).

## 2019-04-10 NOTE — PATIENT INSTRUCTIONS
Please read Warning before using.    USE ONLY AS DIRECTED, IF SYMPTOMS PERSIST SEE YOUR DOCTOR/HEALTHCARE PROFESSIONAL. ALWAYS READ THE LABEL. IMPORTANT: NeilMed® SINUS RINSE Mixture Packets should be used with NeilMed® 240 mL (8 fl oz) NASAFLO® to achieve the best results. You may use NeilMed® packets with other irrigation devices, as long as you mix with the correct volume of water. Our recommendation is to replace Neti Pot every three months.  Step 1  Please wash your hands and rinse the device. Fill the NASAFLO® with 240 mL (8 fl oz) of lukewarm distilled, filtered or previously boiled water. Please do not use tap or faucet water to dissolve the mixture unless it has been previously boiled and cooled down. You may warm the water in a microwave, but we recommend that you warm it in increments of 5 to 10 seconds to avoid overheating, damaging the device or scalding your nasal passage. Step 2  Cut the SINUS RINSE mixture packet at the corner and pour contents into the pot. Tighten the lid on the device securely. Place one finger over the hole of the cap and shake the device gently to dissolve the mixture. Step 3  Standing in front of a sink, bend forward to your comfort level and tilt your head to one side. Keeping your mouth open, and without holding your breath, apply the tip of the device snugly against your nasal passage and ALLOW THE SOLUTION TO GENTLY FLOW until the solution starts draining from the opposite nasal passage. Use roughly half the solution in the NASAFLO® (120 mL / 4 fl oz).  It should not enter your mouth unless you are tilting your head backwards. To adjust or stop the flow, you may place your finger over the hole of the cap, and depending on the seal, you may be able to control the flow. Step 4  Blow your nose very gently, without pinching nose completely to avoid pressure on eardrums. If tolerable, sniff in gently any residual solution remaining in the nasal passage once or twice, because  this may clean out the posterior nasopharyngeal area, which is the area at the back of your nasal passage. At times, some solution will reach the back of your throat, so please spit it out.  For NASAFLO® users, to help drain any residual solution, blow your nose gently while tilting your head forward and to the same side of the nasal passage you just rinsed. Step 5  Now repeat steps 3 & 4 on your other nasal passage.  If there is any solution left over, please discard it. We recommend you make a fresh solution each time you rinse. Rinse once or twice daily OR as directed by your physician. Saline is considered safe.  The [place] FDA is recommending that common cough and cold over the counter medicines not be given to babies and toddlers, and that antihistamines should not be given to children under age 6. NeilMed® NASAFLO®: Please clean with soap & water and let air dry Please read Warning before using.    Our recommendation is to replace Neti Pot every three months.      [employer] [facility]  [facility]urable [facility]  [address] [place] [address] [id number] [phone] Phone / [phone] Fax / [phone] Toll Free

## 2019-04-11 ENCOUNTER — PATIENT MESSAGE (OUTPATIENT)
Dept: PULMONOLOGY | Facility: CLINIC | Age: 74
End: 2019-04-11

## 2019-04-11 LAB
BRPFT: ABNORMAL
FEF 25 75 LLN: 0.67
FEF 25 75 PRE REF: 32.2 %
FEF 25 75 REF: 1.54
FEV1 FVC LLN: 64
FEV1 FVC PRE REF: 67.7 %
FEV1 FVC REF: 78
FEV1 LLN: 1.27
FEV1 PRE REF: 72.3 %
FEV1 REF: 1.79
FEV6 LLN: 1.62
FEV6 PRE REF: 99.8 %
FEV6 PRE: 2.21 L (ref 1.62–2.81)
FEV6 REF: 2.21
FVC LLN: 1.65
FVC PRE REF: 106 %
FVC REF: 2.31
PEF LLN: 3.15
PEF PRE REF: 71.8 %
PEF REF: 4.65
PRE FEF 25 75: 0.49 L/S (ref 0.67–2.81)
PRE FET 100: 13.66 SEC
PRE FEV1 FVC: 52.82 % (ref 63.88–90.24)
PRE FEV1: 1.29 L (ref 1.27–2.28)
PRE FVC: 2.45 L (ref 1.65–3.01)
PRE PEF: 3.34 L/S (ref 3.15–6.15)

## 2019-04-15 ENCOUNTER — PATIENT MESSAGE (OUTPATIENT)
Dept: PAIN MEDICINE | Facility: CLINIC | Age: 74
End: 2019-04-15

## 2019-04-17 ENCOUNTER — PATIENT MESSAGE (OUTPATIENT)
Dept: PAIN MEDICINE | Facility: CLINIC | Age: 74
End: 2019-04-17

## 2019-04-30 ENCOUNTER — TELEPHONE (OUTPATIENT)
Dept: PAIN MEDICINE | Facility: CLINIC | Age: 74
End: 2019-04-30

## 2019-04-30 ENCOUNTER — OFFICE VISIT (OUTPATIENT)
Dept: PAIN MEDICINE | Facility: CLINIC | Age: 74
End: 2019-04-30
Payer: MEDICARE

## 2019-04-30 ENCOUNTER — PATIENT MESSAGE (OUTPATIENT)
Dept: CARDIOLOGY | Facility: CLINIC | Age: 74
End: 2019-04-30

## 2019-04-30 ENCOUNTER — TELEPHONE (OUTPATIENT)
Dept: CARDIOLOGY | Facility: CLINIC | Age: 74
End: 2019-04-30

## 2019-04-30 VITALS
DIASTOLIC BLOOD PRESSURE: 61 MMHG | SYSTOLIC BLOOD PRESSURE: 136 MMHG | BODY MASS INDEX: 25.96 KG/M2 | RESPIRATION RATE: 18 BRPM | HEART RATE: 78 BPM | WEIGHT: 132.94 LBS | OXYGEN SATURATION: 97 % | TEMPERATURE: 98 F

## 2019-04-30 DIAGNOSIS — G90.521 COMPLEX REGIONAL PAIN SYNDROME TYPE 1 OF RIGHT LOWER EXTREMITY: ICD-10-CM

## 2019-04-30 DIAGNOSIS — G89.4 CHRONIC PAIN SYNDROME: Primary | ICD-10-CM

## 2019-04-30 DIAGNOSIS — M54.16 LUMBAR RADICULITIS: ICD-10-CM

## 2019-04-30 DIAGNOSIS — G58.0 INTERCOSTAL NEUROPATHY: ICD-10-CM

## 2019-04-30 DIAGNOSIS — B02.29 POSTHERPETIC NEURALGIA: ICD-10-CM

## 2019-04-30 PROCEDURE — 3075F PR MOST RECENT SYSTOLIC BLOOD PRESS GE 130-139MM HG: ICD-10-PCS | Mod: CPTII,S$GLB,, | Performed by: ANESTHESIOLOGY

## 2019-04-30 PROCEDURE — 99999 PR PBB SHADOW E&M-EST. PATIENT-LVL V: CPT | Mod: PBBFAC,,, | Performed by: ANESTHESIOLOGY

## 2019-04-30 PROCEDURE — 99214 OFFICE O/P EST MOD 30 MIN: CPT | Mod: S$GLB,,, | Performed by: ANESTHESIOLOGY

## 2019-04-30 PROCEDURE — 3075F SYST BP GE 130 - 139MM HG: CPT | Mod: CPTII,S$GLB,, | Performed by: ANESTHESIOLOGY

## 2019-04-30 PROCEDURE — 1101F PT FALLS ASSESS-DOCD LE1/YR: CPT | Mod: CPTII,S$GLB,, | Performed by: ANESTHESIOLOGY

## 2019-04-30 PROCEDURE — 3078F PR MOST RECENT DIASTOLIC BLOOD PRESSURE < 80 MM HG: ICD-10-PCS | Mod: CPTII,S$GLB,, | Performed by: ANESTHESIOLOGY

## 2019-04-30 PROCEDURE — 99214 PR OFFICE/OUTPT VISIT, EST, LEVL IV, 30-39 MIN: ICD-10-PCS | Mod: S$GLB,,, | Performed by: ANESTHESIOLOGY

## 2019-04-30 PROCEDURE — 1101F PR PT FALLS ASSESS DOC 0-1 FALLS W/OUT INJ PAST YR: ICD-10-PCS | Mod: CPTII,S$GLB,, | Performed by: ANESTHESIOLOGY

## 2019-04-30 PROCEDURE — 99499 UNLISTED E&M SERVICE: CPT | Mod: S$GLB,,, | Performed by: ANESTHESIOLOGY

## 2019-04-30 PROCEDURE — 99999 PR PBB SHADOW E&M-EST. PATIENT-LVL V: ICD-10-PCS | Mod: PBBFAC,,, | Performed by: ANESTHESIOLOGY

## 2019-04-30 PROCEDURE — 99499 RISK ADDL DX/OHS AUDIT: ICD-10-PCS | Mod: S$GLB,,, | Performed by: ANESTHESIOLOGY

## 2019-04-30 PROCEDURE — 3078F DIAST BP <80 MM HG: CPT | Mod: CPTII,S$GLB,, | Performed by: ANESTHESIOLOGY

## 2019-04-30 NOTE — PROGRESS NOTES
This note was completed with dictation software and grammatical errors may exist.    CC:  Left abdominal wall pain, left lateral thigh pain    HPI:  The patient is a 73-year-old woman with a history of COPD, CAD, chronic abdominal wall pain who presents in referral from Dr. Mathews continued pain. She returns in follow-up today, she had met with the [employer] representative who tried optimize her programming and she does seem to be getting good coverage of her left abdominal wall and left leg pain. She actually states that she has almost no pain in the left leg and left abdomen but she has not tried turning off the device.  She is afraid that the pain will return.  While our last visit, she had complained of continued left abdominal pain, her main complaint is now pain is exactly in the mirror image on the right side in the right lower abdominal to mid abdominal region wrapping around her chest into her back in addition to right anterior thigh pain. This is burning and throbbing, states that it feels exactly the same as what she initially had on the left side.  She denies any fevers or chills, no lesions noted.    From 4/1/19 visit:   She returns in follow-up today to review records, continues to have left abdominal wall pain but states that it also crosses over to the midline and right side as well. I reviewed records from Dr. Nielson that states her leads were initially placed at T5, but unclear if this was the top of T5 over the bottom or middle.  Furthermore, notes from x-rays done after this state that one lead was in the midthoracic region and the other was in the lower thoracic region but there is no mention of specific levels.  We have obtained an x-ray of her spine that shows the left lead at T10 and T11 and the right lead over T6 through T8.  She states that she does get coverage of her left leg where she has pain but denies any improvement in her abdomen.  Since the last visit, she denies any major  changes.      Past history:  The patient reports that she has had a history of chronic left abdominal wall pain, was seen by multiple physicians about 10 years ago and was eventually diagnosed with post herpetic neuralgia, sounds like she never had any of the visible lesions but continued to have pain. She was seen by Dr. Huan Nielson and had undergone trial and implantation of a spinal cord stimulator for this pain and states that she was almost 100% pain free with this device.  She states that she had read that the battery only lasts for 10 years and so she requested a battery exchange at 9 years to make sure that she had continued coverage.  She states while she initially had a [employer] stimulator, when the battery was changed, it was changed to a [employer] device.  She was also told that one of the leads had frayed where it entered the IPG and she was told that this lead was replaced.  She cannot recall if the incision was made only over the IPG site or if they actually went into the initial midline incision site as well. Nonetheless, this was accomplished in January, 2018 and after this revision, she was doing well without left abdominal wall pain. 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 Dre Grey in August, 2018.  After that time, she states that her left abdominal wall pain returned and also her left lateral thigh pain returned.  She states that she has met with the [employer] representatives and has not been able to get the stimulator to provide relief.  She has difficulty with providing good history, I am not sure if she actually has any stimulation over her pain sites but nonetheless, she states that she is not having any relief.  She  also reports having pain in her lateral left thigh similar to her abdominal wall pain.  She is currently using lidocaine patches with some relief.  She also has been taking hydrocodone 7.5/325 every several days.  I do not see a record of her receiving this medication but states that she had this filled at [facility] in [place].        ROS:  She reports shortness of breath, easy bruising, bleeding problems, memory loss, difficulty sleeping, anxiety and depression.  Balance of review of systems is negative.    Past Medical History:   Diagnosis Date    Acid reflux 4/11/2014    Anxiety and depression 3/6/2014    AP (angina pectoris) 2/13/2017    CAD (coronary artery disease) 2/13/2017    CAD, multiple vessel 2/13/2017    Chronic pain associated with significant psychosocial dysfunction 2/21/2013    CKD (chronic kidney disease) stage 3, GFR 30-59 ml/min     Dr. Vásquez    COPD (chronic obstructive pulmonary disease)     well controlled, Dr [facility]    History of shingles     HTN (hypertension)     Hypothyroidism     Multiple allergies     S/P CABG (coronary artery bypass graft) 2/13/2017       Past Surgical History:   Procedure Laterality Date    AOROTOCORONARY BYPASS-CABG N/A 2/13/2017    [facility]    CARDIAC SURGERY  02/2017    CABG x3    COLONOSCOPY  ~2013    Dr. Perez; colon polyps removed    COLONOSCOPY N/A 3/15/2018    [facility]ay [facility] at [facility] ENDO    DILATION AND CURETTAGE OF UTERUS      EGD (ESOPHAGOGASTRODUODENOSCOPY) N/A 7/3/2018    [facility]berg [facility] at [facility] END[facility]    ESOPHAGOGASTRODUODENOSCOPY (EGD) N/A 3/15/2018    [facility] END[facility]    HEART CATH-LEFT Left 1/13/2017    Per[facility] Giuliano Henry [facility] at [facility] CATH LAB    KNEE SURGERY Right     SPINAL CORD STIMULATOR IMPLANT  02/12/2018    for pain secondary to shingles, sees Dr Kam for pain management     TONSILLECTOMY      TUMOR REMOVAL      ULTRASOUND, ENDOSCOPIC, UPPER GI TRACT Left 7/3/2018    [facility] ENDO    UPPER GASTROINTESTINAL ENDOSCOPY      XI ROBOTIC GASTRECTOMY-PARTIAL N/A 2018    Performed [facility] Dre Grey [facility] at [facility] OR       Social History     Socioeconomic History    Marital status:      Spouse name: Not on file    Number of children: Not on file    Years of education: Not on file    Highest education level: Not on file   Occupational History    Not on file   Social Needs    Financial resource strain: Not on file    Food insecurity:     Worry: Not on file     Inability: Not on file    Transportation needs:     Medical: Not on file     Non-medical: Not on file   Tobacco Use    Smoking status: Former Smoker     Packs/day: 1.00     Years: 50.00     Pack years: 50.00     Last attempt to quit: 12/10/2012     Years since quittin.3    Smokeless tobacco: Never Used   Substance and Sexual Activity    Alcohol use: No    Drug use: No    Sexual activity: Not on file   Lifestyle    Physical activity:     Days per week: Not on file     Minutes per session: Not on file    Stress: Not on file   Relationships    Social connections:     Talks on phone: Not on file     Gets together: Not on file     Attends Rastafari service: Not on file     Active member of club or organization: Not on file     Attends meetings of clubs or organizations: Not on file     Relationship status: Not on file   Other Topics Concern    Not on file   Social History Narrative    Not on file         Medications/Allergies: See med card    Vitals:    19 0918   BP: 136/61   Pulse: 78   Resp: 18   Temp: 97.5 °F (36.4 °C)   TempSrc: Oral   SpO2: 97%   Weight: 60.3 kg (132 lb 15 oz)   PainSc:   4   PainLoc: Abdomen         Physical exam:    Gen: A and O x3, pleasant, well-groomed  Skin: No rashes or obvious lesions  HEENT: PERRLA, no obvious deformities on ears or in  canals. Trachea midline.  CVS: Regular rate and rhythm, normal palpable pulses.  Resp:No increased work of breathing, symmetrical chest rise.  Abdomen: Soft, NT/ND.  She has tenderness palpation the left lower abdominal wall, allodynia with light touch across the left abdomen from about T7 down to T12 and extending into the left flank region as well now with similar symptoms on the right side as well, as well as allodynia across the right anterior thigh.  Musculoskeletal:No antalgic gait.     Neuro:  Lower extremities: 5/5 strength bilaterally  Reflexes: Patellar 2+, Achilles 2+ bilaterally.  Sensory:  Intact and symmetrical to light touch and pinprick in L2-S1 dermatomes bilaterally.    Lumbar spine:  Lumbar spine: ROM is full with flexion extension and oblique extension with no increased pain.    Ramon's test causes no increased pain on either side.    Supine straight leg raise is negative bilaterally.    Internal and external rotation of the hip causes no increased pain on either side.  Myofascial exam: No tenderness to palpation across lumbar paraspinous muscles.    Imagin18 CT Abdomen and pelvis:  1. Thickening of the gastric wall in the location adjacent to where a mass was seen on 2018 that may relate to partial gastrostomy.  The mass may relate to a hematoma or postsurgical change but residual disease is difficult to exclude.  Please clinically correlate follow-up for stability and/or correlation with endoscopy may be of use  2. Multiple stable pulmonary nodules at the lung bases favoring a benign etiology  3. Hepatic hypodensity at the dome of the liver stable since 16 suggesting a benign etiology  4. Apparent bladder wall thickening as can be seen with urinary tract infection, postobstructive change, or neurogenic bladder.      Assessment:   The patient is a 73-year-old woman with a history of COPD, CAD, chronic abdominal wall pain who presents in referral from Dr. Mathews continued  pain.     1. Chronic pain syndrome     2. Postherpetic neuralgia     3. Lumbar radiculitis     4. Intercostal neuropathy         Plan:  1.  She has developed similar symptoms on the right side to what she has had a on the left side.  Current spinal cord stimulator leads are all left-sided to treat her previous pain issues and the seem to be working well. Since she has developed similar symptoms on the right side, these leads are not covering what she currently has.  We would need to trial her with two separate leads one in the upper thoracic region, one in the lower thoracic region to cover both the intercostal neuralgia and what seems to be CRPS type pain in her right thigh.  I will have her get a psychological evaluation again.  She has already had workup by a general surgeon regarding her abdominal pain, she has seen GI and they have ruled out any gastric antral objects issues.  She has also seen urology.  She gets approval, I will see her in five days after the placement of the trial leads.    Greater than 50% of this 45min visit was spent educating and counseling this patient.

## 2019-04-30 NOTE — TELEPHONE ENCOUNTER
Please contact patient to schedule a psychiatric evaluation prior to spinal cord stimulator trial. Please let us know when this is completed and we will contact patient to schedule the surgery. Thanks.

## 2019-04-30 NOTE — TELEPHONE ENCOUNTER
Patient is to be scheduled for a spinal cord stimulator trial and will need to stop aspirin 7 days before. Please advise if this is okay. Thanks.

## 2019-04-30 NOTE — TELEPHONE ENCOUNTER
Message sent thru portal regarding lasix , she should continue. ----- Message from Tatum Barker sent at 4/30/2019  1:07 PM CDT -----  Contact: patient  Type:  Needs Medical Advice    Who Called: Patient  Symptoms (please be specific):    How long has patient had these symptoms:    Pharmacy name and phone #:    Would the patient rather a call back or a response via [facility]? My chart  Best Call Back Number:   Additional Information: Patient needs to know if she is to continue taking Lasix.

## 2019-05-02 ENCOUNTER — PATIENT MESSAGE (OUTPATIENT)
Dept: PAIN MEDICINE | Facility: CLINIC | Age: 74
End: 2019-05-02

## 2019-05-02 NOTE — TELEPHONE ENCOUNTER
I also wanted to let you know that patients can contact Asa directly to schedule as well (and sometimes that works even better for us) - they can contact her at [phone] and request to speak with Asa Velarde.  Thanks!

## 2019-05-03 RX ORDER — DONEPEZIL HYDROCHLORIDE 10 MG/1
10 TABLET, FILM COATED ORAL DAILY
Qty: 30 TABLET | Refills: 11 | OUTPATIENT
Start: 2019-05-03

## 2019-05-13 ENCOUNTER — DOCUMENTATION ONLY (OUTPATIENT)
Dept: PSYCHIATRY | Facility: CLINIC | Age: 74
End: 2019-05-13

## 2019-05-13 ENCOUNTER — OFFICE VISIT (OUTPATIENT)
Dept: PSYCHIATRY | Facility: CLINIC | Age: 74
End: 2019-05-13
Payer: MEDICARE

## 2019-05-13 DIAGNOSIS — M54.16 LUMBAR RADICULITIS: ICD-10-CM

## 2019-05-13 DIAGNOSIS — G90.521 COMPLEX REGIONAL PAIN SYNDROME TYPE 1 OF RIGHT LOWER EXTREMITY: ICD-10-CM

## 2019-05-13 DIAGNOSIS — B02.29 POSTHERPETIC NEURALGIA: ICD-10-CM

## 2019-05-13 DIAGNOSIS — Z63.79 OTHER STRESSFUL LIFE EVENTS AFFECTING FAMILY AND HOUSEHOLD: ICD-10-CM

## 2019-05-13 DIAGNOSIS — Z01.818 PREOP EXAMINATION: Primary | ICD-10-CM

## 2019-05-13 DIAGNOSIS — G89.4 CHRONIC PAIN SYNDROME: ICD-10-CM

## 2019-05-13 DIAGNOSIS — G58.0 INTERCOSTAL NEUROPATHY: ICD-10-CM

## 2019-05-13 PROCEDURE — 96131 PSYCL TST EVAL PHYS/QHP EA: CPT | Mod: S$GLB,,, | Performed by: PSYCHOLOGIST

## 2019-05-13 PROCEDURE — 96130 PR PSYCHOLOGIC TEST EVAL SVCS, 1ST HR: ICD-10-PCS | Mod: S$GLB,,, | Performed by: PSYCHOLOGIST

## 2019-05-13 PROCEDURE — 99999 PR PBB SHADOW E&M-EST. PATIENT-LVL I: CPT | Mod: PBBFAC,,, | Performed by: PSYCHOLOGIST

## 2019-05-13 PROCEDURE — 96130 PSYCL TST EVAL PHYS/QHP 1ST: CPT | Mod: S$GLB,,, | Performed by: PSYCHOLOGIST

## 2019-05-13 PROCEDURE — 96138 PSYCL/NRPSYC TECH 1ST: CPT | Mod: S$GLB,,, | Performed by: PSYCHOLOGIST

## 2019-05-13 PROCEDURE — 99999 PR PBB SHADOW E&M-EST. PATIENT-LVL I: ICD-10-PCS | Mod: PBBFAC,,, | Performed by: PSYCHOLOGIST

## 2019-05-13 PROCEDURE — 96138 PR PSYCH/NEUROPSYCH TEST ADMIN/SCORING, BY TECH, 2+ TESTS, 1ST 30 MIN: ICD-10-PCS | Mod: S$GLB,,, | Performed by: PSYCHOLOGIST

## 2019-05-13 PROCEDURE — 90791 PSYCH DIAGNOSTIC EVALUATION: CPT | Mod: S$GLB,,, | Performed by: PSYCHOLOGIST

## 2019-05-13 PROCEDURE — 96139 PSYCL/NRPSYC TST TECH EA: CPT | Mod: S$GLB,,, | Performed by: PSYCHOLOGIST

## 2019-05-13 PROCEDURE — 96139 PR PSYCH/NEUROPSYCH TEST ADMIN/SCORING, BY TECH, 2+ TESTS, EA ADDTL 30 MIN: ICD-10-PCS | Mod: S$GLB,,, | Performed by: PSYCHOLOGIST

## 2019-05-13 PROCEDURE — 96131 PR PSYCHOLOGIC TEST EVAL SVCS, EA ADDTL HR: ICD-10-PCS | Mod: S$GLB,,, | Performed by: PSYCHOLOGIST

## 2019-05-13 PROCEDURE — 90791 PR PSYCHIATRIC DIAGNOSTIC EVALUATION: ICD-10-PCS | Mod: S$GLB,,, | Performed by: PSYCHOLOGIST

## 2019-05-13 NOTE — PSYCH TESTING
[facility] [address] [place]  [address]  [phone]    REPORT OF PSYCHOLOGICAL TESTING    NAME: Soumya Melchor  OC #: [id number]  : 1945    REFERRED BY:  Ebenezer Rouse M.D.    EVALUATED BY:  uLh Quiroga, Ph.D., Clinical Psychologist  NOE Drew, Psychometrician    DATES OF EVALUATION:  2019, 2019    EVALUATION PROCEDURES AND TIMES:  Conducted by Psychologist (1 hour, 45 minutes):  Integration of patient data, interpretation of standardized test results and clinical data, clinical decision-making, treatment planning and report, and interactive feedback to the patient  CPT Codes:  07166 - 1 hour; 40435 - 1 hour  Conducted by Technician (1 hour, 10 minutes):  Psychological test administration and scoring by technician, two or more tests, any method:  [place] Multiphasic Personality Inventory - 2 - Restructured Form (MMPI-2-RF); Pain and Impairment Relationship Scale (PAIRS); Abreu Pain Catastrophizing Scale (PCS)  CPT Codes:  75793 - 30 minutes; 24957 - 30 minutes (1 additional units)    EVALUATION FINDINGS:  The diagnostic interview revealed that Ms. Soumya Melchor is a 73-year-old White  female referred for Psychological Evaluation prior to surgical implantation of a spinal cord stimulator (SCS) to address chronic pain.  Her pain has been present for the past 12 years when she had her first SCS.  She was treated with SCS by Dr. Huan Nielson with 100% left-sided pain reduction, but when the battery was changed two years ago it was discovered that one of the leads had frayed.  She has been in pain for the past 10 months.  She has chronic pain in her right abdominal wall that goes down to her leg.  She tried physical therapy and medications without receiving sufficient relief.  Her activities are greatly limited.  Ms. Melchor was able to walk to her appointment today with the assistance of a walker.    Ms. Melchor is now  interested in a trial of SCS.  She has reviewed the educational materials and is familiar with the procedures involved.  She understood risks including risks of surgery including bleeding, infection, failure of surgery, misplaced hardware, migration of hardware, need for reoperation, etc.  When asked about potential concerns regarding SCS, she indicated no concerns.  Her expectations regarding SCS include being pain-free again.  She is motivated to be more independent, dance with her  again, and return to work.  Her  (and possibly home health) will be available to help her during recovery after surgery.    Ms. Melchor was briefly treated for insomnia with various hypnotic medications; however, she has been able to sleep well without using prescribed trazodone.  She denied past psychiatric treatment.  While she does not report significant psychiatric problems, she acknowledges stressors including her relationship with her  and worries about her daughters.  Although she mainly employs avoidant coping strategies, she does not appear to experience significant distress or impairment related to stress.  Overall, she was pleasant and cooperative in interview and appeared to be in good spirits.    The medical record also revealed the following diagnoses:  Chronic pain syndrome; Postherpetic neuralgia; Lumbar radiculitis; Intercostal neuropathy; Complex regional pain syndrome type 1 of right lower extremity; CAD; COPD; Hypertension; S/P CABG; CKD; Hypothyroidism.    TEST DATA:  Psychological Testing data revealed that she was fully cooperative and engaged in the assessment process. Effort on all tests was satisfactory to produce valid results.    MMPI-2-RF.  The MMPI-2-RF provides an assessment of personality and psychopathology with specific evaluation of psychosocial risk factors associated with outcomes of spinal cord stimulation.  Ms. Melchor produced a valid and interpretable MMPI-2-RF profile,  answering items relevantly based on content.  She did respond to less than 90% of items related to aggression, and an absence of elevation on this scale is not interpretable.  Overall, her responses should be considered a relatively accurate reflection of her current psychological functioning.    Ms. Melchor reports somatic complaints including poor health, feeling weak and incapacitated, and low energy.  Emotionally, she reports multiple specific fears, anxiety, and being anger-prone.  She may have problems with irritability and low frustration tolerance.  Interpersonally, she describes herself as having strong opinions and being assertive.  Others may occasionally view her as domineering.  She reports cynical beliefs and distrust of others, which may lead to alienation or negative interpersonal experiences.   Compared to other SCS candidates, Ms. Melchor reports greater health concerns, anxiety, anger proneness, cynicism, and assertiveness.  The following likelihoods are based on test results and research, and are correlational rather than causational.  Pre-surgery, Ms. Melchor is more likely to report disability, pain catastrophizing, fear/avoidance of work and physical activities (and being out of work for more than two months), and having a partner who reinforces pain behaviors.  She is more likely to report anxiety and stress and anger.  She is less likely to seek out health information and be engaged in overall health.  She is more likely to smoke and may be at increased risk for opioid abuse.   Post-surgery, Ms. Melchor is more likely to report greater levels of disability.   Test data provides treatment recommendations that may help Ms. Melchor achieve better outcomes with SCS.  Based on somatic complaints, she may benefit from behavioral strategies to promote overall health improvement (weight loss, diet control, smoking cessation, sexual adaptation, and sleep hygiene).  Due to anxiety, she may benefit  from balanced and realistic perspectives about SCS through CBT, meditation, or biofeedback.  Based on anger and irritability, she should focus on collaborating with providers to prepare for the procedure and potential setbacks during recovery.  Due to distrust and suspiciousness, she may benefit from collaboration with providers and engagement in treatment planning to obtain information about her condition and range of outcomes and expectations.    PAIRS and PCS.  The PAIRS and PCS reveal beliefs and attitudes related to pain that may impact outcomes of spinal cord stimulation.  The PAIRS indicated significant complications related to perceptions of impairment with a total score of 85 (a score over 75 is clinically significant).  The PCS indicated significant catastrophizing of pain with a total score of 43, which is in the 95th percentile (a score over 30 is in the 75th percentile and is clinically significant).    Feedback.  Ms. Melchor was provided with test results, and offered the opportunity to respond to feedback and clarify results if needed.  She acknowledged physical health concerns and being assertive.  She has some anxiety about her children.  She is terrified of snakes, spiders, alligators, and other animals.  She acknowledged being anger-prone and having difficulties with frustration tolerance.  She denied cynicism or suspiciousness of others.  She noted that she has positive perceptions about SCS, so she does not believe she experiences pain-related catastrophizing and impairment.      DIAGNOSTIC IMPRESSIONS:    ICD-10-CM ICD-9-CM   1. Preop examination Z01.818 V72.84   2. Chronic pain syndrome G89.4 338.4   3. Postherpetic neuralgia B02.29 053.19   4. Lumbar radiculitis M54.16 724.4   5. Intercostal neuropathy G58.0 354.8   6. Complex regional pain syndrome type 1 of right lower extremity G90.521 337.22   7. Other stressful life events affecting family and household Z63.79 V61.09       SUMMARY AND  RECOMMENDATIONS:  Ms. Melchor has a long history of severe pain and is pursuing the spinal cord stimulator (SCS) in an effort to improve pain and quality of life.  Test results should be considered valid, and indicate that she reports somatic complaints, fear/anxiety, irritability, and significant pain-related catastrophizing and impairment (which she denied).  Her profile correlated with risks for post-surgery reports of disability.  Ms. Melchor was open to optional treatment recommendations based on her test results.  Based on research and recommendations by Sylwia et al. (2017) and Sylwia and Liudmila (2013) regarding presurgical psychological screening for pain control procedures, her test results and reports are within the expected range to predict adequate outcomes and patient satisfaction.  In the clinical interview, Ms. Melchor reports current stressors that are adequately managed with coping strategies.  She denied past psychiatric treatment, and there are no recommendations for psychological treatment at this time.  This evaluation revealed NO contraindications to SCS from a psychological perspective.        Report and interpretation and coding were completed on 05/13/2019.
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
## 2019-05-13 NOTE — Clinical Note
Please see report for additional information as needed.  Overall, there are no overt psychological contraindications for the spinal cord stimulator procedure. Please do not hesitate to contact me with any questions or concerns. JR Jose Elias

## 2019-05-13 NOTE — PROGRESS NOTES
From presurgical psychological evaluation for SCS on 05/13/2019:    Please see report for additional information as needed.  Overall, there are no overt psychological contraindications for the spinal cord stimulator procedure. Please do not hesitate to contact me with any questions or concerns. JR Jose Elias

## 2019-05-14 ENCOUNTER — PATIENT MESSAGE (OUTPATIENT)
Dept: PAIN MEDICINE | Facility: CLINIC | Age: 74
End: 2019-05-14

## 2019-05-14 DIAGNOSIS — G89.4 CHRONIC PAIN SYNDROME: Primary | ICD-10-CM

## 2019-05-14 RX ORDER — SODIUM CHLORIDE, SODIUM LACTATE, POTASSIUM CHLORIDE, CALCIUM CHLORIDE 600; 310; 30; 20 MG/100ML; MG/100ML; MG/100ML; MG/100ML
INJECTION, SOLUTION INTRAVENOUS CONTINUOUS
Status: CANCELLED | OUTPATIENT
Start: 2019-06-03

## 2019-05-14 NOTE — TELEPHONE ENCOUNTER
Spoke with patient. The psych evaluation was received.  The stimulator trial is scheduled for 6/3 with a post op on 6/7. The pre-op instructions were reviewed with patient. She is coming in for the nasal swab on 5/20. She stated she will also need Hibiclens sent to the pharmacy. Thanks.

## 2019-05-15 RX ORDER — CHLORHEXIDINE GLUCONATE 40 MG/ML
SOLUTION TOPICAL
Qty: 120 ML | Refills: 0 | Status: SHIPPED | OUTPATIENT
Start: 2019-05-15 | End: 2020-06-17

## 2019-05-19 ENCOUNTER — PATIENT MESSAGE (OUTPATIENT)
Dept: SURGERY | Facility: HOSPITAL | Age: 74
End: 2019-05-19

## 2019-05-20 ENCOUNTER — CLINICAL SUPPORT (OUTPATIENT)
Dept: PAIN MEDICINE | Facility: CLINIC | Age: 74
End: 2019-05-20
Payer: MEDICARE

## 2019-05-20 DIAGNOSIS — Z11.9 SCREENING EXAMINATION FOR INFECTIOUS DISEASE: Primary | ICD-10-CM

## 2019-05-20 PROCEDURE — 87081 CULTURE SCREEN ONLY: CPT

## 2019-05-22 LAB — MRSA SPEC QL CULT: NORMAL

## 2019-05-23 ENCOUNTER — PATIENT MESSAGE (OUTPATIENT)
Dept: FAMILY MEDICINE | Facility: CLINIC | Age: 74
End: 2019-05-23

## 2019-05-26 ENCOUNTER — PATIENT MESSAGE (OUTPATIENT)
Dept: SURGERY | Facility: HOSPITAL | Age: 74
End: 2019-05-26

## 2019-05-28 ENCOUNTER — TELEPHONE (OUTPATIENT)
Dept: FAMILY MEDICINE | Facility: CLINIC | Age: 74
End: 2019-05-28

## 2019-05-28 NOTE — TELEPHONE ENCOUNTER
----- Message from Laurel Emmanuel sent at 5/28/2019 10:58 AM CDT -----  Contact: self  Type:  Sooner Apoointment Request    Caller is requesting a sooner appointment.  Caller declined first available appointment listed below.  Caller will not accept being placed on the waitlist and is requesting a message be sent to doctor.  Name of Caller:Soumya  When is the first available appointment?07/15/19  Symptoms:hospital f/u  Would the patient rather a call back or a response [facility]? call  Best Call Back Number:[phone]  Additional Information: pt is not willing to see anyone else

## 2019-05-29 ENCOUNTER — TELEPHONE (OUTPATIENT)
Dept: PAIN MEDICINE | Facility: CLINIC | Age: 74
End: 2019-05-29

## 2019-05-29 NOTE — TELEPHONE ENCOUNTER
Spoke with the patient and information regarding the trial, post op and the perm was discussed. No further questions.

## 2019-05-29 NOTE — TELEPHONE ENCOUNTER
Can I use an urgent spot next week for a hospital follow up, pt refused to see a NP stating she was taken off several medications and had a few other questions regarding her hospital stay

## 2019-05-29 NOTE — TELEPHONE ENCOUNTER
----- Message from Diane Leach sent at 5/29/2019  9:25 AM CDT -----  Contact: self  Type:  Needs Medical Advice    Who Called: pt  Symptoms (please be specific):n/a   How long has patient had these symptoms:n/a  Pharmacy name and phone #: n/a  Would the patient rather a call back or a response via [facility]? Call back  Best Call Back Number: [phone]  Additional Information: requesting call back regarding questions about appt.    Thanks,  Diane Leach

## 2019-05-29 NOTE — TELEPHONE ENCOUNTER
Spoke with patient. The procedure has been rescheduled to 6/19. Pre-op instructions were reviewed with patient and she has a copy of the instructions.

## 2019-06-02 ENCOUNTER — PATIENT MESSAGE (OUTPATIENT)
Dept: FAMILY MEDICINE | Facility: CLINIC | Age: 74
End: 2019-06-02

## 2019-06-02 RX ORDER — AMLODIPINE BESYLATE 5 MG/1
TABLET ORAL
Qty: 30 TABLET | Refills: 11 | Status: SHIPPED | OUTPATIENT
Start: 2019-06-02 | End: 2020-05-22 | Stop reason: ALTCHOICE

## 2019-06-02 RX ORDER — SIMVASTATIN 40 MG/1
40 TABLET, FILM COATED ORAL NIGHTLY
Qty: 30 TABLET | Refills: 12 | Status: SHIPPED | OUTPATIENT
Start: 2019-06-02 | End: 2020-06-16

## 2019-06-03 RX ORDER — ZALEPLON 10 MG/1
10 CAPSULE ORAL NIGHTLY
Qty: 30 CAPSULE | Refills: 5 | Status: ON HOLD | OUTPATIENT
Start: 2019-06-03 | End: 2019-06-19 | Stop reason: HOSPADM

## 2019-06-04 ENCOUNTER — TELEPHONE (OUTPATIENT)
Dept: PULMONOLOGY | Facility: CLINIC | Age: 74
End: 2019-06-04

## 2019-06-04 ENCOUNTER — TELEPHONE (OUTPATIENT)
Dept: PAIN MEDICINE | Facility: CLINIC | Age: 74
End: 2019-06-04

## 2019-06-04 DIAGNOSIS — J30.89 NON-SEASONAL ALLERGIC RHINITIS, UNSPECIFIED TRIGGER: Primary | ICD-10-CM

## 2019-06-05 ENCOUNTER — OFFICE VISIT (OUTPATIENT)
Dept: FAMILY MEDICINE | Facility: CLINIC | Age: 74
End: 2019-06-05
Payer: MEDICARE

## 2019-06-05 ENCOUNTER — PATIENT OUTREACH (OUTPATIENT)
Dept: ADMINISTRATIVE | Facility: HOSPITAL | Age: 74
End: 2019-06-05

## 2019-06-05 VITALS
HEIGHT: 60 IN | SYSTOLIC BLOOD PRESSURE: 114 MMHG | HEART RATE: 74 BPM | TEMPERATURE: 98 F | DIASTOLIC BLOOD PRESSURE: 68 MMHG | WEIGHT: 132 LBS | BODY MASS INDEX: 25.91 KG/M2

## 2019-06-05 DIAGNOSIS — J43.8 OTHER EMPHYSEMA: ICD-10-CM

## 2019-06-05 DIAGNOSIS — I10 ESSENTIAL HYPERTENSION: ICD-10-CM

## 2019-06-05 DIAGNOSIS — E03.4 HYPOTHYROIDISM DUE TO ACQUIRED ATROPHY OF THYROID: ICD-10-CM

## 2019-06-05 DIAGNOSIS — K21.9 GASTROESOPHAGEAL REFLUX DISEASE, ESOPHAGITIS PRESENCE NOT SPECIFIED: ICD-10-CM

## 2019-06-05 DIAGNOSIS — G89.4 CHRONIC PAIN ASSOCIATED WITH SIGNIFICANT PSYCHOSOCIAL DYSFUNCTION: ICD-10-CM

## 2019-06-05 DIAGNOSIS — J18.9 PNEUMONIA DUE TO INFECTIOUS ORGANISM, UNSPECIFIED LATERALITY, UNSPECIFIED PART OF LUNG: Primary | ICD-10-CM

## 2019-06-05 PROCEDURE — 99499 RISK ADDL DX/OHS AUDIT: ICD-10-PCS | Mod: S$GLB,,, | Performed by: FAMILY MEDICINE

## 2019-06-05 PROCEDURE — 1100F PR PT FALLS ASSESS DOC 2+ FALLS/FALL W/INJURY/YR: ICD-10-PCS | Mod: CPTII,S$GLB,, | Performed by: FAMILY MEDICINE

## 2019-06-05 PROCEDURE — 3074F PR MOST RECENT SYSTOLIC BLOOD PRESSURE < 130 MM HG: ICD-10-PCS | Mod: CPTII,S$GLB,, | Performed by: FAMILY MEDICINE

## 2019-06-05 PROCEDURE — 3074F SYST BP LT 130 MM HG: CPT | Mod: CPTII,S$GLB,, | Performed by: FAMILY MEDICINE

## 2019-06-05 PROCEDURE — 1100F PTFALLS ASSESS-DOCD GE2>/YR: CPT | Mod: CPTII,S$GLB,, | Performed by: FAMILY MEDICINE

## 2019-06-05 PROCEDURE — 99214 PR OFFICE/OUTPT VISIT, EST, LEVL IV, 30-39 MIN: ICD-10-PCS | Mod: S$GLB,,, | Performed by: FAMILY MEDICINE

## 2019-06-05 PROCEDURE — 99214 OFFICE O/P EST MOD 30 MIN: CPT | Mod: S$GLB,,, | Performed by: FAMILY MEDICINE

## 2019-06-05 PROCEDURE — 3288F PR FALLS RISK ASSESSMENT DOCUMENTED: ICD-10-PCS | Mod: CPTII,S$GLB,, | Performed by: FAMILY MEDICINE

## 2019-06-05 PROCEDURE — 3288F FALL RISK ASSESSMENT DOCD: CPT | Mod: CPTII,S$GLB,, | Performed by: FAMILY MEDICINE

## 2019-06-05 PROCEDURE — 99999 PR PBB SHADOW E&M-EST. PATIENT-LVL IV: ICD-10-PCS | Mod: PBBFAC,,, | Performed by: FAMILY MEDICINE

## 2019-06-05 PROCEDURE — 3078F DIAST BP <80 MM HG: CPT | Mod: CPTII,S$GLB,, | Performed by: FAMILY MEDICINE

## 2019-06-05 PROCEDURE — 3078F PR MOST RECENT DIASTOLIC BLOOD PRESSURE < 80 MM HG: ICD-10-PCS | Mod: CPTII,S$GLB,, | Performed by: FAMILY MEDICINE

## 2019-06-05 PROCEDURE — 99999 PR PBB SHADOW E&M-EST. PATIENT-LVL IV: CPT | Mod: PBBFAC,,, | Performed by: FAMILY MEDICINE

## 2019-06-05 PROCEDURE — 99499 UNLISTED E&M SERVICE: CPT | Mod: S$GLB,,, | Performed by: FAMILY MEDICINE

## 2019-06-05 RX ORDER — DONEPEZIL HYDROCHLORIDE 10 MG/1
10 TABLET, FILM COATED ORAL DAILY
Qty: 30 TABLET | Refills: 11 | Status: SHIPPED | OUTPATIENT
Start: 2019-06-05 | End: 2020-06-16

## 2019-06-05 RX ORDER — LEVOTHYROXINE SODIUM 200 UG/1
200 TABLET ORAL DAILY
Qty: 30 TABLET | Refills: 11 | Status: SHIPPED | OUTPATIENT
Start: 2019-06-05 | End: 2020-06-16

## 2019-06-05 RX ORDER — DOXEPIN HYDROCHLORIDE 10 MG/1
CAPSULE ORAL
Refills: 5 | Status: ON HOLD | COMMUNITY
Start: 2019-05-15 | End: 2019-06-19 | Stop reason: HOSPADM

## 2019-06-05 RX ORDER — LEVOFLOXACIN 750 MG/1
750 TABLET ORAL EVERY OTHER DAY
Refills: 0 | Status: ON HOLD | COMMUNITY
Start: 2019-05-27 | End: 2019-06-19 | Stop reason: HOSPADM

## 2019-06-05 RX ORDER — CETIRIZINE HYDROCHLORIDE 10 MG/1
TABLET ORAL
Refills: 11 | COMMUNITY
Start: 2019-05-03 | End: 2019-06-10 | Stop reason: SDUPTHER

## 2019-06-05 NOTE — PROGRESS NOTES
The patient presents today to fu recent admit for Pn tx w levoquin and resolved symptoms Follows w PMR dt neuropathy. Collapsed leg this am dt weakness but doing ok now other than pain thigh and ant tib area whch she says is cw prev dx shingles wo derm findings    Past Medical History:  Past Medical History:   Diagnosis Date    Acid reflux 4/11/2014    Anxiety and depression 3/6/2014    AP (angina pectoris) 2/13/2017    CAD (coronary artery disease) 2/13/2017    CAD, multiple vessel 2/13/2017    Chronic pain associated with significant psychosocial dysfunction 2/21/2013    CKD (chronic kidney disease) stage 3, GFR 30-59 ml/min     Dr. Vásquez    COPD (chronic obstructive pulmonary disease)     well controlled, Dr [facility]    History of shingles     HTN (hypertension)     Hypothyroidism     Multiple allergies     S/P CABG (coronary artery bypass graft) 2/13/2017     Past Surgical History:   Procedure Laterality Date    AOROTOCORONARY BYPASS-CABG N/A 2/13/2017    [facility]    CARDIAC SURGERY  02/2017    CABG x3    COLONOSCOPY  ~2013    Dr. Perez; colon polyps removed    COLONOSCOPY N/A 3/15/2018    [facility] at [facility] ENDO    DILATION AND CURETTAGE OF UTERUS      EGD (ESOPHAGOGASTRODUODENOSCOPY) N/A 7/3/2018    Per[facility] at [facility] ENDO    ESOPHAGOGASTRODUODENOSCOPY (EGD) N/A 3/15/2018    Per[facility] at [facility] ENDO    HEART CATH-LEFT Left 1/13/2017    Performed by Giuliano Henry [facility] at [facility] CATH LAB    KNEE SURGERY Right     SPINAL CORD STIMULATOR IMPLANT  02/12/2018    for pain secondary to shingles, sees Dr Kam for pain management    TONSILLECTOMY      TUMOR REMOVAL      ULTRASOUND, ENDOSCOPIC, UPPER GI TRACT Left 7/3/2018    [facility]    UPPER GASTROINTESTINAL ENDOSCOPY      XI ROBOTIC GASTRECTOMY-PARTIAL N/A 7/31/2018     [facility] OR     Review of patient's allergies indicates:   Allergen Reactions    Losartan Swelling     angioedema    Ace inhibitors Other (See Comments) and Swelling     unknown    Angioedema    Arb-angiotensin receptor antagonist Other (See Comments)     unknown  unknown      Iodine and iodide containing products Hives     Since age of 48    Lisinopril      angioedema    Metoprolol tartrate      swelling    Shrimp Other (See Comments) and Swelling     Localized itching and swelling on her hands if she peels shrimp.  Able to eat shrimp without difficulty.  No generalized reaction.     Current Outpatient Medications on File Prior to Visit   Medication Sig Dispense Refill    albuterol (PROVENTIL/VENTOLIN HFA) 90 mcg/actuation inhaler Inhale 2 puffs into the lungs every 6 (six) hours as needed for Wheezing. Rescue 18 g 11    amLODIPine (NORVASC) 5 MG tablet TAKE 1 TABLET BY MOUTH ONCE DAILY 30 tablet 11    ascorbic acid, vitamin C, (VITAMIN C) 1000 MG tablet Take 1,000 mg by mouth once daily.       aspirin (ECOTRIN) 81 MG EC tablet Take 81 mg by mouth once daily.      calcium carbonate-vitamin D2 500 mg(1,250mg) -200 unit Tab Take 1 tablet by mouth.      calcium-vitamin D3 500 mg(1,250mg) -200 unit per tablet Take 1 tablet by mouth 2 (two) times daily with meals. 60 tablet 11    cetirizine (ZYRTEC) 10 MG tablet TAKE 1 TABLET BY MOUTH ONCE DAILY for sinus CONGESTION  11    cholecalciferol, vitamin D3, (VITAMIN D3) 1,000 unit capsule 1 capsule      cyanocobalamin (VITAMIN B-12) 250 MCG tablet Take 250 mcg by mouth once daily.      donepezil (ARICEPT) 10 MG tablet Take 1 tablet (10 mg total) by mouth once daily. 30 tablet 11    doxepin (SINEQUAN) 10 MG capsule TAKE 1-3 CAPSULES BY MOUTH EVERY NIGHT AT BEDTIME AS NEEDED  5    esomeprazole (NEXIUM) 20 MG capsule 1 capsule      fish oil-omega-3 fatty acids 300-1,000 mg capsule Take 1 capsule by mouth 2 (two) times daily.        furosemide (LASIX) 20 MG tablet Take 1 tablet (20 mg total) by mouth once daily. (Patient taking differently: Take 20 mg by mouth once daily. ) 30 tablet 6    gabapentin (NEURONTIN) 100 MG capsule Take 3 capsules (300 mg total) by mouth every evening. 90 capsule 12    guaiFENesin (MUCINEX) 600 mg 12 hr tablet Take 2 tablets (1,200 mg total) by mouth 2 (two) times daily.      lactase (LACTAID ORAL) Take by mouth as needed.       levoFLOXacin (LEVAQUIN) 750 MG tablet Take 750 mg by mouth every other day.  0    levothyroxine (SYNTHROID) 200 MCG tablet Take 1 tablet (200 mcg total) by mouth once daily. 30 tablet 11    montelukast (SINGULAIR) 10 mg tablet Take 1 tablet (10 mg total) by mouth every evening. 30 tablet 11    oxybutynin (DITROPAN-XL) 5 MG TR24 Take 1 tablet (5 mg total) by mouth once daily. 30 tablet 12    pregabalin (LYRICA) 75 MG capsule Take 1 capsule (75 mg total) by mouth 2 (two) times daily. 60 capsule 6    pyridoxine, vitamin B6, (B-6) 100 MG Tab Take 100 mg by mouth.      simvastatin (ZOCOR) 40 MG tablet Take 1 tablet (40 mg total) by mouth every evening. 30 tablet 12    umeclidinium-vilanterol (ANORO ELLIPTA) 62.5-25 mcg/actuation DsDv Inhale 1 puff into the lungs once daily. Controller 60 each 11    vitamins  A,C,E-zinc-copper (PRESERVISION AREDS) 14,320-226-200 unit-mg-unit Cap Take 2 capsules by mouth once daily.      zaleplon (SONATA) 10 MG capsule Take 1 capsule (10 mg total) by mouth every evening. 30 capsule 5    chlorhexidine (HIBICLENS) 4 % external liquid Use to shower night before procedure and day of procedure 120 mL 0    cholestyramine (QUESTRAN) 4 gram packet Take 1 packet (4 g total) by mouth once daily. 30 packet 1    docosahexanoic acid-epa 120-180 mg Cap Take by mouth.      EPINEPHrine (EPIPEN) 0.3 mg/0.3 mL AtIn Inject 0.3 mg into the muscle.      famotidine (PEPCID) 20 MG tablet TAKE 1 TABLET BY MOUTH EVERY DAY 30 tablet 3    fluticasone (FLONASE) 50  mcg/actuation nasal spray 2 sprays (100 mcg total) by Each Nare route once daily. 1 Bottle 11    FOLIC ACID/MULTIVIT-MIN/LUTEIN (CENTRUM SILVER ORAL) Take by mouth once daily.      HYDROcodone-acetaminophen (NORCO) 7.5-325 mg per tablet Take 1 tablet by mouth every 12 (twelve) hours as needed (pain). 30 tablet 0    L.acid/L.casei/B.bif/B.miryam/FOS (PROBIOTIC BLEND ORAL) Take by mouth once daily.       magnesium oxide (MAG-OX) 250 mg magnesium Tab 1 tablet with a meal      metoprolol tartrate (LOPRESSOR) 25 MG tablet Take 25 mg by mouth.      niacin 500 MG CpSR Take 500 mg by mouth every evening.       omeprazole (PRILOSEC) 40 MG capsule TAKE ONE CAPSULE BY MOUTH EVERY DAY 30 capsule 11    oxyCODONE-acetaminophen (PERCOCET) 5-325 mg per tablet Take 1 tablet by mouth every 4 (four) hours as needed for Pain. 3 tablet 0    POTASSIUM GLUCONATE ORAL Take 99 mg by mouth.      SHINGRIX, PF, 50 mcg/0.5 mL injection ADM 0.5ML IM UTD  0    traZODone (DESYREL) 50 MG tablet Take 1 tablet (50 mg total) by mouth every evening. 30 tablet 11    triamterene-hydrochlorothiazide 37.5-25 mg (DYAZIDE) 37.5-25 mg per capsule 1 capsule in the morning      valACYclovir (VALTREX) 500 MG tablet Take 1 tablet (500 mg total) by mouth 2 (two) times daily. 14 tablet 0    ZETIA 10 mg tablet TAKE 1 TABLET BY MOUTH EVERY EVENING 30 tablet 11    [DISCONTINUED] fexofenadine (ALLEGRA) 180 MG tablet Take 1 tablet (180 mg total) by mouth once daily. 30 tablet 11     No current facility-administered medications on file prior to visit.      Social History     Socioeconomic History    Marital status:      Spouse name: Not on file    Number of children: Not on file    Years of education: Not on file    Highest education level: Not on file   Occupational History    Not on file   Social Needs    Financial resource strain: Not on file    Food insecurity:     Worry: Not on file     Inability: Not on file    Transportation needs:      Medical: Not on file     Non-medical: Not on file   Tobacco Use    Smoking status: Former Smoker     Packs/day: 1.00     Years: 50.00     Pack years: 50.00     Last attempt to quit: 12/10/2012     Years since quittin.4    Smokeless tobacco: Never Used   Substance and Sexual Activity    Alcohol use: No    Drug use: No    Sexual activity: Not on file   Lifestyle    Physical activity:     Days per week: Not on file     Minutes per session: Not on file    Stress: Not on file   Relationships    Social connections:     Talks on phone: Not on file     Gets together: Not on file     Attends Faith service: Not on file     Active member of club or organization: Not on file     Attends meetings of clubs or organizations: Not on file     Relationship status: Not on file   Other Topics Concern    Not on file   Social History Narrative    Not on file     Family History   Problem Relation Age of Onset    Heart attacks under age 50 Mother 41    Cancer Father         prostate    Colon cancer Neg Hx     Colon polyps Neg Hx     Crohn's disease Neg Hx     Ulcerative colitis Neg Hx     Celiac disease Neg Hx          ROS:GENERAL: No fever, chills, fatigability or weight loss.  SKIN: No rashes, itching or changes in color or texture of skin.  HEAD: No headaches or recent head trauma.EYES: Visual acuity fine. No photophobia, ocular pain or diplopia.EARS: Denies ear pain, discharge or vertigo.NOSE: No loss of smell, no epistaxis or postnasal drip.MOUTH & THROAT: No hoarseness or change in voice. No excessive gum bleeding.NODES: Denies swollen glands.  CHEST: Denies JOHN, cyanosis, wheezing, cough and sputum production.  CARDIOVASCULAR: Denies chest pain, PND, orthopnea or reduced exercise tolerance.  ABDOMEN: Appetite fine. No weight loss. Denies diarrhea, abdominal pain, hematemesis or blood in stool.  URINARY: No flank pain, dysuria or hematuria.  PERIPHERAL VASCULAR: No claudication or cyanosis.  MUSCULOSKELETAL:  See above.  NEUROLOGIC: No history of seizures, paralysis, alteration of gait or coordination.  PE:   HEAD: Normocephalic, atraumatic.EYES: PERRL. EOMI.   EARS: TM's intact. Light reflex normal. No retraction or perforation.   NOSE: Mucosa pink. Airway clear.MOUTH & THROAT: No tonsillar enlargement. No pharyngeal erythema or exudate. No stridor.  NODES: No cervical, axillary or inguinal lymph node enlargement.  CHEST: Lungs clear to auscultation.  CARDIOVASCULAR: Normal S1, S2. No rubs, murmurs or gallops.  ABDOMEN: Bowel sounds normal. Not distended. Soft. No tenderness or masses.  MUSCULOSKELETAL: No palpable abnormality  NEUROLOGIC: Cranial Nerves: II-XII grossly intact.  Motor: 5/5 strength major flexors/extensors.  DTR's: Knees, Ankles 2+ and equal bilaterally; downgoing toes.  Sensory: Intact to light touch distally.  Gait & Posture: Normal gait and fine motion. No cerebellar signs.     Impression: Soumya was seen today for hosp fu and fall.    Diagnoses and all orders for this visit:    Pneumonia due to infectious organism, unspecified laterality, unspecified part of lung    Gastroesophageal reflux disease, esophagitis presence not specified    Chronic pain associated with significant psychosocial dysfunction    Other emphysema    Hypothyroidism due to acquired atrophy of thyroid    Essential hypertension      Plan:Rev med recs and Lab eval  Rec diet and ex recs   FU PMR con

## 2019-06-07 ENCOUNTER — TELEPHONE (OUTPATIENT)
Dept: PULMONOLOGY | Facility: CLINIC | Age: 74
End: 2019-06-07

## 2019-06-07 DIAGNOSIS — J30.89 NON-SEASONAL ALLERGIC RHINITIS DUE TO OTHER ALLERGIC TRIGGER: Primary | ICD-10-CM

## 2019-06-07 RX ORDER — CETIRIZINE HYDROCHLORIDE 10 MG/1
10 TABLET ORAL DAILY
Qty: 30 TABLET | Refills: 11 | Status: CANCELLED | OUTPATIENT
Start: 2019-06-07 | End: 2020-06-06

## 2019-06-10 RX ORDER — CETIRIZINE HYDROCHLORIDE 10 MG/1
10 TABLET ORAL DAILY
Qty: 30 TABLET | Refills: 11 | Status: SHIPPED | OUTPATIENT
Start: 2019-06-10 | End: 2020-06-17

## 2019-06-13 ENCOUNTER — TELEPHONE (OUTPATIENT)
Dept: CARDIOLOGY | Facility: CLINIC | Age: 74
End: 2019-06-13

## 2019-06-13 NOTE — TELEPHONE ENCOUNTER
----- Message from Krystyna Montanez sent at 6/13/2019  9:56 AM CDT -----  Contact: pt  Type:  Patient Returning Call    Who Called:Patient  Who Left Message for Patient:nurse  Does the patient know what this is regarding?:na  Would the patient rather a call back or a response via [facility]? Call back  Best Call Back Number:[phone]  Additional Information: na

## 2019-06-18 ENCOUNTER — ANESTHESIA EVENT (OUTPATIENT)
Dept: SURGERY | Facility: HOSPITAL | Age: 74
End: 2019-06-18
Payer: MEDICARE

## 2019-06-18 DIAGNOSIS — M51.36 DDD (DEGENERATIVE DISC DISEASE), LUMBAR: Primary | ICD-10-CM

## 2019-06-19 ENCOUNTER — HOSPITAL ENCOUNTER (OUTPATIENT)
Facility: HOSPITAL | Age: 74
Discharge: HOME | End: 2019-06-19
Attending: ANESTHESIOLOGY | Admitting: ANESTHESIOLOGY
Payer: MEDICARE

## 2019-06-19 ENCOUNTER — HOSPITAL ENCOUNTER (OUTPATIENT)
Dept: RADIOLOGY | Facility: HOSPITAL | Age: 74
Discharge: HOME | End: 2019-06-19
Attending: ANESTHESIOLOGY | Admitting: ANESTHESIOLOGY
Payer: MEDICARE

## 2019-06-19 ENCOUNTER — ANESTHESIA (OUTPATIENT)
Dept: SURGERY | Facility: HOSPITAL | Age: 74
End: 2019-06-19
Payer: MEDICARE

## 2019-06-19 DIAGNOSIS — M51.36 DDD (DEGENERATIVE DISC DISEASE), LUMBAR: ICD-10-CM

## 2019-06-19 DIAGNOSIS — G89.4 CHRONIC PAIN SYNDROME: Primary | ICD-10-CM

## 2019-06-19 PROCEDURE — C1897 LEAD, NEUROSTIM TEST KIT: HCPCS | Mod: PO | Performed by: ANESTHESIOLOGY

## 2019-06-19 PROCEDURE — 25000003 PHARM REV CODE 250: Mod: PO | Performed by: ANESTHESIOLOGY

## 2019-06-19 PROCEDURE — 37000009 HC ANESTHESIA EA ADD 15 MINS: Mod: PO | Performed by: ANESTHESIOLOGY

## 2019-06-19 PROCEDURE — 63650 PR PERCUT IMPLNT NEUROELECT,EPIDURAL: ICD-10-PCS | Mod: ,,, | Performed by: ANESTHESIOLOGY

## 2019-06-19 PROCEDURE — D9220A PRA ANESTHESIA: Mod: ANES,,, | Performed by: ANESTHESIOLOGY

## 2019-06-19 PROCEDURE — 63600175 PHARM REV CODE 636 W HCPCS: Mod: PO | Performed by: NURSE ANESTHETIST, CERTIFIED REGISTERED

## 2019-06-19 PROCEDURE — D9220A PRA ANESTHESIA: Mod: CRNA,,, | Performed by: NURSE ANESTHETIST, CERTIFIED REGISTERED

## 2019-06-19 PROCEDURE — 71000033 HC RECOVERY, INTIAL HOUR: Mod: PO | Performed by: ANESTHESIOLOGY

## 2019-06-19 PROCEDURE — C1778 LEAD, NEUROSTIMULATOR: HCPCS | Mod: PO | Performed by: ANESTHESIOLOGY

## 2019-06-19 PROCEDURE — 63600175 PHARM REV CODE 636 W HCPCS: Mod: PO | Performed by: ANESTHESIOLOGY

## 2019-06-19 PROCEDURE — 37000008 HC ANESTHESIA 1ST 15 MINUTES: Mod: PO | Performed by: ANESTHESIOLOGY

## 2019-06-19 PROCEDURE — 36000707: Mod: PO | Performed by: ANESTHESIOLOGY

## 2019-06-19 PROCEDURE — D9220A PRA ANESTHESIA: ICD-10-PCS | Mod: ANES,,, | Performed by: ANESTHESIOLOGY

## 2019-06-19 PROCEDURE — 95971 PR ANALYZE NEUROSTIM,SIMPLE/PROG: ICD-10-PCS | Mod: 59,,, | Performed by: ANESTHESIOLOGY

## 2019-06-19 PROCEDURE — 71000015 HC POSTOP RECOV 1ST HR: Mod: PO | Performed by: ANESTHESIOLOGY

## 2019-06-19 PROCEDURE — 36000706: Mod: PO | Performed by: ANESTHESIOLOGY

## 2019-06-19 PROCEDURE — D9220A PRA ANESTHESIA: ICD-10-PCS | Mod: CRNA,,, | Performed by: NURSE ANESTHETIST, CERTIFIED REGISTERED

## 2019-06-19 PROCEDURE — 95971 ALYS SMPL SP/PN NPGT W/PRGRM: CPT | Mod: 59,,, | Performed by: ANESTHESIOLOGY

## 2019-06-19 PROCEDURE — 63650 IMPLANT NEUROELECTRODES: CPT | Mod: ,,, | Performed by: ANESTHESIOLOGY

## 2019-06-19 PROCEDURE — 76000 FLUOROSCOPY <1 HR PHYS/QHP: CPT | Mod: TC,PO

## 2019-06-19 DEVICE — LEAD NEROSTIMLTR INFINION 50CM: Type: IMPLANTABLE DEVICE | Site: BACK | Status: FUNCTIONAL

## 2019-06-19 RX ORDER — CEFAZOLIN SODIUM 2 G/50ML
2 SOLUTION INTRAVENOUS
Status: COMPLETED | OUTPATIENT
Start: 2019-06-19 | End: 2019-06-19

## 2019-06-19 RX ORDER — PROPOFOL 10 MG/ML
VIAL (ML) INTRAVENOUS
Status: DISCONTINUED | OUTPATIENT
Start: 2019-06-19 | End: 2019-06-19

## 2019-06-19 RX ORDER — LIDOCAINE HYDROCHLORIDE 10 MG/ML
1 INJECTION, SOLUTION EPIDURAL; INFILTRATION; INTRACAUDAL; PERINEURAL ONCE
Status: COMPLETED | OUTPATIENT
Start: 2019-06-19 | End: 2019-06-19

## 2019-06-19 RX ORDER — SODIUM CHLORIDE, SODIUM LACTATE, POTASSIUM CHLORIDE, CALCIUM CHLORIDE 600; 310; 30; 20 MG/100ML; MG/100ML; MG/100ML; MG/100ML
INJECTION, SOLUTION INTRAVENOUS CONTINUOUS
Status: DISCONTINUED | OUTPATIENT
Start: 2019-06-19 | End: 2019-06-19 | Stop reason: HOSPADM

## 2019-06-19 RX ORDER — SODIUM CHLORIDE 0.9 G/100ML
IRRIGANT IRRIGATION
Status: DISCONTINUED | OUTPATIENT
Start: 2019-06-19 | End: 2019-06-19 | Stop reason: HOSPADM

## 2019-06-19 RX ORDER — LIDOCAINE HCL/PF 100 MG/5ML
SYRINGE (ML) INTRAVENOUS
Status: DISCONTINUED | OUTPATIENT
Start: 2019-06-19 | End: 2019-06-19

## 2019-06-19 RX ORDER — PROPOFOL 10 MG/ML
VIAL (ML) INTRAVENOUS CONTINUOUS PRN
Status: DISCONTINUED | OUTPATIENT
Start: 2019-06-19 | End: 2019-06-19

## 2019-06-19 RX ORDER — FENTANYL CITRATE 50 UG/ML
INJECTION, SOLUTION INTRAMUSCULAR; INTRAVENOUS
Status: DISCONTINUED | OUTPATIENT
Start: 2019-06-19 | End: 2019-06-19

## 2019-06-19 RX ORDER — LIDOCAINE HYDROCHLORIDE 10 MG/ML
1 INJECTION INFILTRATION; PERINEURAL ONCE
Status: DISCONTINUED | OUTPATIENT
Start: 2019-06-19 | End: 2019-06-19 | Stop reason: HOSPADM

## 2019-06-19 RX ORDER — LIDOCAINE HYDROCHLORIDE 10 MG/ML
INJECTION, SOLUTION EPIDURAL; INFILTRATION; INTRACAUDAL; PERINEURAL
Status: DISCONTINUED | OUTPATIENT
Start: 2019-06-19 | End: 2019-06-19 | Stop reason: HOSPADM

## 2019-06-19 RX ADMIN — FENTANYL CITRATE 50 MCG: 50 INJECTION, SOLUTION INTRAMUSCULAR; INTRAVENOUS at 12:06

## 2019-06-19 RX ADMIN — PROPOFOL 90 MG: 10 INJECTION, EMULSION INTRAVENOUS at 12:06

## 2019-06-19 RX ADMIN — FENTANYL CITRATE 25 MCG: 50 INJECTION, SOLUTION INTRAMUSCULAR; INTRAVENOUS at 01:06

## 2019-06-19 RX ADMIN — CEFAZOLIN SODIUM 2 G: 2 SOLUTION INTRAVENOUS at 12:06

## 2019-06-19 RX ADMIN — PROPOFOL 75 MCG/KG/MIN: 10 INJECTION, EMULSION INTRAVENOUS at 12:06

## 2019-06-19 RX ADMIN — SODIUM CHLORIDE, SODIUM LACTATE, POTASSIUM CHLORIDE, AND CALCIUM CHLORIDE: .6; .31; .03; .02 INJECTION, SOLUTION INTRAVENOUS at 11:06

## 2019-06-19 RX ADMIN — LIDOCAINE HYDROCHLORIDE 75 MG: 20 INJECTION PARENTERAL at 12:06

## 2019-06-19 RX ADMIN — LIDOCAINE HYDROCHLORIDE 10 MG: 10 INJECTION, SOLUTION EPIDURAL; INFILTRATION; INTRACAUDAL; PERINEURAL at 11:06

## 2019-06-19 NOTE — DISCHARGE SUMMARY
[facility]  Discharge Note  Short Stay    Admit Date: 6/19/2019    Discharge Date: 6/19/2019    Attending Physician: Ebenezer Rouse MD     Discharge Provider: Ebenezer Rouse    Diagnoses:  Active Hospital Problems    Diagnosis  POA    *Chronic pain syndrome [G89.4]  Yes      Resolved Hospital Problems   No resolved problems to display.       Discharged Condition: good    Final Diagnoses: Chronic pain syndrome [G89.4]    Disposition: Home or Self Care    Hospital Course: no complications, uneventful    Outcome of Hospitalization, Treatment, Procedure, or Surgery:  Patient was admitted for outpatient procedure. The patient underwent procedure without complications and are discharged home    Follow up/Patient Instructions:  Follow up as scheduled in Pain Management clinic in 3-4 weeks/Patient has received instructions and follow up date and time    Medications:  Continue previous medications    Discharge Procedure Orders   Call MD for:  temperature >100.4     Call MD for:  severe uncontrolled pain     Call MD for:  redness, tenderness, or signs of infection (pain, swelling, redness, odor or green/yellow discharge around incision site)     Call MD for:  severe persistent headache     No dressing needed         Discharge Procedure Orders (must include Diet, Follow-up, Activity):   Discharge Procedure Orders (must include Diet, Follow-up, Activity)   Call MD for:  temperature >100.4     Call MD for:  severe uncontrolled pain     Call MD for:  redness, tenderness, or signs of infection (pain, swelling, redness, odor or green/yellow discharge around incision site)     Call MD for:  severe persistent headache     No dressing needed

## 2019-06-19 NOTE — DISCHARGE INSTRUCTIONS
Discharge Instructions: After Your Surgery  Youve just had surgery. During surgery, you were given medicine called anesthesia to keep you relaxed and free of pain. After surgery, you may have some pain or nausea. This is common. Here are some tips for feeling better and getting well after surgery.     Stay on schedule with your medicine.   Going home  Your healthcare provider will show you how to take care of yourself when you go home. He or she will also answer your questions. Have an adult family member or friend drive you home. For the first 24 hours after your surgery:  · Do not drive or use heavy equipment.  · Do not make important decisions or sign legal papers.  · Do not drink alcohol.  · Have someone stay with you, if needed. He or she can watch for problems and help keep you safe.  Be sure to go to all follow-up visits with your healthcare provider. And rest after your surgery for as long as your healthcare provider tells you to.  Coping with pain  If you have pain after surgery, pain medicine will help you feel better. Take it as told, before pain becomes severe. Also, ask your healthcare provider or pharmacist about other ways to control pain. This might be with heat, ice, or relaxation. And follow any other instructions your surgeon or nurse gives you.  Tips for taking pain medicine  To get the best relief possible, remember these points:  · Pain medicines can upset your stomach. Taking them with a little food may help.  · Most pain relievers taken by mouth need at least 20 to 30 minutes to start to work.  · Taking medicine on a schedule can help you remember to take it. Try to time your medicine so that you can take it before starting an activity. This might be before you get dressed, go for a walk, or sit down for dinner.  · Constipation is a common side effect of pain medicines. Call your healthcare provider before taking any medicines such as laxatives or stool softeners to help ease constipation.  Also ask if you should skip any foods. Drinking lots of fluids and eating foods such as fruits and vegetables that are high in fiber can also help. Remember, do not take laxatives unless your surgeon has prescribed them.  · Drinking alcohol and taking pain medicine can cause dizziness and slow your breathing. It can even be deadly. Do not drink alcohol while taking pain medicine.  · Pain medicine can make you react more slowly to things. Do not drive or run machinery while taking pain medicine.  Your healthcare provider may tell you to take acetaminophen to help ease your pain. Ask him or her how much you are supposed to take each day. Acetaminophen or other pain relievers may interact with your prescription medicines or other over-the-counter (OTC) medicines. Some prescription medicines have acetaminophen and other ingredients. Using both prescription and OTC acetaminophen for pain can cause you to overdose. Read the labels on your OTC medicines with care. This will help you to clearly know the list of ingredients, how much to take, and any warnings. It may also help you not take too much acetaminophen. If you have questions or do not understand the information, ask your pharmacist or healthcare provider to explain it to you before you take the OTC medicine.  Managing nausea  Some people have an upset stomach after surgery. This is often because of anesthesia, pain, or pain medicine, or the stress of surgery. These tips will help you handle nausea and eat healthy foods as you get better. If you were on a special food plan before surgery, ask your healthcare provider if you should follow it while you get better. These tips may help:  · Do not push yourself to eat. Your body will tell you when to eat and how much.  · Start off with clear liquids and soup. They are easier to digest.  · Next try semi-solid foods, such as mashed potatoes, applesauce, and gelatin, as you feel ready.  · Slowly move to solid foods. Dont  eat fatty, rich, or spicy foods at first.  · Do not force yourself to have 3 large meals a day. Instead eat smaller amounts more often.  · Take pain medicines with a small amount of solid food, such as crackers or toast, to avoid nausea.     Call your surgeon if  · You still have pain an hour after taking medicine. The medicine may not be strong enough.  · You feel too sleepy, dizzy, or groggy. The medicine may be too strong.  · You have side effects like nausea, vomiting, or skin changes, such as rash, itching, or hives.       If you have obstructive sleep apnea  You were given anesthesia medicine during surgery to keep you comfortable and free of pain. After surgery, you may have more apnea spells because of this medicine and other medicines you were given. The spells may last longer than usual.   At home:  · Keep using the continuous positive airway pressure (CPAP) device when you sleep. Unless your healthcare provider tells you not to, use it when you sleep, day or night. CPAP is a common device used to treat obstructive sleep apnea.  · Talk with your provider before taking any pain medicine, muscle relaxants, or sedatives. Your provider will tell you about the possible dangers of taking these medicines.  Date Last Reviewed: 12/1/2016 © 2000-2017 [employer]. [address] [place] [address], [place] [address]. All rights reserved. This information is not intended as a substitute for professional medical care. Always follow your healthcare professional's instructions.          SPINAL CORD STIMULATOR TRIAL    Please follow all of the instructions that were given to you and demonstrated by your "[employer] Representative about the use and care of your remote and equipment.    A member from the "[employer] team will call you each afternoon to remind you of restrictions and to offer any support you may need on the care and operation of your spinal cord stimulator device.     After your  procedure:    -Sponge baths only.  -Keep your bandage and all of the external components clean and dry.  -Please limit any necessary bending, lifting or twisting to slow and careful movements.  -Avoid overhead work. Keep your elbows below your shoulders.  -Avoid motions that cause pulling on your dressing or wires.  -Turn off your stimulator when driving. (Do not drive for the first 24 hours after your procedure)  -Wear your brace when you are walking or active. You do not have to wear the brace while you are in bed.    -You may resume your home medications as prescribed.  -You may resume your normal diet as tolerated.  -Follow up as scheduled with Dr. Rouse.       For programming or any questions you may have about your spinal cord stimulator, please call your Mobile Safe Case Representative:    ___ Andrei [phone]  ___ José Luis [phone]  ___ Emir [phone]  ___ Mac [phone]  ___ Duncan [phone]  ___ French [phone]  ___ Tyrone [phone]

## 2019-06-19 NOTE — ANESTHESIA POSTPROCEDURE EVALUATION
Anesthesia Post Evaluation    Patient: Soumya V Pomares    Procedure(s) Performed: Procedure(s) (LRB):  INSERTION, NEUROSTIMULATOR, SPINAL CORD, DORSAL COLUMN, FOR TRIAL (N/A)    Final Anesthesia Type: MAC  Patient location during evaluation: PACU  Patient participation: Yes- Able to Participate  Level of consciousness: awake and alert  Post-procedure vital signs: reviewed and stable  Pain management: adequate  Airway patency: patent  PONV status at discharge: No PONV  Anesthetic complications: no      Cardiovascular status: blood pressure returned to baseline and hemodynamically stable  Respiratory status: unassisted  Hydration status: euvolemic  Follow-up not needed.          Vitals Value Taken Time   /70 6/19/2019  1:34 PM   Temp 36.4 °C (97.5 °F) 6/19/2019  1:20 PM   Pulse 74 6/19/2019  1:34 PM   Resp 18 6/19/2019  1:34 PM   SpO2 98 % 6/19/2019  1:34 PM         No case tracking events are documented in the log.      Pain/Maya Score: Maya Score: 10 (6/19/2019  1:20 PM)

## 2019-06-19 NOTE — H&P
CC: Back and right chest wall pain    HPI: The patient is a 74yo woman with a history of chronic pain syndrome, lumbar radiculitis here for SCS trial. There are no major changes in history and physical from 4/30/19.    Past Medical History:   Diagnosis Date    Acid reflux 4/11/2014    Anxiety and depression 3/6/2014    AP (angina pectoris) 2/13/2017    CAD (coronary artery disease) 2/13/2017    CAD, multiple vessel 2/13/2017    Chronic pain associated with significant psychosocial dysfunction 2/21/2013    CKD (chronic kidney disease) stage 3, GFR 30-59 ml/min     Dr. Vásquez    COPD (chronic obstructive pulmonary disease)     well controlled, Dr [facility]    History of shingles     HTN (hypertension)     Hypothyroidism     Multiple allergies     S/P CABG (coronary artery bypass graft) 2/13/2017       Past Surgical History:   Procedure Laterality Date    AOROTOCORONARY BYPASS-CABG N/A 2/13/2017    [facility]    CARDIAC SURGERY  02/2017    CABG x3    COLONOSCOPY  ~2013    Dr. Perez; colon polyps removed    COLONOSCOPY N/A 3/15/2018    [facility]ay [facility] at [facility] ENDO    DILATION AND CURETTAGE OF UTERUS      EGD (ESOPHAGOGASTRODUODENOSCOPY) N/A 7/3/2018    Performed by Jordy Greenberg [facility] at [facility] ENDO    ESOPHAGOGASTRODUODENOSCOPY (EGD) N/A 3/15/2018    Performed by [facility]ito [facility] at [facility] ENDO    HEART CATH-LEFT Left 1/13/2017    Performed by Giuliano Henry [facility] at [facility] CATH LAB    KNEE SURGERY Right     SPINAL CORD STIMULATOR IMPLANT  02/12/2018    for pain secondary to shingles, sees Dr Kam for pain management    TONSILLECTOMY      TUMOR REMOVAL      ULTRASOUND, ENDOSCOPIC, UPPER GI TRACT Left 7/3/2018    [facility]berg [facility] at [facility] ENDO    UPPER GASTROINTESTINAL ENDOSCOPY      XI ROBOTIC GASTRECTOMY-PARTIAL N/A 7/31/2018    Performed by Dre Grey MD at [facility] OR       Family History   Problem  Relation Age of Onset    Heart attacks under age 50 Mother 41    Cancer Father         prostate    Colon cancer Neg Hx     Colon polyps Neg Hx     Crohn's disease Neg Hx     Ulcerative colitis Neg Hx     Celiac disease Neg Hx        Social History     Socioeconomic History    Marital status:      Spouse name: Not on file    Number of children: Not on file    Years of education: Not on file    Highest education level: Not on file   Occupational History    Not on file   Social Needs    Financial resource strain: Not on file    Food insecurity:     Worry: Not on file     Inability: Not on file    Transportation needs:     Medical: Not on file     Non-medical: Not on file   Tobacco Use    Smoking status: Former Smoker     Packs/day: 1.00     Years: 50.00     Pack years: 50.00     Last attempt to quit: 12/10/2012     Years since quittin.5    Smokeless tobacco: Never Used   Substance and Sexual Activity    Alcohol use: No    Drug use: No    Sexual activity: Not on file   Lifestyle    Physical activity:     Days per week: Not on file     Minutes per session: Not on file    Stress: Not on file   Relationships    Social connections:     Talks on phone: Not on file     Gets together: Not on file     Attends Taoism service: Not on file     Active member of club or organization: Not on file     Attends meetings of clubs or organizations: Not on file     Relationship status: Not on file   Other Topics Concern    Not on file   Social History Narrative    Not on file       Current Facility-Administered Medications   Medication Dose Route Frequency Provider Last Rate Last Dose    cefazolin (ANCEF) 2 gram in dextrose 5% 50 mL IVPB (premix)  2 g Intravenous On Call Procedure Ebenezer Rouse MD        lactated ringers infusion   Intravenous Continuous Caity Daniels MD        lactated ringers infusion   Intravenous Continuous Ebenezer Rouse MD 25 mL/hr at 19 [id number]       lidocaine HCL 10 mg/ml (1%) injection 1 mL  1 mL Other Once Ebenezer Rouse MD           Review of patient's allergies indicates:   Allergen Reactions    Losartan Swelling     angioedema    Ace inhibitors Other (See Comments) and Swelling     unknown    Angioedema    Arb-angiotensin receptor antagonist Other (See Comments)     unknown  unknown      Iodine and iodide containing products Hives     Since age of 48    Lisinopril      angioedema    Metoprolol tartrate      swelling    Shrimp Other (See Comments) and Swelling     Localized itching and swelling on her hands if she peels shrimp.  Able to eat shrimp without difficulty.  No generalized reaction.       Vitals:    06/17/19 1008 06/19/19 1100   BP:  (!) 115/58   Pulse:  67   Resp:  18   Temp:  98.1 °F (36.7 °C)   TempSrc:  Skin   SpO2:  98%   Weight: 62.1 kg (137 lb)    Height: 5' (1.524 m)        REVIEW OF SYSTEMS:     GENERAL: No weight loss, malaise or fevers.  HEENT:  No recent changes in vision or hearing  NECK: Negative for lumps, no difficulty with swallowing.  RESPIRATORY: Negative for cough, wheezing or shortness of breath, patient denies any recent URI.  CARDIOVASCULAR: Negative for chest pain, leg swelling or palpitations.  GI: Negative for abdominal discomfort, blood in stools or black stools or change in bowel habits.  MUSCULOSKELETAL: See HPI.  SKIN: Negative for lesions, rash, and itching.  PSYCH: No suicidal or homicidal ideations, no current mood disturbances.  HEMATOLOGY/LYMPHOLOGY: Negative for prolonged bleeding, bruising easily or swollen nodes. Patient is not currently taking any anti-coagulants  ENDO: No history of diabetes or thyroid dysfunction  NEURO: No history of syncope, paralysis, seizures or tremors.All other reviewed and negative other than HPI.    Physical exam:  Gen: A and O x3, pleasant, well-groomed  Skin: No rashes or obvious lesions  HEENT: PERRLA, no obvious deformities on ears or in canals. No thyroid masses,  trachea midline, no palpable lymph nodes in neck, axilla.  CVS: Regular rate and rhythm, normal S1 and S2, no murmurs.  Resp: Clear to auscultation bilaterally.  Abdomen: Soft, NT/ND, normal bowel sounds present.  Musculoskeletal/Neuro: Moving all extremities    Assessment:  Chronic pain syndrome  -     Case Request Operating Room: INSERTION, SPINAL CORD STIMULATOR, DORSAL COLUMN, FOR TRIAL  -     Place in Outpatient; Standing  -     Diet NPO; Standing  -     lactated ringers infusion  -     cefazolin (ANCEF) 2 gram in dextrose 5% 50 mL IVPB (premix)  -     Notify physician ; Standing  -     Notify physician ; Standing  -     Notify physician (specify); Standing  -     Place 18-22 gauage peripheral IV ; Standing  -     Verify informed consent; Standing  -     Vital signs; Standing    Other orders  -     Vital signs; Standing  -     Insert peripheral IV; Standing  -     Use 1% lidocaine at IV site; Standing  -     Notify Physician - Potential Need of Opioid Reversal; Standing  -     Cancel: Diet NPO Except for: Medication; Standing  -     lactated ringers infusion  -     lidocaine (PF) 10 mg/ml (1%) injection 10 mg  -     Notify Anesthesiologist if Patient on Home Insulin Pump; Standing  -     Pulse Oximetry Q4H; Standing  -     IP VTE LOW RISK PATIENT; Standing  -     lidocaine HCL 10 mg/ml (1%) injection 1 mL

## 2019-06-19 NOTE — OP NOTE
PROCEDURE DATE: 6/19/2019    Procedure  1. Percutaneous insertion of spinal cord stimulator leads x 2. Total of 32 contacts.   2. Fluoroscopic needle guidance.   3. Intraoperative complex programming of Spinal Cord Stimulator, >30mins.     Pre-op Diagnosis: : Chronic pain syndrome, intercostal neuralgia, lumbar radicultis    Post-op Diagnosis: Same    Surgeon: Ebenezer Rouse M.D.    Assistant: none    Anesthesia: MAC per Anesthesia Provider    Blood Loss: <5ml    Complications: none    PROCEDURE NOTE: After obtaining written informed consent patient was taken to the procedure room. Pre-procedure blood pressure and pulse were stable and recorded in patients clinic chart. Pre-op IV antibiotics were started by the Anesthesia provider.    The patient was taken to the operating room and placed in prone position. Routine monitors were applied and IV anesthesia was titrated to effect by the Anesthesia provider. The patient's back and buttocks were prepped and draped in sterile fashion using betadine and then DuraPrep solution and sterile drapes were applied. C-arm fluoroscopy was used to identify the T12/L1 interspace. Through a 1% local lidocaine skin wheal, a small stab incision was made with a #11 blade scapel. Through this, a 14-gauge Tuohy needle was advanced until contact was made with the right-sided L1 lamina. It was then walked off in a cephalad medial direction using loss of resistance to technique entering into the epidural space. Once within the epidural space, an epidural spinal cord stimulator lead was advanced until the tip of the lead rested at the bottom of the T5 vertebral body just to the right of midline. Following this, another percutaneous lead was placed with another Tuohy needle on the right side following the same procedure as described for the other side with the lead in the bottom of T7. Once all the leads were in place, stimulation testing was carried out by the device representative under my  guidance. Once the leads were noted to be within proper position with patient reporting stimulation in the painful areas, the needle was removed. The leads were secured to the patient's back using 0-0 silk. Skin was cleaned, bacitracin applied and a sterile dressing was applied.    Following the procedure the patient's vital signs were stable. The patient was taken to the recovery room in good condition with no known complication. The patient was allowed to fully awaken from anesthesia and final programming of the device was done by the device representative under my guidance. The patient was discharged home in good condition after being given discharge instructions.

## 2019-06-19 NOTE — TRANSFER OF CARE
Anesthesia Transfer of Care Note    Patient: Soumya V Pomares    Procedure(s) Performed: Procedure(s) (LRB):  INSERTION, NEUROSTIMULATOR, SPINAL CORD, DORSAL COLUMN, FOR TRIAL (N/A)    Patient location: PACU    Anesthesia Type: MAC    Transport from OR: Transported from OR on room air with adequate spontaneous ventilation    Post pain: adequate analgesia    Post assessment: no apparent anesthetic complications    Post vital signs: stable    Level of consciousness: awake    Nausea/Vomiting: no nausea/vomiting    Complications: none    Transfer of care protocol was followed      Last vitals:   Visit Vitals  BP (!) 115/58 (BP Location: Right arm, Patient Position: Lying)   Pulse 67   Temp 36.7 °C (98.1 °F) (Skin)   Resp 18   Ht 5' (1.524 m)   Wt 62.1 kg (137 lb)   SpO2 98%   Breastfeeding? No   BMI 26.76 kg/m²

## 2019-06-19 NOTE — ANESTHESIA PREPROCEDURE EVALUATION
06/19/2019  Soumya Melchor is a 73 y.o., female.    Anesthesia Evaluation    I have reviewed the Patient Summary Reports.    I have reviewed the Nursing Notes.      Review of Systems  Anesthesia Hx:  No problems with previous Anesthesia    Cardiovascular:   Hypertension, well controlled CAD asymptomatic     Pulmonary:   COPD, mild    Hepatic/GI:   GERD, well controlled    Endocrine:   Hypothyroidism        Physical Exam  General:  Well nourished    Airway/Jaw/Neck:  Airway Findings: Mallampati: II                Anesthesia Plan  Type of Anesthesia, risks & benefits discussed:  Anesthesia Type:  MAC  Patient's Preference:   Intra-op Monitoring Plan:   Intra-op Monitoring Plan Comments:   Post Op Pain Control Plan:   Post Op Pain Control Plan Comments:   Induction:   IV  Beta Blocker:  Patient is not currently on a Beta-Blocker (No further documentation required).       Informed Consent: Patient understands risks and agrees with Anesthesia plan.  Questions answered. Anesthesia consent signed with patient.  ASA Score: 3     Day of Surgery Review of History & Physical:    H&P update referred to the surgeon.         Ready For Surgery From Anesthesia Perspective.

## 2019-06-20 VITALS
OXYGEN SATURATION: 98 % | SYSTOLIC BLOOD PRESSURE: 140 MMHG | TEMPERATURE: 98 F | WEIGHT: 137 LBS | RESPIRATION RATE: 18 BRPM | DIASTOLIC BLOOD PRESSURE: 74 MMHG | HEART RATE: 74 BPM | BODY MASS INDEX: 26.9 KG/M2 | HEIGHT: 60 IN

## 2019-06-26 ENCOUNTER — TELEPHONE (OUTPATIENT)
Dept: CARDIOLOGY | Facility: CLINIC | Age: 74
End: 2019-06-26

## 2019-06-26 ENCOUNTER — OFFICE VISIT (OUTPATIENT)
Dept: PAIN MEDICINE | Facility: CLINIC | Age: 74
End: 2019-06-26
Payer: MEDICARE

## 2019-06-26 VITALS
OXYGEN SATURATION: 99 % | BODY MASS INDEX: 26.69 KG/M2 | TEMPERATURE: 98 F | HEART RATE: 70 BPM | SYSTOLIC BLOOD PRESSURE: 124 MMHG | WEIGHT: 136.69 LBS | DIASTOLIC BLOOD PRESSURE: 57 MMHG | RESPIRATION RATE: 18 BRPM

## 2019-06-26 DIAGNOSIS — G58.0 INTERCOSTAL NEUROPATHY: ICD-10-CM

## 2019-06-26 DIAGNOSIS — M54.16 LUMBAR RADICULOPATHY: Primary | ICD-10-CM

## 2019-06-26 DIAGNOSIS — M54.16 LUMBAR RADICULITIS: ICD-10-CM

## 2019-06-26 DIAGNOSIS — G89.4 CHRONIC PAIN SYNDROME: ICD-10-CM

## 2019-06-26 PROCEDURE — 99999 PR PBB SHADOW E&M-EST. PATIENT-LVL IV: ICD-10-PCS | Mod: PBBFAC,,, | Performed by: ANESTHESIOLOGY

## 2019-06-26 PROCEDURE — 99999 PR PBB SHADOW E&M-EST. PATIENT-LVL IV: CPT | Mod: PBBFAC,,, | Performed by: ANESTHESIOLOGY

## 2019-06-26 PROCEDURE — 99024 POSTOP FOLLOW-UP VISIT: CPT | Mod: S$GLB,,, | Performed by: ANESTHESIOLOGY

## 2019-06-26 PROCEDURE — 99024 PR POST-OP FOLLOW-UP VISIT: ICD-10-PCS | Mod: S$GLB,,, | Performed by: ANESTHESIOLOGY

## 2019-06-26 RX ORDER — SODIUM CHLORIDE, SODIUM LACTATE, POTASSIUM CHLORIDE, CALCIUM CHLORIDE 600; 310; 30; 20 MG/100ML; MG/100ML; MG/100ML; MG/100ML
INJECTION, SOLUTION INTRAVENOUS CONTINUOUS
Status: CANCELLED | OUTPATIENT
Start: 2019-07-25

## 2019-06-26 NOTE — PROGRESS NOTES
This note was completed with dictation software and grammatical errors may exist.    CC:  Left abdominal wall pain, left lateral thigh pain    HPI:  The patient is a 73-year-old woman with a history of COPD, CAD, chronic abdominal wall pain who presents in referral from Dr. Mathews continued pain. She returns in follow-up today for spinal cord stimulator trial with [employer] to cover her right-sided intercostal neuralgia and right leg pain. She reports about 90% relief of her right chest wall pain, abdominal pain and right leg pain.  There is a small area in her right thigh that did not seem to benefit but otherwise she is extremely pleased with the results.  She fell several days ago but felt that this did not necessarily change her stimulation patterns.  She denies any new weakness or numbness, no bowel or bladder incontinence.  She denies any fevers or chills.        From 4/1/19 visit:   She returns in follow-up today to review records, continues to have left abdominal wall pain but states that it also crosses over to the midline and right side as well. I reviewed records from Dr. Nielson that states her leads were initially placed at T5, but unclear if this was the top of T5 over the bottom or middle.  Furthermore, notes from x-rays done after this state that one lead was in the midthoracic region and the other was in the lower thoracic region but there is no mention of specific levels.  We have obtained an x-ray of her spine that shows the left lead at T10 and T11 and the right lead over T6 through T8.  She states that she does get coverage of her left leg where she has pain but denies any improvement in her abdomen.  Since the last visit, she denies any major changes.      Past history:  The patient reports that she has had a history of chronic left abdominal wall pain, was seen by multiple physicians about 10 years ago and was eventually diagnosed with post herpetic neuralgia, sounds like she never had  any of the visible lesions but continued to have pain. She was seen by Dr. Huan Nielson and had undergone trial and implantation of a spinal cord stimulator for this pain and states that she was almost 100% pain free with this device.  She states that she had read that the battery only lasts for 10 years and so she requested a battery exchange at 9 years to make sure that she had continued coverage.  She states while she initially had a [employer] stimulator, when the battery was changed, it was changed to a [employer] device.  She was also told that one of the leads had frayed where it entered the IPG and she was told that this lead was replaced.  She cannot recall if the incision was made only over the IPG site or if they actually went into the initial midline incision site as well. Nonetheless, this was accomplished in January, 2018 and after this revision, she was doing well without left abdominal wall pain. 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 Dre Grey in August, 2018.  After that time, she states that her left abdominal wall pain returned and also her left lateral thigh pain returned.  She states that she has met with the [employer] representatives and has not been able to get the stimulator to provide relief.  She has difficulty with providing good history, I am not sure if she actually has any stimulation over her pain sites but nonetheless, she states that she is not having any relief.  She also reports having pain in her lateral left thigh similar to her abdominal wall pain.  She is currently using lidocaine patches with some relief.  She also has been taking hydrocodone 7.5/325 every several days.  I do not see a record of her receiving  this medication but states that she had this filled at [facility] in [place].        ROS:  She reports shortness of breath, easy bruising, bleeding problems, memory loss, difficulty sleeping, anxiety and depression.  Balance of review of systems is negative.    Past Medical History:   Diagnosis Date    Acid reflux 4/11/2014    Anxiety and depression 3/6/2014    AP (angina pectoris) 2/13/2017    CAD (coronary artery disease) 2/13/2017    CAD, multiple vessel 2/13/2017    Chronic pain associated with significant psychosocial dysfunction 2/21/2013    CKD (chronic kidney disease) stage 3, GFR 30-59 ml/min     Dr. Vásquez    COPD (chronic obstructive pulmonary disease)     well controlled, Dr [facility]    History of shingles     HTN (hypertension)     Hypothyroidism     Multiple allergies     S/P CABG (coronary artery bypass graft) 2/13/2017       Past Surgical History:   Procedure Laterality Date    AOROTOCORONARY BYPASS-CABG N/A 2/13/2017    [facility]    CARDIAC SURGERY  02/2017    CABG x3    COLONOSCOPY  ~2013    Dr. Perez; colon polyps removed    COLONOSCOPY N/A 3/15/2018    [facility]    DILATION AND CURETTAGE OF UTERUS      EGD (ESOPHAGOGASTRODUODENOSCOPY) N/A 7/3/2018    [facility] ENDO    ESOPHAGOGASTRODUODENOSCOPY (EGD) N/A 3/15/2018    [facility]    HEART CATH-LEFT Left 1/13/2017    [facility] Giuliano Henry [facility] at [facility] CATH LAB    KNEE SURGERY Right     SPINAL CORD STIMULATOR IMPLANT  02/12/2018    for pain secondary to shingles, sees Dr Kam for pain management    TONSILLECTOMY      TUMOR REMOVAL      ULTRASOUND, ENDOSCOPIC, UPPER GI TRACT Left 7/3/2018    [facility]    UPPER GASTROINTESTINAL ENDOSCOPY      XI ROBOTIC GASTRECTOMY-PARTIAL N/A 7/31/2018    Per[facility] by Dre ALVARENGA  [facility]       Social History     Socioeconomic History    Marital status:      Spouse name: Not on file    Number of children: Not on file    Years of education: Not on file    Highest education level: Not on file   Occupational History    Not on file   Social Needs    Financial resource strain: Not on file    Food insecurity:     Worry: Not on file     Inability: Not on file    Transportation needs:     Medical: Not on file     Non-medical: Not on file   Tobacco Use    Smoking status: Former Smoker     Packs/day: 1.00     Years: 50.00     Pack years: 50.00     Last attempt to quit: 12/10/2012     Years since quittin.5    Smokeless tobacco: Never Used   Substance and Sexual Activity    Alcohol use: No    Drug use: No    Sexual activity: Not on file   Lifestyle    Physical activity:     Days per week: Not on file     Minutes per session: Not on file    Stress: Not on file   Relationships    Social connections:     Talks on phone: Not on file     Gets together: Not on file     Attends Christianity service: Not on file     Active member of club or organization: Not on file     Attends meetings of clubs or organizations: Not on file     Relationship status: Not on file   Other Topics Concern    Not on file   Social History Narrative    Not on file         Medications/Allergies: See med card    Vitals:    19 0908   BP: (!) 124/57   Pulse: 70   Resp: 18   Temp: 97.8 °F (36.6 °C)   TempSrc: Oral   SpO2: 99%   Weight: 62 kg (136 lb 11 oz)   PainSc:   4   PainLoc: Foot         Physical exam:    Gen: A and O x3, pleasant, well-groomed  Skin: No rashes or obvious lesions  HEENT: PERRLA, no obvious deformities on ears or in canals. Trachea midline.  CVS: Regular rate and rhythm, normal palpable pulses.  Resp:No increased work of breathing, symmetrical chest rise.  Abdomen: Soft, NT/ND.  She has tenderness palpation the left lower abdominal wall, allodynia with light touch across the  left abdomen from about T7 down to T12 and extending into the left flank region as well now with similar symptoms on the right side as well, as well as allodynia across the right anterior thigh.  Musculoskeletal:No antalgic gait.     Neuro:  Lower extremities: 5/5 strength bilaterally  Reflexes: Patellar 2+, Achilles 2+ bilaterally.  Sensory:  Intact and symmetrical to light touch and pinprick in L2-S1 dermatomes bilaterally.    Lumbar spine:  Lumbar spine: ROM is full with flexion extension and oblique extension with no increased pain.    Ramon's test causes no increased pain on either side.    Supine straight leg raise is negative bilaterally.    Internal and external rotation of the hip causes no increased pain on either side.  Myofascial exam: No tenderness to palpation across lumbar paraspinous muscles.    Lead insertion site clean, dry intact. No erythema or drainage.    Imagin18 CT Abdomen and pelvis:  1. Thickening of the gastric wall in the location adjacent to where a mass was seen on 2018 that may relate to partial gastrostomy.  The mass may relate to a hematoma or postsurgical change but residual disease is difficult to exclude.  Please clinically correlate follow-up for stability and/or correlation with endoscopy may be of use  2. Multiple stable pulmonary nodules at the lung bases favoring a benign etiology  3. Hepatic hypodensity at the dome of the liver stable since 16 suggesting a benign etiology  4. Apparent bladder wall thickening as can be seen with urinary tract infection, postobstructive change, or neurogenic bladder.      Assessment:   The patient is a 73-year-old woman with a history of COPD, CAD, chronic abdominal wall pain who presents in referral from Dr. Mathews continued pain.     1. Lumbar radiculopathy  Vital signs    Place 18- [facility] IV     Verify informed consent    Notify physician     Notify physician     Notify physician (specify)    Diet NPO     Case Request Operating Room: INSERTION, NEUROSTIMULATOR, SPINAL CORD, DORSAL COLUMN    Place in Outpatient    lactated ringers infusion    ceFAZolin (ANCEF) 2 g in dextrose 5 % 50 mL IVPB   2. Chronic pain syndrome     3. Intercostal neuropathy     4. Lumbar radiculitis         Plan:  1.  I removed the spinal cord stimulator leads after cleaning with sterile prep and cutting sutures.  The leads were removed intact and bandages were applied to the site.  2.  Since she did so well with the trial she would like to move forward with implantation.  We will schedule this in several weeks, I have given her prescription for Hibiclens to use the night before and the morning of the procedure. We will use a new IPG that has for lead ports so we can attached the new leads in addition to the old leads to the same battery.  I will have her follow-up in one week after the procedure sooner as needed.

## 2019-06-26 NOTE — H&P (VIEW-ONLY)
This note was completed with dictation software and grammatical errors may exist.    CC:  Left abdominal wall pain, left lateral thigh pain    HPI:  The patient is a 73-year-old woman with a history of COPD, CAD, chronic abdominal wall pain who presents in referral from Dr. Mathews continued pain. She returns in follow-up today for spinal cord stimulator trial with [employer] to cover her right-sided intercostal neuralgia and right leg pain. She reports about 90% relief of her right chest wall pain, abdominal pain and right leg pain.  There is a small area in her right thigh that did not seem to benefit but otherwise she is extremely pleased with the results.  She fell several days ago but felt that this did not necessarily change her stimulation patterns.  She denies any new weakness or numbness, no bowel or bladder incontinence.  She denies any fevers or chills.        From 4/1/19 visit:   She returns in follow-up today to review records, continues to have left abdominal wall pain but states that it also crosses over to the midline and right side as well. I reviewed records from Dr. Nielson that states her leads were initially placed at T5, but unclear if this was the top of T5 over the bottom or middle.  Furthermore, notes from x-rays done after this state that one lead was in the midthoracic region and the other was in the lower thoracic region but there is no mention of specific levels.  We have obtained an x-ray of her spine that shows the left lead at T10 and T11 and the right lead over T6 through T8.  She states that she does get coverage of her left leg where she has pain but denies any improvement in her abdomen.  Since the last visit, she denies any major changes.      Past history:  The patient reports that she has had a history of chronic left abdominal wall pain, was seen by multiple physicians about 10 years ago and was eventually diagnosed with post herpetic neuralgia, sounds like she never had  any of the visible lesions but continued to have pain. She was seen by Dr. Huan Nielson and had undergone trial and implantation of a spinal cord stimulator for this pain and states that she was almost 100% pain free with this device.  She states that she had read that the battery only lasts for 10 years and so she requested a battery exchange at 9 years to make sure that she had continued coverage.  She states while she initially had a [employer] stimulator, when the battery was changed, it was changed to a [employer] device.  She was also told that one of the leads had frayed where it entered the IPG and she was told that this lead was replaced.  She cannot recall if the incision was made only over the IPG site or if they actually went into the initial midline incision site as well. Nonetheless, this was accomplished in January, 2018 and after this revision, she was doing well without left abdominal wall pain. 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 Dre Grey in August, 2018.  After that time, she states that her left abdominal wall pain returned and also her left lateral thigh pain returned.  She states that she has met with the [employer] representatives and has not been able to get the stimulator to provide relief.  She has difficulty with providing good history, I am not sure if she actually has any stimulation over her pain sites but nonetheless, she states that she is not having any relief.  She also reports having pain in her lateral left thigh similar to her abdominal wall pain.  She is currently using lidocaine patches with some relief.  She also has been taking hydrocodone 7.5/325 every several days.  I do not see a record of her receiving  this medication but states that she had this filled at [facility] in [place].        ROS:  She reports shortness of breath, easy bruising, bleeding problems, memory loss, difficulty sleeping, anxiety and depression.  Balance of review of systems is negative.    Past Medical History:   Diagnosis Date    Acid reflux 4/11/2014    Anxiety and depression 3/6/2014    AP (angina pectoris) 2/13/2017    CAD (coronary artery disease) 2/13/2017    CAD, multiple vessel 2/13/2017    Chronic pain associated with significant psychosocial dysfunction 2/21/2013    CKD (chronic kidney disease) stage 3, GFR 30-59 ml/min     Dr. Vásquez    COPD (chronic obstructive pulmonary disease)     well controlled, [facility]    History of shingles     HTN (hypertension)     Hypothyroidism     Multiple allergies     S/P CABG (coronary artery bypass graft) 2/13/2017       Past Surgical History:   Procedure Laterality Date    AOROTOCORONARY BYPASS-CABG N/A 2/13/2017    [facility]    CARDIAC SURGERY  02/2017    CABG x3    COLONOSCOPY  ~2013    Dr. Perez; colon polyps removed    COLONOSCOPY N/A 3/15/2018    [facility] at [facility] ENDO    DILATION AND CURETTAGE OF UTERUS      EGD (ESOPHAGOGASTRODUODENOSCOPY) N/A 7/3/2018    [facility]berg [facility] at [facility] ENDO    ESOPHAGOGASTRODUODENOSCOPY (EGD) N/A 3/15/2018    [facility]    HEART CATH-LEFT Left 1/13/2017    [facility] Giuliano Henry [facility] at [facility] CATH LAB    KNEE SURGERY Right     SPINAL CORD STIMULATOR IMPLANT  02/12/2018    for pain secondary to shingles, sees Dr Kam for pain management    TONSILLECTOMY      TUMOR REMOVAL      ULTRASOUND, ENDOSCOPIC, UPPER GI TRACT Left 7/3/2018    [facility]    UPPER GASTROINTESTINAL ENDOSCOPY      XI ROBOTIC GASTRECTOMY-PARTIAL N/A 7/31/2018    Performed by Dre ALVARENGA  [facility] OR       Social History     Socioeconomic History    Marital status:      Spouse name: Not on file    Number of children: Not on file    Years of education: Not on file    Highest education level: Not on file   Occupational History    Not on file   Social Needs    Financial resource strain: Not on file    Food insecurity:     Worry: Not on file     Inability: Not on file    Transportation needs:     Medical: Not on file     Non-medical: Not on file   Tobacco Use    Smoking status: Former Smoker     Packs/day: 1.00     Years: 50.00     Pack years: 50.00     Last attempt to quit: 12/10/2012     Years since quittin.5    Smokeless tobacco: Never Used   Substance and Sexual Activity    Alcohol use: No    Drug use: No    Sexual activity: Not on file   Lifestyle    Physical activity:     Days per week: Not on file     Minutes per session: Not on file    Stress: Not on file   Relationships    Social connections:     Talks on phone: Not on file     Gets together: Not on file     Attends Gnosticist service: Not on file     Active member of club or organization: Not on file     Attends meetings of clubs or organizations: Not on file     Relationship status: Not on file   Other Topics Concern    Not on file   Social History Narrative    Not on file         Medications/Allergies: See med card    Vitals:    19 0908   BP: (!) 124/57   Pulse: 70   Resp: 18   Temp: 97.8 °F (36.6 °C)   TempSrc: Oral   SpO2: 99%   Weight: 62 kg (136 lb 11 oz)   PainSc:   4   PainLoc: Foot         Physical exam:    Gen: A and O x3, pleasant, well-groomed  Skin: No rashes or obvious lesions  HEENT: PERRLA, no obvious deformities on ears or in canals. Trachea midline.  CVS: Regular rate and rhythm, normal palpable pulses.  Resp:No increased work of breathing, symmetrical chest rise.  Abdomen: Soft, NT/ND.  She has tenderness palpation the left lower abdominal wall, allodynia with light touch across the  left abdomen from about T7 down to T12 and extending into the left flank region as well now with similar symptoms on the right side as well, as well as allodynia across the right anterior thigh.  Musculoskeletal:No antalgic gait.     Neuro:  Lower extremities: 5/5 strength bilaterally  Reflexes: Patellar 2+, Achilles 2+ bilaterally.  Sensory:  Intact and symmetrical to light touch and pinprick in L2-S1 dermatomes bilaterally.    Lumbar spine:  Lumbar spine: ROM is full with flexion extension and oblique extension with no increased pain.    Ramon's test causes no increased pain on either side.    Supine straight leg raise is negative bilaterally.    Internal and external rotation of the hip causes no increased pain on either side.  Myofascial exam: No tenderness to palpation across lumbar paraspinous muscles.    Lead insertion site clean, dry intact. No erythema or drainage.    Imagin18 CT Abdomen and pelvis:  1. Thickening of the gastric wall in the location adjacent to where a mass was seen on 2018 that may relate to partial gastrostomy.  The mass may relate to a hematoma or postsurgical change but residual disease is difficult to exclude.  Please clinically correlate follow-up for stability and/or correlation with endoscopy may be of use  2. Multiple stable pulmonary nodules at the lung bases favoring a benign etiology  3. Hepatic hypodensity at the dome of the liver stable since 16 suggesting a benign etiology  4. Apparent bladder wall thickening as can be seen with urinary tract infection, postobstructive change, or neurogenic bladder.      Assessment:   The patient is a 73-year-old woman with a history of COPD, CAD, chronic abdominal wall pain who presents in referral from Dr. Mathews continued pain.     1. Lumbar radiculopathy  Vital signs    Place 18- [facility] IV     Verify informed consent    Notify physician     Notify physician     Notify physician (specify)    Diet NPO     Case Request Operating Room: INSERTION, NEUROSTIMULATOR, SPINAL CORD, DORSAL COLUMN    Place in Outpatient    lactated ringers infusion    ceFAZolin (ANCEF) 2 g in dextrose 5 % 50 mL IVPB   2. Chronic pain syndrome     3. Intercostal neuropathy     4. Lumbar radiculitis         Plan:  1.  I removed the spinal cord stimulator leads after cleaning with sterile prep and cutting sutures.  The leads were removed intact and bandages were applied to the site.  2.  Since she did so well with the trial she would like to move forward with implantation.  We will schedule this in several weeks, I have given her prescription for Hibiclens to use the night before and the morning of the procedure. We will use a new IPG that has for lead ports so we can attached the new leads in addition to the old leads to the same battery.  I will have her follow-up in one week after the procedure sooner as needed.

## 2019-06-26 NOTE — TELEPHONE ENCOUNTER
----- Message from Carlos Serrano sent at 6/26/2019  2:40 PM CDT -----  Contact: Patient  Type: Needs Medical Advice    Who Called:  Patient  Pharmacy name and phone #:    [facility] [place] [address] [id number] [facility] [address]  Phone: [phone] Fax: [phone]  Best Call Back Number: [phone]  Additional Information: Patient would like to discuss sleep medication. Please call to advise. Thanks!

## 2019-07-02 ENCOUNTER — TELEPHONE (OUTPATIENT)
Dept: CARDIOLOGY | Facility: CLINIC | Age: 74
End: 2019-07-02

## 2019-07-09 ENCOUNTER — PATIENT MESSAGE (OUTPATIENT)
Dept: PAIN MEDICINE | Facility: CLINIC | Age: 74
End: 2019-07-09

## 2019-07-19 ENCOUNTER — TELEPHONE (OUTPATIENT)
Dept: PAIN MEDICINE | Facility: CLINIC | Age: 74
End: 2019-07-19

## 2019-07-19 DIAGNOSIS — G89.4 CHRONIC PAIN SYNDROME: Primary | ICD-10-CM

## 2019-07-19 NOTE — TELEPHONE ENCOUNTER
----- Message from Kely Ceron sent at 7/19/2019 10:32 AM CDT -----  Contact: Patient  Type:  Patient Returning Call    Who Called:  Patient  Who Left Message for Patient:  NA  Does the patient know what this is regarding?:  procedure  Best Call Back Number: [phone]

## 2019-07-19 NOTE — TELEPHONE ENCOUNTER
Spoke with patient. She was calling to see if her back brace was ready. Will get with DME regarding this.

## 2019-07-22 DIAGNOSIS — M54.16 LUMBAR RADICULOPATHY: Primary | ICD-10-CM

## 2019-07-22 DIAGNOSIS — G89.4 CHRONIC PAIN SYNDROME: ICD-10-CM

## 2019-07-22 DIAGNOSIS — M51.36 DDD (DEGENERATIVE DISC DISEASE), LUMBAR: ICD-10-CM

## 2019-07-23 ENCOUNTER — TELEPHONE (OUTPATIENT)
Dept: SURGERY | Facility: HOSPITAL | Age: 74
End: 2019-07-23

## 2019-07-23 ENCOUNTER — PATIENT MESSAGE (OUTPATIENT)
Dept: PAIN MEDICINE | Facility: CLINIC | Age: 74
End: 2019-07-23

## 2019-07-23 NOTE — TELEPHONE ENCOUNTER
When doing pre op call, patient stated that she had not received her back brace from [facility]. She asked that a message be sent to the office to inquire about the status of the brace.   She also asked that a prescription of Hydrocodone 10mg be called in to pharmacy.  Thank you

## 2019-07-23 NOTE — TELEPHONE ENCOUNTER
Spoke with the patient regarding the back brace. Her insurance is working to get this approved for her surgery date. She was advised that we do have these on site and we can get her fitted day of surgery but we need the approval for the brace. She did want Dr. Rouse to send in RX for pain medication for the surgery.

## 2019-07-24 ENCOUNTER — ANESTHESIA EVENT (OUTPATIENT)
Dept: SURGERY | Facility: HOSPITAL | Age: 74
End: 2019-07-24
Payer: MEDICARE

## 2019-07-24 DIAGNOSIS — M51.36 DDD (DEGENERATIVE DISC DISEASE), LUMBAR: Primary | ICD-10-CM

## 2019-07-25 ENCOUNTER — ANESTHESIA (OUTPATIENT)
Dept: SURGERY | Facility: HOSPITAL | Age: 74
End: 2019-07-25
Payer: MEDICARE

## 2019-07-25 ENCOUNTER — HOSPITAL ENCOUNTER (OUTPATIENT)
Facility: HOSPITAL | Age: 74
Discharge: HOME | End: 2019-07-25
Attending: ANESTHESIOLOGY | Admitting: ANESTHESIOLOGY
Payer: MEDICARE

## 2019-07-25 ENCOUNTER — HOSPITAL ENCOUNTER (OUTPATIENT)
Dept: RADIOLOGY | Facility: HOSPITAL | Age: 74
Discharge: HOME | End: 2019-07-25
Attending: ANESTHESIOLOGY | Admitting: ANESTHESIOLOGY
Payer: MEDICARE

## 2019-07-25 DIAGNOSIS — G89.4 CHRONIC PAIN SYNDROME: Primary | ICD-10-CM

## 2019-07-25 DIAGNOSIS — M54.16 LUMBAR RADICULOPATHY: ICD-10-CM

## 2019-07-25 DIAGNOSIS — M51.36 DDD (DEGENERATIVE DISC DISEASE), LUMBAR: ICD-10-CM

## 2019-07-25 PROCEDURE — 25000003 PHARM REV CODE 250: Mod: PO | Performed by: ANESTHESIOLOGY

## 2019-07-25 PROCEDURE — D9220A PRA ANESTHESIA: ICD-10-PCS | Mod: ANES,,, | Performed by: ANESTHESIOLOGY

## 2019-07-25 PROCEDURE — 76000 FLUOROSCOPY <1 HR PHYS/QHP: CPT | Mod: TC,PO

## 2019-07-25 PROCEDURE — 71000033 HC RECOVERY, INTIAL HOUR: Mod: PO | Performed by: ANESTHESIOLOGY

## 2019-07-25 PROCEDURE — 71000015 HC POSTOP RECOV 1ST HR: Mod: PO | Performed by: ANESTHESIOLOGY

## 2019-07-25 PROCEDURE — 63650 IMPLANT NEUROELECTRODES: CPT | Mod: ,,, | Performed by: ANESTHESIOLOGY

## 2019-07-25 PROCEDURE — 63600175 PHARM REV CODE 636 W HCPCS: Mod: PO | Performed by: ANESTHESIOLOGY

## 2019-07-25 PROCEDURE — 37000008 HC ANESTHESIA 1ST 15 MINUTES: Mod: PO | Performed by: ANESTHESIOLOGY

## 2019-07-25 PROCEDURE — 27800903 OPTIME MED/SURG SUP & DEVICES OTHER IMPLANTS: Mod: PO | Performed by: ANESTHESIOLOGY

## 2019-07-25 PROCEDURE — D9220A PRA ANESTHESIA: ICD-10-PCS | Mod: CRNA,,, | Performed by: NURSE ANESTHETIST, CERTIFIED REGISTERED

## 2019-07-25 PROCEDURE — 25000003 PHARM REV CODE 250: Mod: PO | Performed by: NURSE ANESTHETIST, CERTIFIED REGISTERED

## 2019-07-25 PROCEDURE — 63685 INS/RPLC SPI NPG/RCVR POCKET: CPT | Mod: 59,,, | Performed by: ANESTHESIOLOGY

## 2019-07-25 PROCEDURE — 63650 PR PERCUT IMPLNT NEUROELECT,EPIDURAL: ICD-10-PCS | Mod: ,,, | Performed by: ANESTHESIOLOGY

## 2019-07-25 PROCEDURE — 63685 PR IMPLANT SPINAL NEUROSTIM/RECEIVER: ICD-10-PCS | Mod: 59,,, | Performed by: ANESTHESIOLOGY

## 2019-07-25 PROCEDURE — 36000707: Mod: PO | Performed by: ANESTHESIOLOGY

## 2019-07-25 PROCEDURE — C1820 GENERATOR NEURO RECHG BAT SY: HCPCS | Mod: PO | Performed by: ANESTHESIOLOGY

## 2019-07-25 PROCEDURE — C1713 ANCHOR/SCREW BN/BN,TIS/BN: HCPCS | Mod: PO | Performed by: ANESTHESIOLOGY

## 2019-07-25 PROCEDURE — 36000706: Mod: PO | Performed by: ANESTHESIOLOGY

## 2019-07-25 PROCEDURE — C1778 LEAD, NEUROSTIMULATOR: HCPCS | Mod: PO | Performed by: ANESTHESIOLOGY

## 2019-07-25 PROCEDURE — D9220A PRA ANESTHESIA: Mod: ANES,,, | Performed by: ANESTHESIOLOGY

## 2019-07-25 PROCEDURE — 37000009 HC ANESTHESIA EA ADD 15 MINS: Mod: PO | Performed by: ANESTHESIOLOGY

## 2019-07-25 PROCEDURE — 27201423 OPTIME MED/SURG SUP & DEVICES STERILE SUPPLY: Mod: PO | Performed by: ANESTHESIOLOGY

## 2019-07-25 PROCEDURE — 63600175 PHARM REV CODE 636 W HCPCS: Mod: PO | Performed by: NURSE ANESTHETIST, CERTIFIED REGISTERED

## 2019-07-25 PROCEDURE — D9220A PRA ANESTHESIA: Mod: CRNA,,, | Performed by: NURSE ANESTHETIST, CERTIFIED REGISTERED

## 2019-07-25 DEVICE — LEAD ST LINEAR 70CM: Type: IMPLANTABLE DEVICE | Site: BACK | Status: FUNCTIONAL

## 2019-07-25 DEVICE — ANCHOR LEAD NEROSTIMLTR CLIK X: Type: IMPLANTABLE DEVICE | Site: BACK | Status: FUNCTIONAL

## 2019-07-25 DEVICE — KIT SPECTRA WAVEWRITER IPG: Type: IMPLANTABLE DEVICE | Site: BACK | Status: FUNCTIONAL

## 2019-07-25 RX ORDER — ACETAMINOPHEN 10 MG/ML
INJECTION, SOLUTION INTRAVENOUS
Status: DISCONTINUED | OUTPATIENT
Start: 2019-07-25 | End: 2019-07-25

## 2019-07-25 RX ORDER — BACITRACIN 50000 [IU]/1
INJECTION, POWDER, FOR SOLUTION INTRAMUSCULAR
Status: DISCONTINUED | OUTPATIENT
Start: 2019-07-25 | End: 2019-07-25 | Stop reason: HOSPADM

## 2019-07-25 RX ORDER — HYDROCODONE BITARTRATE AND ACETAMINOPHEN 7.5; 325 MG/1; MG/1
1 TABLET ORAL EVERY 6 HOURS PRN
Qty: 20 TABLET | Refills: 0 | Status: SHIPPED | OUTPATIENT
Start: 2019-07-25 | End: 2019-08-24

## 2019-07-25 RX ORDER — LIDOCAINE HYDROCHLORIDE 10 MG/ML
INJECTION, SOLUTION INTRAVENOUS
Status: DISCONTINUED | OUTPATIENT
Start: 2019-07-25 | End: 2019-07-25

## 2019-07-25 RX ORDER — FENTANYL CITRATE 50 UG/ML
25 INJECTION, SOLUTION INTRAMUSCULAR; INTRAVENOUS EVERY 5 MIN PRN
Status: DISCONTINUED | OUTPATIENT
Start: 2019-07-25 | End: 2019-07-25 | Stop reason: HOSPADM

## 2019-07-25 RX ORDER — SODIUM CHLORIDE, SODIUM LACTATE, POTASSIUM CHLORIDE, CALCIUM CHLORIDE 600; 310; 30; 20 MG/100ML; MG/100ML; MG/100ML; MG/100ML
INJECTION, SOLUTION INTRAVENOUS CONTINUOUS
Status: DISCONTINUED | OUTPATIENT
Start: 2019-07-25 | End: 2019-07-25 | Stop reason: HOSPADM

## 2019-07-25 RX ORDER — LABETALOL HYDROCHLORIDE 5 MG/ML
INJECTION, SOLUTION INTRAVENOUS
Status: DISCONTINUED | OUTPATIENT
Start: 2019-07-25 | End: 2019-07-25

## 2019-07-25 RX ORDER — MIDAZOLAM HYDROCHLORIDE 1 MG/ML
INJECTION, SOLUTION INTRAMUSCULAR; INTRAVENOUS
Status: DISCONTINUED | OUTPATIENT
Start: 2019-07-25 | End: 2019-07-25

## 2019-07-25 RX ORDER — FENTANYL CITRATE 50 UG/ML
INJECTION, SOLUTION INTRAMUSCULAR; INTRAVENOUS
Status: DISCONTINUED | OUTPATIENT
Start: 2019-07-25 | End: 2019-07-25

## 2019-07-25 RX ORDER — PROPOFOL 10 MG/ML
VIAL (ML) INTRAVENOUS
Status: DISCONTINUED | OUTPATIENT
Start: 2019-07-25 | End: 2019-07-25

## 2019-07-25 RX ORDER — GLYCOPYRROLATE 0.2 MG/ML
INJECTION INTRAMUSCULAR; INTRAVENOUS
Status: DISCONTINUED | OUTPATIENT
Start: 2019-07-25 | End: 2019-07-25

## 2019-07-25 RX ORDER — LIDOCAINE HYDROCHLORIDE AND EPINEPHRINE 10; 10 MG/ML; UG/ML
INJECTION, SOLUTION INFILTRATION; PERINEURAL
Status: DISCONTINUED | OUTPATIENT
Start: 2019-07-25 | End: 2019-07-25 | Stop reason: HOSPADM

## 2019-07-25 RX ORDER — PROPOFOL 10 MG/ML
VIAL (ML) INTRAVENOUS CONTINUOUS PRN
Status: DISCONTINUED | OUTPATIENT
Start: 2019-07-25 | End: 2019-07-25

## 2019-07-25 RX ORDER — HYDROMORPHONE HYDROCHLORIDE 2 MG/ML
0.2 INJECTION, SOLUTION INTRAMUSCULAR; INTRAVENOUS; SUBCUTANEOUS EVERY 5 MIN PRN
Status: DISCONTINUED | OUTPATIENT
Start: 2019-07-25 | End: 2019-07-25 | Stop reason: HOSPADM

## 2019-07-25 RX ORDER — BUPIVACAINE HYDROCHLORIDE 2.5 MG/ML
INJECTION, SOLUTION EPIDURAL; INFILTRATION; INTRACAUDAL
Status: DISCONTINUED | OUTPATIENT
Start: 2019-07-25 | End: 2019-07-25 | Stop reason: HOSPADM

## 2019-07-25 RX ORDER — LIDOCAINE HYDROCHLORIDE 10 MG/ML
1 INJECTION, SOLUTION EPIDURAL; INFILTRATION; INTRACAUDAL; PERINEURAL ONCE
Status: COMPLETED | OUTPATIENT
Start: 2019-07-25 | End: 2019-07-25

## 2019-07-25 RX ORDER — CEFAZOLIN SODIUM 2 G/50ML
2 SOLUTION INTRAVENOUS
Status: COMPLETED | OUTPATIENT
Start: 2019-07-25 | End: 2019-07-25

## 2019-07-25 RX ORDER — OXYCODONE HYDROCHLORIDE 5 MG/1
5 TABLET ORAL
Status: DISCONTINUED | OUTPATIENT
Start: 2019-07-25 | End: 2019-07-25 | Stop reason: HOSPADM

## 2019-07-25 RX ADMIN — SODIUM CHLORIDE, SODIUM LACTATE, POTASSIUM CHLORIDE, AND CALCIUM CHLORIDE: .6; .31; .03; .02 INJECTION, SOLUTION INTRAVENOUS at 11:07

## 2019-07-25 RX ADMIN — ACETAMINOPHEN 1000 MG: 10 INJECTION, SOLUTION INTRAVENOUS at 12:07

## 2019-07-25 RX ADMIN — FENTANYL CITRATE 25 MCG: 50 INJECTION, SOLUTION INTRAMUSCULAR; INTRAVENOUS at 12:07

## 2019-07-25 RX ADMIN — LIDOCAINE HYDROCHLORIDE 50 MG: 10 INJECTION, SOLUTION INTRAVENOUS at 12:07

## 2019-07-25 RX ADMIN — PROPOFOL 50 MG: 10 INJECTION, EMULSION INTRAVENOUS at 12:07

## 2019-07-25 RX ADMIN — GLYCOPYRROLATE 0.2 MG: 0.2 INJECTION, SOLUTION INTRAMUSCULAR; INTRAVENOUS at 12:07

## 2019-07-25 RX ADMIN — PROPOFOL 75 MCG/KG/MIN: 10 INJECTION, EMULSION INTRAVENOUS at 12:07

## 2019-07-25 RX ADMIN — LIDOCAINE HYDROCHLORIDE 5 MG: 10 INJECTION, SOLUTION EPIDURAL; INFILTRATION; INTRACAUDAL; PERINEURAL at 11:07

## 2019-07-25 RX ADMIN — FENTANYL CITRATE 25 MCG: 50 INJECTION, SOLUTION INTRAMUSCULAR; INTRAVENOUS at 01:07

## 2019-07-25 RX ADMIN — OXYCODONE HYDROCHLORIDE 5 MG: 5 TABLET ORAL at 02:07

## 2019-07-25 RX ADMIN — PROPOFOL 20 MG: 10 INJECTION, EMULSION INTRAVENOUS at 01:07

## 2019-07-25 RX ADMIN — CEFAZOLIN SODIUM 2 G: 2 SOLUTION INTRAVENOUS at 12:07

## 2019-07-25 RX ADMIN — LABETALOL HYDROCHLORIDE 10 MG: 5 INJECTION, SOLUTION INTRAVENOUS at 01:07

## 2019-07-25 RX ADMIN — SODIUM CHLORIDE, SODIUM LACTATE, POTASSIUM CHLORIDE, AND CALCIUM CHLORIDE: .6; .31; .03; .02 INJECTION, SOLUTION INTRAVENOUS at 01:07

## 2019-07-25 RX ADMIN — MIDAZOLAM HYDROCHLORIDE 2 MG: 1 INJECTION, SOLUTION INTRAMUSCULAR; INTRAVENOUS at 12:07

## 2019-07-25 NOTE — TRANSFER OF CARE
Anesthesia Transfer of Care Note    Patient: Soumya V Pomares    Procedure(s) Performed: Procedure(s) (LRB):  INSERTION, NEUROSTIMULATOR, SPINAL CORD, DORSAL COLUMN (N/A)  REPLACEMENT, BATTERY, NEUROSTIMULATOR    Patient location: PACU    Anesthesia Type: MAC    Transport from OR: Transported from OR on 6-10 L/min O2 by face mask with adequate spontaneous ventilation    Post pain: adequate analgesia    Post assessment: no apparent anesthetic complications and tolerated procedure well    Post vital signs: stable    Level of consciousness: awake, alert and oriented    Nausea/Vomiting: no nausea/vomiting    Complications: none    Transfer of care protocol was followed      Last vitals:   Visit Vitals  /63 (BP Location: Left arm, Patient Position: Lying)   Pulse 87   Temp 36.3 °C (97.3 °F) (Skin)   Resp 16   Ht 5' (1.524 m)   Wt 61.7 kg (136 lb)   SpO2 98%   Breastfeeding? No   BMI 26.56 kg/m²

## 2019-07-25 NOTE — ANESTHESIA PREPROCEDURE EVALUATION
07/25/2019  Soumya Melchor is a 73 y.o., female.    Pre-op Assessment    I have reviewed the Patient Summary Reports.     I have reviewed the Nursing Notes.      Review of Systems  Anesthesia Hx:  No problems with previous Anesthesia    Cardiovascular:   Hypertension, well controlled CAD asymptomatic     Pulmonary:   COPD, mild    Hepatic/GI:   GERD, well controlled    Endocrine:   Hypothyroidism        Physical Exam  General:  Well nourished    Airway/Jaw/Neck:  Airway Findings: Mallampati: II                Anesthesia Plan  Type of Anesthesia, risks & benefits discussed:  Anesthesia Type:  MAC  Patient's Preference:   Intra-op Monitoring Plan:   Intra-op Monitoring Plan Comments:   Post Op Pain Control Plan:   Post Op Pain Control Plan Comments:   Induction:   IV  Beta Blocker:  Patient is not currently on a Beta-Blocker (No further documentation required).       Informed Consent: Patient understands risks and agrees with Anesthesia plan.  Questions answered. Anesthesia consent signed with patient.  ASA Score: 3     Day of Surgery Review of History & Physical:    H&P update referred to the surgeon.         Ready For Surgery From Anesthesia Perspective.

## 2019-07-25 NOTE — DISCHARGE INSTRUCTIONS
SPINAL CORD STIMULATOR   Please follow all of the instructions that were given to you and demonstrated by your  Representative about the use and care of your remote and equipment.    Please contact your representative for any questions about restrictions or to offer any support you may need on the care and operation of your spinal cord stimulator device.     After your procedure:    -Sponge baths only .  -Keep your bandage clean and dry.  -Do not remove your dressing until your follow up visit.   -Please limit any necessary bending, lifting or twisting to slow and careful movements    for the next 6 weeks or as directed by your doctor.  -During these 6 weeks, also avoid overhead work. Keep your elbows below your  shoulders.  -During these 6 weeks, wear your brace when you are walking or active. You do not have to wear the brace  while you are in bed.  -Turn off your stimulator when driving. (Do not drive for the first 24 hours after your  procedure)      -You may resume your home medications as prescribed.  -You may resume your normal diet as tolerated.  -Follow up as scheduled with Dr. Rouse.       For programming or any questions you may have about your spinal cord stimulator, please call your  Representative:            Discharge Instructions: After Your Surgery  Youve just had surgery. During surgery, you were given medicine called anesthesia to keep you relaxed and free of pain. After surgery, you may have some pain or nausea. This is common. Here are some tips for feeling better and getting well after surgery.     Stay on schedule with your medicine.   Going home  Your healthcare provider will show you how to take care of yourself when you go home. He or she will also answer your questions. Have an adult family member or friend drive you home. For the first 24 hours after your surgery:  · Do not drive or use heavy equipment.  · Do not make important decisions or sign legal papers.  · Do not drink  alcohol.  · Have someone stay with you, if needed. He or she can watch for problems and help keep you safe.  Be sure to go to all follow-up visits with your healthcare provider. And rest after your surgery for as long as your healthcare provider tells you to.  Coping with pain  If you have pain after surgery, pain medicine will help you feel better. Take it as told, before pain becomes severe. Also, ask your healthcare provider or pharmacist about other ways to control pain. This might be with heat, ice, or relaxation. And follow any other instructions your surgeon or nurse gives you.  Tips for taking pain medicine  To get the best relief possible, remember these points:  · Pain medicines can upset your stomach. Taking them with a little food may help.  · Most pain relievers taken by mouth need at least 20 to 30 minutes to start to work.  · Taking medicine on a schedule can help you remember to take it. Try to time your medicine so that you can take it before starting an activity. This might be before you get dressed, go for a walk, or sit down for dinner.  · Constipation is a common side effect of pain medicines. Call your healthcare provider before taking any medicines such as laxatives or stool softeners to help ease constipation. Also ask if you should skip any foods. Drinking lots of fluids and eating foods such as fruits and vegetables that are high in fiber can also help. Remember, do not take laxatives unless your surgeon has prescribed them.  · Drinking alcohol and taking pain medicine can cause dizziness and slow your breathing. It can even be deadly. Do not drink alcohol while taking pain medicine.  · Pain medicine can make you react more slowly to things. Do not drive or run machinery while taking pain medicine.  Your healthcare provider may tell you to take acetaminophen to help ease your pain. Ask him or her how much you are supposed to take each day. Acetaminophen or other pain relievers may interact  with your prescription medicines or other over-the-counter (OTC) medicines. Some prescription medicines have acetaminophen and other ingredients. Using both prescription and OTC acetaminophen for pain can cause you to overdose. Read the labels on your OTC medicines with care. This will help you to clearly know the list of ingredients, how much to take, and any warnings. It may also help you not take too much acetaminophen. If you have questions or do not understand the information, ask your pharmacist or healthcare provider to explain it to you before you take the OTC medicine.  Managing nausea  Some people have an upset stomach after surgery. This is often because of anesthesia, pain, or pain medicine, or the stress of surgery. These tips will help you handle nausea and eat healthy foods as you get better. If you were on a special food plan before surgery, ask your healthcare provider if you should follow it while you get better. These tips may help:  · Do not push yourself to eat. Your body will tell you when to eat and how much.  · Start off with clear liquids and soup. They are easier to digest.  · Next try semi-solid foods, such as mashed potatoes, applesauce, and gelatin, as you feel ready.  · Slowly move to solid foods. Dont eat fatty, rich, or spicy foods at first.  · Do not force yourself to have 3 large meals a day. Instead eat smaller amounts more often.  · Take pain medicines with a small amount of solid food, such as crackers or toast, to avoid nausea.     Call your surgeon if  · You still have pain an hour after taking medicine. The medicine may not be strong enough.  · You feel too sleepy, dizzy, or groggy. The medicine may be too strong.  · You have side effects like nausea, vomiting, or skin changes, such as rash, itching, or hives.       If you have obstructive sleep apnea  You were given anesthesia medicine during surgery to keep you comfortable and free of pain. After surgery, you may have more  apnea spells because of this medicine and other medicines you were given. The spells may last longer than usual.   At home:  · Keep using the continuous positive airway pressure (CPAP) device when you sleep. Unless your healthcare provider tells you not to, use it when you sleep, day or night. CPAP is a common device used to treat obstructive sleep apnea.  · Talk with your provider before taking any pain medicine, muscle relaxants, or sedatives. Your provider will tell you about the possible dangers of taking these medicines.  Date Last Reviewed: 12/1/2016  © 7534-0258 [employer]. [address] [place] [address], [place] [address]. All rights reserved. This information is not intended as a substitute for professional medical care. Always follow your healthcare professional's instructions.

## 2019-07-25 NOTE — DISCHARGE SUMMARY
[facility]  Discharge Note  Short Stay    Admit Date: 7/25/2019    Discharge Date: 7/25/2019    Attending Physician: Ebenezer Rouse MD     Discharge Provider: Ebenezer Rouse    Diagnoses:  Active Hospital Problems    Diagnosis  POA    *Chronic pain syndrome [G89.4]  Yes      Resolved Hospital Problems   No resolved problems to display.       Discharged Condition: good    Final Diagnoses: Lumbar radiculopathy [M54.16]    Disposition: Home or Self Care    Hospital Course: no complications, uneventful    Outcome of Hospitalization, Treatment, Procedure, or Surgery:  Patient was admitted for outpatient procedure. The patient underwent procedure without complications and are discharged home    Follow up/Patient Instructions:  Follow up as scheduled in Pain Management clinic in 3-4 weeks/Patient has received instructions and follow up date and time    Medications:  Continue previous medications    Discharge Procedure Orders   Call MD for:  temperature >100.4     Call MD for:  severe uncontrolled pain     Call MD for:  redness, tenderness, or signs of infection (pain, swelling, redness, odor or green/yellow discharge around incision site)     Call MD for:  severe persistent headache     No dressing needed         Discharge Procedure Orders (must include Diet, Follow-up, Activity):   Discharge Procedure Orders (must include Diet, Follow-up, Activity)   Call MD for:  temperature >100.4     Call MD for:  severe uncontrolled pain     Call MD for:  redness, tenderness, or signs of infection (pain, swelling, redness, odor or green/yellow discharge around incision site)     Call MD for:  severe persistent headache     No dressing needed

## 2019-07-25 NOTE — ANESTHESIA POSTPROCEDURE EVALUATION
Anesthesia Post Evaluation    Patient: Soumya V Pomares    Procedure(s) Performed: Procedure(s) (LRB):  INSERTION, NEUROSTIMULATOR, SPINAL CORD, DORSAL COLUMN (N/A)  REPLACEMENT, BATTERY, NEUROSTIMULATOR    Final Anesthesia Type: MAC  Patient location during evaluation: PACU  Patient participation: Yes- Able to Participate  Level of consciousness: awake and alert, oriented and awake  Post-procedure vital signs: reviewed and stable  Pain management: adequate  Airway patency: patent  PONV status at discharge: No PONV  Anesthetic complications: no      Cardiovascular status: blood pressure returned to baseline and hemodynamically stable  Respiratory status: unassisted, spontaneous ventilation and room air  Hydration status: euvolemic  Follow-up not needed.          Vitals Value Taken Time   /64 7/25/2019  2:34 PM   Temp 36.3 °C (97.3 °F) 7/25/2019  2:00 PM   Pulse 78 7/25/2019  2:34 PM   Resp 18 7/25/2019  2:34 PM   SpO2 98 % 7/25/2019  2:34 PM         No case tracking events are documented in the log.      Pain/Maya Score: Pain Rating Prior to Med Admin: 4 (7/25/2019  2:48 PM)  Maya Score: 10 (7/25/2019  2:20 PM)

## 2019-07-25 NOTE — PLAN OF CARE
Stimulator representatives at bedside testing stimulator programs with patient.  Patient AAO x3, denies pain, Verbalizes understanding of instructions, NADN, continuing monitoring at bedside.

## 2019-07-25 NOTE — OP NOTE
PROCEDURE DATE: 7/25/2019    Spinal Cord Stimulator Implantation  PROCEDURE:   1. Spinal Cord Stimulator leads placement x 1 and implant- 8 contact total  2. Pulse Generator Implantation under flouroscopy  3. Programming greater than 30 mins    Pre-op diagnosis: Chronic pain syndrome, lumbar radiculitis    Post-op diagnosis: Same    Surgeon: Dr. Ebenezer Rouse    Assistant: none     Anesthesia: Monitored Anesthesia Care    Estimated Blood Loss: Minimal- <10ml    Urine Output: Not Measured    IV Fluids- See Anesthesia record    Complications: none    CONSENT: The risks, benefits, and options were discussed thoroughly with the patient. The patient's questions were answered. The patient understands the risk and benefits and wishes to proceed. Informed consent was obtained.     PROCEDURE NOTE:  Patient is s/p a successful trial of spinal cord stimulation and scheduled for a stage 2 implant. After obtaining informed consent, pre-op antibiotic was started 30 minutes prior to incision. Site of the implantable pulse generator was marked on the patients left side in the preoperative area. The patient was taken to the OR and placed in the prone position. The anesthesia provider throughout the case provided sedation and cardiopulmonary monitoring. The Patient's back was prepped with Duraprep and then draped in usual sterile fashion.     An AP fluoroscopic view was obtained to identify and cory the midline position of the spinous process. The skin was anesthetized with Lidocaine 1%. Skin incision with a No. 10 scalpel was made and local dissection done without electrocautery as necessary to facilitate access to the paraspinous fascia.     Using a paramedian approach, a Tuohy needle was entered into the right-side T12/L1 epidural space using a loss of resistance technique with 0.5cc of air. Then, after negative aspiration of blood, CSF, or any paraesthesia, the SCS leads were advanced to the top of the T7 vertebral body in the  midline just to the right of the already present 2 previous SCS leads.    Then, the Tuohy needles were removed under fluoroscopy and a lead anchor placed over the leads. Using 0-0 Orthocord sutures, leads were anchored to the fascia with 2 sutures for each lead. A relief loop was then placed. Then further local anesthetic was used at the IPG site with 1% lidocaine. Using a No. 10 scalpel, an incision was made over the left buttock and dissection carried out with blunt dissection to access to the previous IPG. The previous IPG was disconnected and removed. After obtaining hemostasis, both the incisions were irrigated with antibiotic solutions. Using the tunneling tool, tunneling was completed between the midline and the IPG pocket. The new lead were passed to the IPG and connected along with the other already present two SCS leads. Then the IPG was placed in the IPG pocket and system was checked and noted to be in adequate position. Copious antibiotic bulb lavage was irrigated throughout the field, and hemostasis was maintained.     Then the wound was closed with simple interrupted sutures using 0-0 vicryl sutures and then 2-0 vicryl sutures in 2 layers and the skin closed with 4-0 monocryl. Steristrips were placed over the incision sites and dressings applied. Sponge and needle counts were correct x2 at conclusion of the case.     Patient was then placed supine on the stretcher and transferred to the recovery room. Patient was programmed by the representative of the SCS. Patient was instructed not to perform any abrupt movements with the back, avoid bending, twisting, or lifting >10lbs. The patient has been scheduled to return to the clinic in approx 7 days. The patient tolerated the procedure well.

## 2019-07-25 NOTE — INTERVAL H&P NOTE
The patient has been examined and the H&P has been reviewed:    I concur with the findings and no changes have occurred since H&P was written.    Anesthesia/Surgery risks, benefits and alternative options discussed and understood by patient/family.          Active Hospital Problems    Diagnosis  POA    Chronic pain syndrome [G89.4]  Yes      Resolved Hospital Problems   No resolved problems to display.

## 2019-07-26 ENCOUNTER — TELEPHONE (OUTPATIENT)
Dept: PAIN MEDICINE | Facility: CLINIC | Age: 74
End: 2019-07-26

## 2019-07-26 VITALS
DIASTOLIC BLOOD PRESSURE: 64 MMHG | BODY MASS INDEX: 26.7 KG/M2 | HEIGHT: 60 IN | OXYGEN SATURATION: 98 % | WEIGHT: 136 LBS | SYSTOLIC BLOOD PRESSURE: 113 MMHG | TEMPERATURE: 97 F | HEART RATE: 78 BPM | RESPIRATION RATE: 18 BRPM

## 2019-07-26 DIAGNOSIS — R15.9 INCONTINENCE OF FECES, UNSPECIFIED FECAL INCONTINENCE TYPE: ICD-10-CM

## 2019-07-26 RX ORDER — CHOLESTYRAMINE 4 G/9G
4 POWDER, FOR SUSPENSION ORAL DAILY
Qty: 30 PACKET | Refills: 1 | Status: SHIPPED | OUTPATIENT
Start: 2019-07-26 | End: 2020-06-03 | Stop reason: SDUPTHER

## 2019-07-26 RX ORDER — ONDANSETRON 8 MG/1
8 TABLET, ORALLY DISINTEGRATING ORAL ONCE
Qty: 20 TABLET | Refills: 0 | Status: SHIPPED | OUTPATIENT
Start: 2019-07-26 | End: 2019-07-26

## 2019-07-26 NOTE — TELEPHONE ENCOUNTER
Pharmacy requesting refill of Ondansetron 4 mg 1 tablet by mouth every 6 hours as needed don't see it on her list.

## 2019-07-29 ENCOUNTER — OFFICE VISIT (OUTPATIENT)
Dept: PAIN MEDICINE | Facility: CLINIC | Age: 74
End: 2019-07-29
Payer: MEDICARE

## 2019-07-29 ENCOUNTER — PATIENT MESSAGE (OUTPATIENT)
Dept: PAIN MEDICINE | Facility: CLINIC | Age: 74
End: 2019-07-29

## 2019-07-29 VITALS
TEMPERATURE: 97 F | RESPIRATION RATE: 18 BRPM | HEART RATE: 68 BPM | WEIGHT: 134.5 LBS | BODY MASS INDEX: 26.26 KG/M2 | SYSTOLIC BLOOD PRESSURE: 132 MMHG | OXYGEN SATURATION: 97 % | DIASTOLIC BLOOD PRESSURE: 62 MMHG

## 2019-07-29 DIAGNOSIS — M54.16 LUMBAR RADICULITIS: ICD-10-CM

## 2019-07-29 DIAGNOSIS — G89.4 CHRONIC PAIN SYNDROME: Primary | ICD-10-CM

## 2019-07-29 DIAGNOSIS — G90.521 COMPLEX REGIONAL PAIN SYNDROME TYPE 1 OF RIGHT LOWER EXTREMITY: ICD-10-CM

## 2019-07-29 DIAGNOSIS — G58.0 INTERCOSTAL NEUROPATHY: ICD-10-CM

## 2019-07-29 PROCEDURE — 99024 PR POST-OP FOLLOW-UP VISIT: ICD-10-PCS | Mod: S$GLB,,, | Performed by: ANESTHESIOLOGY

## 2019-07-29 PROCEDURE — 99999 PR PBB SHADOW E&M-EST. PATIENT-LVL III: ICD-10-PCS | Mod: PBBFAC,,, | Performed by: ANESTHESIOLOGY

## 2019-07-29 PROCEDURE — 99024 POSTOP FOLLOW-UP VISIT: CPT | Mod: S$GLB,,, | Performed by: ANESTHESIOLOGY

## 2019-07-29 PROCEDURE — 99999 PR PBB SHADOW E&M-EST. PATIENT-LVL III: CPT | Mod: PBBFAC,,, | Performed by: ANESTHESIOLOGY

## 2019-07-29 RX ORDER — HYDROCODONE BITARTRATE AND ACETAMINOPHEN 10; 325 MG/1; MG/1
1 TABLET ORAL 3 TIMES DAILY PRN
Qty: 20 TABLET | Refills: 0 | Status: SHIPPED | OUTPATIENT
Start: 2019-07-29 | End: 2019-08-28

## 2019-07-29 NOTE — PROGRESS NOTES
This note was completed with dictation software and grammatical errors may exist.    CC:  Bilateral abdominal wall and bilateral lower leg pain    HPI:  The patient is a 73-year-old woman with a history of COPD, CAD, chronic abdominal wall pain who presents in referral from Dr. Mathews continued pain. She returns in follow-up today one week status post AMEE spinal cord stimulator implantation in which I had placed one extra lead out to the right side to cover her right leg and abdominal wall pain. She now has three separate leads located in the epidural canal connected to one battery which is controlling her bilateral leg and abdominal wall pain and she reports greater than 80% relief.  She states that she had a great deal of incisional pain and had been taking hydrocodone for her pain but also had nausea with this, Zofran was not all that helpful but she found eating charity was helpful.  Otherwise, she denies any fevers or chills, no new numbness or weakness.      From 4/1/19 visit:   She returns in follow-up today to review records, continues to have left abdominal wall pain but states that it also crosses over to the midline and right side as well. I reviewed records from Dr. Nielson that states her leads were initially placed at T5, but unclear if this was the top of T5 over the bottom or middle.  Furthermore, notes from x-rays done after this state that one lead was in the midthoracic region and the other was in the lower thoracic region but there is no mention of specific levels.  We have obtained an x-ray of her spine that shows the left lead at T10 and T11 and the right lead over T6 through T8.  She states that she does get coverage of her left leg where she has pain but denies any improvement in her abdomen.  Since the last visit, she denies any major changes.      Past history:  The patient reports that she has had a history of chronic left abdominal wall pain, was seen by multiple physicians  about 10 years ago and was eventually diagnosed with post herpetic neuralgia, sounds like she never had any of the visible lesions but continued to have pain. She was seen by Dr. Huan Nielsno and had undergone trial and implantation of a spinal cord stimulator for this pain and states that she was almost 100% pain free with this device.  She states that she had read that the battery only lasts for 10 years and so she requested a battery exchange at 9 years to make sure that she had continued coverage.  She states while she initially had a [employer] stimulator, when the battery was changed, it was changed to a [employer] device.  She was also told that one of the leads had frayed where it entered the IPG and she was told that this lead was replaced.  She cannot recall if the incision was made only over the IPG site or if they actually went into the initial midline incision site as well. Nonetheless, this was accomplished in January, 2018 and after this revision, she was doing well without left abdominal wall pain. 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 Dre Grey in August, 2018.  After that time, she states that her left abdominal wall pain returned and also her left lateral thigh pain returned.  She states that she has met with the [employer] representatives and has not been able to get the stimulator to provide relief.  She has difficulty with providing good history, I am not sure if she actually has any stimulation over her pain sites but nonetheless, she states that she is not having any relief.  She also reports having pain in her lateral left thigh similar to her abdominal wall pain.  She is currently using lidocaine patches with some relief.   She also has been taking hydrocodone 7.5/325 every several days.  I do not see a record of her receiving this medication but states that she had this filled at [facility] in [place].        ROS:  She reports shortness of breath, easy bruising, bleeding problems, memory loss, difficulty sleeping, anxiety and depression.  Balance of review of systems is negative.    Past Medical History:   Diagnosis Date    Acid reflux 4/11/2014    Anxiety and depression 3/6/2014    AP (angina pectoris) 2/13/2017    CAD (coronary artery disease) 2/13/2017    CAD, multiple vessel 2/13/2017    Chronic pain associated with significant psychosocial dysfunction 2/21/2013    CKD (chronic kidney disease) stage 3, GFR 30-59 ml/min     Dr. Vásquez    COPD (chronic obstructive pulmonary disease)     well controlled, [facility]    History of shingles     HTN (hypertension)     Hypothyroidism     Multiple allergies     S/P CABG (coronary artery bypass graft) 2/13/2017       Past Surgical History:   Procedure Laterality Date    AOROTOCORONARY BYPASS-CABG N/A 2/13/2017    [facility]    CARDIAC SURGERY  02/2017    CABG x3    COLONOSCOPY  ~2013    Dr. Perez; colon polyps removed    COLONOSCOPY N/A 3/15/2018    [facility] at [facility]    DILATION AND CURETTAGE OF UTERUS      EGD (ESOPHAGOGASTRODUODENOSCOPY) N/A 7/3/2018    [facility] at [facility]    ESOPHAGOGASTRODUODENOSCOPY (EGD) N/A 3/15/2018    [facility] at [facility]    HEART CATH-LEFT Left 1/13/2017    [facility] C[facility] LAB    INSERTION, NEUROSTIMULATOR, SPINAL CORD, DORSAL COLUMN N/A 7/25/2019    [facility]    INSERTION, NEUROSTIMULATOR, SPINAL CORD, DORSAL COLUMN, FOR TRIAL N/A 6/19/2019    [facility] at [facility] OR    KNEE SURGERY Right     REPLACEMENT,  BATTERY, NEUROSTIMULATOR  2019    [facility]    SPINAL CORD STIMULATOR IMPLANT  2018    for pain secondary to shingles, sees Dr Kam for pain management    TONSILLECTOMY      TUMOR REMOVAL      ULTRASOUND, ENDOSCOPIC, UPPER GI TRACT Left 7/3/2018    [facility] at [facility]    UPPER GASTROINTESTINAL ENDOSCOPY      XI ROBOTIC GASTRECTOMY-PARTIAL N/A 2018    [facility] Dre [facility] at [facility] OR       Social History     Socioeconomic History    Marital status:      Spouse name: Not on file    Number of children: Not on file    Years of education: Not on file    Highest education level: Not on file   Occupational History    Not on file   Social Needs    Financial resource strain: Not on file    Food insecurity:     Worry: Not on file     Inability: Not on file    Transportation needs:     Medical: Not on file     Non-medical: Not on file   Tobacco Use    Smoking status: Former Smoker     Packs/day: 1.00     Years: 50.00     Pack years: 50.00     Last attempt to quit: 12/10/2012     Years since quittin.6    Smokeless tobacco: Never Used   Substance and Sexual Activity    Alcohol use: No    Drug use: No    Sexual activity: Not on file   Lifestyle    Physical activity:     Days per week: Not on file     Minutes per session: Not on file    Stress: Not on file   Relationships    Social connections:     Talks on phone: Not on file     Gets together: Not on file     Attends Mormonism service: Not on file     Active member of club or organization: Not on file     Attends meetings of clubs or organizations: Not on file     Relationship status: Not on file   Other Topics Concern    Not on file   Social History Narrative    Not on file         Medications/Allergies: See med card    Vitals:    19 1049   BP: 132/62   Pulse: 68   Resp: 18   Temp: 97.4 °F (36.3 °C)   TempSrc: Oral   SpO2: 97%   Weight: 61 kg (134  lb 7.7 oz)   PainSc:   4   PainLoc: Back         Physical exam:  Gen: A and O x3, pleasant, well-groomed  Skin: No rashes or obvious lesions  HEENT: PERRLA, no obvious deformities on ears or in canals. Trachea midline.  CVS: Regular rate and rhythm, normal palpable pulses.  Resp:No increased work of breathing, symmetrical chest rise.  Abdomen: Soft, NT/ND.  She has tenderness palpation the left lower abdominal wall, allodynia with light touch across the left abdomen from about T7 down to T12 and extending into the left flank region as well now with similar symptoms on the right side as well, as well as allodynia across the right anterior thigh.  Musculoskeletal:No antalgic gait.     Neuro:  Lower extremities: 5/5 strength bilaterally  Reflexes: Patellar 2+, Achilles 2+ bilaterally.  Sensory:  Intact and symmetrical to light touch and pinprick in L2-S1 dermatomes bilaterally.    Incisional sites covered with Steri-Strips, no erythema or drainage, no major tenderness around the sites.    Imagin18 CT Abdomen and pelvis:  1. Thickening of the gastric wall in the location adjacent to where a mass was seen on 2018 that may relate to partial gastrostomy.  The mass may relate to a hematoma or postsurgical change but residual disease is difficult to exclude.  Please clinically correlate follow-up for stability and/or correlation with endoscopy may be of use  2. Multiple stable pulmonary nodules at the lung bases favoring a benign etiology  3. Hepatic hypodensity at the dome of the liver stable since 16 suggesting a benign etiology  4. Apparent bladder wall thickening as can be seen with urinary tract infection, postobstructive change, or neurogenic bladder.      Assessment:   The patient is a 73-year-old woman with a history of COPD, CAD, chronic abdominal wall pain who presents in referral from Dr. Mathews continued pain.     1. Chronic pain syndrome     2. Intercostal neuropathy     3. Complex regional  pain syndrome type 1 of right lower extremity     4. Lumbar radiculitis         Plan:  1.  I removed the [place] Bandages and the wound sites look well healed, Steri-Strips still in place.  I've instructed her to begin showering but not too vigorously scrub incision sites and allow the Steri-Strips to stay in place until they fall off in the next 3-5 days.  Do not soak in water for at least 3 weeks.  2.  Since she is doing well, I will have her return for followup in one month or sooner as needed.  3.  She is having some mild pain so I have given her prescription for hydrocodone 10/325 to take intermittently as needed.

## 2019-08-20 ENCOUNTER — TELEPHONE (OUTPATIENT)
Dept: PULMONOLOGY | Facility: CLINIC | Age: 74
End: 2019-08-20

## 2019-08-20 DIAGNOSIS — J44.9 COPD, MODERATE: Primary | ICD-10-CM

## 2019-08-20 NOTE — TELEPHONE ENCOUNTER
----- Message from Any Rogers sent at 8/20/2019  2:36 PM CDT -----  Contact: Pt  Type:  Patient Returning Call    Who Called: Bridgett  Who Left Message for Patient: CHICA MUNIZ  Does the patient know what this is regarding?: N/A  Would the patient rather a call back or a response via [facility]? Callback  Best Call Back Number: [phone]  Additional Information: No

## 2019-08-20 NOTE — TELEPHONE ENCOUNTER
----- Message from Karen Barth sent at 8/20/2019 10:46 AM CDT -----  Contact: Patient   Patient requesting a call back regarding inoro.Medication has become too expensive. Would like to switch medication to something less expensive but as effective.If able to please call in to [facility].Please call back at [phone].    Pt uses:  [facility] [place] - [id number] [facility] [place] [address]  Phone: [phone] Fax: [phone]    Thanks,  Karen Barth

## 2019-08-22 RX ORDER — THEOPHYLLINE 300 MG/1
300 TABLET, EXTENDED RELEASE ORAL 2 TIMES DAILY
Qty: 60 TABLET | Refills: 11 | Status: SHIPPED | OUTPATIENT
Start: 2019-08-22 | End: 2020-03-02

## 2019-08-26 ENCOUNTER — TELEPHONE (OUTPATIENT)
Dept: FAMILY MEDICINE | Facility: CLINIC | Age: 74
End: 2019-08-26

## 2019-08-26 ENCOUNTER — PATIENT MESSAGE (OUTPATIENT)
Dept: PAIN MEDICINE | Facility: CLINIC | Age: 74
End: 2019-08-26

## 2019-08-26 RX ORDER — CEPHALEXIN 500 MG/1
500 CAPSULE ORAL EVERY 12 HOURS
Qty: 14 CAPSULE | Refills: 0 | Status: SHIPPED | OUTPATIENT
Start: 2019-08-26 | End: 2020-06-17

## 2019-08-26 NOTE — TELEPHONE ENCOUNTER
Called and notified patient of rx being sent to her pharmacy, patient verbalized understanding of medication and how to take it.

## 2019-08-26 NOTE — TELEPHONE ENCOUNTER
----- Message from Susie Grey sent at 8/26/2019 11:46 AM CDT -----  Contact: [phone]  Would like to consult with the nurse, Patient states that she has an Uti and would like to get something call in , patient also had a surgery and is unable to drive Patient would like to speak with the nurse as soon as possible, please call back   [phone], Thanks sj  [facility] [place] [address] [id number] [facility]  [place] [address]  Phone: [phone] Fax: [phone]

## 2019-08-28 ENCOUNTER — OFFICE VISIT (OUTPATIENT)
Dept: PAIN MEDICINE | Facility: CLINIC | Age: 74
End: 2019-08-28
Payer: MEDICARE

## 2019-08-28 VITALS
WEIGHT: 134 LBS | DIASTOLIC BLOOD PRESSURE: 59 MMHG | HEIGHT: 60 IN | HEART RATE: 109 BPM | SYSTOLIC BLOOD PRESSURE: 117 MMHG | BODY MASS INDEX: 26.31 KG/M2

## 2019-08-28 DIAGNOSIS — G89.4 CHRONIC PAIN SYNDROME: Primary | ICD-10-CM

## 2019-08-28 DIAGNOSIS — G58.0 INTERCOSTAL NEUROPATHY: ICD-10-CM

## 2019-08-28 DIAGNOSIS — B02.23 NEUROPATHY DUE TO HERPES ZOSTER: ICD-10-CM

## 2019-08-28 DIAGNOSIS — M54.16 LUMBAR RADICULITIS: ICD-10-CM

## 2019-08-28 PROCEDURE — 99213 OFFICE O/P EST LOW 20 MIN: CPT | Mod: S$GLB,,, | Performed by: ANESTHESIOLOGY

## 2019-08-28 PROCEDURE — 99999 PR PBB SHADOW E&M-EST. PATIENT-LVL V: CPT | Mod: PBBFAC,,, | Performed by: ANESTHESIOLOGY

## 2019-08-28 PROCEDURE — 99213 PR OFFICE/OUTPT VISIT, EST, LEVL III, 20-29 MIN: ICD-10-PCS | Mod: S$GLB,,, | Performed by: ANESTHESIOLOGY

## 2019-08-28 PROCEDURE — 1101F PT FALLS ASSESS-DOCD LE1/YR: CPT | Mod: CPTII,S$GLB,, | Performed by: ANESTHESIOLOGY

## 2019-08-28 PROCEDURE — 99499 RISK ADDL DX/OHS AUDIT: ICD-10-PCS | Mod: S$GLB,,, | Performed by: ANESTHESIOLOGY

## 2019-08-28 PROCEDURE — 99499 UNLISTED E&M SERVICE: CPT | Mod: S$GLB,,, | Performed by: ANESTHESIOLOGY

## 2019-08-28 PROCEDURE — 1101F PR PT FALLS ASSESS DOC 0-1 FALLS W/OUT INJ PAST YR: ICD-10-PCS | Mod: CPTII,S$GLB,, | Performed by: ANESTHESIOLOGY

## 2019-08-28 PROCEDURE — 3078F DIAST BP <80 MM HG: CPT | Mod: CPTII,S$GLB,, | Performed by: ANESTHESIOLOGY

## 2019-08-28 PROCEDURE — 3078F PR MOST RECENT DIASTOLIC BLOOD PRESSURE < 80 MM HG: ICD-10-PCS | Mod: CPTII,S$GLB,, | Performed by: ANESTHESIOLOGY

## 2019-08-28 PROCEDURE — 3074F PR MOST RECENT SYSTOLIC BLOOD PRESSURE < 130 MM HG: ICD-10-PCS | Mod: CPTII,S$GLB,, | Performed by: ANESTHESIOLOGY

## 2019-08-28 PROCEDURE — 99999 PR PBB SHADOW E&M-EST. PATIENT-LVL V: ICD-10-PCS | Mod: PBBFAC,,, | Performed by: ANESTHESIOLOGY

## 2019-08-28 PROCEDURE — 3074F SYST BP LT 130 MM HG: CPT | Mod: CPTII,S$GLB,, | Performed by: ANESTHESIOLOGY

## 2019-08-28 RX ORDER — MONTELUKAST SODIUM 10 MG/1
10 TABLET ORAL NIGHTLY
Refills: 11 | COMMUNITY
Start: 2019-08-08 | End: 2020-06-22 | Stop reason: SDUPTHER

## 2019-08-28 RX ORDER — ESZOPICLONE 3 MG/1
TABLET, FILM COATED ORAL
Refills: 3 | COMMUNITY
Start: 2019-08-06 | End: 2019-12-23 | Stop reason: SDUPTHER

## 2019-08-28 RX ORDER — NITROFURANTOIN 25; 75 MG/1; MG/1
100 CAPSULE ORAL 2 TIMES DAILY
Refills: 0 | COMMUNITY
Start: 2019-08-26 | End: 2020-06-17

## 2019-08-28 NOTE — PROGRESS NOTES
This note was completed with dictation software and grammatical errors may exist.    CC:  Bilateral abdominal wall and bilateral lower leg pain    HPI:  The patient is a 73-year-old woman with a history of COPD, CAD, chronic abdominal wall pain who presents in referral from Dr. Mathews continued pain. She returns in follow-up, she is one month status post spinal cord stimulator implantation with new leads to cover the right side, now has leads covering both side attached to one IPG.  She has been doing very well with complete relief of her right abdominal wall and right leg pain.  However she developed UTI over the weekend and states that since that time, she has been having diarrhea and she feels that all of her pain has returned even though she is feeling stimulations in the area where she has the pain. She denies any pain at the incision sites and those have all healed well. She denies any change in areas of stimulation, has not felt like she has any pain at the incision sites such as when a lead might move.    From 4/1/19 visit:   She returns in follow-up today to review records, continues to have left abdominal wall pain but states that it also crosses over to the midline and right side as well. I reviewed records from Dr. Nielson that states her leads were initially placed at T5, but unclear if this was the top of T5 over the bottom or middle.  Furthermore, notes from x-rays done after this state that one lead was in the midthoracic region and the other was in the lower thoracic region but there is no mention of specific levels.  We have obtained an x-ray of her spine that shows the left lead at T10 and T11 and the right lead over T6 through T8.  She states that she does get coverage of her left leg where she has pain but denies any improvement in her abdomen.  Since the last visit, she denies any major changes.      Past history:  The patient reports that she has had a history of chronic left abdominal wall  pain, was seen by multiple physicians about 10 years ago and was eventually diagnosed with post herpetic neuralgia, sounds like she never had any of the visible lesions but continued to have pain. She was seen by Dr. Huan Nielson and had undergone trial and implantation of a spinal cord stimulator for this pain and states that she was almost 100% pain free with this device.  She states that she had read that the battery only lasts for 10 years and so she requested a battery exchange at 9 years to make sure that she had continued coverage.  She states while she initially had a [employer] stimulator, when the battery was changed, it was changed to a [employer] device.  She was also told that one of the leads had frayed where it entered the IPG and she was told that this lead was replaced.  She cannot recall if the incision was made only over the IPG site or if they actually went into the initial midline incision site as well. Nonetheless, this was accomplished in January, 2018 and after this revision, she was doing well without left abdominal wall pain. 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 Dre Grey in August, 2018.  After that time, she states that her left abdominal wall pain returned and also her left lateral thigh pain returned.  She states that she has met with the [employer] representatives and has not been able to get the stimulator to provide relief.  She has difficulty with providing good history, I am not sure if she actually has any stimulation over her pain sites but nonetheless, she states that she is not having any relief.  She also reports having pain in her lateral left thigh similar to her abdominal wall pain.  She is currently using  lidocaine patches with some relief.  She also has been taking hydrocodone 7.5/325 every several days.  I do not see a record of her receiving this medication but states that she had this filled at [facility] in [place].        ROS:  She reports shortness of breath, easy bruising, bleeding problems, memory loss, difficulty sleeping, anxiety and depression.  Balance of review of systems is negative.    Past Medical History:   Diagnosis Date    Acid reflux 4/11/2014    Anxiety and depression 3/6/2014    AP (angina pectoris) 2/13/2017    CAD (coronary artery disease) 2/13/2017    CAD, multiple vessel 2/13/2017    Chronic pain associated with significant psychosocial dysfunction 2/21/2013    CKD (chronic kidney disease) stage 3, GFR 30-59 ml/min     Dr. Vásquez    COPD (chronic obstructive pulmonary disease)     well controlled, [facility]    History of shingles     HTN (hypertension)     Hypothyroidism     Multiple allergies     S/P CABG (coronary artery bypass graft) 2/13/2017       Past Surgical History:   Procedure Laterality Date    AOROTOCORONARY BYPASS-CABG N/A 2/13/2017    [facility] OR    CARDIAC SURGERY  02/2017    CABG x3    COLONOSCOPY  ~2013    Dr. Perez; colon polyps removed    COLONOSCOPY N/A 3/15/2018    [facility] at [facility]    DILATION AND CURETTAGE OF UTERUS      EGD (ESOPHAGOGASTRODUODENOSCOPY) N/A 7/3/2018    [facility] at [facility] END[facility]    ESOPHAGOGASTRODUODENOSCOPY (EGD) N/A 3/15/2018    [facility] at [facility]    HEART CATH-LEFT Left 1/13/2017    [facility] Giuliano Henry [facility] at [facility] CATH LAB    INSERTION, NEUROSTIMULATOR, SPINAL CORD, DORSAL COLUMN N/A 7/25/2019    [facility] at [facility]    INSERTION, NEUROSTIMULATOR, SPINAL CORD, DORSAL COLUMN, FOR TRIAL N/A 6/19/2019    [facility] at [facility] OR    KNEE  SURGERY Right     REPLACEMENT, BATTERY, NEUROSTIMULATOR  2019    [facility]    SPINAL CORD STIMULATOR IMPLANT  2018    for pain secondary to shingles, sees Dr Kam for pain management    TONSILLECTOMY      TUMOR REMOVAL      ULTRASOUND, ENDOSCOPIC, UPPER GI TRACT Left 7/3/2018    [facility]    UPPER GASTROINTESTINAL ENDOSCOPY      XI ROBOTIC GASTRECTOMY-PARTIAL N/A 2018    [facility] Dre [facility] at [facility] OR       Social History     Socioeconomic History    Marital status:      Spouse name: Not on file    Number of children: Not on file    Years of education: Not on file    Highest education level: Not on file   Occupational History    Not on file   Social Needs    Financial resource strain: Not on file    Food insecurity:     Worry: Not on file     Inability: Not on file    Transportation needs:     Medical: Not on file     Non-medical: Not on file   Tobacco Use    Smoking status: Former Smoker     Packs/day: 1.00     Years: 50.00     Pack years: 50.00     Last attempt to quit: 12/10/2012     Years since quittin.7    Smokeless tobacco: Never Used   Substance and Sexual Activity    Alcohol use: No    Drug use: No    Sexual activity: Not on file   Lifestyle    Physical activity:     Days per week: Not on file     Minutes per session: Not on file    Stress: Not on file   Relationships    Social connections:     Talks on phone: Not on file     Gets together: Not on file     Attends Rastafari service: Not on file     Active member of club or organization: Not on file     Attends meetings of clubs or organizations: Not on file     Relationship status: Not on file   Other Topics Concern    Not on file   Social History Narrative    Not on file         Medications/Allergies: See med card    Vitals:    19 0945 19 0954   BP:  (!) 117/59   Pulse:  109   Weight: 60.8 kg (134 lb) 60.8  kg (134 lb)   Height: 5' (1.524 m) 5' (1.524 m)   PainSc:   4   4   PainLoc: Back          Physical exam:  Gen: A and O x3, pleasant, well-groomed  Skin: No rashes or obvious lesions  HEENT: PERRLA, no obvious deformities on ears or in canals. Trachea midline.  CVS: Regular rate and rhythm, normal palpable pulses.  Resp:No increased work of breathing, symmetrical chest rise.  Abdomen: Soft, NT/ND.  She has tenderness palpation the left lower abdominal wall, allodynia with light touch across the left abdomen from about T7 down to T12 and extending into the left flank region as well now with similar symptoms on the right side as well, as well as allodynia across the right anterior thigh.  Musculoskeletal:No antalgic gait.     Neuro:  Lower extremities: 5/5 strength bilaterally  Reflexes: Patellar 2+, Achilles 2+ bilaterally.  Sensory:  Intact and symmetrical to light touch and pinprick in L2-S1 dermatomes bilaterally.    Lumbar spine:  Lumbar spine: ROM is full with flexion extension and oblique extension with slight increased pain in the right flank and lower abdominal region with extension    Ramon's test causes no increased pain on either side.    Supine straight leg raise is negative bilaterally.    Internal and external rotation of the hip causes no increased pain on either side.  Myofascial exam: No tenderness to palpation across lumbar paraspinous muscles.      Imagin18 CT Abdomen and pelvis:  1. Thickening of the gastric wall in the location adjacent to where a mass was seen on 2018 that may relate to partial gastrostomy.  The mass may relate to a hematoma or postsurgical change but residual disease is difficult to exclude.  Please clinically correlate follow-up for stability and/or correlation with endoscopy may be of use  2. Multiple stable pulmonary nodules at the lung bases favoring a benign etiology  3. Hepatic hypodensity at the dome of the liver stable since 16 suggesting a benign  etiology  4. Apparent bladder wall thickening as can be seen with urinary tract infection, postobstructive change, or neurogenic bladder.      Assessment:   The patient is a 73-year-old woman with a history of COPD, CAD, chronic abdominal wall pain who presents in referral from Dr. Mathews continued pain.     1. 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 Intercostal neuropathy     4. Lumbar radiculitis         Plan:  1.  It seems like her UTI may have triggered increased pain primarily because she is having diarrhea as well and probably has been doing more flexion at the waist in the bathroom.  Nonetheless, the spinal cord stimulator represent was here today and was able to reprogrammed the device to get better coverage of her pain and she is doing better now.  I am going to have her follow up in two months or sooner as needed.

## 2019-08-30 RX ORDER — GABAPENTIN 100 MG/1
300 CAPSULE ORAL NIGHTLY
Qty: 90 CAPSULE | Refills: 12 | Status: SHIPPED | OUTPATIENT
Start: 2019-08-30 | End: 2020-05-22 | Stop reason: DRUGHIGH

## 2019-09-22 ENCOUNTER — PATIENT MESSAGE (OUTPATIENT)
Dept: FAMILY MEDICINE | Facility: CLINIC | Age: 74
End: 2019-09-22

## 2019-09-23 RX ORDER — HYDROXYZINE PAMOATE 50 MG/1
50 CAPSULE ORAL EVERY 8 HOURS PRN
Qty: 30 CAPSULE | Refills: 3 | Status: SHIPPED | OUTPATIENT
Start: 2019-09-23 | End: 2019-09-28 | Stop reason: SDUPTHER

## 2019-09-24 ENCOUNTER — PATIENT MESSAGE (OUTPATIENT)
Dept: FAMILY MEDICINE | Facility: CLINIC | Age: 74
End: 2019-09-24

## 2019-09-27 ENCOUNTER — TELEPHONE (OUTPATIENT)
Dept: FAMILY MEDICINE | Facility: CLINIC | Age: 74
End: 2019-09-27

## 2019-09-27 NOTE — TELEPHONE ENCOUNTER
----- Message from Tatum Barker sent at 9/27/2019 12:44 PM CDT -----  Contact: [facility]  Ms Hayes is calling about a PA that was sent on Hydroxyzine, missing the high risk statement, please call before 1:00,  [phone]. Thank you

## 2019-09-28 RX ORDER — HYDROXYZINE PAMOATE 50 MG/1
CAPSULE ORAL
Qty: 30 CAPSULE | Refills: 3 | Status: SHIPPED | OUTPATIENT
Start: 2019-09-28 | End: 2019-11-11 | Stop reason: SDUPTHER

## 2019-10-06 ENCOUNTER — PATIENT MESSAGE (OUTPATIENT)
Dept: FAMILY MEDICINE | Facility: CLINIC | Age: 74
End: 2019-10-06

## 2019-10-21 ENCOUNTER — PATIENT MESSAGE (OUTPATIENT)
Dept: PAIN MEDICINE | Facility: CLINIC | Age: 74
End: 2019-10-21

## 2019-11-11 RX ORDER — HYDROXYZINE PAMOATE 50 MG/1
50 CAPSULE ORAL EVERY 6 HOURS PRN
Qty: 30 CAPSULE | Refills: 11 | Status: SHIPPED | OUTPATIENT
Start: 2019-11-11 | End: 2020-03-03 | Stop reason: SDUPTHER

## 2019-11-12 RX ORDER — MONTELUKAST SODIUM 10 MG/1
TABLET ORAL
Qty: 30 TABLET | Refills: 11 | Status: SHIPPED | OUTPATIENT
Start: 2019-11-12 | End: 2020-06-17 | Stop reason: SDUPTHER

## 2019-11-20 ENCOUNTER — OFFICE VISIT (OUTPATIENT)
Dept: DERMATOLOGY | Facility: CLINIC | Age: 74
End: 2019-11-20
Payer: MEDICARE

## 2019-11-20 VITALS — BODY MASS INDEX: 26.32 KG/M2 | WEIGHT: 134.06 LBS | HEIGHT: 60 IN | RESPIRATION RATE: 18 BRPM

## 2019-11-20 DIAGNOSIS — L81.4 LENTIGINES: ICD-10-CM

## 2019-11-20 DIAGNOSIS — L82.1 SEBORRHEIC KERATOSES: ICD-10-CM

## 2019-11-20 DIAGNOSIS — L91.8 ACROCHORDON: ICD-10-CM

## 2019-11-20 DIAGNOSIS — D22.9 MULTIPLE BENIGN NEVI: Primary | ICD-10-CM

## 2019-11-20 DIAGNOSIS — Z12.83 SCREENING EXAM FOR SKIN CANCER: ICD-10-CM

## 2019-11-20 PROCEDURE — 1159F PR MEDICATION LIST DOCUMENTED IN MEDICAL RECORD: ICD-10-PCS | Mod: S$GLB,,, | Performed by: DERMATOLOGY

## 2019-11-20 PROCEDURE — 99999 PR PBB SHADOW E&M-EST. PATIENT-LVL II: CPT | Mod: PBBFAC,,, | Performed by: DERMATOLOGY

## 2019-11-20 PROCEDURE — 99202 PR OFFICE/OUTPT VISIT, NEW, LEVL II, 15-29 MIN: ICD-10-PCS | Mod: S$GLB,,, | Performed by: DERMATOLOGY

## 2019-11-20 PROCEDURE — 99999 PR PBB SHADOW E&M-EST. PATIENT-LVL II: ICD-10-PCS | Mod: PBBFAC,,, | Performed by: DERMATOLOGY

## 2019-11-20 PROCEDURE — 1159F MED LIST DOCD IN RCRD: CPT | Mod: S$GLB,,, | Performed by: DERMATOLOGY

## 2019-11-20 PROCEDURE — 99202 OFFICE O/P NEW SF 15 MIN: CPT | Mod: S$GLB,,, | Performed by: DERMATOLOGY

## 2019-11-20 PROCEDURE — 1101F PR PT FALLS ASSESS DOC 0-1 FALLS W/OUT INJ PAST YR: ICD-10-PCS | Mod: CPTII,S$GLB,, | Performed by: DERMATOLOGY

## 2019-11-20 PROCEDURE — 1101F PT FALLS ASSESS-DOCD LE1/YR: CPT | Mod: CPTII,S$GLB,, | Performed by: DERMATOLOGY

## 2019-11-20 NOTE — PROGRESS NOTES
Subjective:       Patient ID:  Soumya Melchor is a 74 y.o. female who presents for   Chief Complaint   Patient presents with    Lesion     Patient present for initial visit, lesions.     C/o lesions to L axilla x 3-4 months. The patient denies any change, including change in color, increase in size, or spontaneous bleeding, associated with this lesion. Not treating.    no Phx of NMSC.  no Fhx of melanoma.    Past Medical History:  4/11/2014: Acid reflux  No date: Allergy  3/6/2014: Anxiety and depression  2/13/2017: AP (angina pectoris)  2/13/2017: CAD (coronary artery disease)  2/13/2017: CAD, multiple vessel  2/21/2013: Chronic pain associated with significant psychosocial   dysfunction  No date: CKD (chronic kidney disease) stage 3, GFR 30-59 ml/min      Comment:  Dr. Vásquez  No date: COPD (chronic obstructive pulmonary disease)      Comment:  well controlled, Dr Gomez [facility]  No date: History of shingles  No date: HTN (hypertension)  No date: Hypothyroidism  No date: Multiple allergies  2/13/2017: S/P CABG (coronary artery bypass graft)      Review of Systems   Hematologic/Lymphatic: Does not bruise/bleed easily.        Objective:    Physical Exam   Constitutional: She appears well-developed and well-nourished. No distress.   Neurological: She is alert and oriented to person, place, and time. She is not disoriented.   Psychiatric: She has a normal mood and affect.   Skin:   Areas Examined (abnormalities noted in diagram):   Head / Face Inspection Performed  Neck Inspection Performed  Chest / Axilla Inspection Performed  Back Inspection Performed  RUE Inspected                           Diagram Legend     Erythematous scaling macule/papule c/w actinic keratosis       Vascular papule c/w angioma      Pigmented verrucoid papule/plaque c/w seborrheic keratosis      Yellow umbilicated papule c/w sebaceous hyperplasia      Irregularly shaped tan macule c/w lentigo     1-2 mm smooth white papules  consistent with Milia      Movable subcutaneous cyst with punctum c/w epidermal inclusion cyst      Subcutaneous movable cyst c/w pilar cyst      Firm pink to brown papule c/w dermatofibroma      Pedunculated fleshy papule(s) c/w skin tag(s)      Evenly pigmented macule c/w junctional nevus     Mildly variegated pigmented, slightly irregular-bordered macule c/w mildly atypical nevus      Flesh colored to evenly pigmented papule c/w intradermal nevus       Pink pearly papule/plaque c/w basal cell carcinoma      Erythematous hyperkeratotic cursted plaque c/w SCC      Surgical scar with no sign of skin cancer recurrence      Open and closed comedones      Inflammatory papules and pustules      Verrucoid papule consistent consistent with wart     Erythematous eczematous patches and plaques     Dystrophic onycholytic nail with subungual debris c/w onychomycosis     Umbilicated papule    Erythematous-base heme-crusted tan verrucoid plaque consistent with inflamed seborrheic keratosis     Erythematous Silvery Scaling Plaque c/w Psoriasis     See annotation      Assessment / Plan:        Multiple benign nevi  Discussed ABCDE's of nevi.  Monitor for new mole or moles that are becoming bigger, darker, irritated, or developing irregular borders.     Seborrheic keratoses  These are benign inherited growths without a malignant potential. Reassurance given to patient. No treatment is necessary.     Lentigines  This is a benign hyperpigmented sun induced lesion. Daily sun protection will reduce the number of new lesions. Treatment of these benign lesions are considered cosmetic.      Acrochordon  Benign. Reassurance    Screening exam for skin cancer  Focused exam.   - The signs and symptoms of skin cancer were reviewed and the patient was advised to practice sun protection and sun avoidance, use daily sunscreen, and perform regular self skin exams.                Follow up in about 1 year (around 11/20/2020) for skin check.

## 2019-12-02 ENCOUNTER — TELEPHONE (OUTPATIENT)
Dept: CARDIOLOGY | Facility: CLINIC | Age: 74
End: 2019-12-02

## 2019-12-02 NOTE — TELEPHONE ENCOUNTER
Pt will come in earlier ----- Message from Malorie Tuttle MA sent at 12/2/2019  2:16 PM CST -----  Contact: self [phone]  Patient came in the clinic today thinking she had an appointment with Dr. Loya but it is on 12/09/19.  Would like to know if she can be worked in to see him earlier than 2:30 pm on that day.  Please call and advise.  Thanks

## 2019-12-09 ENCOUNTER — OFFICE VISIT (OUTPATIENT)
Dept: CARDIOLOGY | Facility: CLINIC | Age: 74
End: 2019-12-09
Payer: MEDICARE

## 2019-12-09 VITALS
HEART RATE: 75 BPM | DIASTOLIC BLOOD PRESSURE: 76 MMHG | HEIGHT: 60 IN | SYSTOLIC BLOOD PRESSURE: 138 MMHG | WEIGHT: 140 LBS | BODY MASS INDEX: 27.48 KG/M2

## 2019-12-09 DIAGNOSIS — E78.2 MIXED HYPERLIPIDEMIA: ICD-10-CM

## 2019-12-09 DIAGNOSIS — Z95.1 S/P CABG (CORONARY ARTERY BYPASS GRAFT): ICD-10-CM

## 2019-12-09 DIAGNOSIS — R93.89 ABNORMAL CT SCAN: ICD-10-CM

## 2019-12-09 DIAGNOSIS — R20.0 BILATERAL NUMBNESS AND TINGLING OF ARMS AND LEGS: ICD-10-CM

## 2019-12-09 DIAGNOSIS — Z87.891 FORMER SMOKER: ICD-10-CM

## 2019-12-09 DIAGNOSIS — I10 ESSENTIAL HYPERTENSION: ICD-10-CM

## 2019-12-09 DIAGNOSIS — I25.10 CORONARY ARTERY DISEASE INVOLVING NATIVE CORONARY ARTERY OF NATIVE HEART WITHOUT ANGINA PECTORIS: ICD-10-CM

## 2019-12-09 DIAGNOSIS — G89.4 CHRONIC PAIN SYNDROME: Primary | ICD-10-CM

## 2019-12-09 DIAGNOSIS — R20.2 BILATERAL NUMBNESS AND TINGLING OF ARMS AND LEGS: ICD-10-CM

## 2019-12-09 PROBLEM — Z01.810 PREOP CARDIOVASCULAR EXAM: Status: RESOLVED | Noted: 2018-07-17 | Resolved: 2019-12-09

## 2019-12-09 PROCEDURE — 3078F DIAST BP <80 MM HG: CPT | Mod: CPTII,S$GLB,, | Performed by: INTERNAL MEDICINE

## 2019-12-09 PROCEDURE — 3075F PR MOST RECENT SYSTOLIC BLOOD PRESS GE 130-139MM HG: ICD-10-PCS | Mod: CPTII,S$GLB,, | Performed by: INTERNAL MEDICINE

## 2019-12-09 PROCEDURE — 99214 PR OFFICE/OUTPT VISIT, EST, LEVL IV, 30-39 MIN: ICD-10-PCS | Mod: S$GLB,,, | Performed by: INTERNAL MEDICINE

## 2019-12-09 PROCEDURE — 99214 OFFICE O/P EST MOD 30 MIN: CPT | Mod: S$GLB,,, | Performed by: INTERNAL MEDICINE

## 2019-12-09 PROCEDURE — 3078F PR MOST RECENT DIASTOLIC BLOOD PRESSURE < 80 MM HG: ICD-10-PCS | Mod: CPTII,S$GLB,, | Performed by: INTERNAL MEDICINE

## 2019-12-09 PROCEDURE — 1159F MED LIST DOCD IN RCRD: CPT | Mod: S$GLB,,, | Performed by: INTERNAL MEDICINE

## 2019-12-09 PROCEDURE — 99999 PR PBB SHADOW E&M-EST. PATIENT-LVL IV: CPT | Mod: PBBFAC,,, | Performed by: INTERNAL MEDICINE

## 2019-12-09 PROCEDURE — 1126F AMNT PAIN NOTED NONE PRSNT: CPT | Mod: S$GLB,,, | Performed by: INTERNAL MEDICINE

## 2019-12-09 PROCEDURE — 1101F PT FALLS ASSESS-DOCD LE1/YR: CPT | Mod: CPTII,S$GLB,, | Performed by: INTERNAL MEDICINE

## 2019-12-09 PROCEDURE — 99999 PR PBB SHADOW E&M-EST. PATIENT-LVL IV: ICD-10-PCS | Mod: PBBFAC,,, | Performed by: INTERNAL MEDICINE

## 2019-12-09 PROCEDURE — 1126F PR PAIN SEVERITY QUANTIFIED, NO PAIN PRESENT: ICD-10-PCS | Mod: S$GLB,,, | Performed by: INTERNAL MEDICINE

## 2019-12-09 PROCEDURE — 1159F PR MEDICATION LIST DOCUMENTED IN MEDICAL RECORD: ICD-10-PCS | Mod: S$GLB,,, | Performed by: INTERNAL MEDICINE

## 2019-12-09 PROCEDURE — 1101F PR PT FALLS ASSESS DOC 0-1 FALLS W/OUT INJ PAST YR: ICD-10-PCS | Mod: CPTII,S$GLB,, | Performed by: INTERNAL MEDICINE

## 2019-12-09 PROCEDURE — 3075F SYST BP GE 130 - 139MM HG: CPT | Mod: CPTII,S$GLB,, | Performed by: INTERNAL MEDICINE

## 2019-12-09 RX ORDER — ZALEPLON 10 MG/1
CAPSULE ORAL
Refills: 5 | COMMUNITY
Start: 2019-11-12 | End: 2019-12-26 | Stop reason: SDUPTHER

## 2019-12-09 NOTE — PROGRESS NOTES
Subjective:    Patient ID:  Soumya Melchor is a 74 y.o. female who presents for evaluation of Follow-up (follow up /wants test done on legs)      HPI 73 yo WF S/P CABG with UZAIR to LAD and SVG to OM and RCA 2-, HTN , HLD and smoking hx. Patient not having any cardiac issues but has numerous somatic complaints of numbness in arms and legs at night. Her daughter is an ultrasound tech and already did carotids that were normal.Because of pain in legs her daughter did US but mother could not tolerate the cuffs foe PERLA's. She has long hx of neuropathy and has pain stimulator. He pain is at night when lying down and if you touch certain areas of the legs. She does not have symptoms consistent with claudication.    Review of Systems   Constitution: Negative for decreased appetite, fever, malaise/fatigue, weight gain and weight loss.   HENT: Negative for hearing loss and nosebleeds.    Eyes: Negative for visual disturbance.   Cardiovascular: Negative for chest pain, claudication, cyanosis, dyspnea on exertion, irregular heartbeat, leg swelling, near-syncope, orthopnea, palpitations, paroxysmal nocturnal dyspnea and syncope.   Respiratory: Negative for cough, hemoptysis, shortness of breath, sleep disturbances due to breathing, snoring and wheezing.    Endocrine: Negative for cold intolerance, heat intolerance, polydipsia and polyuria.   Hematologic/Lymphatic: Negative for adenopathy and bleeding problem. Does not bruise/bleed easily.   Skin: Negative for color change, itching, poor wound healing, rash and suspicious lesions.   Musculoskeletal: Positive for back pain, muscle cramps and myalgias. Negative for arthritis, falls, joint pain, joint swelling and muscle weakness.   Gastrointestinal: Negative for bloating, abdominal pain, change in bowel habit, constipation, flatus, heartburn, hematemesis, hematochezia, hemorrhoids, jaundice, melena, nausea and vomiting.   Genitourinary: Negative for bladder incontinence,  decreased libido, frequency, hematuria, hesitancy and urgency.   Neurological: Negative for brief paralysis, difficulty with concentration, excessive daytime sleepiness, dizziness, focal weakness, headaches, light-headedness, loss of balance, numbness, vertigo and weakness.   Psychiatric/Behavioral: Negative for altered mental status, depression and memory loss. The patient does not have insomnia and is not nervous/anxious.    Allergic/Immunologic: Negative for environmental allergies, hives and persistent infections.        Objective:    Physical Exam   Constitutional: She is oriented to person, place, and time. She appears well-developed and well-nourished.   /76   Pulse 75   Ht 5' (1.524 m)   Wt 63.5 kg (140 lb)   BMI 27.34 kg/m²      HENT:   Head: Normocephalic and atraumatic.   Right Ear: External ear normal.   Left Ear: External ear normal.   Nose: Nose normal.   Mouth/Throat: Oropharynx is clear and moist.   Eyes: Pupils are equal, round, and reactive to light. Conjunctivae, EOM and lids are normal. Right eye exhibits no discharge. Left eye exhibits no discharge. Right conjunctiva has no hemorrhage. No scleral icterus.   Neck: Normal range of motion. Neck supple. No JVD present. No tracheal deviation present. No thyromegaly present.   Cardiovascular: Normal rate, regular rhythm, normal heart sounds and intact distal pulses. Exam reveals decreased pulses. Exam reveals no gallop and no friction rub.   No murmur heard.  Pulses:       Dorsalis pedis pulses are 1+ on the right side, and 1+ on the left side.   Pulmonary/Chest: Effort normal and breath sounds normal. No respiratory distress. She has no wheezes. She has no rales. She exhibits no tenderness. Breasts are symmetrical.   Sternal scar   Abdominal: Soft. Bowel sounds are normal. She exhibits no distension and no mass. There is no hepatosplenomegaly or hepatomegaly. There is no tenderness. There is no rebound and no guarding.   Musculoskeletal:  Normal range of motion. She exhibits no edema or tenderness.   Lymphadenopathy:     She has no cervical adenopathy.   Neurological: She is alert and oriented to person, place, and time. She displays normal reflexes. No cranial nerve deficit. Coordination normal.   Skin: Skin is warm and dry. No rash noted. No erythema. No pallor.   Psychiatric: She has a normal mood and affect. Her behavior is normal. Judgment and thought content normal.   Nursing note and vitals reviewed.        Assessment:       1. Chronic pain syndrome    2. Coronary artery disease involving native coronary artery of native heart without angina pectoris    3. Mixed hyperlipidemia    4. Essential hypertension    5. S/P CABG (coronary artery bypass graft)    6. Former smoker    7. Abnormal CT scan    8. Bilateral numbness and tingling of arms and legs         Plan:    Patient may have PVD but symptoms are not consistent with a vascular issue. Patient not interested in further testing and since there is no evidence of limb ischemias will not do further testing at this time   Patient advised to modify risk factors such as weight, exercise, diet,  tobacco and alcohol exposure   No orders of the defined types were placed in this encounter.    Follow up in about 6 months (around 6/9/2020).

## 2019-12-11 RX ORDER — OXYBUTYNIN CHLORIDE 5 MG/1
TABLET, EXTENDED RELEASE ORAL
Qty: 30 TABLET | Refills: 12 | Status: SHIPPED | OUTPATIENT
Start: 2019-12-11 | End: 2020-06-17 | Stop reason: DRUGHIGH

## 2019-12-23 ENCOUNTER — PATIENT MESSAGE (OUTPATIENT)
Dept: FAMILY MEDICINE | Facility: CLINIC | Age: 74
End: 2019-12-23

## 2019-12-23 RX ORDER — ESZOPICLONE 3 MG/1
3 TABLET, FILM COATED ORAL NIGHTLY
Qty: 30 TABLET | Refills: 3 | Status: SHIPPED | OUTPATIENT
Start: 2019-12-23 | End: 2020-04-30 | Stop reason: SDUPTHER

## 2019-12-25 ENCOUNTER — PATIENT MESSAGE (OUTPATIENT)
Dept: FAMILY MEDICINE | Facility: CLINIC | Age: 74
End: 2019-12-25

## 2019-12-26 RX ORDER — ZALEPLON 10 MG/1
CAPSULE ORAL
Qty: 30 CAPSULE | Refills: 5 | Status: SHIPPED | OUTPATIENT
Start: 2019-12-26 | End: 2020-06-17

## 2020-01-07 NOTE — TELEPHONE ENCOUNTER
----- Message from Dennise Tipton sent at 1/7/2020  4:09 PM CST -----  Contact: mother Genia [phone]   Pts Grand mother called to say the patient just started vomiting she is asking for a call back.    Per pt, was a [place] for a CT with and without contrast and was told kidney function was at 44 and she needed to see her kidney doctor. Pt did not have a kidney dr, so I scheduled with Dr. Vásquez for 1/3

## 2020-02-18 ENCOUNTER — TELEPHONE (OUTPATIENT)
Dept: CARDIOLOGY | Facility: CLINIC | Age: 75
End: 2020-02-18

## 2020-02-18 NOTE — TELEPHONE ENCOUNTER
appt delphine for pt----- Message from Krystyna Montanez sent at 2/18/2020 12:12 PM CST -----  Contact: pt  .Type:  Sooner Apoointment Request    Caller is requesting a sooner appointment.  Caller declined first available appointment listed below.  Caller will not accept being placed on the waitlist and is requesting a message be sent to doctor.  Name of Caller:Patient  When is the first available appointment?3/2  Symptoms:ankle swollen, short of breath  Would the patient rather a call back or a response via [facility]? Call back  Best Call Back Number:[phone]  Additional Information: pt only wants to come next week

## 2020-02-26 ENCOUNTER — PATIENT OUTREACH (OUTPATIENT)
Dept: ADMINISTRATIVE | Facility: OTHER | Age: 75
End: 2020-02-26

## 2020-03-02 ENCOUNTER — OFFICE VISIT (OUTPATIENT)
Dept: CARDIOLOGY | Facility: CLINIC | Age: 75
End: 2020-03-02
Payer: MEDICARE

## 2020-03-02 VITALS
HEART RATE: 71 BPM | HEIGHT: 60 IN | BODY MASS INDEX: 27.88 KG/M2 | SYSTOLIC BLOOD PRESSURE: 129 MMHG | WEIGHT: 142 LBS | DIASTOLIC BLOOD PRESSURE: 68 MMHG

## 2020-03-02 DIAGNOSIS — Z95.1 S/P CABG (CORONARY ARTERY BYPASS GRAFT): ICD-10-CM

## 2020-03-02 DIAGNOSIS — Z87.891 FORMER SMOKER: ICD-10-CM

## 2020-03-02 DIAGNOSIS — J43.8 OTHER EMPHYSEMA: ICD-10-CM

## 2020-03-02 DIAGNOSIS — R06.09 DOE (DYSPNEA ON EXERTION): ICD-10-CM

## 2020-03-02 DIAGNOSIS — I10 ESSENTIAL HYPERTENSION: ICD-10-CM

## 2020-03-02 DIAGNOSIS — I25.10 CORONARY ARTERY DISEASE INVOLVING NATIVE CORONARY ARTERY OF NATIVE HEART WITHOUT ANGINA PECTORIS: ICD-10-CM

## 2020-03-02 DIAGNOSIS — G89.4 CHRONIC PAIN SYNDROME: ICD-10-CM

## 2020-03-02 DIAGNOSIS — E78.2 MIXED HYPERLIPIDEMIA: ICD-10-CM

## 2020-03-02 DIAGNOSIS — G89.4 CHRONIC PAIN ASSOCIATED WITH SIGNIFICANT PSYCHOSOCIAL DYSFUNCTION: Primary | ICD-10-CM

## 2020-03-02 PROCEDURE — 1101F PT FALLS ASSESS-DOCD LE1/YR: CPT | Mod: S$GLB,,, | Performed by: INTERNAL MEDICINE

## 2020-03-02 PROCEDURE — 1101F PR PT FALLS ASSESS DOC 0-1 FALLS W/OUT INJ PAST YR: ICD-10-PCS | Mod: S$GLB,,, | Performed by: INTERNAL MEDICINE

## 2020-03-02 PROCEDURE — 1159F MED LIST DOCD IN RCRD: CPT | Mod: S$GLB,,, | Performed by: INTERNAL MEDICINE

## 2020-03-02 PROCEDURE — 1159F PR MEDICATION LIST DOCUMENTED IN MEDICAL RECORD: ICD-10-PCS | Mod: S$GLB,,, | Performed by: INTERNAL MEDICINE

## 2020-03-02 PROCEDURE — 99999 PR PBB SHADOW E&M-EST. PATIENT-LVL IV: CPT | Mod: PBBFAC,,, | Performed by: INTERNAL MEDICINE

## 2020-03-02 PROCEDURE — 99999 PR PBB SHADOW E&M-EST. PATIENT-LVL IV: ICD-10-PCS | Mod: PBBFAC,,, | Performed by: INTERNAL MEDICINE

## 2020-03-02 PROCEDURE — 99214 OFFICE O/P EST MOD 30 MIN: CPT | Mod: S$GLB,,, | Performed by: INTERNAL MEDICINE

## 2020-03-02 PROCEDURE — 1126F AMNT PAIN NOTED NONE PRSNT: CPT | Mod: S$GLB,,, | Performed by: INTERNAL MEDICINE

## 2020-03-02 PROCEDURE — 3078F PR MOST RECENT DIASTOLIC BLOOD PRESSURE < 80 MM HG: ICD-10-PCS | Mod: S$GLB,,, | Performed by: INTERNAL MEDICINE

## 2020-03-02 PROCEDURE — 3074F SYST BP LT 130 MM HG: CPT | Mod: S$GLB,,, | Performed by: INTERNAL MEDICINE

## 2020-03-02 PROCEDURE — 1126F PR PAIN SEVERITY QUANTIFIED, NO PAIN PRESENT: ICD-10-PCS | Mod: S$GLB,,, | Performed by: INTERNAL MEDICINE

## 2020-03-02 PROCEDURE — 99214 PR OFFICE/OUTPT VISIT, EST, LEVL IV, 30-39 MIN: ICD-10-PCS | Mod: S$GLB,,, | Performed by: INTERNAL MEDICINE

## 2020-03-02 PROCEDURE — 3078F DIAST BP <80 MM HG: CPT | Mod: S$GLB,,, | Performed by: INTERNAL MEDICINE

## 2020-03-02 PROCEDURE — 3074F PR MOST RECENT SYSTOLIC BLOOD PRESSURE < 130 MM HG: ICD-10-PCS | Mod: S$GLB,,, | Performed by: INTERNAL MEDICINE

## 2020-03-02 NOTE — PROGRESS NOTES
Subjective:    Patient ID:  Soumya Melchor is a 74 y.o. female who presents for evaluation of Follow-up (follow up)      HPI 75 yo WF S/P CABG with UZAIR to LAD and SVG to OM and RCA 2-, HTN , HLD and smoking hx. Still having multiple complaints many which she relates to her neuropathies. Has daughter who is ultrasound tech who wants her to have an angiogram but has not had an ischemic work up since surgery.    Review of Systems   Constitution: Positive for malaise/fatigue. Negative for decreased appetite, fever, weight gain and weight loss.   HENT: Negative for hearing loss and nosebleeds.    Eyes: Negative for visual disturbance.   Cardiovascular: Positive for chest pain, dyspnea on exertion, leg swelling and palpitations. Negative for claudication, cyanosis, irregular heartbeat, near-syncope, orthopnea, paroxysmal nocturnal dyspnea and syncope.   Respiratory: Positive for shortness of breath. Negative for cough, hemoptysis, sleep disturbances due to breathing, snoring and wheezing.    Endocrine: Negative for cold intolerance, heat intolerance, polydipsia and polyuria.   Hematologic/Lymphatic: Negative for adenopathy and bleeding problem. Does not bruise/bleed easily.   Skin: Negative for color change, itching, poor wound healing, rash and suspicious lesions.   Musculoskeletal: Positive for joint pain, muscle cramps and myalgias. Negative for arthritis, back pain, falls, joint swelling and muscle weakness.   Gastrointestinal: Negative for bloating, abdominal pain, change in bowel habit, constipation, flatus, heartburn, hematemesis, hematochezia, hemorrhoids, jaundice, melena, nausea and vomiting.   Genitourinary: Negative for bladder incontinence, decreased libido, frequency, hematuria, hesitancy and urgency.   Neurological: Negative for brief paralysis, difficulty with concentration, excessive daytime sleepiness, dizziness, focal weakness, headaches, light-headedness, loss of balance, numbness, vertigo  and weakness.   Psychiatric/Behavioral: Negative for altered mental status, depression and memory loss. The patient does not have insomnia and is not nervous/anxious.    Allergic/Immunologic: Negative for environmental allergies, hives and persistent infections.        Objective:    Physical Exam   Constitutional: She is oriented to person, place, and time. She appears well-developed and well-nourished.   /68   Pulse 71   Ht 5' (1.524 m)   Wt 64.4 kg (142 lb)   BMI 27.73 kg/m²      HENT:   Head: Normocephalic and atraumatic.   Right Ear: External ear normal.   Left Ear: External ear normal.   Nose: Nose normal.   Mouth/Throat: Oropharynx is clear and moist.   Eyes: Pupils are equal, round, and reactive to light. Conjunctivae, EOM and lids are normal. Right eye exhibits no discharge. Left eye exhibits no discharge. Right conjunctiva has no hemorrhage. No scleral icterus.   Neck: Normal range of motion. Neck supple. No JVD present. No tracheal deviation present. No thyromegaly present.   Cardiovascular: Normal rate, regular rhythm, normal heart sounds and intact distal pulses. Exam reveals no gallop and no friction rub.   No murmur heard.  Pulmonary/Chest: Effort normal and breath sounds normal. No respiratory distress. She has no wheezes. She has no rales. She exhibits no tenderness. Breasts are symmetrical.   Sternal scar   Abdominal: Soft. Bowel sounds are normal. She exhibits no distension and no mass. There is no hepatosplenomegaly or hepatomegaly. There is no tenderness. There is no rebound and no guarding.   Musculoskeletal: Normal range of motion. She exhibits no edema or tenderness.   Lymphadenopathy:     She has no cervical adenopathy.   Neurological: She is alert and oriented to person, place, and time. She displays normal reflexes. No cranial nerve deficit. Coordination normal.   Skin: Skin is warm and dry. No rash noted. No erythema. No pallor.   Psychiatric: She has a normal mood and affect.  Her behavior is normal. Judgment and thought content normal.   Nursing note and vitals reviewed.        Assessment:       1. Chronic pain associated with significant psychosocial dysfunction    2. Essential hypertension    3. Mixed hyperlipidemia    4. Coronary artery disease involving native coronary artery of native heart without angina pectoris    5. S/P CABG (coronary artery bypass graft)    6. Former smoker    7. JOHN (dyspnea on exertion)    8. Chronic pain syndrome    9. Other emphysema         Plan:     Needs echo and nuclear stress. If abnormal will schedule for cath      Orders Placed This Encounter   Procedures    Nuclear Stress - 3rd Party    Echo Color Flow Doppler? Yes     Follow up in about 6 months (around 9/2/2020).

## 2020-03-03 RX ORDER — HYDROXYZINE PAMOATE 50 MG/1
50 CAPSULE ORAL EVERY 6 HOURS PRN
Qty: 30 CAPSULE | Refills: 4 | Status: SHIPPED | OUTPATIENT
Start: 2020-03-03 | End: 2021-01-11

## 2020-03-09 ENCOUNTER — HOSPITAL ENCOUNTER (OUTPATIENT)
Dept: CARDIOLOGY | Facility: HOSPITAL | Age: 75
Discharge: HOME | End: 2020-03-09
Attending: INTERNAL MEDICINE
Payer: MEDICARE

## 2020-03-09 VITALS
BODY MASS INDEX: 27.88 KG/M2 | HEIGHT: 60 IN | WEIGHT: 142 LBS | HEART RATE: 65 BPM | DIASTOLIC BLOOD PRESSURE: 68 MMHG | SYSTOLIC BLOOD PRESSURE: 129 MMHG

## 2020-03-09 LAB
AV INDEX (PROSTH): 0.86
AV LVOT PEAK GRADIENT: 3 MMHG
AV MEAN GRADIENT: 3 MMHG
AV PEAK GRADIENT: 4 MMHG
AV VALVE AREA: 2.54 CM2
AV VELOCITY RATIO: 0.8
BSA FOR ECHO PROCEDURE: 1.65 M2
CV ECHO LV RWT: 0.5 CM
DOP CALC AO PEAK VEL: 1.05 M/S
DOP CALC AO VTI: 23.76 CM
DOP CALC LVOT AREA: 3 CM2
DOP CALC LVOT DIAMETER: 1.94 CM
DOP CALC LVOT PEAK VEL: 0.84 M/S
DOP CALC LVOT STROKE VOLUME: 60.42 CM3
DOP CALC RVOT PEAK VEL: 0.42 M/S
DOP CALC RVOT VTI: 11.72 CM
DOP CALCLVOT PEAK VEL VTI: 20.45 CM
E WAVE DECELERATION TIME: 205.58 MS
E/A RATIO: 0.96
E/E' RATIO: 10.44 M/S
ECHO EF ESTIMATED: 76 %
ECHO LV POSTERIOR WALL: 0.95 CM (ref 0.6–1.1)
FRACTIONAL SHORTENING: 44 % (ref 28–44)
INTERVENTRICULAR SEPTUM: 0.92 CM (ref 0.6–1.1)
IVRT: 117.65 MS
LA MAJOR: 4.7 CM
LA MINOR: 4.5 CM
LA WIDTH: 2.7 CM
LEFT ATRIUM SIZE: 3.62 CM
LEFT ATRIUM VOLUME INDEX: 23.7 ML/M2
LEFT ATRIUM VOLUME: 38.2 CM3
LEFT INTERNAL DIMENSION IN SYSTOLE: 2.15 CM (ref 2.1–4)
LEFT VENTRICLE DIASTOLIC VOLUME INDEX: 39.04 ML/M2
LEFT VENTRICLE DIASTOLIC VOLUME: 63 ML
LEFT VENTRICLE MASS INDEX: 67 G/M2
LEFT VENTRICLE SYSTOLIC VOLUME INDEX: 9.3 ML/M2
LEFT VENTRICLE SYSTOLIC VOLUME: 15 ML
LEFT VENTRICULAR INTERNAL DIMENSION IN DIASTOLE: 3.83 CM (ref 3.5–6)
LEFT VENTRICULAR MASS: 107.94 G
LV LATERAL E/E' RATIO: 9.4 M/S
LV SEPTAL E/E' RATIO: 11.75 M/S
MV PEAK A VEL: 0.98 M/S
MV PEAK E VEL: 0.94 M/S
MV STENOSIS PRESSURE HALF TIME: 60 MS
MV VALVE AREA P 1/2 METHOD: 3.67 CM2
PISA TR MAX VEL: 2.12 M/S
PROX AORTA: 2.57 CM
PULM VEIN S/D RATIO: 1.07
PV MEAN GRADIENT: 1 MMHG
PV PEAK D VEL: 0.44 M/S
PV PEAK S VEL: 0.47 M/S
PV PEAK VELOCITY: 0.71 M/S
RA MAJOR: 3.5 CM
RA PRESSURE: 3 MMHG
RA WIDTH: 2.4 CM
RIGHT VENTRICULAR END-DIASTOLIC DIMENSION: 2.24 CM
SINUS: 2.7 CM
STJ: 2.7 CM
TDI LATERAL: 0.1 M/S
TDI SEPTAL: 0.08 M/S
TDI: 0.09 M/S
TR MAX PG: 18 MMHG
TRICUSPID ANNULAR PLANE SYSTOLIC EXCURSION: 1.9 CM
TV REST PULMONARY ARTERY PRESSURE: 21 MMHG

## 2020-03-09 PROCEDURE — 93306 ECHO (CUPID ONLY): ICD-10-PCS | Mod: 26,,, | Performed by: INTERNAL MEDICINE

## 2020-03-09 PROCEDURE — 93306 TTE W/DOPPLER COMPLETE: CPT | Mod: PO

## 2020-03-09 PROCEDURE — 93306 TTE W/DOPPLER COMPLETE: CPT | Mod: 26,,, | Performed by: INTERNAL MEDICINE

## 2020-03-12 ENCOUNTER — PATIENT MESSAGE (OUTPATIENT)
Dept: FAMILY MEDICINE | Facility: CLINIC | Age: 75
End: 2020-03-12

## 2020-03-16 ENCOUNTER — HOSPITAL ENCOUNTER (OUTPATIENT)
Dept: CARDIOLOGY | Facility: HOSPITAL | Age: 75
Discharge: HOME | End: 2020-03-16
Attending: INTERNAL MEDICINE
Payer: MEDICARE

## 2020-03-16 ENCOUNTER — TELEPHONE (OUTPATIENT)
Dept: PAIN MEDICINE | Facility: CLINIC | Age: 75
End: 2020-03-16

## 2020-03-16 VITALS
DIASTOLIC BLOOD PRESSURE: 72 MMHG | HEIGHT: 60 IN | RESPIRATION RATE: 18 BRPM | SYSTOLIC BLOOD PRESSURE: 144 MMHG | HEART RATE: 81 BPM | WEIGHT: 142 LBS | BODY MASS INDEX: 27.88 KG/M2

## 2020-03-16 DIAGNOSIS — Z95.1 S/P CABG (CORONARY ARTERY BYPASS GRAFT): ICD-10-CM

## 2020-03-16 DIAGNOSIS — E78.2 MIXED HYPERLIPIDEMIA: ICD-10-CM

## 2020-03-16 DIAGNOSIS — Z87.891 FORMER SMOKER: ICD-10-CM

## 2020-03-16 DIAGNOSIS — I10 ESSENTIAL HYPERTENSION: ICD-10-CM

## 2020-03-16 DIAGNOSIS — G89.4 CHRONIC PAIN SYNDROME: ICD-10-CM

## 2020-03-16 DIAGNOSIS — J43.8 OTHER EMPHYSEMA: ICD-10-CM

## 2020-03-16 DIAGNOSIS — R06.09 DOE (DYSPNEA ON EXERTION): ICD-10-CM

## 2020-03-16 DIAGNOSIS — G89.4 CHRONIC PAIN ASSOCIATED WITH SIGNIFICANT PSYCHOSOCIAL DYSFUNCTION: ICD-10-CM

## 2020-03-16 DIAGNOSIS — I25.10 CORONARY ARTERY DISEASE INVOLVING NATIVE CORONARY ARTERY OF NATIVE HEART WITHOUT ANGINA PECTORIS: ICD-10-CM

## 2020-03-16 RX ORDER — REGADENOSON 0.08 MG/ML
0.4 INJECTION, SOLUTION INTRAVENOUS ONCE
Status: COMPLETED | OUTPATIENT
Start: 2020-03-16 | End: 2020-03-16

## 2020-03-16 RX ADMIN — REGADENOSON 0.4 MG: 0.08 INJECTION, SOLUTION INTRAVENOUS at 10:03

## 2020-03-16 NOTE — TELEPHONE ENCOUNTER
----- Message from Lorena Lam LPN sent at 3/16/2020 10:55 AM CDT -----  She wishes to change from Dr Rouse to Dr Sutton. She is here at our office this am for a nuclear stress test. For some reason it will not let me schedule her with Dr. Sutton for [facility] pain management. Can you please call her an schedule? Let me know if this can not be done. Thank you Lorena

## 2020-03-16 NOTE — TELEPHONE ENCOUNTER
Made appt for this Friday with .    Informed pt that for patients safety, [facility] is limiting visitors at this time. Also informed pt that pt will be screens at check in.       TRAVEL SCREENING:     In the last month, have you been in contact with someone who was Confirmed or is expected to have coronavirus? no      Do you have any of the following symptoms? Cough, muscle pain, shortness of breath, fever, weakness?no      have you traveled internationally in the last month?no     Pt was able to verbalize all understanding. All questions answered.

## 2020-03-17 LAB
CV STRESS BASE HR: 81 BPM
DIASTOLIC BLOOD PRESSURE: 72 MMHG
NUC REST EJECTION FRACTION: 60
NUC STRESS EJECTION FRACTION: 60 %
OHS CV CPX 1 MINUTE RECOVERY HEART RATE: 82 BPM
OHS CV CPX 85 PERCENT MAX PREDICTED HEART RATE MALE: 118
OHS CV CPX ESTIMATED METS: 1
OHS CV CPX MAX PREDICTED HEART RATE: 139
OHS CV CPX PATIENT IS FEMALE: 1
OHS CV CPX PATIENT IS MALE: 0
OHS CV CPX PEAK DIASTOLIC BLOOD PRESSURE: 84 MMHG
OHS CV CPX PEAK HEAR RATE: 82 BPM
OHS CV CPX PEAK RATE PRESSURE PRODUCT: NORMAL
OHS CV CPX PEAK SYSTOLIC BLOOD PRESSURE: 150 MMHG
OHS CV CPX PERCENT MAX PREDICTED HEART RATE ACHIEVED: 59
OHS CV CPX RATE PRESSURE PRODUCT PRESENTING: NORMAL
STRESS ECHO POST EXERCISE DUR MIN: 2 MINUTES
STRESS ECHO POST EXERCISE DUR SEC: 0 SECONDS
SYSTOLIC BLOOD PRESSURE: 144 MMHG

## 2020-03-18 ENCOUNTER — TELEPHONE (OUTPATIENT)
Dept: CARDIOLOGY | Facility: CLINIC | Age: 75
End: 2020-03-18

## 2020-03-31 ENCOUNTER — TELEPHONE (OUTPATIENT)
Dept: PAIN MEDICINE | Facility: CLINIC | Age: 75
End: 2020-03-31

## 2020-03-31 ENCOUNTER — PATIENT MESSAGE (OUTPATIENT)
Dept: PAIN MEDICINE | Facility: CLINIC | Age: 75
End: 2020-03-31

## 2020-03-31 NOTE — TELEPHONE ENCOUNTER
Tried to call about appt 04/24/220 to offer virtual visit as  will not be here in clinic. No answer. Left vm

## 2020-04-01 ENCOUNTER — TELEPHONE (OUTPATIENT)
Dept: PAIN MEDICINE | Facility: CLINIC | Age: 75
End: 2020-04-01

## 2020-04-17 ENCOUNTER — PATIENT MESSAGE (OUTPATIENT)
Dept: PULMONOLOGY | Facility: CLINIC | Age: 75
End: 2020-04-17

## 2020-04-17 RX ORDER — OMEPRAZOLE 40 MG/1
40 CAPSULE, DELAYED RELEASE ORAL DAILY
Qty: 30 CAPSULE | Refills: 11 | Status: SHIPPED | OUTPATIENT
Start: 2020-04-17 | End: 2020-06-04

## 2020-04-20 DIAGNOSIS — J44.9 CHRONIC OBSTRUCTIVE PULMONARY DISEASE, UNSPECIFIED COPD TYPE: ICD-10-CM

## 2020-04-21 ENCOUNTER — TELEPHONE (OUTPATIENT)
Dept: PAIN MEDICINE | Facility: CLINIC | Age: 75
End: 2020-04-21

## 2020-04-21 NOTE — TELEPHONE ENCOUNTER
----- Message from Katie Silverman sent at 4/21/2020  1:00 PM CDT -----  Contact: pt  Pt would like a call back in regards to appointment that were cancelled and can be reached at [phone]      Thanks,  Katie Silverman

## 2020-04-28 ENCOUNTER — PATIENT MESSAGE (OUTPATIENT)
Dept: FAMILY MEDICINE | Facility: CLINIC | Age: 75
End: 2020-04-28

## 2020-04-30 ENCOUNTER — PATIENT MESSAGE (OUTPATIENT)
Dept: FAMILY MEDICINE | Facility: CLINIC | Age: 75
End: 2020-04-30

## 2020-04-30 RX ORDER — ESZOPICLONE 3 MG/1
3 TABLET, FILM COATED ORAL NIGHTLY
Qty: 30 TABLET | Refills: 3 | Status: SHIPPED | OUTPATIENT
Start: 2020-04-30 | End: 2021-01-11 | Stop reason: SDUPTHER

## 2020-05-06 ENCOUNTER — TELEPHONE (OUTPATIENT)
Dept: PAIN MEDICINE | Facility: CLINIC | Age: 75
End: 2020-05-06

## 2020-05-06 NOTE — TELEPHONE ENCOUNTER
----- Message from Jessi Campuzano sent at 5/6/2020  1:09 PM CDT -----  Pt is requesting a call back regarding getting her records transferred or an copy to Dr. Paulo Cox office. Please call pt back at [phone]

## 2020-05-06 NOTE — TELEPHONE ENCOUNTER
Patient stated she is now aware that Dr. Cox is able to access her records since he is an [facility] doctor.

## 2020-05-08 ENCOUNTER — TELEPHONE (OUTPATIENT)
Dept: DERMATOLOGY | Facility: CLINIC | Age: 75
End: 2020-05-08

## 2020-05-21 ENCOUNTER — TELEPHONE (OUTPATIENT)
Dept: PAIN MEDICINE | Facility: CLINIC | Age: 75
End: 2020-05-21

## 2020-05-22 ENCOUNTER — OFFICE VISIT (OUTPATIENT)
Dept: PAIN MEDICINE | Facility: CLINIC | Age: 75
End: 2020-05-22
Payer: MEDICARE

## 2020-05-22 ENCOUNTER — HOSPITAL ENCOUNTER (OUTPATIENT)
Dept: RADIOLOGY | Facility: HOSPITAL | Age: 75
Discharge: HOME | End: 2020-05-22
Attending: PHYSICAL MEDICINE & REHABILITATION
Payer: MEDICARE

## 2020-05-22 VITALS
DIASTOLIC BLOOD PRESSURE: 68 MMHG | HEART RATE: 70 BPM | WEIGHT: 140.88 LBS | BODY MASS INDEX: 27.66 KG/M2 | SYSTOLIC BLOOD PRESSURE: 140 MMHG | HEIGHT: 60 IN

## 2020-05-22 DIAGNOSIS — R10.9 ABDOMINAL PAIN, LEFT LATERAL: ICD-10-CM

## 2020-05-22 DIAGNOSIS — M54.16 LUMBAR RADICULITIS: ICD-10-CM

## 2020-05-22 DIAGNOSIS — Z96.89 SPINAL CORD STIMULATOR STATUS: Primary | ICD-10-CM

## 2020-05-22 DIAGNOSIS — G89.4 CHRONIC PAIN SYNDROME: ICD-10-CM

## 2020-05-22 DIAGNOSIS — G58.0 INTERCOSTAL NEUROPATHY: ICD-10-CM

## 2020-05-22 DIAGNOSIS — G90.521 COMPLEX REGIONAL PAIN SYNDROME TYPE 1 OF RIGHT LOWER EXTREMITY: ICD-10-CM

## 2020-05-22 DIAGNOSIS — B02.29 POSTHERPETIC NEURALGIA: ICD-10-CM

## 2020-05-22 DIAGNOSIS — Z96.89 SPINAL CORD STIMULATOR STATUS: ICD-10-CM

## 2020-05-22 DIAGNOSIS — M51.36 DDD (DEGENERATIVE DISC DISEASE), LUMBAR: ICD-10-CM

## 2020-05-22 DIAGNOSIS — B02.23 NEUROPATHY DUE TO HERPES ZOSTER: ICD-10-CM

## 2020-05-22 PROCEDURE — 99999 PR PBB SHADOW E&M-EST. PATIENT-LVL III: ICD-10-PCS | Mod: PBBFAC,,, | Performed by: PHYSICAL MEDICINE & REHABILITATION

## 2020-05-22 PROCEDURE — 1159F PR MEDICATION LIST DOCUMENTED IN MEDICAL RECORD: ICD-10-PCS | Mod: S$GLB,,, | Performed by: PHYSICAL MEDICINE & REHABILITATION

## 2020-05-22 PROCEDURE — 72080 XR THORACOLUMBAR SPINE AP LATERAL: ICD-10-PCS | Mod: 26,,, | Performed by: RADIOLOGY

## 2020-05-22 PROCEDURE — 99999 PR PBB SHADOW E&M-EST. PATIENT-LVL III: CPT | Mod: PBBFAC,,, | Performed by: PHYSICAL MEDICINE & REHABILITATION

## 2020-05-22 PROCEDURE — 3078F DIAST BP <80 MM HG: CPT | Mod: CPTII,S$GLB,, | Performed by: PHYSICAL MEDICINE & REHABILITATION

## 2020-05-22 PROCEDURE — 72080 X-RAY EXAM THORACOLMB 2/> VW: CPT | Mod: TC,PO

## 2020-05-22 PROCEDURE — 3288F PR FALLS RISK ASSESSMENT DOCUMENTED: ICD-10-PCS | Mod: CPTII,S$GLB,, | Performed by: PHYSICAL MEDICINE & REHABILITATION

## 2020-05-22 PROCEDURE — 99214 PR OFFICE/OUTPT VISIT, EST, LEVL IV, 30-39 MIN: ICD-10-PCS | Mod: S$GLB,,, | Performed by: PHYSICAL MEDICINE & REHABILITATION

## 2020-05-22 PROCEDURE — 1125F AMNT PAIN NOTED PAIN PRSNT: CPT | Mod: S$GLB,,, | Performed by: PHYSICAL MEDICINE & REHABILITATION

## 2020-05-22 PROCEDURE — 72080 X-RAY EXAM THORACOLMB 2/> VW: CPT | Mod: 26,,, | Performed by: RADIOLOGY

## 2020-05-22 PROCEDURE — 1159F MED LIST DOCD IN RCRD: CPT | Mod: S$GLB,,, | Performed by: PHYSICAL MEDICINE & REHABILITATION

## 2020-05-22 PROCEDURE — 3077F SYST BP >= 140 MM HG: CPT | Mod: CPTII,S$GLB,, | Performed by: PHYSICAL MEDICINE & REHABILITATION

## 2020-05-22 PROCEDURE — 99214 OFFICE O/P EST MOD 30 MIN: CPT | Mod: S$GLB,,, | Performed by: PHYSICAL MEDICINE & REHABILITATION

## 2020-05-22 PROCEDURE — 3288F FALL RISK ASSESSMENT DOCD: CPT | Mod: CPTII,S$GLB,, | Performed by: PHYSICAL MEDICINE & REHABILITATION

## 2020-05-22 PROCEDURE — 1125F PR PAIN SEVERITY QUANTIFIED, PAIN PRESENT: ICD-10-PCS | Mod: S$GLB,,, | Performed by: PHYSICAL MEDICINE & REHABILITATION

## 2020-05-22 PROCEDURE — 3078F PR MOST RECENT DIASTOLIC BLOOD PRESSURE < 80 MM HG: ICD-10-PCS | Mod: CPTII,S$GLB,, | Performed by: PHYSICAL MEDICINE & REHABILITATION

## 2020-05-22 PROCEDURE — 3077F PR MOST RECENT SYSTOLIC BLOOD PRESSURE >= 140 MM HG: ICD-10-PCS | Mod: CPTII,S$GLB,, | Performed by: PHYSICAL MEDICINE & REHABILITATION

## 2020-05-22 PROCEDURE — 1100F PTFALLS ASSESS-DOCD GE2>/YR: CPT | Mod: CPTII,S$GLB,, | Performed by: PHYSICAL MEDICINE & REHABILITATION

## 2020-05-22 PROCEDURE — 1100F PR PT FALLS ASSESS DOC 2+ FALLS/FALL W/INJURY/YR: ICD-10-PCS | Mod: CPTII,S$GLB,, | Performed by: PHYSICAL MEDICINE & REHABILITATION

## 2020-05-22 RX ORDER — NEOMYCIN/POLYMYXIN B/PRAMOXINE 3.5-10K-1
CREAM (GRAM) TOPICAL
COMMUNITY
End: 2021-01-11

## 2020-05-22 RX ORDER — GABAPENTIN 600 MG/1
600 TABLET ORAL 2 TIMES DAILY
Qty: 30 TABLET | Refills: 11 | Status: SHIPPED | OUTPATIENT
Start: 2020-05-22 | End: 2021-02-02 | Stop reason: SDUPTHER

## 2020-05-22 RX ORDER — TALC
1 POWDER (GRAM) TOPICAL DAILY
COMMUNITY

## 2020-05-22 RX ORDER — LEVOCARNITINE 500 MG
TABLET ORAL
COMMUNITY
End: 2022-07-11 | Stop reason: CLARIF

## 2020-05-22 RX ORDER — AMLODIPINE BESYLATE 10 MG/1
TABLET ORAL
COMMUNITY
End: 2020-06-17 | Stop reason: SDUPTHER

## 2020-05-22 NOTE — PATIENT INSTRUCTIONS
- Increase gabapentin to 600mg nightly x 7 days, then 300mg (1/2 tablet) in the AM and 600mg in the evening x 7 days, and then 600mg twice daily and continue.   - Continue current medications as prescribed.  - Continue home based exercises and discussed the importance of a healthy and active lifestyle  - Return for follow up as needed.    Please do not hesitate to contact the clinic [phone] if you have any questions regarding your treatment plan.     Paulo Cox MD  Interventional Pain Medicine  [facility] [place]

## 2020-05-22 NOTE — PROGRESS NOTES
Established Patient Chronic Pain Note (Follow up visit)    Chief Complaint:   Chief Complaint   Patient presents with    Abdominal Pain    Shoulder Pain    Low-back Pain    Hip Pain    Pelvic Pain    Leg Pain       SUBJECTIVE:    Soumya Melchor is a 74 y.o. female who presents to the clinic for a follow-up appointment for bilateral abdominal and bilateral lower leg pain.  She has been followed by Dr. Rouse at [facility] who recently implanted a spinal cord stimulator on 07/25/2019 for chronic pain syndrome.  Since the last visit, Soumya Melchor states the pain has been worsening. Current pain intensity is 8/10.  She feels that the spinal cord stimulator.  Providing significant relief about 5-6 months ago.  She has locating majority of pain over the abdominal region, left-sided.  She also reports that her left leg gave out on her while standing in line a few weeks ago.  She denies any injuries associated with this.    Patient denies night fever/night sweats, urinary incontinence, bowel incontinence, significant weight loss, significant motor weakness and loss of sensations.  She does report having occasional leakage of the bladder at times, but is sensate.    Pain Disability Index Review:  Last 3 PDI Scores 7/29/2019 6/26/2019 4/30/2019   Pain Disability Index (PDI) 14 12 15     Interval HPI 08/28/2019:  The patient is a 73-year-old woman with a history of COPD, CAD, chronic abdominal wall pain who presents in referral from Dr. Mathews continued pain. She returns in follow-up, she is one month status post spinal cord stimulator implantation with new leads to cover the right side, now has leads covering both side attached to one IPG.  She has been doing very well with complete relief of her right abdominal wall and right leg pain.  However she developed UTI over the weekend and states that since that time, she has been having diarrhea and she feels that all of her pain has returned even though she  is feeling stimulations in the area where she has the pain. She denies any pain at the incision sites and those have all healed well. She denies any change in areas of stimulation, has not felt like she has any pain at the incision sites such as when a lead might move.     Interval HPI 4/1/19:   She returns in follow-up today to review records, continues to have left abdominal wall pain but states that it also crosses over to the midline and right side as well. I reviewed records from Dr. Nielson that states her leads were initially placed at T5, but unclear if this was the top of T5 over the bottom or middle.  Furthermore, notes from x-rays done after this state that one lead was in the midthoracic region and the other was in the lower thoracic region but there is no mention of specific levels.  We have obtained an x-ray of her spine that shows the left lead at T10 and T11 and the right lead over T6 through T8.  She states that she does get coverage of her left leg where she has pain but denies any improvement in her abdomen.  Since the last visit, she denies any major changes.        Past history:  The patient reports that she has had a history of chronic left abdominal wall pain, was seen by multiple physicians about 10 years ago and was eventually diagnosed with post herpetic neuralgia, sounds like she never had any of the visible lesions but continued to have pain. She was seen by Dr. Huan Nielson and had undergone trial and implantation of a spinal cord stimulator for this pain and states that she was almost 100% pain free with this device.  She states that she had read that the battery only lasts for 10 years and so she requested a battery exchange at 9 years to make sure that she had continued coverage.  She states while she initially had a [employer] stimulator, when the battery was changed, it was changed to a [employer] device.  She was also told that one of the leads had frayed where it entered  the IPG and she was told that this lead was replaced.  She cannot recall if the incision was made only over the IPG site or if they actually went into the initial midline incision site as well. Nonetheless, this was accomplished in January, 2018 and after this revision, she was doing well without left abdominal wall pain. 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 Dre Grey in August, 2018.  After that time, she states that her left abdominal wall pain returned and also her left lateral thigh pain returned.  She states that she has met with the [email or web address] representatives and has not been able to get the stimulator to provide relief.  She has difficulty with providing good history, I am not sure if she actually has any stimulation over her pain sites but nonetheless, she states that she is not having any relief.  She also reports having pain in her lateral left thigh similar to her abdominal wall pain.  She is currently using lidocaine patches with some relief.  She also has been taking hydrocodone 7.5/325 every several days.  I do not see a record of her receiving this medication but states that she had this filled at [facility] in [place].    Non-Pharmacologic Treatments:  Physical Therapy/Home Exercise: yes  Ice/Heat:yes  TENS: yes  Acupuncture: no  Massage: yes  Chiropractic: no    Other: no    Pain Medications:  - Opioids: Norco  - Adjuvant Medications: Gabapentin  - Anti-Coagulants: Aspirin    Opioid Contract: no     report:  Reviewed and consistent with medication use as prescribed.      Pain Procedures:   -spinal cord stimulator implant and revision (switch from [employer] to [email or web address])      Imaging:   MRI abdomen 02/06/2019:  The liver  demonstrates homogeneity without focal lesion.  No evidence for hepatic steatosis or iron deposition.  No abnormal parenchymal enhancement is identified.  The portal veins and hepatic veins are widely patent.  Likely conventional hepatic arterial anatomy.  No intrahepatic biliary ductal dilatation.  The gallbladder is present without evidence for cholelithiasis.    The spleen, adrenals, kidneys, and pancreas appear normal.    The visualized bowel and colon are normal.  Previous described area of gastric body thickening is not been of the identified on today's examination.  No lymphadenopathy.    X-ray thoracolumbar spine 11/06/2018:  The bones are osteopenic.  There is no acute fracture or malalignment.  Neurostimulator leads are in place.  The into the dorsal spinal canal at the L1-2 interspinous space.  One lead extends cephalad to the level of T9-10.  A 2nd lead extends more cephalad and terminate at the level of T5-6.  There is moderate to marked atherosclerosis.  There is no significant disc space narrowing.  There is multilevel facet joint arthropathy.        PMHx,PSHx, Social history, and Family history:  I have reviewed the patient's medical, surgical, social, and family history in detail and updated the computerized patient record.        Review of patient's allergies indicates:   Allergen Reactions    Losartan Swelling     angioedema    Ace inhibitors Other (See Comments) and Swelling     unknown    Angioedema    Arb-angiotensin receptor antagonist Other (See Comments)     unknown  unknown      Iodine and iodide containing products Hives     Since age of 48    Lisinopril      angioedema    Metoprolol tartrate      swelling    Shrimp Other (See Comments) and Swelling     Localized itching and swelling on her hands if she peels shrimp.  Able to eat shrimp without difficulty.  No generalized reaction.       Current Outpatient Medications   Medication Sig    ascorbic acid, vitamin C, (VITAMIN C) 1000  MG tablet Take 1,000 mg by mouth once daily.     aspirin (ECOTRIN) 81 MG EC tablet Take 81 mg by mouth once daily.    calcium carbonate-vitamin D2 500 mg(1,250mg) -200 unit Tab Take 1 tablet by mouth.    cephALEXin (KEFLEX) 500 MG capsule Take 1 capsule (500 mg total) by mouth every 12 (twelve) hours.    cetirizine (ZYRTEC) 10 MG tablet Take 1 tablet (10 mg total) by mouth once daily.    chlorhexidine (HIBICLENS) 4 % external liquid Use to shower night before procedure and day of procedure    cholecalciferol, vitamin D3, (VITAMIN D3) 1,000 unit capsule 1 capsule    cholestyramine (QUESTRAN) 4 gram packet Take 1 packet (4 g total) by mouth once daily.    cyanocobalamin (VITAMIN B-12) 250 MCG tablet Take 250 mcg by mouth once daily.    docosahexanoic acid-epa 120-180 mg Cap Take by mouth.    docosahexanoic acid-epa 120-180 mg Cap Take by mouth.    donepezil (ARICEPT) 10 MG tablet Take 1 tablet (10 mg total) by mouth once daily.    EPINEPHrine (EPIPEN) 0.3 mg/0.3 mL AtIn Inject 0.3 mg into the muscle.    eszopiclone (LUNESTA) 3 mg Tab Take 1 tablet (3 mg total) by mouth every evening.    fish oil-omega-3 fatty acids 300-1,000 mg capsule Take 1 capsule by mouth 2 (two) times daily.     hydrOXYzine pamoate (VISTARIL) 50 MG Cap Take 1 capsule (50 mg total) by mouth every 6 (six) hours as needed.    L.acid/L.casei/B.bif/B.miryam/FOS (PROBIOTIC BLEND ORAL) Take by mouth 2 (two) times daily.     lactase (LACTAID ORAL) Take by mouth as needed.     levothyroxine (SYNTHROID) 200 MCG tablet Take 1 tablet (200 mcg total) by mouth once daily.    montelukast (SINGULAIR) 10 mg tablet Take 10 mg by mouth every evening.    omeprazole (PRILOSEC) 40 MG capsule Take 1 capsule (40 mg total) by mouth once daily.    oxybutynin (DITROPAN-XL) 5 MG TR24 TAKE 1 TABLET BY MOUTH ONCE DAILY    SHINGRIX, PF, 50 mcg/0.5 mL injection ADM 0.5ML IM UTD    simvastatin (ZOCOR) 40 MG tablet Take 1 tablet (40 mg total) by mouth every  evening.    umeclidinium-vilanteroL (ANORO ELLIPTA) 62.5-25 mcg/actuation DsDv Inhale 1 puff into the lungs once daily. Controller    vitamins  A,C,E-zinc-copper (PRESERVISION AREDS) 14,320-226-200 unit-mg-unit Cap Take 2 capsules by mouth once daily.    albuterol (PROVENTIL/VENTOLIN HFA) 90 mcg/actuation inhaler Inhale 2 puffs into the lungs every 6 (six) hours as needed for Wheezing. Rescue (Patient not taking: Reported on 5/22/2020)    amLODIPine (NORVASC) 10 MG tablet 1 tablet    calcium-vitamin D3 500 mg(1,250mg) -200 unit per tablet Take 1 tablet by mouth 2 (two) times daily with meals.    diphenhydramine-allantoin (ALLEGRA INTENSIVE RELIEF) 2-0.5 % Crea     esomeprazole (NEXIUM) 20 MG capsule 1 capsule    gabapentin (NEURONTIN) 600 MG tablet Take 1 tablet (600 mg total) by mouth 2 (two) times daily.    HYDROcodone-acetaminophen (NORCO) 7.5-325 mg per tablet Take 1 tablet by mouth every 12 (twelve) hours as needed (pain).    magnesium oxide (MAG-OX) 250 mg magnesium Tab 1 tablet with a meal    melatonin 5 mg/15 mL Liqd 1 tablet at bedtime as needed with food    montelukast (SINGULAIR) 10 mg tablet Take 1 tablet by mouth EVERY EVENING (Patient not taking: Reported on 5/22/2020)    niacin, inositol niacinate, (NIACIN FLUSH FREE) 400 mg niacin (500 mg) Cap 1 capsule    nitrofurantoin, macrocrystal-monohydrate, (MACROBID) 100 MG capsule Take 100 mg by mouth 2 (two) times daily.    zaleplon (SONATA) 10 MG capsule Take 1 capsule (10 mg total) by mouth every evening. (Patient not taking: Reported on 5/22/2020)    ZETIA 10 mg tablet TAKE 1 TABLET BY MOUTH EVERY EVENING (Patient not taking: Reported on 5/22/2020)     No current facility-administered medications for this visit.          REVIEW OF SYSTEMS:    GENERAL:  No weight loss, malaise or fevers.  HEENT:   No recent changes in vision or hearing  NECK:  Negative for lumps, no difficulty with swallowing.  RESPIRATORY:  Negative for cough, wheezing or  shortness of breath, patient denies any recent URI.  CARDIOVASCULAR:  Negative for chest pain, leg swelling or palpitations.  GI:  Negative for abdominal discomfort, blood in stools or black stools or change in bowel habits.  MUSCULOSKELETAL:  See HPI.  SKIN:  Negative for lesions, rash, and itching.  PSYCH:  No mood disorder or recent psychosocial stressors.  Patients sleep is not disturbed secondary to pain.  HEMATOLOGY/LYMPHOLOGY:  Negative for prolonged bleeding, bruising easily or swollen nodes.  Patient is currently taking anti-coagulants-aspirin  NEURO:   No history of headaches, syncope, paralysis, seizures or tremors.  All other reviewed and negative other than HPI.    OBJECTIVE:    BP (!) 140/68   Pulse 70   Ht 5' (1.524 m)   Wt 63.9 kg (140 lb 14 oz)   BMI 27.51 kg/m²     PHYSICAL EXAMINATION:    GENERAL: Well appearing, in no acute distress, alert and oriented x3.  PSYCH:  Mood and affect appropriate.  SKIN: Skin color, texture, turgor normal, no rashes or lesions.  HEAD/FACE:  Normocephalic, atraumatic. Cranial nerves grossly intact.  CV: RRR with palpation of the radial artery.  PULM: No evidence of respiratory difficulty, symmetric chest rise.  GI:  Soft and tender to palpation over the left lower abdominal wall, allodynia with light touch across the left abdomen from T5-T12 and extending into the left flank region.  BACK: Straight leg raising in the sitting and supine positions is negative to radicular pain. No pain to palpation over the facet joints of the lumbar spine or spinous processes. Normal range of motion without pain reproduction.  EXTREMITIES: Peripheral joint ROM is full and pain free without obvious instability or laxity in all four extremities. No deformities, edema, or skin discoloration. Good capillary refill.  MUSCULOSKELETAL: Shoulder, hip, and knee provocative maneuvers are negative.  There is no pain with palpation over the sacroiliac joints bilaterally.  FABERs test is  negative.  FADIRs test is negative.   Bilateral upper and lower extremity strength is normal and symmetric.  No atrophy or tone abnormalities are noted.  NEURO: Bilateral upper and lower extremity coordination and muscle stretch reflexes are physiologic and symmetric.  Plantar response are downgoing. No clonus.  No loss of sensation is noted.  GAIT: normal.      LABS:  Lab Results   Component Value Date    WBC 7.51 07/25/2018    HGB 13.1 07/25/2018    HCT 38.8 07/25/2018    MCV 92 07/25/2018     (H) 07/25/2018       CMP  Sodium   Date Value Ref Range Status   01/15/2019 138 136 - 145 mmol/L Final     Potassium   Date Value Ref Range Status   01/15/2019 4.7 3.5 - 5.1 mmol/L Final     Chloride   Date Value Ref Range Status   01/15/2019 98 95 - 110 mmol/L Final     CO2   Date Value Ref Range Status   01/15/2019 29 23 - 29 mmol/L Final     Glucose   Date Value Ref Range Status   01/15/2019 112 (H) 70 - 110 mg/dL Final     BUN, Bld   Date Value Ref Range Status   01/15/2019 21 8 - 23 mg/dL Final     Creatinine   Date Value Ref Range Status   01/15/2019 1.1 0.5 - 1.4 mg/dL Final     Calcium   Date Value Ref Range Status   01/15/2019 9.2 8.7 - 10.5 mg/dL Final     Total Protein   Date Value Ref Range Status   01/15/2019 7.3 6.0 - 8.4 g/dL Final     Albumin   Date Value Ref Range Status   01/15/2019 3.3 (L) 3.5 - 5.2 g/dL Final     Total Bilirubin   Date Value Ref Range Status   01/15/2019 0.3 0.1 - 1.0 mg/dL Final     Comment:     For infants and newborns, interpretation of results should be based  on gestational age, weight and in agreement with clinical  observations.  Premature Infant recommended reference ranges:  Up to 24 hours.............<8.0 mg/dL  Up to 48 hours............<12.0 mg/dL  3-5 days..................<15.0 mg/dL  6-29 days.................<15.0 mg/dL       Alkaline Phosphatase   Date Value Ref Range Status   01/15/2019 106 55 - 135 U/L Final     AST   Date Value Ref Range Status   01/15/2019 16  10 - 40 U/L Final     ALT   Date Value Ref Range Status   01/15/2019 7 (L) 10 - 44 U/L Final     Anion Gap   Date Value Ref Range Status   01/15/2019 11 8 - 16 mmol/L Final     eGFR if    Date Value Ref Range Status   01/15/2019 56.8 (A) >60 mL/min/1.73 m^2 Final     eGFR if non    Date Value Ref Range Status   01/15/2019 49.2 (A) >60 mL/min/1.73 m^2 Final     Comment:     Calculation used to obtain the estimated glomerular filtration  rate (eGFR) is the CKD-EPI equation.          Lab Results   Component Value Date    HGBA1C 5.5 02/13/2017             ASSESSMENT: 74 y.o. year old female with chronic pain, consistent with     1. Spinal cord stimulator status  X-Ray Thoracolumbar Spine AP Lateral   2. 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 Intercostal neuropathy     5. Lumbar radiculitis     6. Complex regional pain syndrome type 1 of right lower extremity     7. DDD (degenerative disc disease), lumbar     8. Postherpetic neuralgia     9. Abdominal pain, left lateral           PLAN:   - Interventions: Spinal cord stimulator already in place, will obtain thoracolumbar x-ray to evaluate for lead migration as patient has reported that it has been more ineffective over the past 5-6 months..  - Anticoagulation: yes, aspirin  - Medications: I have stressed the importance of physical activity and a home exercise plan to help with pain and improve health. and Patient can continue with medications for now since they are providing benefits, using them appropriately, and without side effects.  Adjusting gabapentin to goal dose of 600 mg b.i.d..  Explained titration schedule the patient and she expressed understanding.  - Therapy:  Advised patient continue with activities and exercises as tolerated  - Psychological:  Discussed coping mechanisms to help address chronic pain issues  - Labs:  Reviewed  - Imaging:  Obtaining thoracolumbar x-ray to evaluate for lead  migration  - Consults/Referrals:  None at this time, has upcoming GI point which I I advised that patient should maintain  - Records:  Reviewed/Obtain old records from outside physicians and imaging  - Follow up visit: return to clinic on as needed basis  - Counseled patient regarding the importance of activity modification and physical therapy  - This condition does not require this patient to take time off of work, and the primary goal of our Pain Management services is to improve the patient's functional capacity.  - Patient Questions: Answered all of the patient's questions regarding diagnosis, therapy, and treatment    The above plan and management options were discussed at length with patient. Patient is in agreement with the above and verbalized understanding.      Paulo Cox MD  Interventional Pain Management  [facility]    Disclaimer:  This note was prepared using voice recognition system and is likely to have sound alike errors that may have been overlooked even after proof reading.  Please call me with any questions

## 2020-05-24 ENCOUNTER — PATIENT MESSAGE (OUTPATIENT)
Dept: PAIN MEDICINE | Facility: CLINIC | Age: 75
End: 2020-05-24

## 2020-05-24 ENCOUNTER — PATIENT OUTREACH (OUTPATIENT)
Dept: ADMINISTRATIVE | Facility: OTHER | Age: 75
End: 2020-05-24

## 2020-05-27 ENCOUNTER — PATIENT MESSAGE (OUTPATIENT)
Dept: DERMATOLOGY | Facility: CLINIC | Age: 75
End: 2020-05-27

## 2020-05-27 ENCOUNTER — PATIENT MESSAGE (OUTPATIENT)
Dept: FAMILY MEDICINE | Facility: CLINIC | Age: 75
End: 2020-05-27

## 2020-06-02 ENCOUNTER — PATIENT MESSAGE (OUTPATIENT)
Dept: GASTROENTEROLOGY | Facility: CLINIC | Age: 75
End: 2020-06-02

## 2020-06-03 ENCOUNTER — PATIENT MESSAGE (OUTPATIENT)
Dept: GASTROENTEROLOGY | Facility: CLINIC | Age: 75
End: 2020-06-03

## 2020-06-03 ENCOUNTER — TELEPHONE (OUTPATIENT)
Dept: GASTROENTEROLOGY | Facility: CLINIC | Age: 75
End: 2020-06-03

## 2020-06-03 ENCOUNTER — PATIENT OUTREACH (OUTPATIENT)
Dept: ADMINISTRATIVE | Facility: HOSPITAL | Age: 75
End: 2020-06-03

## 2020-06-03 ENCOUNTER — PATIENT OUTREACH (OUTPATIENT)
Dept: ADMINISTRATIVE | Facility: OTHER | Age: 75
End: 2020-06-03

## 2020-06-03 ENCOUNTER — OFFICE VISIT (OUTPATIENT)
Dept: GASTROENTEROLOGY | Facility: CLINIC | Age: 75
End: 2020-06-03
Payer: MEDICARE

## 2020-06-03 DIAGNOSIS — K21.9 GASTROESOPHAGEAL REFLUX DISEASE WITHOUT ESOPHAGITIS: ICD-10-CM

## 2020-06-03 DIAGNOSIS — Z79.899 ENCOUNTER FOR LONG-TERM (CURRENT) USE OF HIGH-RISK MEDICATION: ICD-10-CM

## 2020-06-03 DIAGNOSIS — R10.13 DYSPEPSIA: ICD-10-CM

## 2020-06-03 DIAGNOSIS — E78.2 MIXED HYPERLIPIDEMIA: ICD-10-CM

## 2020-06-03 DIAGNOSIS — R14.0 ABDOMINAL BLOATING: ICD-10-CM

## 2020-06-03 DIAGNOSIS — R14.0 ABDOMINAL DISTENTION: ICD-10-CM

## 2020-06-03 DIAGNOSIS — R15.2 INCONTINENCE OF FECES WITH FECAL URGENCY: Primary | ICD-10-CM

## 2020-06-03 DIAGNOSIS — R15.9 INCONTINENCE OF FECES WITH FECAL URGENCY: Primary | ICD-10-CM

## 2020-06-03 DIAGNOSIS — Z86.010 HISTORY OF COLON POLYPS: ICD-10-CM

## 2020-06-03 DIAGNOSIS — I10 ESSENTIAL HYPERTENSION: Primary | ICD-10-CM

## 2020-06-03 DIAGNOSIS — Z85.09 HISTORY OF GASTROINTESTINAL STROMAL TUMOR (GIST): ICD-10-CM

## 2020-06-03 PROCEDURE — 99442 PR PHYSICIAN TELEPHONE EVALUATION 11-20 MIN: ICD-10-PCS | Mod: 95,,, | Performed by: NURSE PRACTITIONER

## 2020-06-03 PROCEDURE — 99442 PR PHYSICIAN TELEPHONE EVALUATION 11-20 MIN: CPT | Mod: 95,,, | Performed by: NURSE PRACTITIONER

## 2020-06-03 RX ORDER — CHOLESTYRAMINE 4 G/9G
4 POWDER, FOR SUSPENSION ORAL 3 TIMES DAILY
Qty: 90 PACKET | Refills: 0 | Status: SHIPPED | OUTPATIENT
Start: 2020-06-03 | End: 2020-06-17

## 2020-06-03 RX ORDER — CHOLESTYRAMINE 4 G/9G
4 POWDER, FOR SUSPENSION ORAL 3 TIMES DAILY
Qty: 30 PACKET | Refills: 0 | Status: SHIPPED | OUTPATIENT
Start: 2020-06-03 | End: 2020-06-03

## 2020-06-03 NOTE — PROGRESS NOTES
Spoke with Pt about overdue lipid panel, Lab appt scheduled, Pt verbalizes understanding that it is a fasting lab informing she sleeps late and eat late.     Health Maintenance Updated.   Immunizations: Abstracted.   Care Everywhere: Abstracted: No new HM results found.   [facility] Search: Patient not found.  Health Maintenance Due   Topic    TETANUS VACCINE     Pneumococcal Vaccine (65+ Low/Medium Risk) (2 of 2 - PCV13)    Lipid Panel     LDCT Lung Screen      LIPID: [id number]

## 2020-06-03 NOTE — TELEPHONE ENCOUNTER
Spoke with pt. Scheduled virtual visit, reviewed medications, discussed how to log on to [facility] for appointment. Pt verbalized understanding.

## 2020-06-03 NOTE — Clinical Note
Devonte Santiago,I signed the lipid panel and have also ordered a CMP, A1C and CBC which can be scheduled with it. Thanks!Mindy Hathaway MD

## 2020-06-03 NOTE — Clinical Note
Pt to est. care on 6/17 orders placed for overdue lipid panel. Pt would like to have complete blood workup because she hasn't has labs in a while informing she's also worried about being diabetic. Please place any orders you want pt to have and I will link them.

## 2020-06-03 NOTE — PROGRESS NOTES
Subjective:       Patient ID: Soumya Melchor is a 74 y.o. female, There is no height or weight on file to calculate BMI.    Chief Complaint: Follow-up      Patient is new to me. Established patient of Dr. Calloway.    Established Patient - Audio Only Telehealth Visit     The patient location is: Home ([facility])  The chief complaint leading to consultation is: Follow-up  Visit type: Virtual visit with audio only (telephone)  Total time spent with patient: 15 minutes  The reason for the audio only service rather than synchronous audio and video virtual visit was related to technical difficulties or patient preference/necessity.   Each patient to whom I provide medical services by telemedicine is:  (1) informed of the relationship between the physician and patient and the respective role of any other health care provider with respect to management of the patient; and (2) notified that they may decline to receive medical services by telemedicine and may withdraw from such care at any time. Patient verbally consented to receive this service via voice-only telephone call.        GI Problem   Primary symptoms do not include fever, weight loss, fatigue, abdominal pain, nausea, vomiting, diarrhea (denies diarrhea), melena, hematemesis, jaundice, hematochezia, dysuria or rash. Primary symptoms comment: reports fecal urgency and occ fecal incontinence when not near a restroom; started in 2018; bowel movements are 4-5 times per day of small amount of formed stool; took Questran daily with relief; over the past six months reports Questran ineffective now.   The illness is also significant for bloating (reports abdominal distention and bloating since GIST resection in 2018). The illness does not include chills, anorexia, dysphagia, odynophagia or constipation. Significant associated medical issues include GERD (frequent heartburn and indigestion- taking Nexium 20 mg once daily). Associated medical issues do not include  inflammatory bowel disease, gallstones, liver disease, alcohol abuse, PUD, gastric bypass, bowel resection or irritable bowel syndrome. Associated medical issues comments: hx of GIST- removed in 2018.     Review of Systems   Constitutional: Negative for appetite change, chills, fatigue, fever, unexpected weight change and weight loss.   HENT: Negative for trouble swallowing.    Respiratory: Negative for cough and shortness of breath.    Cardiovascular: Negative for chest pain.   Gastrointestinal: Positive for abdominal distention and bloating (reports abdominal distention and bloating since GIST resection in 2018). Negative for abdominal pain, anorexia, blood in stool, constipation, diarrhea (denies diarrhea), dysphagia, hematemesis, hematochezia, jaundice, melena, nausea and vomiting.        Fecal urgency and incontinence   Genitourinary: Negative for difficulty urinating and dysuria.   Musculoskeletal: Negative for gait problem.   Skin: Negative for rash.   Neurological: Negative for speech difficulty.   Psychiatric/Behavioral: Negative for confusion.       Past Medical History:   Diagnosis Date    Acid reflux 4/11/2014    Allergy     Anxiety and depression 3/6/2014    AP (angina pectoris) 2/13/2017    CAD (coronary artery disease) 2/13/2017    CAD, multiple vessel 2/13/2017    Chronic pain associated with significant psychosocial dysfunction 2/21/2013    CKD (chronic kidney disease) stage 3, GFR 30-59 ml/min     Dr. Vásquez    COPD (chronic obstructive pulmonary disease)     well controlled, Dr Gomez [facility]    History of shingles     HTN (hypertension)     Hypothyroidism     Multiple allergies     S/P CABG (coronary artery bypass graft) 2/13/2017      Past Surgical History:   Procedure Laterality Date    CARDIAC SURGERY  02/2017    CABG x3    COLONOSCOPY  ~2013    Dr. Perez; colon polyps removed    COLONOSCOPY N/A 3/15/2018    Procedure: COLONOSCOPY;  Surgeon: Margarito Calloway MD;   Location: [facility] ENDO;  Service: Endoscopy;  Laterality: N/A;    DILATION AND CURETTAGE OF UTERUS      ENDOSCOPIC ULTRASOUND OF UPPER GASTROINTESTINAL TRACT Left 7/3/2018    Procedure: ULTRASOUND, ENDOSCOPIC, UPPER GI TRACT;  Surgeon: Jordy Greenberg MD;  Location: [facility] ENDO;  Service: Endoscopy;  Laterality: Left;  Linear scope    ESOPHAGOGASTRODUODENOSCOPY N/A 7/3/2018    Procedure: EGD (ESOPHAGOGASTRODUODENOSCOPY);  Surgeon: Jordy Greenberg MD;  Location: [facility] ENDO;  Service: Endoscopy;  Laterality: N/A;    INSERTION OF DORSAL COLUMN NERVE STIMULATOR FOR TRIAL N/A 6/19/2019    Procedure: INSERTION, NEUROSTIMULATOR, SPINAL CORD, DORSAL COLUMN, FOR TRIAL;  Surgeon: Ebenezer Rouse MD;  Location: [facility] OR;  Service: Pain Management;  Laterality: N/A;    INSERTION OF NEUROSTIMULATOR OF DORSAL COLUMN OF SPINAL CORD N/A 7/25/2019    Procedure: INSERTION, NEUROSTIMULATOR, SPINAL CORD, DORSAL COLUMN;  Surgeon: Ebenezer Rouse MD;  Location: [facility] OR;  Service: Pain Management;  Laterality: N/A;  removed old battery    KNEE SURGERY Right     REPLACEMENT OF NERVE STIMULATOR BATTERY  7/25/2019    Procedure: REPLACEMENT, BATTERY, NEUROSTIMULATOR;  Surgeon: Ebenezer Rouse MD;  Location: [facility] OR;  Service: Pain Management;;  Removed Old Battery- LaunchTrack Model 1200   SN [id number]      ROBOT-ASSISTED SURGICAL REMOVAL OF STOMACH USING DA YVROSE XI N/A 7/31/2018    Procedure: XI ROBOTIC GASTRECTOMY-PARTIAL;  Surgeon: Dre Grey MD;  Location: [facility];  Service: General;  Laterality: N/A;    SPINAL CORD STIMULATOR IMPLANT  02/12/2018    for pain secondary to shingles, sees Dr Kam for pain management    TONSILLECTOMY      TUMOR REMOVAL      UPPER GASTROINTESTINAL ENDOSCOPY        Family History   Problem Relation Age of Onset    Heart attacks under age 50 Mother 41    Cancer Father         prostate    Colon cancer Neg Hx     Colon polyps Neg Hx     Crohn's disease Neg Hx      Ulcerative colitis Neg Hx     Celiac disease Neg Hx       Wt Readings from Last 10 Encounters:   05/22/20 63.9 kg (140 lb 14 oz)   03/16/20 64.4 kg (142 lb)   03/09/20 64.4 kg (142 lb)   03/02/20 64.4 kg (142 lb)   12/09/19 63.5 kg (140 lb)   11/20/19 60.8 kg (134 lb 0.6 oz)   08/28/19 60.8 kg (134 lb)   07/29/19 61 kg (134 lb 7.7 oz)   07/25/19 61.7 kg (136 lb)   06/26/19 62 kg (136 lb 11 oz)     Lab Results   Component Value Date    WBC 7.51 07/25/2018    HGB 13.1 07/25/2018    HCT 38.8 07/25/2018    MCV 92 07/25/2018     (H) 07/25/2018     CMP  Sodium   Date Value Ref Range Status   01/15/2019 138 136 - 145 mmol/L Final     Potassium   Date Value Ref Range Status   01/15/2019 4.7 3.5 - 5.1 mmol/L Final     Chloride   Date Value Ref Range Status   01/15/2019 98 95 - 110 mmol/L Final     CO2   Date Value Ref Range Status   01/15/2019 29 23 - 29 mmol/L Final     Glucose   Date Value Ref Range Status   01/15/2019 112 (H) 70 - 110 mg/dL Final     BUN, Bld   Date Value Ref Range Status   01/15/2019 21 8 - 23 mg/dL Final     Creatinine   Date Value Ref Range Status   01/15/2019 1.1 0.5 - 1.4 mg/dL Final     Calcium   Date Value Ref Range Status   01/15/2019 9.2 8.7 - 10.5 mg/dL Final     Total Protein   Date Value Ref Range Status   01/15/2019 7.3 6.0 - 8.4 g/dL Final     Albumin   Date Value Ref Range Status   01/15/2019 3.3 (L) 3.5 - 5.2 g/dL Final     Total Bilirubin   Date Value Ref Range Status   01/15/2019 0.3 0.1 - 1.0 mg/dL Final     Comment:     For infants and newborns, interpretation of results should be based  on gestational age, weight and in agreement with clinical  observations.  Premature Infant recommended reference ranges:  Up to 24 hours.............<8.0 mg/dL  Up to 48 hours............<12.0 mg/dL  3-5 days..................<15.0 mg/dL  6-29 days.................<15.0 mg/dL       Alkaline Phosphatase   Date Value Ref Range Status   01/15/2019 106 55 - 135 U/L Final     AST   Date Value Ref  Range Status   01/15/2019 16 10 - 40 U/L Final     ALT   Date Value Ref Range Status   01/15/2019 7 (L) 10 - 44 U/L Final     Anion Gap   Date Value Ref Range Status   01/15/2019 11 8 - 16 mmol/L Final     eGFR if    Date Value Ref Range Status   01/15/2019 56.8 (A) >60 mL/min/1.73 m^2 Final     eGFR if non    Date Value Ref Range Status   01/15/2019 49.2 (A) >60 mL/min/1.73 m^2 Final     Comment:     Calculation used to obtain the estimated glomerular filtration  rate (eGFR) is the CKD-EPI equation.                 Reviewed prior medical records including radiology report of MRI of abdomen 2/6/19 & endoscopy history (see surgical history).     Objective:      Unable to perform physical examination due to telephone only visit      Assessment:       1. Incontinence of feces with fecal urgency    2. Gastroesophageal reflux disease without esophagitis    3. Dyspepsia    4. History of gastrointestinal stromal tumor (GIST)    5. Abdominal bloating    6. Abdominal distention    7. History of colon polyps           Plan:   All diagnoses and orders for this visit:    Incontinence of feces with fecal urgency  - Increase Questran to TID: cholestyramine (QUESTRAN) 4 gram packet; Take 1 packet (4 g total) by mouth 3 (three) times daily.  Dispense: 90 packet; Refill: 0  - Recommend high fiber diet to help bulk up the stool, especially soluble fiber since this can help bulk up the stool consistency and may help to slow down how fast the stool goes through the colon and can prevent diarrhea  - Possible trial of Bentyl PRN  - Possible referral to general surgery and/or pelvic floor physical therapy, if symptoms persist despite use of above recommendations    Gastroesophageal reflux disease without esophagitis & Dyspepsia   - Increase Nexium to 40 mg once daily   - Schedule EGD, discussed procedure with patient, including risks and benefits, patient verbalized understanding   - Take PPI in the morning  30 minutes before breakfast   - Recommend to avoid large meals, avoid eating within 3 hours of bedtime, elevate head of bed if nocturnal symptoms are present, smoking cessation (if current smoker), & weight loss (if overweight).    - Recommend minimize/avoid high-fat foods, chocolate, caffeine, citrus, alcohol, & tomato products.   - Advised to avoid/limit use of NSAID's, since they can cause GI upset, bleeding, and/or ulcers. If needed, take with food.     History of gastrointestinal stromal tumor (GIST), Abdominal bloating, & Abdominal distention   - Schedule EGD, discussed procedure with patient, including risks and benefits, patient verbalized understanding    History of colon polyps   - Next surveillance colonoscopy due 3/2021    If no improvement in symptoms or symptoms worsen, call/follow-up at clinic or go to ER

## 2020-06-04 ENCOUNTER — TELEPHONE (OUTPATIENT)
Dept: GASTROENTEROLOGY | Facility: CLINIC | Age: 75
End: 2020-06-04

## 2020-06-04 ENCOUNTER — PATIENT MESSAGE (OUTPATIENT)
Dept: GASTROENTEROLOGY | Facility: CLINIC | Age: 75
End: 2020-06-04

## 2020-06-04 RX ORDER — ESOMEPRAZOLE MAGNESIUM 40 MG/1
40 CAPSULE, DELAYED RELEASE ORAL
Qty: 30 CAPSULE | Refills: 2 | Status: SHIPPED | OUTPATIENT
Start: 2020-06-04 | End: 2020-09-02

## 2020-06-04 NOTE — TELEPHONE ENCOUNTER
Schedule patient for EGD with Dr. Calloway for GERD, dyspepsia, hx of GIST, abdominal bloating, and abdominal distention.

## 2020-06-16 ENCOUNTER — TELEPHONE (OUTPATIENT)
Dept: CARDIOLOGY | Facility: CLINIC | Age: 75
End: 2020-06-16

## 2020-06-16 ENCOUNTER — TELEPHONE (OUTPATIENT)
Dept: GASTROENTEROLOGY | Facility: CLINIC | Age: 75
End: 2020-06-16

## 2020-06-16 RX ORDER — AMLODIPINE BESYLATE 5 MG/1
TABLET ORAL
Qty: 30 TABLET | Refills: 11 | Status: SHIPPED | OUTPATIENT
Start: 2020-06-16 | End: 2021-06-21

## 2020-06-16 RX ORDER — LEVOTHYROXINE SODIUM 200 UG/1
TABLET ORAL
Qty: 30 TABLET | Refills: 11 | Status: SHIPPED | OUTPATIENT
Start: 2020-06-16 | End: 2021-01-11 | Stop reason: SDUPTHER

## 2020-06-16 RX ORDER — SIMVASTATIN 40 MG/1
TABLET, FILM COATED ORAL
Qty: 30 TABLET | Refills: 12 | Status: SHIPPED | OUTPATIENT
Start: 2020-06-16 | End: 2021-06-25

## 2020-06-16 RX ORDER — DONEPEZIL HYDROCHLORIDE 10 MG/1
TABLET, FILM COATED ORAL
Qty: 30 TABLET | Refills: 11 | Status: SHIPPED | OUTPATIENT
Start: 2020-06-16 | End: 2021-07-21 | Stop reason: SDUPTHER

## 2020-06-16 NOTE — TELEPHONE ENCOUNTER
----- Message from Keila Graham MA sent at 6/16/2020 11:36 AM CDT -----  Please see note below. Needs dental clearance.   ----- Message -----  From: Gabby Jacobs  Sent: 6/16/2020   9:51 AM CDT  To: Shalini ROBERT Jr Staff    Type: Needs Medical Advice  Who Called:  Patient  Best Call Back Number: [phone]  Additional Information: On 6/24/20, patient needs to have a tooth extraction from Dr. Eric Nicole in [place]. She needs a surgery clearance from office to the dentist to pull the tooth. She did not have the fax but the phone is [phone]. Please call patient when completed.

## 2020-06-16 NOTE — TELEPHONE ENCOUNTER
Called and spoke to dental office to give verbal clearance----- Message from Gabby Jacobs sent at 6/16/2020  9:51 AM CDT -----  Type: Needs Medical Advice  Who Called:  Patient  Best Call Back Number: [phone]  Additional Information: On 6/24/20, patient needs to have a tooth extraction from Dr. Eric Nicole in [place]. She needs a surgery clearance from office to the dentist to pull the tooth. She did not have the fax but the phone is [phone]. Please call patient when completed.

## 2020-06-16 NOTE — TELEPHONE ENCOUNTER
Talked to patient  To let her know to take the rx called in to pharmacy pm and do EGD scheduled. Patient verbally understood

## 2020-06-17 ENCOUNTER — LAB VISIT (OUTPATIENT)
Dept: LAB | Facility: HOSPITAL | Age: 75
End: 2020-06-17
Attending: INTERNAL MEDICINE
Payer: MEDICARE

## 2020-06-17 ENCOUNTER — OFFICE VISIT (OUTPATIENT)
Dept: FAMILY MEDICINE | Facility: CLINIC | Age: 75
End: 2020-06-17
Payer: MEDICARE

## 2020-06-17 VITALS
HEART RATE: 78 BPM | TEMPERATURE: 98 F | WEIGHT: 146 LBS | DIASTOLIC BLOOD PRESSURE: 70 MMHG | BODY MASS INDEX: 28.66 KG/M2 | HEIGHT: 60 IN | SYSTOLIC BLOOD PRESSURE: 135 MMHG

## 2020-06-17 DIAGNOSIS — Z23 NEED FOR PNEUMOCOCCAL VACCINE: ICD-10-CM

## 2020-06-17 DIAGNOSIS — R15.2 INCONTINENCE OF FECES WITH FECAL URGENCY: ICD-10-CM

## 2020-06-17 DIAGNOSIS — R15.9 INCONTINENCE OF FECES, UNSPECIFIED FECAL INCONTINENCE TYPE: ICD-10-CM

## 2020-06-17 DIAGNOSIS — N18.30 CKD (CHRONIC KIDNEY DISEASE) STAGE 3, GFR 30-59 ML/MIN: ICD-10-CM

## 2020-06-17 DIAGNOSIS — E78.2 MIXED HYPERLIPIDEMIA: ICD-10-CM

## 2020-06-17 DIAGNOSIS — E03.9 HYPOTHYROIDISM, UNSPECIFIED TYPE: ICD-10-CM

## 2020-06-17 DIAGNOSIS — K21.9 GASTROESOPHAGEAL REFLUX DISEASE WITHOUT ESOPHAGITIS: ICD-10-CM

## 2020-06-17 DIAGNOSIS — R41.82 ALTERED MENTAL STATUS, UNSPECIFIED ALTERED MENTAL STATUS TYPE: ICD-10-CM

## 2020-06-17 DIAGNOSIS — I25.10 CORONARY ARTERY DISEASE INVOLVING NATIVE CORONARY ARTERY OF NATIVE HEART WITHOUT ANGINA PECTORIS: ICD-10-CM

## 2020-06-17 DIAGNOSIS — J43.8 OTHER EMPHYSEMA: ICD-10-CM

## 2020-06-17 DIAGNOSIS — R15.9 INCONTINENCE OF FECES WITH FECAL URGENCY: ICD-10-CM

## 2020-06-17 DIAGNOSIS — I10 ESSENTIAL HYPERTENSION: ICD-10-CM

## 2020-06-17 DIAGNOSIS — G89.4 CHRONIC PAIN SYNDROME: ICD-10-CM

## 2020-06-17 DIAGNOSIS — R32 URINARY INCONTINENCE, UNSPECIFIED TYPE: Primary | ICD-10-CM

## 2020-06-17 DIAGNOSIS — Z79.899 ENCOUNTER FOR LONG-TERM (CURRENT) USE OF HIGH-RISK MEDICATION: ICD-10-CM

## 2020-06-17 LAB
ALBUMIN SERPL BCP-MCNC: 3.8 G/DL (ref 3.5–5.2)
ALP SERPL-CCNC: 129 U/L (ref 55–135)
ALT SERPL W/O P-5'-P-CCNC: 25 U/L (ref 10–44)
ANION GAP SERPL CALC-SCNC: 12 MMOL/L (ref 8–16)
AST SERPL-CCNC: 35 U/L (ref 10–40)
BASOPHILS # BLD AUTO: 0.08 K/UL (ref 0–0.2)
BASOPHILS NFR BLD: 0.9 % (ref 0–1.9)
BILIRUB SERPL-MCNC: 0.3 MG/DL (ref 0.1–1)
BUN SERPL-MCNC: 21 MG/DL (ref 8–23)
CALCIUM SERPL-MCNC: 9.6 MG/DL (ref 8.7–10.5)
CHLORIDE SERPL-SCNC: 102 MMOL/L (ref 95–110)
CHOLEST SERPL-MCNC: 199 MG/DL (ref 120–199)
CHOLEST/HDLC SERPL: 2.4 {RATIO} (ref 2–5)
CO2 SERPL-SCNC: 25 MMOL/L (ref 23–29)
CREAT SERPL-MCNC: 1.4 MG/DL (ref 0.5–1.4)
DIFFERENTIAL METHOD: ABNORMAL
EOSINOPHIL # BLD AUTO: 0.4 K/UL (ref 0–0.5)
EOSINOPHIL NFR BLD: 4.7 % (ref 0–8)
ERYTHROCYTE [DISTWIDTH] IN BLOOD BY AUTOMATED COUNT: 14.7 % (ref 11.5–14.5)
EST. GFR  (AFRICAN AMERICAN): 42.1 ML/MIN/1.73 M^2
EST. GFR  (NON AFRICAN AMERICAN): 36.5 ML/MIN/1.73 M^2
GLUCOSE SERPL-MCNC: 78 MG/DL (ref 70–110)
HCT VFR BLD AUTO: 37.4 % (ref 37–48.5)
HDLC SERPL-MCNC: 84 MG/DL (ref 40–75)
HDLC SERPL: 42.2 % (ref 20–50)
HGB BLD-MCNC: 11.6 G/DL (ref 12–16)
IMM GRANULOCYTES # BLD AUTO: 0.03 K/UL (ref 0–0.04)
IMM GRANULOCYTES NFR BLD AUTO: 0.3 % (ref 0–0.5)
LDLC SERPL CALC-MCNC: 88.4 MG/DL (ref 63–159)
LYMPHOCYTES # BLD AUTO: 2.5 K/UL (ref 1–4.8)
LYMPHOCYTES NFR BLD: 28.8 % (ref 18–48)
MCH RBC QN AUTO: 28.4 PG (ref 27–31)
MCHC RBC AUTO-ENTMCNC: 31 G/DL (ref 32–36)
MCV RBC AUTO: 91 FL (ref 82–98)
MONOCYTES # BLD AUTO: 0.6 K/UL (ref 0.3–1)
MONOCYTES NFR BLD: 6.8 % (ref 4–15)
NEUTROPHILS # BLD AUTO: 5.1 K/UL (ref 1.8–7.7)
NEUTROPHILS NFR BLD: 58.5 % (ref 38–73)
NONHDLC SERPL-MCNC: 115 MG/DL
NRBC BLD-RTO: 0 /100 WBC
PLATELET # BLD AUTO: 362 K/UL (ref 150–350)
PMV BLD AUTO: 9.9 FL (ref 9.2–12.9)
POTASSIUM SERPL-SCNC: 4.3 MMOL/L (ref 3.5–5.1)
PROT SERPL-MCNC: 7.6 G/DL (ref 6–8.4)
RBC # BLD AUTO: 4.09 M/UL (ref 4–5.4)
SODIUM SERPL-SCNC: 139 MMOL/L (ref 136–145)
TRIGL SERPL-MCNC: 133 MG/DL (ref 30–150)
WBC # BLD AUTO: 8.68 K/UL (ref 3.9–12.7)

## 2020-06-17 PROCEDURE — 83036 HEMOGLOBIN GLYCOSYLATED A1C: CPT

## 2020-06-17 PROCEDURE — 1101F PR PT FALLS ASSESS DOC 0-1 FALLS W/OUT INJ PAST YR: ICD-10-PCS | Mod: CPTII,S$GLB,, | Performed by: INTERNAL MEDICINE

## 2020-06-17 PROCEDURE — 3075F PR MOST RECENT SYSTOLIC BLOOD PRESS GE 130-139MM HG: ICD-10-PCS | Mod: CPTII,S$GLB,, | Performed by: INTERNAL MEDICINE

## 2020-06-17 PROCEDURE — 3078F PR MOST RECENT DIASTOLIC BLOOD PRESSURE < 80 MM HG: ICD-10-PCS | Mod: CPTII,S$GLB,, | Performed by: INTERNAL MEDICINE

## 2020-06-17 PROCEDURE — 84443 ASSAY THYROID STIM HORMONE: CPT

## 2020-06-17 PROCEDURE — G0009 PNEUMOCOCCAL CONJUGATE VACCINE 13-VALENT LESS THAN 5YO & GREATER THAN: ICD-10-PCS | Mod: S$GLB,,, | Performed by: INTERNAL MEDICINE

## 2020-06-17 PROCEDURE — 85025 COMPLETE CBC W/AUTO DIFF WBC: CPT

## 2020-06-17 PROCEDURE — 99214 OFFICE O/P EST MOD 30 MIN: CPT | Mod: 25,S$GLB,, | Performed by: INTERNAL MEDICINE

## 2020-06-17 PROCEDURE — G0009 ADMIN PNEUMOCOCCAL VACCINE: HCPCS | Mod: S$GLB,,, | Performed by: INTERNAL MEDICINE

## 2020-06-17 PROCEDURE — 90670 PNEUMOCOCCAL CONJUGATE VACCINE 13-VALENT LESS THAN 5YO & GREATER THAN: ICD-10-PCS | Mod: S$GLB,,, | Performed by: INTERNAL MEDICINE

## 2020-06-17 PROCEDURE — 99499 RISK ADDL DX/OHS AUDIT: ICD-10-PCS | Mod: S$GLB,,, | Performed by: INTERNAL MEDICINE

## 2020-06-17 PROCEDURE — 1126F PR PAIN SEVERITY QUANTIFIED, NO PAIN PRESENT: ICD-10-PCS | Mod: S$GLB,,, | Performed by: INTERNAL MEDICINE

## 2020-06-17 PROCEDURE — 99214 PR OFFICE/OUTPT VISIT, EST, LEVL IV, 30-39 MIN: ICD-10-PCS | Mod: 25,S$GLB,, | Performed by: INTERNAL MEDICINE

## 2020-06-17 PROCEDURE — 3078F DIAST BP <80 MM HG: CPT | Mod: CPTII,S$GLB,, | Performed by: INTERNAL MEDICINE

## 2020-06-17 PROCEDURE — 1126F AMNT PAIN NOTED NONE PRSNT: CPT | Mod: S$GLB,,, | Performed by: INTERNAL MEDICINE

## 2020-06-17 PROCEDURE — 80053 COMPREHEN METABOLIC PANEL: CPT

## 2020-06-17 PROCEDURE — 1159F PR MEDICATION LIST DOCUMENTED IN MEDICAL RECORD: ICD-10-PCS | Mod: S$GLB,,, | Performed by: INTERNAL MEDICINE

## 2020-06-17 PROCEDURE — 1159F MED LIST DOCD IN RCRD: CPT | Mod: S$GLB,,, | Performed by: INTERNAL MEDICINE

## 2020-06-17 PROCEDURE — 3075F SYST BP GE 130 - 139MM HG: CPT | Mod: CPTII,S$GLB,, | Performed by: INTERNAL MEDICINE

## 2020-06-17 PROCEDURE — 99999 PR PBB SHADOW E&M-EST. PATIENT-LVL V: CPT | Mod: PBBFAC,,, | Performed by: INTERNAL MEDICINE

## 2020-06-17 PROCEDURE — 99499 UNLISTED E&M SERVICE: CPT | Mod: S$GLB,,, | Performed by: INTERNAL MEDICINE

## 2020-06-17 PROCEDURE — 1101F PT FALLS ASSESS-DOCD LE1/YR: CPT | Mod: CPTII,S$GLB,, | Performed by: INTERNAL MEDICINE

## 2020-06-17 PROCEDURE — 80061 LIPID PANEL: CPT

## 2020-06-17 PROCEDURE — 84439 ASSAY OF FREE THYROXINE: CPT

## 2020-06-17 PROCEDURE — 99999 PR PBB SHADOW E&M-EST. PATIENT-LVL V: ICD-10-PCS | Mod: PBBFAC,,, | Performed by: INTERNAL MEDICINE

## 2020-06-17 PROCEDURE — 90670 PCV13 VACCINE IM: CPT | Mod: S$GLB,,, | Performed by: INTERNAL MEDICINE

## 2020-06-17 PROCEDURE — 36415 COLL VENOUS BLD VENIPUNCTURE: CPT | Mod: PO

## 2020-06-17 RX ORDER — OXYBUTYNIN CHLORIDE 10 MG/1
10 TABLET, EXTENDED RELEASE ORAL DAILY
Qty: 30 TABLET | Refills: 11 | Status: SHIPPED | OUTPATIENT
Start: 2020-06-17 | End: 2021-07-14 | Stop reason: ALTCHOICE

## 2020-06-17 RX ORDER — FLUTICASONE PROPIONATE 50 MCG
1 SPRAY, SUSPENSION (ML) NASAL DAILY
COMMUNITY
End: 2020-06-22 | Stop reason: SDUPTHER

## 2020-06-17 RX ORDER — AMOXICILLIN 875 MG/1
TABLET, FILM COATED ORAL
COMMUNITY
Start: 2020-06-08 | End: 2020-07-15 | Stop reason: ALTCHOICE

## 2020-06-17 RX ORDER — CHOLESTYRAMINE 4 G/9G
4 POWDER, FOR SUSPENSION ORAL 3 TIMES DAILY
Qty: 90 PACKET | Refills: 0 | COMMUNITY
Start: 2020-06-17 | End: 2021-01-11

## 2020-06-17 NOTE — ASSESSMENT & PLAN NOTE
-at goal today  -currently on amlodipine 5 mg QD  -denies adverse effects of medications  -continue lifestyle modification with low sodium diet and exercise

## 2020-06-17 NOTE — ASSESSMENT & PLAN NOTE
-followed by pulmonology, Dr. Carmona  -currently on Anoro daily but admits not always compliant  -has follow up with pulmonology soon with pulmonary studies

## 2020-06-17 NOTE — ASSESSMENT & PLAN NOTE
Lab Results   Component Value Date    TSH 2.24 02/15/2018   -currently synthroid 200 mcg daily  -due for repeat TSH

## 2020-06-17 NOTE — PROGRESS NOTES
Assessment/Plan:    Hypertension  -at goal today  -currently on amlodipine 5 mg QD  -denies adverse effects of medications  -continue lifestyle modification with low sodium diet and exercise     Hypothyroidism  Lab Results   Component Value Date    TSH 2.24 02/15/2018   -currently synthroid 200 mcg daily  -due for repeat TSH    Chronic obstructive pulmonary disease  -followed by pulmonology, Dr. Carmona  -currently on Anoro daily but admits not always compliant  -has follow up with pulmonology soon with pulmonary studies    Chronic pain syndrome  -followed by pain management, Dr. Cox  -has spinal nerve stimulator in place  -remains on gabapentin  -previously on opiate therapy    CAD (coronary artery disease)  -s/p CABG in 2017  -followed by cardiology, Dr. Loya  -remains on ASA and statin  -recent nuclear stress test and TTE, both unremarkable    CKD (chronic kidney disease) stage 3, GFR 30-59 ml/min  -evaluated by nephrology in past but did not return for follow up  -plan to recheck renal function.  If worsening, will have her follow back up with nephrology  -avoid nephrotoxic agents  -renally dose medications    Gastroesophageal reflux disease  -followed by GI  -plan for EGD soon for continued symptoms  -remains on daily PPI    Altered mental status  -previously followed by neurology, Dr. cMcain  -remains on Namenda and Aricept  -denies any recent episodes of mental status change    Fecal incontinence  -workup in progress by GI  -recently started on Questran with some improvement of symptoms    _____________________________________________________________________________________________________________________________________________________    Orders this visit:    Urinary incontinence, unspecified type  -     oxybutynin (DITROPAN-XL) 10 MG 24 hr tablet; Take 1 tablet (10 mg total) by mouth once daily.  Dispense: 30 tablet; Refill: 11    Essential hypertension    Hypothyroidism, unspecified type  -     TSH;  Standing    Coronary artery disease involving native coronary artery of native heart without angina pectoris    Other emphysema    Chronic pain syndrome    Need for pneumococcal vaccine  -     Pneumococcal Conjugate Vaccine (13 Valent) (IM)    Incontinence of feces with fecal urgency    Mixed hyperlipidemia    CKD (chronic kidney disease) stage 3, GFR 30-59 ml/min    Gastroesophageal reflux disease without esophagitis    Altered mental status, unspecified altered mental status type    Incontinence of feces, unspecified fecal incontinence type      Follow up in about 3 months (around 9/17/2020).    Mindy Hathaway MD  _____________________________________________________________________________________________________________________________________________________    HPI:    Patient is in clinic today as an established patient of clinic, new to me, here to establish care.  Patient is a 74-year-old  female with a past medical history of chronic pain syndrome, altered mental status, hypertension, CAD, COPD, hyperlipidemia, GERD, fecal incontinence, and hypothyroidism    Previous PCP: Dr. Howard Mathews    Hypertension:  Remains at goal, denies any adverse effects of medications.    CAD:  History of CABG in 2017.  Followed by cardiology.  Was having pain several months ago so proceeded with nuclear stress testing and TTE.  No evidence of ischemia on stress test.  Normal function on TTE.  Remains on aspirin and statin.    Chronic pain syndrome:  Has had nerve stimulator placed in spine.  Recently established with Dr. Cox.  Remains on gabapentin.  No longer on opiates.    Altered mental status/MCI:  Followed by neurology in the past.  No recent episodes of mental status change.  Remains on Namenda and Aricept without any adverse effects.    GERD/fecal incontinence:  Followed by GI for both of these.  On PPI for GERD but still having symptoms.  Plan for EGD soon.  Recently started on Questran for fecal  incontinence with some improvement of symptoms.  Has follow-up with GI soon.    No new complaints today.  Routine health maintenance reviewed.  Due for thyroid studies.  Pneumovax today.    Past Medical History:  Past Medical History:   Diagnosis Date    Acid reflux 4/11/2014    Allergy     Anxiety and depression 3/6/2014    AP (angina pectoris) 2/13/2017    CAD (coronary artery disease) 2/13/2017    CAD, multiple vessel 2/13/2017    Chronic pain associated with significant psychosocial dysfunction 2/21/2013    CKD (chronic kidney disease) stage 3, GFR 30-59 ml/min     Dr. Vásquez    COPD (chronic obstructive pulmonary disease)     well controlled, Dr Gomez [facility]    History of shingles     HTN (hypertension)     Hypothyroidism     Multiple allergies     S/P CABG (coronary artery bypass graft) 2/13/2017     Past Surgical History:   Procedure Laterality Date    CARDIAC SURGERY  02/2017    CABG x3    COLONOSCOPY  ~2013    Dr. Perez; colon polyps removed    COLONOSCOPY N/A 3/15/2018    Procedure: COLONOSCOPY;  Surgeon: Margarito Calloway MD;  Location: [facility];  Service: Endoscopy;  Laterality: N/A;    DILATION AND CURETTAGE OF UTERUS      ENDOSCOPIC ULTRASOUND OF UPPER GASTROINTESTINAL TRACT Left 7/3/2018    Procedure: ULTRASOUND, ENDOSCOPIC, UPPER GI TRACT;  Surgeon: Jordy Greenberg MD;  Location: [facility];  Service: Endoscopy;  Laterality: Left;  Linear scope    ESOPHAGOGASTRODUODENOSCOPY N/A 7/3/2018    Procedure: EGD (ESOPHAGOGASTRODUODENOSCOPY);  Surgeon: Jordy Greenberg MD;  Location: [facility];  Service: Endoscopy;  Laterality: N/A;    INSERTION OF DORSAL COLUMN NERVE STIMULATOR FOR TRIAL N/A 6/19/2019    Procedure: INSERTION, NEUROSTIMULATOR, SPINAL CORD, DORSAL COLUMN, FOR TRIAL;  Surgeon: Ebenezer Rouse MD;  Location: [facility] OR;  Service: Pain Management;  Laterality: N/A;    INSERTION OF NEUROSTIMULATOR OF DORSAL COLUMN OF SPINAL CORD N/A 7/25/2019     Procedure: INSERTION, NEUROSTIMULATOR, SPINAL CORD, DORSAL COLUMN;  Surgeon: Ebenezer Rouse MD;  Location: [facility] OR;  Service: Pain Management;  Laterality: N/A;  removed old battery    KNEE SURGERY Right     REPLACEMENT OF NERVE STIMULATOR BATTERY  2019    Procedure: REPLACEMENT, BATTERY, NEUROSTIMULATOR;  Surgeon: Ebenezer Rouse MD;  Location: [facility] OR;  Service: Pain Management;;  Removed Old Battery- [employer] Model 1200   SN [id number]      ROBOT-ASSISTED SURGICAL REMOVAL OF STOMACH USING DA YVROSE XI N/A 2018    Procedure: XI ROBOTIC GASTRECTOMY-PARTIAL;  Surgeon: Dre Grey MD;  Location: [facility] OR;  Service: General;  Laterality: N/A;    SPINAL CORD STIMULATOR IMPLANT  2018    for pain secondary to shingles, sees Dr Kam for pain management    TONSILLECTOMY      TUMOR REMOVAL      UPPER GASTROINTESTINAL ENDOSCOPY       Review of patient's allergies indicates:   Allergen Reactions    Losartan Swelling     angioedema    Ace inhibitors Other (See Comments) and Swelling     unknown    Angioedema    Arb-angiotensin receptor antagonist Other (See Comments)     unknown  unknown      Iodine and iodide containing products Hives     Since age of 48    Lisinopril      angioedema    Metoprolol tartrate      swelling    Shrimp Other (See Comments) and Swelling     Localized itching and swelling on her hands if she peels shrimp.  Able to eat shrimp without difficulty.  No generalized reaction.     Social History     Tobacco Use    Smoking status: Former Smoker     Packs/day: 1.00     Years: 50.00     Pack years: 50.00     Quit date: 12/10/2012     Years since quittin.5    Smokeless tobacco: Never Used   Substance Use Topics    Alcohol use: No    Drug use: No     Family History   Problem Relation Age of Onset    Heart attacks under age 50 Mother 41    Cancer Father         prostate    Colon cancer Neg Hx     Colon polyps Neg Hx     Crohn's disease Neg Hx      Ulcerative colitis Neg Hx     Celiac disease Neg Hx      Current Outpatient Medications on File Prior to Visit   Medication Sig Dispense Refill    amLODIPine (NORVASC) 5 MG tablet TAKE 1 TABLET BY MOUTH EVERY DAY 30 tablet 11    amoxicillin (AMOXIL) 875 MG tablet TAKE 1 TABLET BY MOUTH TWICE DAILY UNTIL ALL TAKEN      ascorbic acid, vitamin C, (VITAMIN C) 1000 MG tablet Take 1,000 mg by mouth once daily.       aspirin (ECOTRIN) 81 MG EC tablet Take 81 mg by mouth once daily.      cholestyramine (QUESTRAN) 4 gram packet Take 1 packet (4 g total) by mouth 3 (three) times daily. 90 packet 0    cyanocobalamin (VITAMIN B-12) 250 MCG tablet Take 250 mcg by mouth once daily.      donepeziL (ARICEPT) 10 MG tablet TAKE 1 TABLET BY MOUTH EVERY DAY 30 tablet 11    esomeprazole (NEXIUM) 40 MG capsule Take 1 capsule (40 mg total) by mouth before breakfast. 30 capsule 2    eszopiclone (LUNESTA) 3 mg Tab Take 1 tablet (3 mg total) by mouth every evening. 30 tablet 3    famotidine (PEPCID) 40 MG tablet Take 1 tablet (40 mg total) by mouth nightly. 30 tablet 2    fish oil-omega-3 fatty acids 300-1,000 mg capsule Take 1 capsule by mouth 2 (two) times daily.       fluticasone propionate (FLONASE) 50 mcg/actuation nasal spray 1 spray by Each Nostril route once daily.      gabapentin (NEURONTIN) 600 MG tablet Take 1 tablet (600 mg total) by mouth 2 (two) times daily. 30 tablet 11    hydrOXYzine pamoate (VISTARIL) 50 MG Cap Take 1 capsule (50 mg total) by mouth every 6 (six) hours as needed. 30 capsule 4    L.acid/L.casei/B.bif/B.miryam/FOS (PROBIOTIC BLEND ORAL) Take by mouth 2 (two) times daily.       lactase (LACTAID ORAL) Take by mouth as needed.       levothyroxine (SYNTHROID) 200 MCG tablet TAKE 1 TABLET BY MOUTH EVERY DAY 30 tablet 11    magnesium oxide (MAG-OX) 250 mg magnesium Tab 1 tablet with a meal      melatonin 5 mg/15 mL Liqd Pt states she takes 2 tablets      montelukast (SINGULAIR) 10 mg tablet Take 10  mg by mouth every evening.  11    niacin, inositol niacinate, (NIACIN FLUSH FREE) 400 mg niacin (500 mg) Cap 1 capsule      SHINGRIX, PF, 50 mcg/0.5 mL injection ADM 0.5ML IM UTD  0    simvastatin (ZOCOR) 40 MG tablet TAKE 1 TABLET BY MOUTH EVERY EVENING 30 tablet 12    umeclidinium-vilanteroL (ANORO ELLIPTA) 62.5-25 mcg/actuation DsDv Inhale 1 puff into the lungs once daily. Controller 60 each 11    vitamins  A,C,E-zinc-copper (PRESERVISION AREDS) 14,320-226-200 unit-mg-unit Cap Take 2 capsules by mouth once daily.      calcium-vitamin D3 500 mg(1,250mg) -200 unit per tablet Take 1 tablet by mouth 2 (two) times daily with meals. 60 tablet 11     No current facility-administered medications on file prior to visit.        Review of Systems   Constitutional: Negative for chills, diaphoresis, fatigue and fever.   HENT: Negative for congestion, ear pain, postnasal drip, sinus pain and sore throat.    Eyes: Negative for pain and redness.   Respiratory: Negative for cough, chest tightness and shortness of breath.    Cardiovascular: Negative for chest pain and leg swelling.   Gastrointestinal: Negative for abdominal pain, constipation, diarrhea, nausea and vomiting.   Genitourinary: Negative for dysuria and hematuria.        Urinary incontinence   Musculoskeletal: Negative for arthralgias and joint swelling.   Skin: Negative for rash.   Neurological: Negative for dizziness, syncope and headaches.       Vitals:    06/17/20 1416   BP: 135/70   Pulse: 78   Temp: 98.2 °F (36.8 °C)   Weight: 66.2 kg (146 lb)   Height: 5' (1.524 m)       Wt Readings from Last 3 Encounters:   06/17/20 66.2 kg (146 lb)   05/22/20 63.9 kg (140 lb 14 oz)   03/16/20 64.4 kg (142 lb)       Physical Exam  Constitutional:       General: She is not in acute distress.     Appearance: She is well-developed.   HENT:      Head: Normocephalic and atraumatic.   Eyes:      Conjunctiva/sclera: Conjunctivae normal.   Neck:      Musculoskeletal: Normal range  of motion and neck supple.   Cardiovascular:      Rate and Rhythm: Normal rate and regular rhythm.      Heart sounds: Normal heart sounds. No murmur.   Pulmonary:      Effort: Pulmonary effort is normal. No respiratory distress.      Breath sounds: Normal breath sounds.   Abdominal:      General: Bowel sounds are normal. There is no distension.      Palpations: Abdomen is soft.      Tenderness: There is no abdominal tenderness.   Musculoskeletal: Normal range of motion.   Skin:     General: Skin is warm and dry.      Findings: No rash.   Neurological:      Mental Status: She is alert and oriented to person, place, and time.

## 2020-06-18 LAB
ESTIMATED AVG GLUCOSE: 117 MG/DL (ref 68–131)
HBA1C MFR BLD HPLC: 5.7 % (ref 4–5.6)
T4 FREE SERPL-MCNC: 0.78 NG/DL (ref 0.71–1.51)
TSH SERPL DL<=0.005 MIU/L-ACNC: 16.61 UIU/ML (ref 0.4–4)

## 2020-06-19 ENCOUNTER — PATIENT OUTREACH (OUTPATIENT)
Dept: ADMINISTRATIVE | Facility: OTHER | Age: 75
End: 2020-06-19

## 2020-06-21 ENCOUNTER — TELEPHONE (OUTPATIENT)
Dept: FAMILY MEDICINE | Facility: CLINIC | Age: 75
End: 2020-06-21

## 2020-06-21 DIAGNOSIS — R73.03 PREDIABETES: ICD-10-CM

## 2020-06-21 DIAGNOSIS — E03.9 HYPOTHYROIDISM, UNSPECIFIED TYPE: Primary | ICD-10-CM

## 2020-06-21 DIAGNOSIS — D53.9 NUTRITIONAL ANEMIA, UNSPECIFIED: ICD-10-CM

## 2020-06-21 DIAGNOSIS — D64.9 ANEMIA, UNSPECIFIED TYPE: ICD-10-CM

## 2020-06-21 PROBLEM — R15.9 FECAL INCONTINENCE: Status: ACTIVE | Noted: 2020-06-21

## 2020-06-21 RX ORDER — LEVOTHYROXINE SODIUM 25 UG/1
25 TABLET ORAL
Qty: 30 TABLET | Refills: 11 | Status: SHIPPED | OUTPATIENT
Start: 2020-06-21 | End: 2021-06-25

## 2020-06-21 NOTE — ASSESSMENT & PLAN NOTE
-previously followed by neurology, Dr. Mccain  -remains on Namenda and Aricept  -denies any recent episodes of mental status change

## 2020-06-21 NOTE — TELEPHONE ENCOUNTER
Please call patient with below results.    I have reviewed your recent blood work.     Your complete blood count is shows a mild anemia. Plan to check iron studies with next set of labs.    Your metabolic panel which shows your electrolytes, glucose, kidney and liver function is within normal limits with exception for worsening of your kidney disease. Due to this, I recommend that you follow back up with your nephrologist, Dr. Vásquez.    Your cholesterol is stable.    Thyroid studies are abnormal.  I recommend that we increase thyroid medication.  I am adding a 25 mcg pill to the 200 mcg pill that you are already taking.  You will need to take both of these at the same time to equal 225 mcg.  We will need to recheck thyroid studies in 8 weeks.  Make sure that you are taking thyroid medication in the morning, 30 min before you eat or drink anything.    Your hemoglobin A1c is 5.7 which places you in prediabetes range.  I recommend eating a low carb diet and continuing with exercise.  We will repeat your A1c in 6 months.    Please do not hesitate to call or message with any additional questions or concerns.    Please schedule TSH, T4, iron, ferritin, B12, folate, CBC in 8 weeks  Schedule A1c in 6 months.

## 2020-06-21 NOTE — ASSESSMENT & PLAN NOTE
-s/p CABG in 2017  -followed by cardiology, Dr. Loya  -remains on ASA and statin  -recent nuclear stress test and TTE, both unremarkable

## 2020-06-21 NOTE — ASSESSMENT & PLAN NOTE
-followed by pain management, Dr. Cox  -has spinal nerve stimulator in place  -remains on gabapentin  -previously on opiate therapy

## 2020-06-21 NOTE — ASSESSMENT & PLAN NOTE
-evaluated by nephrology in past but did not return for follow up  -plan to recheck renal function.  If worsening, will have her follow back up with nephrology  -avoid nephrotoxic agents  -renally dose medications

## 2020-06-22 ENCOUNTER — OFFICE VISIT (OUTPATIENT)
Dept: PULMONOLOGY | Facility: CLINIC | Age: 75
End: 2020-06-22
Payer: MEDICARE

## 2020-06-22 ENCOUNTER — HOSPITAL ENCOUNTER (OUTPATIENT)
Dept: RADIOLOGY | Facility: HOSPITAL | Age: 75
Discharge: HOME | End: 2020-06-22
Attending: INTERNAL MEDICINE
Payer: MEDICARE

## 2020-06-22 VITALS
SYSTOLIC BLOOD PRESSURE: 120 MMHG | DIASTOLIC BLOOD PRESSURE: 72 MMHG | HEART RATE: 75 BPM | OXYGEN SATURATION: 96 % | RESPIRATION RATE: 16 BRPM | WEIGHT: 138.88 LBS | BODY MASS INDEX: 27.26 KG/M2 | HEIGHT: 60 IN

## 2020-06-22 DIAGNOSIS — J44.9 CHRONIC OBSTRUCTIVE PULMONARY DISEASE, UNSPECIFIED COPD TYPE: ICD-10-CM

## 2020-06-22 DIAGNOSIS — R91.8 PULMONARY NODULES: ICD-10-CM

## 2020-06-22 DIAGNOSIS — R93.89 ABNORMAL CXR: Primary | ICD-10-CM

## 2020-06-22 DIAGNOSIS — J44.9 COPD, MODERATE: ICD-10-CM

## 2020-06-22 DIAGNOSIS — Z87.891 FORMER SMOKER: ICD-10-CM

## 2020-06-22 DIAGNOSIS — R06.02 SHORTNESS OF BREATH: ICD-10-CM

## 2020-06-22 DIAGNOSIS — R91.1 PULMONARY NODULE, RIGHT: ICD-10-CM

## 2020-06-22 DIAGNOSIS — J30.89 NON-SEASONAL ALLERGIC RHINITIS DUE TO OTHER ALLERGIC TRIGGER: ICD-10-CM

## 2020-06-22 PROCEDURE — 99215 PR OFFICE/OUTPT VISIT, EST, LEVL V, 40-54 MIN: ICD-10-PCS | Mod: S$GLB,,, | Performed by: INTERNAL MEDICINE

## 2020-06-22 PROCEDURE — 99999 PR PBB SHADOW E&M-EST. PATIENT-LVL V: ICD-10-PCS | Mod: PBBFAC,,, | Performed by: INTERNAL MEDICINE

## 2020-06-22 PROCEDURE — 1101F PR PT FALLS ASSESS DOC 0-1 FALLS W/OUT INJ PAST YR: ICD-10-PCS | Mod: CPTII,S$GLB,, | Performed by: INTERNAL MEDICINE

## 2020-06-22 PROCEDURE — 3074F PR MOST RECENT SYSTOLIC BLOOD PRESSURE < 130 MM HG: ICD-10-PCS | Mod: CPTII,S$GLB,, | Performed by: INTERNAL MEDICINE

## 2020-06-22 PROCEDURE — 3078F PR MOST RECENT DIASTOLIC BLOOD PRESSURE < 80 MM HG: ICD-10-PCS | Mod: CPTII,S$GLB,, | Performed by: INTERNAL MEDICINE

## 2020-06-22 PROCEDURE — 1159F PR MEDICATION LIST DOCUMENTED IN MEDICAL RECORD: ICD-10-PCS | Mod: S$GLB,,, | Performed by: INTERNAL MEDICINE

## 2020-06-22 PROCEDURE — 3078F DIAST BP <80 MM HG: CPT | Mod: CPTII,S$GLB,, | Performed by: INTERNAL MEDICINE

## 2020-06-22 PROCEDURE — 71046 X-RAY EXAM CHEST 2 VIEWS: CPT | Mod: TC

## 2020-06-22 PROCEDURE — 71046 XR CHEST PA AND LATERAL: ICD-10-PCS | Mod: 26,,, | Performed by: RADIOLOGY

## 2020-06-22 PROCEDURE — 71046 X-RAY EXAM CHEST 2 VIEWS: CPT | Mod: 26,,, | Performed by: RADIOLOGY

## 2020-06-22 PROCEDURE — 99999 PR PBB SHADOW E&M-EST. PATIENT-LVL V: CPT | Mod: PBBFAC,,, | Performed by: INTERNAL MEDICINE

## 2020-06-22 PROCEDURE — 1101F PT FALLS ASSESS-DOCD LE1/YR: CPT | Mod: CPTII,S$GLB,, | Performed by: INTERNAL MEDICINE

## 2020-06-22 PROCEDURE — 3074F SYST BP LT 130 MM HG: CPT | Mod: CPTII,S$GLB,, | Performed by: INTERNAL MEDICINE

## 2020-06-22 PROCEDURE — 1159F MED LIST DOCD IN RCRD: CPT | Mod: S$GLB,,, | Performed by: INTERNAL MEDICINE

## 2020-06-22 PROCEDURE — 99215 OFFICE O/P EST HI 40 MIN: CPT | Mod: S$GLB,,, | Performed by: INTERNAL MEDICINE

## 2020-06-22 RX ORDER — IPRATROPIUM BROMIDE AND ALBUTEROL SULFATE 2.5; .5 MG/3ML; MG/3ML
3 SOLUTION RESPIRATORY (INHALATION) EVERY 6 HOURS PRN
Qty: 120 VIAL | Refills: 5 | Status: SHIPPED | OUTPATIENT
Start: 2020-06-22 | End: 2021-01-11

## 2020-06-22 RX ORDER — FLUTICASONE PROPIONATE 50 MCG
2 SPRAY, SUSPENSION (ML) NASAL DAILY
Qty: 16 G | Refills: 11 | Status: SHIPPED | OUTPATIENT
Start: 2020-06-22 | End: 2022-09-27

## 2020-06-22 RX ORDER — MONTELUKAST SODIUM 10 MG/1
10 TABLET ORAL NIGHTLY
Qty: 30 TABLET | Refills: 11 | Status: SHIPPED | OUTPATIENT
Start: 2020-06-22 | End: 2021-05-05 | Stop reason: SDUPTHER

## 2020-06-22 RX ORDER — UMECLIDINIUM BROMIDE AND VILANTEROL TRIFENATATE 62.5; 25 UG/1; UG/1
1 POWDER RESPIRATORY (INHALATION) DAILY
Qty: 60 EACH | Refills: 11 | Status: SHIPPED | OUTPATIENT
Start: 2020-06-22 | End: 2020-10-22 | Stop reason: ALTCHOICE

## 2020-06-22 NOTE — PROGRESS NOTES
Subjective:     Patient ID: Soumya Melchor is a 74 y.o. female.    Chief Complaint:      HPI     Dyspnea  Patient complains of shortness of breath. Symptoms occur while getting dressed, with one block walking - slowly. Symptoms began 6 months ago, unchanged since. Associated symptoms include  chest pain, located anterior chest. She denies productive cough. She does not have had recent travel. Weight has increased 20 pounds over last year. Symptoms are exacerbated by any exercise. Symptoms are alleviated by rest.     Cardiac w/u  Angiogram    Lung Nodule  She presents for evaluation and treatment of a lung nodule. The patient reports that the imaging was performed to evaluate symptoms of chest pain, dyspnea on exertion and non productive cough which have been present for 6 months and are unchanged. Symptoms are exacerbated by walking and relieved by rest. The patient denies other associated symptoms. She has a history of 50 pack years. The patient has no known exposure to tuberculosis. The patient does not have a history of cancer.      Past Medical History:   Diagnosis Date    Acid reflux 4/11/2014    Allergy     Anxiety and depression 3/6/2014    AP (angina pectoris) 2/13/2017    CAD (coronary artery disease) 2/13/2017    CAD, multiple vessel 2/13/2017    Chronic pain associated with significant psychosocial dysfunction 2/21/2013    CKD (chronic kidney disease) stage 3, GFR 30-59 ml/min     Dr. Vásquez    COPD (chronic obstructive pulmonary disease)     well controlled, Dr Gomez [facility]    History of shingles     HTN (hypertension)     Hypothyroidism     Multiple allergies     S/P CABG (coronary artery bypass graft) 2/13/2017     Past Surgical History:   Procedure Laterality Date    CARDIAC SURGERY  02/2017    CABG x3    COLONOSCOPY  ~2013    Dr. Perez; colon polyps removed    COLONOSCOPY N/A 3/15/2018    Procedure: COLONOSCOPY;  Surgeon: Margarito Calloway MD;  Location: [facility];   Service: Endoscopy;  Laterality: N/A;    DILATION AND CURETTAGE OF UTERUS      ENDOSCOPIC ULTRASOUND OF UPPER GASTROINTESTINAL TRACT Left 7/3/2018    Procedure: ULTRASOUND, ENDOSCOPIC, UPPER GI TRACT;  Surgeon: Jordy Greenberg MD;  Location: [facility];  Service: Endoscopy;  Laterality: Left;  Linear scope    ESOPHAGOGASTRODUODENOSCOPY N/A 7/3/2018    Procedure: EGD (ESOPHAGOGASTRODUODENOSCOPY);  Surgeon: Jordy Greenberg MD;  Location: [facility] ENDO;  Service: Endoscopy;  Laterality: N/A;    INSERTION OF DORSAL COLUMN NERVE STIMULATOR FOR TRIAL N/A 6/19/2019    Procedure: INSERTION, NEUROSTIMULATOR, SPINAL CORD, DORSAL COLUMN, FOR TRIAL;  Surgeon: Ebenezer Rouse MD;  Location: [facility];  Service: Pain Management;  Laterality: N/A;    INSERTION OF NEUROSTIMULATOR OF DORSAL COLUMN OF SPINAL CORD N/A 7/25/2019    Procedure: INSERTION, NEUROSTIMULATOR, SPINAL CORD, DORSAL COLUMN;  Surgeon: Ebenezer Rouse MD;  Location: [facility];  Service: Pain Management;  Laterality: N/A;  removed old battery    KNEE SURGERY Right     REPLACEMENT OF NERVE STIMULATOR BATTERY  7/25/2019    Procedure: REPLACEMENT, BATTERY, NEUROSTIMULATOR;  Surgeon: Ebenezer Rouse MD;  Location: [facility];  Service: Pain Management;;  Removed Old Battery- [employer] Model 1200   SN [id number]      ROBOT-ASSISTED SURGICAL REMOVAL OF STOMACH USING DA YVROSE XI N/A 7/31/2018    Procedure: XI ROBOTIC GASTRECTOMY-PARTIAL;  Surgeon: Dre Grey MD;  Location: [facility];  Service: General;  Laterality: N/A;    SPINAL CORD STIMULATOR IMPLANT  02/12/2018    for pain secondary to shingles, sees Dr Kam for pain management    TONSILLECTOMY      TUMOR REMOVAL      UPPER GASTROINTESTINAL ENDOSCOPY       Review of patient's allergies indicates:   Allergen Reactions    Losartan Swelling     angioedema    Ace inhibitors Other (See Comments) and Swelling     unknown    Angioedema    Arb-angiotensin receptor antagonist Other (See  Comments)     unknown  unknown      Iodine and iodide containing products Hives     Since age of 48    Lisinopril      angioedema    Metoprolol tartrate      swelling    Shrimp Other (See Comments) and Swelling     Localized itching and swelling on her hands if she peels shrimp.  Able to eat shrimp without difficulty.  No generalized reaction.     Current Outpatient Medications on File Prior to Visit   Medication Sig Dispense Refill    amLODIPine (NORVASC) 5 MG tablet TAKE 1 TABLET BY MOUTH EVERY DAY 30 tablet 11    ascorbic acid, vitamin C, (VITAMIN C) 1000 MG tablet Take 1,000 mg by mouth once daily.       aspirin (ECOTRIN) 81 MG EC tablet Take 81 mg by mouth once daily.      cholestyramine (QUESTRAN) 4 gram packet Take 1 packet (4 g total) by mouth 3 (three) times daily. 90 packet 0    cyanocobalamin (VITAMIN B-12) 250 MCG tablet Take 250 mcg by mouth once daily.      donepeziL (ARICEPT) 10 MG tablet TAKE 1 TABLET BY MOUTH EVERY DAY 30 tablet 11    esomeprazole (NEXIUM) 40 MG capsule Take 1 capsule (40 mg total) by mouth before breakfast. 30 capsule 2    eszopiclone (LUNESTA) 3 mg Tab Take 1 tablet (3 mg total) by mouth every evening. 30 tablet 3    famotidine (PEPCID) 40 MG tablet Take 1 tablet (40 mg total) by mouth nightly. 30 tablet 2    fish oil-omega-3 fatty acids 300-1,000 mg capsule Take 1 capsule by mouth 2 (two) times daily.       gabapentin (NEURONTIN) 600 MG tablet Take 1 tablet (600 mg total) by mouth 2 (two) times daily. 30 tablet 11    hydrOXYzine pamoate (VISTARIL) 50 MG Cap Take 1 capsule (50 mg total) by mouth every 6 (six) hours as needed. 30 capsule 4    L.acid/L.casei/B.bif/B.miryam/FOS (PROBIOTIC BLEND ORAL) Take by mouth 2 (two) times daily.       lactase (LACTAID ORAL) Take by mouth as needed.       levothyroxine (SYNTHROID) 200 MCG tablet TAKE 1 TABLET BY MOUTH EVERY DAY 30 tablet 11    levothyroxine (SYNTHROID) 25 MCG tablet Take 1 tablet (25 mcg total) by mouth before  breakfast. 30 tablet 11    magnesium oxide (MAG-OX) 250 mg magnesium Tab 1 tablet with a meal      melatonin 5 mg/15 mL Liqd Pt states she takes 2 tablets      niacin, inositol niacinate, (NIACIN FLUSH FREE) 400 mg niacin (500 mg) Cap 1 capsule      oxybutynin (DITROPAN-XL) 10 MG 24 hr tablet Take 1 tablet (10 mg total) by mouth once daily. 30 tablet 11    SHINGRIX, PF, 50 mcg/0.5 mL injection ADM 0.5ML IM UTD  0    simvastatin (ZOCOR) 40 MG tablet TAKE 1 TABLET BY MOUTH EVERY EVENING 30 tablet 12    vitamins  A,C,E-zinc-copper (PRESERVISION AREDS) 14,320-226-200 unit-mg-unit Cap Take 2 capsules by mouth once daily.      amoxicillin (AMOXIL) 875 MG tablet TAKE 1 TABLET BY MOUTH TWICE DAILY UNTIL ALL TAKEN      calcium-vitamin D3 500 mg(1,250mg) -200 unit per tablet Take 1 tablet by mouth 2 (two) times daily with meals. 60 tablet 11     No current facility-administered medications on file prior to visit.      Social History     Socioeconomic History    Marital status:      Spouse name: Not on file    Number of children: Not on file    Years of education: Not on file    Highest education level: Not on file   Occupational History    Not on file   Social Needs    Financial resource strain: Not on file    Food insecurity     Worry: Not on file     Inability: Not on file    Transportation needs     Medical: Not on file     Non-medical: Not on file   Tobacco Use    Smoking status: Former Smoker     Packs/day: 1.00     Years: 50.00     Pack years: 50.00     Quit date: 12/10/2012     Years since quittin.5    Smokeless tobacco: Never Used   Substance and Sexual Activity    Alcohol use: No    Drug use: No    Sexual activity: Not on file   Lifestyle    Physical activity     Days per week: Not on file     Minutes per session: Not on file    Stress: Not on file   Relationships    Social connections     Talks on phone: Not on file     Gets together: Not on file     Attends [employer] service: Not  on file     Active member of club or organization: Not on file     Attends meetings of clubs or organizations: Not on file     Relationship status: Not on file   Other Topics Concern    Are you pregnant or think you may be? Not Asked    Breast-feeding Not Asked   Social History Narrative    Not on file     Family History   Problem Relation Age of Onset    Heart attacks under age 50 Mother 41    Cancer Father         prostate    Colon cancer Neg Hx     Colon polyps Neg Hx     Crohn's disease Neg Hx     Ulcerative colitis Neg Hx     Celiac disease Neg Hx        Review of Systems   Constitutional: Positive for fatigue. Negative for fever.   HENT: Positive for postnasal drip, rhinorrhea and congestion.    Eyes: Negative for redness and itching.   Respiratory: Positive for cough, sputum production, shortness of breath, dyspnea on extertion, use of rescue inhaler and Paroxysmal Nocturnal Dyspnea.    Cardiovascular: Negative for chest pain, palpitations and leg swelling.   Genitourinary: Negative for difficulty urinating and hematuria.   Endocrine: Negative for cold intolerance and heat intolerance.    Musculoskeletal: Positive for arthralgias and back pain.   Skin: Negative for rash.   Gastrointestinal: Negative for nausea and abdominal pain.   Neurological: Negative for dizziness, syncope, weakness and light-headedness.   Hematological: Negative for adenopathy. Does not bruise/bleed easily.   Psychiatric/Behavioral: Negative for sleep disturbance. The patient is not nervous/anxious.        Objective:      /72   Pulse 75   Resp 16   Ht 5' (1.524 m)   Wt 63 kg (138 lb 14.2 oz)   SpO2 96%   BMI 27.13 kg/m²   Physical Exam  Vitals signs and nursing note reviewed.   Constitutional:       Appearance: She is well-developed.   HENT:      Head: Normocephalic and atraumatic.      Mouth/Throat:      Pharynx: Oropharyngeal exudate present.   Eyes:      Conjunctiva/sclera: Conjunctivae normal.      Pupils:  Pupils are equal, round, and reactive to light.   Neck:      Musculoskeletal: Neck supple.      Thyroid: No thyromegaly.      Vascular: No JVD.      Trachea: No tracheal deviation.   Cardiovascular:      Rate and Rhythm: Normal rate and regular rhythm.      Heart sounds: Normal heart sounds.   Pulmonary:      Effort: Pulmonary effort is normal. No respiratory distress.      Breath sounds: Examination of the right-lower field reveals wheezing. Examination of the left-lower field reveals wheezing. Decreased breath sounds and wheezing present. No rhonchi or rales.   Chest:      Chest wall: No tenderness.   Abdominal:      General: Bowel sounds are normal.      Palpations: Abdomen is soft.   Musculoskeletal: Normal range of motion.   Lymphadenopathy:      Cervical: No cervical adenopathy.   Skin:     General: Skin is warm and dry.   Neurological:      Mental Status: She is alert and oriented to person, place, and time.       Personal Diagnostic Review  CT of chest performed on 6/24/2020 without contrast revealed stable nodule.  CT Chest Without Contrast  Narrative: EXAMINATION:  CT CHEST WITHOUT CONTRAST    CLINICAL HISTORY:  Shortness of breath;abnormal CXR right lung; Shortness of breath    TECHNIQUE:  The chest was surveyed from the apices through the posterior costophrenic angles .  Data was reconstructed for multiplanar images in axial, sagittal and coronal planes in for maximal intensity projection images in the axial plane.    COMPARISON:  04/10/2019    FINDINGS:  Base of Neck: No significant abnormality.    Airways: Patent.    Lungs: Severe centrilobular emphysematous changes are seen throughout the lungs.  Nodular interstitial thickening is seen within the right middle lobe and right upper lobe which could reflect focal infiltrates.  Recommend follow-up.  Mild dependent changes.    10 mm nodule within the right upper lobe similar to prior exam.  4 mm nodule also seen within the right upper lobe just above the  fissure.  Stable subpleural nodule right lower lobe measuring 7 mm.  Benign granuloma also seen within the right upper lobe.  Scattered 4 mm nodule left upper and lower lobes.    Pleura: No pleural fluid.No pleural calcification.    Anne Marie/Mediastinum: No pathologic rk enlargement.    Esophagus: Normal.    Heart/pericardium: Normal size.  No pericardial effusion or calcification.    Pulmonary vasculature: Pulmonary arteries distribute normally.  There are four pulmonary veins.    Aorta: Left-sided aortic arch with 3 arterial branches.  The aorta maintains normal caliber, contour and course. There is severe calcification of the thoracic aorta.  There is  moderate coronary artery calcification.    Thoracic soft tissues: Normal.  Heterogeneous breast tissue.    Bones: No acute fracture. No suspicious lytic or sclerotic lesion.  Mild spondylosis.  Sternotomy wires are noted.  Spine stimulator is seen.    Upper Abdomen: Small hiatal hernia.  Thickening of the distal esophagus and GE junction could reflect esophagitis or reflux disease.  Stomach is largely collapsed which limits evaluation.  Mild gastritis not excluded.  Moderate constipation.  Impression: Nodular interstitial infiltrate within the right middle lobe and right upper lobe which may reflect infectious or inflammatory process.  Neoplastic process is thought to be less likely however follow-up recommended.    Bilateral pulmonary nodules similar to prior exam dating back to 09/26/2018.    Severe coronary artery disease.    All CT scans at this facility use dose modulation, iterative reconstruction and/or weight based dosing when appropriate to reduce radiation dose to as low as reasonably achievable.    Electronically signed by: Raymond Dugan MD  Date:    06/24/2020  Time:    17:33      Office Spirometry Results:     No flowsheet data found.  Pulmonary Studies Review 6/22/2020   SpO2 96   Ordering Provider -   Interpreting Provider -   Performing nurse/tech/RT  -   Diagnosis -   Height 60   Weight 2222.24   BMI (Calculated) 27.1   Predicted Distance 265.82   Patient Race -   6MWT Status -   Patient Reported -   Was O2 used? -   Delivery Method -   Did patient stop? -   Type of assistive device(s) used? -   Is extra documentation required for this patient? -   Oxygen Saturation -   Supplemental Oxygen -   Heart Rate -   Blood Pressure -   [place] Dyspnea Rating  -   Oxygen Saturation -   Supplemental Oxygen -   Heart Rate -   Blood Pressure -   [place] Dyspnea Rating  -   Recovery Time (seconds) -   Oxygen Saturation -   Supplemental Oxygen -   Heart Rate -   Blood Pressure -   [place] Dyspnea Rating  -   Is procedure ready for interpretation? -   Did the patient stop or pause? -   Total Time Walked (Calculated) -   Total Laps Walked -   Final Partial Lap Distance (feet) -   Total Distance Feet (Calculated) -   Total Distance Meters (Calculated) -   Predicted Distance Meters (Calculated) 405.01   Percentage of Predicted (Calculated) -   Peak VO2 (Calculated) -   Mets -   Has The Patient Had a Previous Six Minute Walk Test? -   Oxygen Qualification? -         Assessment:            Abnormal CXR  -     CT Chest Without Contrast; Future; Expected date: 06/22/2020    Shortness of breath  -     CT Chest Without Contrast; Future; Expected date: 06/22/2020  -     albuterol-ipratropium (DUO-NEB) 2.5 mg-0.5 mg/3 mL nebulizer solution; Take 3 mLs by nebulization every 6 (six) hours as needed for Shortness of Breath (before walking). Rescue  Dispense: 120 vial; Refill: 5    COPD, moderate  -     albuterol-ipratropium (DUO-NEB) 2.5 mg-0.5 mg/3 mL nebulizer solution; Take 3 mLs by nebulization every 6 (six) hours as needed for Shortness of Breath (before walking). Rescue  Dispense: 120 vial; Refill: 5    Chronic obstructive pulmonary disease, unspecified COPD type  -     umeclidinium-vilanteroL (ANORO ELLIPTA) 62.5-25 mcg/actuation DsDv; Inhale 1 puff into the lungs once daily. Controller   Dispense: 60 each; Refill: 11    Non-seasonal allergic rhinitis due to other allergic trigger  -     montelukast (SINGULAIR) 10 mg tablet; Take 1 tablet (10 mg total) by mouth every evening.  Dispense: 30 tablet; Refill: 11  -     fluticasone propionate (FLONASE) 50 mcg/actuation nasal spray; 2 sprays (100 mcg total) by Each Nostril route once daily.  Dispense: 16 g; Refill: 11          Outpatient Encounter Medications as of 6/22/2020   Medication Sig Dispense Refill    amLODIPine (NORVASC) 5 MG tablet TAKE 1 TABLET BY MOUTH EVERY DAY 30 tablet 11    ascorbic acid, vitamin C, (VITAMIN C) 1000 MG tablet Take 1,000 mg by mouth once daily.       aspirin (ECOTRIN) 81 MG EC tablet Take 81 mg by mouth once daily.      cholestyramine (QUESTRAN) 4 gram packet Take 1 packet (4 g total) by mouth 3 (three) times daily. 90 packet 0    cyanocobalamin (VITAMIN B-12) 250 MCG tablet Take 250 mcg by mouth once daily.      donepeziL (ARICEPT) 10 MG tablet TAKE 1 TABLET BY MOUTH EVERY DAY 30 tablet 11    esomeprazole (NEXIUM) 40 MG capsule Take 1 capsule (40 mg total) by mouth before breakfast. 30 capsule 2    eszopiclone (LUNESTA) 3 mg Tab Take 1 tablet (3 mg total) by mouth every evening. 30 tablet 3    famotidine (PEPCID) 40 MG tablet Take 1 tablet (40 mg total) by mouth nightly. 30 tablet 2    fish oil-omega-3 fatty acids 300-1,000 mg capsule Take 1 capsule by mouth 2 (two) times daily.       fluticasone propionate (FLONASE) 50 mcg/actuation nasal spray 2 sprays (100 mcg total) by Each Nostril route once daily. 16 g 11    gabapentin (NEURONTIN) 600 MG tablet Take 1 tablet (600 mg total) by mouth 2 (two) times daily. 30 tablet 11    hydrOXYzine pamoate (VISTARIL) 50 MG Cap Take 1 capsule (50 mg total) by mouth every 6 (six) hours as needed. 30 capsule 4    L.acid/L.casei/B.bif/B.miryam/FOS (PROBIOTIC BLEND ORAL) Take by mouth 2 (two) times daily.       lactase (LACTAID ORAL) Take by mouth as needed.       levothyroxine  (SYNTHROID) 200 MCG tablet TAKE 1 TABLET BY MOUTH EVERY DAY 30 tablet 11    levothyroxine (SYNTHROID) 25 MCG tablet Take 1 tablet (25 mcg total) by mouth before breakfast. 30 tablet 11    magnesium oxide (MAG-OX) 250 mg magnesium Tab 1 tablet with a meal      melatonin 5 mg/15 mL Liqd Pt states she takes 2 tablets      montelukast (SINGULAIR) 10 mg tablet Take 1 tablet (10 mg total) by mouth every evening. 30 tablet 11    niacin, inositol niacinate, (NIACIN FLUSH FREE) 400 mg niacin (500 mg) Cap 1 capsule      oxybutynin (DITROPAN-XL) 10 MG 24 hr tablet Take 1 tablet (10 mg total) by mouth once daily. 30 tablet 11    SHINGRIX, PF, 50 mcg/0.5 mL injection ADM 0.5ML IM UTD  0    simvastatin (ZOCOR) 40 MG tablet TAKE 1 TABLET BY MOUTH EVERY EVENING 30 tablet 12    umeclidinium-vilanteroL (ANORO ELLIPTA) 62.5-25 mcg/actuation DsDv Inhale 1 puff into the lungs once daily. Controller 60 each 11    vitamins  A,C,E-zinc-copper (PRESERVISION AREDS) 14,320-226-200 unit-mg-unit Cap Take 2 capsules by mouth once daily.      [DISCONTINUED] fluticasone propionate (FLONASE) 50 mcg/actuation nasal spray 1 spray by Each Nostril route once daily.      [DISCONTINUED] montelukast (SINGULAIR) 10 mg tablet Take 10 mg by mouth every evening.  11    [DISCONTINUED] umeclidinium-vilanteroL (ANORO ELLIPTA) 62.5-25 mcg/actuation DsDv Inhale 1 puff into the lungs once daily. Controller 60 each 11    albuterol-ipratropium (DUO-NEB) 2.5 mg-0.5 mg/3 mL nebulizer solution Take 3 mLs by nebulization every 6 (six) hours as needed for Shortness of Breath (before walking). Rescue 120 vial 5    amoxicillin (AMOXIL) 875 MG tablet TAKE 1 TABLET BY MOUTH TWICE DAILY UNTIL ALL TAKEN      calcium-vitamin D3 500 mg(1,250mg) -200 unit per tablet Take 1 tablet by mouth 2 (two) times daily with meals. 60 tablet 11    [DISCONTINUED] methylPREDNISolone sod suc(PF) injection        No facility-administered encounter medications on file as of  2020.      Plan:       Requested Prescriptions     Signed Prescriptions Disp Refills    albuterol-ipratropium (DUO-NEB) 2.5 mg-0.5 mg/3 mL nebulizer solution 120 vial 5     Sig: Take 3 mLs by nebulization every 6 (six) hours as needed for Shortness of Breath (before walking). Rescue    umeclidinium-vilanteroL (ANORO ELLIPTA) 62.5-25 mcg/actuation DsDv 60 each 11     Sig: Inhale 1 puff into the lungs once daily. Controller    montelukast (SINGULAIR) 10 mg tablet 30 tablet 11     Sig: Take 1 tablet (10 mg total) by mouth every evening.    fluticasone propionate (FLONASE) 50 mcg/actuation nasal spray 16 g 11     Si sprays (100 mcg total) by Each Nostril route once daily.     Problem List Items Addressed This Visit     Chronic obstructive pulmonary disease    Overview     -followed by pulmonology, Dr. Carmona  -currently on Anoro daily but admits not always compliant  -has follow up with pulmonology soon with pulmonary studies         Relevant Medications    albuterol-ipratropium (DUO-NEB) 2.5 mg-0.5 mg/3 mL nebulizer solution    umeclidinium-vilanteroL (ANORO ELLIPTA) 62.5-25 mcg/actuation DsDv      Other Visit Diagnoses     Abnormal CXR    -  Primary    Relevant Orders    CT Chest Without Contrast (Completed)    Shortness of breath        Relevant Medications    albuterol-ipratropium (DUO-NEB) 2.5 mg-0.5 mg/3 mL nebulizer solution    Other Relevant Orders    CT Chest Without Contrast (Completed)    COPD, moderate        Relevant Medications    albuterol-ipratropium (DUO-NEB) 2.5 mg-0.5 mg/3 mL nebulizer solution    Non-seasonal allergic rhinitis due to other allergic trigger        Relevant Medications    montelukast (SINGULAIR) 10 mg tablet    fluticasone propionate (FLONASE) 50 mcg/actuation nasal spray             Follow up in about 1 year (around 2021) for CT/PET - ASAP.    MEDICAL DECISION MAKING: Moderate to high complexity.  Overall, the multiple problems listed are of moderate to high severity  that may impact quality of life and activities of daily living. Side effects of medications, treatment plan as well as options and alternatives reviewed and discussed with patient. There was counseling of patient concerning these issues.    Total time spent in face to face counseling and coordination of care - 40  minutes over 50% of time was used in discussion of prognosis, risks, benefits of treatment, instructions and compliance with regimen . Discussion with other physicians or health care providers (DME, NP, pharmacy, respiratory therapy) occurred.

## 2020-06-22 NOTE — LETTER
June 22, 2020      Mindy Hathaway MD  [address]  [place] [address]           [facility] - Pulmonary Services  [address] [place] [id number]  Phone: [phone]  Fax: [phone]          Patient: Soumya Melchor   MR Number: [id number]   YOB: 1945   Date of Visit: 6/22/2020           Thank you for referring Soumya Melchor to me for evaluation. Attached you will find relevant portions of my assessment and plan of care.    If you have questions, please do not hesitate to call me. I look forward to following Soumya Melchor along with you.    Sincerely,    Navarro Carmona MD    Enclosure  CC:  Ramon Loya Jr., MD    II you would like to receive this communication electronically, please contact [email or web address] or [phone] to request [employer] access.    [employer] is a tool which provides read-only access to select patient information with whom you have a relationship. Its easy to use and provides real time access to review your patients record including encounter summaries, notes, results, and demographic information.    If you feel you have received this communication in error or would no longer like to receive these types of communications, please e-mail [email or web address]

## 2020-06-24 ENCOUNTER — HOSPITAL ENCOUNTER (OUTPATIENT)
Dept: RADIOLOGY | Facility: HOSPITAL | Age: 75
Discharge: HOME | End: 2020-06-24
Attending: INTERNAL MEDICINE
Payer: MEDICARE

## 2020-06-24 DIAGNOSIS — R06.02 SHORTNESS OF BREATH: ICD-10-CM

## 2020-06-24 DIAGNOSIS — R93.89 ABNORMAL CXR: ICD-10-CM

## 2020-06-24 PROCEDURE — 71250 CT THORAX DX C-: CPT | Mod: TC

## 2020-07-06 ENCOUNTER — PATIENT MESSAGE (OUTPATIENT)
Dept: PULMONOLOGY | Facility: CLINIC | Age: 75
End: 2020-07-06

## 2020-07-15 ENCOUNTER — TELEPHONE (OUTPATIENT)
Dept: PULMONOLOGY | Facility: CLINIC | Age: 75
End: 2020-07-15

## 2020-07-15 DIAGNOSIS — R91.1 SOLITARY PULMONARY NODULE: ICD-10-CM

## 2020-07-15 DIAGNOSIS — R05.9 COUGH: ICD-10-CM

## 2020-07-15 DIAGNOSIS — T17.908A ASPIRATION INTO AIRWAY, INITIAL ENCOUNTER: ICD-10-CM

## 2020-07-15 DIAGNOSIS — J98.9 RESPIRATORY DISORDER, UNSPECIFIED: ICD-10-CM

## 2020-07-15 DIAGNOSIS — J20.9 ACUTE BRONCHITIS WITH BRONCHOSPASM: Primary | ICD-10-CM

## 2020-07-15 RX ORDER — PREDNISONE 20 MG/1
TABLET ORAL
Qty: 20 TABLET | Refills: 0 | Status: SHIPPED | OUTPATIENT
Start: 2020-07-15 | End: 2021-01-11

## 2020-07-15 RX ORDER — AMOXICILLIN AND CLAVULANATE POTASSIUM 875; 125 MG/1; MG/1
1 TABLET, FILM COATED ORAL EVERY 12 HOURS
Qty: 20 TABLET | Refills: 0 | Status: SHIPPED | OUTPATIENT
Start: 2020-07-15 | End: 2020-07-25

## 2020-07-15 NOTE — TELEPHONE ENCOUNTER
Called to discuss results of CT scan    Patient had a dental procedure performed on June 8th and had persistent symptoms of cough and chest congestion when a CT scan of the chest was performed on 06/24/2020.    Abnormalities were noted on the CT scan of the chest which are consistent with aspiration syndrome/aspiration pneumonia.    In light of the patient's history of cigarette smoking I recommended a 3 month follow-up for review of the CT scan.    Patient prescribed prednisone an additional dose of Augmentin for treatment of presumed aspiration pneumonia/bronchitis

## 2020-07-15 NOTE — TELEPHONE ENCOUNTER
Abnormalities are present on the CT scan which require further investigation.    The nodule in your right upper lobe is stable and unchanged.  This was suggest that it is probably benign.    However there other abnormalities on your CT scan that are suggestive of an infectious process such as bronchitis or a slow to resolve pneumonia.    If you are presently having symptoms of cough, chest congestion and bringing up discolored phlegm then antibiotics may be indicated. Contact my office if you need antibiotics.      For the above reasons I recommend strongly that a repeat CT scan of the chest be performed in 3 months time with a follow-up office visits who that a complete history and physical examination can be performed.

## 2020-07-21 ENCOUNTER — PATIENT OUTREACH (OUTPATIENT)
Dept: ADMINISTRATIVE | Facility: OTHER | Age: 75
End: 2020-07-21

## 2020-07-22 ENCOUNTER — OFFICE VISIT (OUTPATIENT)
Dept: OPHTHALMOLOGY | Facility: CLINIC | Age: 75
End: 2020-07-22
Payer: MEDICARE

## 2020-07-22 DIAGNOSIS — H35.3211 WET AGE-RELATED MACULAR DEGENERATION OF RIGHT EYE WITH ACTIVE CHOROIDAL NEOVASCULARIZATION: Primary | ICD-10-CM

## 2020-07-22 DIAGNOSIS — Z96.1 PSEUDOPHAKIA OF BOTH EYES: ICD-10-CM

## 2020-07-22 DIAGNOSIS — Z01.812 PRE-PROCEDURE LAB EXAM: ICD-10-CM

## 2020-07-22 DIAGNOSIS — H35.3122 INTERMEDIATE STAGE NONEXUDATIVE AGE-RELATED MACULAR DEGENERATION OF LEFT EYE: ICD-10-CM

## 2020-07-22 PROCEDURE — 99999 PR PBB SHADOW E&M-EST. PATIENT-LVL IV: CPT | Mod: PBBFAC,,, | Performed by: OPHTHALMOLOGY

## 2020-07-22 PROCEDURE — 92134 CPTRZ OPH DX IMG PST SGM RTA: CPT | Mod: S$GLB,,, | Performed by: OPHTHALMOLOGY

## 2020-07-22 PROCEDURE — 92134 POSTERIOR SEGMENT OCT RETINA (OCULAR COHERENCE TOMOGRAPHY)-BOTH EYES: ICD-10-PCS | Mod: S$GLB,,, | Performed by: OPHTHALMOLOGY

## 2020-07-22 PROCEDURE — 92004 COMPRE OPH EXAM NEW PT 1/>: CPT | Mod: S$GLB,,, | Performed by: OPHTHALMOLOGY

## 2020-07-22 PROCEDURE — 99999 PR PBB SHADOW E&M-EST. PATIENT-LVL IV: ICD-10-PCS | Mod: PBBFAC,,, | Performed by: OPHTHALMOLOGY

## 2020-07-22 PROCEDURE — 92004 PR EYE EXAM, NEW PATIENT,COMPREHESV: ICD-10-PCS | Mod: S$GLB,,, | Performed by: OPHTHALMOLOGY

## 2020-07-22 NOTE — PROGRESS NOTES
HPI     New pt here for routine eye exam. Pt states va has been getting worse at   distance. Occasional floaters in va. No gtts. HTN is controlled with meds.   Denies eye pain. + AREDS daily x2     Last edited by Codey Escamilla on 7/22/2020  1:42 PM. (History)        ROS     Negative for: Constitutional, Gastrointestinal, Neurological, Skin,   Genitourinary, Musculoskeletal, HENT, Endocrine, Cardiovascular, Eyes,   Respiratory, Psychiatric, Allergic/Imm, Heme/Lymph    Last edited by Edward Díaz Jr., MD on 7/22/2020  2:42 PM. (History)        Assessment /Plan     For exam results, see Encounter Report.    Wet age-related macular degeneration of right eye with active choroidal neovascularization  -     Ambulatory referral/consult to Ophthalmology; Future; Expected date: 08/05/2020    Intermediate stage nonexudative age-related macular degeneration of left eye  -     Posterior Segment OCT Retina-Both eyes; Future; Expected date: 07/23/2020    Pseudophakia of both eyes      -referral retina for WET ARMD OD  -Check your [employer]msler Grid daily, each eye separately; instructions are available on the grid  -Return promptly if a change is noted such as lines that are not visible or looking wavy on the grid  -It is also recommended that you take eye vitamin supplements.  These are available over-the-counter. I recommend I-Caps AREDS2 Formula or Preservision AREDS2 Formula. Should be taken 1 tab po BID    Follow up in about 2 weeks (around 8/5/2020) for referral to retina for Wet ARMD right eye.

## 2020-07-22 NOTE — PATIENT INSTRUCTIONS
"  What Is Age-Related Macular Degeneration (AMD)?    Age-related macular degeneration (AMD) is an eye disease. AMD is the leading cause of vision loss in adults over age 50. One or both eyes may be affected. The macula (the part of the eye that controls your central, detailed vision) becomes damaged. Central vision becomes limited. However, side vision remains clear. There are two types of macular degeneration: "dry" and "wet."     Dry macular degeneration       Wet macular degeneration      Dry macular degeneration  Dry is the most common type of macular degeneration. In the early stages, changes in vision may be hard to notice. Over time, your central vision may slowly worsen. You may notice wavy lines and blank spots in the center of your vision. Colors may look dim. There is no way to restore vision lost from dry macular degeneration. But you need to monitor it because it can turn into wet macular degeneration.  Wet macular degeneration  Wet macular degeneration is less common but more serious. Vision loss may be faster and more noticeable. You may suddenly see dark spots, blank spots, wavy lines, and dim colors in the center of your vision. If wet macular degeneration is caught early, certain treatments, such as injections, photodynamic therapy, or laser surgery, may help to slow further vision loss.  Date Last Reviewed: 6/8/2015  © 9564-1256 [employer]. [address] [place] [address], [place] [address]. All rights reserved. This information is not intended as a substitute for professional medical care. Always follow your healthcare professional's instructions.        "

## 2020-07-27 ENCOUNTER — TELEPHONE (OUTPATIENT)
Dept: GASTROENTEROLOGY | Facility: CLINIC | Age: 75
End: 2020-07-27

## 2020-07-27 ENCOUNTER — LAB VISIT (OUTPATIENT)
Dept: LAB | Facility: HOSPITAL | Age: 75
End: 2020-07-27
Attending: INTERNAL MEDICINE
Payer: MEDICARE

## 2020-07-27 ENCOUNTER — CLINICAL SUPPORT (OUTPATIENT)
Dept: FAMILY MEDICINE | Facility: CLINIC | Age: 75
End: 2020-07-27
Payer: MEDICARE

## 2020-07-27 DIAGNOSIS — R14.0 ABDOMINAL BLOATING: Primary | ICD-10-CM

## 2020-07-27 DIAGNOSIS — Z01.812 PRE-PROCEDURE LAB EXAM: ICD-10-CM

## 2020-07-27 PROCEDURE — U0003 INFECTIOUS AGENT DETECTION BY NUCLEIC ACID (DNA OR RNA); SEVERE ACUTE RESPIRATORY SYNDROME CORONAVIRUS 2 (SARS-COV-2) (CORONAVIRUS DISEASE [COVID-19]), AMPLIFIED PROBE TECHNIQUE, MAKING USE OF HIGH THROUGHPUT TECHNOLOGIES AS DESCRIBED BY CMS-2020-01-R: HCPCS

## 2020-07-27 NOTE — TELEPHONE ENCOUNTER
----- Message from Juarez Stover sent at 7/27/2020 10:31 AM CDT -----  Contact: pt  Type: Needs Medical Advice    Who Called:  pt    Best Call Back Number: [phone]  Additional Information: pt would like to discuss her upcoming procedure. Would like to speak with Nicol. Please call to advise.

## 2020-07-27 NOTE — TELEPHONE ENCOUNTER
Spoke with pt's daughter with pt permission. Daughter is requesting a nuc med test to evaluate pt's gall bladder function. Daughter states pt's symptoms are similar to those of issues with the gall bladder & no provider has ever checked the function of this. Daughter informed message would be sent to Dr. Calloway, she verbalized understanding & appreciation.

## 2020-07-28 ENCOUNTER — ANESTHESIA EVENT (OUTPATIENT)
Dept: ENDOSCOPY | Facility: HOSPITAL | Age: 75
End: 2020-07-28
Payer: MEDICARE

## 2020-07-28 LAB — SARS-COV-2 RNA RESP QL NAA+PROBE: NOT DETECTED

## 2020-07-29 ENCOUNTER — HOSPITAL ENCOUNTER (OUTPATIENT)
Dept: RADIOLOGY | Facility: HOSPITAL | Age: 75
Discharge: HOME | End: 2020-07-29
Attending: INTERNAL MEDICINE
Payer: MEDICARE

## 2020-07-29 ENCOUNTER — ANESTHESIA (OUTPATIENT)
Dept: ENDOSCOPY | Facility: HOSPITAL | Age: 75
End: 2020-07-29
Payer: MEDICARE

## 2020-07-29 ENCOUNTER — HOSPITAL ENCOUNTER (OUTPATIENT)
Facility: HOSPITAL | Age: 75
Discharge: HOME | End: 2020-07-29
Attending: INTERNAL MEDICINE | Admitting: INTERNAL MEDICINE
Payer: MEDICARE

## 2020-07-29 DIAGNOSIS — R14.0 ABDOMINAL BLOATING: ICD-10-CM

## 2020-07-29 DIAGNOSIS — K21.9 GERD (GASTROESOPHAGEAL REFLUX DISEASE): ICD-10-CM

## 2020-07-29 PROCEDURE — 37000009 HC ANESTHESIA EA ADD 15 MINS: Mod: PO | Performed by: INTERNAL MEDICINE

## 2020-07-29 PROCEDURE — 88305 TISSUE EXAM BY PATHOLOGIST: CPT | Mod: 59 | Performed by: PATHOLOGY

## 2020-07-29 PROCEDURE — 63600175 PHARM REV CODE 636 W HCPCS: Mod: PO | Performed by: NURSE ANESTHETIST, CERTIFIED REGISTERED

## 2020-07-29 PROCEDURE — 37000008 HC ANESTHESIA 1ST 15 MINUTES: Mod: PO | Performed by: INTERNAL MEDICINE

## 2020-07-29 PROCEDURE — 88305 TISSUE EXAM BY PATHOLOGIST: CPT | Mod: 26,,, | Performed by: PATHOLOGY

## 2020-07-29 PROCEDURE — 43239 EGD BIOPSY SINGLE/MULTIPLE: CPT | Mod: PO | Performed by: INTERNAL MEDICINE

## 2020-07-29 PROCEDURE — D9220A PRA ANESTHESIA: Mod: ANES,,, | Performed by: ANESTHESIOLOGY

## 2020-07-29 PROCEDURE — 76705 ECHO EXAM OF ABDOMEN: CPT | Mod: 26,,, | Performed by: RADIOLOGY

## 2020-07-29 PROCEDURE — 27201012 HC FORCEPS, HOT/COLD, DISP: Mod: PO | Performed by: INTERNAL MEDICINE

## 2020-07-29 PROCEDURE — 88312 PR  SPECIAL STAINS,GROUP I: ICD-10-PCS | Mod: 26,,, | Performed by: PATHOLOGY

## 2020-07-29 PROCEDURE — 88312 SPECIAL STAINS GROUP 1: CPT | Mod: 26,,, | Performed by: PATHOLOGY

## 2020-07-29 PROCEDURE — 88312 SPECIAL STAINS GROUP 1: CPT | Performed by: PATHOLOGY

## 2020-07-29 PROCEDURE — 76705 US ABDOMEN LIMITED: ICD-10-PCS | Mod: 26,,, | Performed by: RADIOLOGY

## 2020-07-29 PROCEDURE — 76705 ECHO EXAM OF ABDOMEN: CPT | Mod: TC,PO

## 2020-07-29 PROCEDURE — D9220A PRA ANESTHESIA: Mod: CRNA,,, | Performed by: NURSE ANESTHETIST, CERTIFIED REGISTERED

## 2020-07-29 PROCEDURE — D9220A PRA ANESTHESIA: ICD-10-PCS | Mod: CRNA,,, | Performed by: NURSE ANESTHETIST, CERTIFIED REGISTERED

## 2020-07-29 PROCEDURE — 43239 PR EGD, FLEX, W/BIOPSY, SGL/MULTI: ICD-10-PCS | Mod: ,,, | Performed by: INTERNAL MEDICINE

## 2020-07-29 PROCEDURE — 25000003 PHARM REV CODE 250: Mod: PO | Performed by: NURSE ANESTHETIST, CERTIFIED REGISTERED

## 2020-07-29 PROCEDURE — 88305 TISSUE EXAM BY PATHOLOGIST: ICD-10-PCS | Mod: 26,,, | Performed by: PATHOLOGY

## 2020-07-29 PROCEDURE — D9220A PRA ANESTHESIA: ICD-10-PCS | Mod: ANES,,, | Performed by: ANESTHESIOLOGY

## 2020-07-29 PROCEDURE — 43239 EGD BIOPSY SINGLE/MULTIPLE: CPT | Mod: ,,, | Performed by: INTERNAL MEDICINE

## 2020-07-29 PROCEDURE — 63600175 PHARM REV CODE 636 W HCPCS: Mod: PO | Performed by: INTERNAL MEDICINE

## 2020-07-29 RX ORDER — LIDOCAINE HCL/PF 100 MG/5ML
SYRINGE (ML) INTRAVENOUS
Status: DISCONTINUED | OUTPATIENT
Start: 2020-07-29 | End: 2020-07-29

## 2020-07-29 RX ORDER — SUCRALFATE 1 G/1
1 TABLET ORAL 3 TIMES DAILY
Qty: 100 TABLET | Refills: 2 | Status: SHIPPED | OUTPATIENT
Start: 2020-07-29 | End: 2020-08-28

## 2020-07-29 RX ORDER — SODIUM CHLORIDE 0.9 % (FLUSH) 0.9 %
10 SYRINGE (ML) INJECTION
Status: DISCONTINUED | OUTPATIENT
Start: 2020-07-29 | End: 2020-07-29 | Stop reason: HOSPADM

## 2020-07-29 RX ORDER — PROPOFOL 10 MG/ML
VIAL (ML) INTRAVENOUS
Status: DISCONTINUED | OUTPATIENT
Start: 2020-07-29 | End: 2020-07-29

## 2020-07-29 RX ORDER — SODIUM CHLORIDE, SODIUM LACTATE, POTASSIUM CHLORIDE, CALCIUM CHLORIDE 600; 310; 30; 20 MG/100ML; MG/100ML; MG/100ML; MG/100ML
INJECTION, SOLUTION INTRAVENOUS CONTINUOUS
Status: DISCONTINUED | OUTPATIENT
Start: 2020-07-29 | End: 2020-07-29 | Stop reason: HOSPADM

## 2020-07-29 RX ADMIN — SODIUM CHLORIDE, SODIUM LACTATE, POTASSIUM CHLORIDE, AND CALCIUM CHLORIDE: .6; .31; .03; .02 INJECTION, SOLUTION INTRAVENOUS at 10:07

## 2020-07-29 RX ADMIN — PROPOFOL 50 MG: 10 INJECTION, EMULSION INTRAVENOUS at 10:07

## 2020-07-29 RX ADMIN — PROPOFOL 25 MG: 10 INJECTION, EMULSION INTRAVENOUS at 10:07

## 2020-07-29 RX ADMIN — PROPOFOL 75 MG: 10 INJECTION, EMULSION INTRAVENOUS at 10:07

## 2020-07-29 RX ADMIN — LIDOCAINE HYDROCHLORIDE 100 MG: 20 INJECTION, SOLUTION INTRAVENOUS at 10:07

## 2020-07-29 NOTE — ANESTHESIA POSTPROCEDURE EVALUATION
Anesthesia Post Evaluation    Patient: Soumya Melchor    Procedure(s) Performed: Procedure(s) (LRB):  EGD (ESOPHAGOGASTRODUODENOSCOPY) (N/A)    Final Anesthesia Type: general    Patient location during evaluation: PACU  Patient participation: Yes- Able to Participate  Level of consciousness: awake and alert  Post-procedure vital signs: reviewed and stable  Pain management: adequate  Airway patency: patent    PONV status at discharge: No PONV  Anesthetic complications: no      Cardiovascular status: hemodynamically stable  Respiratory status: unassisted and room air  Hydration status: euvolemic  Follow-up not needed.          Vitals Value Taken Time   /56 07/29/20 1118        Pulse 67 07/29/20 1118   Resp 16 07/29/20 1118   SpO2 96 % 07/29/20 1118         Event Time   Out of Recovery 11:35:00         Pain/Maya Score: Maya Score: 10 (7/29/2020 11:24 AM)

## 2020-07-29 NOTE — DISCHARGE INSTRUCTIONS
Gastritis (Adult)    Gastritis is inflammation and irritation of the stomach lining. It can be present for a short time (acute) or be long lasting (chronic). Gastritis is often caused by infection with bacteria called H pylori. More than a third of people in the [place] have this bacteria in their bodies. In many cases, H pylori causes no problems or symptoms. In some people, though, the infection irritates the stomach lining and causes gastritis. Other causes of stomach irritation include drinking alcohol or taking pain-relieving medicines called NSAIDs (such as aspirin or ibuprofen).   Symptoms of gastritis can include:  · Abdominal pain or bloating  · Loss of appetite  · Nausea or vomiting  · Vomiting blood or having black stools  · Feeling more tired than usual  An inflamed and irritated stomach lining is more likely to develop a sore called an ulcer. To help prevent this, gastritis should be treated.  Home care  If needed, medicines may be prescribed. If you have H pylori infection, treating it will likely relieve your symptoms. Other changes can help reduce stomach irritation and help it heal.  · If you have been prescribed medicines for H pylori infection, take them as directed. Take all of the medicine until it is finished or your healthcare provider tells you to stop, even if you feel better.  · Your healthcare provider may recommend avoiding NSAIDs. If you take daily aspirin for your heart or other medical reasons, do not stop without talking to your healthcare provider first.  · Avoid drinking alcohol.  · Stop smoking. Smoking can irritate the stomach and delay healing. As much as possible, stay away from second hand smoke.  Follow-up care  Follow up with your healthcare provider, or as advised by our staff. Testing may be needed to check for inflammation or an ulcer.  When to seek medical advice  Call your healthcare provider for any of the following:  · Stomach pain that gets worse or moves to the lower  right abdomen (appendix area)  · Chest pain that appears or gets worse, or spreads to the back, neck, shoulder, or arm  · Frequent vomiting (cant keep down liquids)  · Blood in the stool or vomit (red or black in color)  · Feeling weak or dizzy  · Fever of 100.4ºF (38ºC) or higher, or as directed by your healthcare provider  Date Last Reviewed: 6/22/2015  © 6504-4762 [employer]. [address] [place] [address]. All rights reserved. This information is not intended as a substitute for professional medical care. Always follow your healthcare professional's instructions.

## 2020-07-29 NOTE — DISCHARGE SUMMARY
Discharge Note  Short Stay      SUMMARY     Admit Date: 7/29/2020    Attending Physician: Margarito Calloway MD     Discharge Physician: Margarito Calloway MD    Discharge Date: 7/29/2020 10:57 AM    Final Diagnosis: Gastroesophageal reflux disease, esophagitis presence not specified [K21.9]  Dyspepsia [R10.13]  History of gastrointestinal stromal tumor (GIST) [Z85.09]  Bloating [R14.0]  Abdominal distention [R14.0]    Disposition: HOME OR SELF CARE    Patient Instructions:   Current Discharge Medication List      START taking these medications    Details   sucralfate (CARAFATE) 1 gram tablet Take 1 tablet (1 g total) by mouth 3 (three) times daily.  Qty: 100 tablet, Refills: 2         CONTINUE these medications which have NOT CHANGED    Details   amLODIPine (NORVASC) 5 MG tablet TAKE 1 TABLET BY MOUTH EVERY DAY  Qty: 30 tablet, Refills: 11      ascorbic acid, vitamin C, (VITAMIN C) 1000 MG tablet Take 1,000 mg by mouth once daily.       calcium-vitamin D3 500 mg(1,250mg) -200 unit per tablet Take 1 tablet by mouth 2 (two) times daily with meals.  Qty: 60 tablet, Refills: 11    Associated Diagnoses: Osteoporosis      cholestyramine (QUESTRAN) 4 gram packet Take 1 packet (4 g total) by mouth 3 (three) times daily.  Qty: 90 packet, Refills: 0    Associated Diagnoses: Incontinence of feces with fecal urgency      cyanocobalamin (VITAMIN B-12) 250 MCG tablet Take 250 mcg by mouth once daily.      donepeziL (ARICEPT) 10 MG tablet TAKE 1 TABLET BY MOUTH EVERY DAY  Qty: 30 tablet, Refills: 11      esomeprazole (NEXIUM) 40 MG capsule Take 1 capsule (40 mg total) by mouth before breakfast.  Qty: 30 capsule, Refills: 2    Associated Diagnoses: Gastroesophageal reflux disease without esophagitis; Dyspepsia      eszopiclone (LUNESTA) 3 mg Tab Take 1 tablet (3 mg total) by mouth every evening.  Qty: 30 tablet, Refills: 3      famotidine (PEPCID) 40 MG tablet Take 1 tablet (40 mg total) by mouth nightly.  Qty: 30 tablet,  Refills: 2    Associated Diagnoses: Gastroesophageal reflux disease, esophagitis presence not specified      fish oil-omega-3 fatty acids 300-1,000 mg capsule Take 1 capsule by mouth 2 (two) times daily.       fluticasone propionate (FLONASE) 50 mcg/actuation nasal spray 2 sprays (100 mcg total) by Each Nostril route once daily.  Qty: 16 g, Refills: 11    Associated Diagnoses: Non-seasonal allergic rhinitis due to other allergic trigger      gabapentin (NEURONTIN) 600 MG tablet Take 1 tablet (600 mg total) by mouth 2 (two) times daily.  Qty: 30 tablet, Refills: 11      hydrOXYzine pamoate (VISTARIL) 50 MG Cap Take 1 capsule (50 mg total) by mouth every 6 (six) hours as needed.  Qty: 30 capsule, Refills: 4      L.acid/L.casei/B.bif/B.miryam/FOS (PROBIOTIC BLEND ORAL) Take by mouth 2 (two) times daily.       lactase (LACTAID ORAL) Take by mouth as needed.       !! levothyroxine (SYNTHROID) 200 MCG tablet TAKE 1 TABLET BY MOUTH EVERY DAY  Qty: 30 tablet, Refills: 11      !! levothyroxine (SYNTHROID) 25 MCG tablet Take 1 tablet (25 mcg total) by mouth before breakfast.  Qty: 30 tablet, Refills: 11    Associated Diagnoses: Hypothyroidism, unspecified type      magnesium oxide (MAG-OX) 250 mg magnesium Tab 1 tablet with a meal      melatonin 5 mg/15 mL Liqd Pt states she takes 2 tablets      montelukast (SINGULAIR) 10 mg tablet Take 1 tablet (10 mg total) by mouth every evening.  Qty: 30 tablet, Refills: 11    Associated Diagnoses: Non-seasonal allergic rhinitis due to other allergic trigger      niacin, inositol niacinate, (NIACIN FLUSH FREE) 400 mg niacin (500 mg) Cap 1 capsule      oxybutynin (DITROPAN-XL) 10 MG 24 hr tablet Take 1 tablet (10 mg total) by mouth once daily.  Qty: 30 tablet, Refills: 11    Associated Diagnoses: Urinary incontinence, unspecified type      simvastatin (ZOCOR) 40 MG tablet TAKE 1 TABLET BY MOUTH EVERY EVENING  Qty: 30 tablet, Refills: 12      umeclidinium-vilanteroL (ANORO ELLIPTA) 62.5-25  mcg/actuation DsDv Inhale 1 puff into the lungs once daily. Controller  Qty: 60 each, Refills: 11    Associated Diagnoses: Chronic obstructive pulmonary disease, unspecified COPD type      vitamins  A,C,E-zinc-copper (PRESERVISION AREDS) 14,320-226-200 unit-mg-unit Cap Take 2 capsules by mouth once daily.      albuterol-ipratropium (DUO-NEB) 2.5 mg-0.5 mg/3 mL nebulizer solution Take 3 mLs by nebulization every 6 (six) hours as needed for Shortness of Breath (before walking). Rescue  Qty: 120 vial, Refills: 5    Associated Diagnoses: Shortness of breath; COPD, moderate      aspirin (ECOTRIN) 81 MG EC tablet Take 81 mg by mouth once daily.      predniSONE (DELTASONE) 20 MG tablet Prednisone 60 mg/ day for 3 days, 40 mg/day for 3 days,20 mg/ day for 3 days, (1/2 tablet )10 mg a day for 3 days.  Qty: 20 tablet, Refills: 0    Associated Diagnoses: Acute bronchitis with bronchospasm; Aspiration into airway, initial encounter      SHINGRIX, PF, 50 mcg/0.5 mL injection ADM 0.5ML IM UTD  Refills: 0       !! - Potential duplicate medications found. Please discuss with provider.          Discharge Procedure Orders (must include Diet, Follow-up, Activity)    Follow Up:  Follow up with PCP as previously scheduled  Resume routine diet.  Activity as tolerated.    No driving day of procedure.

## 2020-07-29 NOTE — ANESTHESIA PREPROCEDURE EVALUATION
07/29/2020  Soumya Melchor is a 74 y.o., female.    Pre-op Assessment    I have reviewed the Patient Summary Reports.     I have reviewed the Nursing Notes. I have reviewed the NPO Status.   I have reviewed the Medications.     Review of Systems  Anesthesia Hx:  No problems with previous Anesthesia    Social:  Former Smoker    Hematology/Oncology:  Hematology Normal   Oncology Normal     EENT/Dental:EENT/Dental Normal   Cardiovascular:   Hypertension, well controlled CAD asymptomatic  Angina JOHN    Pulmonary:   COPD, mild Shortness of breath    Renal/:   Chronic Renal Disease    Hepatic/GI:   GERD, well controlled    Musculoskeletal:  Musculoskeletal Normal    Neurological:   Seizures    Endocrine:   Hypothyroidism    Psych:   Psychiatric History          Physical Exam  General:  Well nourished    Airway/Jaw/Neck:  Airway Findings: Mouth Opening: Normal Tongue: Normal  General Airway Assessment: Adult  Mallampati: II        Eyes/Ears/Nose:  EYES/EARS/NOSE FINDINGS: Normal   Dental:  Dental Findings: In tact   Chest/Lungs:  Chest/Lungs Findings: Clear to auscultation, Normal Respiratory Rate     Heart/Vascular:  Heart Findings: Rate: Normal  Rhythm: Regular Rhythm        Mental Status:  Mental Status Findings:  Cooperative, Alert and Oriented         Anesthesia Plan  Type of Anesthesia, risks & benefits discussed:  Anesthesia Type:  general  Patient's Preference:   Intra-op Monitoring Plan: standard ASA monitors  Intra-op Monitoring Plan Comments:   Post Op Pain Control Plan:   Post Op Pain Control Plan Comments:   Induction:   IV  Beta Blocker:  Patient is on a Beta-Blocker and has received one dose within the past 24 hours (No further documentation required).       Informed Consent: Patient understands risks and agrees with Anesthesia plan.  Questions answered. Anesthesia consent signed with  patient.  ASA Score: 3     Day of Surgery Review of History & Physical: I have interviewed and examined the patient. I have reviewed the patient's H&P dated:    H&P update referred to the provider.         Ready For Surgery From Anesthesia Perspective.

## 2020-07-29 NOTE — TRANSFER OF CARE
Anesthesia Transfer of Care Note    Patient: Soumya Melchor    Procedure(s) Performed: Procedure(s) (LRB):  EGD (ESOPHAGOGASTRODUODENOSCOPY) (N/A)    Patient location: PACU    Anesthesia Type: general    Transport from OR: Transported from OR on room air with adequate spontaneous ventilation    Post pain: adequate analgesia    Post assessment: no apparent anesthetic complications and tolerated procedure well    Post vital signs: stable    Level of consciousness: sedated and responds to stimulation    Nausea/Vomiting: no nausea/vomiting    Complications: none    Transfer of care protocol was followed      Last vitals:   Visit Vitals  BP (!) 104/56 (BP Location: Right arm, Patient Position: Lying)   Pulse 66   Temp 36.7 °C (98.1 °F) (Skin)   Resp 18   Ht 5' (1.524 m)   Wt 64.9 kg (143 lb)   SpO2 98%   Breastfeeding No   BMI 27.93 kg/m²

## 2020-07-29 NOTE — H&P
History & Physical - Short Stay  Gastroenterology      SUBJECTIVE:     Procedure: EGD    Chief Complaint/Indication for Procedure: Reflux    PTA Medications   Medication Sig    amLODIPine (NORVASC) 5 MG tablet TAKE 1 TABLET BY MOUTH EVERY DAY    ascorbic acid, vitamin C, (VITAMIN C) 1000 MG tablet Take 1,000 mg by mouth once daily.     calcium-vitamin D3 500 mg(1,250mg) -200 unit per tablet Take 1 tablet by mouth 2 (two) times daily with meals.    cholestyramine (QUESTRAN) 4 gram packet Take 1 packet (4 g total) by mouth 3 (three) times daily.    cyanocobalamin (VITAMIN B-12) 250 MCG tablet Take 250 mcg by mouth once daily.    donepeziL (ARICEPT) 10 MG tablet TAKE 1 TABLET BY MOUTH EVERY DAY    esomeprazole (NEXIUM) 40 MG capsule Take 1 capsule (40 mg total) by mouth before breakfast.    eszopiclone (LUNESTA) 3 mg Tab Take 1 tablet (3 mg total) by mouth every evening.    famotidine (PEPCID) 40 MG tablet Take 1 tablet (40 mg total) by mouth nightly.    fish oil-omega-3 fatty acids 300-1,000 mg capsule Take 1 capsule by mouth 2 (two) times daily.     fluticasone propionate (FLONASE) 50 mcg/actuation nasal spray 2 sprays (100 mcg total) by Each Nostril route once daily.    gabapentin (NEURONTIN) 600 MG tablet Take 1 tablet (600 mg total) by mouth 2 (two) times daily.    hydrOXYzine pamoate (VISTARIL) 50 MG Cap Take 1 capsule (50 mg total) by mouth every 6 (six) hours as needed.    L.acid/L.casei/B.bif/B.miryam/FOS (PROBIOTIC BLEND ORAL) Take by mouth 2 (two) times daily.     lactase (LACTAID ORAL) Take by mouth as needed.     levothyroxine (SYNTHROID) 200 MCG tablet TAKE 1 TABLET BY MOUTH EVERY DAY    levothyroxine (SYNTHROID) 25 MCG tablet Take 1 tablet (25 mcg total) by mouth before breakfast.    magnesium oxide (MAG-OX) 250 mg magnesium Tab 1 tablet with a meal    melatonin 5 mg/15 mL Liqd Pt states she takes 2 tablets    montelukast (SINGULAIR) 10 mg tablet Take 1 tablet (10 mg total) by mouth  every evening.    niacin, inositol niacinate, (NIACIN FLUSH FREE) 400 mg niacin (500 mg) Cap 1 capsule    oxybutynin (DITROPAN-XL) 10 MG 24 hr tablet Take 1 tablet (10 mg total) by mouth once daily.    simvastatin (ZOCOR) 40 MG tablet TAKE 1 TABLET BY MOUTH EVERY EVENING    umeclidinium-vilanteroL (ANORO ELLIPTA) 62.5-25 mcg/actuation DsDv Inhale 1 puff into the lungs once daily. Controller    vitamins  A,C,E-zinc-copper (PRESERVISION AREDS) 14,320-226-200 unit-mg-unit Cap Take 2 capsules by mouth once daily.    albuterol-ipratropium (DUO-NEB) 2.5 mg-0.5 mg/3 mL nebulizer solution Take 3 mLs by nebulization every 6 (six) hours as needed for Shortness of Breath (before walking). Rescue (Patient not taking: Reported on 7/28/2020)    aspirin (ECOTRIN) 81 MG EC tablet Take 81 mg by mouth once daily.    predniSONE (DELTASONE) 20 MG tablet Prednisone 60 mg/ day for 3 days, 40 mg/day for 3 days,20 mg/ day for 3 days, (1/2 tablet )10 mg a day for 3 days. (Patient not taking: Reported on 7/28/2020)    SHINGRIX, PF, 50 mcg/0.5 mL injection ADM 0.5ML IM UTD       Review of patient's allergies indicates:   Allergen Reactions    Losartan Swelling     angioedema    Ace inhibitors Other (See Comments) and Swelling     unknown    Angioedema    Arb-angiotensin receptor antagonist Other (See Comments)     unknown  unknown      Iodine and iodide containing products Hives     Since age of 48    Lisinopril      angioedema    Metoprolol tartrate      swelling    Shrimp Other (See Comments) and Swelling     Localized itching and swelling on her hands if she peels shrimp.  Able to eat shrimp without difficulty.  No generalized reaction.        Past Medical History:   Diagnosis Date    Acid reflux 4/11/2014    Allergy     Anxiety and depression 3/6/2014    AP (angina pectoris) 2/13/2017    CAD (coronary artery disease) 2/13/2017    CAD, multiple vessel 2/13/2017    Chronic pain associated with significant psychosocial  dysfunction 2/21/2013    CKD (chronic kidney disease) stage 3, GFR 30-59 ml/min     Dr. Vásquez    COPD (chronic obstructive pulmonary disease)     well controlled, Dr Gomez [facility]    History of shingles     HTN (hypertension)     Hypothyroidism     Multiple allergies     S/P CABG (coronary artery bypass graft) 2/13/2017     Past Surgical History:   Procedure Laterality Date    CARDIAC SURGERY  02/2017    CABG x3    CATARACT EXTRACTION Bilateral 2014    COLONOSCOPY  ~2013    Dr. Perez; colon polyps removed    COLONOSCOPY N/A 3/15/2018    Procedure: COLONOSCOPY;  Surgeon: Margarito Calloway MD;  Location: [facility];  Service: Endoscopy;  Laterality: N/A;    DILATION AND CURETTAGE OF UTERUS      ENDOSCOPIC ULTRASOUND OF UPPER GASTROINTESTINAL TRACT Left 7/3/2018    Procedure: ULTRASOUND, ENDOSCOPIC, UPPER GI TRACT;  Surgeon: Jordy Greenberg MD;  Location: [facility];  Service: Endoscopy;  Laterality: Left;  Linear scope    ESOPHAGOGASTRODUODENOSCOPY N/A 7/3/2018    Procedure: EGD (ESOPHAGOGASTRODUODENOSCOPY);  Surgeon: Jordy Greenberg MD;  Location: [facility];  Service: Endoscopy;  Laterality: N/A;    INSERTION OF DORSAL COLUMN NERVE STIMULATOR FOR TRIAL N/A 6/19/2019    Procedure: INSERTION, NEUROSTIMULATOR, SPINAL CORD, DORSAL COLUMN, FOR TRIAL;  Surgeon: Ebenezer Rouse MD;  Location: [facility] OR;  Service: Pain Management;  Laterality: N/A;    INSERTION OF NEUROSTIMULATOR OF DORSAL COLUMN OF SPINAL CORD N/A 7/25/2019    Procedure: INSERTION, NEUROSTIMULATOR, SPINAL CORD, DORSAL COLUMN;  Surgeon: Ebenezer Rouse MD;  Location: [facility] OR;  Service: Pain Management;  Laterality: N/A;  removed old battery    KNEE SURGERY Right     REPLACEMENT OF NERVE STIMULATOR BATTERY  7/25/2019    Procedure: REPLACEMENT, BATTERY, NEUROSTIMULATOR;  Surgeon: Ebenezer Rouse MD;  Location: [facility] OR;  Service: Pain Management;;  Removed Old Battery- Smallable Model 1200   SN [id number]       ROBOT-ASSISTED SURGICAL REMOVAL OF STOMACH USING DA YVROSE XI N/A 2018    Procedure: XI ROBOTIC GASTRECTOMY-PARTIAL;  Surgeon: Dre Grey MD;  Location: [facility] OR;  Service: General;  Laterality: N/A;    SPINAL CORD STIMULATOR IMPLANT  2018    for pain secondary to shingles, sees Dr Kam for pain management    TONSILLECTOMY      TUMOR REMOVAL      UPPER GASTROINTESTINAL ENDOSCOPY       Family History   Problem Relation Age of Onset    Heart attacks under age 50 Mother 41    Cancer Father         prostate    Colon cancer Neg Hx     Colon polyps Neg Hx     Crohn's disease Neg Hx     Ulcerative colitis Neg Hx     Celiac disease Neg Hx     Glaucoma Neg Hx     Macular degeneration Neg Hx     Retinal detachment Neg Hx      Social History     Tobacco Use    Smoking status: Former Smoker     Packs/day: 1.00     Years: 50.00     Pack years: 50.00     Quit date: 12/10/2012     Years since quittin.6    Smokeless tobacco: Never Used   Substance Use Topics    Alcohol use: No    Drug use: No         OBJECTIVE:     Vital Signs (Most Recent)  Temp: 98.1 °F (36.7 °C) (20 1016)  Pulse: 66 (20 1016)  Resp: 18 (20 1016)  BP: (!) 104/56 (20 1016)  SpO2: 98 % (20 1016)    Physical Exam:                                                       GENERAL:  Comfortable, in no acute distress.                                 HEENT EXAM:  Nonicteric.  No adenopathy.  Oropharynx is clear.               NECK:  Supple.                                                               LUNGS:  Clear.                                                               CARDIAC:  Regular rate and rhythm.  S1, S2.  No murmur.                      ABDOMEN:  Soft, positive bowel sounds, nontender.  No hepatosplenomegaly or masses.  No rebound or guarding.                                             EXTREMITIES:  No edema.     MENTAL STATUS:  Normal, alert and oriented.      ASSESSMENT/PLAN:      Assessment: Reflux    Plan: EGD    Anesthesia Plan: General    ASA Grade: ASA 2 - Patient with mild systemic disease with no functional limitations    MALLAMPATI SCORE:  I (soft palate, uvula, fauces, and tonsillar pillars visible)     In my medical opinion and judgment, this medical or surgical procedure was not able to be safely postponed in accordance with [place] Department of Health, Healthcare Facility Notice #2020-COVID19-ALL-007.

## 2020-07-29 NOTE — [employer]ATION PATIENT INSTRUCTIONS
Discharge Summary/Instructions after an Endoscopic Procedure  Patient Name: Soumya Melchor  Patient MRN: [id number]  Patient YOB: 1945 Wednesday, July 29, 2020  Margarito Calloway MD  RESTRICTIONS:  During your procedure today, you received medications for sedation.  These   medications may affect your judgment, balance and coordination.  Therefore,   for 24 hours, you have the following restrictions:   - DO NOT drive a car, operate machinery, make legal/financial decisions,   sign important papers or drink alcohol.    ACTIVITY:  Today: no heavy lifting, straining or running due to procedural   sedation/anesthesia.  The following day: return to full activity including work.  DIET:  Eat and drink normally unless instructed otherwise.     TREATMENT FOR COMMON SIDE EFFECTS:  - Mild abdominal pain, nausea, belching, bloating or excessive gas:  rest,   eat lightly and use a heating pad.  - Sore Throat: treat with throat lozenges and/or gargle with warm salt   water.  - Because air was used during the procedure, expelling large amounts of air   from your rectum or belching is normal.  - If a bowel prep was taken, you may not have a bowel movement for 1-3 days.    This is normal.  SYMPTOMS TO WATCH FOR AND REPORT TO YOUR PHYSICIAN:  1. Abdominal pain or bloating, other than gas cramps.  2. Chest pain.  3. Back pain.  4. Signs of infection such as: chills or fever occurring within 24 hours   after the procedure.  5. Rectal bleeding, which would show as bright red, maroon, or black stools.   (A tablespoon of blood from the rectum is not serious, especially if   hemorrhoids are present.)  6. Vomiting.  7. Weakness or dizziness.  GO DIRECTLY TO THE NEAREST EMERGENCY ROOM IF YOU HAVE ANY OF THE FOLLOWING:      Difficulty breathing              Chills and/or fever over 101 F   Persistent vomiting and/or vomiting blood   Severe abdominal pain   Severe chest pain   Black, tarry stools   Bleeding- more than one  tablespoon   Any other symptom or condition that you feel may need urgent attention  Your doctor recommends these additional instructions:  If any biopsies were taken, your doctors clinic will contact you in 1 to 2   weeks with any results.  We are waiting for your pathology results.   Continue your present medications.   You are being discharged to home.  For questions, problems or results please call your physician - Margarito Calloway MD at Work:  [phone].  EMERGENCY PHONE NUMBER: [phone], LAB RESULTS: [phone]  IF A COMPLICATION OR EMERGENCY SITUATION ARISES AND YOU ARE UNABLE TO REACH   YOUR PHYSICIAN - GO DIRECTLY TO THE EMERGENCY ROOM.  ___________________________________________  Nurse Signature  ___________________________________________  Patient/Designated Responsible Party Signature  Margarito Calloway MD  7/29/2020 10:56:08 AM  This report has been verified and signed electronically.  [employer]

## 2020-07-30 VITALS
OXYGEN SATURATION: 96 % | TEMPERATURE: 98 F | HEART RATE: 67 BPM | SYSTOLIC BLOOD PRESSURE: 109 MMHG | HEIGHT: 60 IN | WEIGHT: 143 LBS | RESPIRATION RATE: 16 BRPM | DIASTOLIC BLOOD PRESSURE: 56 MMHG | BODY MASS INDEX: 28.07 KG/M2

## 2020-08-01 ENCOUNTER — PATIENT MESSAGE (OUTPATIENT)
Dept: GASTROENTEROLOGY | Facility: CLINIC | Age: 75
End: 2020-08-01

## 2020-08-03 ENCOUNTER — TELEPHONE (OUTPATIENT)
Dept: GASTROENTEROLOGY | Facility: CLINIC | Age: 75
End: 2020-08-03

## 2020-08-03 ENCOUNTER — OFFICE VISIT (OUTPATIENT)
Dept: OPHTHALMOLOGY | Facility: CLINIC | Age: 75
End: 2020-08-03
Payer: MEDICARE

## 2020-08-03 ENCOUNTER — PATIENT OUTREACH (OUTPATIENT)
Dept: ADMINISTRATIVE | Facility: OTHER | Age: 75
End: 2020-08-03

## 2020-08-03 VITALS — DIASTOLIC BLOOD PRESSURE: 74 MMHG | HEART RATE: 63 BPM | SYSTOLIC BLOOD PRESSURE: 159 MMHG

## 2020-08-03 DIAGNOSIS — H35.3122 NONEXUDATIVE AGE-RELATED MACULAR DEGENERATION, LEFT EYE, INTERMEDIATE DRY STAGE: ICD-10-CM

## 2020-08-03 DIAGNOSIS — H35.3211 EXUDATIVE AGE-RELATED MACULAR DEGENERATION OF RIGHT EYE WITH ACTIVE CHOROIDAL NEOVASCULARIZATION: Primary | ICD-10-CM

## 2020-08-03 PROCEDURE — 67028 PR INJECT INTRAVITREAL PHARMCOLOGIC: ICD-10-PCS | Mod: RT,S$GLB,, | Performed by: OPHTHALMOLOGY

## 2020-08-03 PROCEDURE — 99999 PR PBB SHADOW E&M-EST. PATIENT-LVL V: ICD-10-PCS | Mod: PBBFAC,,, | Performed by: OPHTHALMOLOGY

## 2020-08-03 PROCEDURE — 92134 POSTERIOR SEGMENT OCT RETINA (OCULAR COHERENCE TOMOGRAPHY)-BOTH EYES: ICD-10-PCS | Mod: S$GLB,,, | Performed by: OPHTHALMOLOGY

## 2020-08-03 PROCEDURE — 99999 PR PBB SHADOW E&M-EST. PATIENT-LVL V: CPT | Mod: PBBFAC,,, | Performed by: OPHTHALMOLOGY

## 2020-08-03 PROCEDURE — 92014 COMPRE OPH EXAM EST PT 1/>: CPT | Mod: 25,S$GLB,, | Performed by: OPHTHALMOLOGY

## 2020-08-03 PROCEDURE — 92134 CPTRZ OPH DX IMG PST SGM RTA: CPT | Mod: S$GLB,,, | Performed by: OPHTHALMOLOGY

## 2020-08-03 PROCEDURE — 67028 INJECTION EYE DRUG: CPT | Mod: RT,S$GLB,, | Performed by: OPHTHALMOLOGY

## 2020-08-03 PROCEDURE — 92014 PR EYE EXAM, EST PATIENT,COMPREHESV: ICD-10-PCS | Mod: 25,S$GLB,, | Performed by: OPHTHALMOLOGY

## 2020-08-03 RX ADMIN — Medication 1.25 MG: at 03:08

## 2020-08-03 NOTE — PROGRESS NOTES
HPI     Wet AMD consult per Dr. Díaz   DLS- 07/22/20 Dr. Díaz    Vision same as last visit with Dr. Díaz   Denies pain   (-)Flashes (+)Floaters occasionally   (+)Photophobia no more than normal   (-)Glare      EYE MEDS:   Areds BID       Last edited by Kate Maxwell on 8/3/2020  2:14 PM. (History)        OCT - OD SRF/SRH  Drusen OU      A/P    1. Wet AMD OD    Avastin OD today    2. Dry AMD OU  AREDS/AG    3. PCIOL OU      1 month Avastin OD only    Risks, benefits, and alternatives to treatment discussed in detail with the patient.  The patient voiced understanding and wished to proceed with the procedure    Injection Procedure Note:  Diagnosis: Wet AMD OD    Patient Identified and Time Out complete  Pt Prefers to be marked with sticker rather than ink marker (OHS.Qual.[email or web address] #5)  Topical Proparacaine and Betadine.  Inject Avastin OD at 6:00 @ 3.5-4mm posterior to limbus  Post Operative Dx: Same  Complications: None  Follow up as above.

## 2020-08-03 NOTE — TELEPHONE ENCOUNTER
Spoke with pt & daughter. Informed of results & recommendations per Dr. Calloway. Daughter asked if Dr. Calloway wanted to do a nuc med test to determine gallbladder function. Informed daughter at this point, he did not. Scheduled pt with general surgery in [place]. Daughter & pt verbalized understanding.

## 2020-08-03 NOTE — TELEPHONE ENCOUNTER
----- Message from Margarito Calloway MD sent at 8/3/2020  3:22 PM CDT -----  Tell pt U/S notable for small gallstones and gallbladder adenomyomatosis (benign proliferation of the lining of gallbladder wall). Not convinced this is cause of pt's symptoms, however if she wants surgeon's opinion, can refer to pt's or pt's family surgeon of choice.

## 2020-08-03 NOTE — TELEPHONE ENCOUNTER
EGD esophageal biopsies benign acid reflux, gastritis biopsies benign, negative H pylori, small bowel biopsies normal. Continue current meds.

## 2020-08-03 NOTE — LETTER
August 3, 2020      Edward Díaz Jr., MD  [address] [facility] [address] [place] [address]           [facility]  [address] [place] [address]  Phone: [phone]  Fax: [phone]          Patient: Soumya Melchor   MR Number: [id number]   YOB: 1945   Date of Visit: 8/3/2020       Dear Dr. Edward Díaz Jr.:    Thank you for referring Soumya Melchor to me for evaluation. Attached you will find relevant portions of my assessment and plan of care.    If you have questions, please do not hesitate to call me. I look forward to following Soumya Melchor along with you.    Sincerely,    CORNELL Rich MD    Enclosure  CC:  No Recipients    If you would like to receive this communication electronically, please contact [email or web address] or [phone] to request more information on EpicCare Link access.    For providers and/or their staff who would like to refer a patient to [facility], please contact us through our one-stop-shop provider referral line, [facility], at [phone].    If you feel you have received this communication in error or would no longer like to receive these types of communications, please e-mail [email or web address]

## 2020-08-03 NOTE — PATIENT INSTRUCTIONS

## 2020-08-04 LAB
FINAL PATHOLOGIC DIAGNOSIS: NORMAL
GROSS: NORMAL
SUPPLEMENTAL DIAGNOSIS: NORMAL

## 2020-08-12 ENCOUNTER — OFFICE VISIT (OUTPATIENT)
Dept: DERMATOLOGY | Facility: CLINIC | Age: 75
End: 2020-08-12
Payer: MEDICARE

## 2020-08-12 VITALS — RESPIRATION RATE: 18 BRPM

## 2020-08-12 DIAGNOSIS — L57.0 AK (ACTINIC KERATOSIS): Primary | ICD-10-CM

## 2020-08-12 DIAGNOSIS — L82.1 SEBORRHEIC KERATOSES: ICD-10-CM

## 2020-08-12 DIAGNOSIS — D22.9 MULTIPLE BENIGN NEVI: ICD-10-CM

## 2020-08-12 DIAGNOSIS — D18.01 ANGIOMA OF SKIN: ICD-10-CM

## 2020-08-12 DIAGNOSIS — L81.4 LENTIGINES: ICD-10-CM

## 2020-08-12 DIAGNOSIS — Z12.83 SCREENING EXAM FOR SKIN CANCER: ICD-10-CM

## 2020-08-12 PROCEDURE — 1101F PT FALLS ASSESS-DOCD LE1/YR: CPT | Mod: CPTII,S$GLB,, | Performed by: DERMATOLOGY

## 2020-08-12 PROCEDURE — 1126F PR PAIN SEVERITY QUANTIFIED, NO PAIN PRESENT: ICD-10-PCS | Mod: S$GLB,,, | Performed by: DERMATOLOGY

## 2020-08-12 PROCEDURE — 17000 DESTRUCT PREMALG LESION: CPT | Mod: S$GLB,,, | Performed by: DERMATOLOGY

## 2020-08-12 PROCEDURE — 17000 PR DESTRUCTION(LASER SURGERY,CRYOSURGERY,CHEMOSURGERY),PREMALIGNANT LESIONS,FIRST LESION: ICD-10-PCS | Mod: S$GLB,,, | Performed by: DERMATOLOGY

## 2020-08-12 PROCEDURE — 1126F AMNT PAIN NOTED NONE PRSNT: CPT | Mod: S$GLB,,, | Performed by: DERMATOLOGY

## 2020-08-12 PROCEDURE — 1101F PR PT FALLS ASSESS DOC 0-1 FALLS W/OUT INJ PAST YR: ICD-10-PCS | Mod: CPTII,S$GLB,, | Performed by: DERMATOLOGY

## 2020-08-12 PROCEDURE — 99214 PR OFFICE/OUTPT VISIT, EST, LEVL IV, 30-39 MIN: ICD-10-PCS | Mod: 25,S$GLB,, | Performed by: DERMATOLOGY

## 2020-08-12 PROCEDURE — 99214 OFFICE O/P EST MOD 30 MIN: CPT | Mod: 25,S$GLB,, | Performed by: DERMATOLOGY

## 2020-08-12 PROCEDURE — 99999 PR PBB SHADOW E&M-EST. PATIENT-LVL IV: ICD-10-PCS | Mod: PBBFAC,,, | Performed by: DERMATOLOGY

## 2020-08-12 PROCEDURE — 1159F PR MEDICATION LIST DOCUMENTED IN MEDICAL RECORD: ICD-10-PCS | Mod: S$GLB,,, | Performed by: DERMATOLOGY

## 2020-08-12 PROCEDURE — 99999 PR PBB SHADOW E&M-EST. PATIENT-LVL IV: CPT | Mod: PBBFAC,,, | Performed by: DERMATOLOGY

## 2020-08-12 PROCEDURE — 1159F MED LIST DOCD IN RCRD: CPT | Mod: S$GLB,,, | Performed by: DERMATOLOGY

## 2020-08-12 NOTE — PROGRESS NOTES
Subjective:       Patient ID:  Soumya Melchor is a 74 y.o. female who presents for   Chief Complaint   Patient presents with    Skin Check     LOV 11/2019  Patient present for follow up routine skin check       C/o lesions to left upper thigh, present for years. The patient denies any change, including change in color, increase in size, or spontaneous bleeding, associated with this lesion. Not treating.     no Phx of NMSC.  no Fhx of melanoma.     Past Medical History:  4/11/2014: Acid reflux  No date: Allergy  3/6/2014: Anxiety and depression  2/13/2017: AP (angina pectoris)  2/13/2017: CAD (coronary artery disease)  2/13/2017: CAD, multiple vessel  2/21/2013: Chronic pain associated with significant psychosocial   dysfunction  No date: CKD (chronic kidney disease) stage 3, GFR 30-59 ml/min      Comment:  Dr. Vásquez  No date: COPD (chronic obstructive pulmonary disease)      Comment:  well controlled, Dr Gomez [facility]  No date: History of shingles  No date: HTN (hypertension)  No date: Hypothyroidism  No date: Multiple allergies  2/13/2017: S/P CABG (coronary artery bypass graft      Review of Systems   Skin: Negative for itching, rash, dry skin, sun sensitivity, daily sunscreen use, activity-related sunscreen use, sensitivity to antibiotic ointment, sensitivity to bandage adhesive, tendency to form keloidal scars, recent sunburn, dry lips, abscesses and wears hat.   Hematologic/Lymphatic: Negative for adenopathy. Bruises/bleeds easily.        Objective:    Physical Exam   Constitutional: She appears well-developed and well-nourished. No distress.   Neurological: She is alert and oriented to person, place, and time. She is not disoriented.   Psychiatric: She has a normal mood and affect.   Skin:   Areas Examined (abnormalities noted in diagram):   Head / Face Inspection Performed  Neck Inspection Performed  Chest / Axilla Inspection Performed  Back Inspection Performed  RUE Inspected                            Diagram Legend     Erythematous scaling macule/papule c/w actinic keratosis       Vascular papule c/w angioma      Pigmented verrucoid papule/plaque c/w seborrheic keratosis      Yellow umbilicated papule c/w sebaceous hyperplasia      Irregularly shaped tan macule c/w lentigo     1-2 mm smooth white papules consistent with Milia      Movable subcutaneous cyst with punctum c/w epidermal inclusion cyst      Subcutaneous movable cyst c/w pilar cyst      Firm pink to brown papule c/w dermatofibroma      Pedunculated fleshy papule(s) c/w skin tag(s)      Evenly pigmented macule c/w junctional nevus     Mildly variegated pigmented, slightly irregular-bordered macule c/w mildly atypical nevus      Flesh colored to evenly pigmented papule c/w intradermal nevus       Pink pearly papule/plaque c/w basal cell carcinoma      Erythematous hyperkeratotic cursted plaque c/w SCC      Surgical scar with no sign of skin cancer recurrence      Open and closed comedones      Inflammatory papules and pustules      Verrucoid papule consistent consistent with wart     Erythematous eczematous patches and plaques     Dystrophic onycholytic nail with subungual debris c/w onychomycosis     Umbilicated papule    Erythematous-base heme-crusted tan verrucoid plaque consistent with inflamed seborrheic keratosis     Erythematous Silvery Scaling Plaque c/w Psoriasis     See annotation      Assessment / Plan:        AK (actinic keratosis)  Premalignant nature discussed     Cryosurgery Procedure Note    Verbal consent from the patient is obtained including, but not limited to, risk of hypopigmentation/hyperpigmentation, scar, recurrence of lesion. The patient is aware of the precancerous quality and need for treatment of these lesions. Liquid nitrogen cryosurgery is applied to the 1 actinic keratoses, as detailed in the physical exam, to produce a freeze injury. The patient is aware that blisters may form and is instructed on wound  care with gentle cleansing and use of vaseline ointment to keep moist until healed. The patient is supplied a handout on cryosurgery and is instructed to call if lesions do not completely resolve.      Multiple benign nevi  total body skin examination performed today including at least 12 points as noted in physical examination. No lesions suspicious for malignancy noted.  Reassurance provided.  Instructed patient to observe lesion(s) for changes and follow up in clinic if changes are noted. Discussed ABCDE's of moles and brochure provided.      Seborrheic keratoses  These are benign inherited growths without a malignant potential. Reassurance given to patient. No treatment is necessary.       Angioma of skin  This is a benign vascular lesion. Reassurance given. No treatment required.       Lentigines  This is a benign hyperpigmented sun induced lesion. Daily sun protection will reduce the number of new lesions. Treatment of these benign lesions are considered cosmetic.      Screening exam for skin cancer  Total body skin examination performed today including at least 12 points as noted in physical examination. No lesions suspicious for malignancy noted.               Follow up in about 2 years (around 8/12/2022).

## 2020-08-16 ENCOUNTER — PATIENT MESSAGE (OUTPATIENT)
Dept: FAMILY MEDICINE | Facility: CLINIC | Age: 75
End: 2020-08-16

## 2020-08-16 ENCOUNTER — PATIENT MESSAGE (OUTPATIENT)
Dept: GASTROENTEROLOGY | Facility: CLINIC | Age: 75
End: 2020-08-16

## 2020-08-25 ENCOUNTER — PATIENT MESSAGE (OUTPATIENT)
Dept: GASTROENTEROLOGY | Facility: CLINIC | Age: 75
End: 2020-08-25

## 2020-09-14 ENCOUNTER — PROCEDURE VISIT (OUTPATIENT)
Dept: OPHTHALMOLOGY | Facility: CLINIC | Age: 75
End: 2020-09-14
Payer: MEDICARE

## 2020-09-14 DIAGNOSIS — H35.3122 NONEXUDATIVE AGE-RELATED MACULAR DEGENERATION, LEFT EYE, INTERMEDIATE DRY STAGE: ICD-10-CM

## 2020-09-14 DIAGNOSIS — H35.3211 EXUDATIVE AGE-RELATED MACULAR DEGENERATION OF RIGHT EYE WITH ACTIVE CHOROIDAL NEOVASCULARIZATION: Primary | ICD-10-CM

## 2020-09-14 DIAGNOSIS — H35.3211 WET AGE-RELATED MACULAR DEGENERATION OF RIGHT EYE WITH ACTIVE CHOROIDAL NEOVASCULARIZATION: ICD-10-CM

## 2020-09-14 PROCEDURE — 99499 UNLISTED E&M SERVICE: CPT | Mod: S$GLB,,, | Performed by: OPHTHALMOLOGY

## 2020-09-14 PROCEDURE — 99499 NO LOS: ICD-10-PCS | Mod: S$GLB,,, | Performed by: OPHTHALMOLOGY

## 2020-09-14 PROCEDURE — 67028 INJECTION EYE DRUG: CPT | Mod: RT,S$GLB,, | Performed by: OPHTHALMOLOGY

## 2020-09-14 PROCEDURE — 67028 PR INJECT INTRAVITREAL PHARMCOLOGIC: ICD-10-PCS | Mod: RT,S$GLB,, | Performed by: OPHTHALMOLOGY

## 2020-09-14 RX ADMIN — Medication 1.25 MG: at 02:09

## 2020-09-14 NOTE — PATIENT INSTRUCTIONS

## 2020-09-14 NOTE — PROGRESS NOTES
HPI     1 mo Av   DLS- 08/03/20 Dr. Rich    Pt sts improvement in floaters not seeing them at this time.   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     Denies pain   (-)Flashes (-)Floaters not as much   (+)Photophobia no more than normal   (-)Glare      EYE MEDS:   Areds BID   Refresh PRN       OCT - OD SRF/SRH  Drusen OU      A/P    1. Wet AMD OD  S/p Avastin OD x 1    Avastin OD today    2. Dry AMD OU  AREDS/AG    3. PCIOL OU      1 month OCT no dilate    Risks, benefits, and alternatives to treatment discussed in detail with the patient.  The patient voiced understanding and wished to proceed with the procedure    Injection Procedure Note:  Diagnosis: Wet AMD OD    Patient Identified and Time Out complete  Pt Prefers to be marked with sticker rather than ink marker ([email or web address].003 #5)  Topical Proparacaine and Betadine.  Inject Avastin OD at 6:00 @ 3.5-4mm posterior to limbus  Post Operative Dx: Same  Complications: None  Follow up as above.

## 2020-10-19 ENCOUNTER — PROCEDURE VISIT (OUTPATIENT)
Dept: OPHTHALMOLOGY | Facility: CLINIC | Age: 75
End: 2020-10-19
Payer: MEDICARE

## 2020-10-19 DIAGNOSIS — H35.3211 EXUDATIVE AGE-RELATED MACULAR DEGENERATION OF RIGHT EYE WITH ACTIVE CHOROIDAL NEOVASCULARIZATION: Primary | ICD-10-CM

## 2020-10-19 PROCEDURE — 99499 UNLISTED E&M SERVICE: CPT | Mod: S$GLB,,, | Performed by: OPHTHALMOLOGY

## 2020-10-19 PROCEDURE — 92134 POSTERIOR SEGMENT OCT RETINA (OCULAR COHERENCE TOMOGRAPHY)-BOTH EYES: ICD-10-PCS | Mod: S$GLB,,, | Performed by: OPHTHALMOLOGY

## 2020-10-19 PROCEDURE — 67028 PR INJECT INTRAVITREAL PHARMCOLOGIC: ICD-10-PCS | Mod: RT,S$GLB,, | Performed by: OPHTHALMOLOGY

## 2020-10-19 PROCEDURE — 92012 PR EYE EXAM, EST PATIENT,INTERMED: ICD-10-PCS | Mod: 25,S$GLB,, | Performed by: OPHTHALMOLOGY

## 2020-10-19 PROCEDURE — 92012 INTRM OPH EXAM EST PATIENT: CPT | Mod: 25,S$GLB,, | Performed by: OPHTHALMOLOGY

## 2020-10-19 PROCEDURE — 67028 INJECTION EYE DRUG: CPT | Mod: RT,S$GLB,, | Performed by: OPHTHALMOLOGY

## 2020-10-19 PROCEDURE — 99499 RISK ADDL DX/OHS AUDIT: ICD-10-PCS | Mod: S$GLB,,, | Performed by: OPHTHALMOLOGY

## 2020-10-19 PROCEDURE — 92134 CPTRZ OPH DX IMG PST SGM RTA: CPT | Mod: S$GLB,,, | Performed by: OPHTHALMOLOGY

## 2020-10-19 RX ADMIN — Medication 1.25 MG: at 02:10

## 2020-10-19 NOTE — PATIENT INSTRUCTIONS

## 2020-10-19 NOTE — PROGRESS NOTES
HPI     1 month OCT Avastin  DLS: 09/14/2020 Dr. Rich    Patient states her vision has been doing well and has not had any floaters   at all. She is pleased about that. Denies pain   (-)Flashes (-)Floaters   (+)Photophobia no more than normal   (-)Glare      EYE MEDS:   Areds BID   Refresh PRN       OCT - OD SRF/SRH  Drusen OU      A/P    1. Wet AMD OD  S/p Avastin OD x 2    Avastin OD today    2. Dry AMD OU  AREDS/AG    3. PCIOL OU      1 month avastin OD only    Risks, benefits, and alternatives to treatment discussed in detail with the patient.  The patient voiced understanding and wished to proceed with the procedure    Injection Procedure Note:  Diagnosis: Wet AMD OD    Patient Identified and Time Out complete  Pt Prefers to be marked with sticker rather than ink marker ([email or web address].003 #5)  Topical Proparacaine and Betadine.  Inject Avastin OD at 6:00 @ 3.5-4mm posterior to limbus  Post Operative Dx: Same  Complications: None  Follow up as above.

## 2020-10-20 ENCOUNTER — HOSPITAL ENCOUNTER (OUTPATIENT)
Dept: RADIOLOGY | Facility: HOSPITAL | Age: 75
Discharge: HOME | End: 2020-10-20
Attending: INTERNAL MEDICINE
Payer: MEDICARE

## 2020-10-20 ENCOUNTER — PATIENT MESSAGE (OUTPATIENT)
Dept: PULMONOLOGY | Facility: CLINIC | Age: 75
End: 2020-10-20

## 2020-10-20 ENCOUNTER — TELEPHONE (OUTPATIENT)
Dept: PULMONOLOGY | Facility: CLINIC | Age: 75
End: 2020-10-20

## 2020-10-20 DIAGNOSIS — J98.9 RESPIRATORY DISORDER, UNSPECIFIED: ICD-10-CM

## 2020-10-20 DIAGNOSIS — J44.9 CHRONIC OBSTRUCTIVE PULMONARY DISEASE, UNSPECIFIED COPD TYPE: Primary | ICD-10-CM

## 2020-10-20 DIAGNOSIS — R91.1 SOLITARY PULMONARY NODULE: ICD-10-CM

## 2020-10-20 DIAGNOSIS — R05.9 COUGH: ICD-10-CM

## 2020-10-20 PROCEDURE — 71250 CT THORAX DX C-: CPT | Mod: TC

## 2020-10-22 RX ORDER — FUROSEMIDE 20 MG/1
40 TABLET ORAL DAILY
Qty: 30 TABLET | Refills: 11 | Status: SHIPPED | OUTPATIENT
Start: 2020-10-22 | End: 2021-06-11

## 2020-10-22 NOTE — TELEPHONE ENCOUNTER
Returned call  Call in prescription for Lasix 40 mg to be taken once a day.  Patient is already taking supplemental over-the-counter potassium.    Patient is scheduled to see a cardiologist next week.    Patient indicates that she did not feel that the Anoro was effective.    New prescription for Spiriva Respimat sent in to her pharmacy.  Stop Anoro

## 2020-10-27 DIAGNOSIS — K21.9 GASTROESOPHAGEAL REFLUX DISEASE, UNSPECIFIED WHETHER ESOPHAGITIS PRESENT: ICD-10-CM

## 2020-10-27 RX ORDER — FAMOTIDINE 40 MG/1
40 TABLET, FILM COATED ORAL NIGHTLY
Qty: 30 TABLET | Refills: 2 | Status: SHIPPED | OUTPATIENT
Start: 2020-10-27 | End: 2021-03-23 | Stop reason: SDUPTHER

## 2020-11-09 ENCOUNTER — TELEPHONE (OUTPATIENT)
Dept: PULMONOLOGY | Facility: CLINIC | Age: 75
End: 2020-11-09

## 2020-11-09 DIAGNOSIS — R06.02 SOB (SHORTNESS OF BREATH): Primary | ICD-10-CM

## 2020-11-09 NOTE — TELEPHONE ENCOUNTER
----- Message from Padma Holley sent at 11/9/2020  9:45 AM CST -----  Contact: Soumya Dooley would like a call back at [phone], Regards to getting a portable oxygen tank.    Thanks  Td

## 2020-11-23 ENCOUNTER — LAB VISIT (OUTPATIENT)
Dept: LAB | Facility: HOSPITAL | Age: 75
End: 2020-11-23
Attending: INTERNAL MEDICINE
Payer: MEDICARE

## 2020-11-23 DIAGNOSIS — I25.700 ATHEROSCLEROSIS OF CORONARY ARTERY BYPASS GRAFT WITH UNSTABLE ANGINA PECTORIS: Primary | ICD-10-CM

## 2020-11-23 LAB
ANION GAP SERPL CALC-SCNC: 9 MMOL/L (ref 8–16)
BASOPHILS # BLD AUTO: 0.08 K/UL (ref 0–0.2)
BASOPHILS NFR BLD: 1 % (ref 0–1.9)
BUN SERPL-MCNC: 27 MG/DL (ref 8–23)
CALCIUM SERPL-MCNC: 9 MG/DL (ref 8.7–10.5)
CHLORIDE SERPL-SCNC: 100 MMOL/L (ref 95–110)
CO2 SERPL-SCNC: 32 MMOL/L (ref 23–29)
CREAT SERPL-MCNC: 1.4 MG/DL (ref 0.5–1.4)
DIFFERENTIAL METHOD: ABNORMAL
EOSINOPHIL # BLD AUTO: 0.3 K/UL (ref 0–0.5)
EOSINOPHIL NFR BLD: 3.8 % (ref 0–8)
ERYTHROCYTE [DISTWIDTH] IN BLOOD BY AUTOMATED COUNT: 14 % (ref 11.5–14.5)
EST. GFR  (AFRICAN AMERICAN): 41.8 ML/MIN/1.73 M^2
EST. GFR  (NON AFRICAN AMERICAN): 36.3 ML/MIN/1.73 M^2
GLUCOSE SERPL-MCNC: 81 MG/DL (ref 70–110)
HCT VFR BLD AUTO: 38.7 % (ref 37–48.5)
HGB BLD-MCNC: 12.2 G/DL (ref 12–16)
IMM GRANULOCYTES # BLD AUTO: 0.03 K/UL (ref 0–0.04)
IMM GRANULOCYTES NFR BLD AUTO: 0.4 % (ref 0–0.5)
LYMPHOCYTES # BLD AUTO: 2.8 K/UL (ref 1–4.8)
LYMPHOCYTES NFR BLD: 33.8 % (ref 18–48)
MCH RBC QN AUTO: 29.8 PG (ref 27–31)
MCHC RBC AUTO-ENTMCNC: 31.5 G/DL (ref 32–36)
MCV RBC AUTO: 94 FL (ref 82–98)
MONOCYTES # BLD AUTO: 0.6 K/UL (ref 0.3–1)
MONOCYTES NFR BLD: 7.5 % (ref 4–15)
NEUTROPHILS # BLD AUTO: 4.5 K/UL (ref 1.8–7.7)
NEUTROPHILS NFR BLD: 53.5 % (ref 38–73)
NRBC BLD-RTO: 0 /100 WBC
PLATELET # BLD AUTO: 321 K/UL (ref 150–350)
PMV BLD AUTO: 10.2 FL (ref 9.2–12.9)
POTASSIUM SERPL-SCNC: 3.8 MMOL/L (ref 3.5–5.1)
RBC # BLD AUTO: 4.1 M/UL (ref 4–5.4)
SODIUM SERPL-SCNC: 141 MMOL/L (ref 136–145)
WBC # BLD AUTO: 8.38 K/UL (ref 3.9–12.7)

## 2020-11-23 PROCEDURE — 85025 COMPLETE CBC W/AUTO DIFF WBC: CPT

## 2020-11-23 PROCEDURE — 80048 BASIC METABOLIC PNL TOTAL CA: CPT

## 2020-11-23 PROCEDURE — 36415 COLL VENOUS BLD VENIPUNCTURE: CPT | Mod: PO

## 2020-12-02 ENCOUNTER — TELEPHONE (OUTPATIENT)
Dept: PULMONOLOGY | Facility: CLINIC | Age: 75
End: 2020-12-02

## 2020-12-02 NOTE — TELEPHONE ENCOUNTER
----- Message from Courtney Spaulding sent at 12/2/2020 11:24 AM CST -----  Contact: Soumya  Type:  Sooner Apoointment Request    Caller is requesting a sooner appointment.  Caller declined first available appointment listed below.  Caller will not accept being placed on the waitlist and is requesting a message be sent to doctor.  Name of Caller: Soumya   When is the first available appointment? 1/12/21  Symptoms: shortness of breath   Would the patient rather a call back or a response via [facility]? Call back   Best Call Back Number: [phone]  Additional Information: pt would like to be seen as soon as possible      Thanks,  PB

## 2020-12-04 ENCOUNTER — TELEPHONE (OUTPATIENT)
Dept: PULMONOLOGY | Facility: CLINIC | Age: 75
End: 2020-12-04

## 2020-12-04 NOTE — TELEPHONE ENCOUNTER
Pt states she got emails from what she thought was Pulmonary about a sooner appt.  Pt states she will plan on coming 12/23/20 as scheduled.

## 2020-12-04 NOTE — TELEPHONE ENCOUNTER
----- Message from Sierra Judd sent at 12/4/2020 12:45 PM CST -----  Contact: Pt  .Type:  Sooner Appointment Request    Caller is requesting a sooner appointment.  Caller declined first available appointment listed below.  Caller will not accept being placed on the waitlist and is requesting a message be sent to doctor.  Name of Caller: Soumya  When is the first available appointment?  Symptoms:  Would the patient rather a call back or a response via [facility]? callback  Best Call Back Number: .[phone] (home)     Additional Information:  pt recvd a message for a sooner appt/ she needs to know the time and day

## 2020-12-07 ENCOUNTER — PROCEDURE VISIT (OUTPATIENT)
Dept: OPHTHALMOLOGY | Facility: CLINIC | Age: 75
End: 2020-12-07
Payer: MEDICARE

## 2020-12-07 DIAGNOSIS — H35.3122 NONEXUDATIVE AGE-RELATED MACULAR DEGENERATION, LEFT EYE, INTERMEDIATE DRY STAGE: ICD-10-CM

## 2020-12-07 DIAGNOSIS — H35.3211 EXUDATIVE AGE-RELATED MACULAR DEGENERATION OF RIGHT EYE WITH ACTIVE CHOROIDAL NEOVASCULARIZATION: Primary | ICD-10-CM

## 2020-12-07 PROCEDURE — 67028 PR INJECT INTRAVITREAL PHARMCOLOGIC: ICD-10-PCS | Mod: RT,S$GLB,, | Performed by: OPHTHALMOLOGY

## 2020-12-07 PROCEDURE — 67028 INJECTION EYE DRUG: CPT | Mod: RT,S$GLB,, | Performed by: OPHTHALMOLOGY

## 2020-12-07 RX ADMIN — Medication 1.25 MG: at 02:12

## 2020-12-07 NOTE — PATIENT INSTRUCTIONS

## 2020-12-07 NOTE — PROGRESS NOTES
HPI     1 month Avastin  DLS: 10/19/20  Dr. Rich    Patient states vision is stable. 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   (-)Flashes (-)Floaters   (+)Photophobia no more than normal   (-)Glare      EYE MEDS:   Areds BID   Refresh PRN         OCT - OD SRF/SRH  Drusen OU      A/P    1. Wet AMD OD  S/p Avastin OD x 3    Avastin OD today    2. Dry AMD OU  AREDS/AG    3. PCIOL OU      1 month OCT no dilate    Risks, benefits, and alternatives to treatment discussed in detail with the patient.  The patient voiced understanding and wished to proceed with the procedure    Injection Procedure Note:  Diagnosis: Wet AMD OD    Patient Identified and Time Out complete  Pt Prefers to be marked with sticker rather than ink marker ([id number] #[id number])  Topical Proparacaine and Betadine.  Inject Avastin OD at 6:00 @ 3.5-4mm posterior to limbus  Post Operative Dx: Same  Complications: None  Follow up as above.

## 2020-12-17 ENCOUNTER — TELEPHONE (OUTPATIENT)
Dept: PAIN MEDICINE | Facility: CLINIC | Age: 75
End: 2020-12-17

## 2020-12-17 DIAGNOSIS — G89.4 CHRONIC PAIN SYNDROME: Primary | ICD-10-CM

## 2020-12-17 RX ORDER — DULOXETIN HYDROCHLORIDE 20 MG/1
20 CAPSULE, DELAYED RELEASE ORAL DAILY
Qty: 30 CAPSULE | Refills: 1 | Status: SHIPPED | OUTPATIENT
Start: 2020-12-17 | End: 2021-02-02 | Stop reason: SDUPTHER

## 2020-12-17 NOTE — TELEPHONE ENCOUNTER
----- Message from Johanna Giles sent at 12/17/2020  8:19 AM CST -----  States she would like to change her medicine from gabepentin to cymbalta. States she would like try the cymbalta, too see if it helps her better. Pt uses     [facility] - [place] - [address]  [address]  [place] [address]  Phone: [phone] Fax: [phone]    Please call pt [phone]. Thank you

## 2020-12-17 NOTE — TELEPHONE ENCOUNTER
Informed pt . Pt understood. All questions answered.   [facility]

## 2020-12-22 ENCOUNTER — LAB VISIT (OUTPATIENT)
Dept: LAB | Facility: HOSPITAL | Age: 75
End: 2020-12-22
Attending: INTERNAL MEDICINE
Payer: MEDICARE

## 2020-12-22 DIAGNOSIS — R73.03 PREDIABETES: ICD-10-CM

## 2020-12-22 PROCEDURE — 36415 COLL VENOUS BLD VENIPUNCTURE: CPT | Mod: PO

## 2020-12-22 PROCEDURE — 83036 HEMOGLOBIN GLYCOSYLATED A1C: CPT

## 2020-12-23 ENCOUNTER — CLINICAL SUPPORT (OUTPATIENT)
Dept: PULMONOLOGY | Facility: CLINIC | Age: 75
End: 2020-12-23
Payer: MEDICARE

## 2020-12-23 ENCOUNTER — OFFICE VISIT (OUTPATIENT)
Dept: PULMONOLOGY | Facility: CLINIC | Age: 75
End: 2020-12-23
Payer: MEDICARE

## 2020-12-23 VITALS
BODY MASS INDEX: 29.22 KG/M2 | WEIGHT: 148.81 LBS | WEIGHT: 148.81 LBS | BODY MASS INDEX: 29.22 KG/M2 | OXYGEN SATURATION: 98 % | DIASTOLIC BLOOD PRESSURE: 63 MMHG | SYSTOLIC BLOOD PRESSURE: 134 MMHG | HEART RATE: 68 BPM | RESPIRATION RATE: 18 BRPM | HEIGHT: 60 IN | HEIGHT: 60 IN

## 2020-12-23 DIAGNOSIS — Z87.891 FORMER SMOKER: ICD-10-CM

## 2020-12-23 DIAGNOSIS — G89.4 CHRONIC PAIN SYNDROME: ICD-10-CM

## 2020-12-23 DIAGNOSIS — J44.9 MODERATE COPD (CHRONIC OBSTRUCTIVE PULMONARY DISEASE): Primary | ICD-10-CM

## 2020-12-23 DIAGNOSIS — K21.9 GASTROESOPHAGEAL REFLUX DISEASE, UNSPECIFIED WHETHER ESOPHAGITIS PRESENT: ICD-10-CM

## 2020-12-23 DIAGNOSIS — R73.03 PREDIABETES: ICD-10-CM

## 2020-12-23 DIAGNOSIS — R06.02 SOB (SHORTNESS OF BREATH): ICD-10-CM

## 2020-12-23 DIAGNOSIS — N18.31 STAGE 3A CHRONIC KIDNEY DISEASE: ICD-10-CM

## 2020-12-23 DIAGNOSIS — R06.09 DOE (DYSPNEA ON EXERTION): ICD-10-CM

## 2020-12-23 LAB
ESTIMATED AVG GLUCOSE: 126 MG/DL (ref 68–131)
HBA1C MFR BLD HPLC: 6 % (ref 4–5.6)

## 2020-12-23 PROCEDURE — 1159F PR MEDICATION LIST DOCUMENTED IN MEDICAL RECORD: ICD-10-PCS | Mod: S$GLB,,, | Performed by: NURSE PRACTITIONER

## 2020-12-23 PROCEDURE — 1101F PT FALLS ASSESS-DOCD LE1/YR: CPT | Mod: CPTII,S$GLB,, | Performed by: NURSE PRACTITIONER

## 2020-12-23 PROCEDURE — 99999 PR PBB SHADOW E&M-EST. PATIENT-LVL V: ICD-10-PCS | Mod: PBBFAC,,, | Performed by: NURSE PRACTITIONER

## 2020-12-23 PROCEDURE — 3075F PR MOST RECENT SYSTOLIC BLOOD PRESS GE 130-139MM HG: ICD-10-PCS | Mod: CPTII,S$GLB,, | Performed by: NURSE PRACTITIONER

## 2020-12-23 PROCEDURE — 99214 PR OFFICE/OUTPT VISIT, EST, LEVL IV, 30-39 MIN: ICD-10-PCS | Mod: 25,S$GLB,, | Performed by: NURSE PRACTITIONER

## 2020-12-23 PROCEDURE — 99214 OFFICE O/P EST MOD 30 MIN: CPT | Mod: 25,S$GLB,, | Performed by: NURSE PRACTITIONER

## 2020-12-23 PROCEDURE — 99999 PR PBB SHADOW E&M-EST. PATIENT-LVL I: CPT | Mod: PBBFAC,,,

## 2020-12-23 PROCEDURE — 1101F PR PT FALLS ASSESS DOC 0-1 FALLS W/OUT INJ PAST YR: ICD-10-PCS | Mod: CPTII,S$GLB,, | Performed by: NURSE PRACTITIONER

## 2020-12-23 PROCEDURE — 3288F FALL RISK ASSESSMENT DOCD: CPT | Mod: CPTII,S$GLB,, | Performed by: NURSE PRACTITIONER

## 2020-12-23 PROCEDURE — 94618 PULMONARY STRESS TESTING: CPT | Mod: S$GLB,,, | Performed by: INTERNAL MEDICINE

## 2020-12-23 PROCEDURE — 1159F MED LIST DOCD IN RCRD: CPT | Mod: S$GLB,,, | Performed by: NURSE PRACTITIONER

## 2020-12-23 PROCEDURE — 3078F DIAST BP <80 MM HG: CPT | Mod: CPTII,S$GLB,, | Performed by: NURSE PRACTITIONER

## 2020-12-23 PROCEDURE — 3078F PR MOST RECENT DIASTOLIC BLOOD PRESSURE < 80 MM HG: ICD-10-PCS | Mod: CPTII,S$GLB,, | Performed by: NURSE PRACTITIONER

## 2020-12-23 PROCEDURE — 99999 PR PBB SHADOW E&M-EST. PATIENT-LVL V: CPT | Mod: PBBFAC,,, | Performed by: NURSE PRACTITIONER

## 2020-12-23 PROCEDURE — 99999 PR PBB SHADOW E&M-EST. PATIENT-LVL I: ICD-10-PCS | Mod: PBBFAC,,,

## 2020-12-23 PROCEDURE — 3075F SYST BP GE 130 - 139MM HG: CPT | Mod: CPTII,S$GLB,, | Performed by: NURSE PRACTITIONER

## 2020-12-23 PROCEDURE — 94618 PULMONARY STRESS TESTING: ICD-10-PCS | Mod: S$GLB,,, | Performed by: INTERNAL MEDICINE

## 2020-12-23 PROCEDURE — 3288F PR FALLS RISK ASSESSMENT DOCUMENTED: ICD-10-PCS | Mod: CPTII,S$GLB,, | Performed by: NURSE PRACTITIONER

## 2020-12-23 RX ORDER — HYDROGEN PEROXIDE 3 %
SOLUTION, NON-ORAL MISCELLANEOUS
COMMUNITY
End: 2021-01-28

## 2020-12-23 RX ORDER — AMLODIPINE BESYLATE 10 MG/1
TABLET ORAL
COMMUNITY
End: 2021-06-21

## 2020-12-23 RX ORDER — MAGNESIUM HYDROXIDE 400 MG/5ML
SUSPENSION, ORAL (FINAL DOSE FORM) ORAL
COMMUNITY
End: 2022-04-05 | Stop reason: ALTCHOICE

## 2020-12-23 RX ORDER — BUDESONIDE, GLYCOPYRROLATE, AND FORMOTEROL FUMARATE 160; 9; 4.8 UG/1; UG/1; UG/1
2 AEROSOL, METERED RESPIRATORY (INHALATION) 2 TIMES DAILY
Qty: 10.7 G | Refills: 11 | Status: SHIPPED | OUTPATIENT
Start: 2020-12-23 | End: 2021-01-28

## 2020-12-23 RX ORDER — LEVOTHYROXINE SODIUM 112 UG/1
TABLET ORAL
COMMUNITY
End: 2021-01-11

## 2020-12-23 NOTE — LETTER
December 23, 2020      Navarro Carmona MD  [address]  [place] [address]           [facility]  [address] [place] [id number]  Phone: [phone]  Fax: [phone]          Patient: Soumya Melchor   MR Number: [id number]   YOB: 1945   Date of Visit: 12/23/2020       Dear Dr. Navarro Carmona:    Thank you for referring Soumya Melchor to me for evaluation. Attached you will find relevant portions of my assessment and plan of care.    If you have questions, please do not hesitate to call me. I look forward to following Soumya Melchor along with you.    Sincerely,    Nirmala Marcelino, NP    Enclosure  CC:  No Recipients    If you would like to receive this communication electronically, please contact [email or web address] or [phone] to request more information on [employer] access.    For providers and/or their staff who would like to refer a patient to [facility], please contact us through our one-stop-shop provider referral line, [facility] , at [phone].    If you feel you have received this communication in error or would no longer like to receive these types of communications, please e-mail [email or web address]

## 2020-12-23 NOTE — PROCEDURES
[facility]  Six Minute Walk     SUMMARY     Ordering Provider: Dr. Carmona   Interpreting Provider: Dr. Carmichael  Performing nurse/tech/RT: EARNEST Jacobs crt  Diagnosis: Shortness of Breath  Height: 5' (152.4 cm)  Weight: 67.5 kg (148 lb 13 oz)  BMI (Calculated): 29.1   Patient Race:             Phase Oxygen Assessment Supplemental O2 Heart   Rate Blood Pressure John Paul Dyspnea Scale Rating   Resting 96 % Room Air 75 bpm 134/63 3   Exercise        Minute        1 97 % Room Air 99 bpm     2 97 % Room Air 102 bpm     3 96 % Room Air 106 bpm     4 97 % Room Air 106 bpm     5 97 % Room Air 106 bpm     6  97 % Room Air 107 bpm 152/57 5-6   Recovery        Minute        1 96 % Room Air 96 bpm     2 97 % Room Air 81 bpm     3 99 % Room Air 73 bpm     4 98 % Room Air 72 bpm 132/61 2     Six Minute Walk Summary  6MWT Status: completed without stopping  Patient Reported: Dyspnea     Interpretation:  Did the patient stop or pause?: No         Total Time Walked (Calculated): 360 seconds  Final Partial Lap Distance (feet): 25 feet  Total Distance Meters (Calculated): 251.46 meters  Predicted Distance Meters (Calculated): 388.93 meters  Percentage of Predicted (Calculated): 64.65  Peak VO2 (Calculated): 11.52  Mets: 3.29  Has The Patient Had a Previous Six Minute Walk Test?: Yes       Previous 6MWT Results  Has The Patient Had a Previous Six Minute Walk Test?: Yes  Date of Previous Test: 12/23/16  Total Time Walked: 360 seconds  Total Distance (meters): 304.8  Predicted Distance (meters): 439.53 meters  Percentage of Predicted: 69.35  Percent Change (Calculated): 0.18         CLINICAL INTERPRETATION:  Six minute walk distance is 388.93m (64.65 % predicted) with moderate dyspnea.  During exercise, there was no significant desaturation while breathing supplemental oxygen.  This may represent a hypertensive response to exercise.  The patient did not report non-pulmonary symptoms during exercise.  The patient did  complete the study, walking 360 seconds of the 360 second test.  The patient may not benefit from using supplemental oxygen during exertion.  Since the previous study in 12/23/2016, exercise capacity is unchanged.  Based upon age and body mass index, exercise capacity is less than predicted.

## 2020-12-23 NOTE — ASSESSMENT & PLAN NOTE
Chronic  12/23/2020 6MWD No desaturations requiring supplemental oxygen at rest or exertion.  Recent cardiac work up told no cardiac findings to link to cause of shortness of breath on exertion  Moderate COPD begin ICS/LABA/LAMA

## 2020-12-23 NOTE — ASSESSMENT & PLAN NOTE
Hemoglobin A1C   Date Value Ref Range Status   12/22/2020 6.0 (H) 4.0 - 5.6 % Final     Comment:     ADA Screening Guidelines:  5.7-6.4%  Consistent with prediabetes  >or=6.5%  Consistent with diabetes  High levels of fetal hemoglobin interfere with the HbA1C  assay. Heterozygous hemoglobin variants (HbS, HgC, etc)do  not significantly interfere with this assay.   However, presence of multiple variants may affect accuracy.     06/17/2020 5.7 (H) 4.0 - 5.6 % Final     Comment:     ADA Screening Guidelines:  5.7-6.4%  Consistent with prediabetes  >or=6.5%  Consistent with diabetes  High levels of fetal hemoglobin interfere with the HbA1C  assay. Heterozygous hemoglobin variants (HbS, HgC, etc)do  not significantly interfere with this assay.   However, presence of multiple variants may affect accuracy.     02/13/2017 5.5 4.5 - 6.2 % Final     Comment:     According to ADA guidelines, hemoglobin A1C <7.0% represents  optimal control in non-pregnant diabetic patients.  Different  metrics may apply to specific populations.   Standards of Medical Care in Diabetes - 2016.  For the purpose of screening for the presence of diabetes:  <5.7%     Consistent with the absence of diabetes  5.7-6.4%  Consistent with increasing risk for diabetes   (prediabetes)  >or=6.5%  Consistent with diabetes  Currently no consensus exists for use of hemoglobin A1C  for diagnosis of diabetes for children.

## 2020-12-23 NOTE — ASSESSMENT & PLAN NOTE
Not well controlled on sprivia or anoro  Trial of ics/laba/lama Breztri   Patient prefer HFA type inhaler over powder

## 2020-12-23 NOTE — PROGRESS NOTES
Subjective:      Patient ID: Soumya Melchor is a 75 y.o. female.    I have reviewed the patient's medical history in detail and updated the computerized patient record.    Problem list has been reviewed.    Chief Complaint: COPD    HPI  The patient presents for evaluation of COPD with chronic shortness of breath complaint with review of 6mwd.     12/23/2020 6MWD No desaturations requiring supplemental oxygen at rest or exertion.    Patient reports she has had chronic shortness of breath that she finds worse over past month.  She had cardiac work up with her cardiologist, Dr. Alan Singh she reports told no blockage in heart.      She has chronic symptom of shortness of breath with prolonged exertion.   She denies  cough,  sputum, hemoptysis, pain with breathing and wheezing.     Her current respiratory regimen: Duo nebs 2-3 a day, Albuterol MDI(or generic equivalent) and  Spiriva (Toptrpium) Handihaler.   She does use medications compliantly. Does not feel spiriva offers relief. No relief with anoro in past.   She avoids activities that cause shortness of breath.  CAT score today is 16.    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   Occupational exposure to hair spray, hair chemicals (she wears mask and tries not to over spray hair spray).    Avocational Exposure:   None currently.       Pet Exposures:  Dogs 3 in the house. Denies allergy to Dog dander. Allergy to cats, Siamese only.     Previous Report Reviewed: lab reports and office notes     Past Medical History: The following portions of the patient's history were reviewed and updated as appropriate:   She  has a past surgical history that includes Knee surgery (Right); Dilation and curettage of uterus; Spinal cord stimulator implant (02/12/2018); Tonsillectomy; Upper gastrointestinal endoscopy; Esophagogastroduodenoscopy (N/A, 7/3/2018); Endoscopic ultrasound of upper gastrointestinal  tract (Left, 7/3/2018); Cardiac surgery (02/2017); Robot-assisted surgical removal of stomach using da Kenia Xi (N/A, 7/31/2018); Tumor removal; Insertion of dorsal column nerve stimulator for trial (N/A, 6/19/2019); Insertion of neurostimulator of dorsal column of spinal cord (N/A, 7/25/2019); Replacement of nerve stimulator battery (7/25/2019); Colonoscopy (~2013); Colonoscopy (N/A, 3/15/2018); Cataract extraction (Bilateral, 2014); and Esophagogastroduodenoscopy (N/A, 7/29/2020).  Her family history includes Cancer in her father; Heart attacks under age 50 (age of onset: 41) in her mother.  She  reports that she quit smoking about 8 years ago. She started smoking about 61 years ago. She has a 52.00 pack-year smoking history. She has never used smokeless tobacco. She reports that she does not drink alcohol or use drugs.  She has a current medication list which includes the following prescription(s): amlodipine, amlodipine, ascorbic acid (vitamin c), aspirin, cholestyramine, cyanocobalamin, donepezil, duloxetine, esomeprazole, famotidine, fish oil-omega-3 fatty acids, fluticasone propionate, furosemide, gabapentin, hydroxyzine pamoate, l.acid/l.casei/b.bif/b.miryam/fos, lactase, levothyroxine, levothyroxine, magnesium oxide, montelukast, niacin (inositol niacinate), oxybutynin, potassium gluconate, prednisone, shingrix (pf), simvastatin, vitamins  a,c,e-zinc-copper, albuterol-ipratropium, breztri aerosphere, calcium-vitamin d3, eszopiclone, levothyroxine, and melatonin.  She is allergic to losartan; ace inhibitors; arb-angiotensin receptor antagonist; iodine and iodide containing products; lisinopril; metoprolol tartrate; and shrimp..    The following portions of the patient's history were reviewed and updated as appropriate: allergies, current medications, past family history, past medical history, past social history, past surgical history and problem list.    Review of Systems   Constitutional: Negative for fever,  chills, weight loss, weight gain, activity change, appetite change, fatigue and night sweats.   HENT: Negative for postnasal drip, rhinorrhea, sinus pressure, voice change and congestion.    Eyes: Negative for redness and itching.   Respiratory: Positive for shortness of breath and dyspnea on extertion. Negative for snoring, cough, sputum production, chest tightness, wheezing, orthopnea, asthma nighttime symptoms, use of rescue inhaler and somnolence.    Cardiovascular: Negative.  Negative for chest pain, palpitations and leg swelling.   Genitourinary: Negative for difficulty urinating and hematuria.   Endocrine: Negative for cold intolerance and heat intolerance.    Musculoskeletal: Negative for arthralgias, gait problem, joint swelling and myalgias.   Skin: Negative.    Gastrointestinal: Negative for nausea, vomiting, abdominal pain and acid reflux.   Neurological: Negative for dizziness, weakness, light-headedness and headaches.   Hematological: Negative for adenopathy. No excessive bruising.   All other systems reviewed and are negative.     Objective:     Physical Exam  Vitals signs reviewed.   Constitutional:       General: She is not in acute distress.     Appearance: She is well-developed. She is not ill-appearing or toxic-appearing.   HENT:      Head: Normocephalic.      Right Ear: External ear normal.      Left Ear: External ear normal.      Nose: Nose normal.      Mouth/Throat:      Pharynx: No oropharyngeal exudate.   Eyes:      Conjunctiva/sclera: Conjunctivae normal.   Neck:      Musculoskeletal: Normal range of motion and neck supple.   Cardiovascular:      Rate and Rhythm: Normal rate and regular rhythm.      Heart sounds: Normal heart sounds.   Pulmonary:      Effort: Pulmonary effort is normal.      Breath sounds: Normal breath sounds. No stridor. No wheezing or rales.   Abdominal:      Palpations: Abdomen is soft.   Musculoskeletal: Normal range of motion.   Lymphadenopathy:      Cervical: No  cervical adenopathy.   Skin:     General: Skin is warm and dry.   Neurological:      Mental Status: She is alert and oriented to person, place, and time.   Psychiatric:         Behavior: Behavior normal. Behavior is cooperative.         Thought Content: Thought content normal.         Judgment: Judgment normal.       Personal Diagnostic Review    12/23/2020 6MWD No desaturations requiring supplemental oxygen at rest or exertion.    Phase Oxygen Assessment Supplemental O2 Heart   Rate Blood Pressure John Paul Dyspnea Scale Rating   Resting 96 % Room Air 75 bpm 134/63 3   Exercise             Minute             1 97 % Room Air 99 bpm       2 97 % Room Air 102 bpm       3 96 % Room Air 106 bpm       4 97 % Room Air 106 bpm       5 97 % Room Air 106 bpm       6  97 % Room Air 107 bpm 152/57 5-6   Recovery             Minute             1 96 % Room Air 96 bpm       2 97 % Room Air 81 bpm       3 99 % Room Air 73 bpm       4 98 % Room Air 72 bpm 132/61 2      Six Minute Walk Summary  6MWT Status: completed without stopping  Patient Reported: Dyspnea         Interpretation:  Did the patient stop or pause?: No  Total Time Walked (Calculated): 360 seconds  Final Partial Lap Distance (feet): 25 feet  Total Distance Meters (Calculated): 251.46 meters  Predicted Distance Meters (Calculated): 388.93 meters  Percentage of Predicted (Calculated): 64.65  Peak VO2 (Calculated): 11.52  Mets: 3.29  Has The Patient Had a Previous Six Minute Walk Test?: Yes     Previous 6MWT Results  Has The Patient Had a Previous Six Minute Walk Test?: Yes  Date of Previous Test: 12/23/16  Total Time Walked: 360 seconds  Total Distance (meters): 304.8  Predicted Distance (meters): 439.53 meters  Percentage of Predicted: 69.35  Percent Change (Calculated): 0.18    4/11/2019 spirometry   Mild obstruction. FEV1  72.18 % predicted.     Pulmonary function tests: 3/26/2018 FEV1: 0.88  (50 % predicted), FVC:  1.79 (76 % predicted), FEV1/FVC:  49 (66 %  predicted)  Post bronchodilator: FEV1: 1.16  (66 % predicted), FVC:  2.27 (96 % predicted), FEV1/FVC:  51 (68 % predicted).   Moderate obstructive airflow defect with positive bronchodilator response  Stable when compared to prior dec 2016    Chest xray 3/26/2018   Interval clearance of previously noted bilateral effusions.  Nodular opacity again identified the right upper lung field.  Discussed in clinic with my viewing telephoned final radiology interpretation, 3/26/2018 5:59pm    Assessment:     1. Moderate COPD (chronic obstructive pulmonary disease)    2. Gastroesophageal reflux disease, unspecified whether esophagitis present    3. Prediabetes    4. JOHN (dyspnea on exertion)    5. Former smoker    6. Stage 3a chronic kidney disease    7. Chronic pain syndrome    8. SOB (shortness of breath)      No orders of the defined types were placed in this encounter.    Plan:     Problem List Items Addressed This Visit     Prediabetes     Hemoglobin A1C   Date Value Ref Range Status   12/22/2020 6.0 (H) 4.0 - 5.6 % Final     Comment:     ADA Screening Guidelines:  5.7-6.4%  Consistent with prediabetes  >or=6.5%  Consistent with diabetes  High levels of fetal hemoglobin interfere with the HbA1C  assay. Heterozygous hemoglobin variants (HbS, HgC, etc)do  not significantly interfere with this assay.   However, presence of multiple variants may affect accuracy.     06/17/2020 5.7 (H) 4.0 - 5.6 % Final     Comment:     ADA Screening Guidelines:  5.7-6.4%  Consistent with prediabetes  >or=6.5%  Consistent with diabetes  High levels of fetal hemoglobin interfere with the HbA1C  assay. Heterozygous hemoglobin variants (HbS, HgC, etc)do  not significantly interfere with this assay.   However, presence of multiple variants may affect accuracy.     02/13/2017 5.5 4.5 - 6.2 % Final     Comment:     According to ADA guidelines, hemoglobin A1C <7.0% represents  optimal control in non-pregnant diabetic patients.  Different  metrics may  apply to specific populations.   Standards of Medical Care in Diabetes - 2016.  For the purpose of screening for the presence of diabetes:  <5.7%     Consistent with the absence of diabetes  5.7-6.4%  Consistent with increasing risk for diabetes   (prediabetes)  >or=6.5%  Consistent with diabetes  Currently no consensus exists for use of hemoglobin A1C  for diagnosis of diabetes for children.                Moderate COPD (chronic obstructive pulmonary disease) - Primary     Not well controlled on sprivia or anoro  Trial of ics/laba/lama Breztri   Patient prefer HFA type inhaler over powder           Relevant Medications    budesonide-glycopyr-formoterol (BREZTRI AEROSPHERE) 160-9-4.8 mcg/actuation HFAA    GERD (gastroesophageal reflux disease)     Controlled, on pepcid managed by primary care provider          Former smoker     Quit 2012, 52 pack year smoker         JOHN (dyspnea on exertion)     Chronic  12/23/2020 6MWD No desaturations requiring supplemental oxygen at rest or exertion.  Recent cardiac work up told no cardiac findings to link to cause of shortness of breath on exertion  Moderate COPD begin ICS/LABA/LAMA             Relevant Medications    budesonide-glycopyr-formoterol (BREZTRI AEROSPHERE) 160-9-4.8 mcg/actuation HFAA    CKD (chronic kidney disease) stage 3, GFR 30-59 ml/min     Managed by primary care provider          Chronic pain syndrome     Managed by pain management, Dr. Paulo Cox           Other Visit Diagnoses     SOB (shortness of breath)            Follow up in about 8 weeks (around 2/17/2021) for COPD, evaluate treatment response.

## 2020-12-31 NOTE — PROGRESS NOTES
Results have been released via [employer]. Please verify that these have been viewed by patient. If not, please call patient with results.    Please schedule the following orders:  Nephrology follow up    I have sent a message to them with the following interpretation (see below).    I have reviewed your recent lab results.    Your A1C remains in prediabetes range but slightly increased compared to previous. Be sure to work on a low carbohydrate diet and exercise to prevent progression to diabetes.    Your kidney function is still low. I recommend that you follow back up with the nephrologist.

## 2021-01-11 ENCOUNTER — PROCEDURE VISIT (OUTPATIENT)
Dept: OPHTHALMOLOGY | Facility: CLINIC | Age: 76
End: 2021-01-11
Payer: MEDICARE

## 2021-01-11 ENCOUNTER — OFFICE VISIT (OUTPATIENT)
Dept: FAMILY MEDICINE | Facility: CLINIC | Age: 76
End: 2021-01-11
Payer: MEDICARE

## 2021-01-11 ENCOUNTER — LAB VISIT (OUTPATIENT)
Dept: LAB | Facility: HOSPITAL | Age: 76
End: 2021-01-11
Attending: INTERNAL MEDICINE
Payer: MEDICARE

## 2021-01-11 VITALS
HEIGHT: 60 IN | DIASTOLIC BLOOD PRESSURE: 65 MMHG | HEART RATE: 81 BPM | WEIGHT: 152 LBS | SYSTOLIC BLOOD PRESSURE: 122 MMHG | BODY MASS INDEX: 29.84 KG/M2 | TEMPERATURE: 98 F

## 2021-01-11 DIAGNOSIS — R53.83 FATIGUE, UNSPECIFIED TYPE: ICD-10-CM

## 2021-01-11 DIAGNOSIS — D53.9 NUTRITIONAL ANEMIA, UNSPECIFIED: ICD-10-CM

## 2021-01-11 DIAGNOSIS — D64.9 ANEMIA, UNSPECIFIED TYPE: ICD-10-CM

## 2021-01-11 DIAGNOSIS — H35.3211 EXUDATIVE AGE-RELATED MACULAR DEGENERATION OF RIGHT EYE WITH ACTIVE CHOROIDAL NEOVASCULARIZATION: Primary | ICD-10-CM

## 2021-01-11 DIAGNOSIS — F51.01 PRIMARY INSOMNIA: ICD-10-CM

## 2021-01-11 DIAGNOSIS — E03.9 HYPOTHYROIDISM, UNSPECIFIED TYPE: Primary | ICD-10-CM

## 2021-01-11 DIAGNOSIS — H35.3122 NONEXUDATIVE AGE-RELATED MACULAR DEGENERATION, LEFT EYE, INTERMEDIATE DRY STAGE: ICD-10-CM

## 2021-01-11 LAB
ALBUMIN SERPL BCP-MCNC: 4 G/DL (ref 3.5–5.2)
ALP SERPL-CCNC: 87 U/L (ref 55–135)
ALT SERPL W/O P-5'-P-CCNC: 21 U/L (ref 10–44)
ANION GAP SERPL CALC-SCNC: 14 MMOL/L (ref 8–16)
AST SERPL-CCNC: 31 U/L (ref 10–40)
BASOPHILS # BLD AUTO: 0.07 K/UL (ref 0–0.2)
BASOPHILS NFR BLD: 0.9 % (ref 0–1.9)
BILIRUB SERPL-MCNC: 0.3 MG/DL (ref 0.1–1)
BUN SERPL-MCNC: 27 MG/DL (ref 8–23)
CALCIUM SERPL-MCNC: 9.6 MG/DL (ref 8.7–10.5)
CHLORIDE SERPL-SCNC: 94 MMOL/L (ref 95–110)
CO2 SERPL-SCNC: 33 MMOL/L (ref 23–29)
CREAT SERPL-MCNC: 1.7 MG/DL (ref 0.5–1.4)
DIFFERENTIAL METHOD: ABNORMAL
EOSINOPHIL # BLD AUTO: 0.3 K/UL (ref 0–0.5)
EOSINOPHIL NFR BLD: 3.7 % (ref 0–8)
ERYTHROCYTE [DISTWIDTH] IN BLOOD BY AUTOMATED COUNT: 15.4 % (ref 11.5–14.5)
EST. GFR  (AFRICAN AMERICAN): 33.1 ML/MIN/1.73 M^2
EST. GFR  (NON AFRICAN AMERICAN): 28.7 ML/MIN/1.73 M^2
FERRITIN SERPL-MCNC: 16 NG/ML (ref 20–300)
GLUCOSE SERPL-MCNC: 87 MG/DL (ref 70–110)
HCT VFR BLD AUTO: 37 % (ref 37–48.5)
HGB BLD-MCNC: 11.8 G/DL (ref 12–16)
IMM GRANULOCYTES # BLD AUTO: 0.03 K/UL (ref 0–0.04)
IMM GRANULOCYTES NFR BLD AUTO: 0.4 % (ref 0–0.5)
IRON SERPL-MCNC: 77 UG/DL (ref 30–160)
LYMPHOCYTES # BLD AUTO: 2.7 K/UL (ref 1–4.8)
LYMPHOCYTES NFR BLD: 34.2 % (ref 18–48)
MCH RBC QN AUTO: 30.3 PG (ref 27–31)
MCHC RBC AUTO-ENTMCNC: 31.9 G/DL (ref 32–36)
MCV RBC AUTO: 95 FL (ref 82–98)
MONOCYTES # BLD AUTO: 0.6 K/UL (ref 0.3–1)
MONOCYTES NFR BLD: 7.8 % (ref 4–15)
NEUTROPHILS # BLD AUTO: 4.1 K/UL (ref 1.8–7.7)
NEUTROPHILS NFR BLD: 53 % (ref 38–73)
NRBC BLD-RTO: 0 /100 WBC
PLATELET # BLD AUTO: 312 K/UL (ref 150–350)
PMV BLD AUTO: 10.4 FL (ref 9.2–12.9)
POTASSIUM SERPL-SCNC: 3.4 MMOL/L (ref 3.5–5.1)
PROT SERPL-MCNC: 7.4 G/DL (ref 6–8.4)
RBC # BLD AUTO: 3.9 M/UL (ref 4–5.4)
SATURATED IRON: 17 % (ref 20–50)
SODIUM SERPL-SCNC: 141 MMOL/L (ref 136–145)
TOTAL IRON BINDING CAPACITY: 462 UG/DL (ref 250–450)
TRANSFERRIN SERPL-MCNC: 312 MG/DL (ref 200–375)
WBC # BLD AUTO: 7.8 K/UL (ref 3.9–12.7)

## 2021-01-11 PROCEDURE — 99214 PR OFFICE/OUTPT VISIT, EST, LEVL IV, 30-39 MIN: ICD-10-PCS | Mod: S$GLB,,, | Performed by: INTERNAL MEDICINE

## 2021-01-11 PROCEDURE — 3078F DIAST BP <80 MM HG: CPT | Mod: CPTII,S$GLB,, | Performed by: INTERNAL MEDICINE

## 2021-01-11 PROCEDURE — 1126F PR PAIN SEVERITY QUANTIFIED, NO PAIN PRESENT: ICD-10-PCS | Mod: S$GLB,,, | Performed by: INTERNAL MEDICINE

## 2021-01-11 PROCEDURE — 1126F AMNT PAIN NOTED NONE PRSNT: CPT | Mod: S$GLB,,, | Performed by: INTERNAL MEDICINE

## 2021-01-11 PROCEDURE — 92012 PR EYE EXAM, EST PATIENT,INTERMED: ICD-10-PCS | Mod: 25,S$GLB,, | Performed by: OPHTHALMOLOGY

## 2021-01-11 PROCEDURE — 36415 COLL VENOUS BLD VENIPUNCTURE: CPT | Mod: PO

## 2021-01-11 PROCEDURE — 99214 OFFICE O/P EST MOD 30 MIN: CPT | Mod: S$GLB,,, | Performed by: INTERNAL MEDICINE

## 2021-01-11 PROCEDURE — 3074F SYST BP LT 130 MM HG: CPT | Mod: CPTII,S$GLB,, | Performed by: INTERNAL MEDICINE

## 2021-01-11 PROCEDURE — 99999 PR PBB SHADOW E&M-EST. PATIENT-LVL IV: CPT | Mod: PBBFAC,,, | Performed by: INTERNAL MEDICINE

## 2021-01-11 PROCEDURE — 3288F PR FALLS RISK ASSESSMENT DOCUMENTED: ICD-10-PCS | Mod: CPTII,S$GLB,, | Performed by: INTERNAL MEDICINE

## 2021-01-11 PROCEDURE — 67028 INJECTION EYE DRUG: CPT | Mod: RT,S$GLB,, | Performed by: OPHTHALMOLOGY

## 2021-01-11 PROCEDURE — 83540 ASSAY OF IRON: CPT

## 2021-01-11 PROCEDURE — 1101F PT FALLS ASSESS-DOCD LE1/YR: CPT | Mod: CPTII,S$GLB,, | Performed by: INTERNAL MEDICINE

## 2021-01-11 PROCEDURE — 3078F PR MOST RECENT DIASTOLIC BLOOD PRESSURE < 80 MM HG: ICD-10-PCS | Mod: CPTII,S$GLB,, | Performed by: INTERNAL MEDICINE

## 2021-01-11 PROCEDURE — 85025 COMPLETE CBC W/AUTO DIFF WBC: CPT

## 2021-01-11 PROCEDURE — 3288F FALL RISK ASSESSMENT DOCD: CPT | Mod: CPTII,S$GLB,, | Performed by: INTERNAL MEDICINE

## 2021-01-11 PROCEDURE — 80053 COMPREHEN METABOLIC PANEL: CPT

## 2021-01-11 PROCEDURE — 99499 RISK ADDL DX/OHS AUDIT: ICD-10-PCS | Mod: S$GLB,,, | Performed by: INTERNAL MEDICINE

## 2021-01-11 PROCEDURE — 99499 UNLISTED E&M SERVICE: CPT | Mod: S$GLB,,, | Performed by: INTERNAL MEDICINE

## 2021-01-11 PROCEDURE — 1159F PR MEDICATION LIST DOCUMENTED IN MEDICAL RECORD: ICD-10-PCS | Mod: S$GLB,,, | Performed by: INTERNAL MEDICINE

## 2021-01-11 PROCEDURE — 82728 ASSAY OF FERRITIN: CPT

## 2021-01-11 PROCEDURE — 92134 POSTERIOR SEGMENT OCT RETINA (OCULAR COHERENCE TOMOGRAPHY)-BOTH EYES: ICD-10-PCS | Mod: S$GLB,,, | Performed by: OPHTHALMOLOGY

## 2021-01-11 PROCEDURE — 1101F PR PT FALLS ASSESS DOC 0-1 FALLS W/OUT INJ PAST YR: ICD-10-PCS | Mod: CPTII,S$GLB,, | Performed by: INTERNAL MEDICINE

## 2021-01-11 PROCEDURE — 1159F MED LIST DOCD IN RCRD: CPT | Mod: S$GLB,,, | Performed by: INTERNAL MEDICINE

## 2021-01-11 PROCEDURE — 67028 PR INJECT INTRAVITREAL PHARMCOLOGIC: ICD-10-PCS | Mod: RT,S$GLB,, | Performed by: OPHTHALMOLOGY

## 2021-01-11 PROCEDURE — 82746 ASSAY OF FOLIC ACID SERUM: CPT

## 2021-01-11 PROCEDURE — 92012 INTRM OPH EXAM EST PATIENT: CPT | Mod: 25,S$GLB,, | Performed by: OPHTHALMOLOGY

## 2021-01-11 PROCEDURE — 92134 CPTRZ OPH DX IMG PST SGM RTA: CPT | Mod: S$GLB,,, | Performed by: OPHTHALMOLOGY

## 2021-01-11 PROCEDURE — 3074F PR MOST RECENT SYSTOLIC BLOOD PRESSURE < 130 MM HG: ICD-10-PCS | Mod: CPTII,S$GLB,, | Performed by: INTERNAL MEDICINE

## 2021-01-11 PROCEDURE — 82607 VITAMIN B-12: CPT

## 2021-01-11 PROCEDURE — 99999 PR PBB SHADOW E&M-EST. PATIENT-LVL IV: ICD-10-PCS | Mod: PBBFAC,,, | Performed by: INTERNAL MEDICINE

## 2021-01-11 RX ORDER — ESZOPICLONE 3 MG/1
3 TABLET, FILM COATED ORAL NIGHTLY
Qty: 30 TABLET | Refills: 3 | Status: SHIPPED | OUTPATIENT
Start: 2021-01-11 | End: 2021-07-30

## 2021-01-11 RX ORDER — LEVOTHYROXINE SODIUM 200 UG/1
200 TABLET ORAL DAILY
Qty: 30 TABLET | Refills: 11 | Status: SHIPPED | OUTPATIENT
Start: 2021-01-11 | End: 2022-06-01

## 2021-01-11 RX ADMIN — Medication 1.25 MG: at 03:01

## 2021-01-12 LAB
FOLATE SERPL-MCNC: 18.5 NG/ML (ref 4–24)
VIT B12 SERPL-MCNC: >2000 PG/ML (ref 210–950)

## 2021-01-19 ENCOUNTER — TELEPHONE (OUTPATIENT)
Dept: FAMILY MEDICINE | Facility: CLINIC | Age: 76
End: 2021-01-19

## 2021-01-19 DIAGNOSIS — N18.4 STAGE 4 CHRONIC KIDNEY DISEASE: Primary | ICD-10-CM

## 2021-01-19 DIAGNOSIS — D50.9 IRON DEFICIENCY ANEMIA, UNSPECIFIED IRON DEFICIENCY ANEMIA TYPE: ICD-10-CM

## 2021-01-19 RX ORDER — FERROUS SULFATE 325(65) MG
325 TABLET ORAL
Qty: 30 TABLET | Refills: 11 | Status: SHIPPED | OUTPATIENT
Start: 2021-01-19 | End: 2021-07-30

## 2021-01-20 ENCOUNTER — PATIENT MESSAGE (OUTPATIENT)
Dept: PAIN MEDICINE | Facility: CLINIC | Age: 76
End: 2021-01-20

## 2021-01-27 ENCOUNTER — PATIENT OUTREACH (OUTPATIENT)
Dept: ADMINISTRATIVE | Facility: OTHER | Age: 76
End: 2021-01-27

## 2021-01-28 ENCOUNTER — OFFICE VISIT (OUTPATIENT)
Dept: NEPHROLOGY | Facility: CLINIC | Age: 76
End: 2021-01-28
Payer: MEDICARE

## 2021-01-28 ENCOUNTER — IMMUNIZATION (OUTPATIENT)
Dept: PHARMACY | Facility: CLINIC | Age: 76
End: 2021-01-28
Payer: MEDICARE

## 2021-01-28 VITALS
WEIGHT: 148.13 LBS | SYSTOLIC BLOOD PRESSURE: 132 MMHG | BODY MASS INDEX: 29.08 KG/M2 | DIASTOLIC BLOOD PRESSURE: 72 MMHG | OXYGEN SATURATION: 99 % | HEIGHT: 60 IN | HEART RATE: 82 BPM

## 2021-01-28 DIAGNOSIS — N17.9 ACUTE RENAL FAILURE, UNSPECIFIED ACUTE RENAL FAILURE TYPE: Primary | ICD-10-CM

## 2021-01-28 DIAGNOSIS — N18.30 STAGE 3 CHRONIC KIDNEY DISEASE, UNSPECIFIED WHETHER STAGE 3A OR 3B CKD: ICD-10-CM

## 2021-01-28 DIAGNOSIS — I10 ESSENTIAL HYPERTENSION: ICD-10-CM

## 2021-01-28 PROCEDURE — 1101F PR PT FALLS ASSESS DOC 0-1 FALLS W/OUT INJ PAST YR: ICD-10-PCS | Mod: CPTII,S$GLB,, | Performed by: INTERNAL MEDICINE

## 2021-01-28 PROCEDURE — 3075F PR MOST RECENT SYSTOLIC BLOOD PRESS GE 130-139MM HG: ICD-10-PCS | Mod: CPTII,S$GLB,, | Performed by: INTERNAL MEDICINE

## 2021-01-28 PROCEDURE — 3078F DIAST BP <80 MM HG: CPT | Mod: CPTII,S$GLB,, | Performed by: INTERNAL MEDICINE

## 2021-01-28 PROCEDURE — 1101F PT FALLS ASSESS-DOCD LE1/YR: CPT | Mod: CPTII,S$GLB,, | Performed by: INTERNAL MEDICINE

## 2021-01-28 PROCEDURE — 99215 OFFICE O/P EST HI 40 MIN: CPT | Mod: S$GLB,,, | Performed by: INTERNAL MEDICINE

## 2021-01-28 PROCEDURE — 99215 PR OFFICE/OUTPT VISIT, EST, LEVL V, 40-54 MIN: ICD-10-PCS | Mod: S$GLB,,, | Performed by: INTERNAL MEDICINE

## 2021-01-28 PROCEDURE — 1159F PR MEDICATION LIST DOCUMENTED IN MEDICAL RECORD: ICD-10-PCS | Mod: S$GLB,,, | Performed by: INTERNAL MEDICINE

## 2021-01-28 PROCEDURE — 99499 RISK ADDL DX/OHS AUDIT: ICD-10-PCS | Mod: S$GLB,,, | Performed by: INTERNAL MEDICINE

## 2021-01-28 PROCEDURE — 3288F PR FALLS RISK ASSESSMENT DOCUMENTED: ICD-10-PCS | Mod: CPTII,S$GLB,, | Performed by: INTERNAL MEDICINE

## 2021-01-28 PROCEDURE — 3288F FALL RISK ASSESSMENT DOCD: CPT | Mod: CPTII,S$GLB,, | Performed by: INTERNAL MEDICINE

## 2021-01-28 PROCEDURE — 99499 UNLISTED E&M SERVICE: CPT | Mod: S$GLB,,, | Performed by: INTERNAL MEDICINE

## 2021-01-28 PROCEDURE — 99999 PR PBB SHADOW E&M-EST. PATIENT-LVL V: ICD-10-PCS | Mod: PBBFAC,,, | Performed by: INTERNAL MEDICINE

## 2021-01-28 PROCEDURE — 3078F PR MOST RECENT DIASTOLIC BLOOD PRESSURE < 80 MM HG: ICD-10-PCS | Mod: CPTII,S$GLB,, | Performed by: INTERNAL MEDICINE

## 2021-01-28 PROCEDURE — 3075F SYST BP GE 130 - 139MM HG: CPT | Mod: CPTII,S$GLB,, | Performed by: INTERNAL MEDICINE

## 2021-01-28 PROCEDURE — 1159F MED LIST DOCD IN RCRD: CPT | Mod: S$GLB,,, | Performed by: INTERNAL MEDICINE

## 2021-01-28 PROCEDURE — 99999 PR PBB SHADOW E&M-EST. PATIENT-LVL V: CPT | Mod: PBBFAC,,, | Performed by: INTERNAL MEDICINE

## 2021-02-02 DIAGNOSIS — D50.9 IRON DEFICIENCY ANEMIA, UNSPECIFIED IRON DEFICIENCY ANEMIA TYPE: ICD-10-CM

## 2021-02-02 DIAGNOSIS — G89.4 CHRONIC PAIN SYNDROME: ICD-10-CM

## 2021-02-02 DIAGNOSIS — D50.9 IRON DEFICIENCY ANEMIA, UNSPECIFIED IRON DEFICIENCY ANEMIA TYPE: Primary | ICD-10-CM

## 2021-02-02 RX ORDER — DULOXETIN HYDROCHLORIDE 20 MG/1
20 CAPSULE, DELAYED RELEASE ORAL DAILY
Qty: 30 CAPSULE | Refills: 1 | Status: SHIPPED | OUTPATIENT
Start: 2021-02-02 | End: 2021-04-12

## 2021-02-02 RX ORDER — GABAPENTIN 600 MG/1
600 TABLET ORAL 2 TIMES DAILY
Qty: 30 TABLET | Refills: 11 | Status: SHIPPED | OUTPATIENT
Start: 2021-02-02 | End: 2021-09-10

## 2021-02-02 RX ORDER — FERROUS SULFATE 325(65) MG
325 TABLET ORAL
Qty: 30 TABLET | Refills: 11 | Status: CANCELLED | OUTPATIENT
Start: 2021-02-02 | End: 2022-02-02

## 2021-02-12 ENCOUNTER — TELEPHONE (OUTPATIENT)
Dept: RADIOLOGY | Facility: HOSPITAL | Age: 76
End: 2021-02-12

## 2021-02-19 ENCOUNTER — TELEPHONE (OUTPATIENT)
Dept: ENDOSCOPY | Facility: HOSPITAL | Age: 76
End: 2021-02-19

## 2021-02-23 ENCOUNTER — PATIENT MESSAGE (OUTPATIENT)
Dept: PAIN MEDICINE | Facility: CLINIC | Age: 76
End: 2021-02-23

## 2021-03-01 ENCOUNTER — PROCEDURE VISIT (OUTPATIENT)
Dept: OPHTHALMOLOGY | Facility: CLINIC | Age: 76
End: 2021-03-01
Payer: MEDICARE

## 2021-03-01 DIAGNOSIS — H35.3211 EXUDATIVE AGE-RELATED MACULAR DEGENERATION OF RIGHT EYE WITH ACTIVE CHOROIDAL NEOVASCULARIZATION: Primary | ICD-10-CM

## 2021-03-01 DIAGNOSIS — H35.3122 NONEXUDATIVE AGE-RELATED MACULAR DEGENERATION, LEFT EYE, INTERMEDIATE DRY STAGE: ICD-10-CM

## 2021-03-01 PROCEDURE — 92134 CPTRZ OPH DX IMG PST SGM RTA: CPT | Mod: S$GLB,,, | Performed by: OPHTHALMOLOGY

## 2021-03-01 PROCEDURE — 92012 PR EYE EXAM, EST PATIENT,INTERMED: ICD-10-PCS | Mod: 25,S$GLB,, | Performed by: OPHTHALMOLOGY

## 2021-03-01 PROCEDURE — 92012 INTRM OPH EXAM EST PATIENT: CPT | Mod: 25,S$GLB,, | Performed by: OPHTHALMOLOGY

## 2021-03-01 PROCEDURE — 67028 INJECTION EYE DRUG: CPT | Mod: RT,S$GLB,, | Performed by: OPHTHALMOLOGY

## 2021-03-01 PROCEDURE — 67028 PR INJECT INTRAVITREAL PHARMCOLOGIC: ICD-10-PCS | Mod: RT,S$GLB,, | Performed by: OPHTHALMOLOGY

## 2021-03-01 PROCEDURE — 92134 POSTERIOR SEGMENT OCT RETINA (OCULAR COHERENCE TOMOGRAPHY)-BOTH EYES: ICD-10-PCS | Mod: S$GLB,,, | Performed by: OPHTHALMOLOGY

## 2021-03-01 RX ADMIN — Medication 1.25 MG: at 02:03

## 2021-03-05 ENCOUNTER — TELEPHONE (OUTPATIENT)
Dept: RADIOLOGY | Facility: HOSPITAL | Age: 76
End: 2021-03-05

## 2021-03-08 ENCOUNTER — HOSPITAL ENCOUNTER (OUTPATIENT)
Dept: RADIOLOGY | Facility: HOSPITAL | Age: 76
Discharge: HOME | End: 2021-03-08
Attending: INTERNAL MEDICINE
Payer: MEDICARE

## 2021-03-08 DIAGNOSIS — N17.9 ACUTE RENAL FAILURE, UNSPECIFIED ACUTE RENAL FAILURE TYPE: ICD-10-CM

## 2021-03-08 DIAGNOSIS — N18.30 STAGE 3 CHRONIC KIDNEY DISEASE, UNSPECIFIED WHETHER STAGE 3A OR 3B CKD: ICD-10-CM

## 2021-03-08 PROCEDURE — 76770 US EXAM ABDO BACK WALL COMP: CPT | Mod: 26,,, | Performed by: RADIOLOGY

## 2021-03-08 PROCEDURE — 76770 US RETROPERITONEAL COMPLETE: ICD-10-PCS | Mod: 26,,, | Performed by: RADIOLOGY

## 2021-03-08 PROCEDURE — 76770 US EXAM ABDO BACK WALL COMP: CPT | Mod: TC,PO

## 2021-03-19 ENCOUNTER — LAB VISIT (OUTPATIENT)
Dept: LAB | Facility: HOSPITAL | Age: 76
End: 2021-03-19
Attending: INTERNAL MEDICINE
Payer: MEDICARE

## 2021-03-19 ENCOUNTER — PATIENT MESSAGE (OUTPATIENT)
Dept: FAMILY MEDICINE | Facility: CLINIC | Age: 76
End: 2021-03-19

## 2021-03-19 DIAGNOSIS — D50.9 IRON DEFICIENCY ANEMIA, UNSPECIFIED IRON DEFICIENCY ANEMIA TYPE: ICD-10-CM

## 2021-03-19 DIAGNOSIS — D64.9 ANEMIA, UNSPECIFIED TYPE: ICD-10-CM

## 2021-03-19 DIAGNOSIS — E03.9 HYPOTHYROIDISM, UNSPECIFIED TYPE: ICD-10-CM

## 2021-03-19 LAB
BASOPHILS # BLD AUTO: 0.07 K/UL (ref 0–0.2)
BASOPHILS NFR BLD: 0.9 % (ref 0–1.9)
DIFFERENTIAL METHOD: ABNORMAL
EOSINOPHIL # BLD AUTO: 0.3 K/UL (ref 0–0.5)
EOSINOPHIL NFR BLD: 3.3 % (ref 0–8)
ERYTHROCYTE [DISTWIDTH] IN BLOOD BY AUTOMATED COUNT: 13.2 % (ref 11.5–14.5)
FERRITIN SERPL-MCNC: 15 NG/ML (ref 20–300)
HCT VFR BLD AUTO: 38.4 % (ref 37–48.5)
HGB BLD-MCNC: 12.2 G/DL (ref 12–16)
IMM GRANULOCYTES # BLD AUTO: 0.02 K/UL (ref 0–0.04)
IMM GRANULOCYTES NFR BLD AUTO: 0.2 % (ref 0–0.5)
IRON SERPL-MCNC: 86 UG/DL (ref 30–160)
LYMPHOCYTES # BLD AUTO: 2.9 K/UL (ref 1–4.8)
LYMPHOCYTES NFR BLD: 35.4 % (ref 18–48)
MCH RBC QN AUTO: 30.9 PG (ref 27–31)
MCHC RBC AUTO-ENTMCNC: 31.8 G/DL (ref 32–36)
MCV RBC AUTO: 97 FL (ref 82–98)
MONOCYTES # BLD AUTO: 0.6 K/UL (ref 0.3–1)
MONOCYTES NFR BLD: 7.7 % (ref 4–15)
NEUTROPHILS # BLD AUTO: 4.3 K/UL (ref 1.8–7.7)
NEUTROPHILS NFR BLD: 52.5 % (ref 38–73)
NRBC BLD-RTO: 0 /100 WBC
PLATELET # BLD AUTO: 347 K/UL (ref 150–350)
PMV BLD AUTO: 10 FL (ref 9.2–12.9)
RBC # BLD AUTO: 3.95 M/UL (ref 4–5.4)
SATURATED IRON: 20 % (ref 20–50)
T4 FREE SERPL-MCNC: 1.24 NG/DL (ref 0.71–1.51)
TOTAL IRON BINDING CAPACITY: 426 UG/DL (ref 250–450)
TRANSFERRIN SERPL-MCNC: 288 MG/DL (ref 200–375)
TSH SERPL DL<=0.005 MIU/L-ACNC: 1.15 UIU/ML (ref 0.4–4)
WBC # BLD AUTO: 8.23 K/UL (ref 3.9–12.7)

## 2021-03-19 PROCEDURE — 84443 ASSAY THYROID STIM HORMONE: CPT | Performed by: INTERNAL MEDICINE

## 2021-03-19 PROCEDURE — 36415 COLL VENOUS BLD VENIPUNCTURE: CPT | Mod: PO | Performed by: INTERNAL MEDICINE

## 2021-03-19 PROCEDURE — 85025 COMPLETE CBC W/AUTO DIFF WBC: CPT | Performed by: INTERNAL MEDICINE

## 2021-03-19 PROCEDURE — 82728 ASSAY OF FERRITIN: CPT | Performed by: INTERNAL MEDICINE

## 2021-03-19 PROCEDURE — 84439 ASSAY OF FREE THYROXINE: CPT | Performed by: INTERNAL MEDICINE

## 2021-03-19 PROCEDURE — 83540 ASSAY OF IRON: CPT | Performed by: INTERNAL MEDICINE

## 2021-03-22 ENCOUNTER — PATIENT MESSAGE (OUTPATIENT)
Dept: FAMILY MEDICINE | Facility: CLINIC | Age: 76
End: 2021-03-22

## 2021-03-22 DIAGNOSIS — K21.9 GASTROESOPHAGEAL REFLUX DISEASE, UNSPECIFIED WHETHER ESOPHAGITIS PRESENT: ICD-10-CM

## 2021-03-23 ENCOUNTER — TELEPHONE (OUTPATIENT)
Dept: NEPHROLOGY | Facility: CLINIC | Age: 76
End: 2021-03-23

## 2021-03-23 ENCOUNTER — PATIENT MESSAGE (OUTPATIENT)
Dept: FAMILY MEDICINE | Facility: CLINIC | Age: 76
End: 2021-03-23

## 2021-03-23 RX ORDER — FAMOTIDINE 40 MG/1
40 TABLET, FILM COATED ORAL NIGHTLY
Qty: 30 TABLET | Refills: 11 | Status: SHIPPED | OUTPATIENT
Start: 2021-03-23 | End: 2021-06-07 | Stop reason: ALTCHOICE

## 2021-03-25 ENCOUNTER — TELEPHONE (OUTPATIENT)
Dept: OPHTHALMOLOGY | Facility: CLINIC | Age: 76
End: 2021-03-25

## 2021-03-30 ENCOUNTER — TELEPHONE (OUTPATIENT)
Dept: PAIN MEDICINE | Facility: CLINIC | Age: 76
End: 2021-03-30

## 2021-04-06 NOTE — TELEPHONE ENCOUNTER
----- Message from Rylie Rocha sent at 2/1/2017 10:04 AM CST -----  Kely at [facility]////with/ Dr Gonsalez//at [phone]//states need to find out if pt has clearance to schedule her surgery//need office visit faxed to her//fax is [phone]//please call if any questions//thanks/sixto   Problem: Pain:  Goal: Pain level will decrease  Description: Pain level will decrease  4/6/2021 1728 by Charli Cm RN  Outcome: Ongoing  4/6/2021 0638 by Pushpa Garcia RN  Outcome: Ongoing  Goal: Control of acute pain  Description: Control of acute pain  4/6/2021 1728 by Charli Cm RN  Outcome: Ongoing  4/6/2021 0638 by Pushpa Garcia RN  Outcome: Ongoing  Goal: Control of chronic pain  Description: Control of chronic pain  4/6/2021 1728 by Charli Cm RN  Outcome: Ongoing  4/6/2021 0638 by Pushpa Garcia RN  Outcome: Ongoing     Problem: Nutrition  Goal: Optimal nutrition therapy  4/6/2021 1728 by Charli Cm RN  Outcome: Ongoing  4/6/2021 0638 by Pushpa Garcia RN  Outcome: Ongoing     Problem: Skin Integrity:  Goal: Will show no infection signs and symptoms  Description: Will show no infection signs and symptoms  4/6/2021 1728 by Charli Cm RN  Outcome: Ongoing  4/6/2021 0638 by Pushpa Garcia RN  Outcome: Ongoing  Goal: Absence of new skin breakdown  Description: Absence of new skin breakdown  4/6/2021 1728 by Charli Cm RN  Outcome: Ongoing  4/6/2021 0638 by Pushpa Garcia RN  Outcome: Ongoing     Problem: Suicide risk  Goal: Provide patient with safe environment  Description: Provide patient with safe environment  4/6/2021 1728 by Charli Cm RN  Outcome: Ongoing  4/6/2021 0638 by Pushpa Garcia RN  Outcome: Ongoing

## 2021-04-12 ENCOUNTER — PATIENT MESSAGE (OUTPATIENT)
Dept: PAIN MEDICINE | Facility: CLINIC | Age: 76
End: 2021-04-12

## 2021-04-12 ENCOUNTER — PROCEDURE VISIT (OUTPATIENT)
Dept: OPHTHALMOLOGY | Facility: CLINIC | Age: 76
End: 2021-04-12
Attending: OPHTHALMOLOGY
Payer: MEDICARE

## 2021-04-12 DIAGNOSIS — G89.4 CHRONIC PAIN SYNDROME: ICD-10-CM

## 2021-04-12 DIAGNOSIS — H35.3211 EXUDATIVE AGE-RELATED MACULAR DEGENERATION OF RIGHT EYE WITH ACTIVE CHOROIDAL NEOVASCULARIZATION: Primary | ICD-10-CM

## 2021-04-12 PROCEDURE — 99499 RISK ADDL DX/OHS AUDIT: ICD-10-PCS | Mod: S$GLB,,, | Performed by: OPHTHALMOLOGY

## 2021-04-12 PROCEDURE — 92012 INTRM OPH EXAM EST PATIENT: CPT | Mod: 25,S$GLB,, | Performed by: OPHTHALMOLOGY

## 2021-04-12 PROCEDURE — 92012 PR EYE EXAM, EST PATIENT,INTERMED: ICD-10-PCS | Mod: 25,S$GLB,, | Performed by: OPHTHALMOLOGY

## 2021-04-12 PROCEDURE — 67028 INJECTION EYE DRUG: CPT | Mod: 50,S$GLB,, | Performed by: OPHTHALMOLOGY

## 2021-04-12 PROCEDURE — 67028 PR INJECT INTRAVITREAL PHARMCOLOGIC: ICD-10-PCS | Mod: 50,S$GLB,, | Performed by: OPHTHALMOLOGY

## 2021-04-12 PROCEDURE — 92134 POSTERIOR SEGMENT OCT RETINA (OCULAR COHERENCE TOMOGRAPHY)-BOTH EYES: ICD-10-PCS | Mod: S$GLB,,, | Performed by: OPHTHALMOLOGY

## 2021-04-12 PROCEDURE — 92134 CPTRZ OPH DX IMG PST SGM RTA: CPT | Mod: S$GLB,,, | Performed by: OPHTHALMOLOGY

## 2021-04-12 PROCEDURE — 99499 UNLISTED E&M SERVICE: CPT | Mod: S$GLB,,, | Performed by: OPHTHALMOLOGY

## 2021-04-12 RX ORDER — DULOXETIN HYDROCHLORIDE 20 MG/1
20 CAPSULE, DELAYED RELEASE ORAL DAILY
Qty: 30 CAPSULE | Refills: 1 | OUTPATIENT
Start: 2021-04-12 | End: 2021-05-12

## 2021-04-22 ENCOUNTER — TELEPHONE (OUTPATIENT)
Dept: ENDOSCOPY | Facility: HOSPITAL | Age: 76
End: 2021-04-22

## 2021-05-17 ENCOUNTER — PROCEDURE VISIT (OUTPATIENT)
Dept: OPHTHALMOLOGY | Facility: CLINIC | Age: 76
End: 2021-05-17
Payer: MEDICARE

## 2021-05-17 DIAGNOSIS — H35.3211 EXUDATIVE AGE-RELATED MACULAR DEGENERATION OF RIGHT EYE WITH ACTIVE CHOROIDAL NEOVASCULARIZATION: Primary | ICD-10-CM

## 2021-05-17 DIAGNOSIS — H35.3122 NONEXUDATIVE AGE-RELATED MACULAR DEGENERATION, LEFT EYE, INTERMEDIATE DRY STAGE: ICD-10-CM

## 2021-05-17 PROCEDURE — 67028 INJECTION EYE DRUG: CPT | Mod: 50,S$GLB,, | Performed by: OPHTHALMOLOGY

## 2021-05-17 PROCEDURE — 67028 PR INJECT INTRAVITREAL PHARMCOLOGIC: ICD-10-PCS | Mod: 50,S$GLB,, | Performed by: OPHTHALMOLOGY

## 2021-05-17 PROCEDURE — 99499 NO LOS: ICD-10-PCS | Mod: S$GLB,,, | Performed by: OPHTHALMOLOGY

## 2021-05-17 PROCEDURE — 99499 UNLISTED E&M SERVICE: CPT | Mod: S$GLB,,, | Performed by: OPHTHALMOLOGY

## 2021-05-26 ENCOUNTER — LAB VISIT (OUTPATIENT)
Dept: LAB | Facility: HOSPITAL | Age: 76
End: 2021-05-26
Attending: INTERNAL MEDICINE
Payer: MEDICARE

## 2021-05-26 DIAGNOSIS — N17.9 ACUTE RENAL FAILURE, UNSPECIFIED ACUTE RENAL FAILURE TYPE: ICD-10-CM

## 2021-05-26 DIAGNOSIS — N18.30 STAGE 3 CHRONIC KIDNEY DISEASE, UNSPECIFIED WHETHER STAGE 3A OR 3B CKD: ICD-10-CM

## 2021-05-26 LAB
ALBUMIN SERPL BCP-MCNC: 3.2 G/DL (ref 3.5–5.2)
ANION GAP SERPL CALC-SCNC: 14 MMOL/L (ref 8–16)
BUN SERPL-MCNC: 34 MG/DL (ref 8–23)
CALCIUM SERPL-MCNC: 9.9 MG/DL (ref 8.7–10.5)
CHLORIDE SERPL-SCNC: 100 MMOL/L (ref 95–110)
CO2 SERPL-SCNC: 25 MMOL/L (ref 23–29)
CREAT SERPL-MCNC: 1.4 MG/DL (ref 0.5–1.4)
EST. GFR  (AFRICAN AMERICAN): 41.8 ML/MIN/1.73 M^2
EST. GFR  (NON AFRICAN AMERICAN): 36.3 ML/MIN/1.73 M^2
GLUCOSE SERPL-MCNC: 104 MG/DL (ref 70–110)
PHOSPHATE SERPL-MCNC: 3.6 MG/DL (ref 2.7–4.5)
POTASSIUM SERPL-SCNC: 4.1 MMOL/L (ref 3.5–5.1)
PTH-INTACT SERPL-MCNC: 68 PG/ML (ref 9–77)
SODIUM SERPL-SCNC: 139 MMOL/L (ref 136–145)

## 2021-05-26 PROCEDURE — 83970 ASSAY OF PARATHORMONE: CPT | Performed by: INTERNAL MEDICINE

## 2021-05-26 PROCEDURE — 36415 COLL VENOUS BLD VENIPUNCTURE: CPT | Mod: PO | Performed by: INTERNAL MEDICINE

## 2021-05-26 PROCEDURE — 80069 RENAL FUNCTION PANEL: CPT | Performed by: INTERNAL MEDICINE

## 2021-06-02 ENCOUNTER — OFFICE VISIT (OUTPATIENT)
Dept: NEPHROLOGY | Facility: CLINIC | Age: 76
End: 2021-06-02
Payer: MEDICARE

## 2021-06-02 VITALS
OXYGEN SATURATION: 96 % | WEIGHT: 144.81 LBS | DIASTOLIC BLOOD PRESSURE: 54 MMHG | HEIGHT: 60 IN | BODY MASS INDEX: 28.43 KG/M2 | HEART RATE: 87 BPM | SYSTOLIC BLOOD PRESSURE: 128 MMHG

## 2021-06-02 DIAGNOSIS — I10 ESSENTIAL HYPERTENSION: ICD-10-CM

## 2021-06-02 DIAGNOSIS — N18.30 STAGE 3 CHRONIC KIDNEY DISEASE, UNSPECIFIED WHETHER STAGE 3A OR 3B CKD: Primary | ICD-10-CM

## 2021-06-02 PROCEDURE — 1159F MED LIST DOCD IN RCRD: CPT | Mod: S$GLB,,, | Performed by: INTERNAL MEDICINE

## 2021-06-02 PROCEDURE — 99499 RISK ADDL DX/OHS AUDIT: ICD-10-PCS | Mod: S$GLB,,, | Performed by: INTERNAL MEDICINE

## 2021-06-02 PROCEDURE — 99214 OFFICE O/P EST MOD 30 MIN: CPT | Mod: S$GLB,,, | Performed by: INTERNAL MEDICINE

## 2021-06-02 PROCEDURE — 1101F PT FALLS ASSESS-DOCD LE1/YR: CPT | Mod: CPTII,S$GLB,, | Performed by: INTERNAL MEDICINE

## 2021-06-02 PROCEDURE — 99499 UNLISTED E&M SERVICE: CPT | Mod: S$GLB,,, | Performed by: INTERNAL MEDICINE

## 2021-06-02 PROCEDURE — 3288F PR FALLS RISK ASSESSMENT DOCUMENTED: ICD-10-PCS | Mod: CPTII,S$GLB,, | Performed by: INTERNAL MEDICINE

## 2021-06-02 PROCEDURE — 1159F PR MEDICATION LIST DOCUMENTED IN MEDICAL RECORD: ICD-10-PCS | Mod: S$GLB,,, | Performed by: INTERNAL MEDICINE

## 2021-06-02 PROCEDURE — 1101F PR PT FALLS ASSESS DOC 0-1 FALLS W/OUT INJ PAST YR: ICD-10-PCS | Mod: CPTII,S$GLB,, | Performed by: INTERNAL MEDICINE

## 2021-06-02 PROCEDURE — 99999 PR PBB SHADOW E&M-EST. PATIENT-LVL IV: CPT | Mod: PBBFAC,,, | Performed by: INTERNAL MEDICINE

## 2021-06-02 PROCEDURE — 3288F FALL RISK ASSESSMENT DOCD: CPT | Mod: CPTII,S$GLB,, | Performed by: INTERNAL MEDICINE

## 2021-06-02 PROCEDURE — 99999 PR PBB SHADOW E&M-EST. PATIENT-LVL IV: ICD-10-PCS | Mod: PBBFAC,,, | Performed by: INTERNAL MEDICINE

## 2021-06-02 PROCEDURE — 99214 PR OFFICE/OUTPT VISIT, EST, LEVL IV, 30-39 MIN: ICD-10-PCS | Mod: S$GLB,,, | Performed by: INTERNAL MEDICINE

## 2021-06-02 RX ORDER — CALCIUM CARBONATE 200(500)MG
2 TABLET,CHEWABLE ORAL
COMMUNITY

## 2021-06-07 ENCOUNTER — PATIENT MESSAGE (OUTPATIENT)
Dept: FAMILY MEDICINE | Facility: CLINIC | Age: 76
End: 2021-06-07

## 2021-06-07 DIAGNOSIS — K21.9 GASTROESOPHAGEAL REFLUX DISEASE, UNSPECIFIED WHETHER ESOPHAGITIS PRESENT: Primary | ICD-10-CM

## 2021-06-07 RX ORDER — PANTOPRAZOLE SODIUM 40 MG/1
40 TABLET, DELAYED RELEASE ORAL DAILY
Qty: 30 TABLET | Refills: 11 | Status: SHIPPED | OUTPATIENT
Start: 2021-06-07 | End: 2022-09-22

## 2021-06-10 ENCOUNTER — PATIENT MESSAGE (OUTPATIENT)
Dept: FAMILY MEDICINE | Facility: CLINIC | Age: 76
End: 2021-06-10

## 2021-06-10 DIAGNOSIS — G89.4 CHRONIC PAIN SYNDROME: ICD-10-CM

## 2021-06-10 RX ORDER — DULOXETIN HYDROCHLORIDE 20 MG/1
20 CAPSULE, DELAYED RELEASE ORAL DAILY
Qty: 30 CAPSULE | Refills: 1 | OUTPATIENT
Start: 2021-06-10

## 2021-06-11 NOTE — PLAN OF CARE
Patient tolerating oral liquids without difficulty. No apparent s&s of distress noted at this time, no complaints voiced at this time. Discharge instructions reviewed with patient/family with good verbal feedback received. Patient ready for discharge   

## 2021-06-16 ENCOUNTER — PATIENT MESSAGE (OUTPATIENT)
Dept: ENDOSCOPY | Facility: HOSPITAL | Age: 76
End: 2021-06-16

## 2021-06-21 ENCOUNTER — PROCEDURE VISIT (OUTPATIENT)
Dept: OPHTHALMOLOGY | Facility: CLINIC | Age: 76
End: 2021-06-21
Payer: MEDICARE

## 2021-06-21 DIAGNOSIS — H35.3231 EXUDATIVE AGE-RELATED MACULAR DEGENERATION OF BOTH EYES WITH ACTIVE CHOROIDAL NEOVASCULARIZATION: Primary | ICD-10-CM

## 2021-06-21 DIAGNOSIS — H35.3211 EXUDATIVE AGE-RELATED MACULAR DEGENERATION OF RIGHT EYE WITH ACTIVE CHOROIDAL NEOVASCULARIZATION: ICD-10-CM

## 2021-06-21 PROCEDURE — 67028 INJECTION EYE DRUG: CPT | Mod: 50,S$GLB,, | Performed by: OPHTHALMOLOGY

## 2021-06-21 PROCEDURE — 92134 POSTERIOR SEGMENT OCT RETINA (OCULAR COHERENCE TOMOGRAPHY)-BOTH EYES: ICD-10-PCS | Mod: S$GLB,,, | Performed by: OPHTHALMOLOGY

## 2021-06-21 PROCEDURE — 92134 CPTRZ OPH DX IMG PST SGM RTA: CPT | Mod: S$GLB,,, | Performed by: OPHTHALMOLOGY

## 2021-06-21 PROCEDURE — 67028 PR INJECT INTRAVITREAL PHARMCOLOGIC: ICD-10-PCS | Mod: 50,S$GLB,, | Performed by: OPHTHALMOLOGY

## 2021-06-21 PROCEDURE — 99499 UNLISTED E&M SERVICE: CPT | Mod: S$GLB,,, | Performed by: OPHTHALMOLOGY

## 2021-06-21 PROCEDURE — 99499 NO LOS: ICD-10-PCS | Mod: S$GLB,,, | Performed by: OPHTHALMOLOGY

## 2021-06-21 RX ORDER — FAMOTIDINE 40 MG/1
40 TABLET, FILM COATED ORAL NIGHTLY
COMMUNITY
Start: 2021-06-10 | End: 2022-05-06

## 2021-06-21 RX ORDER — AMLODIPINE BESYLATE 10 MG/1
1 TABLET ORAL DAILY
COMMUNITY
End: 2021-07-15 | Stop reason: SDUPTHER

## 2021-06-21 RX ORDER — TRAZODONE HYDROCHLORIDE 50 MG/1
1 TABLET ORAL NIGHTLY
COMMUNITY
End: 2021-07-30

## 2021-06-30 ENCOUNTER — PATIENT MESSAGE (OUTPATIENT)
Dept: OPHTHALMOLOGY | Facility: CLINIC | Age: 76
End: 2021-06-30

## 2021-07-12 ENCOUNTER — PATIENT MESSAGE (OUTPATIENT)
Dept: OPHTHALMOLOGY | Facility: CLINIC | Age: 76
End: 2021-07-12

## 2021-07-12 ENCOUNTER — PATIENT MESSAGE (OUTPATIENT)
Dept: UROLOGY | Facility: CLINIC | Age: 76
End: 2021-07-12

## 2021-07-14 ENCOUNTER — OFFICE VISIT (OUTPATIENT)
Dept: UROLOGY | Facility: CLINIC | Age: 76
End: 2021-07-14
Payer: MEDICARE

## 2021-07-14 VITALS — BODY MASS INDEX: 28.43 KG/M2 | HEIGHT: 60 IN | WEIGHT: 144.81 LBS

## 2021-07-14 DIAGNOSIS — N32.81 OVERACTIVE BLADDER: Primary | ICD-10-CM

## 2021-07-14 LAB
BILIRUB SERPL-MCNC: NORMAL MG/DL
BLOOD URINE, POC: NORMAL
CLARITY, POC UA: CLEAR
COLOR, POC UA: YELLOW
GLUCOSE UR QL STRIP: NORMAL
KETONES UR QL STRIP: NORMAL
LEUKOCYTE ESTERASE URINE, POC: NORMAL
NITRITE, POC UA: NORMAL
PH, POC UA: 6.5
POC RESIDUAL URINE VOLUME: 111 ML (ref 0–100)
PROTEIN, POC: NORMAL
SPECIFIC GRAVITY, POC UA: 1.01
UROBILINOGEN, POC UA: NORMAL

## 2021-07-14 PROCEDURE — 51798 US URINE CAPACITY MEASURE: CPT | Mod: S$GLB,,, | Performed by: UROLOGY

## 2021-07-14 PROCEDURE — 99203 PR OFFICE/OUTPT VISIT, NEW, LEVL III, 30-44 MIN: ICD-10-PCS | Mod: S$GLB,,, | Performed by: UROLOGY

## 2021-07-14 PROCEDURE — 99203 OFFICE O/P NEW LOW 30 MIN: CPT | Mod: S$GLB,,, | Performed by: UROLOGY

## 2021-07-14 PROCEDURE — 1159F MED LIST DOCD IN RCRD: CPT | Mod: S$GLB,,, | Performed by: UROLOGY

## 2021-07-14 PROCEDURE — 81002 URINALYSIS NONAUTO W/O SCOPE: CPT | Mod: S$GLB,,, | Performed by: UROLOGY

## 2021-07-14 PROCEDURE — 1159F PR MEDICATION LIST DOCUMENTED IN MEDICAL RECORD: ICD-10-PCS | Mod: S$GLB,,, | Performed by: UROLOGY

## 2021-07-14 PROCEDURE — 1126F AMNT PAIN NOTED NONE PRSNT: CPT | Mod: S$GLB,,, | Performed by: UROLOGY

## 2021-07-14 PROCEDURE — 99999 PR PBB SHADOW E&M-EST. PATIENT-LVL IV: ICD-10-PCS | Mod: PBBFAC,,, | Performed by: UROLOGY

## 2021-07-14 PROCEDURE — 1126F PR PAIN SEVERITY QUANTIFIED, NO PAIN PRESENT: ICD-10-PCS | Mod: S$GLB,,, | Performed by: UROLOGY

## 2021-07-14 PROCEDURE — 99999 PR PBB SHADOW E&M-EST. PATIENT-LVL IV: CPT | Mod: PBBFAC,,, | Performed by: UROLOGY

## 2021-07-14 PROCEDURE — 81002 POCT URINE DIPSTICK WITHOUT MICROSCOPE: ICD-10-PCS | Mod: S$GLB,,, | Performed by: UROLOGY

## 2021-07-14 PROCEDURE — 51798 POCT BLADDER SCAN: ICD-10-PCS | Mod: S$GLB,,, | Performed by: UROLOGY

## 2021-07-14 RX ORDER — SOLIFENACIN SUCCINATE 10 MG/1
10 TABLET, FILM COATED ORAL DAILY
Qty: 30 TABLET | Refills: 11 | Status: SHIPPED | OUTPATIENT
Start: 2021-07-14 | End: 2022-07-11 | Stop reason: CLARIF

## 2021-07-15 ENCOUNTER — PATIENT MESSAGE (OUTPATIENT)
Dept: FAMILY MEDICINE | Facility: CLINIC | Age: 76
End: 2021-07-15

## 2021-07-15 DIAGNOSIS — I10 ESSENTIAL HYPERTENSION: Primary | ICD-10-CM

## 2021-07-15 RX ORDER — AMLODIPINE BESYLATE 10 MG/1
10 TABLET ORAL DAILY
Qty: 90 TABLET | Refills: 3 | Status: SHIPPED | OUTPATIENT
Start: 2021-07-15 | End: 2022-06-24

## 2021-07-21 RX ORDER — DONEPEZIL HYDROCHLORIDE 10 MG/1
10 TABLET, FILM COATED ORAL DAILY
Qty: 30 TABLET | Refills: 11 | Status: SHIPPED | OUTPATIENT
Start: 2021-07-21 | End: 2022-08-09

## 2021-07-30 ENCOUNTER — OFFICE VISIT (OUTPATIENT)
Dept: PAIN MEDICINE | Facility: CLINIC | Age: 76
End: 2021-07-30
Payer: MEDICARE

## 2021-07-30 VITALS
BODY MASS INDEX: 30.06 KG/M2 | SYSTOLIC BLOOD PRESSURE: 122 MMHG | WEIGHT: 153.13 LBS | DIASTOLIC BLOOD PRESSURE: 70 MMHG | HEART RATE: 76 BPM | HEIGHT: 60 IN

## 2021-07-30 DIAGNOSIS — M51.36 DDD (DEGENERATIVE DISC DISEASE), LUMBAR: ICD-10-CM

## 2021-07-30 DIAGNOSIS — G89.4 CHRONIC PAIN SYNDROME: ICD-10-CM

## 2021-07-30 DIAGNOSIS — Z96.89 SPINAL CORD STIMULATOR STATUS: Primary | ICD-10-CM

## 2021-07-30 PROCEDURE — 1101F PR PT FALLS ASSESS DOC 0-1 FALLS W/OUT INJ PAST YR: ICD-10-PCS | Mod: CPTII,S$GLB,, | Performed by: PHYSICAL MEDICINE & REHABILITATION

## 2021-07-30 PROCEDURE — 1101F PT FALLS ASSESS-DOCD LE1/YR: CPT | Mod: CPTII,S$GLB,, | Performed by: PHYSICAL MEDICINE & REHABILITATION

## 2021-07-30 PROCEDURE — 1126F AMNT PAIN NOTED NONE PRSNT: CPT | Mod: CPTII,S$GLB,, | Performed by: PHYSICAL MEDICINE & REHABILITATION

## 2021-07-30 PROCEDURE — 3074F PR MOST RECENT SYSTOLIC BLOOD PRESSURE < 130 MM HG: ICD-10-PCS | Mod: CPTII,S$GLB,, | Performed by: PHYSICAL MEDICINE & REHABILITATION

## 2021-07-30 PROCEDURE — 99213 OFFICE O/P EST LOW 20 MIN: CPT | Mod: S$GLB,,, | Performed by: PHYSICAL MEDICINE & REHABILITATION

## 2021-07-30 PROCEDURE — 99213 PR OFFICE/OUTPT VISIT, EST, LEVL III, 20-29 MIN: ICD-10-PCS | Mod: S$GLB,,, | Performed by: PHYSICAL MEDICINE & REHABILITATION

## 2021-07-30 PROCEDURE — 3078F PR MOST RECENT DIASTOLIC BLOOD PRESSURE < 80 MM HG: ICD-10-PCS | Mod: CPTII,S$GLB,, | Performed by: PHYSICAL MEDICINE & REHABILITATION

## 2021-07-30 PROCEDURE — 3288F FALL RISK ASSESSMENT DOCD: CPT | Mod: CPTII,S$GLB,, | Performed by: PHYSICAL MEDICINE & REHABILITATION

## 2021-07-30 PROCEDURE — 1159F PR MEDICATION LIST DOCUMENTED IN MEDICAL RECORD: ICD-10-PCS | Mod: CPTII,S$GLB,, | Performed by: PHYSICAL MEDICINE & REHABILITATION

## 2021-07-30 PROCEDURE — 99999 PR PBB SHADOW E&M-EST. PATIENT-LVL V: ICD-10-PCS | Mod: PBBFAC,,, | Performed by: PHYSICAL MEDICINE & REHABILITATION

## 2021-07-30 PROCEDURE — 1160F RVW MEDS BY RX/DR IN RCRD: CPT | Mod: CPTII,S$GLB,, | Performed by: PHYSICAL MEDICINE & REHABILITATION

## 2021-07-30 PROCEDURE — 3078F DIAST BP <80 MM HG: CPT | Mod: CPTII,S$GLB,, | Performed by: PHYSICAL MEDICINE & REHABILITATION

## 2021-07-30 PROCEDURE — 1159F MED LIST DOCD IN RCRD: CPT | Mod: CPTII,S$GLB,, | Performed by: PHYSICAL MEDICINE & REHABILITATION

## 2021-07-30 PROCEDURE — 99999 PR PBB SHADOW E&M-EST. PATIENT-LVL V: CPT | Mod: PBBFAC,,, | Performed by: PHYSICAL MEDICINE & REHABILITATION

## 2021-07-30 PROCEDURE — 3074F SYST BP LT 130 MM HG: CPT | Mod: CPTII,S$GLB,, | Performed by: PHYSICAL MEDICINE & REHABILITATION

## 2021-07-30 PROCEDURE — 1126F PR PAIN SEVERITY QUANTIFIED, NO PAIN PRESENT: ICD-10-PCS | Mod: CPTII,S$GLB,, | Performed by: PHYSICAL MEDICINE & REHABILITATION

## 2021-07-30 PROCEDURE — 3288F PR FALLS RISK ASSESSMENT DOCUMENTED: ICD-10-PCS | Mod: CPTII,S$GLB,, | Performed by: PHYSICAL MEDICINE & REHABILITATION

## 2021-07-30 PROCEDURE — 1160F PR REVIEW ALL MEDS BY PRESCRIBER/CLIN PHARMACIST DOCUMENTED: ICD-10-PCS | Mod: CPTII,S$GLB,, | Performed by: PHYSICAL MEDICINE & REHABILITATION

## 2021-07-30 RX ORDER — DULOXETIN HYDROCHLORIDE 20 MG/1
20 CAPSULE, DELAYED RELEASE ORAL 2 TIMES DAILY
Qty: 60 CAPSULE | Refills: 1 | Status: SHIPPED | OUTPATIENT
Start: 2021-07-30 | End: 2021-10-08

## 2021-07-30 RX ORDER — ASCORBIC ACID 125 MG
TABLET,CHEWABLE ORAL
COMMUNITY
End: 2022-07-11 | Stop reason: CLARIF

## 2021-08-02 ENCOUNTER — PROCEDURE VISIT (OUTPATIENT)
Dept: OPHTHALMOLOGY | Facility: CLINIC | Age: 76
End: 2021-08-02
Payer: MEDICARE

## 2021-08-02 VITALS — DIASTOLIC BLOOD PRESSURE: 77 MMHG | HEART RATE: 79 BPM | SYSTOLIC BLOOD PRESSURE: 143 MMHG

## 2021-08-02 DIAGNOSIS — H35.3122 NONEXUDATIVE AGE-RELATED MACULAR DEGENERATION, LEFT EYE, INTERMEDIATE DRY STAGE: ICD-10-CM

## 2021-08-02 DIAGNOSIS — H35.3231 EXUDATIVE AGE-RELATED MACULAR DEGENERATION OF BOTH EYES WITH ACTIVE CHOROIDAL NEOVASCULARIZATION: Primary | ICD-10-CM

## 2021-08-02 PROCEDURE — 67028 PR INJECT INTRAVITREAL PHARMCOLOGIC: ICD-10-PCS | Mod: 50,S$GLB,, | Performed by: OPHTHALMOLOGY

## 2021-08-02 PROCEDURE — 67028 INJECTION EYE DRUG: CPT | Mod: 50,S$GLB,, | Performed by: OPHTHALMOLOGY

## 2021-08-02 PROCEDURE — 92134 POSTERIOR SEGMENT OCT RETINA (OCULAR COHERENCE TOMOGRAPHY)-BOTH EYES: ICD-10-PCS | Mod: S$GLB,,, | Performed by: OPHTHALMOLOGY

## 2021-08-02 PROCEDURE — 92134 CPTRZ OPH DX IMG PST SGM RTA: CPT | Mod: S$GLB,,, | Performed by: OPHTHALMOLOGY

## 2021-08-02 PROCEDURE — 92012 INTRM OPH EXAM EST PATIENT: CPT | Mod: 25,S$GLB,, | Performed by: OPHTHALMOLOGY

## 2021-08-02 PROCEDURE — 92012 PR EYE EXAM, EST PATIENT,INTERMED: ICD-10-PCS | Mod: 25,S$GLB,, | Performed by: OPHTHALMOLOGY

## 2021-08-09 ENCOUNTER — PATIENT MESSAGE (OUTPATIENT)
Dept: FAMILY MEDICINE | Facility: CLINIC | Age: 76
End: 2021-08-09

## 2021-08-09 DIAGNOSIS — F51.01 PRIMARY INSOMNIA: ICD-10-CM

## 2021-08-09 RX ORDER — ESZOPICLONE 3 MG/1
TABLET, FILM COATED ORAL
Qty: 30 TABLET | Refills: 3 | OUTPATIENT
Start: 2021-08-09

## 2021-08-13 ENCOUNTER — TELEPHONE (OUTPATIENT)
Dept: PAIN MEDICINE | Facility: CLINIC | Age: 76
End: 2021-08-13

## 2021-08-26 ENCOUNTER — TELEPHONE (OUTPATIENT)
Dept: PULMONOLOGY | Facility: CLINIC | Age: 76
End: 2021-08-26

## 2021-09-13 ENCOUNTER — PROCEDURE VISIT (OUTPATIENT)
Dept: OPHTHALMOLOGY | Facility: CLINIC | Age: 76
End: 2021-09-13
Payer: MEDICARE

## 2021-09-13 DIAGNOSIS — H35.3231 EXUDATIVE AGE-RELATED MACULAR DEGENERATION OF BOTH EYES WITH ACTIVE CHOROIDAL NEOVASCULARIZATION: Primary | ICD-10-CM

## 2021-09-13 PROCEDURE — 67028 PR INJECT INTRAVITREAL PHARMCOLOGIC: ICD-10-PCS | Mod: 50,S$GLB,, | Performed by: OPHTHALMOLOGY

## 2021-09-13 PROCEDURE — 67028 INJECTION EYE DRUG: CPT | Mod: 50,S$GLB,, | Performed by: OPHTHALMOLOGY

## 2021-09-13 PROCEDURE — 92134 POSTERIOR SEGMENT OCT RETINA (OCULAR COHERENCE TOMOGRAPHY)-BOTH EYES: ICD-10-PCS | Mod: S$GLB,,, | Performed by: OPHTHALMOLOGY

## 2021-09-13 PROCEDURE — 92012 PR EYE EXAM, EST PATIENT,INTERMED: ICD-10-PCS | Mod: 25,S$GLB,, | Performed by: OPHTHALMOLOGY

## 2021-09-13 PROCEDURE — 92134 CPTRZ OPH DX IMG PST SGM RTA: CPT | Mod: S$GLB,,, | Performed by: OPHTHALMOLOGY

## 2021-09-13 PROCEDURE — 92012 INTRM OPH EXAM EST PATIENT: CPT | Mod: 25,S$GLB,, | Performed by: OPHTHALMOLOGY

## 2021-10-11 ENCOUNTER — TELEPHONE (OUTPATIENT)
Dept: FAMILY MEDICINE | Facility: CLINIC | Age: 76
End: 2021-10-11

## 2021-10-11 DIAGNOSIS — Z87.828 HISTORY OF MOTOR VEHICLE ACCIDENT: ICD-10-CM

## 2021-10-11 DIAGNOSIS — R53.81 DEBILITY: Primary | ICD-10-CM

## 2021-10-12 ENCOUNTER — TELEPHONE (OUTPATIENT)
Dept: FAMILY MEDICINE | Facility: CLINIC | Age: 76
End: 2021-10-12

## 2021-10-18 ENCOUNTER — PROCEDURE VISIT (OUTPATIENT)
Dept: OPHTHALMOLOGY | Facility: CLINIC | Age: 76
End: 2021-10-18
Payer: MEDICARE

## 2021-10-18 DIAGNOSIS — H35.3122 NONEXUDATIVE AGE-RELATED MACULAR DEGENERATION, LEFT EYE, INTERMEDIATE DRY STAGE: ICD-10-CM

## 2021-10-18 DIAGNOSIS — H35.3231 EXUDATIVE AGE-RELATED MACULAR DEGENERATION OF BOTH EYES WITH ACTIVE CHOROIDAL NEOVASCULARIZATION: Primary | ICD-10-CM

## 2021-10-18 PROCEDURE — 67028 PR INJECT INTRAVITREAL PHARMCOLOGIC: ICD-10-PCS | Mod: 50,S$GLB,, | Performed by: OPHTHALMOLOGY

## 2021-10-18 PROCEDURE — 92134 CPTRZ OPH DX IMG PST SGM RTA: CPT | Mod: S$GLB,,, | Performed by: OPHTHALMOLOGY

## 2021-10-18 PROCEDURE — 92014 PR EYE EXAM, EST PATIENT,COMPREHESV: ICD-10-PCS | Mod: 25,S$GLB,, | Performed by: OPHTHALMOLOGY

## 2021-10-18 PROCEDURE — 92014 COMPRE OPH EXAM EST PT 1/>: CPT | Mod: 25,S$GLB,, | Performed by: OPHTHALMOLOGY

## 2021-10-18 PROCEDURE — 92134 POSTERIOR SEGMENT OCT RETINA (OCULAR COHERENCE TOMOGRAPHY)-BOTH EYES: ICD-10-PCS | Mod: S$GLB,,, | Performed by: OPHTHALMOLOGY

## 2021-10-18 PROCEDURE — 67028 INJECTION EYE DRUG: CPT | Mod: 50,S$GLB,, | Performed by: OPHTHALMOLOGY

## 2021-10-22 ENCOUNTER — OFFICE VISIT (OUTPATIENT)
Dept: FAMILY MEDICINE | Facility: CLINIC | Age: 76
End: 2021-10-22
Payer: MEDICARE

## 2021-10-22 ENCOUNTER — HOSPITAL ENCOUNTER (OUTPATIENT)
Dept: RADIOLOGY | Facility: HOSPITAL | Age: 76
Discharge: HOME | End: 2021-10-22
Attending: NURSE PRACTITIONER
Payer: MEDICARE

## 2021-10-22 ENCOUNTER — TELEPHONE (OUTPATIENT)
Dept: FAMILY MEDICINE | Facility: CLINIC | Age: 76
End: 2021-10-22

## 2021-10-22 VITALS
TEMPERATURE: 98 F | HEIGHT: 60 IN | HEART RATE: 79 BPM | DIASTOLIC BLOOD PRESSURE: 73 MMHG | WEIGHT: 149 LBS | BODY MASS INDEX: 29.25 KG/M2 | SYSTOLIC BLOOD PRESSURE: 139 MMHG

## 2021-10-22 DIAGNOSIS — S89.92XD INJURY OF LEFT KNEE, SUBSEQUENT ENCOUNTER: ICD-10-CM

## 2021-10-22 DIAGNOSIS — S30.1XXD ABDOMINAL WALL HEMATOMA, SUBSEQUENT ENCOUNTER: ICD-10-CM

## 2021-10-22 DIAGNOSIS — S20.01XD POSTTRAUMATIC HEMATOMA OF RIGHT BREAST, SUBSEQUENT ENCOUNTER: ICD-10-CM

## 2021-10-22 DIAGNOSIS — S06.0X1D CONCUSSION WITH LOSS OF CONSCIOUSNESS OF 30 MINUTES OR LESS, SUBSEQUENT ENCOUNTER: ICD-10-CM
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
## 2021-10-22 PROCEDURE — 1101F PR PT FALLS ASSESS DOC 0-1 FALLS W/OUT INJ PAST YR: ICD-10-PCS | Mod: CPTII,S$GLB,, | Performed by: NURSE PRACTITIONER

## 2021-10-22 PROCEDURE — 1159F PR MEDICATION LIST DOCUMENTED IN MEDICAL RECORD: ICD-10-PCS | Mod: CPTII,S$GLB,, | Performed by: NURSE PRACTITIONER

## 2021-10-22 PROCEDURE — 73560 X-RAY EXAM OF KNEE 1 OR 2: CPT | Mod: 26,RT,, | Performed by: RADIOLOGY

## 2021-10-22 PROCEDURE — 99214 PR OFFICE/OUTPT VISIT, EST, LEVL IV, 30-39 MIN: ICD-10-PCS | Mod: S$GLB,,, | Performed by: NURSE PRACTITIONER

## 2021-10-22 PROCEDURE — 1160F RVW MEDS BY RX/DR IN RCRD: CPT | Mod: CPTII,S$GLB,, | Performed by: NURSE PRACTITIONER

## 2021-10-22 PROCEDURE — 1126F PR PAIN SEVERITY QUANTIFIED, NO PAIN PRESENT: ICD-10-PCS | Mod: CPTII,S$GLB,, | Performed by: NURSE PRACTITIONER

## 2021-10-22 PROCEDURE — 3078F DIAST BP <80 MM HG: CPT | Mod: CPTII,S$GLB,, | Performed by: NURSE PRACTITIONER

## 2021-10-22 PROCEDURE — 73562 XR KNEE ORTHO LEFT: ICD-10-PCS | Mod: 26,LT,, | Performed by: RADIOLOGY

## 2021-10-22 PROCEDURE — 3075F PR MOST RECENT SYSTOLIC BLOOD PRESS GE 130-139MM HG: ICD-10-PCS | Mod: CPTII,S$GLB,, | Performed by: NURSE PRACTITIONER

## 2021-10-22 PROCEDURE — 99999 PR PBB SHADOW E&M-EST. PATIENT-LVL III: ICD-10-PCS | Mod: PBBFAC,,, | Performed by: NURSE PRACTITIONER

## 2021-10-22 PROCEDURE — 3075F SYST BP GE 130 - 139MM HG: CPT | Mod: CPTII,S$GLB,, | Performed by: NURSE PRACTITIONER

## 2021-10-22 PROCEDURE — 3078F PR MOST RECENT DIASTOLIC BLOOD PRESSURE < 80 MM HG: ICD-10-PCS | Mod: CPTII,S$GLB,, | Performed by: NURSE PRACTITIONER

## 2021-10-22 PROCEDURE — 1159F MED LIST DOCD IN RCRD: CPT | Mod: CPTII,S$GLB,, | Performed by: NURSE PRACTITIONER

## 2021-10-22 PROCEDURE — 3288F FALL RISK ASSESSMENT DOCD: CPT | Mod: CPTII,S$GLB,, | Performed by: NURSE PRACTITIONER

## 2021-10-22 PROCEDURE — 3288F PR FALLS RISK ASSESSMENT DOCUMENTED: ICD-10-PCS | Mod: CPTII,S$GLB,, | Performed by: NURSE PRACTITIONER

## 2021-10-22 PROCEDURE — 1160F PR REVIEW ALL MEDS BY PRESCRIBER/CLIN PHARMACIST DOCUMENTED: ICD-10-PCS | Mod: CPTII,S$GLB,, | Performed by: NURSE PRACTITIONER

## 2021-10-22 PROCEDURE — 73560 X-RAY EXAM OF KNEE 1 OR 2: CPT | Mod: 59,TC,PO,RT

## 2021-10-22 PROCEDURE — 73560 XR KNEE ORTHO LEFT: ICD-10-PCS | Mod: 26,RT,, | Performed by: RADIOLOGY

## 2021-10-22 PROCEDURE — 73562 X-RAY EXAM OF KNEE 3: CPT | Mod: 26,LT,, | Performed by: RADIOLOGY

## 2021-10-22 PROCEDURE — 1126F AMNT PAIN NOTED NONE PRSNT: CPT | Mod: CPTII,S$GLB,, | Performed by: NURSE PRACTITIONER

## 2021-10-22 PROCEDURE — 1101F PT FALLS ASSESS-DOCD LE1/YR: CPT | Mod: CPTII,S$GLB,, | Performed by: NURSE PRACTITIONER

## 2021-10-22 PROCEDURE — 99214 OFFICE O/P EST MOD 30 MIN: CPT | Mod: S$GLB,,, | Performed by: NURSE PRACTITIONER

## 2021-10-22 PROCEDURE — 99999 PR PBB SHADOW E&M-EST. PATIENT-LVL III: CPT | Mod: PBBFAC,,, | Performed by: NURSE PRACTITIONER

## 2021-10-22 RX ORDER — TRAMADOL HYDROCHLORIDE 50 MG/1
50 TABLET ORAL EVERY 8 HOURS PRN
Qty: 20 TABLET | Refills: 0 | Status: SHIPPED | OUTPATIENT
Start: 2021-10-22 | End: 2021-11-05

## 2021-10-22 RX ORDER — MELOXICAM 7.5 MG/1
7.5 TABLET ORAL DAILY
Qty: 7 TABLET | Refills: 0 | Status: SHIPPED | OUTPATIENT
Start: 2021-10-22 | End: 2022-06-29

## 2021-11-02 ENCOUNTER — TELEPHONE (OUTPATIENT)
Dept: UROLOGY | Facility: CLINIC | Age: 76
End: 2021-11-02
Payer: MEDICARE

## 2021-11-02 ENCOUNTER — TELEPHONE (OUTPATIENT)
Dept: FAMILY MEDICINE | Facility: CLINIC | Age: 76
End: 2021-11-02
Payer: MEDICARE

## 2021-11-02 RX ORDER — SULFAMETHOXAZOLE AND TRIMETHOPRIM 800; 160 MG/1; MG/1
1 TABLET ORAL 2 TIMES DAILY
Qty: 6 TABLET | Refills: 0 | Status: SHIPPED | OUTPATIENT
Start: 2021-11-02 | End: 2021-11-05

## 2021-11-02 RX ORDER — CEPHALEXIN 500 MG/1
500 CAPSULE ORAL EVERY 8 HOURS
Qty: 15 CAPSULE | Refills: 0 | Status: SHIPPED | OUTPATIENT
Start: 2021-11-02 | End: 2021-11-07

## 2021-11-05 ENCOUNTER — OFFICE VISIT (OUTPATIENT)
Dept: PAIN MEDICINE | Facility: CLINIC | Age: 76
End: 2021-11-05
Payer: MEDICARE

## 2021-11-05 VITALS
BODY MASS INDEX: 28.99 KG/M2 | HEART RATE: 72 BPM | SYSTOLIC BLOOD PRESSURE: 118 MMHG | WEIGHT: 147.69 LBS | DIASTOLIC BLOOD PRESSURE: 72 MMHG | HEIGHT: 60 IN

## 2021-11-05 DIAGNOSIS — M51.36 DDD (DEGENERATIVE DISC DISEASE), LUMBAR: ICD-10-CM

## 2021-11-05 DIAGNOSIS — G89.4 CHRONIC PAIN SYNDROME: Primary | ICD-10-CM
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
## 2021-11-05 DIAGNOSIS — Z96.89 SPINAL CORD STIMULATOR STATUS: ICD-10-CM

## 2021-11-05 DIAGNOSIS — G90.521 COMPLEX REGIONAL PAIN SYNDROME TYPE 1 OF RIGHT LOWER EXTREMITY: ICD-10-CM

## 2021-11-05 PROCEDURE — 99214 PR OFFICE/OUTPT VISIT, EST, LEVL IV, 30-39 MIN: ICD-10-PCS | Mod: S$GLB,,, | Performed by: PHYSICAL MEDICINE & REHABILITATION

## 2021-11-05 PROCEDURE — 1160F RVW MEDS BY RX/DR IN RCRD: CPT | Mod: CPTII,S$GLB,, | Performed by: PHYSICAL MEDICINE & REHABILITATION

## 2021-11-05 PROCEDURE — 3288F FALL RISK ASSESSMENT DOCD: CPT | Mod: CPTII,S$GLB,, | Performed by: PHYSICAL MEDICINE & REHABILITATION

## 2021-11-05 PROCEDURE — 99999 PR PBB SHADOW E&M-EST. PATIENT-LVL V: CPT | Mod: PBBFAC,,, | Performed by: PHYSICAL MEDICINE & REHABILITATION

## 2021-11-05 PROCEDURE — 1125F PR PAIN SEVERITY QUANTIFIED, PAIN PRESENT: ICD-10-PCS | Mod: CPTII,S$GLB,, | Performed by: PHYSICAL MEDICINE & REHABILITATION

## 2021-11-05 PROCEDURE — 1101F PR PT FALLS ASSESS DOC 0-1 FALLS W/OUT INJ PAST YR: ICD-10-PCS | Mod: CPTII,S$GLB,, | Performed by: PHYSICAL MEDICINE & REHABILITATION

## 2021-11-05 PROCEDURE — 1101F PT FALLS ASSESS-DOCD LE1/YR: CPT | Mod: CPTII,S$GLB,, | Performed by: PHYSICAL MEDICINE & REHABILITATION

## 2021-11-05 PROCEDURE — 1159F PR MEDICATION LIST DOCUMENTED IN MEDICAL RECORD: ICD-10-PCS | Mod: CPTII,S$GLB,, | Performed by: PHYSICAL MEDICINE & REHABILITATION

## 2021-11-05 PROCEDURE — 3074F SYST BP LT 130 MM HG: CPT | Mod: CPTII,S$GLB,, | Performed by: PHYSICAL MEDICINE & REHABILITATION

## 2021-11-05 PROCEDURE — 99999 PR PBB SHADOW E&M-EST. PATIENT-LVL V: ICD-10-PCS | Mod: PBBFAC,,, | Performed by: PHYSICAL MEDICINE & REHABILITATION

## 2021-11-05 PROCEDURE — 3078F DIAST BP <80 MM HG: CPT | Mod: CPTII,S$GLB,, | Performed by: PHYSICAL MEDICINE & REHABILITATION

## 2021-11-05 PROCEDURE — 3078F PR MOST RECENT DIASTOLIC BLOOD PRESSURE < 80 MM HG: ICD-10-PCS | Mod: CPTII,S$GLB,, | Performed by: PHYSICAL MEDICINE & REHABILITATION

## 2021-11-05 PROCEDURE — 99214 OFFICE O/P EST MOD 30 MIN: CPT | Mod: S$GLB,,, | Performed by: PHYSICAL MEDICINE & REHABILITATION

## 2021-11-05 PROCEDURE — 3074F PR MOST RECENT SYSTOLIC BLOOD PRESSURE < 130 MM HG: ICD-10-PCS | Mod: CPTII,S$GLB,, | Performed by: PHYSICAL MEDICINE & REHABILITATION

## 2021-11-05 PROCEDURE — 3288F PR FALLS RISK ASSESSMENT DOCUMENTED: ICD-10-PCS | Mod: CPTII,S$GLB,, | Performed by: PHYSICAL MEDICINE & REHABILITATION

## 2021-11-05 PROCEDURE — 1159F MED LIST DOCD IN RCRD: CPT | Mod: CPTII,S$GLB,, | Performed by: PHYSICAL MEDICINE & REHABILITATION

## 2021-11-05 PROCEDURE — 1160F PR REVIEW ALL MEDS BY PRESCRIBER/CLIN PHARMACIST DOCUMENTED: ICD-10-PCS | Mod: CPTII,S$GLB,, | Performed by: PHYSICAL MEDICINE & REHABILITATION

## 2021-11-05 PROCEDURE — 1125F AMNT PAIN NOTED PAIN PRSNT: CPT | Mod: CPTII,S$GLB,, | Performed by: PHYSICAL MEDICINE & REHABILITATION

## 2021-11-05 RX ORDER — TRIAMTERENE AND HYDROCHLOROTHIAZIDE 37.5; 25 MG/1; MG/1
1 CAPSULE ORAL EVERY MORNING
COMMUNITY
End: 2022-07-11 | Stop reason: CLARIF

## 2021-11-05 RX ORDER — TRAMADOL HYDROCHLORIDE 50 MG/1
50 TABLET ORAL EVERY 12 HOURS PRN
Qty: 60 TABLET | Refills: 1 | Status: SHIPPED | OUTPATIENT
Start: 2021-11-05 | End: 2022-01-07 | Stop reason: SDUPTHER

## 2021-11-05 RX ORDER — EZETIMIBE 10 MG/1
1 TABLET ORAL DAILY
COMMUNITY
End: 2022-04-06 | Stop reason: SDUPTHER

## 2021-11-05 RX ORDER — METOPROLOL TARTRATE 25 MG/1
1 TABLET, FILM COATED ORAL DAILY
COMMUNITY
End: 2022-07-11 | Stop reason: CLARIF

## 2021-11-05 RX ORDER — HYDROGEN PEROXIDE 3 %
1 SOLUTION, NON-ORAL MISCELLANEOUS 2 TIMES DAILY
COMMUNITY
End: 2022-05-06 | Stop reason: ALTCHOICE

## 2021-11-05 RX ORDER — BUDESONIDE, GLYCOPYRROLATE, AND FORMOTEROL FUMARATE 160; 9; 4.8 UG/1; UG/1; UG/1
2 AEROSOL, METERED RESPIRATORY (INHALATION) 2 TIMES DAILY
COMMUNITY
Start: 2021-10-19 | End: 2022-07-11 | Stop reason: CLARIF

## 2021-11-15 ENCOUNTER — TELEPHONE (OUTPATIENT)
Dept: FAMILY MEDICINE | Facility: CLINIC | Age: 76
End: 2021-11-15
Payer: MEDICARE

## 2021-11-19 ENCOUNTER — TELEPHONE (OUTPATIENT)
Dept: NEPHROLOGY | Facility: CLINIC | Age: 76
End: 2021-11-19

## 2021-11-19 NOTE — TELEPHONE ENCOUNTER
----- Message from Kaia Felix sent at 11/19/2021  9:31 AM CST -----  Who Called: CÉSAR LEMON    What is the reqeust in detail: Pt called in to reschedule appt, she needs something either before 12/15/21 or after 12/22/21. Please advise.     Can the clinic reply by [facility]? No     Best Call Back Number: [phone]    Additional Information:

## 2021-11-23 ENCOUNTER — TELEPHONE (OUTPATIENT)
Dept: ADMINISTRATIVE | Facility: HOSPITAL | Age: 76
End: 2021-11-23
Payer: MEDICARE

## 2021-11-29 ENCOUNTER — PROCEDURE VISIT (OUTPATIENT)
Dept: OPHTHALMOLOGY | Facility: CLINIC | Age: 76
End: 2021-11-29
Payer: MEDICARE

## 2021-11-29 DIAGNOSIS — H35.3122 NONEXUDATIVE AGE-RELATED MACULAR DEGENERATION, LEFT EYE, INTERMEDIATE DRY STAGE: ICD-10-CM

## 2021-11-29 DIAGNOSIS — H35.3231 EXUDATIVE AGE-RELATED MACULAR DEGENERATION OF BOTH EYES WITH ACTIVE CHOROIDAL NEOVASCULARIZATION: Primary | ICD-10-CM

## 2021-11-29 PROCEDURE — 67028 INJECTION EYE DRUG: CPT | Mod: 50,S$GLB,, | Performed by: OPHTHALMOLOGY

## 2021-11-29 PROCEDURE — 92134 CPTRZ OPH DX IMG PST SGM RTA: CPT | Mod: S$GLB,,, | Performed by: OPHTHALMOLOGY

## 2021-11-29 PROCEDURE — 92012 PR EYE EXAM, EST PATIENT,INTERMED: ICD-10-PCS | Mod: 25,S$GLB,, | Performed by: OPHTHALMOLOGY

## 2021-11-29 PROCEDURE — 67028 PR INJECT INTRAVITREAL PHARMCOLOGIC: ICD-10-PCS | Mod: 50,S$GLB,, | Performed by: OPHTHALMOLOGY

## 2021-11-29 PROCEDURE — 92012 INTRM OPH EXAM EST PATIENT: CPT | Mod: 25,S$GLB,, | Performed by: OPHTHALMOLOGY

## 2021-11-29 PROCEDURE — 92134 POSTERIOR SEGMENT OCT RETINA (OCULAR COHERENCE TOMOGRAPHY)-BOTH EYES: ICD-10-PCS | Mod: S$GLB,,, | Performed by: OPHTHALMOLOGY

## 2021-11-30 ENCOUNTER — OFFICE VISIT (OUTPATIENT)
Dept: NEUROLOGY | Facility: CLINIC | Age: 76
End: 2021-11-30
Payer: MEDICARE

## 2021-11-30 VITALS
BODY MASS INDEX: 28.99 KG/M2 | HEIGHT: 60 IN | WEIGHT: 147.69 LBS | SYSTOLIC BLOOD PRESSURE: 120 MMHG | DIASTOLIC BLOOD PRESSURE: 68 MMHG | HEART RATE: 91 BPM

## 2021-11-30 DIAGNOSIS — F43.10 POST TRAUMATIC STRESS DISORDER: ICD-10-CM

## 2021-11-30 DIAGNOSIS — R25.2 JERKING MOVEMENTS OF EXTREMITIES: Primary | ICD-10-CM

## 2021-11-30 PROCEDURE — 99499 RISK ADDL DX/OHS AUDIT: ICD-10-PCS | Mod: S$GLB,,, | Performed by: PHYSICIAN ASSISTANT

## 2021-11-30 PROCEDURE — 99205 PR OFFICE/OUTPT VISIT, NEW, LEVL V, 60-74 MIN: ICD-10-PCS | Mod: S$GLB,,, | Performed by: PHYSICIAN ASSISTANT

## 2021-11-30 PROCEDURE — 99999 PR PBB SHADOW E&M-EST. PATIENT-LVL V: CPT | Mod: PBBFAC,,, | Performed by: PHYSICIAN ASSISTANT

## 2021-11-30 PROCEDURE — 99205 OFFICE O/P NEW HI 60 MIN: CPT | Mod: S$GLB,,, | Performed by: PHYSICIAN ASSISTANT

## 2021-11-30 PROCEDURE — 99999 PR PBB SHADOW E&M-EST. PATIENT-LVL V: ICD-10-PCS | Mod: PBBFAC,,, | Performed by: PHYSICIAN ASSISTANT

## 2021-11-30 PROCEDURE — 99499 UNLISTED E&M SERVICE: CPT | Mod: S$GLB,,, | Performed by: PHYSICIAN ASSISTANT

## 2021-12-01 ENCOUNTER — TELEPHONE (OUTPATIENT)
Dept: NEUROLOGY | Facility: CLINIC | Age: 76
End: 2021-12-01
Payer: MEDICARE

## 2021-12-01 DIAGNOSIS — F43.10 POST TRAUMATIC STRESS DISORDER: Primary | ICD-10-CM

## 2021-12-08 ENCOUNTER — LAB VISIT (OUTPATIENT)
Dept: LAB | Facility: HOSPITAL | Age: 76
End: 2021-12-08
Attending: INTERNAL MEDICINE
Payer: MEDICARE

## 2021-12-08 DIAGNOSIS — N18.30 STAGE 3 CHRONIC KIDNEY DISEASE, UNSPECIFIED WHETHER STAGE 3A OR 3B CKD: ICD-10-CM

## 2021-12-08 LAB
ALBUMIN SERPL BCP-MCNC: 3.5 G/DL (ref 3.5–5.2)
ANION GAP SERPL CALC-SCNC: 13 MMOL/L (ref 8–16)
BUN SERPL-MCNC: 18 MG/DL (ref 8–23)
CALCIUM SERPL-MCNC: 9.6 MG/DL (ref 8.7–10.5)
CHLORIDE SERPL-SCNC: 96 MMOL/L (ref 95–110)
CO2 SERPL-SCNC: 28 MMOL/L (ref 23–29)
CREAT SERPL-MCNC: 1.4 MG/DL (ref 0.5–1.4)
EST. GFR  (AFRICAN AMERICAN): 41.5 ML/MIN/1.73 M^2
EST. GFR  (NON AFRICAN AMERICAN): 36 ML/MIN/1.73 M^2
GLUCOSE SERPL-MCNC: 102 MG/DL (ref 70–110)
PHOSPHATE SERPL-MCNC: 3.3 MG/DL (ref 2.7–4.5)
POTASSIUM SERPL-SCNC: 3.6 MMOL/L (ref 3.5–5.1)
PTH-INTACT SERPL-MCNC: 190 PG/ML (ref 9–77)
SODIUM SERPL-SCNC: 137 MMOL/L (ref 136–145)

## 2021-12-08 PROCEDURE — 83970 ASSAY OF PARATHORMONE: CPT | Performed by: INTERNAL MEDICINE

## 2021-12-08 PROCEDURE — 36415 COLL VENOUS BLD VENIPUNCTURE: CPT | Mod: PO | Performed by: INTERNAL MEDICINE

## 2021-12-08 PROCEDURE — 80069 RENAL FUNCTION PANEL: CPT | Performed by: INTERNAL MEDICINE

## 2021-12-29 ENCOUNTER — OFFICE VISIT (OUTPATIENT)
Dept: NEPHROLOGY | Facility: CLINIC | Age: 76
End: 2021-12-29
Payer: MEDICARE

## 2021-12-29 VITALS
BODY MASS INDEX: 28.86 KG/M2 | HEART RATE: 79 BPM | OXYGEN SATURATION: 95 % | SYSTOLIC BLOOD PRESSURE: 122 MMHG | DIASTOLIC BLOOD PRESSURE: 54 MMHG | HEIGHT: 60 IN | WEIGHT: 147 LBS

## 2021-12-29 DIAGNOSIS — I10 ESSENTIAL HYPERTENSION: ICD-10-CM

## 2021-12-29 DIAGNOSIS — N18.30 STAGE 3 CHRONIC KIDNEY DISEASE, UNSPECIFIED WHETHER STAGE 3A OR 3B CKD: Primary | ICD-10-CM

## 2021-12-29 PROCEDURE — 3074F PR MOST RECENT SYSTOLIC BLOOD PRESSURE < 130 MM HG: ICD-10-PCS | Mod: CPTII,S$GLB,, | Performed by: INTERNAL MEDICINE

## 2021-12-29 PROCEDURE — 1101F PR PT FALLS ASSESS DOC 0-1 FALLS W/OUT INJ PAST YR: ICD-10-PCS | Mod: CPTII,S$GLB,, | Performed by: INTERNAL MEDICINE

## 2021-12-29 PROCEDURE — 1160F RVW MEDS BY RX/DR IN RCRD: CPT | Mod: CPTII,S$GLB,, | Performed by: INTERNAL MEDICINE

## 2021-12-29 PROCEDURE — 99214 PR OFFICE/OUTPT VISIT, EST, LEVL IV, 30-39 MIN: ICD-10-PCS | Mod: S$GLB,,, | Performed by: INTERNAL MEDICINE

## 2021-12-29 PROCEDURE — 3288F PR FALLS RISK ASSESSMENT DOCUMENTED: ICD-10-PCS | Mod: CPTII,S$GLB,, | Performed by: INTERNAL MEDICINE

## 2021-12-29 PROCEDURE — 3078F PR MOST RECENT DIASTOLIC BLOOD PRESSURE < 80 MM HG: ICD-10-PCS | Mod: CPTII,S$GLB,, | Performed by: INTERNAL MEDICINE

## 2021-12-29 PROCEDURE — 99999 PR PBB SHADOW E&M-EST. PATIENT-LVL V: CPT | Mod: PBBFAC,,, | Performed by: INTERNAL MEDICINE

## 2021-12-29 PROCEDURE — 99999 PR PBB SHADOW E&M-EST. PATIENT-LVL V: ICD-10-PCS | Mod: PBBFAC,,, | Performed by: INTERNAL MEDICINE

## 2021-12-29 PROCEDURE — 3078F DIAST BP <80 MM HG: CPT | Mod: CPTII,S$GLB,, | Performed by: INTERNAL MEDICINE

## 2021-12-29 PROCEDURE — 1101F PT FALLS ASSESS-DOCD LE1/YR: CPT | Mod: CPTII,S$GLB,, | Performed by: INTERNAL MEDICINE

## 2021-12-29 PROCEDURE — 3288F FALL RISK ASSESSMENT DOCD: CPT | Mod: CPTII,S$GLB,, | Performed by: INTERNAL MEDICINE

## 2021-12-29 PROCEDURE — 3074F SYST BP LT 130 MM HG: CPT | Mod: CPTII,S$GLB,, | Performed by: INTERNAL MEDICINE

## 2021-12-29 PROCEDURE — 1159F MED LIST DOCD IN RCRD: CPT | Mod: CPTII,S$GLB,, | Performed by: INTERNAL MEDICINE

## 2021-12-29 PROCEDURE — 99214 OFFICE O/P EST MOD 30 MIN: CPT | Mod: S$GLB,,, | Performed by: INTERNAL MEDICINE

## 2021-12-29 PROCEDURE — 1159F PR MEDICATION LIST DOCUMENTED IN MEDICAL RECORD: ICD-10-PCS | Mod: CPTII,S$GLB,, | Performed by: INTERNAL MEDICINE

## 2021-12-29 PROCEDURE — 1160F PR REVIEW ALL MEDS BY PRESCRIBER/CLIN PHARMACIST DOCUMENTED: ICD-10-PCS | Mod: CPTII,S$GLB,, | Performed by: INTERNAL MEDICINE

## 2021-12-29 RX ORDER — TRAMADOL HYDROCHLORIDE 100 MG/1
100 TABLET, EXTENDED RELEASE ORAL DAILY
COMMUNITY
End: 2022-01-07 | Stop reason: CLARIF

## 2022-01-06 ENCOUNTER — DOCUMENTATION ONLY (OUTPATIENT)
Dept: NEPHROLOGY | Facility: CLINIC | Age: 77
End: 2022-01-06
Payer: MEDICARE

## 2022-01-07 ENCOUNTER — OFFICE VISIT (OUTPATIENT)
Dept: PAIN MEDICINE | Facility: CLINIC | Age: 77
End: 2022-01-07
Payer: MEDICARE

## 2022-01-07 VITALS
WEIGHT: 149.06 LBS | HEART RATE: 70 BPM | SYSTOLIC BLOOD PRESSURE: 126 MMHG | HEIGHT: 60 IN | BODY MASS INDEX: 29.26 KG/M2 | DIASTOLIC BLOOD PRESSURE: 78 MMHG | RESPIRATION RATE: 18 BRPM

## 2022-01-07 DIAGNOSIS — G89.4 CHRONIC PAIN SYNDROME: ICD-10-CM

## 2022-01-07 PROCEDURE — 3078F DIAST BP <80 MM HG: CPT | Mod: CPTII,S$GLB,, | Performed by: PHYSICAL MEDICINE & REHABILITATION

## 2022-01-07 PROCEDURE — 3288F PR FALLS RISK ASSESSMENT DOCUMENTED: ICD-10-PCS | Mod: CPTII,S$GLB,, | Performed by: PHYSICAL MEDICINE & REHABILITATION

## 2022-01-07 PROCEDURE — 1160F RVW MEDS BY RX/DR IN RCRD: CPT | Mod: CPTII,S$GLB,, | Performed by: PHYSICAL MEDICINE & REHABILITATION

## 2022-01-07 PROCEDURE — 3074F PR MOST RECENT SYSTOLIC BLOOD PRESSURE < 130 MM HG: ICD-10-PCS | Mod: CPTII,S$GLB,, | Performed by: PHYSICAL MEDICINE & REHABILITATION

## 2022-01-07 PROCEDURE — 3078F PR MOST RECENT DIASTOLIC BLOOD PRESSURE < 80 MM HG: ICD-10-PCS | Mod: CPTII,S$GLB,, | Performed by: PHYSICAL MEDICINE & REHABILITATION

## 2022-01-07 PROCEDURE — 99999 PR PBB SHADOW E&M-EST. PATIENT-LVL III: ICD-10-PCS | Mod: PBBFAC,,, | Performed by: PHYSICAL MEDICINE & REHABILITATION

## 2022-01-07 PROCEDURE — 99214 OFFICE O/P EST MOD 30 MIN: CPT | Mod: S$GLB,,, | Performed by: PHYSICAL MEDICINE & REHABILITATION

## 2022-01-07 PROCEDURE — 3288F FALL RISK ASSESSMENT DOCD: CPT | Mod: CPTII,S$GLB,, | Performed by: PHYSICAL MEDICINE & REHABILITATION

## 2022-01-07 PROCEDURE — 99999 PR PBB SHADOW E&M-EST. PATIENT-LVL III: CPT | Mod: PBBFAC,,, | Performed by: PHYSICAL MEDICINE & REHABILITATION

## 2022-01-07 PROCEDURE — 1101F PT FALLS ASSESS-DOCD LE1/YR: CPT | Mod: CPTII,S$GLB,, | Performed by: PHYSICAL MEDICINE & REHABILITATION

## 2022-01-07 PROCEDURE — 99214 PR OFFICE/OUTPT VISIT, EST, LEVL IV, 30-39 MIN: ICD-10-PCS | Mod: S$GLB,,, | Performed by: PHYSICAL MEDICINE & REHABILITATION

## 2022-01-07 PROCEDURE — 1159F MED LIST DOCD IN RCRD: CPT | Mod: CPTII,S$GLB,, | Performed by: PHYSICAL MEDICINE & REHABILITATION

## 2022-01-07 PROCEDURE — 1126F PR PAIN SEVERITY QUANTIFIED, NO PAIN PRESENT: ICD-10-PCS | Mod: CPTII,S$GLB,, | Performed by: PHYSICAL MEDICINE & REHABILITATION

## 2022-01-07 PROCEDURE — 3074F SYST BP LT 130 MM HG: CPT | Mod: CPTII,S$GLB,, | Performed by: PHYSICAL MEDICINE & REHABILITATION

## 2022-01-07 PROCEDURE — 1160F PR REVIEW ALL MEDS BY PRESCRIBER/CLIN PHARMACIST DOCUMENTED: ICD-10-PCS | Mod: CPTII,S$GLB,, | Performed by: PHYSICAL MEDICINE & REHABILITATION

## 2022-01-07 PROCEDURE — 1126F AMNT PAIN NOTED NONE PRSNT: CPT | Mod: CPTII,S$GLB,, | Performed by: PHYSICAL MEDICINE & REHABILITATION

## 2022-01-07 PROCEDURE — 1101F PR PT FALLS ASSESS DOC 0-1 FALLS W/OUT INJ PAST YR: ICD-10-PCS | Mod: CPTII,S$GLB,, | Performed by: PHYSICAL MEDICINE & REHABILITATION

## 2022-01-07 PROCEDURE — 1159F PR MEDICATION LIST DOCUMENTED IN MEDICAL RECORD: ICD-10-PCS | Mod: CPTII,S$GLB,, | Performed by: PHYSICAL MEDICINE & REHABILITATION

## 2022-01-07 RX ORDER — TRAMADOL HYDROCHLORIDE 50 MG/1
50 TABLET ORAL EVERY 12 HOURS PRN
Qty: 60 TABLET | Refills: 1 | Status: SHIPPED | OUTPATIENT
Start: 2022-01-07 | End: 2022-02-06

## 2022-01-07 RX ORDER — DICLOFENAC SODIUM 10 MG/G
2 GEL TOPICAL 3 TIMES DAILY PRN
Qty: 2 G | Refills: 2 | Status: SHIPPED | OUTPATIENT
Start: 2022-01-07 | End: 2022-07-11 | Stop reason: CLARIF

## 2022-01-07 RX ORDER — DULOXETIN HYDROCHLORIDE 20 MG/1
20 CAPSULE, DELAYED RELEASE ORAL 2 TIMES DAILY
Qty: 60 CAPSULE | Refills: 1 | Status: SHIPPED | OUTPATIENT
Start: 2022-01-07 | End: 2022-03-18
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
## 2022-01-10 ENCOUNTER — PROCEDURE VISIT (OUTPATIENT)
Dept: OPHTHALMOLOGY | Facility: CLINIC | Age: 77
End: 2022-01-10
Payer: MEDICARE

## 2022-01-10 DIAGNOSIS — H35.3231 EXUDATIVE AGE-RELATED MACULAR DEGENERATION OF BOTH EYES WITH ACTIVE CHOROIDAL NEOVASCULARIZATION: Primary | ICD-10-CM

## 2022-01-10 DIAGNOSIS — H35.3122 NONEXUDATIVE AGE-RELATED MACULAR DEGENERATION, LEFT EYE, INTERMEDIATE DRY STAGE: ICD-10-CM

## 2022-01-10 PROCEDURE — 99499 RISK ADDL DX/OHS AUDIT: ICD-10-PCS | Mod: S$PBB,,, | Performed by: OPHTHALMOLOGY

## 2022-01-10 PROCEDURE — 67028 INJECTION EYE DRUG: CPT | Mod: 50,S$GLB,, | Performed by: OPHTHALMOLOGY

## 2022-01-10 PROCEDURE — 67028 PR INJECT INTRAVITREAL PHARMCOLOGIC: ICD-10-PCS | Mod: 50,S$GLB,, | Performed by: OPHTHALMOLOGY

## 2022-01-10 PROCEDURE — 99499 UNLISTED E&M SERVICE: CPT | Mod: S$PBB,,, | Performed by: OPHTHALMOLOGY

## 2022-01-10 PROCEDURE — 99499 UNLISTED E&M SERVICE: CPT | Mod: S$GLB,,, | Performed by: OPHTHALMOLOGY

## 2022-01-10 NOTE — PATIENT INSTRUCTIONS
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
## 2022-01-12 ENCOUNTER — PATIENT MESSAGE (OUTPATIENT)
Dept: NEUROLOGY | Facility: CLINIC | Age: 77
End: 2022-01-12
Payer: MEDICARE

## 2022-01-28 ENCOUNTER — PES CALL (OUTPATIENT)
Dept: ADMINISTRATIVE | Facility: CLINIC | Age: 77
End: 2022-01-28
Payer: MEDICARE

## 2022-02-14 ENCOUNTER — PROCEDURE VISIT (OUTPATIENT)
Dept: OPHTHALMOLOGY | Facility: CLINIC | Age: 77
End: 2022-02-14
Payer: MEDICARE

## 2022-02-14 DIAGNOSIS — H35.3122 NONEXUDATIVE AGE-RELATED MACULAR DEGENERATION, LEFT EYE, INTERMEDIATE DRY STAGE: ICD-10-CM

## 2022-02-14 DIAGNOSIS — H35.3231 EXUDATIVE AGE-RELATED MACULAR DEGENERATION OF BOTH EYES WITH ACTIVE CHOROIDAL NEOVASCULARIZATION: Primary | ICD-10-CM

## 2022-02-14 PROCEDURE — 67028 PR INJECT INTRAVITREAL PHARMCOLOGIC: ICD-10-PCS | Mod: 50,S$GLB,, | Performed by: OPHTHALMOLOGY

## 2022-02-14 PROCEDURE — 92134 POSTERIOR SEGMENT OCT RETINA (OCULAR COHERENCE TOMOGRAPHY)-BOTH EYES: ICD-10-PCS | Mod: S$GLB,,, | Performed by: OPHTHALMOLOGY

## 2022-02-14 PROCEDURE — 92014 COMPRE OPH EXAM EST PT 1/>: CPT | Mod: 25,S$GLB,, | Performed by: OPHTHALMOLOGY

## 2022-02-14 PROCEDURE — 92134 CPTRZ OPH DX IMG PST SGM RTA: CPT | Mod: S$GLB,,, | Performed by: OPHTHALMOLOGY

## 2022-02-14 PROCEDURE — 92014 PR EYE EXAM, EST PATIENT,COMPREHESV: ICD-10-PCS | Mod: 25,S$GLB,, | Performed by: OPHTHALMOLOGY

## 2022-02-14 PROCEDURE — 67028 INJECTION EYE DRUG: CPT | Mod: 50,S$GLB,, | Performed by: OPHTHALMOLOGY

## 2022-02-14 NOTE — PATIENT INSTRUCTIONS
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
## 2022-02-28 ENCOUNTER — OFFICE VISIT (OUTPATIENT)
Dept: FAMILY MEDICINE | Facility: CLINIC | Age: 77
End: 2022-02-28
Payer: MEDICARE

## 2022-02-28 VITALS
WEIGHT: 141.19 LBS | DIASTOLIC BLOOD PRESSURE: 78 MMHG | BODY MASS INDEX: 27.72 KG/M2 | TEMPERATURE: 98 F | HEIGHT: 60 IN | HEART RATE: 71 BPM | OXYGEN SATURATION: 95 % | SYSTOLIC BLOOD PRESSURE: 131 MMHG

## 2022-02-28 DIAGNOSIS — E78.2 MIXED HYPERLIPIDEMIA: ICD-10-CM

## 2022-02-28 DIAGNOSIS — J44.9 MODERATE COPD (CHRONIC OBSTRUCTIVE PULMONARY DISEASE): ICD-10-CM

## 2022-02-28 DIAGNOSIS — E03.9 HYPOTHYROIDISM, UNSPECIFIED TYPE: ICD-10-CM

## 2022-02-28 DIAGNOSIS — I25.10 CORONARY ARTERY DISEASE INVOLVING NATIVE CORONARY ARTERY OF NATIVE HEART WITHOUT ANGINA PECTORIS: ICD-10-CM

## 2022-02-28 DIAGNOSIS — N18.30 STAGE 3 CHRONIC KIDNEY DISEASE, UNSPECIFIED WHETHER STAGE 3A OR 3B CKD: ICD-10-CM

## 2022-02-28 DIAGNOSIS — R73.03 PREDIABETES: ICD-10-CM

## 2022-02-28 DIAGNOSIS — I10 PRIMARY HYPERTENSION: Primary | ICD-10-CM

## 2022-02-28 DIAGNOSIS — R91.1 PULMONARY NODULE, RIGHT: ICD-10-CM

## 2022-02-28 PROBLEM — N17.9 ACUTE RENAL FAILURE: Status: RESOLVED | Noted: 2021-01-28 | Resolved: 2022-02-28

## 2022-02-28 PROCEDURE — 1160F PR REVIEW ALL MEDS BY PRESCRIBER/CLIN PHARMACIST DOCUMENTED: ICD-10-PCS | Mod: CPTII,S$GLB,, | Performed by: INTERNAL MEDICINE

## 2022-02-28 PROCEDURE — 99499 UNLISTED E&M SERVICE: CPT | Mod: S$PBB,,, | Performed by: INTERNAL MEDICINE

## 2022-02-28 PROCEDURE — 3078F PR MOST RECENT DIASTOLIC BLOOD PRESSURE < 80 MM HG: ICD-10-PCS | Mod: CPTII,S$GLB,, | Performed by: INTERNAL MEDICINE

## 2022-02-28 PROCEDURE — 99499 RISK ADDL DX/OHS AUDIT: ICD-10-PCS | Mod: S$PBB,,, | Performed by: INTERNAL MEDICINE

## 2022-02-28 PROCEDURE — 99999 PR PBB SHADOW E&M-EST. PATIENT-LVL V: ICD-10-PCS | Mod: PBBFAC,,, | Performed by: INTERNAL MEDICINE

## 2022-02-28 PROCEDURE — 1101F PR PT FALLS ASSESS DOC 0-1 FALLS W/OUT INJ PAST YR: ICD-10-PCS | Mod: CPTII,S$GLB,, | Performed by: INTERNAL MEDICINE

## 2022-02-28 PROCEDURE — 1126F AMNT PAIN NOTED NONE PRSNT: CPT | Mod: CPTII,S$GLB,, | Performed by: INTERNAL MEDICINE

## 2022-02-28 PROCEDURE — 99999 PR PBB SHADOW E&M-EST. PATIENT-LVL V: CPT | Mod: PBBFAC,,, | Performed by: INTERNAL MEDICINE

## 2022-02-28 PROCEDURE — 3288F FALL RISK ASSESSMENT DOCD: CPT | Mod: CPTII,S$GLB,, | Performed by: INTERNAL MEDICINE

## 2022-02-28 PROCEDURE — 3288F PR FALLS RISK ASSESSMENT DOCUMENTED: ICD-10-PCS | Mod: CPTII,S$GLB,, | Performed by: INTERNAL MEDICINE

## 2022-02-28 PROCEDURE — 3075F SYST BP GE 130 - 139MM HG: CPT | Mod: CPTII,S$GLB,, | Performed by: INTERNAL MEDICINE

## 2022-02-28 PROCEDURE — 1159F PR MEDICATION LIST DOCUMENTED IN MEDICAL RECORD: ICD-10-PCS | Mod: CPTII,S$GLB,, | Performed by: INTERNAL MEDICINE

## 2022-02-28 PROCEDURE — 99214 OFFICE O/P EST MOD 30 MIN: CPT | Mod: S$GLB,,, | Performed by: INTERNAL MEDICINE

## 2022-02-28 PROCEDURE — 3078F DIAST BP <80 MM HG: CPT | Mod: CPTII,S$GLB,, | Performed by: INTERNAL MEDICINE

## 2022-02-28 PROCEDURE — 1159F MED LIST DOCD IN RCRD: CPT | Mod: CPTII,S$GLB,, | Performed by: INTERNAL MEDICINE

## 2022-02-28 PROCEDURE — 1101F PT FALLS ASSESS-DOCD LE1/YR: CPT | Mod: CPTII,S$GLB,, | Performed by: INTERNAL MEDICINE

## 2022-02-28 PROCEDURE — 99214 PR OFFICE/OUTPT VISIT, EST, LEVL IV, 30-39 MIN: ICD-10-PCS | Mod: S$GLB,,, | Performed by: INTERNAL MEDICINE

## 2022-02-28 PROCEDURE — 3075F PR MOST RECENT SYSTOLIC BLOOD PRESS GE 130-139MM HG: ICD-10-PCS | Mod: CPTII,S$GLB,, | Performed by: INTERNAL MEDICINE

## 2022-02-28 PROCEDURE — 1160F RVW MEDS BY RX/DR IN RCRD: CPT | Mod: CPTII,S$GLB,, | Performed by: INTERNAL MEDICINE

## 2022-02-28 PROCEDURE — 1126F PR PAIN SEVERITY QUANTIFIED, NO PAIN PRESENT: ICD-10-PCS | Mod: CPTII,S$GLB,, | Performed by: INTERNAL MEDICINE

## 2022-02-28 RX ORDER — FUROSEMIDE 20 MG/1
40 TABLET ORAL DAILY
Qty: 30 TABLET | Refills: 11 | Status: SHIPPED | OUTPATIENT
Start: 2022-02-28 | End: 2022-06-29

## 2022-02-28 NOTE — TELEPHONE ENCOUNTER
Care Due:                  Date            Visit Type   Department     Provider  --------------------------------------------------------------------------------                                EP -                              PRIMARY      [facility] FAMILY  Last Visit: 01-      CARE ([facility])   MEDICINE       Mindy Hathaway  Next Visit: None Scheduled  None         None Found                                                            Last  Test          Frequency    Reason                     Performed    Due Date  --------------------------------------------------------------------------------    Office Visit  12 months..  amLODIPine, furosemide,    01- 01-                             pantoprazole, simvastatin    Lipid Panel.  12 months..  simvastatin..............  06- 06-    Powered by [employer]. Reference number: [id number].   2/28/2022 1:56:10 PM CST

## 2022-02-28 NOTE — PROGRESS NOTES
Assessment/Plan:    Problem List Items Addressed This Visit        Pulmonary    Moderate COPD (chronic obstructive pulmonary disease)    Overview     -followed by pulmonology, Dr. Colindres  -currently on Anoro daily but admits not always compliant  -has follow up with pulmonology soon with pulmonary studies           Pulmonary nodule, right    Overview     -monitored by pulmonology  -last CT in Oct 2021-stable  -recs to repeat in one year              Cardiac/Vascular    Hypertension - Primary    Overview     -at goal today  -currently on amlodipine 10 mg QD, lopressor 25 mg QD  -continue lifestyle modification with low sodium diet and exercise   -discussed hypertension disease course and importance of treating high blood pressure  -patient understood and advised of risk of untreated blood pressure.  ER precautions were given   for symptoms of hypertensive urgency and emergency.           Relevant Orders    Basic Metabolic Panel    Hyperlipidemia    Overview     -chronic condition. Currently stable.    -reports compliance with hyperlipidemia treatment as prescribed  -denies any known adverse effects of medications  -recent labs listed below; due for repeat lipid  Lab Results   Component Value Date    CHOL 199 06/17/2020     Lab Results   Component Value Date    HDL 84 (H) 06/17/2020     Lab Results   Component Value Date    LDLCALC 88.4 06/17/2020     Lab Results   Component Value Date    TRIG 133 06/17/2020     Lab Results   Component Value Date    ALT 37 (H) 10/08/2021    AST 76 (H) 10/08/2021    ALKPHOS 111 10/08/2021    BILITOT 0.4 10/08/2021              Relevant Orders    Lipid Panel    CAD (coronary artery disease)    Overview     -s/p CABG in 2017  -followed by cardiology, Dr. Loya  -remains on ASA and statin  -recent nuclear stress test 2020 unremarkable              Renal/    CKD (chronic kidney disease) stage 3, GFR 30-59 ml/min    Overview     -followed by nephrology  -avoid nephrotoxic  agents  -renally dose medications              Endocrine    Hypothyroidism    Overview     Lab Results   Component Value Date    TSH 1.154 03/19/2021   -levothyroxine 225 mcg daily               Prediabetes    Overview     Lab Results   Component Value Date    HGBA1C 6.0 (H) 12/22/2020   -due for repeat a1c                 Follow up in about 6 months (around 8/28/2022).    Mindy Hathaway MD  _____________________________________________________________________________________________________________________________________________________    CC: follow up of chronic medical conditions     HPI:    Patient is in clinic today as an established patient here for follow up of chronic medical conditions.    HTN: The patient is currently being treated for essential hypertension. This condition is chronic and stable. The patient is tolerating their medication well with good compliance.  Denies any adverse effects of medications.  Counseling was offered regarding low sodium diet.  The patient has a reduced salt intake. Routine exercise recommended. The patient denies headache, vision changes, chest pain, palpitations, shortness of breath, or lower extremity edema.    COPD: followed by pulmonology. Reports respiratory symptoms stable. Admits to non-compliance with controller inhaler. Using PRN albuterol.     CAD: followed by cardiology. Stable. No recent symptoms of chest pain or SOB.    No new complaints today. Remaining chronic conditions have been reviewed and remain stable. Further detail as stated above.       Past Medical History:  Past Medical History:   Diagnosis Date    Acid reflux 4/11/2014    Allergy     Anxiety and depression 3/6/2014    AP (angina pectoris) 2/13/2017    CAD (coronary artery disease) 2/13/2017    CAD, multiple vessel 2/13/2017    Chronic pain associated with significant psychosocial dysfunction 2/21/2013    CKD (chronic kidney disease) stage 3, GFR 30-59 ml/min     Dr. Vásquez    COPD  (chronic obstructive pulmonary disease)     well controlled, Dr Gomez [facility]    History of shingles     HTN (hypertension)     Hypothyroidism     Multiple allergies     S/P CABG (coronary artery bypass graft) 2/13/2017     Past Surgical History:   Procedure Laterality Date    CARDIAC SURGERY  02/2017    CABG x3    CATARACT EXTRACTION Bilateral 2014    COLONOSCOPY  ~2013    Dr. Perez; colon polyps removed    COLONOSCOPY N/A 3/15/2018    Procedure: COLONOSCOPY;  Surgeon: Margarito Calloway MD;  Location: [facility];  Service: Endoscopy;  Laterality: N/A;    DILATION AND CURETTAGE OF UTERUS      ENDOSCOPIC ULTRASOUND OF UPPER GASTROINTESTINAL TRACT Left 7/3/2018    Procedure: ULTRASOUND, ENDOSCOPIC, UPPER GI TRACT;  Surgeon: Jordy Greenberg MD;  Location: [facility];  Service: Endoscopy;  Laterality: Left;  Linear scope    ESOPHAGOGASTRODUODENOSCOPY N/A 7/3/2018    Procedure: EGD (ESOPHAGOGASTRODUODENOSCOPY);  Surgeon: Jordy Greenberg MD;  Location: [facility];  Service: Endoscopy;  Laterality: N/A;    ESOPHAGOGASTRODUODENOSCOPY N/A 7/29/2020    Procedure: EGD (ESOPHAGOGASTRODUODENOSCOPY);  Surgeon: Margarito Calloway MD;  Location: [facility];  Service: Endoscopy;  Laterality: N/A;    INSERTION OF DORSAL COLUMN NERVE STIMULATOR FOR TRIAL N/A 6/19/2019    Procedure: INSERTION, NEUROSTIMULATOR, SPINAL CORD, DORSAL COLUMN, FOR TRIAL;  Surgeon: Ebenezer Rouse MD;  Location: [facility] OR;  Service: Pain Management;  Laterality: N/A;    INSERTION OF NEUROSTIMULATOR OF DORSAL COLUMN OF SPINAL CORD N/A 7/25/2019    Procedure: INSERTION, NEUROSTIMULATOR, SPINAL CORD, DORSAL COLUMN;  Surgeon: Ebenezer Rouse MD;  Location: [facility] OR;  Service: Pain Management;  Laterality: N/A;  removed old battery    KNEE SURGERY Right     REPLACEMENT OF NERVE STIMULATOR BATTERY  7/25/2019    Procedure: REPLACEMENT, BATTERY, NEUROSTIMULATOR;  Surgeon: Ebenezer Rouse MD;  Location: [facility] OR;  Service: Pain  Management;;  Removed Old Battery- [employer] Model 1200   SN [id number]      ROBOT-ASSISTED SURGICAL REMOVAL OF STOMACH USING DA YVROSE XI N/A 2018    Procedure: XI ROBOTIC GASTRECTOMY-PARTIAL;  Surgeon: Dre Grey MD;  Location: [facility] OR;  Service: General;  Laterality: N/A;    SPINAL CORD STIMULATOR IMPLANT  2018    for pain secondary to shingles, sees Dr Kam for pain management    TONSILLECTOMY      TUMOR REMOVAL      UPPER GASTROINTESTINAL ENDOSCOPY       Review of patient's allergies indicates:   Allergen Reactions    Losartan Swelling     angioedema    Ace inhibitors Other (See Comments) and Swelling     unknown    Angioedema    Arb-angiotensin receptor antagonist Other (See Comments)     unknown  unknown      Iodine and iodide containing products Hives     Since age of 48    Lisinopril      angioedema    Metoprolol tartrate      swelling    Shrimp Other (See Comments) and Swelling     Localized itching and swelling on her hands if she peels shrimp.  Able to eat shrimp without difficulty.  No generalized reaction.     Social History     Tobacco Use    Smoking status: Former Smoker     Packs/day: 1.00     Years: 52.00     Pack years: 52.00     Start date:      Quit date: 12/10/2012     Years since quittin.2    Smokeless tobacco: Never Used   Substance Use Topics    Alcohol use: No    Drug use: No     Family History   Problem Relation Age of Onset    Heart attacks under age 50 Mother 41    Cancer Father         prostate    Colon cancer Neg Hx     Colon polyps Neg Hx     Crohn's disease Neg Hx     Ulcerative colitis Neg Hx     Celiac disease Neg Hx     Glaucoma Neg Hx     Macular degeneration Neg Hx     Retinal detachment Neg Hx      Current Outpatient Medications on File Prior to Visit   Medication Sig Dispense Refill    albuterol (PROVENTIL/VENTOLIN HFA) 90 mcg/actuation inhaler Inhale 2 puffs into the lungs every 4 (four) hours as needed for Wheezing.  Rescue 18 g 5    amLODIPine (NORVASC) 10 MG tablet Take 1 tablet (10 mg total) by mouth once daily. 90 tablet 3    ascorbic acid, vitamin C, (VITAMIN C) 1000 MG tablet Take 1,000 mg by mouth once daily.       aspirin (ECOTRIN) 81 MG EC tablet Take 81 mg by mouth once daily.      BREZTRI AEROSPHERE 160-9-4.8 mcg/actuation HFAA Inhale 2 puffs into the lungs 2 (two) times daily.      calcium carbonate (TUMS) 200 mg calcium (500 mg) chewable tablet Take 2 tablets by mouth once daily.      calcium-vitamin D3 500 mg(1,250mg) -200 unit per tablet Take 1 tablet by mouth 2 (two) times daily with meals. 60 tablet 11    cyanocobalamin (VITAMIN B-12) 250 MCG tablet Take 250 mcg by mouth once daily.      diclofenac sodium (VOLTAREN) 1 % Gel Apply 2 g topically 3 (three) times daily as needed (pain). 2 g 2    donepeziL (ARICEPT) 10 MG tablet Take 1 tablet (10 mg total) by mouth once daily. 30 tablet 11    DULoxetine (CYMBALTA) 20 MG capsule Take 1 capsule (20 mg total) by mouth 2 (two) times daily. 60 capsule 1    esomeprazole (NEXIUM) 20 MG capsule Take 1 capsule by mouth 2 (two) times a day.      ezetimibe (ZETIA) 10 mg tablet Take 1 tablet by mouth once daily.      famotidine (PEPCID) 40 MG tablet Take 40 mg by mouth nightly.      fish oil-omega-3 fatty acids 300-1,000 mg capsule Take 1 capsule by mouth 2 (two) times daily.       fluticasone propionate (FLONASE) 50 mcg/actuation nasal spray 2 sprays (100 mcg total) by Each Nostril route once daily. 16 g 11    gabapentin (NEURONTIN) 600 MG tablet TAKE 1 TABLET BY MOUTH TWICE DAILY 30 tablet 11    L.acid/L.casei/B.bif/B.miryam/FOS (PROBIOTIC BLEND ORAL) Take by mouth 2 (two) times daily.       lactase (LACTAID ORAL) Take by mouth as needed.       levothyroxine (SYNTHROID) 200 MCG tablet Take 1 tablet (200 mcg total) by mouth once daily. 30 tablet 11    levothyroxine (SYNTHROID) 25 MCG tablet TAKE 1 TABLET BY MOUTH EVERY MORNING BEFORE BREAKFAST 30 tablet 11     magnesium oxide (MAG-OX) 250 mg magnesium Tab 1 tablet with a meal      meloxicam (MOBIC) 7.5 MG tablet Take 1 tablet (7.5 mg total) by mouth once daily. 7 tablet 0    metoprolol tartrate (LOPRESSOR) 25 MG tablet Take 1 tablet by mouth once daily.      montelukast (SINGULAIR) 10 mg tablet Take 1 tablet (10 mg total) by mouth every evening. 30 tablet 11    niacin, inositol niacinate, 400 mg niacin (500 mg) Cap       pantoprazole (PROTONIX) 40 MG tablet Take 1 tablet (40 mg total) by mouth once daily. 30 tablet 11    potassium gluconate 595 mg (99 mg) Tab       simvastatin (ZOCOR) 40 MG tablet TAKE 1 TABLET BY MOUTH EVERY EVENING 30 tablet 12    solifenacin (VESICARE) 10 MG tablet Take 1 tablet (10 mg total) by mouth once daily. 30 tablet 11    triamterene-hydrochlorothiazide 37.5-25 mg (DYAZIDE) 37.5-25 mg per capsule Take 1 capsule by mouth every morning.      vitamin K2 100 mcg Cap Take by mouth.      vitamins  A,C,E-zinc-copper 14,320-226-200 unit-mg-unit Cap Take 2 capsules by mouth once daily.       No current facility-administered medications on file prior to visit.       Review of Systems   Constitutional: Negative for chills, diaphoresis, fatigue and fever.   HENT: Negative for congestion, ear pain, postnasal drip, sinus pain and sore throat.    Eyes: Negative for pain and redness.   Respiratory: Negative for cough, chest tightness and shortness of breath.    Cardiovascular: Negative for chest pain and leg swelling.   Gastrointestinal: Negative for abdominal pain, constipation, diarrhea, nausea and vomiting.   Genitourinary: Negative for dysuria and hematuria.   Musculoskeletal: Negative for arthralgias and joint swelling.   Skin: Negative for rash.   Neurological: Negative for dizziness, syncope and headaches.       Vitals:    02/28/22 1600   BP: 131/78   Pulse: 71   Temp: 98.1 °F (36.7 °C)   TempSrc: Oral   SpO2: 95%   Weight: 64 kg (141 lb 3.2 oz)   Height: 5' (1.524 m)       Wt Readings from  Last 3 Encounters:   02/28/22 64 kg (141 lb 3.2 oz)   01/26/22 64.3 kg (141 lb 12.8 oz)   01/07/22 67.6 kg (149 lb 0.5 oz)       Physical Exam  Constitutional:       General: She is not in acute distress.     Appearance: Normal appearance. She is well-developed.   HENT:      Head: Normocephalic and atraumatic.   Eyes:      Conjunctiva/sclera: Conjunctivae normal.   Cardiovascular:      Rate and Rhythm: Normal rate and regular rhythm.      Pulses: Normal pulses.      Heart sounds: Normal heart sounds. No murmur heard.  Pulmonary:      Effort: Pulmonary effort is normal. No respiratory distress.      Breath sounds: Normal breath sounds.   Abdominal:      General: Bowel sounds are normal. There is no distension.      Palpations: Abdomen is soft.      Tenderness: There is no abdominal tenderness.   Musculoskeletal:         General: Normal range of motion.      Cervical back: Normal range of motion and neck supple.   Skin:     General: Skin is warm and dry.      Findings: No rash.   Neurological:      General: No focal deficit present.      Mental Status: She is alert and oriented to person, place, and time.   Psychiatric:         Mood and Affect: Mood normal.         Behavior: Behavior normal.         Health Maintenance   Topic Date Due    Hepatitis C Screening  Never done    Colonoscopy  03/15/2021    DEXA Scan  04/04/2021    Lipid Panel  06/17/2021    LDCT Lung Screen  10/20/2021    Aspirin/Antiplatelet Therapy  02/28/2023    TETANUS VACCINE  01/28/2031    Mammogram  Discontinued

## 2022-03-18 ENCOUNTER — OFFICE VISIT (OUTPATIENT)
Dept: PAIN MEDICINE | Facility: CLINIC | Age: 77
End: 2022-03-18
Payer: MEDICARE

## 2022-03-18 VITALS
HEIGHT: 60 IN | BODY MASS INDEX: 28.17 KG/M2 | WEIGHT: 143.5 LBS | DIASTOLIC BLOOD PRESSURE: 71 MMHG | RESPIRATION RATE: 17 BRPM | SYSTOLIC BLOOD PRESSURE: 131 MMHG | HEART RATE: 80 BPM

## 2022-03-18 DIAGNOSIS — Z98.890 H/O ABDOMINAL SURGERY: ICD-10-CM

## 2022-03-18 DIAGNOSIS — G89.4 CHRONIC PAIN SYNDROME: ICD-10-CM

## 2022-03-18 DIAGNOSIS — D21.4 BENIGN GASTROINTESTINAL STROMAL TUMOR (GIST): Primary | ICD-10-CM

## 2022-03-18 PROCEDURE — 1159F MED LIST DOCD IN RCRD: CPT | Mod: CPTII,S$GLB,, | Performed by: PHYSICAL MEDICINE & REHABILITATION

## 2022-03-18 PROCEDURE — 1159F PR MEDICATION LIST DOCUMENTED IN MEDICAL RECORD: ICD-10-PCS | Mod: CPTII,S$GLB,, | Performed by: PHYSICAL MEDICINE & REHABILITATION

## 2022-03-18 PROCEDURE — 1125F AMNT PAIN NOTED PAIN PRSNT: CPT | Mod: CPTII,S$GLB,, | Performed by: PHYSICAL MEDICINE & REHABILITATION

## 2022-03-18 PROCEDURE — 1101F PR PT FALLS ASSESS DOC 0-1 FALLS W/OUT INJ PAST YR: ICD-10-PCS | Mod: CPTII,S$GLB,, | Performed by: PHYSICAL MEDICINE & REHABILITATION

## 2022-03-18 PROCEDURE — 99999 PR PBB SHADOW E&M-EST. PATIENT-LVL V: ICD-10-PCS | Mod: PBBFAC,,, | Performed by: PHYSICAL MEDICINE & REHABILITATION

## 2022-03-18 PROCEDURE — 3075F PR MOST RECENT SYSTOLIC BLOOD PRESS GE 130-139MM HG: ICD-10-PCS | Mod: CPTII,S$GLB,, | Performed by: PHYSICAL MEDICINE & REHABILITATION

## 2022-03-18 PROCEDURE — 3078F DIAST BP <80 MM HG: CPT | Mod: CPTII,S$GLB,, | Performed by: PHYSICAL MEDICINE & REHABILITATION

## 2022-03-18 PROCEDURE — 3288F FALL RISK ASSESSMENT DOCD: CPT | Mod: CPTII,S$GLB,, | Performed by: PHYSICAL MEDICINE & REHABILITATION

## 2022-03-18 PROCEDURE — 1160F RVW MEDS BY RX/DR IN RCRD: CPT | Mod: CPTII,S$GLB,, | Performed by: PHYSICAL MEDICINE & REHABILITATION

## 2022-03-18 PROCEDURE — 3288F PR FALLS RISK ASSESSMENT DOCUMENTED: ICD-10-PCS | Mod: CPTII,S$GLB,, | Performed by: PHYSICAL MEDICINE & REHABILITATION

## 2022-03-18 PROCEDURE — 1125F PR PAIN SEVERITY QUANTIFIED, PAIN PRESENT: ICD-10-PCS | Mod: CPTII,S$GLB,, | Performed by: PHYSICAL MEDICINE & REHABILITATION

## 2022-03-18 PROCEDURE — 3075F SYST BP GE 130 - 139MM HG: CPT | Mod: CPTII,S$GLB,, | Performed by: PHYSICAL MEDICINE & REHABILITATION

## 2022-03-18 PROCEDURE — 1160F PR REVIEW ALL MEDS BY PRESCRIBER/CLIN PHARMACIST DOCUMENTED: ICD-10-PCS | Mod: CPTII,S$GLB,, | Performed by: PHYSICAL MEDICINE & REHABILITATION

## 2022-03-18 PROCEDURE — 99999 PR PBB SHADOW E&M-EST. PATIENT-LVL V: CPT | Mod: PBBFAC,,, | Performed by: PHYSICAL MEDICINE & REHABILITATION

## 2022-03-18 PROCEDURE — 1101F PT FALLS ASSESS-DOCD LE1/YR: CPT | Mod: CPTII,S$GLB,, | Performed by: PHYSICAL MEDICINE & REHABILITATION

## 2022-03-18 PROCEDURE — 3078F PR MOST RECENT DIASTOLIC BLOOD PRESSURE < 80 MM HG: ICD-10-PCS | Mod: CPTII,S$GLB,, | Performed by: PHYSICAL MEDICINE & REHABILITATION

## 2022-03-18 PROCEDURE — 99214 OFFICE O/P EST MOD 30 MIN: CPT | Mod: S$GLB,,, | Performed by: PHYSICAL MEDICINE & REHABILITATION

## 2022-03-18 PROCEDURE — 99214 PR OFFICE/OUTPT VISIT, EST, LEVL IV, 30-39 MIN: ICD-10-PCS | Mod: S$GLB,,, | Performed by: PHYSICAL MEDICINE & REHABILITATION

## 2022-03-18 RX ORDER — DULOXETIN HYDROCHLORIDE 30 MG/1
30 CAPSULE, DELAYED RELEASE ORAL 2 TIMES DAILY
Qty: 60 CAPSULE | Refills: 1 | Status: SHIPPED | OUTPATIENT
Start: 2022-03-18 | End: 2022-05-02 | Stop reason: SDUPTHER

## 2022-03-18 RX ORDER — TRAMADOL HYDROCHLORIDE 50 MG/1
50 TABLET ORAL EVERY 12 HOURS PRN
Qty: 60 TABLET | Refills: 1 | Status: SHIPPED | OUTPATIENT
Start: 2022-03-18 | End: 2022-05-31
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
## 2022-03-21 ENCOUNTER — PROCEDURE VISIT (OUTPATIENT)
Dept: OPHTHALMOLOGY | Facility: CLINIC | Age: 77
End: 2022-03-21
Payer: MEDICARE

## 2022-03-21 DIAGNOSIS — H35.3122 NONEXUDATIVE AGE-RELATED MACULAR DEGENERATION, LEFT EYE, INTERMEDIATE DRY STAGE: ICD-10-CM

## 2022-03-21 DIAGNOSIS — H35.3231 EXUDATIVE AGE-RELATED MACULAR DEGENERATION OF BOTH EYES WITH ACTIVE CHOROIDAL NEOVASCULARIZATION: Primary | ICD-10-CM

## 2022-03-21 PROCEDURE — 67028 PR INJECT INTRAVITREAL PHARMCOLOGIC: ICD-10-PCS | Mod: RT,S$GLB,, | Performed by: OPHTHALMOLOGY

## 2022-03-21 PROCEDURE — 92012 PR EYE EXAM, EST PATIENT,INTERMED: ICD-10-PCS | Mod: 25,S$GLB,, | Performed by: OPHTHALMOLOGY

## 2022-03-21 PROCEDURE — 67028 INJECTION EYE DRUG: CPT | Mod: RT,S$GLB,, | Performed by: OPHTHALMOLOGY

## 2022-03-21 PROCEDURE — 92012 INTRM OPH EXAM EST PATIENT: CPT | Mod: 25,S$GLB,, | Performed by: OPHTHALMOLOGY
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
## 2022-03-22 NOTE — PATIENT INSTRUCTIONS

## 2022-03-29 ENCOUNTER — PATIENT OUTREACH (OUTPATIENT)
Dept: ADMINISTRATIVE | Facility: HOSPITAL | Age: 77
End: 2022-03-29
Payer: MEDICARE

## 2022-03-29 ENCOUNTER — TELEPHONE (OUTPATIENT)
Dept: ADMINISTRATIVE | Facility: HOSPITAL | Age: 77
End: 2022-03-29
Payer: MEDICARE

## 2022-03-29 DIAGNOSIS — Z78.0 ASYMPTOMATIC MENOPAUSE: Primary | ICD-10-CM

## 2022-03-30 ENCOUNTER — HOSPITAL ENCOUNTER (OUTPATIENT)
Dept: RADIOLOGY | Facility: HOSPITAL | Age: 77
Discharge: HOME | End: 2022-03-30
Attending: INTERNAL MEDICINE
Payer: MEDICARE

## 2022-03-30 ENCOUNTER — PATIENT MESSAGE (OUTPATIENT)
Dept: FAMILY MEDICINE | Facility: CLINIC | Age: 77
End: 2022-03-30
Payer: MEDICARE

## 2022-03-30 DIAGNOSIS — Z78.0 ASYMPTOMATIC MENOPAUSE: ICD-10-CM

## 2022-03-30 PROCEDURE — 77080 DEXA BONE DENSITY SPINE HIP: ICD-10-PCS | Mod: 26,,, | Performed by: RADIOLOGY

## 2022-03-30 PROCEDURE — 77080 DXA BONE DENSITY AXIAL: CPT | Mod: 26,,, | Performed by: RADIOLOGY

## 2022-03-30 PROCEDURE — 77080 DXA BONE DENSITY AXIAL: CPT | Mod: TC,PO

## 2022-04-04 ENCOUNTER — PATIENT MESSAGE (OUTPATIENT)
Dept: FAMILY MEDICINE | Facility: CLINIC | Age: 77
End: 2022-04-04
Payer: MEDICARE

## 2022-04-05 DIAGNOSIS — E87.6 HYPOKALEMIA: Primary | ICD-10-CM

## 2022-04-05 RX ORDER — POTASSIUM CHLORIDE 20 MEQ/1
20 TABLET, EXTENDED RELEASE ORAL DAILY
Qty: 90 TABLET | Refills: 3 | Status: SHIPPED | OUTPATIENT
Start: 2022-04-05 | End: 2022-10-21

## 2022-04-06 DIAGNOSIS — E78.2 MIXED HYPERLIPIDEMIA: Primary | ICD-10-CM

## 2022-04-06 DIAGNOSIS — M81.0 AGE-RELATED OSTEOPOROSIS WITHOUT CURRENT PATHOLOGICAL FRACTURE: ICD-10-CM

## 2022-04-06 DIAGNOSIS — E03.9 HYPOTHYROIDISM, UNSPECIFIED TYPE: ICD-10-CM

## 2022-04-06 RX ORDER — ALENDRONATE SODIUM 70 MG/1
70 TABLET ORAL
Qty: 12 TABLET | Refills: 3 | Status: SHIPPED | OUTPATIENT
Start: 2022-04-06 | End: 2022-10-19

## 2022-04-06 RX ORDER — EZETIMIBE 10 MG/1
10 TABLET ORAL DAILY
Qty: 90 TABLET | Refills: 3 | Status: SHIPPED | OUTPATIENT
Start: 2022-04-06 | End: 2022-05-02 | Stop reason: SDUPTHER

## 2022-04-06 RX ORDER — LEVOTHYROXINE SODIUM 50 UG/1
50 TABLET ORAL
Qty: 30 TABLET | Refills: 11 | Status: SHIPPED | OUTPATIENT
Start: 2022-04-06 | End: 2022-06-01

## 2022-04-06 NOTE — PROGRESS NOTES
Patient's DEXA scan results have been sent via portal. Please verify that patient has viewed results. If not, please call patient with interpretation below:    -Your recent DEXA scan shows osteoporosis.    -I recommend that you start on fosamax 70 mg po q week with a glass of water and no lying down or eating for 30 minutes after.    -I also recommend that you start using weight bearing exercises to help reduce the risk of a fracture.    -Also, please start taking over the counter calcium 1200 mg daily and Vitamin D3 1000 units daily.  -We will plan to recheck your DEXA scan in 2 years.     Also please see below health maintenance items that are due:    Hepatitis C Screening Never done  COVID-19 Vaccine(1) Never done  Colonoscopy due on 03/15/2021  LDCT Lung Screen due on 10/20/2021

## 2022-04-06 NOTE — TELEPHONE ENCOUNTER
No new care gaps identified.  Powered by [employer]. Reference number: [id number].   4/06/2022 10:15:19 AM CDT

## 2022-04-06 NOTE — TELEPHONE ENCOUNTER
Spoke with patient regarding results.     She reports she has been taking her thyroid medication consistently as prescribed. She has been taking the simvastatin, but has not been taking the zetia. She said she remembers the name, but is not currently taking. I don't see where this has been filled recently/just historical provider info. Please advise.    BMP scheduled in 1 week.

## 2022-04-06 NOTE — TELEPHONE ENCOUNTER
----- Message from Mindy Hathaway MD sent at 4/5/2022  6:24 AM CDT -----  Results have been released via [employer]. Please verify that these have been viewed by patient. If not, please call patient with results.    Please schedule the following orders:  BMP in 1 week    Please find out if patient has been consistently taking her thyroid medication, as well as taking it correctly, 1st thing in the morning, 30 minutes prior to eating or other medications.  If she has, we will need to increase her dosage and repeat test in 8 weeks.    Also find out if she has been taking her cholesterol medications, both Zetia and simvastatin.  If so, we may also need to increase these.    I have sent a message to them with the following interpretation (see below).      I have reviewed your recent blood work.     Your metabolic panel which shows your electrolytes, glucose, kidney and liver function is within normal limits with exception for low potassium.  I will call in a supplement for this.  Is likely due to the fluid medication unsure own.  Your kidney function remains stable.    Your cholesterol is has increased.  Have you been taking your cholesterol medication daily?  If so, we may need to increase her dosage.    Thyroid studies are very abnormal.  Have you been taking your thyroid medication every day and correctly, 30 minutes prior to eating/drinking and taking other medications?  If so, we will need to increase her dosage and repeat the labs in 2 months.    Your hemoglobin A1c is in prediabetes levels at prediabetes level and has improved some since last check.  Currently at 5.8.    Please do not hesitate to call or message with any additional questions or concerns.    Mindy Hathaway MD

## 2022-04-12 ENCOUNTER — TELEPHONE (OUTPATIENT)
Dept: SURGERY | Facility: CLINIC | Age: 77
End: 2022-04-12
Payer: MEDICARE

## 2022-04-14 ENCOUNTER — LAB VISIT (OUTPATIENT)
Dept: LAB | Facility: HOSPITAL | Age: 77
End: 2022-04-14
Attending: INTERNAL MEDICINE
Payer: MEDICARE

## 2022-04-14 DIAGNOSIS — Z79.899 ENCOUNTER FOR LONG-TERM (CURRENT) USE OF HIGH-RISK MEDICATION: ICD-10-CM

## 2022-04-14 DIAGNOSIS — E03.9 HYPOTHYROIDISM, UNSPECIFIED TYPE: ICD-10-CM

## 2022-04-14 DIAGNOSIS — E78.2 MIXED HYPERLIPIDEMIA: ICD-10-CM

## 2022-04-14 LAB
ALBUMIN SERPL BCP-MCNC: 3.9 G/DL (ref 3.5–5.2)
ALP SERPL-CCNC: 127 U/L (ref 55–135)
ALT SERPL W/O P-5'-P-CCNC: 29 U/L (ref 10–44)
ANION GAP SERPL CALC-SCNC: 11 MMOL/L (ref 8–16)
AST SERPL-CCNC: 36 U/L (ref 10–40)
BILIRUB SERPL-MCNC: 0.3 MG/DL (ref 0.1–1)
BUN SERPL-MCNC: 16 MG/DL (ref 8–23)
CALCIUM SERPL-MCNC: 9.1 MG/DL (ref 8.7–10.5)
CHLORIDE SERPL-SCNC: 97 MMOL/L (ref 95–110)
CHOLEST SERPL-MCNC: 218 MG/DL (ref 120–199)
CHOLEST/HDLC SERPL: 2.4 {RATIO} (ref 2–5)
CO2 SERPL-SCNC: 30 MMOL/L (ref 23–29)
CREAT SERPL-MCNC: 1.1 MG/DL (ref 0.5–1.4)
EST. GFR  (AFRICAN AMERICAN): 55.6 ML/MIN/1.73 M^2
EST. GFR  (NON AFRICAN AMERICAN): 48.2 ML/MIN/1.73 M^2
ESTIMATED AVG GLUCOSE: 128 MG/DL (ref 68–131)
GLUCOSE SERPL-MCNC: 99 MG/DL (ref 70–110)
HBA1C MFR BLD: 6.1 % (ref 4–5.6)
HDLC SERPL-MCNC: 91 MG/DL (ref 40–75)
HDLC SERPL: 41.7 % (ref 20–50)
LDLC SERPL CALC-MCNC: 101.2 MG/DL (ref 63–159)
NONHDLC SERPL-MCNC: 127 MG/DL
POTASSIUM SERPL-SCNC: 4 MMOL/L (ref 3.5–5.1)
PROT SERPL-MCNC: 7.4 G/DL (ref 6–8.4)
SODIUM SERPL-SCNC: 138 MMOL/L (ref 136–145)
TRIGL SERPL-MCNC: 129 MG/DL (ref 30–150)
TSH SERPL DL<=0.005 MIU/L-ACNC: 103.64 UIU/ML (ref 0.4–4)

## 2022-04-14 PROCEDURE — 84443 ASSAY THYROID STIM HORMONE: CPT | Performed by: INTERNAL MEDICINE

## 2022-04-14 PROCEDURE — 84439 ASSAY OF FREE THYROXINE: CPT | Performed by: INTERNAL MEDICINE

## 2022-04-14 PROCEDURE — 80061 LIPID PANEL: CPT | Performed by: INTERNAL MEDICINE

## 2022-04-14 PROCEDURE — 36415 COLL VENOUS BLD VENIPUNCTURE: CPT | Mod: PO | Performed by: INTERNAL MEDICINE

## 2022-04-14 PROCEDURE — 83036 HEMOGLOBIN GLYCOSYLATED A1C: CPT | Performed by: INTERNAL MEDICINE

## 2022-04-14 PROCEDURE — 80053 COMPREHEN METABOLIC PANEL: CPT | Performed by: INTERNAL MEDICINE

## 2022-04-15 LAB — T4 FREE SERPL-MCNC: <0.42 NG/DL (ref 0.71–1.51)

## 2022-04-18 ENCOUNTER — PROCEDURE VISIT (OUTPATIENT)
Dept: OPHTHALMOLOGY | Facility: CLINIC | Age: 77
End: 2022-04-18
Payer: MEDICARE

## 2022-04-18 DIAGNOSIS — H35.3122 NONEXUDATIVE AGE-RELATED MACULAR DEGENERATION, LEFT EYE, INTERMEDIATE DRY STAGE: ICD-10-CM

## 2022-04-18 DIAGNOSIS — H35.3231 EXUDATIVE AGE-RELATED MACULAR DEGENERATION OF BOTH EYES WITH ACTIVE CHOROIDAL NEOVASCULARIZATION: Primary | ICD-10-CM

## 2022-04-18 PROCEDURE — 92134 POSTERIOR SEGMENT OCT RETINA (OCULAR COHERENCE TOMOGRAPHY)-BOTH EYES: ICD-10-PCS | Mod: S$GLB,,, | Performed by: OPHTHALMOLOGY

## 2022-04-18 PROCEDURE — 92134 CPTRZ OPH DX IMG PST SGM RTA: CPT | Mod: S$GLB,,, | Performed by: OPHTHALMOLOGY

## 2022-04-18 PROCEDURE — 92014 COMPRE OPH EXAM EST PT 1/>: CPT | Mod: 25,S$GLB,, | Performed by: OPHTHALMOLOGY

## 2022-04-18 PROCEDURE — 67028 PR INJECT INTRAVITREAL PHARMCOLOGIC: ICD-10-PCS | Mod: 50,S$GLB,, | Performed by: OPHTHALMOLOGY

## 2022-04-18 PROCEDURE — 67028 INJECTION EYE DRUG: CPT | Mod: 50,S$GLB,, | Performed by: OPHTHALMOLOGY

## 2022-04-18 PROCEDURE — 92014 PR EYE EXAM, EST PATIENT,COMPREHESV: ICD-10-PCS | Mod: 25,S$GLB,, | Performed by: OPHTHALMOLOGY

## 2022-04-18 NOTE — PATIENT INSTRUCTIONS
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
## 2022-04-20 ENCOUNTER — PATIENT MESSAGE (OUTPATIENT)
Dept: FAMILY MEDICINE | Facility: CLINIC | Age: 77
End: 2022-04-20
Payer: MEDICARE

## 2022-04-21 NOTE — PROGRESS NOTES
Results have been released via [employer]. Please verify that these have been viewed by patient. If not, please call patient with results.    Please schedule the following orders:  Endo (internal or external?)    I have sent a message to them with the following interpretation (see below).    I have reviewed your recent lab results.    Your thyroid numbers are still abnormal. Considering you are on such a high dose of medication already, I think it would be a good idea to have you see an endocrinologist. We can either send you to see someone with [facility] in [place] or [place], or send you to [facility] here in [place]. Please let me know your preference.    Please do not hesitate to call or message with any additional questions or concerns.    Mindy Hathaway MD

## 2022-04-27 ENCOUNTER — TELEPHONE (OUTPATIENT)
Dept: FAMILY MEDICINE | Facility: CLINIC | Age: 77
End: 2022-04-27
Payer: MEDICARE

## 2022-04-27 DIAGNOSIS — E03.9 HYPOTHYROIDISM, UNSPECIFIED TYPE: Primary | ICD-10-CM

## 2022-04-27 NOTE — TELEPHONE ENCOUNTER
----- Message from Mindy Hathaway MD sent at 4/21/2022  4:17 PM CDT -----  Results have been released via [employer]. Please verify that these have been viewed by patient. If not, please call patient with results.    Please schedule the following orders:  Endo (internal or external?)    I have sent a message to them with the following interpretation (see below).    I have reviewed your recent lab results.    Your thyroid numbers are still abnormal. Considering you are on such a high dose of medication already, I think it would be a good idea to have you see an endocrinologist. We can either send you to see someone with [facility] in [place] or [place], or send you to [facility] here in [place]. Please let me know your preference.    Please do not hesitate to call or message with any additional questions or concerns.    Mindy Hathaway MD

## 2022-04-27 NOTE — TELEPHONE ENCOUNTER
Patient contacted with lab results/recommendations. She verbalized understanding.    Endocrinology referral pended. Patient would like to be scheduled in [place]. Attempted to schedule without referral, first available in September. Advised patient we will send message to specialty department for sooner appointment if that it all that's available.

## 2022-04-28 ENCOUNTER — PATIENT MESSAGE (OUTPATIENT)
Dept: FAMILY MEDICINE | Facility: CLINIC | Age: 77
End: 2022-04-28
Payer: MEDICARE

## 2022-05-02 ENCOUNTER — PATIENT MESSAGE (OUTPATIENT)
Dept: FAMILY MEDICINE | Facility: CLINIC | Age: 77
End: 2022-05-02
Payer: MEDICARE

## 2022-05-02 DIAGNOSIS — G89.4 CHRONIC PAIN SYNDROME: ICD-10-CM

## 2022-05-02 RX ORDER — DULOXETIN HYDROCHLORIDE 30 MG/1
30 CAPSULE, DELAYED RELEASE ORAL 2 TIMES DAILY
Qty: 60 CAPSULE | Refills: 1 | Status: SHIPPED | OUTPATIENT
Start: 2022-05-02 | End: 2022-06-17 | Stop reason: SDUPTHER

## 2022-05-02 NOTE — TELEPHONE ENCOUNTER
Call patient to inform them that they can  their medication from the pharmacy. P.t verbalized understand.     Martir Richter   Medical Assistant

## 2022-05-02 NOTE — TELEPHONE ENCOUNTER
Good Morning,     Pt is requesting for a refill of: DULoxetine (CYMBALTA) 30 MG capsule 60 capsule   Last filed: 3/18/2022  Last encounter: 3/18/2022  Up coming apt: 5/20/2022 w/    Pharmacy:[facility] [id number] [facility]   Is this something you can do?      Martir Richter   Medical Assistant

## 2022-05-03 DIAGNOSIS — K21.9 GASTRO-ESOPHAGEAL REFLUX DISEASE WITHOUT ESOPHAGITIS: ICD-10-CM

## 2022-05-03 NOTE — TELEPHONE ENCOUNTER
No new care gaps identified.  [facility] Embedded Care Gaps. Reference number: [id number]. 5/03/2022   5:21:23 PM CDT

## 2022-05-05 ENCOUNTER — OFFICE VISIT (OUTPATIENT)
Dept: SURGERY | Facility: CLINIC | Age: 77
End: 2022-05-05
Payer: MEDICARE

## 2022-05-05 VITALS
SYSTOLIC BLOOD PRESSURE: 126 MMHG | WEIGHT: 138.31 LBS | HEART RATE: 78 BPM | DIASTOLIC BLOOD PRESSURE: 71 MMHG | TEMPERATURE: 99 F | BODY MASS INDEX: 27.01 KG/M2

## 2022-05-05 DIAGNOSIS — Z85.09 HISTORY OF GASTROINTESTINAL STROMAL TUMOR (GIST): Primary | ICD-10-CM

## 2022-05-05 PROCEDURE — 3078F DIAST BP <80 MM HG: CPT | Mod: CPTII,S$GLB,, | Performed by: SURGERY

## 2022-05-05 PROCEDURE — 3074F SYST BP LT 130 MM HG: CPT | Mod: CPTII,S$GLB,, | Performed by: SURGERY

## 2022-05-05 PROCEDURE — 99204 OFFICE O/P NEW MOD 45 MIN: CPT | Mod: S$GLB,,, | Performed by: SURGERY

## 2022-05-05 PROCEDURE — 3074F PR MOST RECENT SYSTOLIC BLOOD PRESSURE < 130 MM HG: ICD-10-PCS | Mod: CPTII,S$GLB,, | Performed by: SURGERY

## 2022-05-05 PROCEDURE — 1126F AMNT PAIN NOTED NONE PRSNT: CPT | Mod: CPTII,S$GLB,, | Performed by: SURGERY

## 2022-05-05 PROCEDURE — 99999 PR PBB SHADOW E&M-EST. PATIENT-LVL IV: CPT | Mod: PBBFAC,,, | Performed by: SURGERY

## 2022-05-05 PROCEDURE — 1126F PR PAIN SEVERITY QUANTIFIED, NO PAIN PRESENT: ICD-10-PCS | Mod: CPTII,S$GLB,, | Performed by: SURGERY

## 2022-05-05 PROCEDURE — 99999 PR PBB SHADOW E&M-EST. PATIENT-LVL IV: ICD-10-PCS | Mod: PBBFAC,,, | Performed by: SURGERY

## 2022-05-05 PROCEDURE — 1159F PR MEDICATION LIST DOCUMENTED IN MEDICAL RECORD: ICD-10-PCS | Mod: CPTII,S$GLB,, | Performed by: SURGERY

## 2022-05-05 PROCEDURE — 1159F MED LIST DOCD IN RCRD: CPT | Mod: CPTII,S$GLB,, | Performed by: SURGERY

## 2022-05-05 PROCEDURE — 3078F PR MOST RECENT DIASTOLIC BLOOD PRESSURE < 80 MM HG: ICD-10-PCS | Mod: CPTII,S$GLB,, | Performed by: SURGERY

## 2022-05-05 PROCEDURE — 99499 UNLISTED E&M SERVICE: CPT | Mod: S$GLB,,, | Performed by: SURGERY

## 2022-05-05 PROCEDURE — 99499 RISK ADDL DX/OHS AUDIT: ICD-10-PCS | Mod: S$GLB,,, | Performed by: SURGERY

## 2022-05-05 PROCEDURE — 99204 PR OFFICE/OUTPT VISIT, NEW, LEVL IV, 45-59 MIN: ICD-10-PCS | Mod: S$GLB,,, | Performed by: SURGERY

## 2022-05-05 NOTE — PROGRESS NOTES
Patient ID: Soumya Melchor is a 76 y.o. female.      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   Abdominal fullness or bloating    Chief Complaint: Consult (Gastrointestinal tumor )      HPI:  Patient was sent by primary care doctor because she had a gastrointestinal stromal tumor removed robotically in 2018. She has had an endoscopy since then that showed no internal abnormalities she has had a CT scan in October of last year that showed no evidence of a recurrence.  She says that her stomach appears larger than it should.  She has occasional pain.    She also has a small hiatal hernia with use not offer any complaints of reflux.    She is on home oxygen for her COPD      Review of Systems   Gastrointestinal: Positive for abdominal distention (Patient reports). Abdominal pain: Occasion.       Current Outpatient Medications   Medication Sig Dispense Refill    albuterol (PROVENTIL/VENTOLIN HFA) 90 mcg/actuation inhaler Inhale 2 puffs into the lungs every 4 (four) hours as needed for Wheezing. Rescue 18 g 5    alendronate (FOSAMAX) 70 MG tablet Take 1 tablet (70 mg total) by mouth every 7 days. 12 tablet 3    amLODIPine (NORVASC) 10 MG tablet Take 1 tablet (10 mg total) by mouth once daily. 90 tablet 3    ascorbic acid, vitamin C, (VITAMIN C) 1000 MG tablet Take 1,000 mg by mouth once daily.       aspirin (ECOTRIN) 81 MG EC tablet Take 81 mg by mouth once daily.      BREZTRI AEROSPHERE 160-9-4.8 mcg/actuation HFAA Inhale 2 puffs into the lungs 2 (two) times daily.      calcium carbonate (TUMS) 200 mg calcium (500 mg) chewable tablet Take 2 tablets by mouth once daily.      cyanocobalamin (VITAMIN B-12) 250 MCG tablet Take 250 mcg by mouth once daily.      diclofenac sodium (VOLTAREN) 1 % Gel Apply 2 g topically 3 (three) times daily as needed (pain). 2 g 2    donepeziL (ARICEPT) 10 MG tablet Take 1 tablet (10 mg total) by mouth once daily. 30 tablet 11    DULoxetine (CYMBALTA) 30  MG capsule Take 1 capsule (30 mg total) by mouth 2 (two) times daily. 60 capsule 1    esomeprazole (NEXIUM) 20 MG capsule Take 1 capsule by mouth 2 (two) times a day.      ezetimibe (ZETIA) 10 mg tablet Take 1 tablet (10 mg total) by mouth once daily. 90 tablet 3    famotidine (PEPCID) 40 MG tablet Take 40 mg by mouth nightly.      fish oil-omega-3 fatty acids 300-1,000 mg capsule Take 1 capsule by mouth 2 (two) times daily.       fluticasone propionate (FLONASE) 50 mcg/actuation nasal spray 2 sprays (100 mcg total) by Each Nostril route once daily. 16 g 11    furosemide (LASIX) 20 MG tablet Take 2 tablets (40 mg total) by mouth once daily. 30 tablet 11    gabapentin (NEURONTIN) 600 MG tablet TAKE 1 TABLET BY MOUTH TWICE DAILY 30 tablet 11    L.acid/L.casei/B.bif/B.miryam/FOS (PROBIOTIC BLEND ORAL) Take by mouth 2 (two) times daily.       lactase (LACTAID ORAL) Take by mouth as needed.       levothyroxine (SYNTHROID) 200 MCG tablet Take 1 tablet (200 mcg total) by mouth once daily. 30 tablet 11    levothyroxine (SYNTHROID) 25 MCG tablet TAKE 1 TABLET BY MOUTH EVERY MORNING BEFORE BREAKFAST 30 tablet 11    levothyroxine (SYNTHROID) 50 MCG tablet Take 1 tablet (50 mcg total) by mouth before breakfast. 30 tablet 11    magnesium oxide (MAG-OX) 250 mg magnesium Tab 1 tablet with a meal      meloxicam (MOBIC) 7.5 MG tablet Take 1 tablet (7.5 mg total) by mouth once daily. 7 tablet 0    metoprolol tartrate (LOPRESSOR) 25 MG tablet Take 1 tablet by mouth once daily.      montelukast (SINGULAIR) 10 mg tablet Take 1 tablet (10 mg total) by mouth every evening. 30 tablet 11    niacin, inositol niacinate, 400 mg niacin (500 mg) Cap       pantoprazole (PROTONIX) 40 MG tablet Take 1 tablet (40 mg total) by mouth once daily. 30 tablet 11    potassium chloride SA (K-DUR,KLOR-CON) 20 MEQ tablet Take 1 tablet (20 mEq total) by mouth once daily. 90 tablet 3    simvastatin (ZOCOR) 40 MG tablet TAKE 1 TABLET BY MOUTH  EVERY EVENING 30 tablet 12    solifenacin (VESICARE) 10 MG tablet Take 1 tablet (10 mg total) by mouth once daily. 30 tablet 11    traMADoL (ULTRAM) 50 mg tablet Take 1 tablet (50 mg total) by mouth every 12 (twelve) hours as needed for Pain. Greater than 7 day supply medically necessary. 60 tablet 1    triamterene-hydrochlorothiazide 37.5-25 mg (DYAZIDE) 37.5-25 mg per capsule Take 1 capsule by mouth every morning.      vitamin K2 100 mcg Cap Take by mouth.      vitamins  A,C,E-zinc-copper 14,320-226-200 unit-mg-unit Cap Take 2 capsules by mouth once daily.      calcium-vitamin D3 500 mg(1,250mg) -200 unit per tablet Take 1 tablet by mouth 2 (two) times daily with meals. 60 tablet 11     No current facility-administered medications for this visit.       Review of patient's allergies indicates:   Allergen Reactions    Losartan Swelling     angioedema    Ace inhibitors Other (See Comments) and Swelling     unknown    Angioedema    Arb-angiotensin receptor antagonist Other (See Comments)     unknown  unknown      Iodine and iodide containing products Hives     Since age of 48    Lisinopril      angioedema    Metoprolol tartrate      swelling    Shrimp Other (See Comments) and Swelling     Localized itching and swelling on her hands if she peels shrimp.  Able to eat shrimp without difficulty.  No generalized reaction.       Past Medical History:   Diagnosis Date    Acid reflux 4/11/2014    Allergy     Anxiety and depression 3/6/2014    AP (angina pectoris) 2/13/2017    CAD (coronary artery disease) 2/13/2017    CAD, multiple vessel 2/13/2017    Chronic pain associated with significant psychosocial dysfunction 2/21/2013    CKD (chronic kidney disease) stage 3, GFR 30-59 ml/min     Dr. Vásquez    COPD (chronic obstructive pulmonary disease)     well controlled, Dr Gomez [facility]    History of shingles     HTN (hypertension)     Hypothyroidism     Multiple allergies     S/P CABG (coronary  artery bypass graft) 2/13/2017       Past Surgical History:   Procedure Laterality Date    CARDIAC SURGERY  02/2017    CABG x3    CATARACT EXTRACTION Bilateral 2014    COLONOSCOPY  ~2013    Dr. Perez; colon polyps removed    COLONOSCOPY N/A 3/15/2018    Procedure: COLONOSCOPY;  Surgeon: Margarito Calloway MD;  Location[facility];  Service: Endoscopy;  Laterality: N/A;    DILATION AND CURETTAGE OF UTERUS      ENDOSCOPIC ULTRASOUND OF UPPER GASTROINTESTINAL TRACT Left 7/3/2018    Procedure: ULTRASOUND, ENDOSCOPIC, UPPER GI TRACT;  Surgeon: Jordy Greenberg MD;  Location: [facility];  Service: Endoscopy;  Laterality: Left;  Linear scope    ESOPHAGOGASTRODUODENOSCOPY N/A 7/3/2018    Procedure: EGD (ESOPHAGOGASTRODUODENOSCOPY);  Surgeon: Jordy Greenberg MD;  Location: [facility];  Service: Endoscopy;  Laterality: N/A;    ESOPHAGOGASTRODUODENOSCOPY N/A 7/29/2020    Procedure: EGD (ESOPHAGOGASTRODUODENOSCOPY);  Surgeon: Margarito Calloway MD;  Location: [facility];  Service: Endoscopy;  Laterality: N/A;    INSERTION OF DORSAL COLUMN NERVE STIMULATOR FOR TRIAL N/A 6/19/2019    Procedure: INSERTION, NEUROSTIMULATOR, SPINAL CORD, DORSAL COLUMN, FOR TRIAL;  Surgeon: Ebenezer Rouse MD;  Location: [facility];  Service: Pain Management;  Laterality: N/A;    INSERTION OF NEUROSTIMULATOR OF DORSAL COLUMN OF SPINAL CORD N/A 7/25/2019    Procedure: INSERTION, NEUROSTIMULATOR, SPINAL CORD, DORSAL COLUMN;  Surgeon: Ebenezer Rouse MD;  Location: [facility];  Service: Pain Management;  Laterality: N/A;  removed old battery    KNEE SURGERY Right     REPLACEMENT OF NERVE STIMULATOR BATTERY  7/25/2019    Procedure: REPLACEMENT, BATTERY, NEUROSTIMULATOR;  Surgeon: Ebenezer Rouse MD;  Location: [facility];  Service: Pain Management;;  Removed Old Battery- [employer] Model 1200   SN [id number]      ROBOT-ASSISTED SURGICAL REMOVAL OF STOMACH USING DA YVROSE XI N/A 7/31/2018    Procedure: XI ROBOTIC  GASTRECTOMY-PARTIAL;  Surgeon: Dre Grey MD;  Location: [facility];  Service: General;  Laterality: N/A;    SPINAL CORD STIMULATOR IMPLANT  2018    for pain secondary to shingles, sees Dr Kam for pain management    TONSILLECTOMY      TUMOR REMOVAL      UPPER GASTROINTESTINAL ENDOSCOPY         Family History   Problem Relation Age of Onset    Heart attacks under age 50 Mother 41    Cancer Father         prostate    Colon cancer Neg Hx     Colon polyps Neg Hx     Crohn's disease Neg Hx     Ulcerative colitis Neg Hx     Celiac disease Neg Hx     Glaucoma Neg Hx     Macular degeneration Neg Hx     Retinal detachment Neg Hx        Social History     Socioeconomic History    Marital status:    Tobacco Use    Smoking status: Former Smoker     Packs/day: 1.00     Years: 52.00     Pack years: 52.00     Start date:      Quit date: 12/10/2012     Years since quittin.4    Smokeless tobacco: Never Used   Substance and Sexual Activity    Alcohol use: No    Drug use: No       Vitals:    22 1424   BP: 126/71   Pulse: 78   Temp: 98.8 °F (37.1 °C)       Physical Exam  Vitals reviewed.   Constitutional:       Appearance: She is well-developed. She is ill-appearing (Chronically).      Comments: Short in stature   HENT:      Head: Normocephalic.      Nose: Nose normal.      Comments: Nasal cannula  Eyes:      Pupils: Pupils are equal, round, and reactive to light.   Neck:      Thyroid: No thyromegaly.      Vascular: No JVD.      Trachea: No tracheal deviation.   Cardiovascular:      Rate and Rhythm: Normal rate and regular rhythm.      Heart sounds: Normal heart sounds.   Pulmonary:      Breath sounds: Normal breath sounds. No wheezing.   Abdominal:      General: Bowel sounds are normal. There is no distension.      Palpations: Abdomen is soft. Abdomen is not rigid. There is no mass.      Tenderness: There is no abdominal tenderness. There is no guarding or rebound.      Comments:  Protuberant but soft.  Well-healed incisions   Musculoskeletal:         General: Normal range of motion.   Lymphadenopathy:      Cervical: No cervical adenopathy.   Skin:     General: Skin is warm and dry.      Findings: No erythema or rash.   Neurological:      Mental Status: She is oriented to person, place, and time.       Operative report was reviewed pathology report was reviewed.  Last upper endoscopy was reviewed.  CT scan from October 2021 was reviewed      Assessment & Plan:      No evidence of a recurrence for gastrointestinal stromal tumor on recent imaging  No evidence of any acute intra-abdominal pathology  No evidence of a hernia.    There is no need for any general surgical intervention.    The patient's operative risk for increased due to her COPD and home oxygen use.    Follow up with surgery as needed

## 2022-05-05 NOTE — PATIENT INSTRUCTIONS
There is no need for any general surgery at this time.    You should continue to follow up with her primary care doctor for routine health maintenance.    We will see you back in surgery as needed  Our office phone numbers are  [phone] and

## 2022-05-05 NOTE — TELEPHONE ENCOUNTER
Refill Routing Note   Medication(s) are not appropriate for processing by [facility] for the following reason(s):      - Medication not active on medication list    [facility] action(s):  Defer       Medication Therapy Plan: Pepcid discontinued for alternate therapy 6/7/21 but last filled in 2/2022. Currently present on med list as historical med  Medication reconciliation completed: No     Appointments  past 12m or future 3m with PCP    Date Provider   Last Visit   2/28/2022 Mindy Hathaway MD   Next Visit   Visit date not found Mindy Hathaway MD   ED visits in past 90 days: 0        Note composed:2:01 PM 05/05/2022

## 2022-05-06 RX ORDER — FAMOTIDINE 40 MG/1
TABLET, FILM COATED ORAL
Qty: 30 TABLET | Refills: 11 | Status: SHIPPED | OUTPATIENT
Start: 2022-05-06 | End: 2022-07-11 | Stop reason: CLARIF

## 2022-05-19 ENCOUNTER — TELEPHONE (OUTPATIENT)
Dept: PAIN MEDICINE | Facility: CLINIC | Age: 77
End: 2022-05-19
Payer: MEDICARE

## 2022-05-20 ENCOUNTER — TELEPHONE (OUTPATIENT)
Dept: PAIN MEDICINE | Facility: CLINIC | Age: 77
End: 2022-05-20
Payer: MEDICARE

## 2022-05-20 NOTE — TELEPHONE ENCOUNTER
----- Message from Blanca Duncan sent at 5/20/2022  8:48 AM CDT -----  .Type:  Sooner Apoointment Request    Caller is requesting a sooner appointment.  Caller declined first available appointment listed below.  Caller will not accept being placed on the waitlist and is requesting a message be sent to doctor.  Name of Caller:CÉSAR LEMON [[id number]]  When is the first available appointment?08/2022  Symptoms:  Would the patient rather a call back or a response via [facility]? Call  Best Call Back Number:.[phone]  Additional Information: Pt would like to be seen asap

## 2022-05-20 NOTE — TELEPHONE ENCOUNTER
----- Message from Mariah Banuelos sent at 5/20/2022  9:40 AM CDT -----  Pt stated she spoke to someone at 8:40 requesting to cancel and reschedule the appt today because of diarrhea. Call back number is [phone]. Thx. El

## 2022-05-20 NOTE — TELEPHONE ENCOUNTER
Reached out to patient to schedule appointment from [facility]. Apt has been made.   Pt understand. All questions answered.     Martir Richter  Medical Assistant    Skin normal color for race, warm, dry and intact. No evidence of rash.

## 2022-05-20 NOTE — TELEPHONE ENCOUNTER
Attempt to reach patent. Patient did not answer. Left message on patients voice mail to call back at earliest convenience.   Martir Richter  Medical Assistant

## 2022-05-25 ENCOUNTER — OFFICE VISIT (OUTPATIENT)
Dept: OPTOMETRY | Facility: CLINIC | Age: 77
End: 2022-05-25
Payer: MEDICARE

## 2022-05-25 DIAGNOSIS — H52.13 MYOPIA OF BOTH EYES WITH ASTIGMATISM AND PRESBYOPIA: ICD-10-CM

## 2022-05-25 DIAGNOSIS — H52.4 MYOPIA OF BOTH EYES WITH ASTIGMATISM AND PRESBYOPIA: ICD-10-CM

## 2022-05-25 DIAGNOSIS — H52.203 MYOPIA OF BOTH EYES WITH ASTIGMATISM AND PRESBYOPIA: ICD-10-CM

## 2022-05-25 DIAGNOSIS — H04.123 BILATERAL DRY EYES: ICD-10-CM

## 2022-05-25 DIAGNOSIS — H18.593 ANTERIOR CORNEAL DYSTROPHY OF BOTH EYES: Primary | ICD-10-CM

## 2022-05-25 PROCEDURE — 92002 INTRM OPH EXAM NEW PATIENT: CPT | Mod: S$GLB,,, | Performed by: OPTOMETRIST

## 2022-05-25 PROCEDURE — 3288F PR FALLS RISK ASSESSMENT DOCUMENTED: ICD-10-PCS | Mod: CPTII,S$GLB,, | Performed by: OPTOMETRIST

## 2022-05-25 PROCEDURE — 99999 PR PBB SHADOW E&M-EST. PATIENT-LVL IV: ICD-10-PCS | Mod: PBBFAC,,, | Performed by: OPTOMETRIST

## 2022-05-25 PROCEDURE — 1159F PR MEDICATION LIST DOCUMENTED IN MEDICAL RECORD: ICD-10-PCS | Mod: CPTII,S$GLB,, | Performed by: OPTOMETRIST

## 2022-05-25 PROCEDURE — 92015 PR REFRACTION: ICD-10-PCS | Mod: S$GLB,,, | Performed by: OPTOMETRIST

## 2022-05-25 PROCEDURE — 1126F AMNT PAIN NOTED NONE PRSNT: CPT | Mod: CPTII,S$GLB,, | Performed by: OPTOMETRIST

## 2022-05-25 PROCEDURE — 99999 PR PBB SHADOW E&M-EST. PATIENT-LVL IV: CPT | Mod: PBBFAC,,, | Performed by: OPTOMETRIST

## 2022-05-25 PROCEDURE — 1160F RVW MEDS BY RX/DR IN RCRD: CPT | Mod: CPTII,S$GLB,, | Performed by: OPTOMETRIST

## 2022-05-25 PROCEDURE — 1160F PR REVIEW ALL MEDS BY PRESCRIBER/CLIN PHARMACIST DOCUMENTED: ICD-10-PCS | Mod: CPTII,S$GLB,, | Performed by: OPTOMETRIST

## 2022-05-25 PROCEDURE — 1101F PR PT FALLS ASSESS DOC 0-1 FALLS W/OUT INJ PAST YR: ICD-10-PCS | Mod: CPTII,S$GLB,, | Performed by: OPTOMETRIST

## 2022-05-25 PROCEDURE — 92015 DETERMINE REFRACTIVE STATE: CPT | Mod: S$GLB,,, | Performed by: OPTOMETRIST

## 2022-05-25 PROCEDURE — 1159F MED LIST DOCD IN RCRD: CPT | Mod: CPTII,S$GLB,, | Performed by: OPTOMETRIST

## 2022-05-25 PROCEDURE — 1126F PR PAIN SEVERITY QUANTIFIED, NO PAIN PRESENT: ICD-10-PCS | Mod: CPTII,S$GLB,, | Performed by: OPTOMETRIST

## 2022-05-25 PROCEDURE — 92002 PR EYE EXAM, NEW PATIENT,INTERMED: ICD-10-PCS | Mod: S$GLB,,, | Performed by: OPTOMETRIST

## 2022-05-25 PROCEDURE — 1101F PT FALLS ASSESS-DOCD LE1/YR: CPT | Mod: CPTII,S$GLB,, | Performed by: OPTOMETRIST

## 2022-05-25 PROCEDURE — 3288F FALL RISK ASSESSMENT DOCD: CPT | Mod: CPTII,S$GLB,, | Performed by: OPTOMETRIST

## 2022-05-25 NOTE — PROGRESS NOTES
HPI     Dle- 04/18/2022- Dr. Rich    Pt here for routine eye exam. Pt sts va stable since last seen, pt needs   updated glasses rx since her current glasses are about 4 yrs old. No   increase in flashes/floaters. Gtts: otc refresh prn.     Last edited by Tabitha Lama MA on 5/25/2022  2:25 PM. (History)            Assessment /Plan     For exam results, see Encounter Report.    Anterior corneal dystrophy of both eyes    Bilateral dry eyes    Myopia of both eyes with astigmatism and presbyopia      1,2. Recommend increase artificial tears. 1 drop 2x per day. Chronicity of disease and treatment discussed.  3. Spectacle Rx given, discussed different options for glasses. RTC 1 year routine eye exam.

## 2022-05-27 ENCOUNTER — PATIENT MESSAGE (OUTPATIENT)
Dept: FAMILY MEDICINE | Facility: CLINIC | Age: 77
End: 2022-05-27
Payer: MEDICARE

## 2022-05-30 ENCOUNTER — PROCEDURE VISIT (OUTPATIENT)
Dept: OPHTHALMOLOGY | Facility: CLINIC | Age: 77
End: 2022-05-30
Payer: MEDICARE

## 2022-05-30 DIAGNOSIS — H35.3231 EXUDATIVE AGE-RELATED MACULAR DEGENERATION OF BOTH EYES WITH ACTIVE CHOROIDAL NEOVASCULARIZATION: Primary | ICD-10-CM

## 2022-05-30 DIAGNOSIS — H35.3122 NONEXUDATIVE AGE-RELATED MACULAR DEGENERATION, LEFT EYE, INTERMEDIATE DRY STAGE: ICD-10-CM

## 2022-05-30 PROCEDURE — 67028 PR INJECT INTRAVITREAL PHARMCOLOGIC: ICD-10-PCS | Mod: 50,S$GLB,, | Performed by: OPHTHALMOLOGY

## 2022-05-30 PROCEDURE — 67028 INJECTION EYE DRUG: CPT | Mod: 50,S$GLB,, | Performed by: OPHTHALMOLOGY

## 2022-05-30 PROCEDURE — 92014 COMPRE OPH EXAM EST PT 1/>: CPT | Mod: 25,S$GLB,, | Performed by: OPHTHALMOLOGY

## 2022-05-30 PROCEDURE — 92014 PR EYE EXAM, EST PATIENT,COMPREHESV: ICD-10-PCS | Mod: 25,S$GLB,, | Performed by: OPHTHALMOLOGY

## 2022-05-30 NOTE — PATIENT INSTRUCTIONS
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
## 2022-05-31 ENCOUNTER — OFFICE VISIT (OUTPATIENT)
Dept: ENDOCRINOLOGY | Facility: CLINIC | Age: 77
End: 2022-05-31
Payer: MEDICARE

## 2022-05-31 VITALS
HEIGHT: 60 IN | BODY MASS INDEX: 27.18 KG/M2 | WEIGHT: 138.44 LBS | OXYGEN SATURATION: 100 % | HEART RATE: 75 BPM | DIASTOLIC BLOOD PRESSURE: 72 MMHG | SYSTOLIC BLOOD PRESSURE: 126 MMHG

## 2022-05-31 DIAGNOSIS — I10 PRIMARY HYPERTENSION: Primary | ICD-10-CM

## 2022-05-31 DIAGNOSIS — R73.03 PREDIABETES: ICD-10-CM

## 2022-05-31 DIAGNOSIS — E03.9 HYPOTHYROIDISM, UNSPECIFIED TYPE: ICD-10-CM

## 2022-05-31 PROCEDURE — 1125F PR PAIN SEVERITY QUANTIFIED, PAIN PRESENT: ICD-10-PCS | Mod: CPTII,S$GLB,, | Performed by: INTERNAL MEDICINE

## 2022-05-31 PROCEDURE — 99204 OFFICE O/P NEW MOD 45 MIN: CPT | Mod: S$GLB,,, | Performed by: INTERNAL MEDICINE

## 2022-05-31 PROCEDURE — 1159F MED LIST DOCD IN RCRD: CPT | Mod: CPTII,S$GLB,, | Performed by: INTERNAL MEDICINE

## 2022-05-31 PROCEDURE — 1159F PR MEDICATION LIST DOCUMENTED IN MEDICAL RECORD: ICD-10-PCS | Mod: CPTII,S$GLB,, | Performed by: INTERNAL MEDICINE

## 2022-05-31 PROCEDURE — 3074F PR MOST RECENT SYSTOLIC BLOOD PRESSURE < 130 MM HG: ICD-10-PCS | Mod: CPTII,S$GLB,, | Performed by: INTERNAL MEDICINE

## 2022-05-31 PROCEDURE — 1160F RVW MEDS BY RX/DR IN RCRD: CPT | Mod: CPTII,S$GLB,, | Performed by: INTERNAL MEDICINE

## 2022-05-31 PROCEDURE — 1101F PR PT FALLS ASSESS DOC 0-1 FALLS W/OUT INJ PAST YR: ICD-10-PCS | Mod: CPTII,S$GLB,, | Performed by: INTERNAL MEDICINE

## 2022-05-31 PROCEDURE — 3078F PR MOST RECENT DIASTOLIC BLOOD PRESSURE < 80 MM HG: ICD-10-PCS | Mod: CPTII,S$GLB,, | Performed by: INTERNAL MEDICINE

## 2022-05-31 PROCEDURE — 99999 PR PBB SHADOW E&M-EST. PATIENT-LVL V: ICD-10-PCS | Mod: PBBFAC,,, | Performed by: INTERNAL MEDICINE

## 2022-05-31 PROCEDURE — 3078F DIAST BP <80 MM HG: CPT | Mod: CPTII,S$GLB,, | Performed by: INTERNAL MEDICINE

## 2022-05-31 PROCEDURE — 3074F SYST BP LT 130 MM HG: CPT | Mod: CPTII,S$GLB,, | Performed by: INTERNAL MEDICINE

## 2022-05-31 PROCEDURE — 99999 PR PBB SHADOW E&M-EST. PATIENT-LVL V: CPT | Mod: PBBFAC,,, | Performed by: INTERNAL MEDICINE

## 2022-05-31 PROCEDURE — 1125F AMNT PAIN NOTED PAIN PRSNT: CPT | Mod: CPTII,S$GLB,, | Performed by: INTERNAL MEDICINE

## 2022-05-31 PROCEDURE — 3288F PR FALLS RISK ASSESSMENT DOCUMENTED: ICD-10-PCS | Mod: CPTII,S$GLB,, | Performed by: INTERNAL MEDICINE

## 2022-05-31 PROCEDURE — 3288F FALL RISK ASSESSMENT DOCD: CPT | Mod: CPTII,S$GLB,, | Performed by: INTERNAL MEDICINE

## 2022-05-31 PROCEDURE — 1101F PT FALLS ASSESS-DOCD LE1/YR: CPT | Mod: CPTII,S$GLB,, | Performed by: INTERNAL MEDICINE

## 2022-05-31 PROCEDURE — 1160F PR REVIEW ALL MEDS BY PRESCRIBER/CLIN PHARMACIST DOCUMENTED: ICD-10-PCS | Mod: CPTII,S$GLB,, | Performed by: INTERNAL MEDICINE

## 2022-05-31 PROCEDURE — 99204 PR OFFICE/OUTPT VISIT, NEW, LEVL IV, 45-59 MIN: ICD-10-PCS | Mod: S$GLB,,, | Performed by: INTERNAL MEDICINE

## 2022-05-31 NOTE — ASSESSMENT & PLAN NOTE
BP controlled. Pt notes nocturia. Advised f/u with PCP regarding lasixs. Continue current meds. Pt to notify PCP if BP consistently more than 140/90.

## 2022-05-31 NOTE — ASSESSMENT & PLAN NOTE
Clinically pt with fatigue. Most recent TSH elevated. Pt unsure of current dose of thyroid medication. Takes thyroid hormone along with calcium and other meds in the am. Advised pt to take thyroid hormone when she wakes up overnight or first thing in the morning. If takes in am, should wait atleast 30 minutes before eating or taking other meds. Should separate from calcium by atleast 2 hours.     Adjust dose prn once pt verifies her current dose.

## 2022-05-31 NOTE — PROGRESS NOTES
Subjective:    Patient ID:  Soumya Melchor is a 76 y.o. female.    Chief Complaint:  Hypothyroidism      Pt presents to establish care for hypothyroidism and review of chronic medical conditions as listed in the visit diagnosis section of this encounter.     Reviewed referring provider's last office note, Dr. Hathaway    With regards to hypothyroidism:    Onset > 20 years ago    Has recently started taking oxygen.     Current medication:   Generic levothyroxine. She is not sure how much she is taking.     Denies biotin or other herbals. No new meds.     Takes calcium carbonate. Notes she takes all her meds together.     Current symptoms:   fatigue +ve, Has COPD and is now on 02. Runs a [employer] and notes she is tired a lot.   constipation -ve  Cold intolerance +ve   Depression -ve    No hoarseness, voice changes, dysphagia, compressive symptoms, or head/neck exposure to XRT. No personal or FH of thyroid cancer or MEN syndrome.          Review of Systems   Constitutional: Positive for fatigue.   Respiratory: Positive for shortness of breath.    Gastrointestinal: Negative for constipation.   Genitourinary: Positive for frequency.   Neurological:        +ve neuropathy   Psychiatric/Behavioral: Positive for sleep disturbance. The patient is not nervous/anxious.         Past Medical History:   Diagnosis Date    Acid reflux 4/11/2014    Allergy     Anxiety and depression 3/6/2014    AP (angina pectoris) 2/13/2017    CAD (coronary artery disease) 2/13/2017    CAD, multiple vessel 2/13/2017    Chronic pain associated with significant psychosocial dysfunction 2/21/2013    CKD (chronic kidney disease) stage 3, GFR 30-59 ml/min     Dr. Vásquez    COPD (chronic obstructive pulmonary disease)     well controlled, Dr [facility]    History of shingles     HTN (hypertension)     Hypothyroidism     Multiple allergies     S/P CABG (coronary artery bypass graft) 2/13/2017      Social History     Tobacco  Use    Smoking status: Former Smoker     Packs/day: 1.00     Years: 52.00     Pack years: 52.00     Start date:      Quit date: 12/10/2012     Years since quittin.4    Smokeless tobacco: Never Used   Substance Use Topics    Alcohol use: No    Drug use: No     Family History   Problem Relation Age of Onset    Heart attacks under age 50 Mother 41    Cancer Father         prostate    Colon cancer Neg Hx     Colon polyps Neg Hx     Crohn's disease Neg Hx     Ulcerative colitis Neg Hx     Celiac disease Neg Hx     Glaucoma Neg Hx     Macular degeneration Neg Hx     Retinal detachment Neg Hx       Past Surgical History:   Procedure Laterality Date    CARDIAC SURGERY  2017    CABG x3    CATARACT EXTRACTION Bilateral 2014    COLONOSCOPY  ~    Dr. Perez; colon polyps removed    COLONOSCOPY N/A 3/15/2018    Procedure: COLONOSCOPY;  Surgeon: Margarito Calloway MD;  Location: [facility];  Service: Endoscopy;  Laterality: N/A;    DILATION AND CURETTAGE OF UTERUS      ENDOSCOPIC ULTRASOUND OF UPPER GASTROINTESTINAL TRACT Left 7/3/2018    Procedure: ULTRASOUND, ENDOSCOPIC, UPPER GI TRACT;  Surgeon: Jordy Greenberg MD;  Location: [facility];  Service: Endoscopy;  Laterality: Left;  Linear scope    ESOPHAGOGASTRODUODENOSCOPY N/A 7/3/2018    Procedure: EGD (ESOPHAGOGASTRODUODENOSCOPY);  Surgeon: Jordy Greenberg MD;  Location: [facility];  Service: Endoscopy;  Laterality: N/A;    ESOPHAGOGASTRODUODENOSCOPY N/A 2020    Procedure: EGD (ESOPHAGOGASTRODUODENOSCOPY);  Surgeon: Margarito Calloway MD;  Location: [facility];  Service: Endoscopy;  Laterality: N/A;    INSERTION OF DORSAL COLUMN NERVE STIMULATOR FOR TRIAL N/A 2019    Procedure: INSERTION, NEUROSTIMULATOR, SPINAL CORD, DORSAL COLUMN, FOR TRIAL;  Surgeon: Ebenezer Rouse MD;  Location: [facility] OR;  Service: Pain Management;  Laterality: N/A;    INSERTION OF NEUROSTIMULATOR OF DORSAL COLUMN OF SPINAL CORD N/A 2019     Procedure: INSERTION, NEUROSTIMULATOR, SPINAL CORD, DORSAL COLUMN;  Surgeon: Ebenezer Rouse MD;  Location: [facility] OR;  Service: Pain Management;  Laterality: N/A;  removed old battery    KNEE SURGERY Right     REPLACEMENT OF NERVE STIMULATOR BATTERY  7/25/2019    Procedure: REPLACEMENT, BATTERY, NEUROSTIMULATOR;  Surgeon: Ebenezer Rouse MD;  Location: [facility] OR;  Service: Pain Management;;  Removed Old Battery- [employer] Model 1200   SN [id number]      ROBOT-ASSISTED SURGICAL REMOVAL OF STOMACH USING DA YVROSE XI N/A 7/31/2018    Procedure: XI ROBOTIC GASTRECTOMY-PARTIAL;  Surgeon: Dre Grey MD;  Location: [facility] OR;  Service: General;  Laterality: N/A;    SPINAL CORD STIMULATOR IMPLANT  02/12/2018    for pain secondary to shingles, sees Dr Kam for pain management    TONSILLECTOMY      TUMOR REMOVAL      UPPER GASTROINTESTINAL ENDOSCOPY            Current Outpatient Medications:     albuterol (PROVENTIL/VENTOLIN HFA) 90 mcg/actuation inhaler, Inhale 2 puffs into the lungs every 4 (four) hours as needed for Wheezing. Rescue, Disp: 18 g, Rfl: 5    alendronate (FOSAMAX) 70 MG tablet, Take 1 tablet (70 mg total) by mouth every 7 days., Disp: 12 tablet, Rfl: 3    amLODIPine (NORVASC) 10 MG tablet, Take 1 tablet (10 mg total) by mouth once daily., Disp: 90 tablet, Rfl: 3    ascorbic acid, vitamin C, (VITAMIN C) 1000 MG tablet, Take 1,000 mg by mouth once daily. , Disp: , Rfl:     aspirin (ECOTRIN) 81 MG EC tablet, Take 81 mg by mouth once daily., Disp: , Rfl:     BREZTRI AEROSPHERE 160-9-4.8 mcg/actuation HFAA, Inhale 2 puffs into the lungs 2 (two) times daily., Disp: , Rfl:     calcium carbonate (TUMS) 200 mg calcium (500 mg) chewable tablet, Take 2 tablets by mouth once daily., Disp: , Rfl:     cyanocobalamin (VITAMIN B-12) 250 MCG tablet, Take 250 mcg by mouth once daily., Disp: , Rfl:     diclofenac sodium (VOLTAREN) 1 % Gel, Apply 2 g topically 3 (three) times daily as needed (pain).,  Disp: 2 g, Rfl: 2    donepeziL (ARICEPT) 10 MG tablet, Take 1 tablet (10 mg total) by mouth once daily., Disp: 30 tablet, Rfl: 11    DULoxetine (CYMBALTA) 30 MG capsule, Take 1 capsule (30 mg total) by mouth 2 (two) times daily., Disp: 60 capsule, Rfl: 1    ezetimibe (ZETIA) 10 mg tablet, Take 1 tablet (10 mg total) by mouth once daily., Disp: 90 tablet, Rfl: 3    famotidine (PEPCID) 40 MG tablet, TAKE 1 TABLET BY MOUTH NIGHTLY, Disp: 30 tablet, Rfl: 11    fish oil-omega-3 fatty acids 300-1,000 mg capsule, Take 1 capsule by mouth 2 (two) times daily. , Disp: , Rfl:     fluticasone propionate (FLONASE) 50 mcg/actuation nasal spray, 2 sprays (100 mcg total) by Each Nostril route once daily., Disp: 16 g, Rfl: 11    furosemide (LASIX) 20 MG tablet, Take 2 tablets (40 mg total) by mouth once daily., Disp: 30 tablet, Rfl: 11    gabapentin (NEURONTIN) 600 MG tablet, TAKE 1 TABLET BY MOUTH TWICE DAILY, Disp: 30 tablet, Rfl: 11    L.acid/L.casei/B.bif/B.miryam/FOS (PROBIOTIC BLEND ORAL), Take by mouth 2 (two) times daily. , Disp: , Rfl:     lactase (LACTAID ORAL), Take by mouth as needed. , Disp: , Rfl:     levothyroxine (SYNTHROID) 200 MCG tablet, Take 1 tablet (200 mcg total) by mouth once daily., Disp: 30 tablet, Rfl: 11    levothyroxine (SYNTHROID) 25 MCG tablet, TAKE 1 TABLET BY MOUTH EVERY MORNING BEFORE BREAKFAST, Disp: 30 tablet, Rfl: 11    levothyroxine (SYNTHROID) 50 MCG tablet, Take 1 tablet (50 mcg total) by mouth before breakfast., Disp: 30 tablet, Rfl: 11    magnesium oxide (MAG-OX) 250 mg magnesium Tab, 1 tablet with a meal, Disp: , Rfl:     meloxicam (MOBIC) 7.5 MG tablet, Take 1 tablet (7.5 mg total) by mouth once daily., Disp: 7 tablet, Rfl: 0    metoprolol tartrate (LOPRESSOR) 25 MG tablet, Take 1 tablet by mouth once daily., Disp: , Rfl:     montelukast (SINGULAIR) 10 mg tablet, Take 1 tablet (10 mg total) by mouth every evening., Disp: 30 tablet, Rfl: 11    niacin, inositol niacinate, 400  mg niacin (500 mg) Cap, , Disp: , Rfl:     pantoprazole (PROTONIX) 40 MG tablet, Take 1 tablet (40 mg total) by mouth once daily., Disp: 30 tablet, Rfl: 11    potassium chloride SA (K-DUR,KLOR-CON) 20 MEQ tablet, Take 1 tablet (20 mEq total) by mouth once daily., Disp: 90 tablet, Rfl: 3    simvastatin (ZOCOR) 40 MG tablet, TAKE 1 TABLET BY MOUTH EVERY EVENING, Disp: 30 tablet, Rfl: 12    solifenacin (VESICARE) 10 MG tablet, Take 1 tablet (10 mg total) by mouth once daily., Disp: 30 tablet, Rfl: 11    triamterene-hydrochlorothiazide 37.5-25 mg (DYAZIDE) 37.5-25 mg per capsule, Take 1 capsule by mouth every morning., Disp: , Rfl:     vitamin K2 100 mcg Cap, Take by mouth., Disp: , Rfl:     vitamins  A,C,E-zinc-copper 14,320-226-200 unit-mg-unit Cap, Take 2 capsules by mouth once daily., Disp: , Rfl:     calcium-vitamin D3 500 mg(1,250mg) -200 unit per tablet, Take 1 tablet by mouth 2 (two) times daily with meals., Disp: 60 tablet, Rfl: 11  No current facility-administered medications for this visit.     Review of patient's allergies indicates:   Allergen Reactions    Losartan Swelling     angioedema    Ace inhibitors Other (See Comments) and Swelling     unknown    Angioedema    Arb-angiotensin receptor antagonist Other (See Comments)     unknown  unknown      Iodine and iodide containing products Hives     Since age of 48    Lisinopril      angioedema    Metoprolol tartrate      swelling    Shrimp Other (See Comments) and Swelling     Localized itching and swelling on her hands if she peels shrimp.  Able to eat shrimp without difficulty.  No generalized reaction.        Objective:   /72   Pulse 75   Ht 5' (1.524 m)   Wt 62.8 kg (138 lb 7.2 oz)   SpO2 100%   BMI 27.04 kg/m²   BP Readings from Last 3 Encounters:   05/31/22 126/72   05/05/22 126/71   03/18/22 131/71     Wt Readings from Last 3 Encounters:   05/31/22 1307 62.8 kg (138 lb 7.2 oz)   05/05/22 1424 62.7 kg (138 lb 4.8 oz)   03/18/22  0903 65.1 kg (143 lb 8.3 oz)          Physical Exam  Vitals reviewed.   Constitutional:       General: She is not in acute distress.     Appearance: Normal appearance. She is well-developed. She is not ill-appearing, toxic-appearing or diaphoretic.      Comments: Elderly WF   HENT:      Head: Normocephalic and atraumatic.   Eyes:      General: No scleral icterus.  Neck:      Trachea: No tracheal deviation.   Pulmonary:      Effort: Pulmonary effort is normal. No respiratory distress.      Comments: On O2 BNC.   Neurological:      Mental Status: She is alert and oriented to person, place, and time.   Psychiatric:         Thought Content: Thought content normal.           Lab Results   Component Value Date     04/14/2022    K 4.0 04/14/2022    CL 97 04/14/2022    CO2 30 (H) 04/14/2022    BUN 16 04/14/2022    CREATININE 1.1 04/14/2022    GLU 99 04/14/2022    HGBA1C 6.1 (H) 04/14/2022    MG 2.3 10/30/2017    AST 36 04/14/2022    ALT 29 04/14/2022    ALBUMIN 3.9 04/14/2022    PROT 7.4 04/14/2022    BILITOT 0.3 04/14/2022    WBC 15.07 (H) 10/08/2021    HGB 11.2 (L) 10/08/2021    HCT 34.7 (L) 10/08/2021    HCT 32 (L) 02/13/2017    MCV 90 10/08/2021    MCH 29.0 10/08/2021     10/08/2021    MPV 8.8 (L) 10/08/2021    GRAN 10.5 (H) 10/08/2021    GRAN 69.4 10/08/2021    LYMPH 2.9 10/08/2021    LYMPH 18.9 10/08/2021    CHOL 218 (H) 04/14/2022    HDL 91 (H) 04/14/2022    LDLCALC 101.2 04/14/2022    TRIG 129 04/14/2022       Lab Results   Component Value Date    .640 (H) 04/14/2022    I2ZYUUZ 8.9 01/12/2016    FREET4 <0.42 (L) 04/14/2022        Thyroid Labs Latest Ref Rng & Units 12/8/2021 3/30/2022 4/14/2022   TSH 0.400 - 4.000 uIU/mL - 106.060(H) 103.640(H)   Free T4 0.71 - 1.51 ng/dL - 0.55(L) <0.42(L)   Sodium 136 - 145 mmol/L 137 140 138   Potassium 3.5 - 5.1 mmol/L 3.6 3.0(L) 4.0   Chloride 95 - 110 mmol/L 96 93(L) 97   Carbon Dioxide 23 - 29 mmol/L 28 33(H) 30(H)   Glucose 70 - 110 mg/dL 102 86 99    Blood Urea Nitrogen 8 - 23 mg/dL 18 14 16   Creatinine 0.5 - 1.4 mg/dL 1.4 1.2 1.1   Calcium 8.7 - 10.5 mg/dL 9.6 9.4 9.1   Total Protein 6.0 - 8.4 g/dL - - 7.4   Albumin 3.5 - 5.2 g/dL 3.5 - 3.9   Total Bilirubin 0.1 - 1.0 mg/dL - - 0.3   AST 10 - 40 U/L - - 36   ALT 10 - 44 U/L - - 29   Anion Gap 8 - 16 mmol/L 13 14 11   eGFR (African American) >60 mL/min/1.73 m:2 41.5(A) 50.0(A) 55.6(A)   eGFR (Non-African American) >60 mL/min/1.73 m:2 36.0(A) 43.4(A) 48.2(A)   WBC 3.90 - 12.70 K/uL - - -   RBC 4.00 - 5.40 M/uL - - -   Hemoglobin 12.0 - 16.0 g/dL - - -   Hematocrit 37.0 - 48.5 % - - -   MCV 82 - 98 fL - - -   MCH 27.0 - 31.0 pg - - -   MCHC 32.0 - 36.0 g/dL - - -   RDW 11.5 - 14.5 % - - -   Platelets 150 - 450 K/uL - - -   MPV 9.2 - 12.9 fL - - -   Gran # 1.8 - 7.7 K/uL - - -   Lymph # 1.0 - 4.8 K/uL - - -   Mono # 0.3 - 1.0 K/uL - - -   Eos # 0.0 - 0.5 K/uL - - -   Baso # 0.00 - 0.20 K/uL - - -   Gran % 38.0 - 73.0 % - - -   Lymph % 18.0 - 48.0 % - - -   Mono% 4.0 - 15.0 % - - -   Eos % 0.0 - 8.0 % - - -   Baso % 0.0 - 1.9 % - - -   Prothrombin Time 9.0 - 12.5 sec - - -   INR - - - -   aPTT 24.6 - 36.7 sec - - -           Hemoglobin A1C   Date Value Ref Range Status   04/14/2022 6.1 (H) 4.0 - 5.6 % Final     Comment:     ADA Screening Guidelines:  5.7-6.4%  Consistent with prediabetes  >or=6.5%  Consistent with diabetes    High levels of fetal hemoglobin interfere with the HbA1C  assay. Heterozygous hemoglobin variants (HbS, HgC, etc)do  not significantly interfere with this assay.   However, presence of multiple variants may affect accuracy.     03/30/2022 5.8 (H) 4.0 - 5.6 % Final     Comment:     ADA Screening Guidelines:  5.7-6.4%  Consistent with prediabetes  >or=6.5%  Consistent with diabetes    High levels of fetal hemoglobin interfere with the HbA1C  assay. Heterozygous hemoglobin variants (HbS, HgC, etc)do  not significantly interfere with this assay.   However, presence of multiple variants may  affect accuracy.     12/22/2020 6.0 (H) 4.0 - 5.6 % Final     Comment:     ADA Screening Guidelines:  5.7-6.4%  Consistent with prediabetes  >or=6.5%  Consistent with diabetes  High levels of fetal hemoglobin interfere with the HbA1C  assay. Heterozygous hemoglobin variants (HbS, HgC, etc)do  not significantly interfere with this assay.   However, presence of multiple variants may affect accuracy.           Assessment and plan:     Problem List Items Addressed This Visit        Cardiac/Vascular    Hypertension - Primary     BP controlled. Pt notes nocturia. Advised f/u with PCP regarding lasixs. Continue current meds. Pt to notify PCP if BP consistently more than 140/90.                Endocrine    Hypothyroidism     Clinically pt with fatigue. Most recent TSH elevated. Pt unsure of current dose of thyroid medication. Takes thyroid hormone along with calcium and other meds in the am. Advised pt to take thyroid hormone when she wakes up overnight or first thing in the morning. If takes in am, should wait atleast 30 minutes before eating or taking other meds. Should separate from calcium by atleast 2 hours.     Adjust dose prn once pt verifies her current dose.            Prediabetes     H/o pre-diabetes. Notes neuropathy. Last A1C 6.1%. F/u with PCP.                   Pt to send message with current daily dose of thyroid hormone. (Highlight the name levothyroxine on her AVS so that she knows which pills to message us about.)    RTC in 6 months with any full endo provider              Disclaimer: This note has been generated using voice-recognition software. There may be typographical errors that have been missed during proof-reading.

## 2022-06-01 ENCOUNTER — TELEPHONE (OUTPATIENT)
Dept: ENDOCRINOLOGY | Facility: CLINIC | Age: 77
End: 2022-06-01
Payer: MEDICARE

## 2022-06-01 DIAGNOSIS — E03.9 HYPOTHYROIDISM, UNSPECIFIED TYPE: Primary | ICD-10-CM

## 2022-06-01 RX ORDER — LEVOTHYROXINE SODIUM 88 UG/1
88 TABLET ORAL
Qty: 30 TABLET | Refills: 11 | Status: SHIPPED | OUTPATIENT
Start: 2022-06-01 | End: 2022-10-21

## 2022-06-01 NOTE — TELEPHONE ENCOUNTER
"----- Message from Magali Case sent at 6/1/2022  9:12 AM CDT -----  "Type:  Patient Call Back    Who Called:CÉSAR LEMON [[id number]]    What is the reqeust in detail:Pt requesting call back in regards to thyroid medication. Please advise     Can the clinic reply by [facility]?no    Best Call Back Number:[phone]      Additional Information:Pt states doctor wanted to talk about medication this morning            "

## 2022-06-01 NOTE — TELEPHONE ENCOUNTER
S/w pharmacy. States most recent Rx they have was from 4/6/22 for 50mcg that was never filled. Next Rx was 25mcg from Dr. Hathaway from 6/25/21 picked up on 5/28. The 200mcg Rx was from 1/11/21 and was last filled on 12/17/21. Per pt she has only ever taken the 25mcg for the last 25 years. States this is the Rx she has at home and has never taken a different dose.

## 2022-06-01 NOTE — TELEPHONE ENCOUNTER
Please call her pharmacies and verify this. Per her PCP Mag they discussed her dosing at her last visit and she was getting confused because PCP wanted her to take 200+ 25 daily.     If pharmacy verifies this, will plan to change dose to 88 mcg daily which is here estimated TDD based on her weight.

## 2022-06-01 NOTE — TELEPHONE ENCOUNTER
Pt states she has been taking the 25mcg of levothyroxine for years. Has never taken the 50mcg or 200mcg. Please advise.

## 2022-06-01 NOTE — TELEPHONE ENCOUNTER
Thanks for looking into that!     Tell pt to put all doses other than the 88 mcg away (ie. store in a bag under the sink so that she doesn't get confused).     Take one 88 mcg tablet every day. Take it when she wakes up in the middle of the night or first thing in the am but should wait 30 minutes before she takes other meds or has breakfast.     Get labs in 6 weeks.

## 2022-06-17 ENCOUNTER — OFFICE VISIT (OUTPATIENT)
Dept: PAIN MEDICINE | Facility: CLINIC | Age: 77
End: 2022-06-17
Payer: MEDICARE

## 2022-06-17 ENCOUNTER — HOSPITAL ENCOUNTER (OUTPATIENT)
Dept: RADIOLOGY | Facility: HOSPITAL | Age: 77
Discharge: HOME | End: 2022-06-17
Attending: PHYSICAL MEDICINE & REHABILITATION
Payer: MEDICARE

## 2022-06-17 VITALS
WEIGHT: 138.31 LBS | DIASTOLIC BLOOD PRESSURE: 72 MMHG | SYSTOLIC BLOOD PRESSURE: 133 MMHG | HEIGHT: 61 IN | BODY MASS INDEX: 26.11 KG/M2 | RESPIRATION RATE: 17 BRPM | HEART RATE: 79 BPM

## 2022-06-17 DIAGNOSIS — Z98.890 H/O ABDOMINAL SURGERY: ICD-10-CM

## 2022-06-17 DIAGNOSIS — G89.4 CHRONIC PAIN SYNDROME: ICD-10-CM

## 2022-06-17 DIAGNOSIS — Z96.89 SPINAL CORD STIMULATOR STATUS: ICD-10-CM

## 2022-06-17 DIAGNOSIS — G89.4 CHRONIC PAIN SYNDROME: Primary | ICD-10-CM

## 2022-06-17 DIAGNOSIS — G90.521 COMPLEX REGIONAL PAIN SYNDROME TYPE 1 OF RIGHT LOWER EXTREMITY: ICD-10-CM

## 2022-06-17 PROCEDURE — 1125F PR PAIN SEVERITY QUANTIFIED, PAIN PRESENT: ICD-10-PCS | Mod: CPTII,S$GLB,, | Performed by: PHYSICAL MEDICINE & REHABILITATION

## 2022-06-17 PROCEDURE — 3288F PR FALLS RISK ASSESSMENT DOCUMENTED: ICD-10-PCS | Mod: CPTII,S$GLB,, | Performed by: PHYSICAL MEDICINE & REHABILITATION

## 2022-06-17 PROCEDURE — 72080 XR THORACOLUMBAR SPINE AP LATERAL: ICD-10-PCS | Mod: 26,,, | Performed by: RADIOLOGY

## 2022-06-17 PROCEDURE — 1159F MED LIST DOCD IN RCRD: CPT | Mod: CPTII,S$GLB,, | Performed by: PHYSICAL MEDICINE & REHABILITATION

## 2022-06-17 PROCEDURE — 99999 PR PBB SHADOW E&M-EST. PATIENT-LVL V: ICD-10-PCS | Mod: PBBFAC,,, | Performed by: PHYSICAL MEDICINE & REHABILITATION

## 2022-06-17 PROCEDURE — 1160F PR REVIEW ALL MEDS BY PRESCRIBER/CLIN PHARMACIST DOCUMENTED: ICD-10-PCS | Mod: CPTII,S$GLB,, | Performed by: PHYSICAL MEDICINE & REHABILITATION

## 2022-06-17 PROCEDURE — 99214 OFFICE O/P EST MOD 30 MIN: CPT | Mod: S$GLB,,, | Performed by: PHYSICAL MEDICINE & REHABILITATION

## 2022-06-17 PROCEDURE — 99999 PR PBB SHADOW E&M-EST. PATIENT-LVL V: CPT | Mod: PBBFAC,,, | Performed by: PHYSICAL MEDICINE & REHABILITATION

## 2022-06-17 PROCEDURE — 3075F PR MOST RECENT SYSTOLIC BLOOD PRESS GE 130-139MM HG: ICD-10-PCS | Mod: CPTII,S$GLB,, | Performed by: PHYSICAL MEDICINE & REHABILITATION

## 2022-06-17 PROCEDURE — 72080 X-RAY EXAM THORACOLMB 2/> VW: CPT | Mod: 26,,, | Performed by: RADIOLOGY

## 2022-06-17 PROCEDURE — 72080 X-RAY EXAM THORACOLMB 2/> VW: CPT | Mod: TC,PO

## 2022-06-17 PROCEDURE — 3288F FALL RISK ASSESSMENT DOCD: CPT | Mod: CPTII,S$GLB,, | Performed by: PHYSICAL MEDICINE & REHABILITATION

## 2022-06-17 PROCEDURE — 3075F SYST BP GE 130 - 139MM HG: CPT | Mod: CPTII,S$GLB,, | Performed by: PHYSICAL MEDICINE & REHABILITATION

## 2022-06-17 PROCEDURE — 1160F RVW MEDS BY RX/DR IN RCRD: CPT | Mod: CPTII,S$GLB,, | Performed by: PHYSICAL MEDICINE & REHABILITATION

## 2022-06-17 PROCEDURE — 1101F PT FALLS ASSESS-DOCD LE1/YR: CPT | Mod: CPTII,S$GLB,, | Performed by: PHYSICAL MEDICINE & REHABILITATION

## 2022-06-17 PROCEDURE — 1159F PR MEDICATION LIST DOCUMENTED IN MEDICAL RECORD: ICD-10-PCS | Mod: CPTII,S$GLB,, | Performed by: PHYSICAL MEDICINE & REHABILITATION

## 2022-06-17 PROCEDURE — 3078F DIAST BP <80 MM HG: CPT | Mod: CPTII,S$GLB,, | Performed by: PHYSICAL MEDICINE & REHABILITATION

## 2022-06-17 PROCEDURE — 1125F AMNT PAIN NOTED PAIN PRSNT: CPT | Mod: CPTII,S$GLB,, | Performed by: PHYSICAL MEDICINE & REHABILITATION

## 2022-06-17 PROCEDURE — 99214 PR OFFICE/OUTPT VISIT, EST, LEVL IV, 30-39 MIN: ICD-10-PCS | Mod: S$GLB,,, | Performed by: PHYSICAL MEDICINE & REHABILITATION

## 2022-06-17 PROCEDURE — 3078F PR MOST RECENT DIASTOLIC BLOOD PRESSURE < 80 MM HG: ICD-10-PCS | Mod: CPTII,S$GLB,, | Performed by: PHYSICAL MEDICINE & REHABILITATION

## 2022-06-17 PROCEDURE — 1101F PR PT FALLS ASSESS DOC 0-1 FALLS W/OUT INJ PAST YR: ICD-10-PCS | Mod: CPTII,S$GLB,, | Performed by: PHYSICAL MEDICINE & REHABILITATION

## 2022-06-17 RX ORDER — DULOXETIN HYDROCHLORIDE 30 MG/1
30 CAPSULE, DELAYED RELEASE ORAL 2 TIMES DAILY
Qty: 60 CAPSULE | Refills: 1 | Status: SHIPPED | OUTPATIENT
Start: 2022-06-17

## 2022-06-17 RX ORDER — TRAMADOL HYDROCHLORIDE 50 MG/1
TABLET ORAL
Qty: 60 TABLET | Refills: 1 | Status: ON HOLD | OUTPATIENT
Start: 2022-06-17 | End: 2022-07-14 | Stop reason: HOSPADM
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
## 2022-06-21 ENCOUNTER — LAB VISIT (OUTPATIENT)
Dept: LAB | Facility: HOSPITAL | Age: 77
End: 2022-06-21
Attending: INTERNAL MEDICINE
Payer: MEDICARE

## 2022-06-21 DIAGNOSIS — N18.30 STAGE 3 CHRONIC KIDNEY DISEASE, UNSPECIFIED WHETHER STAGE 3A OR 3B CKD: ICD-10-CM

## 2022-06-21 LAB
ALBUMIN SERPL BCP-MCNC: 4.2 G/DL (ref 3.5–5.2)
ANION GAP SERPL CALC-SCNC: 13 MMOL/L (ref 8–16)
BUN SERPL-MCNC: 23 MG/DL (ref 8–23)
CALCIUM SERPL-MCNC: 10.2 MG/DL (ref 8.7–10.5)
CHLORIDE SERPL-SCNC: 97 MMOL/L (ref 95–110)
CO2 SERPL-SCNC: 31 MMOL/L (ref 23–29)
CREAT SERPL-MCNC: 1.6 MG/DL (ref 0.5–1.4)
EST. GFR  (AFRICAN AMERICAN): 35.3 ML/MIN/1.73 M^2
EST. GFR  (NON AFRICAN AMERICAN): 30.6 ML/MIN/1.73 M^2
GLUCOSE SERPL-MCNC: 83 MG/DL (ref 70–110)
PHOSPHATE SERPL-MCNC: 4.8 MG/DL (ref 2.7–4.5)
POTASSIUM SERPL-SCNC: 4.1 MMOL/L (ref 3.5–5.1)
PTH-INTACT SERPL-MCNC: 117.1 PG/ML (ref 9–77)
SODIUM SERPL-SCNC: 141 MMOL/L (ref 136–145)

## 2022-06-21 PROCEDURE — 80069 RENAL FUNCTION PANEL: CPT | Performed by: INTERNAL MEDICINE

## 2022-06-21 PROCEDURE — 36415 COLL VENOUS BLD VENIPUNCTURE: CPT | Mod: PO | Performed by: INTERNAL MEDICINE

## 2022-06-21 PROCEDURE — 83970 ASSAY OF PARATHORMONE: CPT | Performed by: INTERNAL MEDICINE

## 2022-06-24 DIAGNOSIS — I10 ESSENTIAL HYPERTENSION: ICD-10-CM

## 2022-06-24 RX ORDER — AMLODIPINE BESYLATE 10 MG/1
TABLET ORAL
Qty: 90 TABLET | Refills: 2 | Status: SHIPPED | OUTPATIENT
Start: 2022-06-24 | End: 2022-11-16

## 2022-06-24 NOTE — TELEPHONE ENCOUNTER
No new care gaps identified.  [facility] Embedded Care Gaps. Reference number: [id number]. 6/24/2022   1:18:18 PM CDT

## 2022-06-24 NOTE — TELEPHONE ENCOUNTER
Refill Decision Note   Soumya Melchor  is requesting a refill authorization.  Brief Assessment and Rationale for Refill:  Approve     Medication Therapy Plan:       Medication Reconciliation Completed: No   Comments:     No Care Gaps recommended.     Note composed:3:21 PM 06/24/2022

## 2022-06-29 ENCOUNTER — OFFICE VISIT (OUTPATIENT)
Dept: NEPHROLOGY | Facility: CLINIC | Age: 77
End: 2022-06-29
Payer: MEDICARE

## 2022-06-29 ENCOUNTER — TELEPHONE (OUTPATIENT)
Dept: UROLOGY | Facility: CLINIC | Age: 77
End: 2022-06-29
Payer: MEDICARE

## 2022-06-29 VITALS
BODY MASS INDEX: 26.06 KG/M2 | DIASTOLIC BLOOD PRESSURE: 64 MMHG | WEIGHT: 138 LBS | HEIGHT: 61 IN | SYSTOLIC BLOOD PRESSURE: 122 MMHG

## 2022-06-29 DIAGNOSIS — I10 ESSENTIAL HYPERTENSION: ICD-10-CM

## 2022-06-29 DIAGNOSIS — N18.30 STAGE 3 CHRONIC KIDNEY DISEASE, UNSPECIFIED WHETHER STAGE 3A OR 3B CKD: Primary | ICD-10-CM

## 2022-06-29 PROCEDURE — 1159F MED LIST DOCD IN RCRD: CPT | Mod: CPTII,S$GLB,, | Performed by: INTERNAL MEDICINE

## 2022-06-29 PROCEDURE — 99214 PR OFFICE/OUTPT VISIT, EST, LEVL IV, 30-39 MIN: ICD-10-PCS | Mod: S$GLB,,, | Performed by: INTERNAL MEDICINE

## 2022-06-29 PROCEDURE — 3288F FALL RISK ASSESSMENT DOCD: CPT | Mod: CPTII,S$GLB,, | Performed by: INTERNAL MEDICINE

## 2022-06-29 PROCEDURE — 1101F PT FALLS ASSESS-DOCD LE1/YR: CPT | Mod: CPTII,S$GLB,, | Performed by: INTERNAL MEDICINE

## 2022-06-29 PROCEDURE — 1101F PR PT FALLS ASSESS DOC 0-1 FALLS W/OUT INJ PAST YR: ICD-10-PCS | Mod: CPTII,S$GLB,, | Performed by: INTERNAL MEDICINE

## 2022-06-29 PROCEDURE — 3074F PR MOST RECENT SYSTOLIC BLOOD PRESSURE < 130 MM HG: ICD-10-PCS | Mod: CPTII,S$GLB,, | Performed by: INTERNAL MEDICINE

## 2022-06-29 PROCEDURE — 99214 OFFICE O/P EST MOD 30 MIN: CPT | Mod: S$GLB,,, | Performed by: INTERNAL MEDICINE

## 2022-06-29 PROCEDURE — 99999 PR PBB SHADOW E&M-EST. PATIENT-LVL IV: ICD-10-PCS | Mod: PBBFAC,,, | Performed by: INTERNAL MEDICINE

## 2022-06-29 PROCEDURE — 3078F DIAST BP <80 MM HG: CPT | Mod: CPTII,S$GLB,, | Performed by: INTERNAL MEDICINE

## 2022-06-29 PROCEDURE — 3288F PR FALLS RISK ASSESSMENT DOCUMENTED: ICD-10-PCS | Mod: CPTII,S$GLB,, | Performed by: INTERNAL MEDICINE

## 2022-06-29 PROCEDURE — 3078F PR MOST RECENT DIASTOLIC BLOOD PRESSURE < 80 MM HG: ICD-10-PCS | Mod: CPTII,S$GLB,, | Performed by: INTERNAL MEDICINE

## 2022-06-29 PROCEDURE — 3074F SYST BP LT 130 MM HG: CPT | Mod: CPTII,S$GLB,, | Performed by: INTERNAL MEDICINE

## 2022-06-29 PROCEDURE — 99999 PR PBB SHADOW E&M-EST. PATIENT-LVL IV: CPT | Mod: PBBFAC,,, | Performed by: INTERNAL MEDICINE

## 2022-06-29 PROCEDURE — 1159F PR MEDICATION LIST DOCUMENTED IN MEDICAL RECORD: ICD-10-PCS | Mod: CPTII,S$GLB,, | Performed by: INTERNAL MEDICINE

## 2022-06-29 NOTE — TELEPHONE ENCOUNTER
----- Message from Isaias Torres MD sent at 6/29/2022  3:30 PM CDT -----  She does not seem to be having much relief from the vesicare with regards to her urinary symptoms.  Please contact her.
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
## 2022-06-29 NOTE — Clinical Note
She does not seem to be having much relief from the vesicare with regards to her urinary symptoms.  Please contact her.

## 2022-06-30 ENCOUNTER — TELEPHONE (OUTPATIENT)
Dept: UROLOGY | Facility: CLINIC | Age: 77
End: 2022-06-30
Payer: MEDICARE

## 2022-07-07 ENCOUNTER — HOSPITAL ENCOUNTER (OUTPATIENT)
Dept: RADIOLOGY | Facility: HOSPITAL | Age: 77
Discharge: HOME | End: 2022-07-07
Attending: OBSTETRICS & GYNECOLOGY
Payer: MEDICARE

## 2022-07-07 DIAGNOSIS — R10.31 ABDOMINAL PAIN, RIGHT LOWER QUADRANT: ICD-10-CM

## 2022-07-07 DIAGNOSIS — R19.00 ABDOMINAL LUMP: ICD-10-CM

## 2022-07-07 PROCEDURE — 74176 CT ABD & PELVIS W/O CONTRAST: CPT | Mod: 26,,, | Performed by: RADIOLOGY

## 2022-07-07 PROCEDURE — A9698 NON-RAD CONTRAST MATERIALNOC: HCPCS | Mod: PO

## 2022-07-07 PROCEDURE — 74176 CT ABD & PELVIS W/O CONTRAST: CPT | Mod: TC,PO

## 2022-07-07 PROCEDURE — 74176 CT ABDOMEN PELVIS WITHOUT CONTRAST: ICD-10-PCS | Mod: 26,,, | Performed by: RADIOLOGY

## 2022-07-07 PROCEDURE — 25500020 PHARM REV CODE 255: Mod: PO

## 2022-07-07 RX ADMIN — BARIUM SULFATE 900 ML: 20 SUSPENSION ORAL at 04:07

## 2022-07-09 ENCOUNTER — PATIENT MESSAGE (OUTPATIENT)
Dept: FAMILY MEDICINE | Facility: CLINIC | Age: 77
End: 2022-07-09
Payer: MEDICARE

## 2022-07-09 DIAGNOSIS — K57.92 DIVERTICULITIS: Primary | ICD-10-CM

## 2022-07-11 ENCOUNTER — TELEPHONE (OUTPATIENT)
Dept: UROLOGY | Facility: CLINIC | Age: 77
End: 2022-07-11
Payer: MEDICARE

## 2022-07-11 ENCOUNTER — TELEPHONE (OUTPATIENT)
Dept: OPHTHALMOLOGY | Facility: CLINIC | Age: 77
End: 2022-07-11
Payer: MEDICARE

## 2022-07-11 PROBLEM — K86.89 DILATION OF PANCREATIC DUCT: Status: ACTIVE | Noted: 2022-07-11

## 2022-07-11 PROBLEM — I10 ESSENTIAL HYPERTENSION: Status: ACTIVE | Noted: 2022-07-11

## 2022-07-11 PROBLEM — R10.9 ABDOMINAL PAIN: Status: ACTIVE | Noted: 2022-07-11

## 2022-07-11 PROBLEM — K80.20 CHOLELITHIASES: Status: ACTIVE | Noted: 2022-07-11

## 2022-07-11 PROBLEM — Z71.89 ACP (ADVANCE CARE PLANNING): Status: ACTIVE | Noted: 2018-07-17

## 2022-07-11 PROBLEM — N30.90 CYSTITIS: Status: ACTIVE | Noted: 2022-07-11

## 2022-07-11 PROBLEM — J44.89 ASTHMA WITH COPD: Status: ACTIVE | Noted: 2022-07-11

## 2022-07-11 PROBLEM — K57.92 DIVERTICULITIS: Status: ACTIVE | Noted: 2022-07-11

## 2022-07-11 RX ORDER — CIPROFLOXACIN 500 MG/1
500 TABLET ORAL 2 TIMES DAILY
Qty: 20 TABLET | Refills: 0 | Status: ON HOLD | OUTPATIENT
Start: 2022-07-11 | End: 2022-07-14 | Stop reason: HOSPADM

## 2022-07-11 RX ORDER — METRONIDAZOLE 500 MG/1
500 TABLET ORAL 3 TIMES DAILY
Qty: 30 TABLET | Refills: 0 | Status: ON HOLD | OUTPATIENT
Start: 2022-07-11 | End: 2022-07-14 | Stop reason: HOSPADM

## 2022-07-11 NOTE — TELEPHONE ENCOUNTER
Needs to start both cipro and flagyl for treatment of diverticulitis.    I have signed for the following orders AND/OR meds.  Please call the patient and ask the patient to schedule the testing AND/OR inform about any medications that were sent. Medications have been sent to pharmacy listed below      No orders of the defined types were placed in this encounter.      Medications Ordered This Encounter   Medications    ciprofloxacin HCl (CIPRO) 500 MG tablet     Sig: Take 1 tablet (500 mg total) by mouth 2 (two) times daily. for 10 days     Dispense:  20 tablet     Refill:  0    metroNIDAZOLE (FLAGYL) 500 MG tablet     Sig: Take 1 tablet (500 mg total) by mouth 3 (three) times daily. for 10 days     Dispense:  30 tablet     Refill:  0         [facility] - [place] - [address]  [address]  [place] [address]  Phone: [phone] Fax: [phone]    [id number] - [place] - [address] AT [address] OF [facility] [address] [facility]  [address]  [place] [address]  Phone: [phone] Fax: [phone]

## 2022-07-11 NOTE — TELEPHONE ENCOUNTER
----- Message from Don Pulido sent at 7/11/2022  1:09 PM CDT -----  Contact: CÉSAR LEMON [[id number]]  Type:  Patient Returning Call    Who Called:CÉSAR LEMON [[id number]]    Who Left Message for Patient:Jo Ann Young LPN     Does the patient know what this is regarding?:    Would the patient rather a call back or a response via [facility]?     Best Call Back Number: [phone] (mobile)    Additional Information:

## 2022-07-11 NOTE — TELEPHONE ENCOUNTER
----- Message from Mary Walker sent at 7/11/2022 11:52 AM CDT -----  Contact: self  Patient is in route to [facility] and will be admitted and so she needs to cancel her appt for today and will call back when she is better.  Call back if needed at [phone] (home) and thanks.

## 2022-07-14 PROBLEM — K82.8 BILIARY DYSKINESIA: Status: ACTIVE | Noted: 2022-07-14

## 2022-07-22 PROBLEM — Z90.49 S/P CHOLECYSTECTOMY: Status: ACTIVE | Noted: 2022-07-22

## 2022-07-22 PROBLEM — K52.9 COLITIS: Status: ACTIVE | Noted: 2022-07-22

## 2022-07-23 PROBLEM — D75.839 THROMBOCYTOSIS: Status: ACTIVE | Noted: 2022-07-23

## 2022-07-25 PROBLEM — E87.6 HYPOKALEMIA: Status: ACTIVE | Noted: 2022-07-25

## 2022-07-26 ENCOUNTER — TELEPHONE (OUTPATIENT)
Dept: GASTROENTEROLOGY | Facility: CLINIC | Age: 77
End: 2022-07-26
Payer: MEDICARE

## 2022-07-26 NOTE — TELEPHONE ENCOUNTER
----- Message from Ashley Odonnell sent at 7/26/2022  9:37 AM CDT -----  Contact: [facility]  Type  Apoointment Request    Name of Caller: [facility]     When is the first available appointment?       Would the patient rather a call back or a response via [facility]?  Call    Best Call Back Number: [phone] (home)     Additional Information:  [facility] needs to schedule a 1 week hospital follow up for patient     Please call patient to schedule

## 2022-08-01 ENCOUNTER — TELEPHONE (OUTPATIENT)
Dept: GASTROENTEROLOGY | Facility: CLINIC | Age: 77
End: 2022-08-01
Payer: MEDICARE

## 2022-08-01 NOTE — TELEPHONE ENCOUNTER
----- Message from Ashley Odonnell sent at 7/29/2022  4:00 PM CDT -----  Contact: Patient  Type:  Needs Medical Advice    Who Called:  patient     Would the patient rather a call back or a response via [facility]?  Call    Best Call Back Number:  [phone] (home)     Additional Information:  Patient states she had a procedure last week and the pain medication she is on is not working and she needs something stronger and the antibiotics are not working     Please call to advise     Patient needs to speak to the nurse today because she is in pain

## 2022-09-12 ENCOUNTER — TELEPHONE (OUTPATIENT)
Dept: FAMILY MEDICINE | Facility: CLINIC | Age: 77
End: 2022-09-12
Payer: MEDICARE

## 2022-09-12 NOTE — TELEPHONE ENCOUNTER
----- Message from Amanda Barron sent at 9/12/2022  2:30 PM CDT -----  Contact: Pt  Type:  Sooner Apoointment Request    Caller is requesting a sooner appointment.  Caller declined first available appointment listed below.  Caller will not accept being placed on the waitlist and is requesting a message be sent to doctor.  Name of Caller: pt   When is the first available appointment?10/2022  Symptoms: hospital follow up /discuss her meds   Would the patient rather a call back or a response via [facility]? Phone   Best Call Back Number: [phone]  Additional Information:  was in [facility] twice and [facility] once   / needs to come in on a Wed

## 2022-09-12 NOTE — TELEPHONE ENCOUNTER
Patient scheduled for follow up with PCP on 10/19/22 at 2:00 pm. Appointment also placed on wait list for sooner availability.

## 2022-10-19 ENCOUNTER — LAB VISIT (OUTPATIENT)
Dept: LAB | Facility: HOSPITAL | Age: 77
End: 2022-10-19
Attending: INTERNAL MEDICINE
Payer: MEDICARE

## 2022-10-19 ENCOUNTER — OFFICE VISIT (OUTPATIENT)
Dept: FAMILY MEDICINE | Facility: CLINIC | Age: 77
End: 2022-10-19
Payer: MEDICARE

## 2022-10-19 VITALS
DIASTOLIC BLOOD PRESSURE: 72 MMHG | WEIGHT: 121.69 LBS | OXYGEN SATURATION: 99 % | TEMPERATURE: 98 F | HEIGHT: 66 IN | BODY MASS INDEX: 19.56 KG/M2 | SYSTOLIC BLOOD PRESSURE: 117 MMHG | HEART RATE: 81 BPM

## 2022-10-19 DIAGNOSIS — I10 ESSENTIAL HYPERTENSION: ICD-10-CM

## 2022-10-19 DIAGNOSIS — Z79.899 ENCOUNTER FOR LONG-TERM (CURRENT) USE OF HIGH-RISK MEDICATION: ICD-10-CM

## 2022-10-19 DIAGNOSIS — G89.4 CHRONIC PAIN SYNDROME: ICD-10-CM

## 2022-10-19 DIAGNOSIS — R91.1 PULMONARY NODULE, RIGHT: ICD-10-CM

## 2022-10-19 DIAGNOSIS — E03.9 HYPOTHYROIDISM, UNSPECIFIED TYPE: ICD-10-CM

## 2022-10-19 DIAGNOSIS — J96.11 CHRONIC RESPIRATORY FAILURE WITH HYPOXIA: ICD-10-CM

## 2022-10-19 DIAGNOSIS — E78.2 MIXED HYPERLIPIDEMIA: ICD-10-CM

## 2022-10-19 DIAGNOSIS — I25.10 CORONARY ARTERY DISEASE INVOLVING NATIVE CORONARY ARTERY OF NATIVE HEART WITHOUT ANGINA PECTORIS: ICD-10-CM

## 2022-10-19 DIAGNOSIS — N18.32 STAGE 3B CHRONIC KIDNEY DISEASE: ICD-10-CM

## 2022-10-19 DIAGNOSIS — J30.89 NON-SEASONAL ALLERGIC RHINITIS DUE TO OTHER ALLERGIC TRIGGER: ICD-10-CM

## 2022-10-19 DIAGNOSIS — R73.03 PREDIABETES: ICD-10-CM

## 2022-10-19 DIAGNOSIS — D64.9 ANEMIA, UNSPECIFIED TYPE: ICD-10-CM

## 2022-10-19 DIAGNOSIS — J44.89 ASTHMA WITH COPD: Primary | ICD-10-CM

## 2022-10-19 DIAGNOSIS — D53.9 NUTRITIONAL ANEMIA: ICD-10-CM

## 2022-10-19 DIAGNOSIS — M81.0 AGE-RELATED OSTEOPOROSIS WITHOUT CURRENT PATHOLOGICAL FRACTURE: ICD-10-CM

## 2022-10-19 DIAGNOSIS — R60.0 LOCALIZED EDEMA: ICD-10-CM

## 2022-10-19 LAB
BASOPHILS # BLD AUTO: 0.11 K/UL (ref 0–0.2)
BASOPHILS NFR BLD: 0.9 % (ref 0–1.9)
DIFFERENTIAL METHOD: ABNORMAL
EOSINOPHIL # BLD AUTO: 0.5 K/UL (ref 0–0.5)
EOSINOPHIL NFR BLD: 3.7 % (ref 0–8)
ERYTHROCYTE [DISTWIDTH] IN BLOOD BY AUTOMATED COUNT: 13.8 % (ref 11.5–14.5)
HCT VFR BLD AUTO: 32 % (ref 37–48.5)
HGB BLD-MCNC: 9.9 G/DL (ref 12–16)
IMM GRANULOCYTES # BLD AUTO: 0.08 K/UL (ref 0–0.04)
IMM GRANULOCYTES NFR BLD AUTO: 0.7 % (ref 0–0.5)
LYMPHOCYTES # BLD AUTO: 4.3 K/UL (ref 1–4.8)
LYMPHOCYTES NFR BLD: 35.3 % (ref 18–48)
MCH RBC QN AUTO: 30.7 PG (ref 27–31)
MCHC RBC AUTO-ENTMCNC: 30.9 G/DL (ref 32–36)
MCV RBC AUTO: 99 FL (ref 82–98)
MONOCYTES # BLD AUTO: 0.7 K/UL (ref 0.3–1)
MONOCYTES NFR BLD: 5.8 % (ref 4–15)
NEUTROPHILS # BLD AUTO: 6.5 K/UL (ref 1.8–7.7)
NEUTROPHILS NFR BLD: 53.6 % (ref 38–73)
NRBC BLD-RTO: 0 /100 WBC
PLATELET # BLD AUTO: 522 K/UL (ref 150–450)
PMV BLD AUTO: 9.4 FL (ref 9.2–12.9)
RBC # BLD AUTO: 3.22 M/UL (ref 4–5.4)
WBC # BLD AUTO: 12.15 K/UL (ref 3.9–12.7)

## 2022-10-19 PROCEDURE — 1159F MED LIST DOCD IN RCRD: CPT | Mod: CPTII,S$GLB,, | Performed by: INTERNAL MEDICINE

## 2022-10-19 PROCEDURE — 99214 OFFICE O/P EST MOD 30 MIN: CPT | Mod: S$GLB,,, | Performed by: INTERNAL MEDICINE

## 2022-10-19 PROCEDURE — 99999 PR PBB SHADOW E&M-EST. PATIENT-LVL V: ICD-10-PCS | Mod: PBBFAC,,, | Performed by: INTERNAL MEDICINE

## 2022-10-19 PROCEDURE — 83036 HEMOGLOBIN GLYCOSYLATED A1C: CPT | Performed by: INTERNAL MEDICINE

## 2022-10-19 PROCEDURE — 85025 COMPLETE CBC W/AUTO DIFF WBC: CPT | Performed by: INTERNAL MEDICINE

## 2022-10-19 PROCEDURE — 3074F SYST BP LT 130 MM HG: CPT | Mod: CPTII,S$GLB,, | Performed by: INTERNAL MEDICINE

## 2022-10-19 PROCEDURE — 82306 VITAMIN D 25 HYDROXY: CPT | Performed by: INTERNAL MEDICINE

## 2022-10-19 PROCEDURE — 99999 PR PBB SHADOW E&M-EST. PATIENT-LVL V: CPT | Mod: PBBFAC,,, | Performed by: INTERNAL MEDICINE

## 2022-10-19 PROCEDURE — 80053 COMPREHEN METABOLIC PANEL: CPT | Performed by: INTERNAL MEDICINE

## 2022-10-19 PROCEDURE — 1100F PTFALLS ASSESS-DOCD GE2>/YR: CPT | Mod: CPTII,S$GLB,, | Performed by: INTERNAL MEDICINE

## 2022-10-19 PROCEDURE — 3288F FALL RISK ASSESSMENT DOCD: CPT | Mod: CPTII,S$GLB,, | Performed by: INTERNAL MEDICINE

## 2022-10-19 PROCEDURE — 84443 ASSAY THYROID STIM HORMONE: CPT | Performed by: INTERNAL MEDICINE

## 2022-10-19 PROCEDURE — 1100F PR PT FALLS ASSESS DOC 2+ FALLS/FALL W/INJURY/YR: ICD-10-PCS | Mod: CPTII,S$GLB,, | Performed by: INTERNAL MEDICINE

## 2022-10-19 PROCEDURE — 3078F DIAST BP <80 MM HG: CPT | Mod: CPTII,S$GLB,, | Performed by: INTERNAL MEDICINE

## 2022-10-19 PROCEDURE — 1159F PR MEDICATION LIST DOCUMENTED IN MEDICAL RECORD: ICD-10-PCS | Mod: CPTII,S$GLB,, | Performed by: INTERNAL MEDICINE

## 2022-10-19 PROCEDURE — 3074F PR MOST RECENT SYSTOLIC BLOOD PRESSURE < 130 MM HG: ICD-10-PCS | Mod: CPTII,S$GLB,, | Performed by: INTERNAL MEDICINE

## 2022-10-19 PROCEDURE — 99214 PR OFFICE/OUTPT VISIT, EST, LEVL IV, 30-39 MIN: ICD-10-PCS | Mod: S$GLB,,, | Performed by: INTERNAL MEDICINE

## 2022-10-19 PROCEDURE — 3078F PR MOST RECENT DIASTOLIC BLOOD PRESSURE < 80 MM HG: ICD-10-PCS | Mod: CPTII,S$GLB,, | Performed by: INTERNAL MEDICINE

## 2022-10-19 PROCEDURE — 3288F PR FALLS RISK ASSESSMENT DOCUMENTED: ICD-10-PCS | Mod: CPTII,S$GLB,, | Performed by: INTERNAL MEDICINE

## 2022-10-19 PROCEDURE — 36415 COLL VENOUS BLD VENIPUNCTURE: CPT | Mod: PO | Performed by: INTERNAL MEDICINE

## 2022-10-19 PROCEDURE — 99499 UNLISTED E&M SERVICE: CPT | Mod: S$GLB,,, | Performed by: INTERNAL MEDICINE

## 2022-10-19 PROCEDURE — 84439 ASSAY OF FREE THYROXINE: CPT | Performed by: INTERNAL MEDICINE

## 2022-10-19 RX ORDER — FUROSEMIDE 20 MG/1
20 TABLET ORAL 2 TIMES DAILY
Qty: 180 TABLET | Refills: 3 | Status: SHIPPED | OUTPATIENT
Start: 2022-10-19 | End: 2024-01-10

## 2022-10-19 RX ORDER — TRAMADOL HYDROCHLORIDE 50 MG/1
50 TABLET ORAL DAILY PRN
COMMUNITY
End: 2024-01-10

## 2022-10-19 RX ORDER — MONTELUKAST SODIUM 10 MG/1
10 TABLET ORAL NIGHTLY
Qty: 30 TABLET | Refills: 11 | Status: SHIPPED | OUTPATIENT
Start: 2022-10-19 | End: 2023-10-31

## 2022-10-19 RX ORDER — ALBUTEROL SULFATE 90 UG/1
2 AEROSOL, METERED RESPIRATORY (INHALATION) EVERY 6 HOURS PRN
Qty: 18 G | Refills: 1 | Status: SHIPPED | OUTPATIENT
Start: 2022-10-19

## 2022-10-19 RX ORDER — FUROSEMIDE 20 MG/1
40 TABLET ORAL DAILY
COMMUNITY
Start: 2022-09-22 | End: 2022-10-19 | Stop reason: SDUPTHER

## 2022-10-19 RX ORDER — FAMOTIDINE 40 MG/1
40 TABLET, FILM COATED ORAL NIGHTLY PRN
COMMUNITY
End: 2023-05-30

## 2022-10-19 RX ORDER — GABAPENTIN 600 MG/1
600 TABLET ORAL 2 TIMES DAILY
COMMUNITY
End: 2024-01-10

## 2022-10-19 NOTE — PATIENT INSTRUCTIONS
-start using the Breztri 2 puffs, twice daily. Make sure to rinse mouth out after use to prevent thrush  -you can use Albuterol inhaler every 4-6 hours as needed for shortness of breath or wheezing

## 2022-10-19 NOTE — PROGRESS NOTES
Assessment/Plan:    Problem List Items Addressed This Visit          Neuro    Chronic pain syndrome    Overview     -followed by pain management  -has spinal nerve stimulator in place  -remains on gabapentin  -remains on tramadol PRN as well            Pulmonary    Asthma with COPD - Primary    Overview     -followed by pulmonology  -now on supplemental O2 at night and with exertion  -was only using Breztri PRN but discussed starting to use BID  -prescribed albuterol inhaler for PRN use  -due for follow up with pulmonology         Relevant Medications    albuterol (PROVENTIL/VENTOLIN HFA) 90 mcg/actuation inhaler    Chronic respiratory failure with hypoxia    Pulmonary nodule, right    Overview     -monitored by pulmonology  -last CT in Oct 2021-stable  -recs to repeat in one year            Cardiac/Vascular    CAD (coronary artery disease)    Overview     -s/p CABG in 2017  -followed by cardiology, Dr. Loya  -remains on ASA and statin  -recent nuclear stress test 2020 unremarkable         Essential hypertension    Overview     Hypertension Medications               amLODIPine (NORVASC) 10 MG tablet TAKE 1 TABLET BY MOUTH ONCE DAILY    furosemide (LASIX) 20 MG tablet Take 1 tablet (20 mg total) by mouth 2 (two) times a day.   -at goal today  -continue lifestyle modification with low sodium diet and exercise   -discussed hypertension disease course and importance of treating high blood pressure  -patient understood and advised of risk of untreated blood pressure.  ER precautions were given   for symptoms of hypertensive urgency and emergency.          Hyperlipidemia    Overview     Hyperlipidemia Medications               ezetimibe (ZETIA) 10 mg tablet Take 1 tablet (10 mg total) by mouth once daily.    fish oil-omega-3 fatty acids 300-1,000 mg capsule Take 1 capsule by mouth 2 (two) times daily.     simvastatin (ZOCOR) 40 MG tablet Take 1 tablet (40 mg total) by mouth every evening.   -chronic condition.  Currently stable.    -reports compliance with hyperlipidemia treatment as prescribed  -denies any known adverse effects of medications  -most recent labs listed below:  Lab Results   Component Value Date    CHOL 218 (H) 04/14/2022     Lab Results   Component Value Date    HDL 91 (H) 04/14/2022     Lab Results   Component Value Date    LDLCALC 101.2 04/14/2022     Lab Results   Component Value Date    TRIG 129 04/14/2022     Lab Results   Component Value Date    ALT 24 10/19/2022    AST 32 10/19/2022    ALKPHOS 114 10/19/2022    BILITOT 0.3 10/19/2022             Renal/    CKD (chronic kidney disease) stage 3, GFR 30-59 ml/min    Overview     -followed by nephrology; requesting to switch to [facility]  -recent hospital stay with NYDIA; plan to repeat renal function  -avoid nephrotoxic agents  -renally dose medications         Relevant Orders    Comprehensive Metabolic Panel    Ambulatory referral/consult to Nephrology    CBC Auto Differential (Completed)       Endocrine    Hypothyroidism    Overview     -hx of not taking medication correctly  -currently on levothyroxine 88 mcg QD  -plan to repeat thyroid studies             Relevant Orders    TSH (Completed)    T4, Free (Completed)    Prediabetes    Overview     Lab Results   Component Value Date    HGBA1C 5.6 10/19/2022   -managed with diet            Orthopedic    Age-related osteoporosis without current pathological fracture    Overview     -DEXA March 2022-osteoporosis  -started weekly fosamax  -continue ca/vit d  -repeat DEXA in 2 years         Relevant Orders    Vitamin D (Completed)       Other    Edema    Relevant Medications    furosemide (LASIX) 20 MG tablet     Other Visit Diagnoses       Non-seasonal allergic rhinitis due to other allergic trigger        Relevant Medications    montelukast (SINGULAIR) 10 mg tablet            Follow up in about 3 months (around 1/19/2023).    Mindy Hathaway,  MD  _____________________________________________________________________________________________________________________________________________________    CC: hospital follow up    HPI:    Patient is in clinic today as an established patient. Patient [place] for altered mental status. D/c summary below:    Admit Date:   8/1/2022 9:00 AM  Discharge Date:   8/4/22    History of Present Illness:   This is a 76-year-old  female.  She has a recent somewhat complex medical history.  She was brought to the Emergency Department this morning by her .  She appeared difficult to arouse, confused and generally listless.  She has some nonspecific shaking and was noted to be confused.  This was noted to be change in her status over the last1-2 days.  The patient was also noted to be running fever and further evaluation was done.  The patient's daughter works at the hospital here and she relates more details regarding the present illness.  She was hospitalized several weeks ago at [facility] with abdominal pain.  A CT scan apparently was compatible with colitis.  She was treated with Cipro.  Since discharge, she has continued to have some nonspecific abdominal discomfort.  The patient and her  have noted some swelling in her abdomen.  She was found on routine lab work to have a markedly abnormal creatinine and kidney function and approximately 1 week ago, this was noted to be essentially normal for age. The patient and the  tells me that she has been drinking fluids without a problem.     Patient was seen and evaluated by GI and Nephrology. She is now cleared to be discharged home. She will need to follow up as an outpt with Dr Nichols, Dr Saab and Dr Daniels as well as oncology. She has completed her course of abx and does not need any further treatment as there were no signs of colitis on last imaging.    Hospital Course and Treatment:     1. Abd pain with recent  diagnosis of colitis  - GI following  - continue Zosyn  - MR from ST reviewed  - US of bile duct per GI   2. COPD  - O2 as needed  - stable   3. Weakness  - PT/OT to eval and treat   4. NYDIA   - continue IVF  - appreciate renal recs  - encourage oral hydration as pancho  - monitor I&Os   5. Anemia- iron deficiency  - IV iron for repletion while inhouse   6. Hypokalemia  - cautious repletion in renal pt    Since d/c: patient reports that she has been doing well. She did follow up with a [facility] internal med doctor after d/c. No change of medications. She is here with her  today. They both report that patient has been doing well and there has been no change in mental status since her discharge. We spent a good deal of time going over her medications, as she has struggled to keep these in order in the past. She is not using her Breztri every day. She thought this was only to be used as needed. I instructed her to start taking BID and I am providing albuterol HFA as a rescue inhaler. She reports compliance with her supplemental O2 and is wearing it today. The remainder of her medication seems to be correct. She needs follow up with pulmonology and nephrology. She is requesting to switch to [facility].    No other new complaints today.  Remaining chronic conditions have been reviewed and remain stable. Further detail as stated above.     All discharge medications have been reviewed and reconciled on chart.      Current Outpatient Medications on File Prior to Visit   Medication Sig Dispense Refill    amLODIPine (NORVASC) 10 MG tablet TAKE 1 TABLET BY MOUTH ONCE DAILY (Patient taking differently: Take 10 mg by mouth once daily.) 90 tablet 2    ascorbic acid, vitamin C, (VITAMIN C) 1000 MG tablet Take 1,000 mg by mouth once daily.       aspirin (ECOTRIN) 81 MG EC tablet Take 81 mg by mouth once daily.      BREZTRI AEROSPHERE 160-9-4.8 mcg/actuation HFAA Inhale 2 puffs into the lungs 2 (two) times a day. 10.7 g 5     calcium carbonate (TUMS) 200 mg calcium (500 mg) chewable tablet Take 2 tablets by mouth as needed.      cyanocobalamin (VITAMIN B-12) 250 MCG tablet Take 250 mcg by mouth once daily.      donepeziL (ARICEPT) 10 MG tablet TAKE 1 TABLET BY MOUTH EVERY DAY for memory 30 tablet 11    DULoxetine (CYMBALTA) 30 MG capsule Take 1 capsule (30 mg total) by mouth 2 (two) times daily. 60 capsule 1    ezetimibe (ZETIA) 10 mg tablet Take 1 tablet (10 mg total) by mouth once daily. 90 tablet 3    famotidine (PEPCID) 40 MG tablet Take 40 mg by mouth nightly as needed for Heartburn.      fish oil-omega-3 fatty acids 300-1,000 mg capsule Take 1 capsule by mouth 2 (two) times daily.       fluticasone propionate (FLONASE) 50 mcg/actuation nasal spray USE TWO SPRAYS IN EACH NOSTRIL EVERY DAY 16 g 11    gabapentin (NEURONTIN) 600 MG tablet Take 600 mg by mouth 2 (two) times a day.      L.acid/L.casei/B.bif/B.miryam/FOS (PROBIOTIC BLEND ORAL) Take 1 tablet by mouth 2 (two) times daily.      levothyroxine (SYNTHROID) 88 MCG tablet Take 1 tablet (88 mcg total) by mouth before breakfast. (Patient taking differently: Take 112 mcg by mouth before breakfast.) 30 tablet 11    magnesium oxide (MAG-OX) 250 mg magnesium Tab Take 1 tablet by mouth once daily.      ondansetron (ZOFRAN) 4 MG tablet Take 1 tablet (4 mg total) by mouth every 6 (six) hours as needed. 20 tablet 0    pantoprazole (PROTONIX) 40 MG tablet Take 1 tablet (40 mg total) by mouth once daily for acid reflux 30 tablet 11    potassium chloride SA (K-DUR,KLOR-CON) 20 MEQ tablet Take 1 tablet (20 mEq total) by mouth once daily. 90 tablet 3    simvastatin (ZOCOR) 40 MG tablet Take 1 tablet (40 mg total) by mouth every evening. 90 tablet 3    traMADoL (ULTRAM) 50 mg tablet Take 50 mg by mouth daily as needed for Pain.      vitamins  A,C,E-zinc-copper 14,320-226-200 unit-mg-unit Cap Take 2 capsules by mouth once daily.       No current facility-administered medications on file prior to  "visit.       Review of Systems   Constitutional:  Negative for chills, diaphoresis, fatigue and fever.   HENT:  Negative for congestion, ear pain, postnasal drip, sinus pain and sore throat.    Eyes:  Negative for pain and redness.   Respiratory:  Negative for cough, chest tightness and shortness of breath.    Cardiovascular:  Negative for chest pain and leg swelling.   Gastrointestinal:  Negative for abdominal pain, constipation, diarrhea, nausea and vomiting.   Genitourinary:  Negative for dysuria and hematuria.   Musculoskeletal:  Negative for arthralgias and joint swelling.   Skin:  Negative for rash.   Neurological:  Negative for dizziness, syncope and headaches.   Psychiatric/Behavioral:  Negative for dysphoric mood. The patient is not nervous/anxious.      Vitals:    10/19/22 1426   BP: 117/72   Pulse: 81   Temp: 97.5 °F (36.4 °C)   TempSrc: Temporal   SpO2: 99%   Weight: 55.2 kg (121 lb 11.1 oz)   Height: 5' 6" (1.676 m)       Wt Readings from Last 3 Encounters:   10/19/22 55.2 kg (121 lb 11.1 oz)   07/23/22 55.6 kg (122 lb 9.2 oz)   07/12/22 61.5 kg (135 lb 9.3 oz)       Physical Exam  Constitutional:       General: She is not in acute distress.     Appearance: Normal appearance. She is well-developed.   HENT:      Head: Normocephalic and atraumatic.   Eyes:      Conjunctiva/sclera: Conjunctivae normal.   Cardiovascular:      Rate and Rhythm: Normal rate and regular rhythm.      Pulses: Normal pulses.      Heart sounds: Normal heart sounds. No murmur heard.  Pulmonary:      Effort: Pulmonary effort is normal. No respiratory distress.      Breath sounds: Normal breath sounds.      Comments: NC in place  Abdominal:      General: Bowel sounds are normal. There is no distension.      Palpations: Abdomen is soft.      Tenderness: There is no abdominal tenderness.   Musculoskeletal:         General: Normal range of motion.      Cervical back: Normal range of motion and neck supple.   Skin:     General: Skin is " warm and dry.      Findings: No rash.   Neurological:      General: No focal deficit present.      Mental Status: She is alert and oriented to person, place, and time.   Psychiatric:         Mood and Affect: Mood normal.         Behavior: Behavior normal.       Health Maintenance   Topic Date Due    Hepatitis C Screening  Never done    LDCT Lung Screen  10/20/2021    Lipid Panel  04/14/2023    DEXA Scan  03/30/2025    TETANUS VACCINE  01/28/2031    Mammogram  Discontinued    Colonoscopy  Discontinued

## 2022-10-20 LAB
25(OH)D3+25(OH)D2 SERPL-MCNC: 56 NG/ML (ref 30–96)
ALBUMIN SERPL BCP-MCNC: 3.7 G/DL (ref 3.5–5.2)
ALP SERPL-CCNC: 114 U/L (ref 55–135)
ALT SERPL W/O P-5'-P-CCNC: 24 U/L (ref 10–44)
ANION GAP SERPL CALC-SCNC: 11 MMOL/L (ref 8–16)
AST SERPL-CCNC: 32 U/L (ref 10–40)
BILIRUB SERPL-MCNC: 0.3 MG/DL (ref 0.1–1)
BUN SERPL-MCNC: 29 MG/DL (ref 8–23)
CALCIUM SERPL-MCNC: 10.2 MG/DL (ref 8.7–10.5)
CHLORIDE SERPL-SCNC: 101 MMOL/L (ref 95–110)
CO2 SERPL-SCNC: 25 MMOL/L (ref 23–29)
CREAT SERPL-MCNC: 1.1 MG/DL (ref 0.5–1.4)
EST. GFR  (NO RACE VARIABLE): 51.1 ML/MIN/1.73 M^2
ESTIMATED AVG GLUCOSE: 114 MG/DL (ref 68–131)
GLUCOSE SERPL-MCNC: 66 MG/DL (ref 70–110)
HBA1C MFR BLD: 5.6 % (ref 4–5.6)
POTASSIUM SERPL-SCNC: 5.3 MMOL/L (ref 3.5–5.1)
PROT SERPL-MCNC: 7.2 G/DL (ref 6–8.4)
SODIUM SERPL-SCNC: 137 MMOL/L (ref 136–145)
T4 FREE SERPL-MCNC: 0.79 NG/DL (ref 0.71–1.51)
TSH SERPL DL<=0.005 MIU/L-ACNC: 25.64 UIU/ML (ref 0.4–4)

## 2022-10-21 DIAGNOSIS — I10 ESSENTIAL HYPERTENSION: Primary | ICD-10-CM

## 2022-10-21 PROBLEM — R41.82 ALTERED MENTAL STATUS: Status: RESOLVED | Noted: 2018-02-26 | Resolved: 2022-10-21

## 2022-10-21 PROBLEM — R19.5 HEME POSITIVE STOOL: Status: RESOLVED | Noted: 2018-03-15 | Resolved: 2022-10-21

## 2022-10-21 PROBLEM — R10.9 ABDOMINAL PAIN: Status: RESOLVED | Noted: 2022-07-11 | Resolved: 2022-10-21

## 2022-10-21 PROBLEM — J96.11 CHRONIC RESPIRATORY FAILURE WITH HYPOXIA: Status: ACTIVE | Noted: 2022-10-21

## 2022-10-21 RX ORDER — LEVOTHYROXINE SODIUM 100 UG/1
100 TABLET ORAL
Qty: 30 TABLET | Refills: 11 | Status: SHIPPED | OUTPATIENT
Start: 2022-10-21 | End: 2022-12-28

## 2022-10-21 NOTE — PROGRESS NOTES
Results have been released via [facility]. Please verify that these have been viewed by patient. If not, please call patient with results.    Please schedule the following orders:  Tsh,t4,iron,ferritin,b12,folate,mma,bmp in 6 weeks  I have reviewed your recent results.    A1C NORMAL-The A1c is at goal.  Repeat an a1c in 6 months.       CBC ABNORMAL-The CBC show some improvement in anemia.     CMP/BMP ABNORMAL-Potassium is high. You need to stop taking your potassium supplement.     Kidney function is still slightly declined but improved since leaving the hospital.    TSH ABNORMAL-The TSH is abnormal.  We need to change your levothyroxine dose to 100 mcg a day and will repeat labs in 6 weeks  .    VIT D NORMAL-The Vitamin D test shows that there is a normal level of vitamin D at this time in the blood.      Please let me know if you have any additional questions or concerns about above.

## 2022-10-26 ENCOUNTER — TELEPHONE (OUTPATIENT)
Dept: DERMATOLOGY | Facility: CLINIC | Age: 77
End: 2022-10-26
Payer: MEDICARE

## 2022-10-26 NOTE — TELEPHONE ENCOUNTER
----- Message from Karly Barron sent at 10/26/2022 12:38 PM CDT -----  Regarding: pt call back  Name of Who is Calling: CÉSAR LEMON [[id number]]      What is the request in detail: Pt is requesting a call back. States she would like to be seen soon as possible due to a mole changing in size. Please advise!        Can the clinic reply by [facility]: NO        What Number to [facility] Back [facility]: [phone] (home)

## 2022-11-01 ENCOUNTER — PES CALL (OUTPATIENT)
Dept: ADMINISTRATIVE | Facility: CLINIC | Age: 77
End: 2022-11-01
Payer: MEDICARE

## 2022-11-16 ENCOUNTER — OFFICE VISIT (OUTPATIENT)
Dept: NEPHROLOGY | Facility: CLINIC | Age: 77
End: 2022-11-16
Payer: MEDICARE

## 2022-11-16 VITALS
HEIGHT: 60 IN | BODY MASS INDEX: 23.75 KG/M2 | WEIGHT: 121 LBS | DIASTOLIC BLOOD PRESSURE: 51 MMHG | SYSTOLIC BLOOD PRESSURE: 100 MMHG

## 2022-11-16 DIAGNOSIS — N18.31 STAGE 3A CHRONIC KIDNEY DISEASE: Primary | ICD-10-CM

## 2022-11-16 DIAGNOSIS — I10 PRIMARY HYPERTENSION: ICD-10-CM

## 2022-11-16 DIAGNOSIS — I10 ESSENTIAL HYPERTENSION: ICD-10-CM

## 2022-11-16 PROCEDURE — 3288F PR FALLS RISK ASSESSMENT DOCUMENTED: ICD-10-PCS | Mod: CPTII,S$GLB,, | Performed by: INTERNAL MEDICINE

## 2022-11-16 PROCEDURE — 99214 OFFICE O/P EST MOD 30 MIN: CPT | Mod: S$GLB,,, | Performed by: INTERNAL MEDICINE

## 2022-11-16 PROCEDURE — 1160F RVW MEDS BY RX/DR IN RCRD: CPT | Mod: CPTII,S$GLB,, | Performed by: INTERNAL MEDICINE

## 2022-11-16 PROCEDURE — 3078F DIAST BP <80 MM HG: CPT | Mod: CPTII,S$GLB,, | Performed by: INTERNAL MEDICINE

## 2022-11-16 PROCEDURE — 1159F PR MEDICATION LIST DOCUMENTED IN MEDICAL RECORD: ICD-10-PCS | Mod: CPTII,S$GLB,, | Performed by: INTERNAL MEDICINE

## 2022-11-16 PROCEDURE — 1159F MED LIST DOCD IN RCRD: CPT | Mod: CPTII,S$GLB,, | Performed by: INTERNAL MEDICINE

## 2022-11-16 PROCEDURE — 1160F PR REVIEW ALL MEDS BY PRESCRIBER/CLIN PHARMACIST DOCUMENTED: ICD-10-PCS | Mod: CPTII,S$GLB,, | Performed by: INTERNAL MEDICINE

## 2022-11-16 PROCEDURE — 99999 PR PBB SHADOW E&M-EST. PATIENT-LVL II: ICD-10-PCS | Mod: PBBFAC,,, | Performed by: INTERNAL MEDICINE

## 2022-11-16 PROCEDURE — 3288F FALL RISK ASSESSMENT DOCD: CPT | Mod: CPTII,S$GLB,, | Performed by: INTERNAL MEDICINE

## 2022-11-16 PROCEDURE — 1101F PT FALLS ASSESS-DOCD LE1/YR: CPT | Mod: CPTII,S$GLB,, | Performed by: INTERNAL MEDICINE

## 2022-11-16 PROCEDURE — 99214 PR OFFICE/OUTPT VISIT, EST, LEVL IV, 30-39 MIN: ICD-10-PCS | Mod: S$GLB,,, | Performed by: INTERNAL MEDICINE

## 2022-11-16 PROCEDURE — 99999 PR PBB SHADOW E&M-EST. PATIENT-LVL II: CPT | Mod: PBBFAC,,, | Performed by: INTERNAL MEDICINE

## 2022-11-16 PROCEDURE — 3078F PR MOST RECENT DIASTOLIC BLOOD PRESSURE < 80 MM HG: ICD-10-PCS | Mod: CPTII,S$GLB,, | Performed by: INTERNAL MEDICINE

## 2022-11-16 PROCEDURE — 1101F PR PT FALLS ASSESS DOC 0-1 FALLS W/OUT INJ PAST YR: ICD-10-PCS | Mod: CPTII,S$GLB,, | Performed by: INTERNAL MEDICINE

## 2022-11-16 PROCEDURE — 3074F SYST BP LT 130 MM HG: CPT | Mod: CPTII,S$GLB,, | Performed by: INTERNAL MEDICINE

## 2022-11-16 PROCEDURE — 3074F PR MOST RECENT SYSTOLIC BLOOD PRESSURE < 130 MM HG: ICD-10-PCS | Mod: CPTII,S$GLB,, | Performed by: INTERNAL MEDICINE

## 2022-11-16 RX ORDER — AMLODIPINE BESYLATE 5 MG/1
5 TABLET ORAL DAILY
Qty: 90 TABLET | Refills: 1 | Status: SHIPPED | OUTPATIENT
Start: 2022-11-16 | End: 2023-05-29
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
## 2022-11-25 VITALS — DIASTOLIC BLOOD PRESSURE: 77 MMHG | SYSTOLIC BLOOD PRESSURE: 136 MMHG

## 2022-11-25 RX ORDER — ONDANSETRON 4 MG/1
4 TABLET, FILM COATED ORAL EVERY 6 HOURS PRN
Qty: 30 TABLET | Refills: 0 | Status: SHIPPED | OUTPATIENT
Start: 2022-11-25

## 2022-11-25 NOTE — TELEPHONE ENCOUNTER
----- Message from Johanna Giles sent at 11/25/2022 12:51 PM CST -----  States she is having trouble with her blood pressure. States one of her blood pressure medication is making her nauseated. Please call pt [phone]. Thank, you     Type:  RX Refill Request    Who Called: pt  Refill or New Rx:refill  RX Name and Strength:cindansetrcnh hcl (she states its a nausea medication)??  How is the patient currently taking it? (ex. 1XDay):?  Is this a 30 day or 90 day RX:?  Preferred Pharmacy with phone number:.  [facility] [place] - [address]  [address] [address]  Phone: [phone] Fax: [phone]  Local or Mail Order:local  Ordering Provider:Dr Hathaway  Would the patient rather a call back or a response via [facility]? call  Best Call Back Number:[phone]  Additional Information: .    Thank you

## 2022-11-25 NOTE — TELEPHONE ENCOUNTER
Patient called stating her morning medication is making her nauseous when she takes it in the morning and would like a refill of her zofran because it always works for her.

## 2022-12-01 ENCOUNTER — LAB VISIT (OUTPATIENT)
Dept: LAB | Facility: HOSPITAL | Age: 77
End: 2022-12-01
Attending: INTERNAL MEDICINE
Payer: MEDICARE

## 2022-12-01 DIAGNOSIS — D53.9 NUTRITIONAL ANEMIA: ICD-10-CM

## 2022-12-01 DIAGNOSIS — E03.9 HYPOTHYROIDISM, UNSPECIFIED TYPE: ICD-10-CM

## 2022-12-01 DIAGNOSIS — E87.6 HYPOKALEMIA: ICD-10-CM

## 2022-12-01 DIAGNOSIS — D64.9 ANEMIA, UNSPECIFIED TYPE: ICD-10-CM

## 2022-12-01 DIAGNOSIS — N18.32 STAGE 3B CHRONIC KIDNEY DISEASE: ICD-10-CM

## 2022-12-01 PROCEDURE — 82746 ASSAY OF FOLIC ACID SERUM: CPT | Performed by: INTERNAL MEDICINE

## 2022-12-01 PROCEDURE — 82728 ASSAY OF FERRITIN: CPT | Performed by: INTERNAL MEDICINE

## 2022-12-01 PROCEDURE — 84466 ASSAY OF TRANSFERRIN: CPT | Performed by: INTERNAL MEDICINE

## 2022-12-01 PROCEDURE — 80053 COMPREHEN METABOLIC PANEL: CPT | Performed by: INTERNAL MEDICINE

## 2022-12-01 PROCEDURE — 84443 ASSAY THYROID STIM HORMONE: CPT | Performed by: INTERNAL MEDICINE

## 2022-12-01 PROCEDURE — 84439 ASSAY OF FREE THYROXINE: CPT | Performed by: INTERNAL MEDICINE

## 2022-12-01 PROCEDURE — 83921 ORGANIC ACID SINGLE QUANT: CPT | Performed by: INTERNAL MEDICINE

## 2022-12-01 PROCEDURE — 82607 VITAMIN B-12: CPT | Performed by: INTERNAL MEDICINE

## 2022-12-02 LAB
ALBUMIN SERPL BCP-MCNC: 3.5 G/DL (ref 3.5–5.2)
ALP SERPL-CCNC: 112 U/L (ref 55–135)
ALT SERPL W/O P-5'-P-CCNC: 18 U/L (ref 10–44)
ANION GAP SERPL CALC-SCNC: 13 MMOL/L (ref 8–16)
ANION GAP SERPL CALC-SCNC: 13 MMOL/L (ref 8–16)
AST SERPL-CCNC: 19 U/L (ref 10–40)
BILIRUB SERPL-MCNC: 0.3 MG/DL (ref 0.1–1)
BUN SERPL-MCNC: 26 MG/DL (ref 8–23)
BUN SERPL-MCNC: 26 MG/DL (ref 8–23)
CALCIUM SERPL-MCNC: 9.2 MG/DL (ref 8.7–10.5)
CALCIUM SERPL-MCNC: 9.2 MG/DL (ref 8.7–10.5)
CHLORIDE SERPL-SCNC: 97 MMOL/L (ref 95–110)
CHLORIDE SERPL-SCNC: 97 MMOL/L (ref 95–110)
CO2 SERPL-SCNC: 25 MMOL/L (ref 23–29)
CO2 SERPL-SCNC: 25 MMOL/L (ref 23–29)
CREAT SERPL-MCNC: 1.3 MG/DL (ref 0.5–1.4)
CREAT SERPL-MCNC: 1.3 MG/DL (ref 0.5–1.4)
EST. GFR  (NO RACE VARIABLE): 41.8 ML/MIN/1.73 M^2
EST. GFR  (NO RACE VARIABLE): 41.8 ML/MIN/1.73 M^2
FERRITIN SERPL-MCNC: 177 NG/ML (ref 20–300)
FOLATE SERPL-MCNC: 17.9 NG/ML (ref 4–24)
GLUCOSE SERPL-MCNC: 88 MG/DL (ref 70–110)
GLUCOSE SERPL-MCNC: 88 MG/DL (ref 70–110)
IRON SERPL-MCNC: 16 UG/DL (ref 30–160)
POTASSIUM SERPL-SCNC: 3.8 MMOL/L (ref 3.5–5.1)
POTASSIUM SERPL-SCNC: 3.8 MMOL/L (ref 3.5–5.1)
PROT SERPL-MCNC: 6.8 G/DL (ref 6–8.4)
SATURATED IRON: 5 % (ref 20–50)
SODIUM SERPL-SCNC: 135 MMOL/L (ref 136–145)
SODIUM SERPL-SCNC: 135 MMOL/L (ref 136–145)
T4 FREE SERPL-MCNC: 1.02 NG/DL (ref 0.71–1.51)
TOTAL IRON BINDING CAPACITY: 330 UG/DL (ref 250–450)
TRANSFERRIN SERPL-MCNC: 223 MG/DL (ref 200–375)
TSH SERPL DL<=0.005 MIU/L-ACNC: 8.11 UIU/ML (ref 0.4–4)
VIT B12 SERPL-MCNC: 555 PG/ML (ref 210–950)

## 2022-12-06 LAB — METHYLMALONATE SERPL-SCNC: 0.23 UMOL/L

## 2022-12-28 ENCOUNTER — LAB VISIT (OUTPATIENT)
Dept: LAB | Facility: HOSPITAL | Age: 77
End: 2022-12-28
Attending: INTERNAL MEDICINE
Payer: MEDICARE

## 2022-12-28 ENCOUNTER — TELEPHONE (OUTPATIENT)
Dept: NEPHROLOGY | Facility: CLINIC | Age: 77
End: 2022-12-28
Payer: MEDICARE

## 2022-12-28 DIAGNOSIS — E03.9 HYPOTHYROIDISM, UNSPECIFIED TYPE: Primary | ICD-10-CM

## 2022-12-28 DIAGNOSIS — N18.31 STAGE 3A CHRONIC KIDNEY DISEASE: ICD-10-CM

## 2022-12-28 DIAGNOSIS — D64.9 ANEMIA, UNSPECIFIED TYPE: ICD-10-CM

## 2022-12-28 LAB
ALBUMIN SERPL BCP-MCNC: 3.1 G/DL (ref 3.5–5.2)
ANION GAP SERPL CALC-SCNC: 13 MMOL/L (ref 8–16)
BUN SERPL-MCNC: 25 MG/DL (ref 8–23)
CALCIUM SERPL-MCNC: 8.7 MG/DL (ref 8.7–10.5)
CHLORIDE SERPL-SCNC: 101 MMOL/L (ref 95–110)
CO2 SERPL-SCNC: 25 MMOL/L (ref 23–29)
CREAT SERPL-MCNC: 1.2 MG/DL (ref 0.5–1.4)
EST. GFR  (NO RACE VARIABLE): 46 ML/MIN/1.73 M^2
GLUCOSE SERPL-MCNC: 69 MG/DL (ref 70–110)
PHOSPHATE SERPL-MCNC: 4.2 MG/DL (ref 2.7–4.5)
POTASSIUM SERPL-SCNC: 4.3 MMOL/L (ref 3.5–5.1)
PTH-INTACT SERPL-MCNC: 153.8 PG/ML (ref 9–77)
SODIUM SERPL-SCNC: 139 MMOL/L (ref 136–145)

## 2022-12-28 PROCEDURE — 36415 COLL VENOUS BLD VENIPUNCTURE: CPT | Mod: PO | Performed by: INTERNAL MEDICINE

## 2022-12-28 PROCEDURE — 80069 RENAL FUNCTION PANEL: CPT | Performed by: INTERNAL MEDICINE

## 2022-12-28 PROCEDURE — 83970 ASSAY OF PARATHORMONE: CPT | Performed by: INTERNAL MEDICINE

## 2022-12-28 RX ORDER — LEVOTHYROXINE SODIUM 125 UG/1
125 TABLET ORAL
Qty: 30 TABLET | Refills: 11 | Status: SHIPPED | OUTPATIENT
Start: 2022-12-28 | End: 2024-01-10 | Stop reason: DRUGHIGH

## 2022-12-28 NOTE — TELEPHONE ENCOUNTER
----- Message from Anitra Martel sent at 12/28/2022 10:18 AM CST -----  Contact: Soumya  Patient stated that she wants to know why appt was cancelled today at 10am. Please call her back at [phone], doesn't want to reschedule, wants to come later today.

## 2022-12-28 NOTE — PROGRESS NOTES
Results have been released via [facility]. Please verify that these have been viewed by patient. If not, please call patient with results.    Please schedule the following orders:  Tsh,t4,cbc in 2 mo    I have sent a message to them with the following interpretation (see below).    I have reviewed your recent results.    Thyroid labs are improved but still showing that you are under treated. Plan to further increase the levothyroxine to 125 mcg and repeat labs in 8 weeks.    Please do not hesitate to call or message with any additional questions or concerns.    Mindy Hathaway MD

## 2022-12-28 NOTE — TELEPHONE ENCOUNTER
Patient's lab orders were changed due to patient coming in in November. Patient stated she still wanted to do labs today. I scheduled them in [place] per patient's request.

## 2023-01-13 ENCOUNTER — TELEPHONE (OUTPATIENT)
Dept: PAIN MEDICINE | Facility: CLINIC | Age: 78
End: 2023-01-13
Payer: MEDICARE

## 2023-01-13 NOTE — TELEPHONE ENCOUNTER
Spoke with pt and offered to schedule appt to discuss IPG exchange. She refused and only wanted to know SCS implant date. Information has been provided and she will call back if needed.

## 2023-01-13 NOTE — TELEPHONE ENCOUNTER
----- Message from Martina Cho sent at 1/13/2023  1:49 PM CST -----  Contact: self  Type: Needs Medical Advice  Who Called:  Patient    Best Call Back Number: [phone]    Additional Information: Pt states she had a SCS implant doesn't remember the date and wants to know if its time to delphine. Please call back

## 2023-01-19 ENCOUNTER — PROCEDURE VISIT (OUTPATIENT)
Dept: OPHTHALMOLOGY | Facility: CLINIC | Age: 78
End: 2023-01-19
Payer: MEDICARE

## 2023-01-19 DIAGNOSIS — H35.3221 EXUDATIVE AGE-RELATED MACULAR DEGENERATION OF LEFT EYE WITH ACTIVE CHOROIDAL NEOVASCULARIZATION: Primary | ICD-10-CM

## 2023-01-19 DIAGNOSIS — H35.3212 EXUDATIVE AGE-RELATED MACULAR DEGENERATION OF RIGHT EYE WITH INACTIVE CHOROIDAL NEOVASCULARIZATION: ICD-10-CM

## 2023-01-19 PROCEDURE — 92134 CPTRZ OPH DX IMG PST SGM RTA: CPT | Mod: HCNC,S$GLB,, | Performed by: OPHTHALMOLOGY

## 2023-01-19 PROCEDURE — 67028 INJECTION EYE DRUG: CPT | Mod: HCNC,LT,S$GLB, | Performed by: OPHTHALMOLOGY

## 2023-01-19 PROCEDURE — 92014 COMPRE OPH EXAM EST PT 1/>: CPT | Mod: 25,HCNC,S$GLB, | Performed by: OPHTHALMOLOGY

## 2023-01-19 PROCEDURE — 92014 PR EYE EXAM, EST PATIENT,COMPREHESV: ICD-10-PCS | Mod: 25,HCNC,S$GLB, | Performed by: OPHTHALMOLOGY

## 2023-01-19 PROCEDURE — 92134 POSTERIOR SEGMENT OCT RETINA (OCULAR COHERENCE TOMOGRAPHY)-BOTH EYES: ICD-10-PCS | Mod: HCNC,S$GLB,, | Performed by: OPHTHALMOLOGY

## 2023-01-19 PROCEDURE — 67028 PR INJECT INTRAVITREAL PHARMCOLOGIC: ICD-10-PCS | Mod: HCNC,LT,S$GLB, | Performed by: OPHTHALMOLOGY

## 2023-01-19 RX ORDER — TRAZODONE HYDROCHLORIDE 50 MG/1
TABLET ORAL
COMMUNITY
End: 2024-01-10

## 2023-01-19 RX ORDER — MULTIVIT WITH IRON,MINERALS
TABLET ORAL
COMMUNITY
End: 2024-01-10

## 2023-01-19 RX ORDER — FERROUS SULFATE TAB 325 MG (65 MG ELEMENTAL FE) 325 (65 FE) MG
TAB ORAL
COMMUNITY
Start: 2022-08-05

## 2023-01-19 RX ORDER — HYDROGEN PEROXIDE 3 %
1 SOLUTION, NON-ORAL MISCELLANEOUS
COMMUNITY
End: 2023-05-30 | Stop reason: ALTCHOICE

## 2023-01-19 RX ORDER — TRIAMTERENE AND HYDROCHLOROTHIAZIDE 37.5; 25 MG/1; MG/1
CAPSULE ORAL
COMMUNITY
End: 2024-01-10

## 2023-01-19 RX ORDER — METOPROLOL TARTRATE 25 MG/1
TABLET, FILM COATED ORAL
COMMUNITY
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
## 2023-01-20 NOTE — PATIENT INSTRUCTIONS

## 2023-01-23 PROBLEM — J96.11 CHRONIC RESPIRATORY FAILURE WITH HYPOXIA: Status: RESOLVED | Noted: 2022-10-21 | Resolved: 2023-01-23

## 2023-02-20 ENCOUNTER — PROCEDURE VISIT (OUTPATIENT)
Dept: OPHTHALMOLOGY | Facility: CLINIC | Age: 78
End: 2023-02-20
Payer: MEDICARE

## 2023-02-20 DIAGNOSIS — H35.3221 EXUDATIVE AGE-RELATED MACULAR DEGENERATION OF LEFT EYE WITH ACTIVE CHOROIDAL NEOVASCULARIZATION: Primary | ICD-10-CM

## 2023-02-20 PROCEDURE — 67028 PR INJECT INTRAVITREAL PHARMCOLOGIC: ICD-10-PCS | Mod: HCNC,LT,S$GLB, | Performed by: OPHTHALMOLOGY

## 2023-02-20 PROCEDURE — 67028 INJECTION EYE DRUG: CPT | Mod: HCNC,LT,S$GLB, | Performed by: OPHTHALMOLOGY

## 2023-02-20 PROCEDURE — 99499 UNLISTED E&M SERVICE: CPT | Mod: HCNC,S$GLB,, | Performed by: OPHTHALMOLOGY

## 2023-02-20 PROCEDURE — 99499 NO LOS: ICD-10-PCS | Mod: HCNC,S$GLB,, | Performed by: OPHTHALMOLOGY
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
## 2023-02-27 NOTE — PATIENT INSTRUCTIONS

## 2023-03-20 ENCOUNTER — PROCEDURE VISIT (OUTPATIENT)
Dept: OPHTHALMOLOGY | Facility: CLINIC | Age: 78
End: 2023-03-20
Payer: MEDICARE

## 2023-03-20 DIAGNOSIS — H35.3221 EXUDATIVE AGE-RELATED MACULAR DEGENERATION OF LEFT EYE WITH ACTIVE CHOROIDAL NEOVASCULARIZATION: ICD-10-CM

## 2023-03-20 DIAGNOSIS — H35.3231 EXUDATIVE AGE-RELATED MACULAR DEGENERATION OF BOTH EYES WITH ACTIVE CHOROIDAL NEOVASCULARIZATION: Primary | ICD-10-CM

## 2023-03-20 PROCEDURE — 92012 INTRM OPH EXAM EST PATIENT: CPT | Mod: 25,HCNC,S$GLB, | Performed by: OPHTHALMOLOGY

## 2023-03-20 PROCEDURE — 67028 INJECTION EYE DRUG: CPT | Mod: HCNC,LT,S$GLB, | Performed by: OPHTHALMOLOGY

## 2023-03-20 PROCEDURE — 92012 PR EYE EXAM, EST PATIENT,INTERMED: ICD-10-PCS | Mod: 25,HCNC,S$GLB, | Performed by: OPHTHALMOLOGY

## 2023-03-20 PROCEDURE — 67028 PR INJECT INTRAVITREAL PHARMCOLOGIC: ICD-10-PCS | Mod: HCNC,LT,S$GLB, | Performed by: OPHTHALMOLOGY

## 2023-03-20 PROCEDURE — 92134 CPTRZ OPH DX IMG PST SGM RTA: CPT | Mod: HCNC,S$GLB,, | Performed by: OPHTHALMOLOGY

## 2023-03-20 PROCEDURE — 92134 POSTERIOR SEGMENT OCT RETINA (OCULAR COHERENCE TOMOGRAPHY)-BOTH EYES: ICD-10-PCS | Mod: HCNC,S$GLB,, | Performed by: OPHTHALMOLOGY
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
## 2023-03-24 NOTE — PATIENT INSTRUCTIONS

## 2023-05-01 ENCOUNTER — PROCEDURE VISIT (OUTPATIENT)
Dept: OPHTHALMOLOGY | Facility: CLINIC | Age: 78
End: 2023-05-01
Payer: MEDICARE

## 2023-05-01 DIAGNOSIS — H35.3231 EXUDATIVE AGE-RELATED MACULAR DEGENERATION OF BOTH EYES WITH ACTIVE CHOROIDAL NEOVASCULARIZATION: Primary | ICD-10-CM

## 2023-05-01 DIAGNOSIS — H35.3212 EXUDATIVE AGE-RELATED MACULAR DEGENERATION OF RIGHT EYE WITH INACTIVE CHOROIDAL NEOVASCULARIZATION: ICD-10-CM

## 2023-05-01 DIAGNOSIS — H35.3221 EXUDATIVE AGE-RELATED MACULAR DEGENERATION OF LEFT EYE WITH ACTIVE CHOROIDAL NEOVASCULARIZATION: ICD-10-CM

## 2023-05-01 DIAGNOSIS — H35.3122 NONEXUDATIVE AGE-RELATED MACULAR DEGENERATION, LEFT EYE, INTERMEDIATE DRY STAGE: ICD-10-CM

## 2023-05-01 PROCEDURE — 67028 INJECTION EYE DRUG: CPT | Mod: LT,S$GLB,, | Performed by: OPHTHALMOLOGY

## 2023-05-01 PROCEDURE — 92134 CPTRZ OPH DX IMG PST SGM RTA: CPT | Mod: S$GLB,,, | Performed by: OPHTHALMOLOGY

## 2023-05-01 PROCEDURE — 92134 POSTERIOR SEGMENT OCT RETINA (OCULAR COHERENCE TOMOGRAPHY)-BOTH EYES: ICD-10-PCS | Mod: S$GLB,,, | Performed by: OPHTHALMOLOGY

## 2023-05-01 PROCEDURE — 92012 PR EYE EXAM, EST PATIENT,INTERMED: ICD-10-PCS | Mod: 25,S$GLB,, | Performed by: OPHTHALMOLOGY

## 2023-05-01 PROCEDURE — 67028 PR INJECT INTRAVITREAL PHARMCOLOGIC: ICD-10-PCS | Mod: LT,S$GLB,, | Performed by: OPHTHALMOLOGY

## 2023-05-01 PROCEDURE — 92012 INTRM OPH EXAM EST PATIENT: CPT | Mod: 25,S$GLB,, | Performed by: OPHTHALMOLOGY

## 2023-05-08 NOTE — PATIENT INSTRUCTIONS
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
## 2023-05-29 DIAGNOSIS — I10 ESSENTIAL HYPERTENSION: ICD-10-CM

## 2023-05-29 RX ORDER — AMLODIPINE BESYLATE 5 MG/1
TABLET ORAL
Qty: 90 TABLET | Refills: 1 | Status: SHIPPED | OUTPATIENT
Start: 2023-05-29 | End: 2023-11-13

## 2023-05-29 NOTE — TELEPHONE ENCOUNTER
Care Due:                  Date            Visit Type   Department     Provider  --------------------------------------------------------------------------------                                EP -                              PRIMARY      [facility] FAMILY  Last Visit: 10-      CARE ([facility])   MEDICINE       Mindy Hathaway  Next Visit: None Scheduled  None         None Found                                                            Last  Test          Frequency    Reason                     Performed    Due Date  --------------------------------------------------------------------------------    Lipid Panel.  12 months..  ezetimibe, simvastatin...  04-   04-    [facility] Embedded Care Due Messages. Reference number: [id number].   5/29/2023 10:36:41 AM CDT

## 2023-05-30 RX ORDER — FAMOTIDINE 40 MG/1
TABLET, FILM COATED ORAL
Qty: 30 TABLET | Refills: 11 | Status: SHIPPED | OUTPATIENT
Start: 2023-05-30 | End: 2024-01-10

## 2023-05-30 NOTE — TELEPHONE ENCOUNTER
Refill Routing Note   Medication(s) are not appropriate for processing by [facility] for the following reason(s):      Required labs abnormal  No active prescription written by PCP    [facility] action(s):  Defer Care Due:  Labs due   Medication Therapy Plan: Historical provider rx      Appointments  past 12m or future 3m with PCP    Date Provider   Last Visit   10/19/2022 Mindy Hathaway MD   Next Visit   Visit date not found Mindy Hathaway MD   ED visits in past 90 days: 0        Note composed:8:59 PM 05/29/2023

## 2023-05-30 NOTE — TELEPHONE ENCOUNTER
We can schedule one week from last day of antibiotics as long as she is symptom free   Where Do You Want The Question To Include Opioid Counseling Located?: Case Summary Tab

## 2023-06-12 ENCOUNTER — PROCEDURE VISIT (OUTPATIENT)
Dept: OPHTHALMOLOGY | Facility: CLINIC | Age: 78
End: 2023-06-12
Payer: MEDICARE

## 2023-06-12 DIAGNOSIS — H35.3221 EXUDATIVE AGE-RELATED MACULAR DEGENERATION OF LEFT EYE WITH ACTIVE CHOROIDAL NEOVASCULARIZATION: Primary | ICD-10-CM

## 2023-06-12 PROCEDURE — 92014 COMPRE OPH EXAM EST PT 1/>: CPT | Mod: 25,S$GLB,, | Performed by: OPHTHALMOLOGY

## 2023-06-12 PROCEDURE — 92134 POSTERIOR SEGMENT OCT RETINA (OCULAR COHERENCE TOMOGRAPHY)-BOTH EYES: ICD-10-PCS | Mod: S$GLB,,, | Performed by: OPHTHALMOLOGY

## 2023-06-12 PROCEDURE — 92134 CPTRZ OPH DX IMG PST SGM RTA: CPT | Mod: S$GLB,,, | Performed by: OPHTHALMOLOGY

## 2023-06-12 PROCEDURE — 67028 PR INJECT INTRAVITREAL PHARMCOLOGIC: ICD-10-PCS | Mod: LT,S$GLB,, | Performed by: OPHTHALMOLOGY

## 2023-06-12 PROCEDURE — 92014 PR EYE EXAM, EST PATIENT,COMPREHESV: ICD-10-PCS | Mod: 25,S$GLB,, | Performed by: OPHTHALMOLOGY

## 2023-06-12 PROCEDURE — 67028 INJECTION EYE DRUG: CPT | Mod: LT,S$GLB,, | Performed by: OPHTHALMOLOGY
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
## 2023-07-31 DIAGNOSIS — E78.2 MIXED HYPERLIPIDEMIA: ICD-10-CM

## 2023-07-31 RX ORDER — EZETIMIBE 10 MG/1
10 TABLET ORAL
Qty: 90 TABLET | Refills: 0 | Status: SHIPPED | OUTPATIENT
Start: 2023-07-31 | End: 2023-11-22 | Stop reason: SDUPTHER

## 2023-07-31 NOTE — TELEPHONE ENCOUNTER
Refill Routing Note   Medication(s) are not appropriate for processing by [facility] for the following reason(s):      Required labs outdated    [facility] action(s):  Defer Care Due:  Appointment due  Labs due            Appointments  past 12m or future 3m with PCP    Date Provider   Last Visit   10/19/2022 Mindy Hathaway MD   Next Visit   Visit date not found Mindy Hathaway MD   ED visits in past 90 days: 0        Note composed:1:34 PM 07/31/2023

## 2023-07-31 NOTE — TELEPHONE ENCOUNTER
Request refilled with no additional refills. Please make a follow up appointment with me or [place].

## 2023-07-31 NOTE — TELEPHONE ENCOUNTER
Care Due:                  Date            Visit Type   Department     Provider  --------------------------------------------------------------------------------                                EP -                              PRIMARY      [facility] FAMILY  Last Visit: 10-      CARE ([facility])   MEDICINE       Mindy Hathaway  Next Visit: None Scheduled  None         None Found                                                            Last  Test          Frequency    Reason                     Performed    Due Date  --------------------------------------------------------------------------------    Office Visit  12 months..  albuterol, ezetimibe,      10-   10-                             furosemide,                             levothyroxine,                             montelukast, simvastatin.    Lipid Panel.  12 months..  ezetimibe, simvastatin...  04-   04-    [facility] Embedded Care Due Messages. Reference number: [id number].   7/31/2023 8:20:56 AM CDT

## 2023-08-14 ENCOUNTER — PROCEDURE VISIT (OUTPATIENT)
Dept: OPHTHALMOLOGY | Facility: CLINIC | Age: 78
End: 2023-08-14
Payer: MEDICARE

## 2023-08-14 DIAGNOSIS — H35.3221 EXUDATIVE AGE-RELATED MACULAR DEGENERATION OF LEFT EYE WITH ACTIVE CHOROIDAL NEOVASCULARIZATION: Primary | ICD-10-CM

## 2023-08-14 PROCEDURE — 92134 CPTRZ OPH DX IMG PST SGM RTA: CPT | Mod: S$GLB,,, | Performed by: OPHTHALMOLOGY

## 2023-08-14 PROCEDURE — 92134 POSTERIOR SEGMENT OCT RETINA (OCULAR COHERENCE TOMOGRAPHY)-BOTH EYES: ICD-10-PCS | Mod: S$GLB,,, | Performed by: OPHTHALMOLOGY

## 2023-08-14 PROCEDURE — 67028 INJECTION EYE DRUG: CPT | Mod: LT,S$GLB,, | Performed by: OPHTHALMOLOGY

## 2023-08-14 PROCEDURE — 67028 PR INJECT INTRAVITREAL PHARMCOLOGIC: ICD-10-PCS | Mod: LT,S$GLB,, | Performed by: OPHTHALMOLOGY
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
## 2023-08-16 NOTE — PATIENT INSTRUCTIONS

## 2023-08-18 RX ORDER — DONEPEZIL HYDROCHLORIDE 10 MG/1
TABLET, FILM COATED ORAL
Qty: 90 TABLET | Refills: 3 | Status: SHIPPED | OUTPATIENT
Start: 2023-08-18 | End: 2024-01-10

## 2023-08-18 NOTE — TELEPHONE ENCOUNTER
Refill Routing Note   Medication(s) are not appropriate for processing by [facility] for the following reason(s):      Medication outside of protocol    [facility] action(s):  Route Care Due:  None identified            Appointments  past 12m or future 3m with PCP    Date Provider   Last Visit   10/19/2022 Mindy Hathaway MD   Next Visit   Visit date not found Mindy Hathaway MD   ED visits in past 90 days: 0        Note composed:7:34 AM 08/18/2023

## 2023-09-11 ENCOUNTER — PATIENT MESSAGE (OUTPATIENT)
Dept: FAMILY MEDICINE | Facility: CLINIC | Age: 78
End: 2023-09-11
Payer: MEDICARE

## 2023-09-11 RX ORDER — CHOLESTYRAMINE 4 G/4.8G
1 POWDER, FOR SUSPENSION ORAL DAILY
Qty: 30 PACKET | Refills: 11 | Status: SHIPPED | OUTPATIENT
Start: 2023-09-11 | End: 2024-01-10

## 2023-09-11 NOTE — TELEPHONE ENCOUNTER
Care Due:                  Date            Visit Type   Department     Provider  --------------------------------------------------------------------------------                                EP -                              PRIMARY      [facility] FAMILY  Last Visit: 10-      CARE ([facility])   MEDICINE       Mindy Hathaway  Next Visit: None Scheduled  None         None Found                                                            Last  Test          Frequency    Reason                     Performed    Due Date  --------------------------------------------------------------------------------    CMP.........  12 months..  ezetimibe, simvastatin...  12- 11-    TSH.........  12 months..  levothyroxine............  12- 11-    [facility] Embedded Care Due Messages. Reference number: [id number].   9/11/2023 1:27:28 PM CDT

## 2023-09-16 DIAGNOSIS — E78.2 MIXED HYPERLIPIDEMIA: ICD-10-CM

## 2023-09-16 NOTE — TELEPHONE ENCOUNTER
No care due was identified.  [facility] Embedded Care Due Messages. Reference number: [id number].   9/16/2023 2:51:10 PM CDT

## 2023-09-18 RX ORDER — SIMVASTATIN 40 MG/1
40 TABLET, FILM COATED ORAL NIGHTLY
Qty: 90 TABLET | Refills: 0 | Status: SHIPPED | OUTPATIENT
Start: 2023-09-18 | End: 2023-12-04

## 2023-09-18 NOTE — TELEPHONE ENCOUNTER
Refill Routing Note   Medication(s) are not appropriate for processing by [facility] for the following reason(s):      Required labs outdated    [facility] action(s):  Defer Care Due:  None identified            Appointments  past 12m or future 3m with PCP    Date Provider   Last Visit   10/19/2022 Mindy Hathaway MD   Next Visit   Visit date not found Mindy Hathaway MD   ED visits in past 90 days: 0        Note composed:8:50 AM 09/18/2023

## 2023-10-02 ENCOUNTER — OFFICE VISIT (OUTPATIENT)
Dept: OPHTHALMOLOGY | Facility: CLINIC | Age: 78
End: 2023-10-02
Payer: MEDICARE

## 2023-10-02 DIAGNOSIS — H35.3221 EXUDATIVE AGE-RELATED MACULAR DEGENERATION OF LEFT EYE WITH ACTIVE CHOROIDAL NEOVASCULARIZATION: Primary | ICD-10-CM

## 2023-10-02 PROCEDURE — 1160F RVW MEDS BY RX/DR IN RCRD: CPT | Mod: CPTII,S$GLB,, | Performed by: OPHTHALMOLOGY

## 2023-10-02 PROCEDURE — 67028 INJECTION EYE DRUG: CPT | Mod: LT,S$GLB,, | Performed by: OPHTHALMOLOGY

## 2023-10-02 PROCEDURE — 99999 PR PBB SHADOW E&M-EST. PATIENT-LVL I: CPT | Mod: PBBFAC,,, | Performed by: OPHTHALMOLOGY

## 2023-10-02 PROCEDURE — 92134 CPTRZ OPH DX IMG PST SGM RTA: CPT | Mod: S$GLB,,, | Performed by: OPHTHALMOLOGY

## 2023-10-02 PROCEDURE — 67028 PR INJECT INTRAVITREAL PHARMCOLOGIC: ICD-10-PCS | Mod: LT,S$GLB,, | Performed by: OPHTHALMOLOGY

## 2023-10-02 PROCEDURE — 1160F PR REVIEW ALL MEDS BY PRESCRIBER/CLIN PHARMACIST DOCUMENTED: ICD-10-PCS | Mod: CPTII,S$GLB,, | Performed by: OPHTHALMOLOGY

## 2023-10-02 PROCEDURE — 92134 POSTERIOR SEGMENT OCT RETINA (OCULAR COHERENCE TOMOGRAPHY)-BOTH EYES: ICD-10-PCS | Mod: S$GLB,,, | Performed by: OPHTHALMOLOGY

## 2023-10-02 PROCEDURE — 1159F PR MEDICATION LIST DOCUMENTED IN MEDICAL RECORD: ICD-10-PCS | Mod: CPTII,S$GLB,, | Performed by: OPHTHALMOLOGY

## 2023-10-02 PROCEDURE — 1159F MED LIST DOCD IN RCRD: CPT | Mod: CPTII,S$GLB,, | Performed by: OPHTHALMOLOGY

## 2023-10-02 PROCEDURE — 92012 INTRM OPH EXAM EST PATIENT: CPT | Mod: 25,S$GLB,, | Performed by: OPHTHALMOLOGY

## 2023-10-02 PROCEDURE — 92012 PR EYE EXAM, EST PATIENT,INTERMED: ICD-10-PCS | Mod: 25,S$GLB,, | Performed by: OPHTHALMOLOGY

## 2023-10-02 PROCEDURE — 99999 PR PBB SHADOW E&M-EST. PATIENT-LVL I: ICD-10-PCS | Mod: PBBFAC,,, | Performed by: OPHTHALMOLOGY
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
## 2023-10-03 ENCOUNTER — PATIENT MESSAGE (OUTPATIENT)
Dept: ADMINISTRATIVE | Facility: CLINIC | Age: 78
End: 2023-10-03
Payer: MEDICARE

## 2023-10-03 ENCOUNTER — TELEPHONE (OUTPATIENT)
Dept: ADMINISTRATIVE | Facility: CLINIC | Age: 78
End: 2023-10-03
Payer: MEDICARE

## 2023-10-03 PROBLEM — E21.3 HYPERPARATHYROIDISM: Status: ACTIVE | Noted: 2023-10-03

## 2023-10-03 PROBLEM — I70.0 AORTO-ILIAC ATHEROSCLEROSIS: Status: ACTIVE | Noted: 2023-10-03

## 2023-10-03 PROBLEM — I70.8 AORTO-ILIAC ATHEROSCLEROSIS: Status: ACTIVE | Noted: 2023-10-03

## 2023-10-04 NOTE — TELEPHONE ENCOUNTER
----- Message from Kaylee Avila sent at 7/11/2022  8:19 AM CDT -----  Regarding: new rx  Contact: patient  Patient want to know if office can call her a antibiotic in for burning urine, if possible please send to [facility], any questions please call back at [phone] (home)     [facility] [place] - [address] [facility]  [place] [address]  Phone: [phone] Fax: [phone]    Case number [id number]           
----- Message from Kaylee Avila sent at 7/11/2022  8:19 AM CDT -----  Regarding: new rx  Contact: patient  Patient want to know if office can call her a antibiotic in for burning urine, if possible please send to [facility], any questions please call back at [phone] (home)     [facility] [place] - [id number] [facility]  [place] [address]  Phone: [phone] Fax: [phone]    Case number [id number]           
Lm on patient vm to call office, if patient  Is having symptoms of uti we will need to get cath ua to send for culture.   
Render In Strict Bullet Format?: No
Initiate Treatment: Patient to apply previously given fluorouracil to AA on right nasal ala BID x 2 weeks
Detail Level: Detailed

## 2023-11-10 DIAGNOSIS — I10 ESSENTIAL HYPERTENSION: ICD-10-CM

## 2023-11-13 RX ORDER — AMLODIPINE BESYLATE 5 MG/1
TABLET ORAL
Qty: 90 TABLET | Refills: 1 | Status: SHIPPED | OUTPATIENT
Start: 2023-11-13

## 2023-11-17 DIAGNOSIS — E03.9 HYPOTHYROIDISM, UNSPECIFIED TYPE: ICD-10-CM

## 2023-11-17 RX ORDER — LEVOTHYROXINE SODIUM 100 UG/1
100 TABLET ORAL
Qty: 30 TABLET | Refills: 11 | OUTPATIENT
Start: 2023-11-17

## 2023-11-17 NOTE — TELEPHONE ENCOUNTER
Care Due:                  Date            Visit Type   Department     Provider  --------------------------------------------------------------------------------                                EP -                              PRIMARY      [facility] FAMILY  Last Visit: 10-      CARE ([facility])   MEDICINE       Mindy Hathaway  Next Visit: None Scheduled  None         None Found                                                            Last  Test          Frequency    Reason                     Performed    Due Date  --------------------------------------------------------------------------------    CMP.........  12 months..  cholestyramine-aspartame,   12- 11-                             ezetimibe, furosemide,                             simvastatin..............    TSH.........  12 months..  levothyroxine............  12- 11-    [facility] Embedded Care Due Messages. Reference number: [id number].   11/17/2023 8:12:49 AM CST

## 2023-11-17 NOTE — TELEPHONE ENCOUNTER
Provider Staff:  Action required for this patient    Requires labs      Please see care gap opportunities below in Care Due Message.    Thanks!  [facility]     Appointments      Date Provider   Last Visit   10/19/2022 Mindy Hathaway MD   Next Visit   Visit date not found Mindy Hathaway MD     Refill Decision Note   Soumya Melchor  is requesting a refill authorization.  Brief Assessment and Rationale for Refill:  Quick Discontinue     Medication Therapy Plan:  The original prescription was discontinued on 12/28/2022 by Mindy Hathaway MD.; 125 mcg      Comments:     Note composed:8:39 AM 11/17/2023             Appointments     Last Visit   10/19/2022 Mindy Hathaway MD   Next Visit   Visit date not found Mindy Hathaway MD           Appointments     Last Visit   10/19/2022 Mindy Hathaway MD   Next Visit   Visit date not found Mindy Hathaway MD

## 2023-11-20 ENCOUNTER — PROCEDURE VISIT (OUTPATIENT)
Dept: OPHTHALMOLOGY | Facility: CLINIC | Age: 78
End: 2023-11-20
Payer: MEDICARE

## 2023-11-20 DIAGNOSIS — H35.3122 NONEXUDATIVE AGE-RELATED MACULAR DEGENERATION, LEFT EYE, INTERMEDIATE DRY STAGE: ICD-10-CM

## 2023-11-20 DIAGNOSIS — H35.3212 EXUDATIVE AGE-RELATED MACULAR DEGENERATION OF RIGHT EYE WITH INACTIVE CHOROIDAL NEOVASCULARIZATION: ICD-10-CM

## 2023-11-20 DIAGNOSIS — H35.3221 EXUDATIVE AGE-RELATED MACULAR DEGENERATION OF LEFT EYE WITH ACTIVE CHOROIDAL NEOVASCULARIZATION: Primary | ICD-10-CM

## 2023-11-20 PROCEDURE — 92014 COMPRE OPH EXAM EST PT 1/>: CPT | Mod: 25,S$GLB,, | Performed by: OPHTHALMOLOGY

## 2023-11-20 PROCEDURE — 92134 CPTRZ OPH DX IMG PST SGM RTA: CPT | Mod: S$GLB,,, | Performed by: OPHTHALMOLOGY

## 2023-11-20 PROCEDURE — 67028 INJECTION EYE DRUG: CPT | Mod: LT,S$GLB,, | Performed by: OPHTHALMOLOGY

## 2023-11-20 PROCEDURE — 92014 PR EYE EXAM, EST PATIENT,COMPREHESV: ICD-10-PCS | Mod: 25,S$GLB,, | Performed by: OPHTHALMOLOGY

## 2023-11-20 PROCEDURE — 92134 POSTERIOR SEGMENT OCT RETINA (OCULAR COHERENCE TOMOGRAPHY)-BOTH EYES: ICD-10-PCS | Mod: S$GLB,,, | Performed by: OPHTHALMOLOGY

## 2023-11-20 PROCEDURE — 67028 PR INJECT INTRAVITREAL PHARMCOLOGIC: ICD-10-PCS | Mod: LT,S$GLB,, | Performed by: OPHTHALMOLOGY
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
## 2023-11-22 DIAGNOSIS — E78.2 MIXED HYPERLIPIDEMIA: ICD-10-CM

## 2023-11-22 NOTE — TELEPHONE ENCOUNTER
No care due was identified.  [facility] Embedded Care Due Messages. Reference number: [id number].   11/22/2023 1:57:41 PM CST

## 2023-11-24 RX ORDER — EZETIMIBE 10 MG/1
10 TABLET ORAL DAILY
Qty: 90 TABLET | Refills: 0 | Status: SHIPPED | OUTPATIENT
Start: 2023-11-24

## 2023-12-01 DIAGNOSIS — E78.2 MIXED HYPERLIPIDEMIA: ICD-10-CM

## 2023-12-01 DIAGNOSIS — E03.9 HYPOTHYROIDISM, UNSPECIFIED TYPE: ICD-10-CM

## 2023-12-01 NOTE — TELEPHONE ENCOUNTER
No care due was identified.  [facility] Embedded Care Due Messages. Reference number: [id number].   12/01/2023 11:28:16 AM CST

## 2023-12-02 RX ORDER — LEVOTHYROXINE SODIUM 100 UG/1
100 TABLET ORAL
Qty: 30 TABLET | Refills: 11 | OUTPATIENT
Start: 2023-12-02

## 2023-12-02 NOTE — TELEPHONE ENCOUNTER
Refill Routing Note   Medication(s) are not appropriate for processing by [facility] for the following reason(s):        Required labs outdated    [facility] action(s):  Defer  Quick Discontinue      Medication Therapy Plan: Levothyroxine increased to 125mcg (12/28/23);      Appointments  past 12m or future 3m with PCP    Date Provider   Last Visit   10/19/2022 Mindy Hathaway MD   Next Visit   Visit date not found Mindy Hathaway MD   ED visits in past 90 days: 0        Note composed:9:49 AM 12/02/2023

## 2023-12-04 RX ORDER — SIMVASTATIN 40 MG/1
40 TABLET, FILM COATED ORAL NIGHTLY
Qty: 90 TABLET | Refills: 0 | Status: SHIPPED | OUTPATIENT
Start: 2023-12-04 | End: 2024-02-19

## 2023-12-20 ENCOUNTER — PATIENT MESSAGE (OUTPATIENT)
Dept: ADMINISTRATIVE | Facility: CLINIC | Age: 78
End: 2023-12-20
Payer: MEDICARE

## 2023-12-20 ENCOUNTER — PATIENT OUTREACH (OUTPATIENT)
Dept: ADMINISTRATIVE | Facility: CLINIC | Age: 78
End: 2023-12-20
Payer: MEDICARE

## 2023-12-20 NOTE — PROGRESS NOTES
C3 nurse attempted to contact Soumya Mlechor for a TCC post hospital discharge follow up call. No answer. Left voicemail with callback information. The patient has a scheduled HOSFU appointment with Mindy Hathaway MD (PCP) on 12/27/23 @ 9:00am.

## 2023-12-22 NOTE — PROGRESS NOTES
C3 nurse attempted to contact patient for a TCC post hospital discharge follow-up call. The patient declined call at this time, as she does not have time to complete the call and declined return call at this time. Patient has a scheduled HOSFU with Mindy Hathaway MD (PCP) on 12/27/23 @ 9:00am.

## 2024-01-10 ENCOUNTER — OFFICE VISIT (OUTPATIENT)
Dept: FAMILY MEDICINE | Facility: CLINIC | Age: 79
End: 2024-01-10
Payer: MEDICARE

## 2024-01-10 VITALS
TEMPERATURE: 98 F | OXYGEN SATURATION: 99 % | HEART RATE: 87 BPM | WEIGHT: 131 LBS | HEIGHT: 60 IN | DIASTOLIC BLOOD PRESSURE: 76 MMHG | BODY MASS INDEX: 25.72 KG/M2 | SYSTOLIC BLOOD PRESSURE: 111 MMHG

## 2024-01-10 DIAGNOSIS — B02.23 NEUROPATHY DUE TO HERPES ZOSTER: ICD-10-CM

## 2024-01-10 DIAGNOSIS — I70.8 AORTO-ILIAC ATHEROSCLEROSIS: ICD-10-CM

## 2024-01-10 DIAGNOSIS — E21.3 HYPERPARATHYROIDISM: ICD-10-CM

## 2024-01-10 DIAGNOSIS — R73.03 PREDIABETES: ICD-10-CM

## 2024-01-10 DIAGNOSIS — R56.9 SEIZURES, TRANSIENT: ICD-10-CM

## 2024-01-10 DIAGNOSIS — I70.0 AORTO-ILIAC ATHEROSCLEROSIS: ICD-10-CM

## 2024-01-10 DIAGNOSIS — J44.89 ASTHMA WITH COPD: ICD-10-CM

## 2024-01-10 DIAGNOSIS — E03.9 HYPOTHYROIDISM, UNSPECIFIED TYPE: ICD-10-CM

## 2024-01-10 DIAGNOSIS — G89.4 CHRONIC PAIN SYNDROME: Primary | ICD-10-CM

## 2024-01-10 DIAGNOSIS — R56.9 SEIZURE: ICD-10-CM

## 2024-01-10 DIAGNOSIS — Z79.899 ENCOUNTER FOR LONG-TERM (CURRENT) USE OF HIGH-RISK MEDICATION: ICD-10-CM

## 2024-01-10 PROCEDURE — 99214 OFFICE O/P EST MOD 30 MIN: CPT | Mod: S$GLB,,, | Performed by: INTERNAL MEDICINE

## 2024-01-10 PROCEDURE — 99999 PR PBB SHADOW E&M-EST. PATIENT-LVL V: CPT | Mod: PBBFAC,,, | Performed by: INTERNAL MEDICINE

## 2024-01-10 RX ORDER — ESOMEPRAZOLE MAGNESIUM 40 MG/1
40 CAPSULE, DELAYED RELEASE ORAL
COMMUNITY
End: 2024-06-12 | Stop reason: ALTCHOICE

## 2024-01-10 RX ORDER — PREGABALIN 25 MG/1
25 CAPSULE ORAL 2 TIMES DAILY
Qty: 60 CAPSULE | Refills: 6 | Status: SHIPPED | OUTPATIENT
Start: 2024-01-10 | End: 2024-07-10

## 2024-01-10 RX ORDER — LEVOTHYROXINE SODIUM 200 UG/1
200 TABLET ORAL
COMMUNITY
End: 2024-01-17 | Stop reason: SDUPTHER

## 2024-01-10 RX ORDER — OXCARBAZEPINE 150 MG/1
150 TABLET, FILM COATED ORAL 2 TIMES DAILY
COMMUNITY
End: 2024-02-01 | Stop reason: SDUPTHER

## 2024-01-17 RX ORDER — LEVOTHYROXINE SODIUM 200 UG/1
200 TABLET ORAL
Qty: 90 TABLET | Refills: 3 | Status: SHIPPED | OUTPATIENT
Start: 2024-01-17

## 2024-01-17 NOTE — TELEPHONE ENCOUNTER
----- Message from Danica Mcfarland sent at 1/17/2024  4:36 PM CST -----  Regarding: rx  Name of who is calling:   Soumya      What is the request in detail: Pt is requesting a call back asap in ref to having rx called in for thyroids/ having hot and cold sweats due to not having medication/ [facility]      Can the clinic reply by [facility]:no      What number to call back if not [facility]: [phone]

## 2024-01-17 NOTE — TELEPHONE ENCOUNTER
----- Message from Kasandra Katz sent at 1/17/2024  2:44 PM CST -----  Contact: Kate/ [facility]  Kate is calling in regards to getting a refill on pt medication levothyroxine (SYNTHROID) 200 MCG tablet. Sig - Route: Take 200 mcg by mouth before breakfast. - Oral. Please call back at [phone]                [facility] - [place] - [address] [facility] [place] [address]  Phone: [phone] Fax: [phone]  Hours: Not open 24 hours                Thanks  KT

## 2024-01-17 NOTE — TELEPHONE ENCOUNTER
Care Due:                  Date            Visit Type   Department     Provider  --------------------------------------------------------------------------------                                [facility]  Last Visit: 01-      FOLLOW UP    KINDRA Hathaway                               -                              [facility]  Next Visit: 01-      CARE ([facility])   MEDICINE       Mindy Hathaway                                                            Last  Test          Frequency    Reason                     Performed    Due Date  --------------------------------------------------------------------------------    CMP.........  12 months..  simvastatin..............  12- 11-    Lipid Panel.  12 months..  simvastatin..............  04-   04-    Health [facility] Embedded Care Due Messages. Reference number: [id number].   1/17/2024 2:49:31 PM CST

## 2024-01-17 NOTE — TELEPHONE ENCOUNTER
I have attempted to contact this patient by phone with the following results: no answer. Medication pended and routed to Mindy Hathaway MD. Pt has a scheduled appointment on 01/24/2024.

## 2024-01-18 ENCOUNTER — PROCEDURE VISIT (OUTPATIENT)
Dept: OPHTHALMOLOGY | Facility: CLINIC | Age: 79
End: 2024-01-18
Payer: MEDICARE

## 2024-01-18 DIAGNOSIS — H35.3122 NONEXUDATIVE AGE-RELATED MACULAR DEGENERATION, LEFT EYE, INTERMEDIATE DRY STAGE: ICD-10-CM

## 2024-01-18 DIAGNOSIS — H35.3221 EXUDATIVE AGE-RELATED MACULAR DEGENERATION OF LEFT EYE WITH ACTIVE CHOROIDAL NEOVASCULARIZATION: Primary | ICD-10-CM

## 2024-01-18 PROCEDURE — 67028 INJECTION EYE DRUG: CPT | Mod: LT,S$GLB,, | Performed by: OPHTHALMOLOGY

## 2024-01-18 PROCEDURE — 92012 INTRM OPH EXAM EST PATIENT: CPT | Mod: 25,S$GLB,, | Performed by: OPHTHALMOLOGY

## 2024-01-18 PROCEDURE — 92134 CPTRZ OPH DX IMG PST SGM RTA: CPT | Mod: S$GLB,,, | Performed by: OPHTHALMOLOGY

## 2024-01-28 PROBLEM — K82.8 BILIARY DYSKINESIA: Status: RESOLVED | Noted: 2022-07-14 | Resolved: 2024-01-28

## 2024-01-28 PROBLEM — R25.2 JERKING MOVEMENTS OF EXTREMITIES: Status: RESOLVED | Noted: 2021-11-30 | Resolved: 2024-01-28

## 2024-01-28 PROBLEM — K80.20 CHOLELITHIASES: Status: RESOLVED | Noted: 2022-07-11 | Resolved: 2024-01-28

## 2024-01-28 PROBLEM — R06.09 DOE (DYSPNEA ON EXERTION): Status: RESOLVED | Noted: 2020-03-02 | Resolved: 2024-01-28

## 2024-01-29 NOTE — PROGRESS NOTES
Assessment/Plan:    Problem List Items Addressed This Visit          Neuro    Chronic pain syndrome - Primary    Overview     -followed by pain management  -has spinal nerve stimulator in place  -no longer taking tramadol or gabapentin  -discussed trying lyrica for pain control instead  -medication information provided, including potential AE  -patient is aware not to take gabapentin and lyrica together. 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    Relevant Medications    pregabalin (LYRICA) 25 MG capsule    Seizures, transient    Overview     -recent hospitalization for AMS  -neurology consultation in hospital  -EEG demonstrating left frontotemporal epileptiform discharges  -started on trileptal  -denies any further episodes of AMS or jerking movements  -experiencing some somnolence since starting medication but denies any other AE   -has neurology follow up in several months  -patient aware of seizure precautions            Pulmonary    Asthma with COPD    Overview     -followed by pulmonology  -complicated by chronic respiratory failure  -now on supplemental O2 continuously  -currently on Breztri BID  -pulmonary symptoms stable         Relevant Orders    Ambulatory referral/consult to Home Health       Cardiac/Vascular    Aorto-iliac atherosclerosis    Overview     -noted on xray 8/22  -on ASA/statin            Endocrine    Hyperparathyroidism    Overview     -2/2 renal disease  -managed by nephrology         Hypothyroidism    Overview     -recent TSH >50 during hospital stay  -levothyroxine increased to 200 mcg QD  -plan to repeat thyroid studies 6 weeks from last TSH             Relevant Orders    TSH    T4, Free    Prediabetes    Overview     Lab Results   Component Value Date    HGBA1C 5.6 10/19/2022   -managed with diet  -due for updated labs         Relevant Orders    Hemoglobin A1C     Other Visit Diagnoses        Encounter for long-term (current) use of high-risk medication        Relevant Orders    Comprehensive Metabolic Panel    CBC Auto Differential    Seizure        Relevant Orders    Ambulatory referral/consult to Home Health            Follow up in about 4 weeks (around 2/7/2024) for in office f/u me.    Mindy Hathaway MD  _____________________________________________________________________________________________________________________________________________________    CC: hospital follow up    HPI:    Patient is in clinic today as an established patient. Patient admitted to [facility] for acute encephalopathy. D/c summary below:    Admit Date:   1/2/2024 10:47 PM  Discharge Date:   1/4/2024    History of Present Illness:   78-year-old female with multiple comorbidities as noted below who presents to ED with  for altered mental status.  is present on exam helps to provide history. Patient lives locally with . Reports patient was at baseline earlier in the day. Reports coming home from work overnight and noticing patient being very confused, hallucinating, and speaking nonsensically. Reports she has been having these episodes intermittently over the past several months. Of note, patient was recently hospitalized and discharged on 12/16 for pneumonia and acute on chronic hypoxic respiratory failure. reports these episodes of altered mental status have been occurring for several months even prior to his last hospitalization. Describes episodes resolving spontaneously, usually the next day. Patient's mental status has improved back to baseline in the ED. Patient is alert and oriented x 4 and able to answer much question verbally. Patient able to recall these episodes and is aware that she is hallucinating. Patient also reports that she has difficulty speaking during these episodes. Patient also reports involuntary tremors and contractures to upper extremities during these episodes. Reports  episodes of altered mental status with hallucinations tend to occur at evening or nighttime. Reports they usually resolve by morning time especially upon waking up. Describes seeing objects that are not there such as smoke and ashtrays. Denies delusions. Denies SI or HI or bizarre thoughts. Denies tobacco or illicit drug use. UDS was positive for THC, patient denies marijuana or THC supplement use recently. Does endorse drinking about 1-2 beers per day intermittently. Reports she will sometimes drink a beer with nighttime medications. Patient with relatively unremarkable workup in the ED. Patient was slightly hypotensive on presentation CT head pending radiology read. UDS was positive for THC. Patient be admitted will get psychiatric and neurology evaluation.     Hospital Course and Treatment:     Patient was admitted to the hospital for encephalopathy with hallucinations. Workup was performed in consultation to neurology who performed an EEG demonstrating left frontotemporal epileptiform discharges. The patient was started on Trileptal 150 mg p.o. twice daily per neurology recommendations with outpatient neurology follow-up in 3 months. At time of discharge patient was afebrile hemodynamically stable without complaint. She had resolution of hallucinations and encephalopathy.    All discharge medications have been reviewed and reconciled on chart.     Current Outpatient Medications on File Prior to Visit   Medication Sig Dispense Refill    albuterol (PROVENTIL/VENTOLIN HFA) 90 mcg/actuation inhaler Inhale 2 puffs into the lungs every 6 (six) hours as needed for Wheezing or Shortness of Breath. 18 g 1    amLODIPine (NORVASC) 5 MG tablet TAKE 1 TABLET BY MOUTH EVERY DAY 90 tablet 1    ascorbic acid, vitamin C, (VITAMIN C) 1000 MG tablet Take 1,000 mg by mouth once daily.       aspirin (ECOTRIN) 81 MG EC tablet Take 81 mg by mouth once daily.      budesonide-glycopyr-formoterol (BREZTRI AEROSPHERE) 160-9-4.8  mcg/actuation HFAA Inhale 2 puffs into the lungs 2 (two) times a day. 10.7 g 1    calcium carbonate (TUMS) 200 mg calcium (500 mg) chewable tablet Take 2 tablets by mouth as needed.      DULoxetine (CYMBALTA) 30 MG capsule Take 1 capsule (30 mg total) by mouth 2 (two) times daily. 60 capsule 1    ezetimibe (ZETIA) 10 mg tablet Take 1 tablet (10 mg total) by mouth once daily. 90 tablet 0    fish oil-omega-3 fatty acids 300-1,000 mg capsule Take 1 capsule by mouth 2 (two) times daily.       fluticasone propionate (FLONASE) 50 mcg/actuation nasal spray USE TWO SPRAYS IN EACH NOSTRIL EVERY DAY 16 g 11    L.acid/L.casei/B.bif/B.miryam/FOS (PROBIOTIC BLEND ORAL) Take 1 tablet by mouth 2 (two) times daily.      magnesium oxide (MAG-OX) 250 mg magnesium Tab Take 1 tablet by mouth once daily.      metoprolol tartrate (LOPRESSOR) 25 MG tablet 1 tablet Orally Twice a day      montelukast (SINGULAIR) 10 mg tablet Take 1 tablet (10 mg total) by mouth every evening. 30 tablet 0    multivit-min/iron/folic/lutein (CENTRUM SILVER WOMEN ORAL) Take by mouth.      ondansetron (ZOFRAN) 4 MG tablet Take 1 tablet (4 mg total) by mouth every 6 (six) hours as needed for Nausea. 30 tablet 0    simvastatin (ZOCOR) 40 MG tablet TAKE 1 TABLET BY MOUTH EVERY EVENING 90 tablet 0    vitamins  A,C,E-zinc-copper 14,320-226-200 unit-mg-unit Cap Take 2 capsules by mouth once daily.      esomeprazole (NEXIUM) 40 MG capsule Take 40 mg by mouth before breakfast.      FEROSUL 325 mg (65 mg iron) Tab tablet Take by mouth.      OXcarbazepine (TRILEPTAL) 150 MG Tab Take 150 mg by mouth 2 (two) times daily.       No current facility-administered medications on file prior to visit.       Review of Systems   Constitutional:  Positive for fatigue. Negative for chills, diaphoresis and fever.   HENT:  Negative for congestion, ear pain, postnasal drip, sinus pain and sore throat.    Eyes:  Negative for pain and redness.   Respiratory:  Negative for  cough, chest tightness and shortness of breath.    Cardiovascular:  Negative for chest pain and leg swelling.   Gastrointestinal:  Negative for abdominal pain, constipation, diarrhea, nausea and vomiting.   Genitourinary:  Negative for dysuria and hematuria.   Musculoskeletal:  Positive for back pain. Negative for arthralgias and joint swelling.   Skin:  Negative for rash.   Neurological:  Negative for dizziness, syncope and headaches.   Psychiatric/Behavioral:  Negative for dysphoric mood. The patient is not nervous/anxious.      Vitals:    01/10/24 0925   BP: 111/76   Pulse: 87   Temp: 98.4 °F (36.9 °C)   SpO2: 99%   Weight: 59.4 kg (131 lb)   Height: 5' (1.524 m)       Wt Readings from Last 3 Encounters:   01/10/24 59.4 kg (131 lb)   05/24/23 63.5 kg (140 lb)   11/16/22 54.9 kg (121 lb)       Physical Exam  Constitutional:       General: She is not in acute distress.     Appearance: Normal appearance. She is well-developed.   HENT:      Head: Normocephalic and atraumatic.   Eyes:      Conjunctiva/sclera: Conjunctivae normal.   Cardiovascular:      Rate and Rhythm: Normal rate and regular rhythm.      Pulses: Normal pulses.      Heart sounds: Normal heart sounds. No murmur heard.  Pulmonary:      Effort: Pulmonary effort is normal. No respiratory distress.      Breath sounds: Normal breath sounds.   Abdominal:      General: Bowel sounds are normal. There is no distension.      Palpations: Abdomen is soft.      Tenderness: There is no abdominal tenderness.   Musculoskeletal:         General: Normal range of motion.      Cervical back: Normal range of motion and neck supple.   Skin:     General: Skin is warm and dry.      Findings: No rash.   Neurological:      General: No focal deficit present.      Mental Status: She is alert and oriented to person, place, and time.   Psychiatric:         Mood and Affect: Mood normal.         Behavior: Behavior normal.     Transitional Care Note    Family and/or Caretaker present  at visit?  Yes.  Diagnostic tests reviewed/disposition: I have reviewed all completed as well as pending diagnostic tests at the time of discharge.  Disease/illness education: provided  Home health/community services discussion/referrals: Patient does not have home health established from hospital visit.  They do need home health.  I will be placing orders today.  Establishment or re-establishment of referral orders for community resources: No other necessary community resources.   Discussion with other health care providers: No discussion with other health care providers necessary.

## 2024-01-31 ENCOUNTER — LAB VISIT (OUTPATIENT)
Dept: LAB | Facility: HOSPITAL | Age: 79
End: 2024-01-31
Attending: INTERNAL MEDICINE
Payer: MEDICARE

## 2024-01-31 DIAGNOSIS — R73.03 PREDIABETES: ICD-10-CM

## 2024-01-31 DIAGNOSIS — E03.9 HYPOTHYROIDISM, UNSPECIFIED TYPE: ICD-10-CM

## 2024-01-31 DIAGNOSIS — Z79.899 ENCOUNTER FOR LONG-TERM (CURRENT) USE OF HIGH-RISK MEDICATION: ICD-10-CM

## 2024-01-31 LAB
BASOPHILS # BLD AUTO: 0.13 K/UL (ref 0–0.2)
BASOPHILS NFR BLD: 1.3 % (ref 0–1.9)
DIFFERENTIAL METHOD BLD: ABNORMAL
EOSINOPHIL # BLD AUTO: 0.5 K/UL (ref 0–0.5)
EOSINOPHIL NFR BLD: 4.9 % (ref 0–8)
ERYTHROCYTE [DISTWIDTH] IN BLOOD BY AUTOMATED COUNT: 13.9 % (ref 11.5–14.5)
HCT VFR BLD AUTO: 31.8 % (ref 37–48.5)
HGB BLD-MCNC: 10.1 G/DL (ref 12–16)
IMM GRANULOCYTES # BLD AUTO: 0.03 K/UL (ref 0–0.04)
IMM GRANULOCYTES NFR BLD AUTO: 0.3 % (ref 0–0.5)
LYMPHOCYTES # BLD AUTO: 1.9 K/UL (ref 1–4.8)
LYMPHOCYTES NFR BLD: 18.5 % (ref 18–48)
MCH RBC QN AUTO: 31.5 PG (ref 27–31)
MCHC RBC AUTO-ENTMCNC: 31.8 G/DL (ref 32–36)
MCV RBC AUTO: 99 FL (ref 82–98)
MONOCYTES # BLD AUTO: 1 K/UL (ref 0.3–1)
MONOCYTES NFR BLD: 9.5 % (ref 4–15)
NEUTROPHILS # BLD AUTO: 6.8 K/UL (ref 1.8–7.7)
NEUTROPHILS NFR BLD: 65.5 % (ref 38–73)
NRBC BLD-RTO: 0 /100 WBC
PLATELET # BLD AUTO: 390 K/UL (ref 150–450)
PMV BLD AUTO: 10.3 FL (ref 9.2–12.9)
RBC # BLD AUTO: 3.21 M/UL (ref 4–5.4)
WBC # BLD AUTO: 10.4 K/UL (ref 3.9–12.7)

## 2024-01-31 PROCEDURE — 84443 ASSAY THYROID STIM HORMONE: CPT | Performed by: INTERNAL MEDICINE

## 2024-01-31 PROCEDURE — 85025 COMPLETE CBC W/AUTO DIFF WBC: CPT | Performed by: INTERNAL MEDICINE

## 2024-01-31 PROCEDURE — 36415 COLL VENOUS BLD VENIPUNCTURE: CPT | Mod: PO | Performed by: INTERNAL MEDICINE

## 2024-01-31 PROCEDURE — 84439 ASSAY OF FREE THYROXINE: CPT | Performed by: INTERNAL MEDICINE

## 2024-01-31 PROCEDURE — 83036 HEMOGLOBIN GLYCOSYLATED A1C: CPT | Performed by: INTERNAL MEDICINE

## 2024-01-31 PROCEDURE — 80053 COMPREHEN METABOLIC PANEL: CPT | Performed by: INTERNAL MEDICINE

## 2024-02-01 DIAGNOSIS — R56.9 SEIZURES, TRANSIENT: Primary | ICD-10-CM

## 2024-02-01 LAB
ALBUMIN SERPL BCP-MCNC: 3.5 G/DL (ref 3.5–5.2)
ALP SERPL-CCNC: 141 U/L (ref 55–135)
ALT SERPL W/O P-5'-P-CCNC: 24 U/L (ref 10–44)
ANION GAP SERPL CALC-SCNC: 13 MMOL/L (ref 8–16)
AST SERPL-CCNC: 28 U/L (ref 10–40)
BILIRUB SERPL-MCNC: 0.2 MG/DL (ref 0.1–1)
BUN SERPL-MCNC: 23 MG/DL (ref 8–23)
CALCIUM SERPL-MCNC: 9.8 MG/DL (ref 8.7–10.5)
CHLORIDE SERPL-SCNC: 101 MMOL/L (ref 95–110)
CO2 SERPL-SCNC: 24 MMOL/L (ref 23–29)
CREAT SERPL-MCNC: 1.6 MG/DL (ref 0.5–1.4)
EST. GFR  (NO RACE VARIABLE): 32.4 ML/MIN/1.73 M^2
ESTIMATED AVG GLUCOSE: 108 MG/DL (ref 68–131)
GLUCOSE SERPL-MCNC: 125 MG/DL (ref 70–110)
HBA1C MFR BLD: 5.4 % (ref 4–5.6)
POTASSIUM SERPL-SCNC: 4.2 MMOL/L (ref 3.5–5.1)
PROT SERPL-MCNC: 7.5 G/DL (ref 6–8.4)
SODIUM SERPL-SCNC: 138 MMOL/L (ref 136–145)
T4 FREE SERPL-MCNC: 1.26 NG/DL (ref 0.71–1.51)
TSH SERPL DL<=0.005 MIU/L-ACNC: 1.74 UIU/ML (ref 0.4–4)

## 2024-02-01 RX ORDER — OXCARBAZEPINE 150 MG/1
150 TABLET, FILM COATED ORAL 2 TIMES DAILY
Qty: 90 TABLET | Refills: 3 | Status: SHIPPED | OUTPATIENT
Start: 2024-02-01

## 2024-02-01 NOTE — TELEPHONE ENCOUNTER
----- Message from Peter Knight sent at 2/1/2024 11:08 AM CST -----  Contact: Shreya Bender [facility] )  .Type:  RX Refill Request    Who Called: Shreya ( [facility] )  Refill or New Rx: New  pt has taken before   RX Name and Strength:OXcarbazepine (TRILEPTAL) 150 MG Tab  How is the patient currently taking it?   Is this a 30 day or 90 day RX:30  Preferred Pharmacy with phone number: ..Type:  Patient Returning Call .  [facility] [place] - [address] [id number] [facility] [address]  Phone: [phone] Fax: [phone]      Local or Mail Order: local   Ordering Provider:Mag   Would the patient rather a call back or a response via [facility]?  Call   Best Call Back Number: [phone]  Additional Information: Requesting a new script old one had on refills

## 2024-02-01 NOTE — TELEPHONE ENCOUNTER
Refill Routing Note   Medication(s) are not appropriate for processing by [facility] for the following reason(s):        Outside of protocol    [facility] action(s):  Route             Appointments  past 12m or future 3m with PCP    Date Provider   Last Visit   1/10/2024 Mindy Hathaway MD   Next Visit   3/28/2024 Mindy Hathaway MD   ED visits in past 90 days: 0        Note composed:11:19 AM 02/01/2024

## 2024-02-01 NOTE — TELEPHONE ENCOUNTER
No care due was identified.  Health [facility] Embedded Care Due Messages. Reference number: [id number].   2/01/2024 11:16:11 AM CST

## 2024-02-06 ENCOUNTER — TELEPHONE (OUTPATIENT)
Dept: NEUROSURGERY | Facility: CLINIC | Age: 79
End: 2024-02-06
Payer: MEDICARE
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
## 2024-02-06 NOTE — TELEPHONE ENCOUNTER
----- Message from Sandrine Roberts LPN sent at 2/5/2024  4:57 PM CST -----    ----- Message -----  From: Dillon Giang  Sent: 2/5/2024  10:45 AM CST  To: [facility] Staff    Type: Need Medical Advice   Who Called: Patient  Best callback number: [phone]  Patient refused first available appointment on:  Symptoms: seizure 3 weeks ago with small strokes, know what she wants to say but words don't come out  Additional Information: Patient called to schedule an appointment due to symptoms above, patient is no longer driving, patient stated Dr Mello has always been her Dr. I went through her chart and did not see a recent appointment with   Please call to further assist with scheduling, Thanks.

## 2024-02-07 ENCOUNTER — OFFICE VISIT (OUTPATIENT)
Dept: NEUROLOGY | Facility: CLINIC | Age: 79
End: 2024-02-07
Payer: MEDICARE

## 2024-02-07 ENCOUNTER — LAB VISIT (OUTPATIENT)
Dept: LAB | Facility: HOSPITAL | Age: 79
End: 2024-02-07
Attending: STUDENT IN AN ORGANIZED HEALTH CARE EDUCATION/TRAINING PROGRAM
Payer: MEDICARE

## 2024-02-07 VITALS — WEIGHT: 128.06 LBS | BODY MASS INDEX: 25.02 KG/M2

## 2024-02-07 DIAGNOSIS — I67.9 SMALL VESSEL DISEASE, CEREBROVASCULAR: ICD-10-CM

## 2024-02-07 DIAGNOSIS — R56.9 SEIZURES, TRANSIENT: ICD-10-CM

## 2024-02-07 DIAGNOSIS — R56.9 SEIZURES, TRANSIENT: Primary | ICD-10-CM

## 2024-02-07 PROCEDURE — 99214 OFFICE O/P EST MOD 30 MIN: CPT | Mod: S$GLB,,, | Performed by: STUDENT IN AN ORGANIZED HEALTH CARE EDUCATION/TRAINING PROGRAM

## 2024-02-07 PROCEDURE — 80183 DRUG SCRN QUANT OXCARBAZEPIN: CPT | Performed by: STUDENT IN AN ORGANIZED HEALTH CARE EDUCATION/TRAINING PROGRAM

## 2024-02-07 PROCEDURE — 99999 PR PBB SHADOW E&M-EST. PATIENT-LVL III: CPT | Mod: PBBFAC,,, | Performed by: STUDENT IN AN ORGANIZED HEALTH CARE EDUCATION/TRAINING PROGRAM

## 2024-02-07 PROCEDURE — 36415 COLL VENOUS BLD VENIPUNCTURE: CPT | Mod: PO | Performed by: STUDENT IN AN ORGANIZED HEALTH CARE EDUCATION/TRAINING PROGRAM

## 2024-02-07 NOTE — PATIENT INSTRUCTIONS
You were seen today for new onset seizures.    The abnormal activity they saw on your EEG was coming from the temporal lobe.  This region of the brain is highly involved in memory.  People who start to have dysfunction in the temporal lobe lobe may start to have memory problems.  This may be contributing to your word finding issues.     Plan:  - EEG (brainwave study)  - Check Trileptal levels (blood work today)  - Continue Trileptal 150 mg twice a day  - Try to check with your rep on whether the spinal cord stimulator is MRI compatible   - Follow up in 2-3 months

## 2024-02-07 NOTE — ASSESSMENT & PLAN NOTE
Seen on CTH, old lacunar infarct  Secondary to stroke risk factors - HTN, HLD, prediabetes  Patient advised to follow up with PCP for management of risk factors

## 2024-02-07 NOTE — ASSESSMENT & PLAN NOTE
New onset seizures, controlled on medication.  Occasional word finding problems likely due to new onset temporal dysfunction, less like discrete seizures however will check with repeat EEG study  Continue trileptal  Repeat EEG  Will check to see if patient can get MRI (spinal cord stimulator)  Check levels

## 2024-02-09 LAB — OXCARBAZEPINE METABOLITE: 13 MCG/ML (ref 10–35)

## 2024-02-16 DIAGNOSIS — J30.89 NON-SEASONAL ALLERGIC RHINITIS DUE TO OTHER ALLERGIC TRIGGER: ICD-10-CM

## 2024-02-16 DIAGNOSIS — E78.2 MIXED HYPERLIPIDEMIA: ICD-10-CM

## 2024-02-16 NOTE — TELEPHONE ENCOUNTER
No care due was identified.  [facility] Embedded Care Due Messages. Reference number: [id number].   2/16/2024 3:46:16 PM CST

## 2024-02-17 RX ORDER — MONTELUKAST SODIUM 10 MG/1
10 TABLET ORAL NIGHTLY
Qty: 90 TABLET | Refills: 3 | Status: SHIPPED | OUTPATIENT
Start: 2024-02-17

## 2024-02-17 NOTE — TELEPHONE ENCOUNTER
Refill Routing Note   Medication(s) are not appropriate for processing by [facility] for the following reason(s):        Required labs outdated    [facility] action(s):  Approve  Defer   Requires labs : Yes      Medication Therapy Plan: Lipid Panel      Appointments  past 12m or future 3m with PCP    Date Provider   Last Visit   1/10/2024 Mindy Hathaway MD   Next Visit   3/28/2024 Mindy Hathaway MD   ED visits in past 90 days: 0        Note composed:12:41 AM 02/17/2024

## 2024-02-19 RX ORDER — SIMVASTATIN 40 MG/1
40 TABLET, FILM COATED ORAL NIGHTLY
Qty: 90 TABLET | Refills: 3 | Status: SHIPPED | OUTPATIENT
Start: 2024-02-19

## 2024-02-20 ENCOUNTER — EXTERNAL HOME HEALTH (OUTPATIENT)
Dept: HOME HEALTH SERVICES | Facility: HOSPITAL | Age: 79
End: 2024-02-20
Payer: MEDICARE

## 2024-02-26 ENCOUNTER — DOCUMENT SCAN (OUTPATIENT)
Dept: HOME HEALTH SERVICES | Facility: HOSPITAL | Age: 79
End: 2024-02-26
Payer: MEDICARE

## 2024-03-07 ENCOUNTER — PATIENT MESSAGE (OUTPATIENT)
Dept: NEUROLOGY | Facility: CLINIC | Age: 79
End: 2024-03-07
Payer: MEDICARE
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
## 2024-03-18 ENCOUNTER — PROCEDURE VISIT (OUTPATIENT)
Dept: OPHTHALMOLOGY | Facility: CLINIC | Age: 79
End: 2024-03-18
Payer: MEDICARE

## 2024-03-18 DIAGNOSIS — H35.3221 EXUDATIVE AGE-RELATED MACULAR DEGENERATION OF LEFT EYE WITH ACTIVE CHOROIDAL NEOVASCULARIZATION: Primary | ICD-10-CM

## 2024-03-18 PROCEDURE — 92134 CPTRZ OPH DX IMG PST SGM RTA: CPT | Mod: S$GLB,,, | Performed by: OPHTHALMOLOGY

## 2024-03-18 PROCEDURE — 67028 INJECTION EYE DRUG: CPT | Mod: LT,S$GLB,, | Performed by: OPHTHALMOLOGY

## 2024-03-18 PROCEDURE — 92012 INTRM OPH EXAM EST PATIENT: CPT | Mod: 25,S$GLB,, | Performed by: OPHTHALMOLOGY

## 2024-03-18 RX ORDER — IBUPROFEN 800 MG/1
800 TABLET ORAL 2 TIMES DAILY PRN
COMMUNITY
Start: 2024-02-21

## 2024-03-18 RX ORDER — ALBUTEROL SULFATE 90 UG/1
AEROSOL, METERED RESPIRATORY (INHALATION)
COMMUNITY
Start: 2023-07-13

## 2024-03-18 RX ORDER — FAMOTIDINE 40 MG/1
TABLET, FILM COATED ORAL
COMMUNITY
Start: 2024-02-16 | End: 2024-06-12 | Stop reason: ALTCHOICE
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
## 2024-04-03 ENCOUNTER — PATIENT MESSAGE (OUTPATIENT)
Dept: PULMONOLOGY | Facility: CLINIC | Age: 79
End: 2024-04-03
Payer: MEDICARE

## 2024-05-29 ENCOUNTER — OFFICE VISIT (OUTPATIENT)
Dept: OPTOMETRY | Facility: CLINIC | Age: 79
End: 2024-05-29
Payer: MEDICARE

## 2024-05-29 DIAGNOSIS — H35.3212 EXUDATIVE AGE-RELATED MACULAR DEGENERATION OF RIGHT EYE WITH INACTIVE CHOROIDAL NEOVASCULARIZATION: ICD-10-CM

## 2024-05-29 DIAGNOSIS — H04.123 BILATERAL DRY EYES: ICD-10-CM

## 2024-05-29 DIAGNOSIS — H52.7 REFRACTIVE ERROR: ICD-10-CM

## 2024-05-29 DIAGNOSIS — H35.3221 EXUDATIVE AGE-RELATED MACULAR DEGENERATION OF LEFT EYE WITH ACTIVE CHOROIDAL NEOVASCULARIZATION: Primary | ICD-10-CM

## 2024-05-29 PROCEDURE — 1159F MED LIST DOCD IN RCRD: CPT | Mod: CPTII,S$GLB,, | Performed by: OPTOMETRIST

## 2024-05-29 PROCEDURE — 99213 OFFICE O/P EST LOW 20 MIN: CPT | Mod: S$GLB,,, | Performed by: OPTOMETRIST

## 2024-05-29 PROCEDURE — 3288F FALL RISK ASSESSMENT DOCD: CPT | Mod: CPTII,S$GLB,, | Performed by: OPTOMETRIST

## 2024-05-29 PROCEDURE — 99999 PR PBB SHADOW E&M-EST. PATIENT-LVL III: CPT | Mod: PBBFAC,,, | Performed by: OPTOMETRIST

## 2024-05-29 PROCEDURE — 92015 DETERMINE REFRACTIVE STATE: CPT | Mod: S$GLB,,, | Performed by: OPTOMETRIST

## 2024-05-29 PROCEDURE — 1126F AMNT PAIN NOTED NONE PRSNT: CPT | Mod: CPTII,S$GLB,, | Performed by: OPTOMETRIST

## 2024-05-29 PROCEDURE — 1160F RVW MEDS BY RX/DR IN RCRD: CPT | Mod: CPTII,S$GLB,, | Performed by: OPTOMETRIST

## 2024-05-29 PROCEDURE — 1101F PT FALLS ASSESS-DOCD LE1/YR: CPT | Mod: CPTII,S$GLB,, | Performed by: OPTOMETRIST

## 2024-05-29 NOTE — PROGRESS NOTES
HPI    Pt. Here for refraction . DLE - 03/18/2024    Pt. States she has watery vision and an excess amount of black floaters.   Pt. Usually sees Dr. Rich and has floaters since last injection. Pt.   States she thinks she needs new rx as VA has become blurry at distance. NV   is effected as well. Does wear readers. OS has macular degeneration. Uses   refresh gtts PRN ou with some relief.   Last edited by Duke Shah, OD on 5/29/2024  2:50 PM.            Assessment /Plan     For exam results, see Encounter Report.    Exudative age-related macular degeneration of left eye with active choroidal neovascularization    Exudative age-related macular degeneration of right eye with inactive choroidal neovascularization    Refractive error    Bilateral dry eyes      1,2. Keep appts with Zakiya, educated to importance of follow up to prevent worsening. I spent a total of 10 minutes on the day of the visit.  This includes face to face time and non-face to face time preparing to see the patient (eg, review of tests), obtaining and/or reviewing separately obtained history, documenting clinical information in the electronic or other health record, independently interpreting results and communicating results to the patient/family/caregiver, or care coordinator.   3. New Spectacle Rx given, discussed different options for glasses. RTC 1 year routine eye exam.  4. Some blur with blink and watery eyes, Recommend artificial tears. 1 drop 2x per day. Chronicity of disease and treatment discussed.

## 2024-06-10 ENCOUNTER — PROCEDURE VISIT (OUTPATIENT)
Dept: OPHTHALMOLOGY | Facility: CLINIC | Age: 79
End: 2024-06-10
Attending: OPHTHALMOLOGY
Payer: MEDICARE

## 2024-06-10 DIAGNOSIS — H35.3212 EXUDATIVE AGE-RELATED MACULAR DEGENERATION OF RIGHT EYE WITH INACTIVE CHOROIDAL NEOVASCULARIZATION: ICD-10-CM

## 2024-06-10 DIAGNOSIS — H35.3122 NONEXUDATIVE AGE-RELATED MACULAR DEGENERATION, LEFT EYE, INTERMEDIATE DRY STAGE: ICD-10-CM

## 2024-06-10 DIAGNOSIS — H35.3221 EXUDATIVE AGE-RELATED MACULAR DEGENERATION OF LEFT EYE WITH ACTIVE CHOROIDAL NEOVASCULARIZATION: Primary | ICD-10-CM
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
## 2024-06-11 ENCOUNTER — TELEPHONE (OUTPATIENT)
Dept: FAMILY MEDICINE | Facility: CLINIC | Age: 79
End: 2024-06-11
Payer: MEDICARE

## 2024-06-11 NOTE — TELEPHONE ENCOUNTER
----- Message from Eunice Riddle sent at 6/11/2024 10:05 AM CDT -----  .Type:  Needs Medical Advice    Who Called: pt    Would the patient rather a call back or a response via [facility]? Call back  Best Call Back Number: [phone]  Additional Information:     Pt stated he medication she been on for acid reflux not working she need something different or stronger please call pt back

## 2024-06-12 ENCOUNTER — OFFICE VISIT (OUTPATIENT)
Dept: FAMILY MEDICINE | Facility: CLINIC | Age: 79
End: 2024-06-12
Payer: MEDICARE

## 2024-06-12 VITALS
HEART RATE: 96 BPM | OXYGEN SATURATION: 96 % | DIASTOLIC BLOOD PRESSURE: 74 MMHG | SYSTOLIC BLOOD PRESSURE: 131 MMHG | BODY MASS INDEX: 26.25 KG/M2 | WEIGHT: 133.69 LBS | RESPIRATION RATE: 18 BRPM | HEIGHT: 60 IN

## 2024-06-12 DIAGNOSIS — K21.9 GASTROESOPHAGEAL REFLUX DISEASE WITHOUT ESOPHAGITIS: Primary | ICD-10-CM

## 2024-06-12 PROCEDURE — 3075F SYST BP GE 130 - 139MM HG: CPT | Mod: CPTII,S$GLB,, | Performed by: PHYSICIAN ASSISTANT

## 2024-06-12 PROCEDURE — 1160F RVW MEDS BY RX/DR IN RCRD: CPT | Mod: CPTII,S$GLB,, | Performed by: PHYSICIAN ASSISTANT

## 2024-06-12 PROCEDURE — 1101F PT FALLS ASSESS-DOCD LE1/YR: CPT | Mod: CPTII,S$GLB,, | Performed by: PHYSICIAN ASSISTANT

## 2024-06-12 PROCEDURE — 1159F MED LIST DOCD IN RCRD: CPT | Mod: CPTII,S$GLB,, | Performed by: PHYSICIAN ASSISTANT

## 2024-06-12 PROCEDURE — 3078F DIAST BP <80 MM HG: CPT | Mod: CPTII,S$GLB,, | Performed by: PHYSICIAN ASSISTANT

## 2024-06-12 PROCEDURE — 3288F FALL RISK ASSESSMENT DOCD: CPT | Mod: CPTII,S$GLB,, | Performed by: PHYSICIAN ASSISTANT

## 2024-06-12 PROCEDURE — 99999 PR PBB SHADOW E&M-EST. PATIENT-LVL V: CPT | Mod: PBBFAC,,, | Performed by: PHYSICIAN ASSISTANT

## 2024-06-12 PROCEDURE — 1126F AMNT PAIN NOTED NONE PRSNT: CPT | Mod: CPTII,S$GLB,, | Performed by: PHYSICIAN ASSISTANT

## 2024-06-12 PROCEDURE — 99214 OFFICE O/P EST MOD 30 MIN: CPT | Mod: S$GLB,,, | Performed by: PHYSICIAN ASSISTANT

## 2024-06-12 RX ORDER — OMEPRAZOLE 40 MG/1
40 CAPSULE, DELAYED RELEASE ORAL DAILY
Qty: 90 CAPSULE | Refills: 3 | Status: SHIPPED | OUTPATIENT
Start: 2024-06-12 | End: 2025-06-12

## 2024-06-12 RX ORDER — SUCRALFATE 1 G/1
1 TABLET ORAL
Qty: 120 TABLET | Refills: 0 | Status: SHIPPED | OUTPATIENT
Start: 2024-06-12 | End: 2024-07-12

## 2024-06-12 NOTE — PROGRESS NOTES
Assessment/Plan:    Problem List Items Addressed This Visit          GI    GERD (gastroesophageal reflux disease) - Primary    Overview     -chronic, worsening  -previously on Nexium which was ineffective  -will start Prilosec 40 mg QD and add PRN Carafate  -EGD 7/2022 normal; consider repeating if symptoms persist  -denies alarm symptoms, such as dysphagia, weight loss or N/V  -continue lifestyle modification with avoidance of acidic foods, caffeine, late night eating         Relevant Medications    omeprazole (PRILOSEC) 40 MG capsule    sucralfate (CARAFATE) 1 gram tablet       Follow up in about 1 month (around 7/12/2024), or if symptoms worsen or fail to improve.    America Mejia PA-C  _____________________________________________________________________________________________________________________________________________________    CC: acid reflux    HPI: Patient is in clinic today as an established patient here for acid reflux. Patient has a chronic hx of GERD with worsening of symptoms over the past few weeks. Her symptoms include belching and eructation and deep pressure at base of neck. She denies chest pain, nausea & vomiting, belching, cramping, distention, dyspepsia, dysphagia, hematochezia, melena, abdominal pain or weight loss. She was previously on Nexium, but stopped taking the medication a few weeks ago because she stated that it was no longer helping. She has previously been on Prilosec and Protonix, however, she is unsure if these medications helped or not. She did have an EGD in 7/2022 which was normal. No other complaints today.     Past Medical History:   Diagnosis Date    Acid reflux 04/11/2014    Allergy     Anemia     Anxiety and depression 03/06/2014    AP (angina pectoris) 02/13/2017    Asthma     Biliary dyskinesia     CAD (coronary artery disease) 02/13/2017    CAD, multiple vessel 02/13/2017    Chronic pain associated with significant psychosocial dysfunction 02/21/2013    Chronic  respiratory failure with hypoxia     on home o2 3lpm    CKD (chronic kidney disease) stage 3, GFR 30-59 ml/min     Dr. Vásquez    COPD (chronic obstructive pulmonary disease)     well controlled, Dr Gomez [facility]    Depression     GERD (gastroesophageal reflux disease)     History of shingles     HTN (hypertension)     Hypothyroidism     Lung nodules     Memory loss     Multiple allergies     Neuropathy     Prediabetes     S/P CABG (coronary artery bypass graft) 02/13/2017     Past Surgical History:   Procedure Laterality Date    CARDIAC SURGERY  02/2017    CABG x3    CATARACT EXTRACTION Bilateral 2014    CHOLECYSTECTOMY  2022    COLONOSCOPY  ~2013    Dr. Perez; colon polyps removed    COLONOSCOPY N/A 03/15/2018    Procedure: COLONOSCOPY;  Surgeon: Margarito Calloway MD;  Location: [facility];  Service: Endoscopy;  Laterality: N/A;    COLONOSCOPY N/A 07/25/2022    Procedure: COLONOSCOPY;  Surgeon: Margarito Calloway MD;  Location: [facility];  Service: Endoscopy;  Laterality: N/A;    DILATION AND CURETTAGE OF UTERUS      ENDOSCOPIC ULTRASOUND OF UPPER GASTROINTESTINAL TRACT Left 07/03/2018    Procedure: ULTRASOUND, ENDOSCOPIC, UPPER GI TRACT;  Surgeon: Jordy Greenberg MD;  Location: [facility];  Service: Endoscopy;  Laterality: Left;  Linear scope    ESOPHAGOGASTRODUODENOSCOPY N/A 07/03/2018    Procedure: EGD (ESOPHAGOGASTRODUODENOSCOPY);  Surgeon: Jordy Greenberg MD;  Location: [facility];  Service: Endoscopy;  Laterality: N/A;    ESOPHAGOGASTRODUODENOSCOPY N/A 07/29/2020    Procedure: EGD (ESOPHAGOGASTRODUODENOSCOPY);  Surgeon: Margarito Calloway MD;  Location: [facility];  Service: Endoscopy;  Laterality: N/A;    ESOPHAGOGASTRODUODENOSCOPY N/A 07/25/2022    Procedure: ESOPHAGOGASTRODUODENOSCOPY (EGD);  Surgeon: Margarito Calloway MD;  Location: [facility];  Service: Endoscopy;  Laterality: N/A;    INSERTION OF DORSAL COLUMN NERVE STIMULATOR FOR TRIAL N/A 06/19/2019    Procedure: INSERTION,  NEUROSTIMULATOR, SPINAL CORD, DORSAL COLUMN, FOR TRIAL;  Surgeon: Ebenezer Rouse MD;  Location: [facility] OR;  Service: Pain Management;  Laterality: N/A;    INSERTION OF NEUROSTIMULATOR OF DORSAL COLUMN OF SPINAL CORD N/A 07/25/2019    Procedure: INSERTION, NEUROSTIMULATOR, SPINAL CORD, DORSAL COLUMN;  Surgeon: Ebenezer Rouse MD;  Location: [facility] OR;  Service: Pain Management;  Laterality: N/A;  removed old battery    KNEE SURGERY Right     LAPAROSCOPIC CHOLECYSTECTOMY N/A 07/13/2022    Procedure: CHOLECYSTECTOMY, LAPAROSCOPIC;  Surgeon: Tiffani Echeverria MD;  Location: [facility] OR;  Service: General;  Laterality: N/A;    REPLACEMENT OF NERVE STIMULATOR BATTERY  07/25/2019    Procedure: REPLACEMENT, BATTERY, NEUROSTIMULATOR;  Surgeon: Ebenezer Rouse MD;  Location: [facility] OR;  Service: Pain Management;;  Removed Old Battery- [employer] Model 1200   SN [id number]      ROBOT-ASSISTED SURGICAL REMOVAL OF STOMACH USING DA YVROSE XI N/A 07/31/2018    Procedure: XI ROBOTIC GASTRECTOMY-PARTIAL;  Surgeon: Dre Grey MD;  Location: [facility] OR;  Service: General;  Laterality: N/A;    SPINAL CORD STIMULATOR IMPLANT  02/12/2018    for pain secondary to shingles, sees Dr Kam for pain management    TONSILLECTOMY      TUMOR REMOVAL      UPPER GASTROINTESTINAL ENDOSCOPY       Review of patient's allergies indicates:   Allergen Reactions    Losartan Swelling     angioedema    Ace inhibitors Other (See Comments) and Swelling     unknown    Angioedema    Arb-angiotensin receptor antagonist Other (See Comments)     unknown  unknown      Iodine and iodide containing products Hives     Since age of 48    Lisinopril      angioedema    Metoprolol tartrate      swelling    Shrimp Other (See Comments) and Swelling     Localized itching and swelling on her hands if she peels shrimp.  Able to eat shrimp without difficulty.  No generalized reaction.     Social History     Tobacco Use    Smoking status: Former     Current packs/day:  0.00     Average packs/day: 1 pack/day for 52.9 years (52.9 ttl pk-yrs)     Types: Cigarettes, Vaping with nicotine     Start date:      Quit date: 12/10/2012     Years since quittin.5    Smokeless tobacco: Never   Substance Use Topics    Alcohol use: No    Drug use: No     Family History   Problem Relation Name Age of Onset    Heart attacks under age 50 Mother  41    Cancer Father Angel Melchor         prostate    Asthma Father Angel Melchor     COPD Father Angel Melchor     Hearing loss Father Angel Melchor     Arthritis Daughter Robert Rouse     Colon cancer Neg Hx      Colon polyps Neg Hx      Crohn's disease Neg Hx      Ulcerative colitis Neg Hx      Celiac disease Neg Hx      Glaucoma Neg Hx      Macular degeneration Neg Hx      Retinal detachment Neg Hx       Current Outpatient Medications on File Prior to Visit   Medication Sig Dispense Refill    acetaminophen-codeine 300-15mg (TYLENOL #2) 300-15 mg per tablet Take 1 tablet by mouth.      albuterol (PROVENTIL/VENTOLIN HFA) 90 mcg/actuation inhaler Inhale 2 puffs into the lungs every 6 (six) hours as needed for Wheezing or Shortness of Breath. 18 g 1    albuterol (VENTOLIN HFA) 90 mcg/actuation inhaler 2 puff EVERY 4 HOURS (route: inhalation)      amLODIPine (NORVASC) 5 MG tablet TAKE 1 TABLET BY MOUTH EVERY DAY 90 tablet 1    ascorbic acid, vitamin C, (VITAMIN C) 1000 MG tablet Take 1,000 mg by mouth once daily.      aspirin (ECOTRIN) 81 MG EC tablet Take 81 mg by mouth once daily.      budesonide-glycopyr-formoterol (BREZTRI AEROSPHERE) 160-9-4.8 mcg/actuation HFAA Inhale 2 puffs into the lungs 2 (two) times a day. 10.7 g 1    calcium carbonate (TUMS) 200 mg calcium (500 mg) chewable tablet Take 2 tablets by mouth as needed.      DULoxetine (CYMBALTA) 30 MG capsule Take 1 capsule (30 mg total) by mouth 2 (two) times daily. 60 capsule 1    ezetimibe (ZETIA) 10 mg tablet Take 1 tablet (10 mg total) by mouth once daily. 90 tablet 0    FEROSUL 325 mg  (65 mg iron) Tab tablet Take by mouth.      fish oil-omega-3 fatty acids 300-1,000 mg capsule Take 1 capsule by mouth 2 (two) times daily.      fluticasone propionate (FLONASE) 50 mcg/actuation nasal spray USE TWO SPRAYS IN EACH NOSTRIL EVERY DAY 16 g 11    ibuprofen (ADVIL,MOTRIN) 800 MG tablet Take 800 mg by mouth 2 (two) times daily as needed.      L.acid/L.casei/B.bif/B.miryam/FOS (PROBIOTIC BLEND ORAL) Take 1 tablet by mouth 2 (two) times daily.      levothyroxine (SYNTHROID) 200 MCG tablet Take 1 tablet (200 mcg total) by mouth before breakfast. 90 tablet 3    magnesium oxide (MAG-OX) 250 mg magnesium Tab Take 1 tablet by mouth once daily.      metoprolol tartrate (LOPRESSOR) 25 MG tablet 1 tablet Orally Twice a day      montelukast (SINGULAIR) 10 mg tablet TAKE 1 TABLET BY MOUTH EVERY EVENING 90 tablet 3    multivit-min/iron/folic/lutein (CENTRUM SILVER WOMEN ORAL) Take by mouth.      ondansetron (ZOFRAN) 4 MG tablet Take 1 tablet (4 mg total) by mouth every 6 (six) hours as needed for Nausea. 30 tablet 0    OXcarbazepine (TRILEPTAL) 150 MG Tab Take 1 tablet (150 mg total) by mouth 2 (two) times daily. 90 tablet 3    pregabalin (LYRICA) 25 MG capsule Take 1 capsule (25 mg total) by mouth 2 (two) times daily. 60 capsule 6    simvastatin (ZOCOR) 40 MG tablet TAKE 1 TABLET BY MOUTH EVERY EVENING 90 tablet 3    vitamins  A,C,E-zinc-copper 14,320-226-200 unit-mg-unit Cap Take 2 capsules by mouth once daily.      [DISCONTINUED] esomeprazole (NEXIUM) 40 MG capsule Take 40 mg by mouth before breakfast.      [DISCONTINUED] famotidine (PEPCID) 40 MG tablet        No current facility-administered medications on file prior to visit.       Review of Systems   Constitutional:  Negative for chills, diaphoresis, fatigue and fever.   HENT:  Negative for congestion, ear pain, postnasal drip, sinus pain and sore throat.    Eyes:  Negative for pain and redness.   Respiratory:  Negative for cough, chest tightness and shortness of  breath.    Cardiovascular:  Negative for chest pain and leg swelling.   Gastrointestinal:  Negative for abdominal pain, constipation, diarrhea, nausea and vomiting.   Genitourinary:  Negative for dysuria and hematuria.   Musculoskeletal:  Negative for arthralgias and joint swelling.   Skin:  Negative for rash.   Neurological:  Negative for dizziness, syncope and headaches.   Psychiatric/Behavioral:  Negative for dysphoric mood. The patient is not nervous/anxious.        Vitals:    06/12/24 1059   BP: 131/74   Pulse: 96   Resp: 18   SpO2: 96%   Weight: 60.6 kg (133 lb 11.2 oz)   Height: 5' (1.524 m)       Wt Readings from Last 3 Encounters:   06/12/24 60.6 kg (133 lb 11.2 oz)   05/22/24 60.3 kg (133 lb)   02/07/24 58.1 kg (128 lb 1.4 oz)       Physical Exam  Constitutional:       General: She is not in acute distress.     Appearance: Normal appearance. She is well-developed.   HENT:      Head: Normocephalic and atraumatic.   Eyes:      Conjunctiva/sclera: Conjunctivae normal.   Cardiovascular:      Rate and Rhythm: Normal rate and regular rhythm.      Pulses: Normal pulses.      Heart sounds: Normal heart sounds. No murmur heard.  Pulmonary:      Effort: Pulmonary effort is normal. No respiratory distress.      Breath sounds: Normal breath sounds.   Abdominal:      General: Bowel sounds are normal. There is no distension.      Palpations: Abdomen is soft.      Tenderness: There is no abdominal tenderness.   Musculoskeletal:         General: Normal range of motion.      Cervical back: Normal range of motion and neck supple.   Skin:     General: Skin is warm and dry.      Findings: No rash.   Neurological:      General: No focal deficit present.      Mental Status: She is alert and oriented to person, place, and time.   Psychiatric:         Mood and Affect: Mood normal.         Behavior: Behavior normal.         Health Maintenance   Topic Date Due    LDCT Lung Screen  10/20/2021    Lipid Panel  04/14/2023    DEXA Scan   03/30/2025    TETANUS VACCINE  01/28/2031    Shingles Vaccine  Completed    Mammogram  Discontinued    Colonoscopy  Discontinued

## 2024-06-12 NOTE — PATIENT INSTRUCTIONS
Devonte Dooley,     If you are due for any health screening(s) below please notify me so we can arrange them to be ordered and scheduled. Most healthy patients at your age complete them, but you are free to accept or refuse.     If you can't do it, I'll definitely understand. If you can, I'd certainly appreciate it!    All of your core healthy metrics are met.

## 2024-07-15 DIAGNOSIS — I10 ESSENTIAL HYPERTENSION: ICD-10-CM

## 2024-07-16 RX ORDER — AMLODIPINE BESYLATE 5 MG/1
5 TABLET ORAL
Qty: 90 TABLET | Refills: 1 | Status: SHIPPED | OUTPATIENT
Start: 2024-07-16

## 2024-07-16 NOTE — TELEPHONE ENCOUNTER
Care Due:                  Date            Visit Type   Department     Provider  --------------------------------------------------------------------------------                                [facility]  Last Visit: 01-      FOLLOW UP    MEDICINE       Mindy Hathaway  Next Visit: None Scheduled  None         None Found                                                            Last  Test          Frequency    Reason                     Performed    Due Date  --------------------------------------------------------------------------------    Lipid Panel.  12 months..  simvastatin..............  04-   04-    [facility] Embedded Care Due Messages. Reference number: [id number].   7/15/2024 11:26:45 PM CDT

## 2024-07-16 NOTE — TELEPHONE ENCOUNTER
Refill Routing Note   Medication(s) are not appropriate for processing by [facility] for the following reason(s):        No active prescription written by provider    [facility] action(s):  Defer   Requires labs : Yes             Appointments  past 12m or future 3m with PCP    Date Provider   Last Visit   1/10/2024 Mindy Hathaway MD   Next Visit   Visit date not found Mindy Hathaway MD   ED visits in past 90 days: 0        Note composed:11:46 PM 07/15/2024

## 2024-08-09 ENCOUNTER — TELEPHONE (OUTPATIENT)
Dept: FAMILY MEDICINE | Facility: CLINIC | Age: 79
End: 2024-08-09
Payer: MEDICARE

## 2024-08-09 DIAGNOSIS — R56.9 SEIZURES, TRANSIENT: ICD-10-CM

## 2024-08-09 DIAGNOSIS — K21.9 GASTROESOPHAGEAL REFLUX DISEASE WITHOUT ESOPHAGITIS: ICD-10-CM

## 2024-08-09 DIAGNOSIS — B02.23 NEUROPATHY DUE TO HERPES ZOSTER: ICD-10-CM

## 2024-08-09 DIAGNOSIS — J30.89 NON-SEASONAL ALLERGIC RHINITIS DUE TO OTHER ALLERGIC TRIGGER: ICD-10-CM

## 2024-08-09 DIAGNOSIS — G89.4 CHRONIC PAIN SYNDROME: ICD-10-CM

## 2024-08-09 RX ORDER — SUCRALFATE 1 G/1
1 TABLET ORAL
Qty: 360 TABLET | Refills: 0 | Status: SHIPPED | OUTPATIENT
Start: 2024-08-09 | End: 2024-11-07

## 2024-08-09 RX ORDER — PREGABALIN 25 MG/1
25 CAPSULE ORAL 2 TIMES DAILY
Qty: 180 CAPSULE | Refills: 3 | Status: SHIPPED | OUTPATIENT
Start: 2024-08-09

## 2024-08-09 RX ORDER — OXCARBAZEPINE 150 MG/1
150 TABLET, FILM COATED ORAL 2 TIMES DAILY
Qty: 180 TABLET | Refills: 3 | Status: SHIPPED | OUTPATIENT
Start: 2024-08-09

## 2024-08-09 RX ORDER — DULOXETIN HYDROCHLORIDE 30 MG/1
30 CAPSULE, DELAYED RELEASE ORAL 2 TIMES DAILY
Qty: 180 CAPSULE | Refills: 1 | Status: SHIPPED | OUTPATIENT
Start: 2024-08-09

## 2024-08-09 RX ORDER — MONTELUKAST SODIUM 10 MG/1
10 TABLET ORAL NIGHTLY
Qty: 90 TABLET | Refills: 1 | Status: SHIPPED | OUTPATIENT
Start: 2024-08-09

## 2024-08-28 ENCOUNTER — TELEPHONE (OUTPATIENT)
Dept: PRIMARY CARE CLINIC | Facility: CLINIC | Age: 79
End: 2024-08-28
Payer: MEDICARE

## 2024-08-28 DIAGNOSIS — K21.9 GASTROESOPHAGEAL REFLUX DISEASE, UNSPECIFIED WHETHER ESOPHAGITIS PRESENT: Primary | ICD-10-CM

## 2024-08-28 NOTE — TELEPHONE ENCOUNTER
----- Message from Mariah Banuelos sent at 8/28/2024  1:07 PM CDT -----  Patient stated the prescriptions that were prescribed at the last appt with Jackie are not working for her. She would like something called in to the pharmacy for acid reflux. Call back number is .[phone]. [email or web address]    .  [facility] - [place] - [address] [facility]  [address] [facility] [address]  Phone: [phone] Fax: [phone]

## 2024-08-29 RX ORDER — RABEPRAZOLE SODIUM 20 MG/1
20 TABLET, DELAYED RELEASE ORAL DAILY
Qty: 30 TABLET | Refills: 11 | Status: SHIPPED | OUTPATIENT
Start: 2024-08-29 | End: 2025-08-29

## 2024-08-29 NOTE — TELEPHONE ENCOUNTER
I sent in a different medication for acid reflux.     Please ask patient if she is fairly certain that reflux symptoms are similar to previous reflux and could not be related to something else, such as a heart issue? We could have her come in for an EKG if needed.    Also, if she continues to have reflux problems, we will need to order another EGD.    I have signed for the following orders AND/OR meds.  Please call the patient and ask the patient to schedule the testing AND/OR inform about any medications that were sent. Medications have been sent to pharmacy listed below      No orders of the defined types were placed in this encounter.      Medications Ordered This Encounter   Medications    RABEprazole (ACIPHEX) 20 mg tablet     Sig: Take 1 tablet (20 mg total) by mouth once daily.     Dispense:  30 tablet     Refill:  11         [facility] - [place] - [address] [place], [place] [address] [facility] [address]  Phone: [phone] Fax: [phone]    [id number] - [facility] [address] [facility] [address] [facility] [address]  [facility] [address]  Phone: [phone] Fax: [phone]    [facility] - [facility] [address] [facility] [address]  Phone: [phone] Fax: [phone]

## 2024-08-30 ENCOUNTER — TELEPHONE (OUTPATIENT)
Dept: FAMILY MEDICINE | Facility: CLINIC | Age: 79
End: 2024-08-30
Payer: MEDICARE

## 2024-08-30 NOTE — TELEPHONE ENCOUNTER
----- Message from Joseozzy Noel sent at 8/30/2024  3:07 PM CDT -----  Regarding: Missed Call  Contact: [phone]  isaias Whitney missed a clal from the office and would like a return call. [facility] call  [phone] to discuss.    Thank you.

## 2024-08-30 NOTE — TELEPHONE ENCOUNTER
Pt is aware that dr wang send RABEprazole (ACIPHEX) 20 mg tablet [[id number]]    Electronically signed by: Mindy Wang MD on 08/29/24 1256 Status: Active   Ordering user: Mindy Wang MD 08/29/24 1256 Authorized by: Mindy Wang MD   Ordering mode: Standard   Frequency: Daily 08/29/24 - 365  days   Diagnoses  Gastroesophageal reflux disease, unspecified whether esophagitis present [K21.9]       Medication Renewals and Changes      rabeprazole sodium 20 mg Oral Daily      (Alternate therapy)  Medication List

## 2024-09-09 ENCOUNTER — PROCEDURE VISIT (OUTPATIENT)
Dept: OPHTHALMOLOGY | Facility: CLINIC | Age: 79
End: 2024-09-09
Payer: MEDICARE

## 2024-09-09 DIAGNOSIS — H35.3221 EXUDATIVE AGE-RELATED MACULAR DEGENERATION OF LEFT EYE WITH ACTIVE CHOROIDAL NEOVASCULARIZATION: Primary | ICD-10-CM
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
## 2024-10-24 ENCOUNTER — PATIENT MESSAGE (OUTPATIENT)
Dept: ADMINISTRATIVE | Facility: CLINIC | Age: 79
End: 2024-10-24
Payer: MEDICARE

## 2024-10-24 ENCOUNTER — PATIENT OUTREACH (OUTPATIENT)
Dept: ADMINISTRATIVE | Facility: CLINIC | Age: 79
End: 2024-10-24
Payer: MEDICARE

## 2024-10-24 NOTE — PROGRESS NOTES
C3 nurse attempted to contact Soumya Melchor for a TCC post hospital discharge follow up call. No answer. Left voicemail with callback information. The patient does not have a scheduled HOSFU appointment. Message sent to PCP staff for assistance with scheduling visit with patient.

## 2024-10-25 NOTE — PROGRESS NOTES
C3 nurse attempted to contact Soumya Melchor for a TCC post hospital discharge follow up call. No answer. Left voicemail with callback information. The patient has a scheduled HOSFU appointment with Mindy Hathaway MD on 11/04/2024 @ 0900.

## 2024-10-30 NOTE — TELEPHONE ENCOUNTER
----- Message from CARA Briseno sent at 10/28/2024  7:38 AM EDT -----  ASCUS PAP needs repeat in 1 year, pt speaks Sami, please notify patient   Refill for zetia sent.     As for thyroid, I would like for her to add an additional 50 mcg to her current dose and we will need to repeat labs in 8 weeks.    I have signed for the following orders AND/OR meds.  Please call the patient and ask the patient to schedule the testing AND/OR inform about any medications that were sent. Medications have been sent to pharmacy listed below      Orders Placed This Encounter   Procedures    TSH     Standing Status:   Future     Standing Expiration Date:   6/5/2023    T4, Free     Standing Status:   Future     Standing Expiration Date:   6/5/2023       Medications Ordered This Encounter   Medications    ezetimibe (ZETIA) 10 mg tablet     Sig: Take 1 tablet (10 mg total) by mouth once daily.     Dispense:  90 tablet     Refill:  3    levothyroxine (SYNTHROID) 50 MCG tablet     Sig: Take 1 tablet (50 mcg total) by mouth before breakfast.     Dispense:  30 tablet     Refill:  11         [facility] - [place]  [address]  [place] [address]  Phone: [phone] Fax: [phone]    [facility] #[id number] - [place] - [address] [facility] [address] [facility]  [address]  [place] [address]  Phone: [phone] Fax: [phone]

## 2024-10-31 ENCOUNTER — NURSE TRIAGE (OUTPATIENT)
Dept: ADMINISTRATIVE | Facility: CLINIC | Age: 79
End: 2024-10-31
Payer: MEDICARE

## 2024-11-05 ENCOUNTER — PATIENT OUTREACH (OUTPATIENT)
Dept: ADMINISTRATIVE | Facility: HOSPITAL | Age: 79
End: 2024-11-05
Payer: MEDICARE

## 2024-11-05 NOTE — PROGRESS NOTES
VBC Program activation, Patient is not due for any topics at this time. HM updated, modifiers removed.

## 2024-11-06 ENCOUNTER — OFFICE VISIT (OUTPATIENT)
Dept: FAMILY MEDICINE | Facility: CLINIC | Age: 79
End: 2024-11-06
Payer: MEDICARE

## 2024-11-06 VITALS
SYSTOLIC BLOOD PRESSURE: 108 MMHG | HEART RATE: 100 BPM | TEMPERATURE: 98 F | RESPIRATION RATE: 16 BRPM | BODY MASS INDEX: 27.03 KG/M2 | WEIGHT: 137.69 LBS | DIASTOLIC BLOOD PRESSURE: 64 MMHG | OXYGEN SATURATION: 95 % | HEIGHT: 60 IN

## 2024-11-06 DIAGNOSIS — G47.00 INSOMNIA, UNSPECIFIED TYPE: Primary | ICD-10-CM

## 2024-11-06 DIAGNOSIS — J44.89 ASTHMA WITH COPD: ICD-10-CM

## 2024-11-06 DIAGNOSIS — S52.501A DISPLACED FRACTURE OF DISTAL END OF RIGHT RADIUS: ICD-10-CM

## 2024-11-06 PROCEDURE — 1159F MED LIST DOCD IN RCRD: CPT | Mod: CPTII,S$GLB,, | Performed by: NURSE PRACTITIONER

## 2024-11-06 PROCEDURE — 1126F AMNT PAIN NOTED NONE PRSNT: CPT | Mod: CPTII,S$GLB,, | Performed by: NURSE PRACTITIONER

## 2024-11-06 PROCEDURE — 99999 PR PBB SHADOW E&M-EST. PATIENT-LVL V: CPT | Mod: PBBFAC,,, | Performed by: NURSE PRACTITIONER

## 2024-11-06 PROCEDURE — 3074F SYST BP LT 130 MM HG: CPT | Mod: CPTII,S$GLB,, | Performed by: NURSE PRACTITIONER

## 2024-11-06 PROCEDURE — 1101F PT FALLS ASSESS-DOCD LE1/YR: CPT | Mod: CPTII,S$GLB,, | Performed by: NURSE PRACTITIONER

## 2024-11-06 PROCEDURE — 3078F DIAST BP <80 MM HG: CPT | Mod: CPTII,S$GLB,, | Performed by: NURSE PRACTITIONER

## 2024-11-06 PROCEDURE — 99214 OFFICE O/P EST MOD 30 MIN: CPT | Mod: S$GLB,,, | Performed by: NURSE PRACTITIONER

## 2024-11-06 PROCEDURE — 3288F FALL RISK ASSESSMENT DOCD: CPT | Mod: CPTII,S$GLB,, | Performed by: NURSE PRACTITIONER

## 2024-11-06 RX ORDER — TRAZODONE HYDROCHLORIDE 50 MG/1
50 TABLET ORAL NIGHTLY
Qty: 30 TABLET | Refills: 11 | Status: SHIPPED | OUTPATIENT
Start: 2024-11-06 | End: 2025-11-06

## 2024-11-06 NOTE — PROGRESS NOTES
Assessment/Plan:  Problem List Items Addressed This Visit          Pulmonary    Asthma with COPD    Overview     -followed by pulmonology  -complicated by chronic respiratory failure  -now on supplemental O2 continuously  -currently on Breztri BID  -pulmonary symptoms stable          Current Assessment & Plan     Continue supplemental oxygen and inhalers. ER precautions.             Orthopedic    Displaced fracture of distal end of right radius    Overview     Patient had a fall 10/5 and was evaluated at [facility]. Followed by ortho. Wearing cast to right wrist.     XR WRIST 3+ VIEWS RIGHT (HTI ONLY)  Narrative  EXAM: XR WRIST RIGHT 3 + VIEWS    CLINICAL HISTORY: [Pain]    FINDINGS: Comminuted fracture of the distal radius on the dorsal surface with stents to the radiocarpal joint.  Slight dorsal angulation.  Diffuse osteopenia and moderate degenerative changes..    IMPRESSION: See findings above         Current Assessment & Plan     Continue current medications and plan of care per ortho. Follow up with specialist.             Other    Insomnia - Primary    Overview     Chronic. Worsening. Tried melatonin and tylenol PM with no relief.          Current Assessment & Plan     Trial of Trazodone.  Discussed insomnia condition course.  Advised of first-line medications for this condition.  Also discussed sleep hygiene.  Information was given below.  Good sleep habits (sometimes referred to as sleep hygiene) can help you get a good nights sleep.    Some habits that can improve your sleep health:  -Be consistent. Go to bed at the same time each night and get up at the same time each morning, including on the weekends  -Make sure your bedroom is quiet, dark, relaxing, and at a comfortable temperature  -Remove electronic devices, such as TVs, computers, and smart phones, from the bedroom  -Avoid large meals, caffeine, and alcohol before bedtime  -Get some exercise. Being physically active during the day can help you fall  asleep more easily at night.         Relevant Medications    traZODone (DESYREL) 50 MG tablet     Follow up if symptoms worsen or fail to improve.  ER precautions for severe or worsening symptoms.     Katrin Holley, NP  _____________________________________________________________________________________________________________________________________________________    CC: insomnia     HPI: Patient is an 79-year-old female who presents in clinic today as an established patient here for insomnia. She has since starting have difficulty sleeping due to her cast and oxygen use. Currently wearing a cast on her right wrist for displaced fracture or right radius. She wakes up when attempting to turn over in bed. She sometimes takes Tylenol PM and melatonin for sleep, but states it is not always effective. She has a history of using sleep aids, including trazodone in the past, which was stopped for unknown reason.      Past Medical History:  Past Medical History:   Diagnosis Date    Acid reflux 04/11/2014    Allergy     Anemia     Anxiety and depression 03/06/2014    AP (angina pectoris) 02/13/2017    Asthma     Biliary dyskinesia     CAD (coronary artery disease) 02/13/2017    CAD, multiple vessel 02/13/2017    Chronic pain associated with significant psychosocial dysfunction 02/21/2013    Chronic respiratory failure with hypoxia     on home o2 3lpm    CKD (chronic kidney disease) stage 3, GFR 30-59 ml/min     Dr. Vásquez    COPD (chronic obstructive pulmonary disease)     well controlled, Dr Gomez [facility]    Depression     GERD (gastroesophageal reflux disease)     History of shingles     HTN (hypertension)     Hypothyroidism     Lung nodules     Memory loss     Multiple allergies     Neuropathy     Prediabetes     S/P CABG (coronary artery bypass graft) 02/13/2017     Past Surgical History:   Procedure Laterality Date    CARDIAC SURGERY  02/2017    CABG x3    CATARACT EXTRACTION Bilateral 2014    CHOLECYSTECTOMY   2022    COLONOSCOPY  ~2013    Dr. Perez; colon polyps removed    COLONOSCOPY N/A 03/15/2018    Procedure: COLONOSCOPY;  Surgeon: Margarito Calloway MD;  Location: [facility];  Service: Endoscopy;  Laterality: N/A;    COLONOSCOPY N/A 07/25/2022    Procedure: COLONOSCOPY;  Surgeon: Margarito Calloway MD;  Location: [facility];  Service: Endoscopy;  Laterality: N/A;    DILATION AND CURETTAGE OF UTERUS      ENDOSCOPIC ULTRASOUND OF UPPER GASTROINTESTINAL TRACT Left 07/03/2018    Procedure: ULTRASOUND, ENDOSCOPIC, UPPER GI TRACT;  Surgeon: Jordy Greenberg MD;  Location: [facility];  Service: Endoscopy;  Laterality: Left;  Linear scope    ESOPHAGOGASTRODUODENOSCOPY N/A 07/03/2018    Procedure: EGD (ESOPHAGOGASTRODUODENOSCOPY);  Surgeon: Jordy Greenberg MD;  Location: [facility];  Service: Endoscopy;  Laterality: N/A;    ESOPHAGOGASTRODUODENOSCOPY N/A 07/29/2020    Procedure: EGD (ESOPHAGOGASTRODUODENOSCOPY);  Surgeon: Margarito Calloway MD;  Location: [facility];  Service: Endoscopy;  Laterality: N/A;    ESOPHAGOGASTRODUODENOSCOPY N/A 07/25/2022    Procedure: ESOPHAGOGASTRODUODENOSCOPY (EGD);  Surgeon: Margarito Calloway MD;  Location: [facility];  Service: Endoscopy;  Laterality: N/A;    INSERTION OF DORSAL COLUMN NERVE STIMULATOR FOR TRIAL N/A 06/19/2019    Procedure: INSERTION, NEUROSTIMULATOR, SPINAL CORD, DORSAL COLUMN, FOR TRIAL;  Surgeon: Ebenezer Rouse MD;  Location: [facility];  Service: Pain Management;  Laterality: N/A;    INSERTION OF NEUROSTIMULATOR OF DORSAL COLUMN OF SPINAL CORD N/A 07/25/2019    Procedure: INSERTION, NEUROSTIMULATOR, SPINAL CORD, DORSAL COLUMN;  Surgeon: Ebenezer Rouse MD;  Location: [facility] OR;  Service: Pain Management;  Laterality: N/A;  removed old battery    KNEE SURGERY Right     LAPAROSCOPIC CHOLECYSTECTOMY N/A 07/13/2022    Procedure: CHOLECYSTECTOMY, LAPAROSCOPIC;  Surgeon: Tiffani Echeverria MD;  Location: [facility];  Service: General;  Laterality: N/A;     REPLACEMENT OF NERVE STIMULATOR BATTERY  2019    Procedure: REPLACEMENT, BATTERY, NEUROSTIMULATOR;  Surgeon: Ebenezer Rouse MD;  Location: [facility] OR;  Service: Pain Management;;  Removed Old Battery- [employer] Model 1200   SN [id number]      ROBOT-ASSISTED SURGICAL REMOVAL OF STOMACH USING DA YVROSE XI N/A 2018    Procedure: XI ROBOTIC GASTRECTOMY-PARTIAL;  Surgeon: Dre Grey MD;  Location: [facility] OR;  Service: General;  Laterality: N/A;    SPINAL CORD STIMULATOR IMPLANT  2018    for pain secondary to shingles, sees Dr Kam for pain management    TONSILLECTOMY      TUMOR REMOVAL      UPPER GASTROINTESTINAL ENDOSCOPY       Review of patient's allergies indicates:   Allergen Reactions    Losartan Swelling     angioedema    Ace inhibitors Other (See Comments) and Swelling     unknown    Angioedema    Arb-angiotensin receptor antagonist Other (See Comments)     unknown  unknown      Iodine and iodide containing products Hives     Since age of 48    Lisinopril      angioedema    Metoprolol tartrate      swelling    Shrimp Other (See Comments) and Swelling     Localized itching and swelling on her hands if she peels shrimp.  Able to eat shrimp without difficulty.  No generalized reaction.     Social History     Tobacco Use    Smoking status: Former     Current packs/day: 0.00     Average packs/day: 1 pack/day for 52.9 years (52.9 ttl pk-yrs)     Types: Cigarettes, Vaping with nicotine     Start date:      Quit date: 12/10/2012     Years since quittin.9    Smokeless tobacco: Never   Substance Use Topics    Alcohol use: No    Drug use: No     Family History   Problem Relation Name Age of Onset    Heart attacks under age 50 Mother  41    Cancer Father Angel Pomares         prostate    Asthma Father Angel Pomares     COPD Father Angel Pomares     Hearing loss Father Angel Pomares     Arthritis Daughter Robert Rouse     Colon cancer Neg Hx      Colon polyps Neg Hx      Crohn's disease Neg  Hx      Ulcerative colitis Neg Hx      Celiac disease Neg Hx      Glaucoma Neg Hx      Macular degeneration Neg Hx      Retinal detachment Neg Hx       Current Outpatient Medications on File Prior to Visit   Medication Sig Dispense Refill    acetaminophen-codeine 300-15mg (TYLENOL #2) 300-15 mg per tablet Take 1 tablet by mouth.      albuterol (PROVENTIL/VENTOLIN HFA) 90 mcg/actuation inhaler Inhale 2 puffs into the lungs every 6 (six) hours as needed for Wheezing or Shortness of Breath. 18 g 1    albuterol (VENTOLIN HFA) 90 mcg/actuation inhaler 2 puff EVERY 4 HOURS (route: inhalation)      amLODIPine (NORVASC) 5 MG tablet TAKE 1 TABLET BY MOUTH EVERY DAY FOR BLOOD PRESSURE 90 tablet 1    ascorbic acid, vitamin C, (VITAMIN C) 1000 MG tablet Take 1,000 mg by mouth once daily.      aspirin (ECOTRIN) 81 MG EC tablet Take 81 mg by mouth once daily.      budesonide-glycopyr-formoterol (BREZTRI AEROSPHERE) 160-9-4.8 mcg/actuation HFAA Inhale 2 puffs into the lungs 2 (two) times a day. 10.7 g 6    calcium carbonate (TUMS) 200 mg calcium (500 mg) chewable tablet Take 2 tablets by mouth as needed.      DULoxetine (CYMBALTA) 30 MG capsule Take 1 capsule (30 mg total) by mouth 2 (two) times daily. 180 capsule 1    ezetimibe (ZETIA) 10 mg tablet Take 1 tablet (10 mg total) by mouth once daily. 90 tablet 0    FEROSUL 325 mg (65 mg iron) Tab tablet Take by mouth.      fish oil-omega-3 fatty acids 300-1,000 mg capsule Take 1 capsule by mouth 2 (two) times daily.      fluticasone propionate (FLONASE) 50 mcg/actuation nasal spray USE TWO SPRAYS IN EACH NOSTRIL EVERY DAY 16 g 11    ibuprofen (ADVIL,MOTRIN) 800 MG tablet Take 800 mg by mouth 2 (two) times daily as needed.      L.acid/L.casei/B.bif/B.miryam/FOS (PROBIOTIC BLEND ORAL) Take 1 tablet by mouth 2 (two) times daily.      levothyroxine (SYNTHROID) 200 MCG tablet Take 1 tablet (200 mcg total) by mouth before breakfast. 90 tablet 3    magnesium oxide (MAG-OX) 250 mg magnesium Tab  Take 1 tablet by mouth once daily.      metoprolol tartrate (LOPRESSOR) 25 MG tablet 1 tablet Orally Twice a day      montelukast (SINGULAIR) 10 mg tablet Take 1 tablet (10 mg total) by mouth every evening. 90 tablet 1    multivit-min/iron/folic/lutein (CENTRUM SILVER WOMEN ORAL) Take by mouth.      ondansetron (ZOFRAN) 4 MG tablet Take 1 tablet (4 mg total) by mouth every 6 (six) hours as needed for Nausea. 30 tablet 0    OXcarbazepine (TRILEPTAL) 150 MG Tab Take 1 tablet (150 mg total) by mouth 2 (two) times daily. 180 tablet 3    pregabalin (LYRICA) 25 MG capsule Take 1 capsule (25 mg total) by mouth 2 (two) times daily. 180 capsule 3    RABEprazole (ACIPHEX) 20 mg tablet Take 1 tablet (20 mg total) by mouth once daily. 30 tablet 11    simvastatin (ZOCOR) 40 MG tablet TAKE 1 TABLET BY MOUTH EVERY EVENING 90 tablet 3    sucralfate (CARAFATE) 1 gram tablet Take 1 tablet (1 g total) by mouth 4 (four) times daily before meals and nightly. 360 tablet 0    vitamins  A,C,E-zinc-copper 14,320-226-200 unit-mg-unit Cap Take 2 capsules by mouth once daily.       No current facility-administered medications on file prior to visit.     Review of Systems   Constitutional:  Negative for appetite change, chills, fatigue and fever.   HENT:  Negative for congestion, rhinorrhea and sore throat.    Eyes:  Negative for visual disturbance.   Respiratory:  Positive for shortness of breath (chronic). Negative for cough.    Cardiovascular:  Negative for chest pain, palpitations and leg swelling.   Gastrointestinal:  Negative for abdominal pain, diarrhea and vomiting.   Genitourinary:  Negative for difficulty urinating, dysuria and hematuria.   Musculoskeletal:  Positive for arthralgias. Negative for myalgias.   Skin:  Negative for rash and wound.   Neurological:  Negative for dizziness and headaches.   Psychiatric/Behavioral:  Positive for sleep disturbance. Negative for behavioral problems. The patient is not nervous/anxious.       Vitals:    11/06/24 1317   BP: 108/64   Pulse: 100   Resp: 16   Temp: 98.1 °F (36.7 °C)   TempSrc: Oral   SpO2: 95%   Weight: 62.5 kg (137 lb 11.2 oz)   Height: 5' (1.524 m)     Wt Readings from Last 3 Encounters:   11/06/24 62.5 kg (137 lb 11.2 oz)   06/12/24 60.6 kg (133 lb 11.2 oz)   05/22/24 60.3 kg (133 lb)     Physical Exam  Vitals reviewed.   Constitutional:       General: She is not in acute distress.     Appearance: Normal appearance. She is well-developed. She is not ill-appearing.      Comments:  present   HENT:      Head: Normocephalic and atraumatic.      Right Ear: External ear normal.      Left Ear: External ear normal.      Nose: Nose normal.   Eyes:      Extraocular Movements: Extraocular movements intact.      Conjunctiva/sclera: Conjunctivae normal.   Cardiovascular:      Rate and Rhythm: Normal rate and regular rhythm.      Pulses: Normal pulses.      Heart sounds: Normal heart sounds. No murmur heard.  Pulmonary:      Effort: Pulmonary effort is normal. No respiratory distress.      Breath sounds: Normal breath sounds.      Comments: Wearing supplemental oxygen  Abdominal:      General: Abdomen is flat. Bowel sounds are normal. There is no distension.      Palpations: Abdomen is soft.      Tenderness: There is no abdominal tenderness.   Musculoskeletal:         General: Normal range of motion.      Right hand: Normal capillary refill.      Left hand: Normal capillary refill.      Cervical back: Normal range of motion and neck supple.      Comments: Cast on right wrist   Skin:     General: Skin is warm and dry.      Capillary Refill: Capillary refill takes less than 2 seconds.      Coloration: Skin is not pale.      Findings: No rash.   Neurological:      General: No focal deficit present.      Mental Status: She is alert and oriented to person, place, and time. Mental status is at baseline.   Psychiatric:         Mood and Affect: Mood normal. Mood is not anxious or depressed. Affect  is not angry or tearful.         Speech: Speech normal. Speech is not rapid and pressured, delayed or slurred.         Behavior: Behavior normal. Behavior is not agitated, slowed, aggressive, withdrawn, hyperactive or combative. Behavior is cooperative.         Thought Content: Thought content normal. Thought content does not include homicidal or suicidal ideation.         Judgment: Judgment normal.       Health Maintenance   Topic Date Due    DEXA Scan  03/30/2025    Lipid Panel  04/14/2027    TETANUS VACCINE  01/28/2031    Shingles Vaccine  Completed     DISCLAIMER: This note was compiled by using a speech recognition dictation system and therefore please be aware that typographical / speech recognition errors can and do occur.  Please contact me if you see any errors specifically.  Consent was obtained for June [employer] recording system prior to the visit.

## 2024-11-06 NOTE — ASSESSMENT & PLAN NOTE
Trial of Trazodone.  Discussed insomnia condition course.  Advised of first-line medications for this condition.  Also discussed sleep hygiene.  Information was given below.  Good sleep habits (sometimes referred to as sleep hygiene) can help you get a good nights sleep.    Some habits that can improve your sleep health:  -Be consistent. Go to bed at the same time each night and get up at the same time each morning, including on the weekends  -Make sure your bedroom is quiet, dark, relaxing, and at a comfortable temperature  -Remove electronic devices, such as TVs, computers, and smart phones, from the bedroom  -Avoid large meals, caffeine, and alcohol before bedtime  -Get some exercise. Being physically active during the day can help you fall asleep more easily at night.

## 2024-12-02 ENCOUNTER — PROCEDURE VISIT (OUTPATIENT)
Dept: OPHTHALMOLOGY | Facility: CLINIC | Age: 79
End: 2024-12-02
Payer: MEDICARE

## 2024-12-02 DIAGNOSIS — H35.3221 EXUDATIVE AGE-RELATED MACULAR DEGENERATION OF LEFT EYE WITH ACTIVE CHOROIDAL NEOVASCULARIZATION: Primary | ICD-10-CM

## 2024-12-02 DIAGNOSIS — H35.3122 NONEXUDATIVE AGE-RELATED MACULAR DEGENERATION, LEFT EYE, INTERMEDIATE DRY STAGE: ICD-10-CM

## 2024-12-02 DIAGNOSIS — H35.3212 EXUDATIVE AGE-RELATED MACULAR DEGENERATION OF RIGHT EYE WITH INACTIVE CHOROIDAL NEOVASCULARIZATION: ICD-10-CM

## 2024-12-02 PROCEDURE — 92134 CPTRZ OPH DX IMG PST SGM RTA: CPT | Mod: S$GLB,,, | Performed by: OPHTHALMOLOGY

## 2024-12-02 PROCEDURE — 67028 INJECTION EYE DRUG: CPT | Mod: LT,S$GLB,, | Performed by: OPHTHALMOLOGY

## 2024-12-02 PROCEDURE — 92012 INTRM OPH EXAM EST PATIENT: CPT | Mod: 25,S$GLB,, | Performed by: OPHTHALMOLOGY

## 2024-12-02 RX ORDER — SUCRALFATE 1 G/1
TABLET ORAL
COMMUNITY
Start: 2024-11-06
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
## 2024-12-03 ENCOUNTER — TELEPHONE (OUTPATIENT)
Dept: FAMILY MEDICINE | Facility: CLINIC | Age: 79
End: 2024-12-03
Payer: MEDICARE

## 2024-12-03 NOTE — TELEPHONE ENCOUNTER
Spoke with pt, informed order for PT for broken wrist would have to come from Dr. Mathew at [place].

## 2024-12-03 NOTE — TELEPHONE ENCOUNTER
----- Message from Katie sent at 12/3/2024  1:45 PM CST -----  Name of Who is Calling: Pt         What is the request in detail: Pt would like call back to discuss getting physical therapy set up for  right wrist and transportation services needed for visit .Please advise thank you         Can the clinic reply by [facility]:no         [email or web address]:Telephone Information:  Mobile          [phone]

## 2024-12-16 ENCOUNTER — TELEPHONE (OUTPATIENT)
Dept: FAMILY MEDICINE | Facility: CLINIC | Age: 79
End: 2024-12-16
Payer: MEDICARE

## 2024-12-16 NOTE — TELEPHONE ENCOUNTER
----- Message from Estevan sent at 12/16/2024 10:43 AM CST -----  Contact: isael  Type:  Needs Medical Advice    Who Called: isael  Symptoms (please be specific): /   How long has patient had these symptoms:  /  Pharmacy name and phone #:  /  Would the patient rather a call back or a response via [facility]? call  Best Call Back Number: [phone]   Additional Information: suggestions on OT for wrist

## 2024-12-30 ENCOUNTER — TELEPHONE (OUTPATIENT)
Dept: FAMILY MEDICINE | Facility: CLINIC | Age: 79
End: 2024-12-30
Payer: MEDICARE

## 2024-12-30 ENCOUNTER — TELEPHONE (OUTPATIENT)
Dept: ORTHOPEDICS | Facility: CLINIC | Age: 79
End: 2024-12-30
Payer: MEDICARE

## 2024-12-30 DIAGNOSIS — S52.501A DISPLACED FRACTURE OF DISTAL END OF RIGHT RADIUS: Primary | ICD-10-CM

## 2024-12-30 NOTE — TELEPHONE ENCOUNTER
----- Message from Summer sent at 12/30/2024 10:35 AM CST -----  Contact: Soumya  Patient called asking for advice about is requesting a referral for ortho [facility] in [facility]. Please call patient at [phone]. Thanks!

## 2024-12-30 NOTE — TELEPHONE ENCOUNTER
I have signed for the following orders AND/OR meds.  Please call the patient and ask the patient to schedule the testing AND/OR inform about any medications that were sent. Medications have been sent to pharmacy listed below      Orders Placed This Encounter   Procedures    Ambulatory referral/consult to Orthopedics     Standing Status:   Future     Standing Expiration Date:   1/30/2026     Referral Priority:   Routine     Referral Type:   Consultation     Requested Specialty:   Orthopedic Surgery     Number of Visits Requested:   1              [facility] - [place] - [address] [facility] [address] [facility]  [address] [facility] [address]  Phone: [phone] Fax: [phone]    [id number] [place] - [address] AT [address] OF [facility] [address] [facility]  [address]  [place] [address]  Phone: [phone] Fax: [phone]    [facility] [address] [facility] [address]  Phone: [phone] Fax: [phone]

## 2024-12-30 NOTE — TELEPHONE ENCOUNTER
Called and spoke with patient regarding upcoming scheduled appointment for January 3 with Dr. Lepe.  Due to the involvement of the injury suggested patient follow up with a Hand Specialist as she is wanting a second opinion from initially seeing provider at [place]. Forwarded message to hand specialist office in [place] to reach out to patient for scheduling.  Let patient know that January 3 appt would be cancelled and that the [place] office should contact her for scheduling.

## 2024-12-30 NOTE — TELEPHONE ENCOUNTER
12/30/2024 11:42 AM   I spoke with the patient about this. Pt stated she had a right wrist break in October- referred to another provider by [facility] and wants to be seen with [facility] in [place]. She asked that we schedule Wednesday appts only.

## 2024-12-31 ENCOUNTER — CLINICAL SUPPORT (OUTPATIENT)
Dept: REHABILITATION | Facility: HOSPITAL | Age: 79
End: 2024-12-31
Payer: MEDICARE

## 2024-12-31 DIAGNOSIS — M25.531 PAIN IN RIGHT WRIST: Primary | ICD-10-CM

## 2024-12-31 DIAGNOSIS — Z78.9 IMPAIRED MOBILITY AND ADLS: ICD-10-CM

## 2024-12-31 DIAGNOSIS — M25.631 STIFFNESS OF RIGHT WRIST JOINT: ICD-10-CM

## 2024-12-31 DIAGNOSIS — Z74.09 IMPAIRED MOBILITY AND ADLS: ICD-10-CM

## 2024-12-31 DIAGNOSIS — R60.0 LOCALIZED EDEMA: ICD-10-CM

## 2024-12-31 PROCEDURE — 97166 OT EVAL MOD COMPLEX 45 MIN: CPT | Mod: PN

## 2024-12-31 PROCEDURE — 97110 THERAPEUTIC EXERCISES: CPT | Mod: PN

## 2024-12-31 NOTE — PATIENT INSTRUCTIONS
[facility], OCCUPATIONAL THERAPY  HOME EXERCISE PROGRAM    Do not worry about scar massage.

## 2024-12-31 NOTE — PLAN OF CARE
[facility]  Occupational Therapy Initial Evaluation     Date: 12/31/2024  Name: Soumya Melchor  Clinic Number: [id number]    Therapy Diagnosis:   Encounter Diagnoses   Name Primary?    Pain in right wrist Yes    Stiffness of right wrist joint     Localized edema     Impaired mobility and ADLs      Physician: Magali De Souza FNP    Physician Orders: OT eval and tx; ROM and strength R hand after closed treatment of distal radius fracture; WBAT  Medical Diagnosis: closed fracture of right wrist with routine healing, subsequent encounter (S62.101D)   Evaluation Date: 12/31/2024  Insurance Authorization Period Expiration: 12/24/2024-12/24/2025  Plan of Care Certification Period: 12/31/2024 to 2/13/2025  Progress Note Due: within 30 days      Visit # / Visits authorized: 1/1  FOTO: 1/3    Surgical Procedure: none   Date of Surgery: none      Date of Return to MD: she is looking to get a follow up opinion in the [facility]     Precautions:   WBAT ; copd, asthma     Time In: 11:00 am  Time Out: 12:00 pm   Total Appointment Time (timed & untimed codes): 60 minutes    Subjective     Date of Onset: 10/3/2024 DOI and saw MD on the 5th of October     Per Dr. Quincy Russ Notes on 11/20/2024:   Plan   Orders Placed This Encounter   XR Wrist Right 3 + Views     - we reviewed the diagnosis and potential treatment options in detail  - we reviewed xrs  - we discussed transitioning to the brace today  - we recommended OT for ROM and strength, patient declined  - she would like to work out her wrist on her own  - patient will follow up after the holidays    IMAGALI FNP, am scribing for, and in the presence of, Dr. Quincy Russ.  NEERAJ LAFLEUR, Scribe  11/20/24    IDr. Quincy., personally performed the services described in this documentation, as scribed by NEERAJ LAFLEUR in my presence, and it is both accurate and complete.  Quincy Russ MD  11/20/24      Imaging: see emr    Mechanism of Injury/ History of Current Condition: Ms. Soumya Melchor is a 79 y.o. female who presented to the ED  on 10/5/2024 with complaints of fall when stepping backward. 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  Patient is right-hand dominant. She was placed in  in well padded short arm cast and educated on cast care 10/9/2024. The cast was removed on 11/20/2024 and she transitioned to a brace. She originally denied therapy services due to the holidays but has since changed her mind. Pt has not worn the brace since seeing the rehab individual at [facility] one time.     Falls: [x] Yes [] No Comments:10/5/2024    Previous Therapy: [] Yes [x] No Comments:     Involved Side: [x] Right [] Left [] Bilateral    Dominant Side: Right     Mechanism of Injury: [x] Acute Trauma []  Surgical  [] Cumulative/Repetitive Strain Injury [] Congenital  [] Degenerative Condition   [] ______     Pain:  Functional Pain Scale Rating 0-10:   4/10 on average  1/10 at best  9/10 at worst  Location: dorsum of her radial side of her wrist and the ulnar boarder   Description: Tingling, Numb, and Sharp- pt reports numbness within her index and her middle finger but when she raises her hand the numbness goes away   Aggravating Factors: pushing, pulling, lifting, carrying and performing hair.   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      Current Functional Status: moderate with all ADLs but does require some assistance from her  and does require oxygen for her COPD,                 Home/Living environment: lives with their spouse       "                     - [x] One  [] Two story home, 3 steps to enter                          - DME: [] None [] Wheelchair [x] Walker; does not use it.  [] Bedside Commode []Shower/Tub Chair [x] Cane; does use the cane if she walks outside [x] walk in tub.                             Limitation of Functional Status as follows:              ADLs/IADLs:                           - Feeding: []No []Minimal [x]Moderate[]Significant; pt has to compensate when using silverware and she is unable to cut her own meat.                           - Bathing: []No []Minimal [x]Moderate[]Significant ; pt has her  to assist her with activities                           - Dressing/Grooming: []No []Minimal [x]Moderate[]Significant; pt is using adaptive tools to perform tasks such as using a paper clip to perform fasteners.                           - Driving:  []No []Minimal [x]Moderate[]Significant; pt is mainly using her left hand.                     Leisure: pt wishes to take more interest in bathing her animal,      Patient's Goals for Therapy: "I want to use my hand again like I used to if possible."        Past Medical History/Physical Systems Review:   Medical History:   Past Medical History:   Diagnosis Date    Acid reflux 04/11/2014    Allergy     Anemia     Anxiety and depression 03/06/2014    AP (angina pectoris) 02/13/2017    Asthma     Biliary dyskinesia     CAD (coronary artery disease) 02/13/2017    CAD, multiple vessel 02/13/2017    Chronic pain associated with significant psychosocial dysfunction 02/21/2013    Chronic respiratory failure with hypoxia     on home o2 3lpm    CKD (chronic kidney disease) stage 3, GFR 30-59 ml/min     Dr. Vásquez    COPD (chronic obstructive pulmonary disease)     well controlled, Dr Gomez [facility]    Depression     GERD (gastroesophageal reflux disease)     History of shingles     HTN (hypertension)     Hypothyroidism     Lung nodules     Memory loss     Multiple allergies  "    Neuropathy     Prediabetes     S/P CABG (coronary artery bypass graft) 02/13/2017       Surgical History:    has a past surgical history that includes Knee surgery (Right); Dilation and curettage of uterus; Spinal cord stimulator implant (02/12/2018); Tonsillectomy; Upper gastrointestinal endoscopy; Esophagogastroduodenoscopy (N/A, 07/03/2018); Endoscopic ultrasound of upper gastrointestinal tract (Left, 07/03/2018); Cardiac surgery (02/2017); Robot-assisted surgical removal of stomach using da Kenia Xi (N/A, 07/31/2018); Tumor removal; Insertion of dorsal column nerve stimulator for trial (N/A, 06/19/2019); Insertion of neurostimulator of dorsal column of spinal cord (N/A, 07/25/2019); Replacement of nerve stimulator battery (07/25/2019); Colonoscopy (~2013); Colonoscopy (N/A, 03/15/2018); Cataract extraction (Bilateral, 2014); Esophagogastroduodenoscopy (N/A, 07/29/2020); Laparoscopic cholecystectomy (N/A, 07/13/2022); Esophagogastroduodenoscopy (N/A, 07/25/2022); Colonoscopy (N/A, 07/25/2022); and Cholecystectomy (2022).    Medications:   has a current medication list which includes the following prescription(s): acetaminophen-codeine 300-15mg, albuterol, albuterol, amlodipine, ascorbic acid (vitamin c), aspirin, breztri aerosphere, calcium carbonate, duloxetine, ezetimibe, ferosul, fish oil-omega-3 fatty acids, fluticasone propionate, ibuprofen, l.acid/l.casei/b.bif/b.miryam/fos, levothyroxine, magnesium oxide, metoprolol tartrate, montelukast, multivit-min/iron/fa/vit k/lut, ondansetron, oxcarbazepine, pregabalin, rabeprazole, simvastatin, sucralfate, trazodone, and vitamins a,c,e-zinc-copper.    Allergies:   Review of patient's allergies indicates:   Allergen Reactions    Losartan Swelling     angioedema    Ace inhibitors Other (See Comments) and Swelling     unknown    Angioedema    Arb-angiotensin receptor antagonist Other (See Comments)     unknown  unknown      Iodine and iodide containing products Hives      Since age of 48    Lisinopril      angioedema    Metoprolol tartrate      swelling    Shrimp Other (See Comments) and Swelling     Localized itching and swelling on her hands if she peels shrimp.  Able to eat shrimp without difficulty.  No generalized reaction.          Objective     Inspection: Deformities noted: angulation present with a dinner fork presentation  of her R wrist.     Observation/Appearance:  Please see circumferential measures below. Pt presents with increased edema in her affected wrist. Pt is able to make a composite fist and oppose to each digit. Limited AROM present with the wrist. Angulation deformity noted.     Sensation: Intact to light touch. Hypersensitivity reported. Paresthesias reported.     Edema: Circumferential measurements (in centimeters)   Right Left    12/31/2024 12/31/2024   Proximal Wrist Crease 17 14   MCPs 17 18          right Upper Extremity Active Range of Motion:     Right Left   ROM (in degrees) 12/31/2024 12/31/2024   Pronation 90 90   Supination 85 pain and compensation  90   Wrist     Radial deviation 10 25   Ulnar deviation 10 30   Wrist flexion 10 65   Wrist extension 50 60       right Hand Active Range of Motion: Pt has full AROM of her R dominant hand making a fist and can oppose to each digit. Pt has no pain within her hand when making a fist.     bilateral Thumb Active Range of Motion:   (Ext/Flex) 12/31/2024   MCP Jt R 0/65° vs. L 50   IP Jt R 0/60° vs. L 80   Opposition Kapandji score: 10/10   Palmar Abd R 40° L 55   Radial Abd R 40° L 50                         and Pinch Strength (in pounds, psi's): ON HOLD     Dexterity:    12/31/2024 12/31/2024   9 Peg Test Left  Right   Time 28 sec 37 sec         Intake Outcome Measure for FOTO wrist  Survey    Therapist reviewed FOTO scores for Soumya V Pomares on 12/31/2024.   FOTO documents entered into [employer] - see Media section.    Intake Score: 41%     Predicted Functional Score: 60% in 11 visits    Treatment      Total Treatment time (time-based codes) separate from Evaluation: 15 minutes    Latia received the treatments listed below:      therapeutic exercises to develop ROM for 10 minutes including:  - please see pt instructions on the exercises provided on this date to address her limited AROM.     self-care techniques to improve independence and safety with ADL/IADL tasks for 5 minutes including:  - pt provided education on edema control and issued a tubigrip stockingnette.     Home Exercise Program/Education:    Education provided:   - Role of OT, goals for OT, scheduling/cancellations, therapy attendance policy    Written Home Exercises Provided: Yes  Exercises were reviewed and Latia was able to demonstrate them prior to the end of the session. Latia demonstrated good understanding of the education provided. See EMR under Patient Instructions for exercises provided during therapy sessions.     Pt was advised to perform these exercises free of pain, and to stop performing them if pain occurs.    Assessment     Soumya Melchor is a 79 y.o. female referred to outpatient occupational therapy and presents with a medical diagnosis of Closed fracture of wrist, right, with routine healing, subsequent encounter [S62.101D] . She injured her wrist back in October of 2024 and was casted numerous times. She has residual edema and an angulation deformity that she is dissatisfied with. Per the last xrays her fracture has not healed completely. She is seeking a follow up opinion but has not yet seen a provider within the [facility]. Patient presents with the following therapy deficits: Decreased ROM, Decreased  strength, Decreased pinch strength, Decreased muscle strength, Decreased functional hand use, Increased pain, localized Edema, Joint Stiffness, and Diminished/Impaired Coordination and demonstrates limitations as described in the chart below.     Following medical record review, it is determined that the pt will  benefit from occupational therapy services in order to maximize pain free and/or functional use of her right wrist/hand. The following goals were discussed with the patient and patient is in agreement with them as to be addressed in the treatment plan. The patient's rehab potential is Good.     Anticipated barriers to occupational therapy: participation in HEP and attendance to therapy and chronicity of her condition at this time.     Pt has no cultural, educational or language barriers to learning provided.    Medical Necessity is demonstrated by the following  Occupational Profile/History  Co-morbidities and personal factors that may impact the plan of care [] LOW: Brief chart review  [x] MODERATE: Expanded chart review   [] HIGH: Extensive chart review    Moderate / High Support Documentation: PMH     Examination  Performance deficits relating to physical, cognitive or psychosocial skills that result in activity limitations and/or participation restrictions  [] LOW: addressing 1-3 Performance deficits  [x] MODERATE: 3-5 Performance deficits  [] HIGH: 5+ Performance deficits (please support below)    Moderate / High Support Documentation:    Physical:  Muscle Power/Strength  Muscle Endurance  Edema   Strength  Pinch Strength  Gross Motor Coordination  Fine Motor Coordination  Proprioception Functions  Pain    Cognitive:  No Deficits    Psychosocial:    No Deficits     Treatment Options [] LOW: Limited options  [x] MODERATE: Several options  [] HIGH: Multiple options      Decision Making/ Complexity Score: moderate       The following goals were discussed with the patient and patient is in agreement with them as to be addressed in the treatment plan.     Goals:   Short Term Goals: (3 weeks)  1. Pt will be independent with HEP in 2 visits and understanding for pt education provided to help reduce her pain.   2. Pt will report decreased pain to a 5/10 with ADL/IADL tasks.   3. Pt will increase R forearm sup AROM  by 5 degrees to enable dressing, grooming activities and hair styling.   4. Pt will increase R wrist flex/ext AROM by 10 degrees to enable dressing, grooming activities.  5. Pt will report an increase in FOTO intake score of > 55%, which would indicate an improvement in quality of life.  6. Pt will be compliant with edema management and display decreased swelling and improved mobility of her affected R wrist     Long Term Goals: (6 weeks)  1. Pt will report decreased pain to 1-2/10 with ADL/IADL tasks.   2. Pt will exhibit 10 degrees of improvement in  R wrist RD/UD to enable independence with self-care and meal preparation.  3.  strength to be assessed at a later date and goals will be added.   4. Pt will report an increase in FOTO intake score of > 60%, which would indicate an improvement in quality of life.  5. Pt will make a full, flat, tight  composite fist to enable grasping and squeezing objects for self-care with no reports of pain when holding a curling iron or hairbrush when working.    6. Pt will present with decreased time on the 9 hole peg test with her R affected hand  to indicate increased ability to perform dexterous tasks such as fasting clothing fasteners.     Plan   Plan of Care Certification: 12/31/2024 to 2/13/2025     Outpatient Occupational Therapy 2 times weekly for 6 weeks to include the following interventions PRN: Fluidotherapy, Manual Therapy, Moist Heat/ Ice, Neuromuscular Re-ed, Orthotic Management and Training, Paraffin, Patient Education, Self Care, Therapeutic Activities, Therapeutic Exercise, and Ultrasound      Lola Ayers OT      I CERTIFY THE NEED FOR THESE SERVICES FURNISHED UNDER THIS PLAN OF TREATMENT AND WHILE UNDER MY CARE  Physician's comments:      Physician's Signature: ___________________________________________________

## 2025-01-08 ENCOUNTER — TELEPHONE (OUTPATIENT)
Dept: FAMILY MEDICINE | Facility: CLINIC | Age: 80
End: 2025-01-08
Payer: MEDICARE

## 2025-01-08 NOTE — TELEPHONE ENCOUNTER
----- Message from [facility] sent at 1/8/2025  8:46 AM CST -----  Contact: CÉSAR LEMON [[id number]]  .Type:  Patient Requesting Call    Who Called:CÉSAR LEMON [[id number]]  Does the patient know what this is regarding?:[facility]  due to him having a stroke late Friday night and she had to go have two X-rays taken and something was found on her right lung. Please call back   Would the patient rather a call back or a response via [facility]? Call back  Best Call Back Number:.[phone] (home)    Additional Information:

## 2025-01-09 ENCOUNTER — TELEPHONE (OUTPATIENT)
Facility: CLINIC | Age: 80
End: 2025-01-09
Payer: MEDICARE

## 2025-01-09 NOTE — TELEPHONE ENCOUNTER
Received msg from A that Dr Colindres wanted pt to be scheduled in our lung nodule clinic with Dr Ag for 1/14. LVM for pt to call back and schedule.   Requested images from [facility].

## 2025-01-09 NOTE — NURSING
Received msg from [facility] that Dr Colindres wanted pt to be scheduled in our lung nodule clinic with Dr Ag for 1/14. LVM for pt to call back and schedule.   Requested images from [facility].    Oncology Navigation   Intake  Cancer Type: LDCT/Incidental Lung Finding     Treatment  Current Status: Staging work-up             Procedures: X-Ray                 Acuity      Follow Up  No follow-ups on file.

## 2025-01-10 ENCOUNTER — TELEPHONE (OUTPATIENT)
Facility: CLINIC | Age: 80
End: 2025-01-10
Payer: MEDICARE

## 2025-01-10 NOTE — TELEPHONE ENCOUNTER
Spoke with pt in regards to scheduling appt with Dr Ag in lung nodule clinic. Pt refused and is questioning why she cannot just be scheduled with her pulmonologist. Advised pt I would reach out to [facility] for guidance. Pt BENI.

## 2025-01-13 ENCOUNTER — OFFICE VISIT (OUTPATIENT)
Dept: FAMILY MEDICINE | Facility: CLINIC | Age: 80
End: 2025-01-13
Payer: MEDICARE

## 2025-01-13 VITALS
DIASTOLIC BLOOD PRESSURE: 77 MMHG | HEART RATE: 87 BPM | TEMPERATURE: 98 F | BODY MASS INDEX: 27.88 KG/M2 | WEIGHT: 142 LBS | HEIGHT: 60 IN | SYSTOLIC BLOOD PRESSURE: 137 MMHG

## 2025-01-13 DIAGNOSIS — H35.3212 EXUDATIVE AGE-RELATED MACULAR DEGENERATION OF RIGHT EYE WITH INACTIVE CHOROIDAL NEOVASCULARIZATION: ICD-10-CM

## 2025-01-13 DIAGNOSIS — E78.2 MIXED HYPERLIPIDEMIA: ICD-10-CM

## 2025-01-13 DIAGNOSIS — R91.1 PULMONARY NODULE, RIGHT: Primary | ICD-10-CM

## 2025-01-13 DIAGNOSIS — R56.9 SEIZURES, TRANSIENT: ICD-10-CM

## 2025-01-13 DIAGNOSIS — J44.89 ASTHMA WITH COPD: ICD-10-CM

## 2025-01-13 DIAGNOSIS — J96.11 CHRONIC RESPIRATORY FAILURE WITH HYPOXIA: ICD-10-CM

## 2025-01-13 DIAGNOSIS — N18.32 STAGE 3B CHRONIC KIDNEY DISEASE: ICD-10-CM

## 2025-01-13 PROCEDURE — 1126F AMNT PAIN NOTED NONE PRSNT: CPT | Mod: CPTII,S$GLB,, | Performed by: INTERNAL MEDICINE

## 2025-01-13 PROCEDURE — G2211 COMPLEX E/M VISIT ADD ON: HCPCS | Mod: S$GLB,,, | Performed by: INTERNAL MEDICINE

## 2025-01-13 PROCEDURE — 99999 PR PBB SHADOW E&M-EST. PATIENT-LVL V: CPT | Mod: PBBFAC,,, | Performed by: INTERNAL MEDICINE

## 2025-01-13 PROCEDURE — 3288F FALL RISK ASSESSMENT DOCD: CPT | Mod: CPTII,S$GLB,, | Performed by: INTERNAL MEDICINE

## 2025-01-13 PROCEDURE — 3075F SYST BP GE 130 - 139MM HG: CPT | Mod: CPTII,S$GLB,, | Performed by: INTERNAL MEDICINE

## 2025-01-13 PROCEDURE — 1160F RVW MEDS BY RX/DR IN RCRD: CPT | Mod: CPTII,S$GLB,, | Performed by: INTERNAL MEDICINE

## 2025-01-13 PROCEDURE — 1159F MED LIST DOCD IN RCRD: CPT | Mod: CPTII,S$GLB,, | Performed by: INTERNAL MEDICINE

## 2025-01-13 PROCEDURE — 99214 OFFICE O/P EST MOD 30 MIN: CPT | Mod: S$GLB,,, | Performed by: INTERNAL MEDICINE

## 2025-01-13 PROCEDURE — 3078F DIAST BP <80 MM HG: CPT | Mod: CPTII,S$GLB,, | Performed by: INTERNAL MEDICINE

## 2025-01-13 PROCEDURE — 1101F PT FALLS ASSESS-DOCD LE1/YR: CPT | Mod: CPTII,S$GLB,, | Performed by: INTERNAL MEDICINE

## 2025-01-13 RX ORDER — EZETIMIBE 10 MG/1
10 TABLET ORAL DAILY
Qty: 90 TABLET | Refills: 3 | Status: SHIPPED | OUTPATIENT
Start: 2025-01-13

## 2025-01-15 ENCOUNTER — CLINICAL SUPPORT (OUTPATIENT)
Dept: REHABILITATION | Facility: HOSPITAL | Age: 80
End: 2025-01-15
Payer: MEDICARE

## 2025-01-15 DIAGNOSIS — M25.631 STIFFNESS OF RIGHT WRIST JOINT: ICD-10-CM

## 2025-01-15 DIAGNOSIS — M25.531 PAIN IN RIGHT WRIST: Primary | ICD-10-CM

## 2025-01-15 DIAGNOSIS — Z74.09 IMPAIRED MOBILITY AND ADLS: ICD-10-CM

## 2025-01-15 DIAGNOSIS — Z78.9 IMPAIRED MOBILITY AND ADLS: ICD-10-CM

## 2025-01-15 DIAGNOSIS — R60.0 LOCALIZED EDEMA: ICD-10-CM

## 2025-01-15 PROCEDURE — 97110 THERAPEUTIC EXERCISES: CPT | Mod: PN

## 2025-01-15 PROCEDURE — 97018 PARAFFIN BATH THERAPY: CPT | Mod: PN

## 2025-01-15 PROCEDURE — 97112 NEUROMUSCULAR REEDUCATION: CPT | Mod: PN

## 2025-01-15 PROCEDURE — 97140 MANUAL THERAPY 1/> REGIONS: CPT | Mod: PN

## 2025-01-15 NOTE — PROGRESS NOTES
"  [facility]  Occupational Therapy Treatment Note     Date: 1/15/2025  Name: Soumya Melchor  Clinic Number: [id number]    Therapy Diagnosis:   Encounter Diagnoses   Name Primary?    Pain in right wrist Yes    Stiffness of right wrist joint     Localized edema     Impaired mobility and ADLs      Physician: Magali De Souza FNP      Physician Orders: OT eval and tx; ROM and strength R hand after closed treatment of distal radius fracture; WBAT  Medical Diagnosis: closed fracture of right wrist with routine healing, subsequent encounter (S62.101D)   Evaluation Date: 12/31/2024  Insurance Authorization Period Expiration: 1/2/2025-12/31/2025  Plan of Care Certification Period: 12/31/2024 to 2/13/2025  Progress Note Due: within 30 days       Visit # / Visits authorized: 1/20 plus one evaluation   FOTO: 1/3     Surgical Procedure: none   Date of Surgery: none                                     Date of Return to MD: she is looking to get a follow up opinion in the [facility]      Precautions:   WBAT ; copd, asthma      Time In: 11:50 pm  Time Out: 12:50 pm   Total Appointment Time (timed & untimed codes): 60 minutes    Subjective     Pt reports: "My  had a stroke on January 3rd that is why I haven't been in and able to attend the past few weeks have been a bit much with him in rehab."     she was compliant with home exercise program given on evaluation.  Response to previous treatment: good.   Functional change: fair response to home exercises.     Pain: 0/10   Location:     Objective   Objective measures updated at progress report unless specified.    Treatment     Latia received the treatments listed below:       Supervised Modalities: Modalities such as hot/cold packs, traction, unattended electrical stimulation, paraffin bath, fluidotherapy to reduce pain and increase soft tissue extensibility. Pt received (8) minutes of modalities listed below after being cleared for " contraindications.  x = modalities performed      Supervised Modalities         Paraffin bath with MHP x 8 minutes             fluidotherapy   15 minutes               Direct Contact Modalities: Modalities such as attended electrical stimulation, iontophoresis, ultrasound to reduce pain and increase soft tissue extensibility. Pt received (0) minutes of modalities listed below after being cleared for contraindications. x = modalities performed      Direct Contact Modalities         MHz,  W/cm2, continuous to  surgical site, to decrease pain & edema, increase circulation and tissue extensibility.              Manual Therapy Techniques: Joint mobilizations, Soft tissue Mobilization, and mobilization with movement applied to the area listed below. Pt received (20) minutes of interventions. x = intervention performed today     Manual Intervention      Extensive Soft Tissue Mobilization, Myofacial Release, Manual Lymphatic Drainage, IASTM, Cupping, Vibration x R hand/edema massage   to increase blood flow/circulation, improve soft tissue pliability and decrease pain/edema    Joint Mobilizations       Mobilization with movement       Scar Management             Therapeutic Exercises: to develop strength, endurance, ROM, flexibility, posture and core stabilization. Pt received (12) minutes of exercises listed below. x = performed today * = level of progression of activity      Therapeutic Exercise      Wrist AROM x All planes of movement -no weight on this date with therapist guidance   Thumb AROM  x Palmar and radial abduction and circumduction   Adduction/abduction of digits x x10   Digital extension  x x10   Opposition(finger taps) x x10   Digit slides  x x10   Tendon glides x Hook fist, straight fist, lumbrical waves    Prayer stretch  x x10   Therapy putty exercises   Mold and squeeze, hold for a slow count of 5, putty roll outs and pinches all 3.                     Therapeutic Activities: Everyday tasks to address  the combined need of improved strength, range of motion, and endurance to achieve increased independence. While simulating these activities, the person is applying the gained skills of strength and improved range of motion in a practical way.  Pt received (4) minutes of activities listed below. x = activities performed today * = level of progression of activity      Therapeutic Activities       dart throwers motion         marbles within the putty         pom pom          graded sponge and clothespin task (lat, tip or tri pinch)        Blue sponge between digits for intrinsic strengthening      Towel walks/scrunches  x 2 min    Graded sponge gather and translate to the tip of index  x 2 min       Neuromuscular Re-education: the use of functional strengthening, stretching, balancing and coordination activities that train the nerves and muscles to react and communicate to restore normal body movement patterns to increase self care independence. Pt received (8) minutes of interventions listed below.  x = interventions performed today * = level of progression of activity      Neuromuscular Re-education      FMC activity incorporating posture, coordination and movement patterns with velcro board        Isospheres x   2 min     wrist maze x   3 min     [place]bee with tennis ball        Grey wheel  x 3 min flexion extension pronation supination    Grooved peg board addressing in hand manipulation and coordination         Self Care: Fundamental skills required to independently care for oneself. Activities of daily living such as eating, bathing, dressing, toileting, grooming, sleeping, transfers and mobility. Instrumental activities of daily living such as driving, medication management, pet care, home management/cleaning, financial management, using the phone, meal preparation and shopping. Pt received (0) minutes of interventions listed below.  x = interventions performed * = level of progression of activity       Self Care                                        Home Exercises and Education Provided     Education provided:   - reviewed HEP issued at evaluation  - progress toward goals    Written Home Exercises Provided: Patient instructed to cont prior HEP  Exercises were reviewed and Latia was able to demonstrate them prior to the end of the session. Latia demonstrated good understanding of the HEP provided.     See EMR under Patient Instructions for exercises provided during prior visit.        Assessment     Latia was seen for her first tx session since initial evaluation on this date. Pt has been scheduled to see a new provider on the 29th of January. She is currently wearing her FA over the counter wrist brace. Her movement on this date was improved from evaluation and decreased complaints of pain. Pt reeducated on the importance of performing her HEP. All exercises tolerated well and moderate verbal cues/tactile cues were utilized throughout treatment.     Latia is progressing towards her goals and there are no updates to goals at this time. Pt prognosis is Fair.     Latia will continue to benefit from skilled outpatient occupational therapy services to address the deficits listed in the problem list on initial evaluation, to provide pt/family education, and to maximize pt's level of independence in the home and community environment.     Pt's spiritual, cultural and educational needs considered and pt agreeable to plan of care and goals.    Anticipated barriers to occupational therapy: participation in HEP and attendance to therapy and chronicity of her condition at this time.       Goals:   Short Term Goals: (3 weeks)  1. Pt will be independent with HEP in 2 visits and understanding for pt education provided to help reduce her pain.   2. Pt will report decreased pain to a 5/10 with ADL/IADL tasks.   3. Pt will increase R forearm sup AROM by 5 degrees to enable dressing, grooming activities and hair styling.   4. Pt will  increase R wrist flex/ext AROM by 10 degrees to enable dressing, grooming activities.  5. Pt will report an increase in FOTO intake score of > 55%, which would indicate an improvement in quality of life.  6. Pt will be compliant with edema management and display decreased swelling and improved mobility of her affected R wrist      Long Term Goals: (6 weeks)  1. Pt will report decreased pain to 1-2/10 with ADL/IADL tasks.   2. Pt will exhibit 10 degrees of improvement in  R wrist RD/UD to enable independence with self-care and meal preparation.  3.  strength to be assessed at a later date and goals will be added.   4. Pt will report an increase in FOTO intake score of > 60%, which would indicate an improvement in quality of life.  5. Pt will make a full, flat, tight  composite fist to enable grasping and squeezing objects for self-care with no reports of pain when holding a curling iron or hairbrush when working.    6. Pt will present with decreased time on the 9 hole peg test with her R affected hand  to indicate increased ability to perform dexterous tasks such as fasting clothing fasteners.        Plan     Plan of Care Certification: 12/31/2024 to 2/13/2025      Outpatient Occupational Therapy 2 times weekly for 6 weeks to include the following interventions PRN: Fluidotherapy, Manual Therapy, Moist Heat/ Ice, Neuromuscular Re-ed, Orthotic Management and Training, Paraffin, Patient Education, Self Care, Therapeutic Activities, Therapeutic Exercise, and Ultrasound      Updates/Grading for next session: progress occupational therapy as tolerated    Lola Ayers OT

## 2025-01-28 PROBLEM — N18.31 STAGE 3A CHRONIC KIDNEY DISEASE: Status: RESOLVED | Noted: 2019-01-19 | Resolved: 2025-01-28

## 2025-01-28 PROBLEM — N18.32 STAGE 3B CHRONIC KIDNEY DISEASE: Status: ACTIVE | Noted: 2025-01-28

## 2025-01-28 NOTE — PROGRESS NOTES
Assessment/Plan:    1. Pulmonary nodule, right  Overview:  -monitored by pulmonology  -last CT in Oct 2021-stable  -recs to repeat in one year but never completed  -recent xray at outside facility reporting concern about growth of nodule  -repeat chest CT ordered  -needs follow up with pulmonology    Orders:  -     Cancel: CT Chest Without Contrast; Future; Expected date: 01/13/2025  -     CT Chest Without Contrast; Future; Expected date: 01/13/2025    2. Mixed hyperlipidemia  Overview:  Hyperlipidemia Medications               ezetimibe (ZETIA) 10 mg tablet Take 1 tablet (10 mg total) by mouth once daily.    fish oil-omega-3 fatty acids 300-1,000 mg capsule Take 1 capsule by mouth 2 (two) times daily.     simvastatin (ZOCOR) 40 MG tablet Take 1 tablet (40 mg total) by mouth every evening.          -chronic condition. Currently stable.    -reports compliance with hyperlipidemia treatment as prescribed  -denies any known adverse effects of medications  -most recent labs listed below; due for updated labs  Lab Results   Component Value Date    CHOL 218 (H) 04/14/2022     Lab Results   Component Value Date    HDL 91 (H) 04/14/2022     Lab Results   Component Value Date    LDLCALC 101.2 04/14/2022     Lab Results   Component Value Date    TRIG 129 04/14/2022     Lab Results   Component Value Date    ALT 24 10/19/2022    AST 32 10/19/2022    ALKPHOS 114 10/19/2022    BILITOT 0.3 10/19/2022       Orders:  -     ezetimibe (ZETIA) 10 mg tablet; Take 1 tablet (10 mg total) by mouth once daily.  Dispense: 90 tablet; Refill: 3    3. Stage 3b chronic kidney disease  Overview:  -previously followed by nephrology but due for follow up  -renally dose medication  -avoid nephrotoxic agents, including NSAIDs       4. Seizures, transient  Overview:  -hospitalized for AMS in early 2024 with EEG demonstrating left frontotemporal epileptiform discharges  -started on trileptal at that time and remains on this medications  -denies any  further episodes since hospital stay last year  -followed up with neurology after discharge but no recent visits      5. Exudative age-related macular degeneration of right eye with inactive choroidal neovascularization  Overview:  -managed by ophthalmology       6. Asthma with COPD  Overview:  -followed by pulmonology  -complicated by chronic respiratory failure  -previously on Breztri BID but not currently using  -due for pulmonology follow up      7. Chronic respiratory failure with hypoxia        Visit today included increased complexity associated with the care of the episodic problem(s) addressed and managing the longitudinal care of the patient due to the serious and/or complex managed problem(s). See above assessment/plan.    Mindy Hathaway MD  _____________________________________________________________________________________________________________________________________________________    CC: follow up of chronic medical conditions     Patient is in clinic today as an established patient.    History of Present Illness  The patient is an 79-year-old female here for a follow-up from urgent care.    She sought medical attention at an urgent care facility due to a persistent cough. An x-ray was performed, which ruled out pneumonia but revealed a nodule that has been present for some time. There is concern about potential growth of this nodule. She has been under the care of a pulmonologist, Dr. José Luis Colindres, with her most recent appointment in May 2024. She was seen by a nurse practitioner during this visit. Her cough has shown improvement since starting Robitussin-DM, and she reports resolution of crackling sounds and mucus production. She reports no weight loss and states that her shortness of breath remains stable.    She is currently on Zocor (simvastatin) for cholesterol management.    MEDICATIONS  Current: simvastatin, Robitussin-DM     No other new complaints today.  Remaining chronic conditions  have been reviewed and remain stable. Further detail as stated above.    Current Outpatient Medications on File Prior to Visit   Medication Sig Dispense Refill    acetaminophen-codeine 300-15mg (TYLENOL #2) 300-15 mg per tablet Take 1 tablet by mouth.      albuterol (PROVENTIL/VENTOLIN HFA) 90 mcg/actuation inhaler Inhale 2 puffs into the lungs every 6 (six) hours as needed for Wheezing or Shortness of Breath. 18 g 1    albuterol (VENTOLIN HFA) 90 mcg/actuation inhaler 2 puff EVERY 4 HOURS (route: inhalation)      amLODIPine (NORVASC) 5 MG tablet TAKE 1 TABLET BY MOUTH EVERY DAY FOR BLOOD PRESSURE 90 tablet 1    ascorbic acid, vitamin C, (VITAMIN C) 1000 MG tablet Take 1,000 mg by mouth once daily.      aspirin (ECOTRIN) 81 MG EC tablet Take 81 mg by mouth once daily.      calcium carbonate (TUMS) 200 mg calcium (500 mg) chewable tablet Take 2 tablets by mouth as needed.      DULoxetine (CYMBALTA) 30 MG capsule Take 1 capsule (30 mg total) by mouth 2 (two) times daily. 180 capsule 1    FEROSUL 325 mg (65 mg iron) Tab tablet Take by mouth.      L.acid/L.casei/B.bif/B.miryam/FOS (PROBIOTIC BLEND ORAL) Take 1 tablet by mouth 2 (two) times daily.      levothyroxine (SYNTHROID) 200 MCG tablet Take 1 tablet (200 mcg total) by mouth before breakfast. 90 tablet 3    metoprolol tartrate (LOPRESSOR) 25 MG tablet 1 tablet Orally Twice a day      montelukast (SINGULAIR) 10 mg tablet Take 1 tablet (10 mg total) by mouth every evening. 90 tablet 1    multivit-min/iron/folic/lutein (CENTRUM SILVER WOMEN ORAL) Take by mouth.      ondansetron (ZOFRAN) 4 MG tablet Take 1 tablet (4 mg total) by mouth every 6 (six) hours as needed for Nausea. 30 tablet 0    OXcarbazepine (TRILEPTAL) 150 MG Tab Take 1 tablet (150 mg total) by mouth 2 (two) times daily. 180 tablet 3    RABEprazole (ACIPHEX) 20 mg tablet Take 1 tablet (20 mg total) by mouth once daily. 30 tablet 11    simvastatin (ZOCOR) 40 MG tablet TAKE 1 TABLET BY MOUTH EVERY EVENING 90  tablet 3    sucralfate (CARAFATE) 1 gram tablet TAKE 1 TABLET BY MOUTH FOUR TIMES DAILY before meals and nightly      vitamins  A,C,E-zinc-copper 14,320-226-200 unit-mg-unit Cap Take 2 capsules by mouth once daily.       No current facility-administered medications on file prior to visit.       Review of Systems   Constitutional:  Negative for chills, diaphoresis, fatigue and fever.   HENT:  Negative for congestion, ear pain, postnasal drip, sinus pain and sore throat.    Eyes:  Negative for pain and redness.   Respiratory:  Negative for cough, chest tightness and shortness of breath.    Cardiovascular:  Negative for chest pain and leg swelling.   Gastrointestinal:  Negative for abdominal pain, constipation, diarrhea, nausea and vomiting.   Genitourinary:  Negative for dysuria and hematuria.   Musculoskeletal:  Negative for arthralgias and joint swelling.   Skin:  Negative for rash.   Neurological:  Negative for dizziness, syncope and headaches.   Psychiatric/Behavioral:  Negative for dysphoric mood. The patient is not nervous/anxious.      Vitals:    01/13/25 1409   BP: 137/77   Patient Position: Sitting   Pulse: 87   Temp: 98.3 °F (36.8 °C)   Weight: 64.4 kg (142 lb)   Height: 5' (1.524 m)       Wt Readings from Last 3 Encounters:   01/13/25 64.4 kg (142 lb)   11/06/24 62.5 kg (137 lb 11.2 oz)   06/12/24 60.6 kg (133 lb 11.2 oz)       Physical Exam  Constitutional:       General: She is not in acute distress.     Appearance: Normal appearance. She is well-developed.   HENT:      Head: Normocephalic and atraumatic.   Eyes:      Conjunctiva/sclera: Conjunctivae normal.   Cardiovascular:      Rate and Rhythm: Normal rate and regular rhythm.      Pulses: Normal pulses.      Heart sounds: Normal heart sounds. No murmur heard.  Pulmonary:      Effort: Pulmonary effort is normal. No respiratory distress.      Breath sounds: Normal breath sounds.   Abdominal:      General: Bowel sounds are normal. There is no distension.       Palpations: Abdomen is soft.      Tenderness: There is no abdominal tenderness.   Musculoskeletal:         General: Normal range of motion.      Cervical back: Normal range of motion and neck supple.   Skin:     General: Skin is warm and dry.      Findings: No rash.   Neurological:      General: No focal deficit present.      Mental Status: She is alert and oriented to person, place, and time.   Psychiatric:         Mood and Affect: Mood normal.         Behavior: Behavior normal.     DISCLAIMER: This note was compiled by using a speech recognition dictation system and therefore please be aware that typographical / speech recognition errors can and do occur.  Please contact me if you see any errors specifically.  Consent was obtained for [employer] recording system prior to the visit.

## 2025-01-29 DIAGNOSIS — M25.531 RIGHT WRIST PAIN: Primary | ICD-10-CM

## 2025-01-30 ENCOUNTER — CLINICAL SUPPORT (OUTPATIENT)
Dept: REHABILITATION | Facility: HOSPITAL | Age: 80
End: 2025-01-30
Payer: MEDICARE

## 2025-01-30 DIAGNOSIS — Z78.9 IMPAIRED MOBILITY AND ADLS: ICD-10-CM

## 2025-01-30 DIAGNOSIS — R60.0 LOCALIZED EDEMA: ICD-10-CM

## 2025-01-30 DIAGNOSIS — M25.631 STIFFNESS OF RIGHT WRIST JOINT: ICD-10-CM

## 2025-01-30 DIAGNOSIS — M25.531 PAIN IN RIGHT WRIST: Primary | ICD-10-CM

## 2025-01-30 DIAGNOSIS — Z74.09 IMPAIRED MOBILITY AND ADLS: ICD-10-CM

## 2025-01-30 PROCEDURE — 97018 PARAFFIN BATH THERAPY: CPT | Mod: PN

## 2025-01-30 PROCEDURE — 97110 THERAPEUTIC EXERCISES: CPT | Mod: PN

## 2025-01-30 NOTE — PROGRESS NOTES
"  Outpatient Rehab    Occupational Therapy Visit    Patient Name: Latia Melchor  MRN: [id number]  YOB: 1945  Today's Date: 1/31/2025    Therapy Diagnosis:   Encounter Diagnoses   Name Primary?    Pain in right wrist Yes    Stiffness of right wrist joint     Localized edema     Impaired mobility and ADLs      Physician: Magali De Souza FNP    Physician Orders: OT eval and tx; ROM and strength R hand after closed treatment of distal radius fracture; WBAT  Medical Diagnosis: closed fracture of right wrist with routine healing, subsequent encounter (S62.101D)   Evaluation Date: 12/31/2024    Insurance Authorization Period Expiration: 1/2/2025-12/31/2025  Plan of Care Certification Period: 12/31/2024 to 2/13/2025  Progress Note Due: within 30 days       Visit # / Visits authorized: 2/20 plus one evaluation        Surgical Procedure: none   Date of Surgery: none                                     Date of Return to MD: she is looking to get a follow up opinion in the [facility]      Precautions:   WBAT ; copd, asthma      Time In: 1104    Time Out: 1200  Total Time: 56   Total Billable Time untimed and timed codes: 37     Pt seen simultaneously with another patient and a  supervised therapy tech assisted.     Subjective   "I am doing so much better now. I really think those exercises have helped so much. Can i take the brace off now.?".  Pain reported as 0.      Objective       Objective measures updated at progress report unless specified.           Intake Outcome Measure for FOTO Survey    Therapist reviewed FOTO scores for Latia Melchor on 1/30/2025.   FOTO report - see Media section or FOTO account episode details.     Intake Score: 41 (taken on 12/31/2024: 60% in 11 visits)%  Survey Score 1:  %  Survey Score 2:  %    Treatment:  Therapeutic Exercise  Therapeutic Exercise Activity 1: wrist AROM with 1 lb weight  Therapeutic Exercise Activity 2: thumb AROM all planes of movement  Therapeutic Exercise " Activity 3: abduction/adduction od digits x10  Therapeutic Exercise Activity 4: digital extesnion x10  Therapeutic Exercise Activity 5: opposition and finger taps x10  Therapeutic Exercise Activity 6: tendon glides x10  Therapeutic Exercise Activity 7: prayer stretches  Therapeutic Exercise Activity 8: FA stretches all ways    Therapeutic Activity  Therapeutic Activity 1: towel walks 2 minutes  Therapeutic Activity 2: sponge gather x2  Therapeutic Activity 4: grey wheel flexion and extension/ pronation/supination         Manual Therapy  Manual Therapy Activity 1: Extensive Soft Tissue Mobilization to address R hand/edema massage   to increase blood flow/circulation, improve soft tissue pliability and decrease pain/edema    Balance/Neuromuscular Re-Education  Balance/Neuromuscular Re-Education Activity 1: isospheres  Balance/Neuromuscular Re-Education Activity 2: grey wheel flexion and extension/ pronation/supination  Balance/Neuromuscular Re-Education Activity 3: wrist maze    Modalities  Paraffin Bath: paraffin bath with MHP 7 minutes to reduce pain and increase soft tissue extensibility.     Patient's spiritual, cultural, and educational needs considered and patient agreeable to plan of care and goals.     Assessment & Plan   Assessment: Latia was seen for her second tx session since initial evaluation on this date. Pt has decided to no longer seek out a second opinon. She is reporting improvement with pain and movemnt. She was instructed to no longer wear her wrist brace as this will hinder her movment and make her stiff. Her overall movement on this date was improved from evaluation and decreased complaints of pain. Pt reeducated on the importance of performing her HEP. All exercises tolerated well and moderate verbal cues/tactile cues were utilized throughout treatment.  Latia is progressing towards her goals and there are no updates to goals at this time. Pt prognosis is Fair.      Latia will continue to benefit  from skilled outpatient occupational therapy services to address the deficits listed in the problem list on initial evaluation, to provide pt/family education, and to maximize pt's level of independence in the home and community environment.      Pt's spiritual, cultural and educational needs considered and pt agreeable to plan of care and goals.     Anticipated barriers to occupational therapy: participation in HEP and attendance to therapy and chronicity of her condition at this time.  Evaluation/Treatment Tolerance: Patient tolerated treatment well        Plan: Plan of Care Certification: 12/31/2024 to 2/13/2025      Outpatient Occupational Therapy 2 times weekly for 6 weeks to include the following interventions PRN: Fluidotherapy, Manual Therapy, Moist Heat/ Ice, Neuromuscular Re-ed, Orthotic Management and Training, Paraffin, Patient Education, Self Care, Therapeutic Activities, Therapeutic Exercise, and Ultrasound        Updates/Grading for next session: progress occupational therapy as tolerated     Lola Ayers OT    Goals:   Active       Long Term Goals        1. Pt will report decreased pain to 1-2/10 with ADL/IADL tasks and Pt will present with decreased time on the 9 hole peg test with her R affected hand  to indicate increased ability to perform dexterous tasks such as fasting clothing fasteners.   (Progressing)       Start:  01/31/25    Expected End:  02/13/25            2. Pt will exhibit 10 degrees of improvement in  R wrist RD/UD to enable independence with self-care and meal preparation.  (Progressing)       Start:  01/31/25    Expected End:  02/13/25            3.  strength to be assessed at a later date and goals will be added.   (Progressing)       Start:  01/31/25    Expected End:  02/13/25            4. Pt will report an increase in FOTO intake score of > 60%, which would indicate an improvement in quality of life.  (Progressing)       Start:  01/31/25    Expected End:  02/13/25             5. Pt will make a full, flat, tight  composite fist to enable grasping and squeezing objects for self-care with no reports of pain when holding a curling iron or hairbrush when working.    (Progressing)       Start:  01/31/25    Expected End:  02/13/25               Short Term Goals        1. Pt will be independent with HEP in 2 visits and understanding for pt education provided to help reduce her pain.  Pt will be compliant with edema management and display decreased swelling and improved mobility of her affected R wrist   (Progressing)       Start:  01/31/25    Expected End:  02/05/25            2. Pt will report decreased pain to a 5/10 with ADL/IADL tasks.   (Progressing)       Start:  01/31/25    Expected End:  02/05/25            3. Pt will increase R forearm sup AROM by 5 degrees to enable dressing, grooming activities and hair styling.   (Progressing)       Start:  01/31/25    Expected End:  02/05/25            4. Pt will increase R wrist flex/ext AROM by 10 degrees to enable dressing, grooming activities.  (Progressing)       Start:  01/31/25    Expected End:  02/05/25            5. Pt will report an increase in FOTO intake score of > 55%, which would indicate an improvement in quality of life.  (Progressing)       Start:  01/31/25    Expected End:  02/05/25

## 2025-02-11 PROBLEM — N30.90 CYSTITIS: Status: RESOLVED | Noted: 2022-07-11 | Resolved: 2025-02-11

## 2025-02-17 ENCOUNTER — PROCEDURE VISIT (OUTPATIENT)
Dept: OPHTHALMOLOGY | Facility: CLINIC | Age: 80
End: 2025-02-17
Payer: MEDICARE

## 2025-02-17 DIAGNOSIS — H35.3221 EXUDATIVE AGE-RELATED MACULAR DEGENERATION OF LEFT EYE WITH ACTIVE CHOROIDAL NEOVASCULARIZATION: Primary | ICD-10-CM

## 2025-02-17 RX ADMIN — Medication 1.5 MG: at 10:02
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
## 2025-02-20 DIAGNOSIS — G89.4 CHRONIC PAIN SYNDROME: ICD-10-CM

## 2025-02-20 DIAGNOSIS — B02.23 NEUROPATHY DUE TO HERPES ZOSTER: ICD-10-CM

## 2025-02-20 NOTE — TELEPHONE ENCOUNTER
Care Due:                  Date            Visit Type   Department     Provider  --------------------------------------------------------------------------------                                EP -                              PRIMARY      [facility] FAMILY  Last Visit: 01-      CARE ([facility])   MEDICINE       Mindy Hathaway  Next Visit: None Scheduled  None         None Found                                                            Last  Test          Frequency    Reason                     Performed    Due Date  --------------------------------------------------------------------------------    CBC.........  12 months..  OXcarbazepine............  01- 01-    CMP.........  12 months..  DULoxetine,                01- 01-                             OXcarbazepine, ezetimibe,                             simvastatin..............    Lipid Panel.  12 months..  ezetimibe, simvastatin...  04-   04-    TSH.........  12 months..  levothyroxine............  02- 01-    [facility] Embedded Care Due Messages. Reference number: [id number].   2/20/2025 10:46:41 AM CST

## 2025-02-21 RX ORDER — PREGABALIN 25 MG/1
25 CAPSULE ORAL 2 TIMES DAILY
Qty: 180 CAPSULE | Refills: 3 | OUTPATIENT
Start: 2025-02-21

## 2025-02-25 DIAGNOSIS — G89.4 CHRONIC PAIN SYNDROME: Primary | ICD-10-CM

## 2025-02-25 RX ORDER — PREGABALIN 25 MG/1
25 CAPSULE ORAL 2 TIMES DAILY
COMMUNITY
Start: 2025-01-27 | End: 2025-02-25 | Stop reason: SDUPTHER

## 2025-02-25 NOTE — TELEPHONE ENCOUNTER
Spoke with pt, stated that her CT results from [place] were sent in yesterday. Pt informed we would contact her once reviewed. Med refill pended for Lyrica.

## 2025-02-25 NOTE — TELEPHONE ENCOUNTER
----- Message from Alex sent at 2/25/2025  3:03 PM CST -----  Contact: isael Dooley is calling in regards ton getting a ct scan done at the [facility] and wants to know if the office has the resultsAlso requesting the Pregabalin 25 mg medication states she has been prescribed it before and needs it again for her legs Please call her at [phone]

## 2025-02-25 NOTE — TELEPHONE ENCOUNTER
No care due was identified.  [facility] Embedded Care Due Messages. Reference number: [id number].   2/25/2025 3:18:44 PM CST

## 2025-02-26 RX ORDER — PREGABALIN 25 MG/1
25 CAPSULE ORAL 2 TIMES DAILY
Qty: 60 CAPSULE | Refills: 5 | Status: SHIPPED | OUTPATIENT
Start: 2025-02-26

## 2025-03-05 ENCOUNTER — TELEPHONE (OUTPATIENT)
Dept: FAMILY MEDICINE | Facility: CLINIC | Age: 80
End: 2025-03-05
Payer: MEDICARE

## 2025-03-05 NOTE — TELEPHONE ENCOUNTER
----- Message from Jose sent at 3/5/2025 11:43 AM CST -----  Contact: [phone]  Type:  Needs Medical AdviceWho Called: CÉSAR LEMON [[id number]]Symptoms (please be specific): need to talk to the nurse about , her medication  pregabalin (LYRICA) 25 MG capsuleHow long has patient had these symptoms:  Pharmacy name and phone #:  Would the patient rather a call back or a response via [facility]? Call [facility] Call Back Number: [phone] Information: mrn [id number]\[facility] - [place] - [address] [place] [address] [address]Lar[address] Carter Memorial Hospitalcarlee [place] [address]Hzgjq: [phone] Fax: [phone]

## 2025-03-06 ENCOUNTER — TELEPHONE (OUTPATIENT)
Dept: FAMILY MEDICINE | Facility: CLINIC | Age: 80
End: 2025-03-06
Payer: MEDICARE

## 2025-03-06 NOTE — TELEPHONE ENCOUNTER
----- Message from Brigidofranco sent at 3/6/2025  9:48 AM CST -----  Contact: [phone]  Type:  Patient Returning CallWho Called:CÉSAR LEMON [[id number]]Who Left Message for Patient:Jens Robbins MADoes the patient know what this is regarding?:missed a call from your office Would the patient rather a call back or a response via [facility]? Call [facility] Call Back Number: [phone] Information: [id number]

## 2025-03-07 NOTE — TELEPHONE ENCOUNTER
Received the CT. CT shows resolution of previous pneumonia from last October. There is also mention of the nodule in the right upper lobe. The radiologist mentions minimal enlargement compared to previous imaging and her pulmonologist has mentioned this being stable in his last clinic note, however I would like for her to follow up with pulmonology for them to review.

## 2025-03-17 ENCOUNTER — DOCUMENTATION ONLY (OUTPATIENT)
Dept: REHABILITATION | Facility: HOSPITAL | Age: 80
End: 2025-03-17
Payer: MEDICARE

## 2025-03-17 PROBLEM — R60.0 LOCALIZED EDEMA: Status: RESOLVED | Noted: 2024-12-31 | Resolved: 2025-01-30

## 2025-03-17 PROBLEM — M25.631 STIFFNESS OF RIGHT WRIST JOINT: Status: RESOLVED | Noted: 2024-12-31 | Resolved: 2025-01-30

## 2025-03-17 PROBLEM — M25.531 PAIN IN RIGHT WRIST: Status: RESOLVED | Noted: 2024-12-31 | Resolved: 2025-01-30

## 2025-03-17 PROBLEM — Z78.9 IMPAIRED MOBILITY AND ADLS: Status: RESOLVED | Noted: 2024-12-31 | Resolved: 2025-01-30

## 2025-03-17 PROBLEM — Z74.09 IMPAIRED MOBILITY AND ADLS: Status: RESOLVED | Noted: 2024-12-31 | Resolved: 2025-01-30

## 2025-03-17 NOTE — PROGRESS NOTES
[facility]  Occupational Therapy Discharge Note    Name: Soumya Melchor  [facility] Number: [id number]    Therapy Diagnosis:        Encounter Diagnoses   Name Primary?    Pain in right wrist Yes    Stiffness of right wrist joint      Localized edema      Impaired mobility and ADLs        Physician: Magali De Souza FNP     Physician Orders: OT efrain and tx; ROM and strength R hand after closed treatment of distal radius fracture; WBAT  Medical Diagnosis: closed fracture of right wrist with routine healing, subsequent encounter (S62.465D)     Date of Last visit: 1/30/2025  Total Visits Received: 3    ASSESSMENT      Pt has not attended treatment since 1/30/2025. Please reference last note to assess her progress with treatment. If the pt would wish to return, she will require a new referral current status unknown.     Discharge reason: Patient has not attended therapy since 1/30/2025    Discharge FOTO Score: 41    Goals: Patient has not met goals secondary to non-compliance with therapy      PLAN   This patient is discharged from Occupational Therapy      Lola Ayers OT               steady

## 2025-03-18 DIAGNOSIS — E78.2 MIXED HYPERLIPIDEMIA: ICD-10-CM

## 2025-03-18 NOTE — TELEPHONE ENCOUNTER
No care due was identified.  [facility] Embedded Care Due Messages. Reference number: [id number].   3/18/2025 4:35:39 PM CDT

## 2025-03-19 NOTE — TELEPHONE ENCOUNTER
Refill Routing Note   Medication(s) are not appropriate for processing by [facility] for the following reason(s):        Required labs outdated    [facility] action(s):  Defer               Appointments  past 12m or future 3m with PCP    Date Provider   Last Visit   1/13/2025 Mindy Hathaway MD   Next Visit   Visit date not found Mindy Hathaway MD   ED visits in past 90 days: 0        Note composed:3:20 PM 03/19/2025

## 2025-03-20 RX ORDER — SIMVASTATIN 40 MG/1
40 TABLET, FILM COATED ORAL NIGHTLY
Qty: 90 TABLET | Refills: 3 | Status: SHIPPED | OUTPATIENT
Start: 2025-03-20

## 2025-03-24 DIAGNOSIS — Z00.00 ENCOUNTER FOR MEDICARE ANNUAL WELLNESS EXAM: ICD-10-CM

## 2025-03-26 ENCOUNTER — OFFICE VISIT (OUTPATIENT)
Dept: OPTOMETRY | Facility: CLINIC | Age: 80
End: 2025-03-26
Payer: MEDICARE

## 2025-03-26 DIAGNOSIS — H52.7 REFRACTIVE ERROR: ICD-10-CM

## 2025-03-26 DIAGNOSIS — H18.593 ANTERIOR CORNEAL DYSTROPHY OF BOTH EYES: ICD-10-CM

## 2025-03-26 DIAGNOSIS — H35.3221 EXUDATIVE AGE-RELATED MACULAR DEGENERATION OF LEFT EYE WITH ACTIVE CHOROIDAL NEOVASCULARIZATION: ICD-10-CM

## 2025-03-26 DIAGNOSIS — H04.123 BILATERAL DRY EYES: Primary | ICD-10-CM

## 2025-03-26 PROCEDURE — 1101F PT FALLS ASSESS-DOCD LE1/YR: CPT | Mod: CPTII,S$GLB,, | Performed by: OPTOMETRIST

## 2025-03-26 PROCEDURE — 3288F FALL RISK ASSESSMENT DOCD: CPT | Mod: CPTII,S$GLB,, | Performed by: OPTOMETRIST

## 2025-03-26 PROCEDURE — 1126F AMNT PAIN NOTED NONE PRSNT: CPT | Mod: CPTII,S$GLB,, | Performed by: OPTOMETRIST

## 2025-03-26 PROCEDURE — 1159F MED LIST DOCD IN RCRD: CPT | Mod: CPTII,S$GLB,, | Performed by: OPTOMETRIST

## 2025-03-26 PROCEDURE — 92015 DETERMINE REFRACTIVE STATE: CPT | Mod: S$GLB,,, | Performed by: OPTOMETRIST

## 2025-03-26 PROCEDURE — 99999 PR PBB SHADOW E&M-EST. PATIENT-LVL II: CPT | Mod: PBBFAC,,, | Performed by: OPTOMETRIST

## 2025-03-26 PROCEDURE — 1160F RVW MEDS BY RX/DR IN RCRD: CPT | Mod: CPTII,S$GLB,, | Performed by: OPTOMETRIST

## 2025-03-26 PROCEDURE — 99213 OFFICE O/P EST LOW 20 MIN: CPT | Mod: S$GLB,,, | Performed by: OPTOMETRIST

## 2025-03-26 NOTE — PROGRESS NOTES
HPI     Eye Problem            Comments: dry ou at dist, x mos, no assoc pain or red, no relief over   time, constant  Drops refresh help some, use as needed          Comments    Wrx-dle-5/24    Pt denies any vision changes since last visit. Some new floaters since   [facility] visit.   Wants new spec rx          Last edited by Duke Shah, OD on 3/26/2025  4:09 PM.            Assessment /Plan     For exam results, see Encounter Report.    Bilateral dry eyes    Exudative age-related macular degeneration of left eye with active choroidal neovascularization    Refractive error    Anterior corneal dystrophy of both eyes      Causing symptoms, Recommend increase artificial tears. 1 drop 2x per day. Chronicity of disease and treatment discussed.  2. Keep [facility] appts for shots  3. New Spectacle Rx given, discussed different options for glasses. RTC 1 year routine eye exam.  4. Monitor condition. Patient to report any changes. RTC 1 year recheck.

## 2025-03-29 DIAGNOSIS — K21.9 GASTRO-ESOPHAGEAL REFLUX DISEASE WITHOUT ESOPHAGITIS: ICD-10-CM

## 2025-03-31 RX ORDER — SUCRALFATE 1 G/1
TABLET ORAL
Qty: 360 TABLET | Refills: 0 | Status: SHIPPED | OUTPATIENT
Start: 2025-03-31

## 2025-03-31 NOTE — TELEPHONE ENCOUNTER
Refill Routing Note   Medication(s) are not appropriate for processing by [facility] for the following reason(s):        Outside of protocol  No active prescription written by provider-med was added back in as historical report    [facility] action(s):  Route               Appointments  past 12m or future 3m with PCP    Date Provider   Last Visit   1/13/2025 Mindy Hathaway MD   Next Visit   Visit date not found Mindy Hathaway MD   ED visits in past 90 days: 0        Note composed:8:02 AM 03/31/2025           Start PA for xopenex.  Iris Killian MD

## 2025-04-01 DIAGNOSIS — G89.4 CHRONIC PAIN SYNDROME: ICD-10-CM

## 2025-04-01 NOTE — TELEPHONE ENCOUNTER
No care due was identified.  [facility] Embedded Care Due Messages. Reference number: [id number].   4/01/2025 1:09:42 PM CDT

## 2025-04-02 ENCOUNTER — PATIENT OUTREACH (OUTPATIENT)
Dept: ADMINISTRATIVE | Facility: HOSPITAL | Age: 80
End: 2025-04-02
Payer: MEDICARE

## 2025-04-02 RX ORDER — DULOXETIN HYDROCHLORIDE 30 MG/1
30 CAPSULE, DELAYED RELEASE ORAL 2 TIMES DAILY
Qty: 180 CAPSULE | Refills: 0 | Status: SHIPPED | OUTPATIENT
Start: 2025-04-02

## 2025-04-02 NOTE — TELEPHONE ENCOUNTER
Refill Routing Note   Medication(s) are not appropriate for processing by [facility] for the following reason(s):        Required labs outdated: eGFR  Required labs abnormal: Cr    [facility] action(s):  Defer             Appointments  past 12m or future 3m with PCP    Date Provider   Last Visit   1/13/2025 Mindy Hathaway MD   Next Visit   Visit date not found Mindy Hathaway MD   ED visits in past 90 days: 0        Note composed:10:20 PM 04/01/2025

## 2025-04-15 ENCOUNTER — TELEPHONE (OUTPATIENT)
Dept: FAMILY MEDICINE | Facility: CLINIC | Age: 80
End: 2025-04-15
Payer: MEDICARE

## 2025-04-15 ENCOUNTER — RESULTS FOLLOW-UP (OUTPATIENT)
Dept: PRIMARY CARE CLINIC | Facility: CLINIC | Age: 80
End: 2025-04-15

## 2025-04-15 ENCOUNTER — HOSPITAL ENCOUNTER (OUTPATIENT)
Dept: RADIOLOGY | Facility: HOSPITAL | Age: 80
Discharge: HOME | End: 2025-04-15
Attending: INTERNAL MEDICINE
Payer: MEDICARE

## 2025-04-15 ENCOUNTER — TELEPHONE (OUTPATIENT)
Dept: PRIMARY CARE CLINIC | Facility: CLINIC | Age: 80
End: 2025-04-15
Payer: MEDICARE

## 2025-04-15 DIAGNOSIS — R05.9 COUGH, UNSPECIFIED TYPE: ICD-10-CM

## 2025-04-15 DIAGNOSIS — R05.9 COUGH, UNSPECIFIED TYPE: Primary | ICD-10-CM

## 2025-04-15 DIAGNOSIS — J44.1 COPD EXACERBATION: Primary | ICD-10-CM

## 2025-04-15 PROCEDURE — 71046 X-RAY EXAM CHEST 2 VIEWS: CPT | Mod: TC,PO

## 2025-04-15 PROCEDURE — 71046 X-RAY EXAM CHEST 2 VIEWS: CPT | Mod: 26,,, | Performed by: RADIOLOGY

## 2025-04-15 RX ORDER — PREDNISONE 20 MG/1
40 TABLET ORAL DAILY
Qty: 10 TABLET | Refills: 0 | Status: SHIPPED | OUTPATIENT
Start: 2025-04-15 | End: 2025-04-20

## 2025-04-15 RX ORDER — AZITHROMYCIN 250 MG/1
TABLET, FILM COATED ORAL
Qty: 6 TABLET | Refills: 0 | Status: SHIPPED | OUTPATIENT
Start: 2025-04-15

## 2025-04-15 NOTE — TELEPHONE ENCOUNTER
Chest xray did not show pneumonia but sending in steroid and z pack for possible COPD flare.     Also remind her that I had placed a referral for to see a lung specialist at [place] so she needs to make an appt to be seen if she has not already done so.    I have signed for the following orders AND/OR meds.  Please call the patient and ask the patient to schedule the testing AND/OR inform about any medications that were sent. Medications have been sent to pharmacy listed below      No orders of the defined types were placed in this encounter.      Medications Ordered This Encounter   Medications    azithromycin (Z-LOTTIE) 250 MG tablet     Sig: Take 2 tablets on day 1, then 1 tablet daily on days 2 - 5.     Dispense:  6 tablet     Refill:  0    predniSONE (DELTASONE) 20 MG tablet     Sig: Take 2 tablets (40 mg total) by mouth once daily. for 5 days     Dispense:  10 tablet     Refill:  0         [facility] - [facility] [address] [address] [facility] [address]  Phone: [phone] Fax: [phone]    [id number] - [place], [place] - [address] AT [facility] [address] [facility]  [address]  [facility] [address]  Phone: [phone] Fax: [phone]    [facility] - [facility] [address] [address] [facility] [address]  Phone: [phone] Fax: [phone]

## 2025-04-15 NOTE — TELEPHONE ENCOUNTER
----- Message from Valencia Barron sent at 4/14/2025  5:37 PM CDT -----  Type: Appointment RequestName of Caller:CÉSAR LEMON [[id number]]When is the first available appointment?No accessSymptoms: Symptom: CoughOutcome: Talk to a nurse or provider within 15 minutes.Reason: Any trouble breathingThe caller rejected this outcome.Would the patient rather a call back or a response via [facility]? [facility] Call Back Number:[phone] Additional Information: patient called in to schedule an appointment with providers office. Patient states she would like be seen as soon as possible because she is worried she may have pneumonia. Patient would like a callback with any additional information.

## 2025-04-16 NOTE — TELEPHONE ENCOUNTER
Spoke with pt regarding results/recommendations/rx sent to pharmacy. Pt stated that she already has an appt with pulmonology within [facility].

## 2025-05-12 ENCOUNTER — PROCEDURE VISIT (OUTPATIENT)
Dept: OPHTHALMOLOGY | Facility: CLINIC | Age: 80
End: 2025-05-12
Payer: MEDICARE

## 2025-05-12 DIAGNOSIS — H35.3221 EXUDATIVE AGE-RELATED MACULAR DEGENERATION OF LEFT EYE WITH ACTIVE CHOROIDAL NEOVASCULARIZATION: Primary | ICD-10-CM

## 2025-05-12 PROCEDURE — 92134 CPTRZ OPH DX IMG PST SGM RTA: CPT | Mod: PBBFAC,PO | Performed by: OPHTHALMOLOGY

## 2025-05-12 PROCEDURE — 67028 INJECTION EYE DRUG: CPT | Mod: PBBFAC,PO,LT | Performed by: OPHTHALMOLOGY

## 2025-05-12 PROCEDURE — 67028 INJECTION EYE DRUG: CPT | Mod: S$PBB,LT,, | Performed by: OPHTHALMOLOGY

## 2025-05-12 PROCEDURE — 92014 COMPRE OPH EXAM EST PT 1/>: CPT | Mod: 25,S$PBB,, | Performed by: OPHTHALMOLOGY

## 2025-05-12 RX ORDER — KETOCONAZOLE 20 MG/G
CREAM TOPICAL 2 TIMES DAILY PRN
COMMUNITY
Start: 2025-03-06

## 2025-05-12 RX ORDER — OMEPRAZOLE 40 MG/1
40 CAPSULE, DELAYED RELEASE ORAL
COMMUNITY
Start: 2025-03-18

## 2025-05-12 RX ORDER — BUDESONIDE, GLYCOPYRROLATE, AND FORMOTEROL FUMARATE 160; 9; 4.8 UG/1; UG/1; UG/1
2 AEROSOL, METERED RESPIRATORY (INHALATION) 2 TIMES DAILY
COMMUNITY
Start: 2025-02-03

## 2025-05-12 RX ADMIN — Medication 1.5 MG: at 10:05
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
## 2025-05-21 DIAGNOSIS — Z78.0 MENOPAUSE: ICD-10-CM

## 2025-05-27 NOTE — TELEPHONE ENCOUNTER
----- Message from Israel Ordonez NP sent at 2/23/2018  2:30 PM CST -----  Thyroid function is good    Allergies to: scallops, wheat, egg whites (low allergy), milk (moderate reaction) , and shrimp (high)      
Called pt and notified her of lab results and recommendations.  
No

## 2025-06-30 ENCOUNTER — TELEPHONE (OUTPATIENT)
Dept: FAMILY MEDICINE | Facility: CLINIC | Age: 80
End: 2025-06-30
Payer: MEDICARE

## 2025-06-30 ENCOUNTER — HOSPITAL ENCOUNTER (OUTPATIENT)
Dept: RADIOLOGY | Facility: HOSPITAL | Age: 80
Discharge: HOME | End: 2025-06-30
Attending: INTERNAL MEDICINE
Payer: MEDICARE

## 2025-06-30 DIAGNOSIS — Z78.0 MENOPAUSE: ICD-10-CM

## 2025-06-30 PROCEDURE — 77080 DXA BONE DENSITY AXIAL: CPT | Mod: TC,PO

## 2025-06-30 PROCEDURE — 77080 DXA BONE DENSITY AXIAL: CPT | Mod: 26,,, | Performed by: STUDENT IN AN ORGANIZED HEALTH CARE EDUCATION/TRAINING PROGRAM

## 2025-06-30 NOTE — TELEPHONE ENCOUNTER
----- Message from Hector sent at 6/30/2025  2:42 PM CDT -----  Pt would like like call back regarding recent black out and loss of arm strength. Pt contact is [phone]

## 2025-07-30 ENCOUNTER — TELEPHONE (OUTPATIENT)
Dept: FAMILY MEDICINE | Facility: CLINIC | Age: 80
End: 2025-07-30
Payer: MEDICARE

## 2025-07-30 NOTE — TELEPHONE ENCOUNTER
Copied from CRM #[id number]. Topic: General Inquiry - Patient Advice  >> Jul 30, 2025 12:20 PM Emelyn wrote:  Type:  Needs Medical Advice    Who Called: Soumya   Would the patient rather a call back or a response via [employer] [facility]? Call back   Best Call Back Number:  [phone] (home)    Additional Information: The patient has an appointment at 1:30pm today and would like for someone to assist her with a wheel chair.    Thanks,  SJ

## 2025-07-31 ENCOUNTER — OFFICE VISIT (OUTPATIENT)
Dept: FAMILY MEDICINE | Facility: CLINIC | Age: 80
End: 2025-07-31
Payer: MEDICARE

## 2025-07-31 ENCOUNTER — HOSPITAL ENCOUNTER (OUTPATIENT)
Dept: RADIOLOGY | Facility: HOSPITAL | Age: 80
Discharge: HOME | End: 2025-07-31
Attending: NURSE PRACTITIONER
Payer: MEDICARE

## 2025-07-31 VITALS
OXYGEN SATURATION: 97 % | BODY MASS INDEX: 26.31 KG/M2 | SYSTOLIC BLOOD PRESSURE: 132 MMHG | DIASTOLIC BLOOD PRESSURE: 74 MMHG | HEART RATE: 64 BPM | TEMPERATURE: 97 F | HEIGHT: 60 IN | WEIGHT: 134 LBS

## 2025-07-31 DIAGNOSIS — Z87.01 HISTORY OF PNEUMONIA: ICD-10-CM

## 2025-07-31 DIAGNOSIS — R07.1 PAINFUL BREATHING: ICD-10-CM

## 2025-07-31 DIAGNOSIS — R07.1 PAINFUL BREATHING: Primary | ICD-10-CM

## 2025-07-31 DIAGNOSIS — J34.9 SINUS PROBLEM: ICD-10-CM

## 2025-07-31 PROCEDURE — 1126F AMNT PAIN NOTED NONE PRSNT: CPT | Mod: CPTII,S$GLB,, | Performed by: NURSE PRACTITIONER

## 2025-07-31 PROCEDURE — 71110 X-RAY EXAM RIBS BIL 3 VIEWS: CPT | Mod: 26,,, | Performed by: RADIOLOGY

## 2025-07-31 PROCEDURE — 99213 OFFICE O/P EST LOW 20 MIN: CPT | Mod: S$GLB,,, | Performed by: NURSE PRACTITIONER

## 2025-07-31 PROCEDURE — 3078F DIAST BP <80 MM HG: CPT | Mod: CPTII,S$GLB,, | Performed by: NURSE PRACTITIONER

## 2025-07-31 PROCEDURE — 71110 X-RAY EXAM RIBS BIL 3 VIEWS: CPT | Mod: TC,PO

## 2025-07-31 PROCEDURE — 3075F SYST BP GE 130 - 139MM HG: CPT | Mod: CPTII,S$GLB,, | Performed by: NURSE PRACTITIONER

## 2025-07-31 PROCEDURE — 1101F PT FALLS ASSESS-DOCD LE1/YR: CPT | Mod: CPTII,S$GLB,, | Performed by: NURSE PRACTITIONER

## 2025-07-31 PROCEDURE — 71046 X-RAY EXAM CHEST 2 VIEWS: CPT | Mod: 26,,, | Performed by: RADIOLOGY

## 2025-07-31 PROCEDURE — 71046 X-RAY EXAM CHEST 2 VIEWS: CPT | Mod: TC,PO

## 2025-07-31 PROCEDURE — 3288F FALL RISK ASSESSMENT DOCD: CPT | Mod: CPTII,S$GLB,, | Performed by: NURSE PRACTITIONER

## 2025-07-31 PROCEDURE — 99999 PR PBB SHADOW E&M-EST. PATIENT-LVL V: CPT | Mod: PBBFAC,,, | Performed by: NURSE PRACTITIONER

## 2025-07-31 PROCEDURE — 1160F RVW MEDS BY RX/DR IN RCRD: CPT | Mod: CPTII,S$GLB,, | Performed by: NURSE PRACTITIONER

## 2025-07-31 PROCEDURE — 1159F MED LIST DOCD IN RCRD: CPT | Mod: CPTII,S$GLB,, | Performed by: NURSE PRACTITIONER

## 2025-07-31 NOTE — PROGRESS NOTES
"Subjective     Patient ID: Soumya Melchor is a 79 y.o. female.    Chief Complaint: Breathing Problem    Breathing Problem  There is no chest tightness, cough, difficulty breathing, frequent throat clearing, hemoptysis, hoarse voice, shortness of breath, sputum production or wheezing. Primary symptoms comments: Painful breathing; pt states, "I think my battery is gone out on my stimulator and they may have something to do with it. I'm going to pain management at 2p today. I just want to make sure I don't have pneumonia." Pt also sees pulmonology. The current episode started 1 to 4 weeks ago (x 3w). The problem occurs daily. The problem has been unchanged. Associated symptoms include nasal congestion and rhinorrhea. Pertinent negatives include no appetite change, chest pain, dyspnea on exertion, ear congestion, ear pain, fever, headaches, heartburn, malaise/fatigue, myalgias, orthopnea, PND, postnasal drip, sneezing, sore throat, sweats, trouble swallowing or weight loss. Associated symptoms comments: Uses continuous 02@ 2L. Exacerbated by: Deep breathing. Her symptoms are alleviated by nothing. She reports no improvement on treatment. Her symptoms are not alleviated by anxiolytic. There are no known risk factors for lung disease. Her past medical history is significant for asthma, COPD and pneumonia. There is no history of bronchiectasis, bronchitis or emphysema.     Past Medical History:   Diagnosis Date    Acid reflux 04/11/2014    Allergy     Anemia     Anxiety and depression 03/06/2014    AP (angina pectoris) 02/13/2017    Asthma     Biliary dyskinesia     CAD (coronary artery disease) 02/13/2017    CAD, multiple vessel 02/13/2017    Chronic pain associated with significant psychosocial dysfunction 02/21/2013    Chronic respiratory failure with hypoxia     on home o2 3lpm    CKD (chronic kidney disease) stage 3, GFR 30-59 ml/min     Dr. Vásquez    COPD (chronic obstructive pulmonary disease)     well " controlled, Dr Gomez [facility]    Depression     GERD (gastroesophageal reflux disease)     History of shingles     HTN (hypertension)     Hypothyroidism     Lung nodules     Memory loss     Multiple allergies     Neuropathy     Prediabetes     S/P CABG (coronary artery bypass graft) 02/13/2017     Social History[1]  Past Surgical History:   Procedure Laterality Date    CARDIAC SURGERY  02/2017    CABG x3    CATARACT EXTRACTION Bilateral 2014    CHOLECYSTECTOMY  2022    COLONOSCOPY  ~2013    Dr. Perez; colon polyps removed    COLONOSCOPY N/A 03/15/2018    Procedure: COLONOSCOPY;  Surgeon: Margarito Calloway MD;  Location: [facility];  Service: Endoscopy;  Laterality: N/A;    COLONOSCOPY N/A 07/25/2022    Procedure: COLONOSCOPY;  Surgeon: Margarito Calloway MD;  Location: [facility];  Service: Endoscopy;  Laterality: N/A;    DILATION AND CURETTAGE OF UTERUS      ENDOSCOPIC ULTRASOUND OF UPPER GASTROINTESTINAL TRACT Left 07/03/2018    Procedure: ULTRASOUND, ENDOSCOPIC, UPPER GI TRACT;  Surgeon: Jordy Greenberg MD;  Location: [facility];  Service: Endoscopy;  Laterality: Left;  Linear scope    ESOPHAGOGASTRODUODENOSCOPY N/A 07/03/2018    Procedure: EGD (ESOPHAGOGASTRODUODENOSCOPY);  Surgeon: Jordy Greenberg MD;  Location: [facility];  Service: Endoscopy;  Laterality: N/A;    ESOPHAGOGASTRODUODENOSCOPY N/A 07/29/2020    Procedure: EGD (ESOPHAGOGASTRODUODENOSCOPY);  Surgeon: Margarito Calloway MD;  Location: [facility];  Service: Endoscopy;  Laterality: N/A;    ESOPHAGOGASTRODUODENOSCOPY N/A 07/25/2022    Procedure: ESOPHAGOGASTRODUODENOSCOPY (EGD);  Surgeon: Margarito Calloway MD;  Location: [facility];  Service: Endoscopy;  Laterality: N/A;    INSERTION OF DORSAL COLUMN NERVE STIMULATOR FOR TRIAL N/A 06/19/2019    Procedure: INSERTION, NEUROSTIMULATOR, SPINAL CORD, DORSAL COLUMN, FOR TRIAL;  Surgeon: Ebenezer Rouse MD;  Location: [facility];  Service: Pain Management;  Laterality: N/A;    INSERTION OF  NEUROSTIMULATOR OF DORSAL COLUMN OF SPINAL CORD N/A 07/25/2019    Procedure: INSERTION, NEUROSTIMULATOR, SPINAL CORD, DORSAL COLUMN;  Surgeon: Ebenezer Rouse MD;  Location: [facility] OR;  Service: Pain Management;  Laterality: N/A;  removed old battery    KNEE SURGERY Right     LAPAROSCOPIC CHOLECYSTECTOMY N/A 07/13/2022    Procedure: CHOLECYSTECTOMY, LAPAROSCOPIC;  Surgeon: Tiffani Echeverria MD;  Location: [facility] OR;  Service: General;  Laterality: N/A;    REPLACEMENT OF NERVE STIMULATOR BATTERY  07/25/2019    Procedure: REPLACEMENT, BATTERY, NEUROSTIMULATOR;  Surgeon: Ebenezer Rouse MD;  Location: [facility] OR;  Service: Pain Management;;  Removed Old Battery- [employer] Model 1200   SN [id number]      ROBOT-ASSISTED SURGICAL REMOVAL OF STOMACH USING DA YVROSE XI N/A 07/31/2018    Procedure: XI ROBOTIC GASTRECTOMY-PARTIAL;  Surgeon: Dre Grey MD;  Location: [facility] OR;  Service: General;  Laterality: N/A;    SPINAL CORD STIMULATOR IMPLANT  02/12/2018    for pain secondary to shingles, sees Dr Kam for pain management    TONSILLECTOMY      TUMOR REMOVAL      UPPER GASTROINTESTINAL ENDOSCOPY         Review of Systems   Constitutional: Negative.  Negative for appetite change, fever, malaise/fatigue and weight loss.   HENT:  Positive for nasal congestion and rhinorrhea. Negative for ear pain, hoarse voice, postnasal drip, sinus pressure/congestion, sneezing, sore throat and trouble swallowing.    Eyes: Negative.    Respiratory:  Negative for cough, hemoptysis, sputum production, shortness of breath and wheezing.         Painful breathing   Cardiovascular: Negative.  Negative for chest pain, dyspnea on exertion and PND.   Gastrointestinal: Negative.  Negative for heartburn.   Endocrine: Negative.    Genitourinary: Negative.    Musculoskeletal: Negative.  Negative for myalgias.   Integumentary:  Negative.   Allergic/Immunologic: Negative.    Neurological: Negative.  Negative for headaches.   Psychiatric/Behavioral:  Negative.            Objective     Physical Exam  Vitals and nursing note reviewed.   Constitutional:       Appearance: Normal appearance.   HENT:      Head: Normocephalic.      Right Ear: Tympanic membrane, ear canal and external ear normal.      Left Ear: Tympanic membrane, ear canal and external ear normal.      Nose: Congestion present.      Mouth/Throat:      Mouth: Mucous membranes are moist.   Eyes:      Conjunctiva/sclera: Conjunctivae normal.      Pupils: Pupils are equal, round, and reactive to light.   Cardiovascular:      Rate and Rhythm: Normal rate and regular rhythm.      Pulses: Normal pulses.      Heart sounds: Normal heart sounds.   Pulmonary:      Effort: Pulmonary effort is normal.      Breath sounds: Normal breath sounds.   Abdominal:      General: Bowel sounds are normal.      Palpations: Abdomen is soft.   Musculoskeletal:      Cervical back: Normal range of motion and neck supple.   Skin:     General: Skin is warm and dry.      Capillary Refill: Capillary refill takes 2 to 3 seconds.   Neurological:      Mental Status: She is alert and oriented to person, place, and time.   Psychiatric:         Mood and Affect: Mood normal.         Behavior: Behavior normal.         Thought Content: Thought content normal.         Judgment: Judgment normal.            Assessment and Plan     1. Painful breathing  2. History of pneumonia  3. Sinus problem  -     X-Ray Chest PA And Lateral; Future; Expected date: 07/31/2025  -     X-Ray Ribs 3 Views Bilateral; Future; Expected date: 07/31/2025  Rx for zpak sent to pharmacy  Hydrate well  Rest  Report to ER immediately if symptoms worsen or persist                     No follow-ups on file.         [1]   Social History  Socioeconomic History    Marital status:    Tobacco Use    Smoking status: Former     Current packs/day: 0.00     Average packs/day: 1 pack/day for 52.9 years (52.9 ttl pk-yrs)     Types: Cigarettes, Vaping with nicotine     Start date:  1960     Quit date: 12/10/2012     Years since quittin.6    Smokeless tobacco: Never   Substance and Sexual Activity    Alcohol use: No    Drug use: No    Sexual activity: Never     Social Drivers of Health     Food Insecurity: Unknown (10/18/2024)    Received from [facility]    Hunger Vital Sign     Ran Out of Food in the Last Year: Never true   Transportation Needs: High Risk (2025)    Received from [facility] SDOH Screening     Has lack of transportation kept you from medical appointments, meetings, work or from getting things needed for daily living? choose all that apply.: Yes, it has kept me from non-medical meetings, appointments, work or from getting things that i need     Has lack of transportation kept you from medical appointments, meetings, work or from getting things needed for daily living? choose all that apply.: Yes, it has kept me from medical appointments or from getting my medications   Housing Stability: Unknown (10/18/2024)    Received from [facility]    Housing Stability Vital Sign     Homeless in the Last Year: No

## 2025-07-31 NOTE — PATIENT INSTRUCTIONS
Hydrate well  Rest  Report to ER immediately if symptoms worsen or persist    Devonte Dooley,     If you are due for any health screening(s) below please notify me so we can arrange them to be ordered and scheduled. Most healthy patients at your age complete them, but you are free to accept or refuse.     If you can't do it, I'll definitely understand. If you can, I'd certainly appreciate it!    All of your core healthy metrics are met.

## 2025-08-11 ENCOUNTER — PROCEDURE VISIT (OUTPATIENT)
Dept: OPHTHALMOLOGY | Facility: CLINIC | Age: 80
End: 2025-08-11
Payer: MEDICARE

## 2025-08-11 DIAGNOSIS — H35.3221 EXUDATIVE AGE-RELATED MACULAR DEGENERATION OF LEFT EYE WITH ACTIVE CHOROIDAL NEOVASCULARIZATION: Primary | ICD-10-CM

## 2025-08-11 PROCEDURE — 92134 CPTRZ OPH DX IMG PST SGM RTA: CPT | Mod: S$GLB,,, | Performed by: OPHTHALMOLOGY

## 2025-08-11 PROCEDURE — 92012 INTRM OPH EXAM EST PATIENT: CPT | Mod: 25,S$GLB,, | Performed by: OPHTHALMOLOGY

## 2025-08-11 PROCEDURE — 67028 INJECTION EYE DRUG: CPT | Mod: LT,S$GLB,, | Performed by: OPHTHALMOLOGY

## 2025-08-11 RX ORDER — CLOBETASOL PROPIONATE 0.5 MG/ML
SOLUTION TOPICAL
COMMUNITY
Start: 2025-08-04

## 2025-08-11 RX ADMIN — Medication 1.5 MG: at 10:08

## 2025-08-26 DIAGNOSIS — R56.9 SEIZURES, TRANSIENT: ICD-10-CM

## 2025-08-27 RX ORDER — OXCARBAZEPINE 150 MG/1
150 TABLET, FILM COATED ORAL 2 TIMES DAILY
Qty: 180 TABLET | Refills: 3 | Status: SHIPPED | OUTPATIENT
Start: 2025-08-27

## 2025-08-27 RX ORDER — OMEPRAZOLE 40 MG/1
40 CAPSULE, DELAYED RELEASE ORAL EVERY MORNING
Qty: 90 CAPSULE | Refills: 3 | Status: SHIPPED | OUTPATIENT
Start: 2025-08-27

## 2025-08-28 ENCOUNTER — TELEPHONE (OUTPATIENT)
Dept: FAMILY MEDICINE | Facility: CLINIC | Age: 80
End: 2025-08-28
Payer: MEDICARE

## 2025-09-03 DIAGNOSIS — E78.2 MIXED HYPERLIPIDEMIA: ICD-10-CM

## 2025-09-03 DIAGNOSIS — I10 ESSENTIAL HYPERTENSION: ICD-10-CM

## 2025-09-03 DIAGNOSIS — G89.4 CHRONIC PAIN SYNDROME: ICD-10-CM

## 2025-09-04 RX ORDER — EZETIMIBE 10 MG/1
10 TABLET ORAL DAILY
Qty: 90 TABLET | Refills: 0 | Status: SHIPPED | OUTPATIENT
Start: 2025-09-04

## 2025-09-04 RX ORDER — DULOXETIN HYDROCHLORIDE 30 MG/1
30 CAPSULE, DELAYED RELEASE ORAL 2 TIMES DAILY
Qty: 180 CAPSULE | Refills: 0 | Status: SHIPPED | OUTPATIENT
Start: 2025-09-04

## 2025-09-04 RX ORDER — AMLODIPINE BESYLATE 5 MG/1
5 TABLET ORAL DAILY
Qty: 90 TABLET | Refills: 0 | Status: SHIPPED | OUTPATIENT
Start: 2025-09-04

## 2025-09-04 RX ORDER — LEVOTHYROXINE SODIUM 200 UG/1
200 TABLET ORAL
Qty: 90 TABLET | Refills: 0 | Status: SHIPPED | OUTPATIENT
Start: 2025-09-04

## (undated) DEVICE — SPONGE IV DRAIN 4X4 STERILE

## (undated) DEVICE — SUT PROLENE 7-0 BV175-6

## (undated) DEVICE — SOL WATER STRL IRR 1000ML

## (undated) DEVICE — SOL 9P NACL IRR PIC IL

## (undated) DEVICE — SYS VEIN HARVESTING

## (undated) DEVICE — SEE MEDLINE ITEM 157131

## (undated) DEVICE — REMOVER LOTION

## (undated) DEVICE — DRAIN CHEST DRY SUCTION

## (undated) DEVICE — SEE MEDLINE ITEM 157148

## (undated) DEVICE — CLOSURE SKIN STERI STRIP 1/2X4

## (undated) DEVICE — DRESSING ADHESIVE ISLAND 3 X 6

## (undated) DEVICE — GLOVE SURGICAL LATEX SZ 7

## (undated) DEVICE — CONTAINER SPECIMEN STRL 4OZ

## (undated) DEVICE — DURAPREP SURG SCRUB 26ML

## (undated) DEVICE — SUT PROLENE 8-0 24IN

## (undated) DEVICE — SYR 30CC LUER LOCK

## (undated) DEVICE — WRENCH HEXAGONAL 3

## (undated) DEVICE — DRESSING SPONGE 8PLY 4X4 STRL

## (undated) DEVICE — SUT SILK 2-0 STRANDS 30IN

## (undated) DEVICE — COVER OVERHEAD SURG LT BLUE

## (undated) DEVICE — SEE MEDLINE ITEM 153199

## (undated) DEVICE — CABLE EXT SPECTRA 2FT 1X16

## (undated) DEVICE — Device

## (undated) DEVICE — SET DECANTER MEDICHOICE

## (undated) DEVICE — FILTER FASTSTART KIT 40 MICRON

## (undated) DEVICE — SUT 0 VICRYL / CT-1

## (undated) DEVICE — SEE ITEM 86274

## (undated) DEVICE — SYR 3CC LUER LOC

## (undated) DEVICE — CATH ALL PUR URTHL RR 20FR

## (undated) DEVICE — DRAPE SLUSH WARMER WITH DISC

## (undated) DEVICE — SOL ISOLYTE S PH 7.4 1000ML

## (undated) DEVICE — SUT SILK 0 SH 30IN BLK BR

## (undated) DEVICE — ELECTRODE REM PLYHSV RETURN 9

## (undated) DEVICE — TAPE RETRACT-O-TAPE

## (undated) DEVICE — CONNECTOR CHEST DRN TRPL

## (undated) DEVICE — SEE L#133928

## (undated) DEVICE — CHLORAPREP W TINT 26ML APPL

## (undated) DEVICE — SUT SILK 3-0 LIGAPAK LA54G

## (undated) DEVICE — SYR GLASS 5CC LUER LOK

## (undated) DEVICE — SUT MONOCYRL 4-0 PS2 UND

## (undated) DEVICE — SUT 2/0 36IN COATED VICRYL

## (undated) DEVICE — CABLE PACING SURG SM DISP ST

## (undated) DEVICE — SUT PROLENE 5/0 RB-1 36 IN

## (undated) DEVICE — SEE MEDLINE ITEM 153151

## (undated) DEVICE — SUT PROLENE 7-0 BV-1 30 BLU

## (undated) DEVICE — DRAIN CHANNEL ROUND 19FR

## (undated) DEVICE — SEE MEDLINE ITEM 146308

## (undated) DEVICE — HEMOSTAT SURGICEL FIBRLR 1X2IN

## (undated) DEVICE — CLIP LIGATING MEDIUM

## (undated) DEVICE — SEE MEDLINE ITEM 157117

## (undated) DEVICE — SUT VICRYL 2-0 27 CT-1

## (undated) DEVICE — SUT VICRYL 4-0 27 PS-1 27IN

## (undated) DEVICE — TRIAL KIT

## (undated) DEVICE — CLEANER TIP ELECSURG 2X2IN

## (undated) DEVICE — SUT 2/0 30IN SILK BLK BRAI

## (undated) DEVICE — NDL SPINAL SPINOCAN 22GX3.5

## (undated) DEVICE — GAUZE SPONGE 4X4 12PLY

## (undated) DEVICE — PUNCH AORTIC 4.0MM 6/CASE

## (undated) DEVICE — PACK OPEN HEART BR

## (undated) DEVICE — SEE L#120831

## (undated) DEVICE — SPONGE DERMACEA 4X4IN 12PLY

## (undated) DEVICE — TUBING HEATED INSUFFLATOR

## (undated) DEVICE — SOL LR INJ 1000ML BG

## (undated) DEVICE — SYR 10CC LUER LOCK

## (undated) DEVICE — IMPLANTABLE DEVICE
Type: IMPLANTABLE DEVICE | Site: HEART | Status: NON-FUNCTIONAL
Removed: 2017-02-13

## (undated) DEVICE — TAPE MEDIPORE 3 X 10YD

## (undated) DEVICE — SEE MEDLINE ITEM 146313

## (undated) DEVICE — SUT 2/0 36IN ETHIBOND EXCE

## (undated) DEVICE — TRAY FOLEY SURESTEP 16FR

## (undated) DEVICE — DRESSING TRANS 4X4 TEGADERM

## (undated) DEVICE — STABILIZER TRIPOD

## (undated) DEVICE — CAUTERY TIP POLISHER

## (undated) DEVICE — SUT SILK 1 BLK BRAID SA87G

## (undated) DEVICE — LIGATING CLIP LARGE

## (undated) DEVICE — DEVICE FIXATE SUT BAND DUAL PK

## (undated) DEVICE — ADHESIVE MASTISOL VIAL 48/BX

## (undated) DEVICE — SHUNT FLO THRU 1.5
Type: IMPLANTABLE DEVICE | Site: HEART | Status: NON-FUNCTIONAL
Removed: 2017-02-13

## (undated) DEVICE — DRESSING TRANS 2X2 TEGADERM

## (undated) DEVICE — BLOWER MISTER

## (undated) DEVICE — SPONGE LAP 4X18 PREWASHED

## (undated) DEVICE — DRESSING MEPORE ISLAND 31/2X4

## (undated) DEVICE — SUT STL DBL WR STNAL CCS#1

## (undated) DEVICE — DRAPE INCISE IOBAN 2 23X17IN

## (undated) DEVICE — SUT PROLENE 6-0 C-1 30IN BL

## (undated) DEVICE — SEE MEDLINE ITEM 152622

## (undated) DEVICE — WIRE TEMP PACING 0 SH SKS-3

## (undated) DEVICE — SEE MEDLINE ITEM 146231

## (undated) DEVICE — DRAPE C ARM 42 X 120 10/BX

## (undated) DEVICE — DECANTER VIAL ASEPTIC TRANSFER

## (undated) DEVICE — SOL NS 1000CC

## (undated) DEVICE — PENCIL ROCKER SWITCH 10FT CORD

## (undated) DEVICE — DERMABOND PAD PRINEO PATIENT

## (undated) DEVICE — DRAIN CHAN RND HUBLS 8MM 24FR

## (undated) DEVICE — SCALPEL #11 BLADE STRL DISP

## (undated) DEVICE — CLIPS LIGATING TITANIUM WDE SM

## (undated) DEVICE — SPONGE LAP 18X18 PREWASHED

## (undated) DEVICE — MANIFOLD 4 PORT

## (undated) DEVICE — SEE MEDLINE ITEM 157125

## (undated) DEVICE — SHUNT FLO THRU 2.0X18
Type: IMPLANTABLE DEVICE | Site: HEART | Status: NON-FUNCTIONAL
Removed: 2017-02-13

## (undated) DEVICE — SOL STRL WATER INJ 1000ML BG